# Patient Record
Sex: FEMALE | Race: BLACK OR AFRICAN AMERICAN | Employment: OTHER | URBAN - METROPOLITAN AREA
[De-identification: names, ages, dates, MRNs, and addresses within clinical notes are randomized per-mention and may not be internally consistent; named-entity substitution may affect disease eponyms.]

---

## 2017-01-03 ENCOUNTER — OFFICE VISIT (OUTPATIENT)
Dept: BARIATRICS/WEIGHT MGMT | Age: 52
End: 2017-01-03

## 2017-01-03 VITALS
SYSTOLIC BLOOD PRESSURE: 134 MMHG | BODY MASS INDEX: 36.8 KG/M2 | OXYGEN SATURATION: 97 % | HEART RATE: 90 BPM | HEIGHT: 66 IN | WEIGHT: 229 LBS | DIASTOLIC BLOOD PRESSURE: 83 MMHG

## 2017-01-03 DIAGNOSIS — F41.9 ANXIETY: ICD-10-CM

## 2017-01-03 DIAGNOSIS — K21.00 GASTROESOPHAGEAL REFLUX DISEASE WITH ESOPHAGITIS: ICD-10-CM

## 2017-01-03 DIAGNOSIS — E66.9 OBESITY (BMI 35.0-39.9 WITHOUT COMORBIDITY): Primary | ICD-10-CM

## 2017-01-03 DIAGNOSIS — Z01.818 PRE-OPERATIVE CLEARANCE: ICD-10-CM

## 2017-01-03 DIAGNOSIS — F17.219 CIGARETTE NICOTINE DEPENDENCE WITH NICOTINE-INDUCED DISORDER: ICD-10-CM

## 2017-01-03 PROBLEM — F17.200 NICOTINE ADDICTION: Status: ACTIVE | Noted: 2017-01-03

## 2017-01-03 PROCEDURE — 99214 OFFICE O/P EST MOD 30 MIN: CPT | Performed by: NURSE PRACTITIONER

## 2017-01-03 ASSESSMENT — ENCOUNTER SYMPTOMS
ABDOMINAL DISTENTION: 0
RHINORRHEA: 0
CHEST TIGHTNESS: 0
WHEEZING: 0
EYE PAIN: 0
SHORTNESS OF BREATH: 0
TROUBLE SWALLOWING: 0
DIARRHEA: 0
ABDOMINAL PAIN: 0
NAUSEA: 0

## 2017-01-27 ENCOUNTER — HOSPITAL ENCOUNTER (OUTPATIENT)
Dept: PHYSICAL THERAPY | Age: 52
Discharge: HOME | End: 2017-01-31
Attending: ANESTHESIOLOGY | Admitting: ANESTHESIOLOGY

## 2017-01-27 ASSESSMENT — PAIN DESCRIPTION - ORIENTATION: ORIENTATION: RIGHT;LEFT

## 2017-01-27 ASSESSMENT — PAIN DESCRIPTION - PAIN TYPE: TYPE: CHRONIC PAIN

## 2017-01-27 ASSESSMENT — PAIN DESCRIPTION - LOCATION: LOCATION: BACK

## 2017-01-27 ASSESSMENT — PAIN DESCRIPTION - DESCRIPTORS: DESCRIPTORS: TIGHTNESS;ACHING

## 2017-02-01 ENCOUNTER — HOSPITAL ENCOUNTER (OUTPATIENT)
Dept: OTHER | Age: 52
Discharge: HOME | End: 2017-02-28
Attending: ANESTHESIOLOGY | Admitting: INTERNAL MEDICINE

## 2017-02-02 ENCOUNTER — OFFICE VISIT (OUTPATIENT)
Dept: BARIATRICS/WEIGHT MGMT | Age: 52
End: 2017-02-02

## 2017-02-02 VITALS
DIASTOLIC BLOOD PRESSURE: 80 MMHG | WEIGHT: 225.5 LBS | BODY MASS INDEX: 36.24 KG/M2 | HEIGHT: 66 IN | HEART RATE: 69 BPM | SYSTOLIC BLOOD PRESSURE: 131 MMHG | RESPIRATION RATE: 12 BRPM

## 2017-02-02 DIAGNOSIS — E66.9 OBESITY (BMI 35.0-39.9 WITHOUT COMORBIDITY): ICD-10-CM

## 2017-02-02 DIAGNOSIS — F32.9 REACTIVE DEPRESSION: ICD-10-CM

## 2017-02-02 DIAGNOSIS — M70.61 TROCHANTERIC BURSITIS OF RIGHT HIP: ICD-10-CM

## 2017-02-02 DIAGNOSIS — N83.201 CYSTS OF BOTH OVARIES: ICD-10-CM

## 2017-02-02 DIAGNOSIS — M54.41 CHRONIC RIGHT-SIDED LOW BACK PAIN WITH RIGHT-SIDED SCIATICA: ICD-10-CM

## 2017-02-02 DIAGNOSIS — I10 ESSENTIAL HYPERTENSION: Primary | Chronic | ICD-10-CM

## 2017-02-02 DIAGNOSIS — J43.9 PULMONARY EMPHYSEMA, UNSPECIFIED EMPHYSEMA TYPE (HCC): Chronic | ICD-10-CM

## 2017-02-02 DIAGNOSIS — F17.219 CIGARETTE NICOTINE DEPENDENCE WITH NICOTINE-INDUCED DISORDER: ICD-10-CM

## 2017-02-02 DIAGNOSIS — E78.5 DYSLIPIDEMIA: ICD-10-CM

## 2017-02-02 DIAGNOSIS — E66.01 MORBID OBESITY DUE TO EXCESS CALORIES (HCC): Chronic | ICD-10-CM

## 2017-02-02 DIAGNOSIS — R06.83 SNORES: ICD-10-CM

## 2017-02-02 DIAGNOSIS — G89.29 CHRONIC RIGHT-SIDED LOW BACK PAIN WITH RIGHT-SIDED SCIATICA: ICD-10-CM

## 2017-02-02 DIAGNOSIS — J96.10 CHRONIC RESPIRATORY FAILURE, UNSPECIFIED WHETHER WITH HYPOXIA OR HYPERCAPNIA (HCC): ICD-10-CM

## 2017-02-02 DIAGNOSIS — N83.202 CYSTS OF BOTH OVARIES: ICD-10-CM

## 2017-02-02 DIAGNOSIS — K21.00 GASTROESOPHAGEAL REFLUX DISEASE WITH ESOPHAGITIS: ICD-10-CM

## 2017-02-02 DIAGNOSIS — F41.9 ANXIETY: ICD-10-CM

## 2017-02-02 DIAGNOSIS — J44.9 COPD, SEVERE (HCC): ICD-10-CM

## 2017-02-02 DIAGNOSIS — M25.551 ARTHRALGIA OF RIGHT HIP: ICD-10-CM

## 2017-02-02 DIAGNOSIS — G57.01 PIRIFORMIS SYNDROME, RIGHT: ICD-10-CM

## 2017-02-02 DIAGNOSIS — Z86.14 HISTORY OF MRSA INFECTION: ICD-10-CM

## 2017-02-02 DIAGNOSIS — N39.3 SUI (STRESS URINARY INCONTINENCE, FEMALE): ICD-10-CM

## 2017-02-02 PROCEDURE — 99214 OFFICE O/P EST MOD 30 MIN: CPT | Performed by: SURGERY

## 2017-02-03 ENCOUNTER — HOSPITAL ENCOUNTER (OUTPATIENT)
Dept: SLEEP CENTER | Age: 52
Discharge: HOME | End: 2017-02-03
Attending: INTERNAL MEDICINE | Admitting: INTERNAL MEDICINE

## 2017-02-14 ENCOUNTER — HOSPITAL ENCOUNTER (OUTPATIENT)
Dept: PHYSICAL THERAPY | Age: 52
Discharge: HOME | End: 2017-02-14

## 2017-02-20 ENCOUNTER — HOSPITAL ENCOUNTER (OUTPATIENT)
Dept: PHYSICAL THERAPY | Age: 52
Discharge: HOME | End: 2017-02-20

## 2017-03-01 ENCOUNTER — OFFICE VISIT (OUTPATIENT)
Dept: BARIATRICS/WEIGHT MGMT | Age: 52
End: 2017-03-01

## 2017-03-01 ENCOUNTER — HOSPITAL ENCOUNTER (OUTPATIENT)
Dept: OTHER | Age: 52
Discharge: HOME | End: 2017-03-31
Attending: ANESTHESIOLOGY | Admitting: INTERNAL MEDICINE

## 2017-03-01 VITALS
RESPIRATION RATE: 16 BRPM | BODY MASS INDEX: 35.81 KG/M2 | HEIGHT: 66 IN | DIASTOLIC BLOOD PRESSURE: 77 MMHG | SYSTOLIC BLOOD PRESSURE: 110 MMHG | WEIGHT: 222.8 LBS | HEART RATE: 89 BPM

## 2017-03-01 DIAGNOSIS — M54.41 CHRONIC RIGHT-SIDED LOW BACK PAIN WITH RIGHT-SIDED SCIATICA: ICD-10-CM

## 2017-03-01 DIAGNOSIS — G89.29 CHRONIC RIGHT-SIDED LOW BACK PAIN WITH RIGHT-SIDED SCIATICA: ICD-10-CM

## 2017-03-01 DIAGNOSIS — J96.10 CHRONIC RESPIRATORY FAILURE, UNSPECIFIED WHETHER WITH HYPOXIA OR HYPERCAPNIA (HCC): ICD-10-CM

## 2017-03-01 DIAGNOSIS — E66.9 OBESITY (BMI 35.0-39.9 WITHOUT COMORBIDITY): ICD-10-CM

## 2017-03-01 DIAGNOSIS — R06.83 SNORES: ICD-10-CM

## 2017-03-01 DIAGNOSIS — I10 ESSENTIAL HYPERTENSION: Chronic | ICD-10-CM

## 2017-03-01 DIAGNOSIS — N39.3 SUI (STRESS URINARY INCONTINENCE, FEMALE): ICD-10-CM

## 2017-03-01 DIAGNOSIS — K29.31 CHRONIC SUPERFICIAL GASTRITIS WITH BLEEDING: ICD-10-CM

## 2017-03-01 DIAGNOSIS — Z86.14 HISTORY OF MRSA INFECTION: ICD-10-CM

## 2017-03-01 DIAGNOSIS — J44.9 COPD, SEVERE (HCC): ICD-10-CM

## 2017-03-01 DIAGNOSIS — E66.01 MORBID OBESITY DUE TO EXCESS CALORIES (HCC): ICD-10-CM

## 2017-03-01 DIAGNOSIS — J43.9 PULMONARY EMPHYSEMA, UNSPECIFIED EMPHYSEMA TYPE (HCC): Chronic | ICD-10-CM

## 2017-03-01 DIAGNOSIS — E78.5 DYSLIPIDEMIA: ICD-10-CM

## 2017-03-01 DIAGNOSIS — F41.9 ANXIETY: ICD-10-CM

## 2017-03-01 DIAGNOSIS — K21.00 GASTROESOPHAGEAL REFLUX DISEASE WITH ESOPHAGITIS: ICD-10-CM

## 2017-03-01 DIAGNOSIS — N83.201 CYSTS OF BOTH OVARIES: ICD-10-CM

## 2017-03-01 DIAGNOSIS — R10.11 RUQ PAIN: Primary | ICD-10-CM

## 2017-03-01 DIAGNOSIS — F17.219 CIGARETTE NICOTINE DEPENDENCE WITH NICOTINE-INDUCED DISORDER: ICD-10-CM

## 2017-03-01 DIAGNOSIS — N83.202 CYSTS OF BOTH OVARIES: ICD-10-CM

## 2017-03-01 DIAGNOSIS — F32.9 REACTIVE DEPRESSION: ICD-10-CM

## 2017-03-01 DIAGNOSIS — G57.01 PIRIFORMIS SYNDROME, RIGHT: ICD-10-CM

## 2017-03-01 DIAGNOSIS — M70.61 TROCHANTERIC BURSITIS OF RIGHT HIP: ICD-10-CM

## 2017-03-01 DIAGNOSIS — M25.551 ARTHRALGIA OF RIGHT HIP: ICD-10-CM

## 2017-03-01 PROCEDURE — 99214 OFFICE O/P EST MOD 30 MIN: CPT | Performed by: SURGERY

## 2017-03-10 ENCOUNTER — HOSPITAL ENCOUNTER (OUTPATIENT)
Dept: MRI IMAGING | Age: 52
Discharge: HOME | End: 2017-03-10
Attending: INTERNAL MEDICINE | Admitting: INTERNAL MEDICINE

## 2017-03-10 DIAGNOSIS — M54.42 LOW BACK PAIN WITH LEFT-SIDED SCIATICA: ICD-10-CM

## 2017-03-10 DIAGNOSIS — G89.29 CHRONIC BILATERAL LOW BACK PAIN WITH LEFT-SIDED SCIATICA: ICD-10-CM

## 2017-03-10 DIAGNOSIS — M54.42 CHRONIC BILATERAL LOW BACK PAIN WITH LEFT-SIDED SCIATICA: ICD-10-CM

## 2017-03-22 ENCOUNTER — OFFICE VISIT (OUTPATIENT)
Dept: BARIATRICS/WEIGHT MGMT | Age: 52
End: 2017-03-22

## 2017-03-22 VITALS
WEIGHT: 228.2 LBS | DIASTOLIC BLOOD PRESSURE: 72 MMHG | BODY MASS INDEX: 36.67 KG/M2 | SYSTOLIC BLOOD PRESSURE: 109 MMHG | HEIGHT: 66 IN | RESPIRATION RATE: 16 BRPM | HEART RATE: 80 BPM

## 2017-03-22 DIAGNOSIS — R06.83 SNORES: ICD-10-CM

## 2017-03-22 DIAGNOSIS — E11.42 TYPE 2 DIABETES MELLITUS WITH DIABETIC POLYNEUROPATHY, WITHOUT LONG-TERM CURRENT USE OF INSULIN (HCC): Chronic | ICD-10-CM

## 2017-03-22 DIAGNOSIS — E66.9 OBESITY (BMI 30-39.9): Primary | ICD-10-CM

## 2017-03-22 PROCEDURE — 99214 OFFICE O/P EST MOD 30 MIN: CPT | Performed by: SURGERY

## 2017-03-22 RX ORDER — NAPROXEN 250 MG/1
250 TABLET ORAL 2 TIMES DAILY WITH MEALS
COMMUNITY
End: 2017-10-19

## 2017-03-23 ENCOUNTER — HOSPITAL ENCOUNTER (OUTPATIENT)
Dept: ULTRASOUND IMAGING | Age: 52
Discharge: HOME | End: 2017-03-23
Attending: SURGERY | Admitting: SURGERY

## 2017-03-23 DIAGNOSIS — R10.11 RUQ PAIN: ICD-10-CM

## 2017-04-01 ENCOUNTER — HOSPITAL ENCOUNTER (OUTPATIENT)
Dept: OTHER | Age: 52
Discharge: HOME | End: 2017-04-30
Attending: ANESTHESIOLOGY | Admitting: INTERNAL MEDICINE

## 2017-04-24 ENCOUNTER — OFFICE VISIT (OUTPATIENT)
Dept: BARIATRICS/WEIGHT MGMT | Age: 52
End: 2017-04-24

## 2017-04-24 VITALS
HEIGHT: 66 IN | DIASTOLIC BLOOD PRESSURE: 77 MMHG | WEIGHT: 228.2 LBS | HEART RATE: 91 BPM | SYSTOLIC BLOOD PRESSURE: 140 MMHG | BODY MASS INDEX: 36.67 KG/M2

## 2017-04-24 DIAGNOSIS — I10 ESSENTIAL HYPERTENSION: Chronic | ICD-10-CM

## 2017-04-24 DIAGNOSIS — K21.00 GASTROESOPHAGEAL REFLUX DISEASE WITH ESOPHAGITIS: ICD-10-CM

## 2017-04-24 DIAGNOSIS — F32.9 REACTIVE DEPRESSION: ICD-10-CM

## 2017-04-24 DIAGNOSIS — E66.9 OBESITY (BMI 35.0-39.9 WITHOUT COMORBIDITY): ICD-10-CM

## 2017-04-24 DIAGNOSIS — N39.3 SUI (STRESS URINARY INCONTINENCE, FEMALE): ICD-10-CM

## 2017-04-24 DIAGNOSIS — E11.42 TYPE 2 DIABETES MELLITUS WITH DIABETIC POLYNEUROPATHY, WITHOUT LONG-TERM CURRENT USE OF INSULIN (HCC): Primary | Chronic | ICD-10-CM

## 2017-04-24 DIAGNOSIS — E78.5 DYSLIPIDEMIA: ICD-10-CM

## 2017-04-24 PROCEDURE — 99214 OFFICE O/P EST MOD 30 MIN: CPT | Performed by: NURSE PRACTITIONER

## 2017-04-24 RX ORDER — MONTELUKAST SODIUM 10 MG/1
1 TABLET ORAL NIGHTLY
Refills: 6 | COMMUNITY
Start: 2017-02-01 | End: 2018-11-19

## 2017-04-24 RX ORDER — GABAPENTIN 600 MG/1
1 TABLET ORAL 4 TIMES DAILY
Refills: 0 | COMMUNITY
Start: 2017-04-17 | End: 2017-10-19

## 2017-04-24 RX ORDER — LORATADINE 10 MG/1
1 TABLET ORAL DAILY
Refills: 3 | COMMUNITY
Start: 2017-02-01 | End: 2017-10-19

## 2017-04-24 RX ORDER — HYDROXYZINE HYDROCHLORIDE 25 MG/1
1 TABLET, FILM COATED ORAL 3 TIMES DAILY
Refills: 0 | COMMUNITY
Start: 2017-02-01 | End: 2017-10-19

## 2017-04-24 RX ORDER — VENLAFAXINE HYDROCHLORIDE 150 MG/1
1 CAPSULE, EXTENDED RELEASE ORAL DAILY
Refills: 3 | COMMUNITY
Start: 2017-03-21 | End: 2017-10-19

## 2017-04-24 RX ORDER — METFORMIN HYDROCHLORIDE 500 MG/1
500 TABLET, EXTENDED RELEASE ORAL 2 TIMES DAILY
Refills: 0 | COMMUNITY
Start: 2017-03-10 | End: 2018-08-16 | Stop reason: DRUGHIGH

## 2017-04-24 RX ORDER — NYSTATIN 100000 [USP'U]/G
POWDER TOPICAL
Refills: 2 | COMMUNITY
Start: 2017-04-10 | End: 2017-10-19

## 2017-04-24 RX ORDER — NAPROXEN SODIUM 550 MG/1
1 TABLET ORAL 2 TIMES DAILY
Refills: 11 | COMMUNITY
Start: 2017-03-16 | End: 2017-10-19

## 2017-04-24 RX ORDER — TIZANIDINE 4 MG/1
1 TABLET ORAL 2 TIMES DAILY
Refills: 11 | COMMUNITY
Start: 2017-04-15 | End: 2019-07-26 | Stop reason: ALTCHOICE

## 2017-04-24 RX ORDER — LISINOPRIL AND HYDROCHLOROTHIAZIDE 25; 20 MG/1; MG/1
2 TABLET ORAL EVERY MORNING
Refills: 6 | COMMUNITY
Start: 2017-03-13 | End: 2018-12-12 | Stop reason: ALTCHOICE

## 2017-04-24 RX ORDER — GLIPIZIDE 5 MG/1
5 TABLET ORAL 2 TIMES DAILY
Refills: 4 | COMMUNITY
Start: 2017-03-09 | End: 2018-06-27 | Stop reason: ALTCHOICE

## 2017-04-24 RX ORDER — TRAZODONE HYDROCHLORIDE 50 MG/1
50 TABLET ORAL NIGHTLY
Refills: 3 | COMMUNITY
Start: 2017-03-21 | End: 2018-01-25 | Stop reason: SDUPTHER

## 2017-04-24 RX ORDER — KETOCONAZOLE 20 MG/ML
SHAMPOO TOPICAL
Refills: 10 | COMMUNITY
Start: 2017-04-10 | End: 2017-10-19

## 2017-04-24 RX ORDER — SITAGLIPTIN 100 MG/1
1 TABLET, FILM COATED ORAL EVERY MORNING
Refills: 3 | COMMUNITY
Start: 2017-03-08 | End: 2018-08-16 | Stop reason: SDUPTHER

## 2017-04-24 RX ORDER — ATORVASTATIN CALCIUM 40 MG/1
1 TABLET, FILM COATED ORAL DAILY
Refills: 5 | COMMUNITY
Start: 2017-02-01 | End: 2017-10-19

## 2017-04-24 ASSESSMENT — ENCOUNTER SYMPTOMS
ABDOMINAL DISTENTION: 0
ABDOMINAL PAIN: 0
NAUSEA: 0
DIARRHEA: 0
CHEST TIGHTNESS: 0
EYE PAIN: 0
COUGH: 1
SHORTNESS OF BREATH: 0
WHEEZING: 0
TROUBLE SWALLOWING: 0
RHINORRHEA: 0
BACK PAIN: 1

## 2017-04-25 ENCOUNTER — TELEPHONE (OUTPATIENT)
Dept: BARIATRICS/WEIGHT MGMT | Age: 52
End: 2017-04-25

## 2017-04-26 ENCOUNTER — TELEPHONE (OUTPATIENT)
Dept: PHYSICAL MEDICINE AND REHAB | Age: 52
End: 2017-04-26

## 2017-04-27 ENCOUNTER — OFFICE VISIT (OUTPATIENT)
Dept: PHYSICAL MEDICINE AND REHAB | Age: 52
End: 2017-04-27

## 2017-04-27 DIAGNOSIS — M79.602 PARESTHESIA AND PAIN OF BOTH UPPER EXTREMITIES: ICD-10-CM

## 2017-04-27 DIAGNOSIS — M79.601 PARESTHESIA AND PAIN OF BOTH UPPER EXTREMITIES: ICD-10-CM

## 2017-04-27 DIAGNOSIS — G56.03 BILATERAL CARPAL TUNNEL SYNDROME: Primary | ICD-10-CM

## 2017-04-27 DIAGNOSIS — R20.2 PARESTHESIA AND PAIN OF BOTH UPPER EXTREMITIES: ICD-10-CM

## 2017-04-27 PROCEDURE — 95910 NRV CNDJ TEST 7-8 STUDIES: CPT | Performed by: PHYSICAL MEDICINE & REHABILITATION

## 2017-04-27 PROCEDURE — 95886 MUSC TEST DONE W/N TEST COMP: CPT | Performed by: PHYSICAL MEDICINE & REHABILITATION

## 2017-05-01 ENCOUNTER — HOSPITAL ENCOUNTER (OUTPATIENT)
Dept: OTHER | Age: 52
Discharge: HOME | End: 2017-05-31
Attending: ANESTHESIOLOGY | Admitting: INTERNAL MEDICINE

## 2017-05-24 ENCOUNTER — OFFICE VISIT (OUTPATIENT)
Dept: BARIATRICS/WEIGHT MGMT | Age: 52
End: 2017-05-24

## 2017-05-24 VITALS
BODY MASS INDEX: 36.4 KG/M2 | DIASTOLIC BLOOD PRESSURE: 90 MMHG | SYSTOLIC BLOOD PRESSURE: 138 MMHG | HEIGHT: 66 IN | WEIGHT: 226.5 LBS | HEART RATE: 85 BPM

## 2017-05-24 DIAGNOSIS — E66.01 MORBID OBESITY DUE TO EXCESS CALORIES (HCC): Primary | ICD-10-CM

## 2017-05-24 PROCEDURE — 99214 OFFICE O/P EST MOD 30 MIN: CPT | Performed by: SURGERY

## 2017-06-29 ENCOUNTER — OFFICE VISIT (OUTPATIENT)
Dept: BARIATRICS/WEIGHT MGMT | Age: 52
End: 2017-06-29

## 2017-06-29 VITALS
WEIGHT: 230.8 LBS | BODY MASS INDEX: 37.09 KG/M2 | HEIGHT: 66 IN | HEART RATE: 85 BPM | SYSTOLIC BLOOD PRESSURE: 130 MMHG | DIASTOLIC BLOOD PRESSURE: 86 MMHG

## 2017-06-29 DIAGNOSIS — J96.10 CHRONIC RESPIRATORY FAILURE, UNSPECIFIED WHETHER WITH HYPOXIA OR HYPERCAPNIA (HCC): ICD-10-CM

## 2017-06-29 DIAGNOSIS — E11.42 TYPE 2 DIABETES MELLITUS WITH DIABETIC POLYNEUROPATHY, WITHOUT LONG-TERM CURRENT USE OF INSULIN (HCC): Chronic | ICD-10-CM

## 2017-06-29 DIAGNOSIS — G89.29 CHRONIC RIGHT-SIDED LOW BACK PAIN WITH RIGHT-SIDED SCIATICA: ICD-10-CM

## 2017-06-29 DIAGNOSIS — Z86.14 HISTORY OF MRSA INFECTION: ICD-10-CM

## 2017-06-29 DIAGNOSIS — J44.9 COPD, SEVERE (HCC): ICD-10-CM

## 2017-06-29 DIAGNOSIS — F41.9 ANXIETY: Primary | ICD-10-CM

## 2017-06-29 DIAGNOSIS — R06.83 SNORES: ICD-10-CM

## 2017-06-29 DIAGNOSIS — I10 ESSENTIAL HYPERTENSION: Chronic | ICD-10-CM

## 2017-06-29 DIAGNOSIS — M25.551 ARTHRALGIA OF RIGHT HIP: ICD-10-CM

## 2017-06-29 DIAGNOSIS — F32.9 REACTIVE DEPRESSION: ICD-10-CM

## 2017-06-29 DIAGNOSIS — M54.41 CHRONIC RIGHT-SIDED LOW BACK PAIN WITH RIGHT-SIDED SCIATICA: ICD-10-CM

## 2017-06-29 DIAGNOSIS — N39.3 SUI (STRESS URINARY INCONTINENCE, FEMALE): ICD-10-CM

## 2017-06-29 DIAGNOSIS — G89.4 CHRONIC PAIN SYNDROME: Chronic | ICD-10-CM

## 2017-06-29 DIAGNOSIS — K21.00 GASTROESOPHAGEAL REFLUX DISEASE WITH ESOPHAGITIS: ICD-10-CM

## 2017-06-29 DIAGNOSIS — E78.5 DYSLIPIDEMIA: ICD-10-CM

## 2017-06-29 DIAGNOSIS — J43.9 PULMONARY EMPHYSEMA, UNSPECIFIED EMPHYSEMA TYPE (HCC): Chronic | ICD-10-CM

## 2017-06-29 DIAGNOSIS — E66.01 MORBID OBESITY DUE TO EXCESS CALORIES (HCC): ICD-10-CM

## 2017-06-29 PROCEDURE — 99214 OFFICE O/P EST MOD 30 MIN: CPT | Performed by: SURGERY

## 2017-06-30 ASSESSMENT — ENCOUNTER SYMPTOMS
SHORTNESS OF BREATH: 1
COUGH: 0
ABDOMINAL DISTENTION: 1
DIARRHEA: 0
CONSTIPATION: 0
TROUBLE SWALLOWING: 0
ABDOMINAL PAIN: 1
VOMITING: 0
ANAL BLEEDING: 0
COLOR CHANGE: 0
BACK PAIN: 1
VOICE CHANGE: 0
SORE THROAT: 0
NAUSEA: 0
PHOTOPHOBIA: 0
BLOOD IN STOOL: 0
WHEEZING: 0

## 2017-07-20 ENCOUNTER — OFFICE VISIT (OUTPATIENT)
Dept: BARIATRICS/WEIGHT MGMT | Age: 52
End: 2017-07-20

## 2017-07-20 VITALS
WEIGHT: 230.1 LBS | HEIGHT: 66 IN | BODY MASS INDEX: 36.98 KG/M2 | HEART RATE: 102 BPM | SYSTOLIC BLOOD PRESSURE: 124 MMHG | DIASTOLIC BLOOD PRESSURE: 85 MMHG

## 2017-07-20 DIAGNOSIS — N39.3 SUI (STRESS URINARY INCONTINENCE, FEMALE): ICD-10-CM

## 2017-07-20 DIAGNOSIS — K21.00 GASTROESOPHAGEAL REFLUX DISEASE WITH ESOPHAGITIS: ICD-10-CM

## 2017-07-20 DIAGNOSIS — G89.29 CHRONIC RIGHT-SIDED LOW BACK PAIN WITH RIGHT-SIDED SCIATICA: ICD-10-CM

## 2017-07-20 DIAGNOSIS — M54.41 CHRONIC RIGHT-SIDED LOW BACK PAIN WITH RIGHT-SIDED SCIATICA: ICD-10-CM

## 2017-07-20 DIAGNOSIS — E78.5 DYSLIPIDEMIA: ICD-10-CM

## 2017-07-20 DIAGNOSIS — E66.9 OBESITY (BMI 35.0-39.9 WITHOUT COMORBIDITY): Primary | ICD-10-CM

## 2017-07-20 DIAGNOSIS — R06.83 SNORES: ICD-10-CM

## 2017-07-20 PROCEDURE — 99214 OFFICE O/P EST MOD 30 MIN: CPT | Performed by: SURGERY

## 2017-07-24 ASSESSMENT — ENCOUNTER SYMPTOMS
ANAL BLEEDING: 0
DIARRHEA: 0
SHORTNESS OF BREATH: 1
SORE THROAT: 0
COUGH: 0
ABDOMINAL DISTENTION: 1
CONSTIPATION: 0
NAUSEA: 0
BACK PAIN: 1
COLOR CHANGE: 0
ABDOMINAL PAIN: 1
TROUBLE SWALLOWING: 0
VOICE CHANGE: 0
WHEEZING: 0
BLOOD IN STOOL: 0
VOMITING: 0
PHOTOPHOBIA: 0

## 2017-08-25 ENCOUNTER — OFFICE VISIT (OUTPATIENT)
Dept: BARIATRICS/WEIGHT MGMT | Age: 52
End: 2017-08-25

## 2017-08-25 VITALS
HEIGHT: 66 IN | DIASTOLIC BLOOD PRESSURE: 79 MMHG | BODY MASS INDEX: 37.11 KG/M2 | SYSTOLIC BLOOD PRESSURE: 125 MMHG | HEART RATE: 98 BPM | WEIGHT: 230.9 LBS

## 2017-08-25 DIAGNOSIS — M25.551 ARTHRALGIA OF RIGHT HIP: ICD-10-CM

## 2017-08-25 DIAGNOSIS — F32.9 REACTIVE DEPRESSION: ICD-10-CM

## 2017-08-25 DIAGNOSIS — K21.00 GASTROESOPHAGEAL REFLUX DISEASE WITH ESOPHAGITIS: ICD-10-CM

## 2017-08-25 DIAGNOSIS — I10 ESSENTIAL HYPERTENSION: Chronic | ICD-10-CM

## 2017-08-25 DIAGNOSIS — E78.5 DYSLIPIDEMIA: ICD-10-CM

## 2017-08-25 DIAGNOSIS — E66.9 OBESITY (BMI 35.0-39.9 WITHOUT COMORBIDITY): Primary | ICD-10-CM

## 2017-08-25 DIAGNOSIS — E11.42 TYPE 2 DIABETES MELLITUS WITH DIABETIC POLYNEUROPATHY, WITHOUT LONG-TERM CURRENT USE OF INSULIN (HCC): Chronic | ICD-10-CM

## 2017-08-25 PROCEDURE — 99214 OFFICE O/P EST MOD 30 MIN: CPT | Performed by: NURSE PRACTITIONER

## 2017-08-25 ASSESSMENT — ENCOUNTER SYMPTOMS
NAUSEA: 0
SHORTNESS OF BREATH: 0
ABDOMINAL PAIN: 0
TROUBLE SWALLOWING: 0
DIARRHEA: 0
RHINORRHEA: 0
BACK PAIN: 1
CHEST TIGHTNESS: 0
EYE PAIN: 0
WHEEZING: 0
ABDOMINAL DISTENTION: 0

## 2017-09-12 ENCOUNTER — TELEPHONE (OUTPATIENT)
Dept: BARIATRICS/WEIGHT MGMT | Age: 52
End: 2017-09-12

## 2017-09-15 LAB
AVERAGE GLUCOSE: NORMAL
HBA1C MFR BLD: 8 %

## 2017-10-03 DIAGNOSIS — E66.9 OBESITY (BMI 35.0-39.9 WITHOUT COMORBIDITY): Primary | ICD-10-CM

## 2017-10-05 RX ORDER — FEEDER CONTAINER WITH PUMP SET
1 EACH MISCELLANEOUS 4 TIMES DAILY PRN
Qty: 124 CAN | Refills: 0 | Status: SHIPPED | OUTPATIENT
Start: 2017-10-05 | End: 2018-12-12

## 2017-10-11 ENCOUNTER — OFFICE VISIT (OUTPATIENT)
Dept: BARIATRICS/WEIGHT MGMT | Age: 52
End: 2017-10-11

## 2017-10-11 VITALS
BODY MASS INDEX: 37.27 KG/M2 | HEIGHT: 66 IN | DIASTOLIC BLOOD PRESSURE: 78 MMHG | HEART RATE: 80 BPM | WEIGHT: 231.9 LBS | SYSTOLIC BLOOD PRESSURE: 126 MMHG

## 2017-10-11 PROCEDURE — 99999 PR OFFICE/OUTPT VISIT,PROCEDURE ONLY: CPT

## 2017-10-11 NOTE — PROGRESS NOTES
Outpatient Nutrition Counseling    REASON FOR VISIT: Pre-Op Diet Education    Chief Complaint:    Chief Complaint   Patient presents with    Weight Management       SUBJECTIVE:  Pt here to start 2 week liver shrinking protein shake diet. Instructed on post-op diet progression, fluid and activity goals, and recipes. Pt ready to get started. The patient is a 50 y.o. female being seen for morbid obesity, considering weight loss surgery; Beverly's, Height: 5' 6\" (167.6 cm), Weight: 231 lb 14.4 oz (105.2 kg), Current Body mass index is 37.43 kg/m². The patient's PCP is Stephanie Triplett MD       Comorbid Conditions:  Significant diseases affecting this patient are   Past Medical History:   Diagnosis Date    [facility]     Depression     Diabetes mellitus [facility]     Emphysema     Hypertension     Obesity     Pap smear for cervical cancer screening 02/11/2008     Neg   Postive-Trichomonas    Pap smear for cervical cancer screening 03/25/2010    Neg    Pap smear for cervical cancer screening 07/18/2011    Neg   . Review of Systems - ROS  Otherwise per HPI. Allergies:  No Known Allergies    Past Surgical History:  Past Surgical History:   Procedure Laterality Date    APPENDECTOMY      CARPAL TUNNEL RELEASE Right     ELBOW SURGERY      ENDOSCOPY, COLON, DIAGNOSTIC  03/03/2017    with EGD    HYSTERECTOMY      UPPER GASTROINTESTINAL ENDOSCOPY  03/03/2017       Family History:  Family History   Problem Relation Age of Onset    Heart Failure Father     Heart Disease Father     High Blood Pressure Mother     High Blood Pressure Sister     Diabetes Brother     Heart Disease Paternal Grandmother     Diabetes Paternal Grandmother        Social History:  Social History     Social History    Marital status: Single     Spouse name: N/A    Number of children: N/A    Years of education: N/A     Occupational History    Not on file.      Social History Main Topics    Smoking status: Former Smoker     Packs/day: 0.25     Years: 25.00     Types: Cigarettes     Quit date: 9/30/2016    Smokeless tobacco: Never Used      Comment: Smokes when around her friends/Smoked again last Monday  Todays date 11/21/2016    Alcohol use 0.6 oz/week     1 Cans of beer per week      Comment: occasional    Drug use: No    Sexual activity: Not Currently     Partners: Male     Other Topics Concern    Not on file     Social History Narrative    No narrative on file         OBJECTIVE:  Physical Exam   /78 (Site: Right Arm, Position: Sitting, Cuff Size: Medium Adult)   Pulse 80   Ht 5' 6\" (1.676 m)   Wt 231 lb 14.4 oz (105.2 kg)   BMI 37.43 kg/m²        NUTRITION DIAGNOSIS: Overweight / Obesity   Problem: Increased adiposity compared to reference standard or established norms   Etiology: Excess intake compared to output over time   S/S: Ht: 66\" Wt: 231.9 lbs BMI: 37.43    NUTRITION INTERVENTIONS:    Individualized treatment goals to address nutrition diagnosis:   Instructed on Pre and Post Op Diet for Planned Sleeve Gastrectomy 10/26/17   Provided sample menus, food lists, recipes, and meal planning handouts   Encouraged Physical activity as approved by physician    MONITORING/ EVALUATION/ PLAN:   Pt verbalized understanding of all materials covered   Pt asked pertinent questions throughout the session - expect compliance with nutrition guidelines presented   Provided pt with contact information should questions arise prior to next visit   Will f/u with pt in 2 weeks for weigh-in  Evangelina Ayala MS, RDN, LD  10/11/2017

## 2017-10-17 ENCOUNTER — TELEPHONE (OUTPATIENT)
Dept: BARIATRICS/WEIGHT MGMT | Age: 52
End: 2017-10-17

## 2017-10-17 NOTE — ANESTHESIA PRE-OP
Results:    EKG:  Sinus rhythm with 1st degree AV block, HR 88   Otherwise normal ECG   10/19.2017    LABS:      CBC  Lab Results   Component Value Date/Time    WBC 10.9 (H) 10/19/2017 09:15 AM    HGB 15.2 10/19/2017 09:15 AM    HCT 43.1 10/19/2017 09:15 AM     10/19/2017 09:15 AM     RENAL  Lab Results   Component Value Date/Time     10/19/2017 09:15 AM    K 4.3 10/19/2017 09:15 AM    CL 99 10/19/2017 09:15 AM    CO2 21 10/19/2017 09:15 AM    BUN 8 10/19/2017 09:15 AM    CREATININE 0.9 10/19/2017 09:15 AM    GLUCOSE 198 (H) 10/19/2017 09:15 AM       Summary:  Chart reviewed, pt. Interviewed and examined. Planned surgical procedure:  Robotic Laparoscopic Gastrectomy Sleeve and Hiatal Hernia repair per Dr. Duy Rose.    1.  Cardiac evaluation per Dr. Glory Scruggs. Considered low-risk for becca-operative cardiac complications. Controlled HTN, HLD. NL stress and echo, EF 56%. 2.  Pulmonary evaluation per Dr. Froilan Curran. She is at higher risk of developing complications during and after surgery. Cleared to have all forms of anaesthesia including general anaesthesia. SOB with walking. Hx severe COPD, emphysema. Inhalers x 3, med neb treatments. No recent exacerbation. Followed by Dr. Froilan Curran. Former smoker, hx .5ppd x 30 years. Quit 2016. Able to lie flat. 4.  Controlled reflux, on PPI. Hiatal hernia. Colon polyps. Hx obesity with failed diets. On pre-op liquid protein diet. Started 4 shakes per day 10/12/2017. Water intake: 64-80 oz. Counseled to have minimum of 64 oz water per day. 5.  Psoriasis roshni elbows, stomach, and breasts. 6.  DM with neuropathy feet and hands. Last A1c was ~8.0 9/2017. Random BS today: 198.     7.  Osteoarthritis hand, back, and roshni shoulders. Roshni hip and shoulders with bursitis. S/p right rotator cuff repair 2013  Right CTR, 2017. S/p roshni elbow surgery, 2013. CBP. WBC: 10.9.     8. Hx migraine HA, last 8/2017. Fibromyalgia.         Anesthesia Evaluation will follow by a

## 2017-10-19 ENCOUNTER — HOSPITAL ENCOUNTER (OUTPATIENT)
Dept: PREADMISSION TESTING | Age: 52
Discharge: HOME | End: 2017-10-19
Attending: SURGERY | Admitting: SURGERY

## 2017-10-19 VITALS
WEIGHT: 227.38 LBS | OXYGEN SATURATION: 94 % | DIASTOLIC BLOOD PRESSURE: 88 MMHG | RESPIRATION RATE: 16 BRPM | HEART RATE: 88 BPM | TEMPERATURE: 97.2 F | SYSTOLIC BLOOD PRESSURE: 136 MMHG | HEIGHT: 66 IN | BODY MASS INDEX: 36.54 KG/M2

## 2017-10-19 LAB
ANION GAP SERPL CALCULATED.3IONS-SCNC: 16 MMOL/L (ref 4–16)
BUN BLDV-MCNC: 8 MG/DL (ref 6–23)
CALCIUM SERPL-MCNC: 9.1 MG/DL (ref 8.3–10.6)
CHLORIDE BLD-SCNC: 99 MMOL/L (ref 99–110)
CO2: 21 MMOL/L (ref 21–32)
CREAT SERPL-MCNC: 0.9 MG/DL (ref 0.6–1.1)
EKG ATRIAL RATE: 88 BPM
EKG DIAGNOSIS: NORMAL
EKG P AXIS: 75 DEGREES
EKG P-R INTERVAL: 218 MS
EKG Q-T INTERVAL: 380 MS
EKG QRS DURATION: 74 MS
EKG QTC CALCULATION (BAZETT): 459 MS
EKG R AXIS: 85 DEGREES
EKG T AXIS: 50 DEGREES
EKG VENTRICULAR RATE: 88 BPM
GFR AFRICAN AMERICAN: >60 ML/MIN/1.73M2
GFR NON-AFRICAN AMERICAN: >60 ML/MIN/1.73M2
GLUCOSE BLD-MCNC: 198 MG/DL (ref 70–140)
HCT VFR BLD CALC: 43.1 % (ref 37–47)
HEMOGLOBIN: 15.2 GM/DL (ref 12.5–16)
MCH RBC QN AUTO: 26.7 PG (ref 27–31)
MCHC RBC AUTO-ENTMCNC: 35.3 % (ref 32–36)
MCV RBC AUTO: 75.7 FL (ref 78–100)
PDW BLD-RTO: 13.2 % (ref 11.7–14.9)
PLATELET # BLD: 307 K/CU MM (ref 140–440)
PMV BLD AUTO: 11.3 FL (ref 7.5–11.1)
POTASSIUM SERPL-SCNC: 4.3 MMOL/L (ref 3.5–5.1)
RBC # BLD: 5.69 M/CU MM (ref 4.2–5.4)
SODIUM BLD-SCNC: 136 MMOL/L (ref 135–145)
WBC # BLD: 10.9 K/CU MM (ref 4–10.5)

## 2017-10-19 RX ORDER — LORATADINE 10 MG/1
10 TABLET ORAL PRN
COMMUNITY
End: 2018-11-19

## 2017-10-19 RX ORDER — LORAZEPAM 0.5 MG/1
0.5 TABLET ORAL 2 TIMES DAILY
COMMUNITY
End: 2018-08-20

## 2017-10-19 RX ORDER — NYSTATIN 100000 [USP'U]/G
POWDER TOPICAL PRN
COMMUNITY
End: 2018-06-27 | Stop reason: ALTCHOICE

## 2017-10-19 RX ORDER — NAPROXEN 500 MG/1
500 TABLET ORAL 2 TIMES DAILY
Status: ON HOLD | COMMUNITY
End: 2017-10-26

## 2017-10-19 ASSESSMENT — PAIN SCALES - GENERAL: PAINLEVEL_OUTOF10: 0

## 2017-10-25 ENCOUNTER — OFFICE VISIT (OUTPATIENT)
Dept: BARIATRICS/WEIGHT MGMT | Age: 52
End: 2017-10-25

## 2017-10-25 VITALS
HEIGHT: 66 IN | HEART RATE: 82 BPM | BODY MASS INDEX: 36.58 KG/M2 | DIASTOLIC BLOOD PRESSURE: 78 MMHG | WEIGHT: 227.6 LBS | SYSTOLIC BLOOD PRESSURE: 126 MMHG

## 2017-10-25 PROCEDURE — 99999 PR OFFICE/OUTPT VISIT,PROCEDURE ONLY: CPT

## 2017-11-02 ENCOUNTER — OFFICE VISIT (OUTPATIENT)
Dept: BARIATRICS/WEIGHT MGMT | Age: 52
End: 2017-11-02

## 2017-11-02 VITALS
SYSTOLIC BLOOD PRESSURE: 140 MMHG | BODY MASS INDEX: 36.59 KG/M2 | HEIGHT: 66 IN | DIASTOLIC BLOOD PRESSURE: 72 MMHG | HEART RATE: 85 BPM | RESPIRATION RATE: 16 BRPM | WEIGHT: 227.7 LBS

## 2017-11-02 DIAGNOSIS — Z98.84 STATUS POST LAPAROSCOPIC SLEEVE GASTRECTOMY: ICD-10-CM

## 2017-11-02 DIAGNOSIS — Z98.84 STATUS POST BARIATRIC SURGERY: Primary | ICD-10-CM

## 2017-11-02 PROCEDURE — 99024 POSTOP FOLLOW-UP VISIT: CPT | Performed by: SURGERY

## 2017-11-02 RX ORDER — POLYETHYLENE GLYCOL 3350 17 G/17G
17 POWDER, FOR SOLUTION ORAL DAILY
Qty: 510 G | Refills: 5 | Status: SHIPPED | OUTPATIENT
Start: 2017-11-02 | End: 2017-12-02

## 2017-11-02 NOTE — PROGRESS NOTES
BARIATRIC SURGERY POST OPERATIVE NOTE    SUBJECTIVE:    Patient presenting today referred from Alber Wilkerson MD, for   Chief Complaint   Patient presents with   [facility] Post Op Follow Up     PO#1 OR 10/26/17 Sleeve   . BP (!) 140/72 (Site: Right Arm, Position: Sitting, Cuff Size: Medium Adult)   Pulse 85   Resp 16   Ht 5' 6\" (1.676 m)   Wt 227 lb 11.2 oz (103.3 kg)   BMI 36.75 kg/m²      HPI: Martin Lou is a 50 y.o. female presenting in first     Date of Surgery: 10/26/17    Type of Surgery:   Laparoscopic robotic DaVinci-assisted sleeve gastrectomy around a  38-[place] bougie + Hiatal hernia primary repair.       BMI: Body mass index is 36.75 kg/m². Obesity Classification: Class II  Today's weight is 227.7 lbs, last visits weight was   Wt Readings from Last 3 Encounters:   11/02/17 227 lb 11.2 oz (103.3 kg)   10/28/17 250 lb (113.4 kg)   10/25/17 227 lb 9.6 oz (103.2 kg)   , total gain/loss is +0.1 lbs    Weight History: Wt Readings from Last 3 Encounters:   11/02/17 227 lb 11.2 oz (103.3 kg)   10/28/17 250 lb (113.4 kg)   10/25/17 227 lb 9.6 oz (103.2 kg)       Total weight loss/gain -2.4 Lbs over >12 month. Clear yellow urine  Will measure  Ensures one yesterday  No nausea  Constipation  NO GERD. How pleased are you with your current weight loss: not sure  If you are disappointed, what do you think is preventing you from increased weight loss? Is patient experiencing any physical problems post surgery: No:   Is patient experiencing any dietary problems post surgery: No:   Food Intolerances: Denies any food intolerances since last seen. How hungry are you? hungry  How full do you feel after eating? full  How fast do you eat? Very slow  Food log brought to appointment today: No    Breakfast: yes  Mid-AM Snack: yes  Lunch: yes  Mid-PM Snack: yes  Dinner: yes  Evening Snack: yes    Liquids Consumed: 32 oz per day.   Times of day liquids consumed: all day  Patient taking protein by mouth nightly , Disp: , Rfl: 6    tiZANidine (ZANAFLEX) 4 MG tablet, Take 1 tablet by mouth 2 times daily, Disp: , Rfl: 11    traZODone (DESYREL) 50 MG tablet, Take 50 mg by mouth nightly , Disp: , Rfl: 3    lisinopril-hydrochlorothiazide (PRINZIDE;ZESTORETIC) 20-25 MG per tablet, Take 2 tablets by mouth every morning \"I Take Two Every Morning\", Disp: , Rfl: 6    glipiZIDE (GLUCOTROL) 5 MG tablet, Take 5 mg by mouth 2 times daily \"I Take Two In The  Morning, Take One At Night\", Disp: , Rfl: 4    metFORMIN (GLUCOPHAGE-XR) 500 MG extended release tablet, Take 500 mg by mouth 2 times daily \"I Take 2 In The Morning, I Take One At Night\", Disp: , Rfl: 0    JANUVIA 100 MG tablet, Take 1 tablet by mouth every morning , Disp: , Rfl: 3    Nutritional Supplements (ENSURE ACTIVE LIGHT) LIQD, Take 4 Cans by mouth daily for 60 doses, Disp: 60 Can, Rfl: 2    tiotropium (SPIRIVA RESPIMAT) 1.25 MCG/ACT AERS inhaler, Inhale 2 puffs into the lungs daily, Disp: 1 Inhaler, Rfl: 5    budesonide-formoterol (SYMBICORT) 160-4.5 MCG/ACT AERO, Inhale 2 puffs into the lungs 2 times daily, Disp: 1 Inhaler, Rfl: 5    albuterol sulfate HFA (PROAIR HFA) 108 (90 BASE) MCG/ACT inhaler, Inhale 2 puffs into the lungs every 6 hours as needed for Wheezing, Disp: 1 Inhaler, Rfl: 5    fluticasone-salmeterol (ADVAIR DISKUS) 500-50 MCG/DOSE diskus inhaler, Inhale 1 puff into the lungs every 12 hours, Disp: 1 Inhaler, Rfl: 5    ranitidine (ZANTAC) 150 MG tablet, Take 150 mg by mouth as needed , Disp: , Rfl:     omeprazole (PRILOSEC) 40 MG capsule, Take 40 mg by mouth as needed , Disp: , Rfl:     hydrOXYzine (VISTARIL) 25 MG capsule, Take 1 capsule by mouth 3 times daily as needed for Itching., Disp: 30 capsule, Rfl: 0  Past Medical History:   Diagnosis Date    Anxiety     Arthritis     \"Back, Hands, Shoulders\"    Bronchitis Last Episode In Early 2000's    Chronic back pain     COPD (chronic obstructive pulmonary disease) ([facility])     Sees

## 2017-12-13 ENCOUNTER — OFFICE VISIT (OUTPATIENT)
Dept: BARIATRICS/WEIGHT MGMT | Age: 52
End: 2017-12-13

## 2017-12-13 VITALS
BODY MASS INDEX: 32.27 KG/M2 | WEIGHT: 200.8 LBS | HEIGHT: 66 IN | DIASTOLIC BLOOD PRESSURE: 85 MMHG | HEART RATE: 83 BPM | RESPIRATION RATE: 16 BRPM | SYSTOLIC BLOOD PRESSURE: 131 MMHG | TEMPERATURE: 96.1 F

## 2017-12-13 DIAGNOSIS — Z98.84 STATUS POST LAPAROSCOPIC SLEEVE GASTRECTOMY: Primary | ICD-10-CM

## 2017-12-13 PROCEDURE — 99024 POSTOP FOLLOW-UP VISIT: CPT | Performed by: SURGERY

## 2017-12-13 NOTE — PROGRESS NOTES
BARIATRIC SURGERY POST OPERATIVE NOTE    SUBJECTIVE:    Patient presenting today referred from Chris Tejeda MD, for   Chief Complaint   Patient presents with   [facility] Post Op Follow Up   . /85   Pulse 83   Temp 96.1 °F (35.6 °C)   Resp 16   Ht 5' 6\" (1.676 m)   Wt 200 lb 12.8 oz (91.1 kg)   BMI 32.41 kg/m²      HPI: Carlos Black is a 50 y.o. female presenting in second, follow up 2 weeks post op. Date of Surgery: 10/26/2017    Type of Surgery: Elective bariatric surgery    BMI:  Obesity Classification: Class II  Today's weight is 200.8lbs, last visits weight was   Wt Readings from Last 3 Encounters:   11/02/17 227 lb 11.2 oz (103.3 kg)   10/28/17 250 lb (113.4 kg)   10/25/17 227 lb 9.6 oz (103.2 kg)   , total gain/loss is -26.9lbs    Weight History: Wt Readings from Last 3 Encounters:   11/02/17 227 lb 11.2 oz (103.3 kg)   10/28/17 250 lb (113.4 kg)   10/25/17 227 lb 9.6 oz (103.2 kg)       Total weight loss/gain -26.9 Lbs over 2 week. How pleased are you with your current weight loss: happy  If you are disappointed, what do you think is preventing you from increased weight loss? Is patient experiencing any physical problems post surgery: No:   Is patient experiencing any dietary problems post surgery: No:   Food Intolerances: Denies any food intolerances since last seen. How hungry are you? little  How full do you feel after eating? full  How fast do you eat? slow  Food log brought to appointment today: No    Breakfast: yes   Mid-AM Snack: no  Lunch: no  Mid-PM Snack: no  Dinner: no  Evening Snack: no    Liquids Consumed: 16.9oz per day. Times of day liquids consumed: all day  Patient taking protein supplement: Yes. Brand of Supplement: ensure  Patient taking multivitamins: Yes  Does patient exercise: rarely  What prevents you from exercising: motivation    ~ 50 oz of water and diet green tea  30 + gm of prtn counseled.   Still on PPI for 2 more wks  Doing overall very well.      Current Outpatient Prescriptions:     Dextromethorphan-Benzocaine 5-7.5 MG LOZG, Take 1 tablet by mouth every 4 hours as needed (sore th), Disp: 40 lozenge, Rfl: 3    pantoprazole (PROTONIX) 40 MG tablet, Take 1 tablet by mouth 2 times daily, Disp: 60 tablet, Rfl: 3    ondansetron (ZOFRAN ODT) 4 MG disintegrating tablet, Take 1 tablet by mouth every 4 hours as needed for Nausea or Vomiting, Disp: 30 tablet, Rfl: 3    Venlafaxine HCl (EFFEXOR PO), Take by mouth 2 times daily \"I Take 150 Mg Every Morning, Take 37.5 Mg At Noon\", Disp: , Rfl:     nystatin (MYCOSTATIN) 921307 UNIT/GM powder, Apply topically as needed Apply topically 4 times daily. , Disp: , Rfl:     LORazepam (ATIVAN) 0.5 MG tablet, Take 0.5 mg by mouth 2 times daily, Disp: , Rfl:     loratadine (CLARITIN) 10 MG tablet, Take 10 mg by mouth as needed, Disp: , Rfl:     Nutritional Supplements (ENSURE HIGH PROTEIN) LIQD, Take 1 Can by mouth 4 times daily as needed (for pre-op diet meal replacement), Disp: 124 Can, Rfl: 0    ipratropium-albuterol (DUONEB) 0.5-2.5 (3) MG/3ML SOLN nebulizer solution, Inhale 3 mLs into the lungs every 4 hours, Disp: 360 mL, Rfl: 5    montelukast (SINGULAIR) 10 MG tablet, Take 1 tablet by mouth nightly , Disp: , Rfl: 6    tiZANidine (ZANAFLEX) 4 MG tablet, Take 1 tablet by mouth 2 times daily, Disp: , Rfl: 11    traZODone (DESYREL) 50 MG tablet, Take 50 mg by mouth nightly , Disp: , Rfl: 3    lisinopril-hydrochlorothiazide (PRINZIDE;ZESTORETIC) 20-25 MG per tablet, Take 2 tablets by mouth every morning \"I Take Two Every Morning\", Disp: , Rfl: 6    glipiZIDE (GLUCOTROL) 5 MG tablet, Take 5 mg by mouth 2 times daily \"I Take Two In The  Morning, Take One At Night\", Disp: , Rfl: 4    metFORMIN (GLUCOPHAGE-XR) 500 MG extended release tablet, Take 500 mg by mouth 2 times daily \"I Take 2 In The Morning, I Take One At Night\", Disp: , Rfl: 0    JANUVIA 100 MG tablet, Take 1 tablet by mouth every morning , Disp: , Rfl: 3    Nutritional Supplements (ENSURE ACTIVE LIGHT) LIQD, Take 4 Cans by mouth daily for 60 doses, Disp: 60 Can, Rfl: 2    tiotropium (SPIRIVA RESPIMAT) 1.25 MCG/ACT AERS inhaler, Inhale 2 puffs into the lungs daily, Disp: 1 Inhaler, Rfl: 5    budesonide-formoterol (SYMBICORT) 160-4.5 MCG/ACT AERO, Inhale 2 puffs into the lungs 2 times daily, Disp: 1 Inhaler, Rfl: 5    albuterol sulfate HFA (PROAIR HFA) 108 (90 BASE) MCG/ACT inhaler, Inhale 2 puffs into the lungs every 6 hours as needed for Wheezing, Disp: 1 Inhaler, Rfl: 5    fluticasone-salmeterol (ADVAIR DISKUS) 500-50 MCG/DOSE diskus inhaler, Inhale 1 puff into the lungs every 12 hours, Disp: 1 Inhaler, Rfl: 5    ranitidine (ZANTAC) 150 MG tablet, Take 150 mg by mouth as needed , Disp: , Rfl:     omeprazole (PRILOSEC) 40 MG capsule, Take 40 mg by mouth as needed , Disp: , Rfl:     hydrOXYzine (VISTARIL) 25 MG capsule, Take 1 capsule by mouth 3 times daily as needed for Itching., Disp: 30 capsule, Rfl: 0  Past Medical History:   Diagnosis Date    Anxiety     Arthritis     \"Back, Hands, Shoulders\"    Bronchitis Last Episode In Early 2000's    Chronic back pain     [facility])     Sees Dr. Eliot Arguello    Depression     [facility] Dx 2008    Emphysema     Fibromyalgia     Hyperlipidemia     Hypertension     Migraines Last Migraine 8-17    MRSA (methicillin resistant Staphylococcus aureus) Dx In 2012 Or 2013    Right Arm    Obesity     Pap smear for cervical cancer screening 02/11/2008     Neg   Postive-Trichomonas    Pap smear for cervical cancer screening 03/25/2010    Neg    Pap smear for cervical cancer screening 07/18/2011    Neg    Pneumonia Dx Early 2000's    Psoriasis     Shortness of breath on exertion     Teeth missing     Upper And Lower    Wears glasses       Past Surgical History:   Procedure Laterality Date    APPENDECTOMY  2000    CARPAL TUNNEL RELEASE Right 2017    [facility]     Tubal Ligation Also Done In     COLONOSCOPY  2017    Polpy Removed    DENTAL SURGERY      Teeth Extracted In Past    ELBOW SURGERY Bilateral Last Done In     Right Done Twice, Left Done Once    ENDOSCOPY, COLON, DIAGNOSTIC  2017    HERNIA REPAIR  10/26/2017    hiatal hernia with gastrectomy    HYSTERECTOMY VAGINAL      LAPAROSCOPY      SLEEVE GASTRECTOMY  10/26/2017    Robotic laparoscpoic    TUBAL LIGATION      Done With      Social History     Social History    Marital status: Single     Spouse name: N/A    Number of children: N/A    Years of education: N/A     Social History Main Topics    Smoking status: Former Smoker     Packs/day: 0.25     Years: 30.00     Types: Cigarettes, E-Cigarettes     Start date:      Quit date:     Smokeless tobacco: Never Used    Alcohol use Yes      Comment: \"Occ. Maybe Twice A Month\"    Drug use: No    Sexual activity: Yes     Partners: Male     Other Topics Concern    None     Social History Narrative    None     Review of Systems      OBJECTIVE:    Physical Exam    Assessment / Plan:    1. Status post laparoscopic sleeve gastrectomy      /85   Pulse 83   Temp 96.1 °F (35.6 °C)   Resp 16   Ht 5' 6\" (1.676 m)   Wt 200 lb 12.8 oz (91.1 kg)   BMI 32.41 kg/m²          ~ 50 oz of water and diet green tea  30 + gm of prtn counseled. Still on PPI for 2 more wks  Doing overall very well. Follow Up:  Return in about 2 months (around 2018) for Bariatric follow up: diet, exercise & weight loss, Post operative check.     Elizabeth Gandara MD, FACS, FICS  Member of the [employer] of [employer] and [employer] Sur[employer]    [phone]    17

## 2018-01-25 ENCOUNTER — OFFICE VISIT (OUTPATIENT)
Dept: FAMILY MEDICINE CLINIC | Age: 53
End: 2018-01-25

## 2018-01-25 VITALS
WEIGHT: 195.4 LBS | RESPIRATION RATE: 16 BRPM | SYSTOLIC BLOOD PRESSURE: 134 MMHG | OXYGEN SATURATION: 95 % | HEIGHT: 66 IN | DIASTOLIC BLOOD PRESSURE: 82 MMHG | BODY MASS INDEX: 31.4 KG/M2 | HEART RATE: 74 BPM

## 2018-01-25 DIAGNOSIS — L40.9 PSORIASIS OF SCALP: ICD-10-CM

## 2018-01-25 DIAGNOSIS — K21.9 GASTROESOPHAGEAL REFLUX DISEASE, ESOPHAGITIS PRESENCE NOT SPECIFIED: ICD-10-CM

## 2018-01-25 DIAGNOSIS — F41.9 ANXIETY: ICD-10-CM

## 2018-01-25 DIAGNOSIS — G47.00 INSOMNIA, UNSPECIFIED TYPE: ICD-10-CM

## 2018-01-25 DIAGNOSIS — J06.9 UPPER RESPIRATORY TRACT INFECTION, UNSPECIFIED TYPE: Primary | ICD-10-CM

## 2018-01-25 PROCEDURE — 99204 OFFICE O/P NEW MOD 45 MIN: CPT | Performed by: NURSE PRACTITIONER

## 2018-01-25 PROCEDURE — G8484 FLU IMMUNIZE NO ADMIN: HCPCS | Performed by: NURSE PRACTITIONER

## 2018-01-25 PROCEDURE — G8417 CALC BMI ABV UP PARAM F/U: HCPCS | Performed by: NURSE PRACTITIONER

## 2018-01-25 PROCEDURE — G8427 DOCREV CUR MEDS BY ELIG CLIN: HCPCS | Performed by: NURSE PRACTITIONER

## 2018-01-25 PROCEDURE — 1036F TOBACCO NON-USER: CPT | Performed by: NURSE PRACTITIONER

## 2018-01-25 PROCEDURE — 3017F COLORECTAL CA SCREEN DOC REV: CPT | Performed by: NURSE PRACTITIONER

## 2018-01-25 RX ORDER — PROMETHAZINE HYDROCHLORIDE AND CODEINE PHOSPHATE 6.25; 1 MG/5ML; MG/5ML
5 SYRUP ORAL NIGHTLY PRN
Qty: 120 ML | Refills: 0 | Status: SHIPPED | OUTPATIENT
Start: 2018-01-25 | End: 2018-02-24

## 2018-01-25 RX ORDER — GUAIFENESIN 600 MG/1
600 TABLET, EXTENDED RELEASE ORAL 2 TIMES DAILY
Qty: 60 TABLET | Refills: 2 | Status: SHIPPED | OUTPATIENT
Start: 2018-01-25 | End: 2019-04-12 | Stop reason: SDUPTHER

## 2018-01-25 RX ORDER — ALPRAZOLAM 0.5 MG/1
0.5 TABLET ORAL PRN
COMMUNITY
End: 2022-01-17

## 2018-01-25 RX ORDER — CLOBETASOL PROPIONATE 0.5 MG/G
OINTMENT TOPICAL
Qty: 45 G | Refills: 0 | Status: SHIPPED | OUTPATIENT
Start: 2018-01-25 | End: 2019-03-08 | Stop reason: ALTCHOICE

## 2018-01-25 RX ORDER — DIAPER,BRIEF,INFANT-TODD,DISP
EACH MISCELLANEOUS
Qty: 1 TUBE | Refills: 1 | Status: SHIPPED | OUTPATIENT
Start: 2018-01-25 | End: 2018-09-04 | Stop reason: SDUPTHER

## 2018-01-25 RX ORDER — AZITHROMYCIN 250 MG/1
TABLET, FILM COATED ORAL
Qty: 6 TABLET | Refills: 0 | Status: SHIPPED | OUTPATIENT
Start: 2018-01-25 | End: 2018-02-04

## 2018-01-25 RX ORDER — CICLOPIROX 1 G/100ML
1 SHAMPOO TOPICAL DAILY
Qty: 120 ML | Refills: 2 | Status: SHIPPED | OUTPATIENT
Start: 2018-01-25 | End: 2019-03-08 | Stop reason: ALTCHOICE

## 2018-01-25 RX ORDER — ALPHA LIPOIC ACID 300 MG
CAPSULE ORAL
COMMUNITY
End: 2018-08-20

## 2018-01-25 RX ORDER — BENZONATATE 200 MG/1
200 CAPSULE ORAL 3 TIMES DAILY PRN
Qty: 30 CAPSULE | Refills: 0 | Status: SHIPPED | OUTPATIENT
Start: 2018-01-25 | End: 2018-02-04

## 2018-01-25 ASSESSMENT — ENCOUNTER SYMPTOMS
NAUSEA: 0
ABDOMINAL DISTENTION: 0
SHORTNESS OF BREATH: 0
VOMITING: 0
BACK PAIN: 0

## 2018-01-25 ASSESSMENT — PATIENT HEALTH QUESTIONNAIRE - PHQ9
SUM OF ALL RESPONSES TO PHQ9 QUESTIONS 1 & 2: 0
1. LITTLE INTEREST OR PLEASURE IN DOING THINGS: 0
2. FEELING DOWN, DEPRESSED OR HOPELESS: 0
SUM OF ALL RESPONSES TO PHQ QUESTIONS 1-9: 0

## 2018-01-25 NOTE — PROGRESS NOTES
montelukast (SINGULAIR) 10 MG tablet Take 1 tablet by mouth nightly   6    glipiZIDE (GLUCOTROL) 5 MG tablet Take 5 mg by mouth 2 times daily \"I Take Two In The  Morning, Take One At Night\"  4    Nutritional Supplements (ENSURE ACTIVE LIGHT) LIQD Take 4 Cans by mouth daily for 60 doses 60 Can 2     No current facility-administered medications for this visit. Return in about 3 months (around 4/25/2018). Mark Mcpherson DNP, FNP-C    Return for new or worsening symptoms or any concerns as needed.

## 2018-01-25 NOTE — PATIENT INSTRUCTIONS
Your medication has been sent to the pharmacy    We will get your old records and review them    Return in 3 months or sooner if needed for any concerns    Follow up with your specialists as scheduled      Patient Education        Upper Respiratory Infection (Cold): Care Instructions  Your Care Instructions    An upper respiratory infection, or URI, is an infection of the nose, sinuses, or throat. URIs are spread by coughs, sneezes, and direct contact. The common cold is the most frequent kind of URI. The flu and sinus infections are other kinds of URIs. Almost all URIs are caused by viruses. Antibiotics won't cure them. But you can treat most infections with home care. This may include drinking lots of fluids and taking over-the-counter pain medicine. You will probably feel better in 4 to 10 days. The doctor has checked you carefully, but problems can develop later. If you notice any problems or new symptoms, get medical treatment right away. Follow-up care is a key part of your treatment and safety. Be sure to make and go to all appointments, and call your doctor if you are having problems. It's also a good idea to know your test results and keep a list of the medicines you take. How can you care for yourself at home? · To prevent dehydration, drink plenty of fluids, enough so that your urine is light yellow or clear like water. Choose water and other caffeine-free clear liquids until you feel better. If you have kidney, heart, or liver disease and have to limit fluids, talk with your doctor before you increase the amount of fluids you drink. · Take an over-the-counter pain medicine, such as acetaminophen (Tylenol), ibuprofen (Advil, Motrin), or naproxen (Aleve). Read and follow all instructions on the label. · Before you use cough and cold medicines, check the label. These medicines may not be safe for young children or for people with certain health problems.   · Be careful when taking over-the-counter cold or flu medicines and Tylenol at the same time. Many of these medicines have acetaminophen, which is Tylenol. Read the labels to make sure that you are not taking more than the recommended dose. Too much acetaminophen (Tylenol) can be harmful. · Get plenty of rest.  · Do not smoke or allow others to smoke around you. If you need help quitting, talk to your doctor about stop-smoking programs and medicines. These can increase your chances of quitting for good. When should you call for help? Call 911 anytime you think you may need emergency care. For example, call if:  ? · You have severe trouble breathing. ?Call your doctor now or seek immediate medical care if:  ? · You seem to be getting much sicker. ? · You have new or worse trouble breathing. ? · You have a new or higher fever. ? · You have a new rash. ? Watch closely for changes in your health, and be sure to contact your doctor if:  ? · You have a new symptom, such as a sore throat, an earache, or sinus pain. ? · You cough more deeply or more often, especially if you notice more mucus or a change in the color of your mucus. ? · You do not get better as expected. Where can you learn more? Go to [email or web address]. org and sign in to your [employer] account. Enter F137 in the fruux box to learn more about \"Upper Respiratory Infection (Cold): Care Instructions. \"     If you do not have an account, please click on the \"Sign Up Now\" link. Current as of: May 12, 2017  Content Version: 11.5  © 4912-3294 Healthwise, Incorporated. Care instructions adapted under license by [facility] ([place], [place]) [employer] [facility] ([facility]). If you have questions about a medical condition or this instruction, always ask your healthcare professional. [place] [address] any warranty or liability for your use of this information.

## 2018-01-26 RX ORDER — TRAZODONE HYDROCHLORIDE 50 MG/1
50 TABLET ORAL NIGHTLY
Qty: 30 TABLET | Refills: 3 | Status: SHIPPED | OUTPATIENT
Start: 2018-01-26 | End: 2018-04-25 | Stop reason: SDUPTHER

## 2018-01-26 RX ORDER — HYDROXYZINE PAMOATE 25 MG/1
25 CAPSULE ORAL 3 TIMES DAILY PRN
Qty: 30 CAPSULE | Refills: 5 | Status: SHIPPED | OUTPATIENT
Start: 2018-01-26 | End: 2018-04-25 | Stop reason: SDUPTHER

## 2018-01-26 RX ORDER — OMEPRAZOLE 40 MG/1
40 CAPSULE, DELAYED RELEASE ORAL DAILY
Qty: 30 CAPSULE | Refills: 5 | Status: SHIPPED | OUTPATIENT
Start: 2018-01-26 | End: 2018-04-25 | Stop reason: SDUPTHER

## 2018-02-01 ENCOUNTER — OFFICE VISIT (OUTPATIENT)
Dept: BARIATRICS/WEIGHT MGMT | Age: 53
End: 2018-02-01

## 2018-02-01 VITALS
DIASTOLIC BLOOD PRESSURE: 79 MMHG | HEIGHT: 66 IN | SYSTOLIC BLOOD PRESSURE: 134 MMHG | HEART RATE: 81 BPM | WEIGHT: 189 LBS | BODY MASS INDEX: 30.37 KG/M2

## 2018-02-01 DIAGNOSIS — E66.9 OBESITY (BMI 30.0-34.9): Primary | ICD-10-CM

## 2018-02-01 PROCEDURE — 99999 PR OFFICE/OUTPT VISIT,PROCEDURE ONLY: CPT

## 2018-02-01 NOTE — PROGRESS NOTES
History:   Procedure Laterality Date    APPENDECTOMY      CARPAL TUNNEL RELEASE Right 2017     SECTION   And     Tubal Ligation Also Done In     COLONOSCOPY  2017    Polpy Removed    DENTAL SURGERY      Teeth Extracted In Past    ELBOW SURGERY Bilateral Last Done In     Right Done Twice, Left Done Once    ENDOSCOPY, COLON, DIAGNOSTIC  2017    HERNIA REPAIR  10/26/2017    hiatal hernia with gastrectomy    HYSTERECTOMY VAGINAL      LAPAROSCOPY      SLEEVE GASTRECTOMY  10/26/2017    Robotic laparoscpoic    TUBAL LIGATION      Done With        Family History:  Family History   Problem Relation Age of Onset    Heart Attack Father     Heart Disease Father     Early Death Father 46     Heart Attack    Alcohol Abuse Father     Substance Abuse Father      Alcoholic    Arthritis Mother     Heart Disease Mother     Diabetes Mother     Cancer Mother      \"Kidney Cancer\"    High Blood Pressure Mother     High Blood Pressure Sister     Diabetes Brother     Early Death Daughter 21     \"Killed In A Car Accident\"       Social History:  Social History     Social History    Marital status: Single     Spouse name: N/A    Number of children: N/A    Years of education: N/A     Occupational History    Not on file. Social History Main Topics    Smoking status: Former Smoker     Packs/day: 0.25     Years: 30.00     Types: Cigarettes, E-Cigarettes     Start date:      Quit date:     Smokeless tobacco: Never Used    Alcohol use Yes      Comment: \"Occ.  Maybe Twice A Month\"    Drug use: No    Sexual activity: Yes     Partners: Male     Other Topics Concern    Not on file     Social History Narrative    No narrative on file         OBJECTIVE:  Physical Exam   /79 (Site: Right Arm, Position: Sitting, Cuff Size: Medium Adult)   Pulse 81   Ht 5' 6\" (1.676 m)   Wt 189 lb (85.7 kg)   BMI 30.51 kg/m²        NUTRITION DIAGNOSIS: Overweight /

## 2018-02-05 ENCOUNTER — TELEPHONE (OUTPATIENT)
Dept: FAMILY MEDICINE CLINIC | Age: 53
End: 2018-02-05

## 2018-02-06 DIAGNOSIS — M79.7 FIBROMYALGIA: Primary | ICD-10-CM

## 2018-02-08 ENCOUNTER — HOSPITAL ENCOUNTER (OUTPATIENT)
Dept: MRI IMAGING | Age: 53
Discharge: HOME | End: 2018-02-08
Attending: ORTHOPAEDIC SURGERY | Admitting: ORTHOPAEDIC SURGERY

## 2018-02-08 DIAGNOSIS — M75.41 CORACOID IMPINGEMENT OF RIGHT SHOULDER: ICD-10-CM

## 2018-02-08 DIAGNOSIS — M25.511 CHRONIC RIGHT SHOULDER PAIN: ICD-10-CM

## 2018-02-08 DIAGNOSIS — M75.41 IMPINGEMENT SYNDROME OF RIGHT SHOULDER: ICD-10-CM

## 2018-02-08 DIAGNOSIS — G89.29 CHRONIC RIGHT SHOULDER PAIN: ICD-10-CM

## 2018-02-08 NOTE — TELEPHONE ENCOUNTER
Received a fax from [facility] pain management stating that pt has been discharged from the practice.  Spoke to pt and she will [employer] insurance and call the office back to let us know who she wants to see that insurance will cover

## 2018-02-26 PROBLEM — M75.41 ROTATOR CUFF IMPINGEMENT SYNDROME, RIGHT: Status: ACTIVE | Noted: 2018-02-26

## 2018-02-27 ENCOUNTER — TELEPHONE (OUTPATIENT)
Dept: FAMILY MEDICINE CLINIC | Age: 53
End: 2018-02-27

## 2018-02-27 ENCOUNTER — OFFICE VISIT (OUTPATIENT)
Dept: BARIATRICS/WEIGHT MGMT | Age: 53
End: 2018-02-27

## 2018-02-27 VITALS
DIASTOLIC BLOOD PRESSURE: 92 MMHG | SYSTOLIC BLOOD PRESSURE: 169 MMHG | BODY MASS INDEX: 29.97 KG/M2 | RESPIRATION RATE: 20 BRPM | WEIGHT: 186.5 LBS | HEART RATE: 70 BPM | HEIGHT: 66 IN

## 2018-02-27 DIAGNOSIS — E11.42 TYPE 2 DIABETES MELLITUS WITH DIABETIC POLYNEUROPATHY, WITHOUT LONG-TERM CURRENT USE OF INSULIN (HCC): Primary | Chronic | ICD-10-CM

## 2018-02-27 DIAGNOSIS — E66.9 CLASS 1 OBESITY WITHOUT SERIOUS COMORBIDITY IN ADULT, UNSPECIFIED BMI, UNSPECIFIED OBESITY TYPE: Chronic | ICD-10-CM

## 2018-02-27 DIAGNOSIS — Z98.84 STATUS POST LAPAROSCOPIC SLEEVE GASTRECTOMY: ICD-10-CM

## 2018-02-27 DIAGNOSIS — E78.5 DYSLIPIDEMIA: ICD-10-CM

## 2018-02-27 DIAGNOSIS — I10 ESSENTIAL HYPERTENSION: Chronic | ICD-10-CM

## 2018-02-27 PROCEDURE — 3017F COLORECTAL CA SCREEN DOC REV: CPT | Performed by: NURSE PRACTITIONER

## 2018-02-27 PROCEDURE — G8417 CALC BMI ABV UP PARAM F/U: HCPCS | Performed by: NURSE PRACTITIONER

## 2018-02-27 PROCEDURE — 99214 OFFICE O/P EST MOD 30 MIN: CPT | Performed by: NURSE PRACTITIONER

## 2018-02-27 PROCEDURE — G8427 DOCREV CUR MEDS BY ELIG CLIN: HCPCS | Performed by: NURSE PRACTITIONER

## 2018-02-27 PROCEDURE — 3046F HEMOGLOBIN A1C LEVEL >9.0%: CPT | Performed by: NURSE PRACTITIONER

## 2018-02-27 PROCEDURE — 1036F TOBACCO NON-USER: CPT | Performed by: NURSE PRACTITIONER

## 2018-02-27 PROCEDURE — G8484 FLU IMMUNIZE NO ADMIN: HCPCS | Performed by: NURSE PRACTITIONER

## 2018-02-27 ASSESSMENT — ENCOUNTER SYMPTOMS
EYE PAIN: 0
CHEST TIGHTNESS: 0
NAUSEA: 0
WHEEZING: 0
ABDOMINAL PAIN: 0
SHORTNESS OF BREATH: 0
TROUBLE SWALLOWING: 0
DIARRHEA: 0
RHINORRHEA: 0
ABDOMINAL DISTENTION: 0

## 2018-02-27 NOTE — PROGRESS NOTES
comorbidity)    Chronic respiratory failure (HCC)    Nicotine addiction    Superficial gastritis with hemorrhage    Morbid obesity ([address]    Hiatal hernia    Status post bariatric surgery    Status post laparoscopic sleeve gastrectomy    Rotator cuff impingement syndrome, right     Past Surgical History:   Procedure Laterality Date    APPENDECTOMY  2000    CARPAL TUNNEL RELEASE Right 2017    Dr. Adali Amezcua (right)   [address]    Tubal Ligation Also Done In     COLONOSCOPY  2017    polyp removed    DENTAL SURGERY      Teeth Extracted In Past    ELBOW SURGERY Bilateral Last Done In 2013    Right Done Twice, Left Done Once left cubital tunnel release ; right 2014    ENDOSCOPY, COLON, DIAGNOSTIC  2017    HERNIA REPAIR  10/26/2017    hiatal hernia with gastrectomy    HYSTERECTOMY VAGINAL      LAPAROSCOPY  2000    ROTATOR CUFF REPAIR Right 2015    Dr. Chava Green  10/26/2017    Robotic laparoscpoic   [address] [place] [address]    Done With      Current Outpatient Prescriptions   Medication Sig Dispense Refill    Multiple Vitamin (MVI, CELEBRATE, CHEWABLE TABLET) Take 1 tablet by mouth daily 30 tablet 3    calcium carbonate-vitamin D (CALTRATE 600+D) 600-400 MG-UNIT TABS per tab Take 1 tablet by mouth 2 times daily 60 tablet 3    cephALEXin (KEFLEX) 500 MG capsule Take 1 capsule by mouth 4 times daily for 10 days 40 capsule 0    hydrOXYzine (VISTARIL) 25 MG capsule Take 1 capsule by mouth 3 times daily as needed for Itching 30 capsule 5    traZODone (DESYREL) 50 MG tablet Take 1 tablet by mouth nightly 30 tablet 3    omeprazole (PRILOSEC) 40 MG delayed release capsule Take 1 capsule by mouth daily 30 capsule 5    ALPRAZolam (XANAX) 0.5 MG tablet Take 0.5 mg by mouth nightly as needed for Sleep.  Alpha-Lipoic Acid 300 MG CAPS Take by mouth      clobetasol (TEMOVATE) 0.05 % ointment Apply topically 2 times daily.  45 g 0    Ciclopirox 1 puffs into the lungs 2 times daily 1 Inhaler 5    albuterol sulfate HFA (PROAIR HFA) 108 (90 BASE) MCG/ACT inhaler Inhale 2 puffs into the lungs every 6 hours as needed for Wheezing 1 Inhaler 5    fluticasone-salmeterol (ADVAIR DISKUS) 500-50 MCG/DOSE diskus inhaler Inhale 1 puff into the lungs every 12 hours 1 Inhaler 5    ranitidine (ZANTAC) 150 MG tablet Take 150 mg by mouth as needed       Nutritional Supplements (ENSURE ACTIVE LIGHT) LIQD Take 4 Cans by mouth daily for 60 doses 60 Can 2     No current facility-administered medications for this visit. No Known Allergies      Review of Systems   Constitutional: Negative. Negative for appetite change, fatigue and fever. HENT: Negative for congestion, dental problem, hearing loss, rhinorrhea and trouble swallowing. Eyes: Negative for pain. Respiratory: Negative for chest tightness, shortness of breath and wheezing. Cardiovascular: Negative for chest pain, palpitations and leg swelling. Gastrointestinal: Negative for abdominal distention, abdominal pain, diarrhea and nausea. Endocrine: Negative for cold intolerance and polydipsia. Genitourinary: Negative for difficulty urinating and frequency. Musculoskeletal: Negative for arthralgias and gait problem. Skin: Negative for rash. Allergic/Immunologic: Negative for environmental allergies. Neurological: Negative for dizziness, seizures and syncope. Hematological: Does not bruise/bleed easily. Psychiatric/Behavioral: Negative for behavioral problems and suicidal ideas. OBJECTIVE:    BP (!) 169/92 (Site: Left Arm, Position: Sitting, Cuff Size: Large Adult)   Pulse 70   Resp 20   Ht 5' 6\" (1.676 m)   Wt 186 lb 8 oz (84.6 kg)   BMI 30.10 kg/m²      Physical Exam   Constitutional: She is oriented to person, place, and time. She appears well-developed and well-nourished. Obese   HENT:   Head: Normocephalic and atraumatic.    Right Ear: Hearing and ear canal normal.   Left Ear: Hearing and ear canal normal.   Nose: Nose normal.   Mouth/Throat: Uvula is midline and oropharynx is clear and moist.   Eyes: Conjunctivae are normal. Pupils are equal, round, and reactive to light. Neck: Normal range of motion. Cardiovascular: Normal rate, regular rhythm and normal heart sounds. Pulmonary/Chest: Effort normal and breath sounds normal. She has no decreased breath sounds. She has no wheezes. She has no rhonchi. She has no rales. Abdominal: Soft. Bowel sounds are normal. There is no tenderness. Incisions well healed, bilateral strais noted with mild skin irritation. Musculoskeletal: Normal range of motion. She exhibits no tenderness. In all 4 extremities. Neurological: She is alert and oriented to person, place, and time. She has normal strength. She exhibits normal muscle tone. GCS eye subscore is 4. GCS verbal subscore is 5. GCS motor subscore is 6. Skin: Skin is warm and dry. No rash noted. Psychiatric: She has a normal mood and affect. Her behavior is normal. Judgment normal.   Nursing note and vitals reviewed. ASSESSMENT & PLAN:    1. Type 2 diabetes mellitus with diabetic polyneuropathy, without long-term current use of insulin ([facility])  - A1C, 8.0.  - Continue metformin and Insulin.    - Need to monitor skin issues. - Comprehensive Metabolic Panel; Future  - Hemoglobin A1C; Future  - Lipid Panel; Future  - PTH, Intact; Future  - TSH with Reflex; Future  - Vitamin B12 & Folate; Future  - Vitamin D 25 Hydroxy; Future  - Vitamin B1; Future  - Zinc; Future  - CBC WITH AUTO DIFFERENTIAL; Future  - Follow up at 9 month visit. 2. Essential hypertension  - HTN noted today. - Asymptomatic, needs to follow with PCP.   - Comprehensive Metabolic Panel; Future  - Hemoglobin A1C; Future  - Lipid Panel; Future  - PTH, Intact; Future  - TSH with Reflex; Future  - Vitamin B12 & Folate; Future  - Vitamin D 25 Hydroxy;  Future  - Vitamin B1; Future  - Zinc; Future  - CBC WITH AUTO DIFFERENTIAL; Future    3. Class 1 obesity without serious comorbidity in adult, unspecified BMI, unspecified obesity type  - Doing well with weight loss. Almost to recommended BMI. - Comprehensive Metabolic Panel; Future  - Hemoglobin A1C; Future  - Lipid Panel; Future  - PTH, Intact; Future  - TSH with Reflex; Future  - Vitamin B12 & Folate; Future  - Vitamin D 25 Hydroxy; Future  - Vitamin B1; Future  - Zinc; Future  - CBC WITH AUTO DIFFERENTIAL; Future    4. Dyslipidemia  - Last LDL elevated, uncontrolled. - Labs all repeated. - Comprehensive Metabolic Panel; Future  - Hemoglobin A1C; Future  - Lipid Panel; Future  - PTH, Intact; Future  - TSH with Reflex; Future  - Vitamin B12 & Folate; Future  - Vitamin D 25 Hydroxy; Future  - Vitamin B1; Future  - Zinc; Future  - CBC WITH AUTO DIFFERENTIAL; Future    5. Status post laparoscopic sleeve gastrectomy  - Doing well since surgery. - Comprehensive Metabolic Panel; Future  - Hemoglobin A1C; Future  - Lipid Panel; Future  - PTH, Intact; Future  - TSH with Reflex; Future  - Vitamin B12 & Folate; Future  - Vitamin D 25 Hydroxy; Future  - Vitamin B1; Future  - Zinc; Future  - CBC WITH AUTO DIFFERENTIAL;  Future       Orders Placed This Encounter   Medications    Multiple Vitamin (MVI, CELEBRATE, CHEWABLE TABLET)     Sig: Take 1 tablet by mouth daily     Dispense:  30 tablet     Refill:  3    calcium carbonate-vitamin D (CALTRATE 600+D) 600-400 MG-UNIT TABS per tab     Sig: Take 1 tablet by mouth 2 times daily     Dispense:  60 tablet     Refill:  3     Orders Placed This Encounter   Procedures    Comprehensive Metabolic Panel     Standing Status:   Future     Standing Expiration Date:   2/27/2019    Hemoglobin A1C     Standing Status:   Future     Standing Expiration Date:   2/27/2019    Lipid Panel     Standing Status:   Future     Standing Expiration Date:   2/27/2019     Order Specific Question:   Is Patient Fasting?/# of Hours     Answer:   12    PTH, Intact     Standing Status:   Future     Standing Expiration Date:   2/27/2019    TSH with Reflex     Standing Status:   Future     Standing Expiration Date:   2/27/2019    Vitamin B12 & Folate     Standing Status:   Future     Standing Expiration Date:   2/27/2019    Vitamin D 25 Hydroxy     Standing Status:   Future     Standing Expiration Date:   2/27/2019    Vitamin B1     Standing Status:   Future     Standing Expiration Date:   2/27/2019    Zinc     Standing Status:   Future     Standing Expiration Date:   2/27/2019    CBC WITH AUTO DIFFERENTIAL     Standing Status:   Future     Standing Expiration Date:   2/27/2019       Follow Up:  Return in about 1 month (around 3/27/2018).     Page Im, CNP

## 2018-02-27 NOTE — TELEPHONE ENCOUNTER
Patient came in states that per request she verified with insurance that [facility] in [place] will accept her insurance.  NE:[id number]

## 2018-03-02 ENCOUNTER — TELEPHONE (OUTPATIENT)
Dept: FAMILY MEDICINE CLINIC | Age: 53
End: 2018-03-02

## 2018-03-05 RX ORDER — LANCETS 28 GAUGE
1 EACH MISCELLANEOUS DAILY
Qty: 100 EACH | Refills: 3 | Status: SHIPPED | OUTPATIENT
Start: 2018-03-05 | End: 2018-10-23 | Stop reason: SDUPTHER

## 2018-03-13 ENCOUNTER — OFFICE VISIT (OUTPATIENT)
Dept: FAMILY MEDICINE CLINIC | Age: 53
End: 2018-03-13

## 2018-03-13 VITALS
HEIGHT: 68 IN | WEIGHT: 183.4 LBS | DIASTOLIC BLOOD PRESSURE: 80 MMHG | RESPIRATION RATE: 17 BRPM | HEART RATE: 89 BPM | SYSTOLIC BLOOD PRESSURE: 116 MMHG | OXYGEN SATURATION: 96 % | BODY MASS INDEX: 27.8 KG/M2

## 2018-03-13 DIAGNOSIS — J44.1 COPD EXACERBATION (HCC): ICD-10-CM

## 2018-03-13 DIAGNOSIS — Z87.01 HISTORY OF PNEUMONIA: ICD-10-CM

## 2018-03-13 DIAGNOSIS — E11.9 TYPE 2 DIABETES MELLITUS WITHOUT COMPLICATION, WITHOUT LONG-TERM CURRENT USE OF INSULIN (HCC): Primary | ICD-10-CM

## 2018-03-13 DIAGNOSIS — Z20.828 EXPOSURE TO INFLUENZA: ICD-10-CM

## 2018-03-13 PROCEDURE — 1036F TOBACCO NON-USER: CPT | Performed by: NURSE PRACTITIONER

## 2018-03-13 PROCEDURE — 3023F SPIROM DOC REV: CPT | Performed by: NURSE PRACTITIONER

## 2018-03-13 PROCEDURE — 99214 OFFICE O/P EST MOD 30 MIN: CPT | Performed by: NURSE PRACTITIONER

## 2018-03-13 PROCEDURE — G8417 CALC BMI ABV UP PARAM F/U: HCPCS | Performed by: NURSE PRACTITIONER

## 2018-03-13 PROCEDURE — 3017F COLORECTAL CA SCREEN DOC REV: CPT | Performed by: NURSE PRACTITIONER

## 2018-03-13 PROCEDURE — G8926 SPIRO NO PERF OR DOC: HCPCS | Performed by: NURSE PRACTITIONER

## 2018-03-13 PROCEDURE — G8427 DOCREV CUR MEDS BY ELIG CLIN: HCPCS | Performed by: NURSE PRACTITIONER

## 2018-03-13 PROCEDURE — 3046F HEMOGLOBIN A1C LEVEL >9.0%: CPT | Performed by: NURSE PRACTITIONER

## 2018-03-13 PROCEDURE — G8484 FLU IMMUNIZE NO ADMIN: HCPCS | Performed by: NURSE PRACTITIONER

## 2018-03-13 RX ORDER — METHYLPREDNISOLONE 4 MG/1
TABLET ORAL
Qty: 1 KIT | Refills: 0 | Status: SHIPPED | OUTPATIENT
Start: 2018-03-13 | End: 2018-06-27

## 2018-03-13 RX ORDER — OSELTAMIVIR PHOSPHATE 75 MG/1
75 CAPSULE ORAL 2 TIMES DAILY
Qty: 10 CAPSULE | Refills: 0 | Status: SHIPPED | OUTPATIENT
Start: 2018-03-13 | End: 2018-03-18

## 2018-03-13 RX ORDER — LEVOFLOXACIN 500 MG/1
500 TABLET, FILM COATED ORAL DAILY
Qty: 7 TABLET | Refills: 0 | Status: SHIPPED | OUTPATIENT
Start: 2018-03-13 | End: 2018-03-20

## 2018-03-13 ASSESSMENT — ENCOUNTER SYMPTOMS
NAUSEA: 0
SHORTNESS OF BREATH: 1
VOMITING: 0
ABDOMINAL PAIN: 0
WHEEZING: 1

## 2018-03-13 ASSESSMENT — PATIENT HEALTH QUESTIONNAIRE - PHQ9
SUM OF ALL RESPONSES TO PHQ9 QUESTIONS 1 & 2: 0
2. FEELING DOWN, DEPRESSED OR HOPELESS: 0
1. LITTLE INTEREST OR PLEASURE IN DOING THINGS: 0
SUM OF ALL RESPONSES TO PHQ QUESTIONS 1-9: 0

## 2018-03-13 NOTE — PROGRESS NOTES
Disease Mother     Diabetes Mother     Cancer Mother      \"Kidney Cancer\"    High Blood Pressure Mother     High Blood Pressure Sister     Diabetes Brother     Early Death Daughter 21     \"Killed In A Car Accident\"     Past Surgical History:   Procedure Laterality Date    APPENDECTOMY  2000    CARPAL TUNNEL RELEASE Right 2017    Dr. Jenna Eric (right)   [address]    Tubal Ligation Also Done In     COLONOSCOPY  2017    polyp removed    DENTAL SURGERY      Teeth Extracted In Past    ELBOW SURGERY Bilateral Last Done In 2013    Right Done Twice, Left Done Once left cubital tunnel release ; right 2014    ENDOSCOPY, COLON, DIAGNOSTIC  2017    HERNIA REPAIR  10/26/2017    hiatal hernia with gastrectomy    HYSTERECTOMY VAGINAL      LAPAROSCOPY  2000    ROTATOR CUFF REPAIR Right 2015    Dr. Arina Trent  10/26/2017    Robotic laparoscpoic    TUBAL LIGATION      Done With        Review of Systems   Constitutional: Positive for fatigue. HENT: Positive for congestion. Respiratory: Positive for shortness of breath and wheezing. Cardiovascular: Negative for chest pain and palpitations. Gastrointestinal: Negative for abdominal pain, nausea and vomiting. Musculoskeletal: Positive for myalgias. Neurological: Negative for dizziness, weakness and headaches. OBJECTIVE:  /80 (Site: Left Arm, Position: Sitting, Cuff Size: Medium Adult)   Pulse 89   Resp 17   Ht 5' 8\" (1.727 m)   Wt 183 lb 6.4 oz (83.2 kg)   SpO2 96%   BMI 27.89 kg/m²   BP Readings from Last 3 Encounters:   18 116/80   18 (!) 169/92   18 126/78     Wt Readings from Last 3 Encounters:   18 183 lb 6.4 oz (83.2 kg)   18 186 lb (84.4 kg)   18 186 lb 8 oz (84.6 kg)     Body mass index is 27.89 kg/m². Physical Exam   Constitutional: She appears well-developed and well-nourished. HENT:   Head: Normocephalic and atraumatic. 40 MG tablet Take 1 tablet by mouth 2 times daily 60 tablet 3    ondansetron (ZOFRAN ODT) 4 MG disintegrating tablet Take 1 tablet by mouth every 4 hours as needed for Nausea or Vomiting 30 tablet 3    Venlafaxine HCl (EFFEXOR PO) Take by mouth 2 times daily \"I Take 150 Mg Every Morning, Take 37.5 Mg At Noon\"      nystatin (MYCOSTATIN) 368159 UNIT/GM powder Apply topically as needed Apply topically 4 times daily.       LORazepam (ATIVAN) 0.5 MG tablet Take 0.5 mg by mouth 2 times daily      loratadine (CLARITIN) 10 MG tablet Take 10 mg by mouth as needed      Nutritional Supplements (ENSURE HIGH PROTEIN) LIQD Take 1 Can by mouth 4 times daily as needed (for pre-op diet meal replacement) 124 Can 0    ipratropium-albuterol (DUONEB) 0.5-2.5 (3) MG/3ML SOLN nebulizer solution Inhale 3 mLs into the lungs every 4 hours 360 mL 5    montelukast (SINGULAIR) 10 MG tablet Take 1 tablet by mouth nightly   6    tiZANidine (ZANAFLEX) 4 MG tablet Take 1 tablet by mouth 2 times daily  11    lisinopril-hydrochlorothiazide (PRINZIDE;ZESTORETIC) 20-25 MG per tablet Take 2 tablets by mouth every morning \"I Take Two Every Morning\"  6    glipiZIDE (GLUCOTROL) 5 MG tablet Take 5 mg by mouth 2 times daily \"I Take Two In The  Morning, Take One At Night\"  4    metFORMIN (GLUCOPHAGE-XR) 500 MG extended release tablet Take 500 mg by mouth 2 times daily \"I Take 2 In The Morning, I Take One At Night\"  0    JANUVIA 100 MG tablet Take 1 tablet by mouth every morning   3    tiotropium (SPIRIVA RESPIMAT) 1.25 MCG/ACT AERS inhaler Inhale 2 puffs into the lungs daily 1 Inhaler 5    budesonide-formoterol (SYMBICORT) 160-4.5 MCG/ACT AERO Inhale 2 puffs into the lungs 2 times daily 1 Inhaler 5    albuterol sulfate HFA (PROAIR HFA) 108 (90 BASE) MCG/ACT inhaler Inhale 2 puffs into the lungs every 6 hours as needed for Wheezing 1 Inhaler 5    ranitidine (ZANTAC) 150 MG tablet Take 150 mg by mouth as needed       Nutritional Supplements

## 2018-03-17 ENCOUNTER — HOSPITAL ENCOUNTER (OUTPATIENT)
Dept: LAB | Age: 53
Discharge: HOME | End: 2018-03-17
Attending: NURSE PRACTITIONER | Admitting: NURSE PRACTITIONER

## 2018-03-17 LAB
ALBUMIN SERPL-MCNC: 4.7 GM/DL (ref 3.4–5)
ALP BLD-CCNC: 83 IU/L (ref 40–129)
ALT SERPL-CCNC: 12 U/L (ref 10–40)
ANION GAP SERPL CALCULATED.3IONS-SCNC: 12 MMOL/L (ref 4–16)
ANISOCYTOSIS: ABNORMAL
AST SERPL-CCNC: 14 IU/L (ref 15–37)
BANDED NEUTROPHILS ABSOLUTE COUNT: 0.46 K/CU MM
BANDED NEUTROPHILS RELATIVE PERCENT: 3 % (ref 5–11)
BILIRUB SERPL-MCNC: 0.5 MG/DL (ref 0–1)
BUN BLDV-MCNC: 15 MG/DL (ref 6–23)
CALCIUM SERPL-MCNC: 10.2 MG/DL (ref 8.3–10.6)
CHLORIDE BLD-SCNC: 96 MMOL/L (ref 99–110)
CHOLESTEROL: 297 MG/DL
CO2: 29 MMOL/L (ref 21–32)
CREAT SERPL-MCNC: 1.1 MG/DL (ref 0.6–1.1)
CREATININE URINE: 150.6 MG/DL (ref 28–217)
DIFFERENTIAL TYPE: ABNORMAL
ESTIMATED AVERAGE GLUCOSE: 157 MG/DL
FOLATE: 15.8 NG/ML (ref 3.1–17.5)
GFR AFRICAN AMERICAN: >60 ML/MIN/1.73M2
GFR NON-AFRICAN AMERICAN: 52 ML/MIN/1.73M2
GLUCOSE FASTING: 136 MG/DL (ref 70–99)
HBA1C MFR BLD: 7.1 % (ref 4.2–6.3)
HCT VFR BLD CALC: 46.8 % (ref 37–47)
HDLC SERPL-MCNC: 51 MG/DL
HEMOGLOBIN: 15.7 GM/DL (ref 12.5–16)
LDL CHOLESTEROL DIRECT: 214 MG/DL
LYMPHOCYTES ABSOLUTE: 8.2 K/CU MM
LYMPHOCYTES RELATIVE PERCENT: 54 % (ref 24–44)
MCH RBC QN AUTO: 25.2 PG (ref 27–31)
MCHC RBC AUTO-ENTMCNC: 33.5 % (ref 32–36)
MCV RBC AUTO: 75.2 FL (ref 78–100)
MICROALBUMIN/CREAT 24H UR: <1.2 MG/DL
MICROALBUMIN/CREAT UR-RTO: NORMAL MG/G CREAT (ref 0–30)
MONOCYTES ABSOLUTE: 0.6 K/CU MM
MONOCYTES RELATIVE PERCENT: 4 % (ref 0–4)
PDW BLD-RTO: 14.3 % (ref 11.7–14.9)
PLATELET # BLD: 395 K/CU MM (ref 140–440)
PMV BLD AUTO: 11 FL (ref 7.5–11.1)
POTASSIUM SERPL-SCNC: 4.3 MMOL/L (ref 3.5–5.1)
RBC # BLD: 6.22 M/CU MM (ref 4.2–5.4)
RBC # BLD: ABNORMAL 10*6/UL
SEGMENTED NEUTROPHILS ABSOLUTE COUNT: 5.9 K/CU MM
SEGMENTED NEUTROPHILS RELATIVE PERCENT: 39 % (ref 36–66)
SODIUM BLD-SCNC: 137 MMOL/L (ref 135–145)
TOTAL PROTEIN: 8 GM/DL (ref 6.4–8.2)
TRIGL SERPL-MCNC: 200 MG/DL
TSH HIGH SENSITIVITY: 1.52 UIU/ML (ref 0.27–4.2)
VITAMIN B-12: 301.4 PG/ML (ref 211–911)
VITAMIN D 25-HYDROXY: 15.31 NG/ML
WBC # BLD: 15.2 K/CU MM (ref 4–10.5)
WBC # BLD: ABNORMAL 10*3/UL

## 2018-03-20 ENCOUNTER — TELEPHONE (OUTPATIENT)
Dept: FAMILY MEDICINE CLINIC | Age: 53
End: 2018-03-20

## 2018-03-20 LAB — PARATHYROID HORMONE INTACT: 48

## 2018-03-21 LAB — ZINC: 93

## 2018-03-22 LAB — VITAMIN B1, PLASMA: 8

## 2018-03-26 ENCOUNTER — TELEPHONE (OUTPATIENT)
Dept: FAMILY MEDICINE CLINIC | Age: 53
End: 2018-03-26

## 2018-03-26 ENCOUNTER — TELEPHONE (OUTPATIENT)
Dept: BARIATRICS/WEIGHT MGMT | Age: 53
End: 2018-03-26

## 2018-03-26 DIAGNOSIS — E55.9 VITAMIN D DEFICIENCY: Primary | ICD-10-CM

## 2018-03-26 NOTE — TELEPHONE ENCOUNTER
Called patient to discuss lab results. Aware of total cholesterol being elevated, states she is not taking her medication. Will be seeing PCP next week and instructed to discuss cholesterol and A1C; patient agreeable. Vitamin D low and medication sent to pharmacy and patient instructed on use.  Also informed of nutrition elements and foods higher in Calcium/Vitamin D.

## 2018-03-27 DIAGNOSIS — M54.9 CHRONIC BACK PAIN GREATER THAN 3 MONTHS DURATION: Primary | ICD-10-CM

## 2018-03-27 DIAGNOSIS — G89.29 CHRONIC BACK PAIN GREATER THAN 3 MONTHS DURATION: Primary | ICD-10-CM

## 2018-04-03 ENCOUNTER — OFFICE VISIT (OUTPATIENT)
Dept: FAMILY MEDICINE CLINIC | Age: 53
End: 2018-04-03

## 2018-04-03 VITALS
TEMPERATURE: 98 F | SYSTOLIC BLOOD PRESSURE: 114 MMHG | OXYGEN SATURATION: 95 % | HEART RATE: 74 BPM | DIASTOLIC BLOOD PRESSURE: 74 MMHG | BODY MASS INDEX: 28.34 KG/M2 | RESPIRATION RATE: 17 BRPM | WEIGHT: 187 LBS | HEIGHT: 68 IN

## 2018-04-03 DIAGNOSIS — J06.9 UPPER RESPIRATORY TRACT INFECTION, UNSPECIFIED TYPE: Primary | ICD-10-CM

## 2018-04-03 DIAGNOSIS — E11.9 TYPE 2 DIABETES MELLITUS WITHOUT COMPLICATION, WITHOUT LONG-TERM CURRENT USE OF INSULIN (HCC): ICD-10-CM

## 2018-04-03 DIAGNOSIS — R05.9 COUGH: ICD-10-CM

## 2018-04-03 DIAGNOSIS — Z12.39 SCREENING FOR BREAST CANCER: ICD-10-CM

## 2018-04-03 DIAGNOSIS — Z23 NEED FOR PROPHYLACTIC VACCINATION AND INOCULATION AGAINST VARICELLA: ICD-10-CM

## 2018-04-03 PROCEDURE — 1036F TOBACCO NON-USER: CPT | Performed by: NURSE PRACTITIONER

## 2018-04-03 PROCEDURE — G8417 CALC BMI ABV UP PARAM F/U: HCPCS | Performed by: NURSE PRACTITIONER

## 2018-04-03 PROCEDURE — 3014F SCREEN MAMMO DOC REV: CPT | Performed by: NURSE PRACTITIONER

## 2018-04-03 PROCEDURE — 3017F COLORECTAL CA SCREEN DOC REV: CPT | Performed by: NURSE PRACTITIONER

## 2018-04-03 PROCEDURE — 3045F PR MOST RECENT HEMOGLOBIN A1C LEVEL 7.0-9.0%: CPT | Performed by: NURSE PRACTITIONER

## 2018-04-03 PROCEDURE — G8427 DOCREV CUR MEDS BY ELIG CLIN: HCPCS | Performed by: NURSE PRACTITIONER

## 2018-04-03 PROCEDURE — 99214 OFFICE O/P EST MOD 30 MIN: CPT | Performed by: NURSE PRACTITIONER

## 2018-04-03 RX ORDER — BENZONATATE 200 MG/1
200 CAPSULE ORAL 3 TIMES DAILY PRN
Qty: 30 CAPSULE | Refills: 0 | Status: SHIPPED | OUTPATIENT
Start: 2018-04-03 | End: 2018-04-13

## 2018-04-03 RX ORDER — PROMETHAZINE HYDROCHLORIDE AND CODEINE PHOSPHATE 6.25; 1 MG/5ML; MG/5ML
5 SYRUP ORAL NIGHTLY PRN
Qty: 120 ML | Refills: 0 | Status: SHIPPED | OUTPATIENT
Start: 2018-04-03 | End: 2018-04-23

## 2018-04-03 RX ORDER — AZITHROMYCIN 250 MG/1
TABLET, FILM COATED ORAL
Qty: 1 PACKET | Refills: 0 | Status: SHIPPED | OUTPATIENT
Start: 2018-04-03 | End: 2018-04-13

## 2018-04-03 ASSESSMENT — ENCOUNTER SYMPTOMS
SINUS PRESSURE: 0
ABDOMINAL DISTENTION: 0
NAUSEA: 0
SINUS PAIN: 0
ALLERGIC/IMMUNOLOGIC NEGATIVE: 1
COUGH: 1
SHORTNESS OF BREATH: 0
VOMITING: 0
BACK PAIN: 0
SORE THROAT: 1
EYES NEGATIVE: 1

## 2018-04-17 ENCOUNTER — HOSPITAL ENCOUNTER (OUTPATIENT)
Dept: WOMENS IMAGING | Age: 53
Discharge: HOME | End: 2018-04-17
Attending: NURSE PRACTITIONER | Admitting: NURSE PRACTITIONER

## 2018-04-17 DIAGNOSIS — Z12.39 SCREENING FOR BREAST CANCER: ICD-10-CM

## 2018-04-17 DIAGNOSIS — R92.8 ABNORMAL MAMMOGRAM OF RIGHT BREAST: Primary | ICD-10-CM

## 2018-04-19 ENCOUNTER — HOSPITAL ENCOUNTER (OUTPATIENT)
Dept: WOMENS IMAGING | Age: 53
Discharge: HOME | End: 2018-04-19
Attending: NURSE PRACTITIONER | Admitting: NURSE PRACTITIONER

## 2018-04-19 DIAGNOSIS — R92.8 ABNORMAL MAMMOGRAM: ICD-10-CM

## 2018-04-19 DIAGNOSIS — R92.8 ABNORMAL MAMMOGRAM OF RIGHT BREAST: ICD-10-CM

## 2018-04-25 ENCOUNTER — OFFICE VISIT (OUTPATIENT)
Dept: FAMILY MEDICINE CLINIC | Age: 53
End: 2018-04-25

## 2018-04-25 VITALS
RESPIRATION RATE: 16 BRPM | HEART RATE: 82 BPM | HEIGHT: 68 IN | BODY MASS INDEX: 27.13 KG/M2 | WEIGHT: 179 LBS | DIASTOLIC BLOOD PRESSURE: 74 MMHG | OXYGEN SATURATION: 97 % | SYSTOLIC BLOOD PRESSURE: 116 MMHG

## 2018-04-25 DIAGNOSIS — Z23 NEED FOR PROPHYLACTIC VACCINATION AND INOCULATION AGAINST VARICELLA: ICD-10-CM

## 2018-04-25 DIAGNOSIS — K21.9 GASTROESOPHAGEAL REFLUX DISEASE, ESOPHAGITIS PRESENCE NOT SPECIFIED: ICD-10-CM

## 2018-04-25 DIAGNOSIS — G89.4 CHRONIC PAIN SYNDROME: Primary | ICD-10-CM

## 2018-04-25 DIAGNOSIS — R05.9 COUGH: ICD-10-CM

## 2018-04-25 DIAGNOSIS — G47.00 INSOMNIA, UNSPECIFIED TYPE: ICD-10-CM

## 2018-04-25 DIAGNOSIS — J06.9 UPPER RESPIRATORY TRACT INFECTION, UNSPECIFIED TYPE: ICD-10-CM

## 2018-04-25 PROCEDURE — 3014F SCREEN MAMMO DOC REV: CPT | Performed by: NURSE PRACTITIONER

## 2018-04-25 PROCEDURE — 99214 OFFICE O/P EST MOD 30 MIN: CPT | Performed by: NURSE PRACTITIONER

## 2018-04-25 PROCEDURE — G8427 DOCREV CUR MEDS BY ELIG CLIN: HCPCS | Performed by: NURSE PRACTITIONER

## 2018-04-25 PROCEDURE — 3017F COLORECTAL CA SCREEN DOC REV: CPT | Performed by: NURSE PRACTITIONER

## 2018-04-25 PROCEDURE — 1036F TOBACCO NON-USER: CPT | Performed by: NURSE PRACTITIONER

## 2018-04-25 PROCEDURE — G8417 CALC BMI ABV UP PARAM F/U: HCPCS | Performed by: NURSE PRACTITIONER

## 2018-04-25 RX ORDER — PANTOPRAZOLE SODIUM 40 MG/1
40 TABLET, DELAYED RELEASE ORAL 2 TIMES DAILY
Qty: 60 TABLET | Refills: 3 | Status: SHIPPED | OUTPATIENT
Start: 2018-04-25 | End: 2018-06-27 | Stop reason: ALTCHOICE

## 2018-04-25 RX ORDER — HYDROXYZINE PAMOATE 25 MG/1
25 CAPSULE ORAL 3 TIMES DAILY PRN
Qty: 30 CAPSULE | Refills: 5 | Status: SHIPPED | OUTPATIENT
Start: 2018-04-25 | End: 2018-08-16 | Stop reason: SDUPTHER

## 2018-04-25 RX ORDER — OMEPRAZOLE 40 MG/1
40 CAPSULE, DELAYED RELEASE ORAL DAILY
Qty: 30 CAPSULE | Refills: 5 | Status: SHIPPED | OUTPATIENT
Start: 2018-04-25 | End: 2018-08-16 | Stop reason: SDUPTHER

## 2018-04-25 RX ORDER — PROMETHAZINE HYDROCHLORIDE AND CODEINE PHOSPHATE 6.25; 1 MG/5ML; MG/5ML
5 SYRUP ORAL NIGHTLY PRN
Qty: 120 ML | Refills: 0 | Status: SHIPPED | OUTPATIENT
Start: 2018-04-25 | End: 2018-05-05

## 2018-04-25 RX ORDER — AZITHROMYCIN 250 MG/1
TABLET, FILM COATED ORAL
Qty: 6 TABLET | Refills: 0 | Status: SHIPPED | OUTPATIENT
Start: 2018-04-25 | End: 2018-05-05

## 2018-04-25 RX ORDER — TRAZODONE HYDROCHLORIDE 50 MG/1
50 TABLET ORAL NIGHTLY
Qty: 30 TABLET | Refills: 3 | Status: SHIPPED | OUTPATIENT
Start: 2018-04-25 | End: 2018-08-16 | Stop reason: SDUPTHER

## 2018-04-25 ASSESSMENT — PATIENT HEALTH QUESTIONNAIRE - PHQ9
1. LITTLE INTEREST OR PLEASURE IN DOING THINGS: 0
2. FEELING DOWN, DEPRESSED OR HOPELESS: 0
SUM OF ALL RESPONSES TO PHQ QUESTIONS 1-9: 0
SUM OF ALL RESPONSES TO PHQ9 QUESTIONS 1 & 2: 0

## 2018-04-25 ASSESSMENT — ENCOUNTER SYMPTOMS
BACK PAIN: 1
VOMITING: 0
COUGH: 1
ABDOMINAL PAIN: 0
NAUSEA: 0
SHORTNESS OF BREATH: 0

## 2018-05-29 ENCOUNTER — TELEPHONE (OUTPATIENT)
Dept: FAMILY MEDICINE CLINIC | Age: 53
End: 2018-05-29

## 2018-05-30 DIAGNOSIS — H92.09 CHRONIC EAR PAIN, UNSPECIFIED LATERALITY: Primary | ICD-10-CM

## 2018-05-30 DIAGNOSIS — G89.29 CHRONIC EAR PAIN, UNSPECIFIED LATERALITY: Primary | ICD-10-CM

## 2018-05-31 ENCOUNTER — TELEPHONE (OUTPATIENT)
Dept: FAMILY MEDICINE CLINIC | Age: 53
End: 2018-05-31

## 2018-06-01 DIAGNOSIS — G89.4 CHRONIC PAIN SYNDROME: Primary | ICD-10-CM

## 2018-06-27 ENCOUNTER — OFFICE VISIT (OUTPATIENT)
Dept: BARIATRICS/WEIGHT MGMT | Age: 53
End: 2018-06-27

## 2018-06-27 VITALS
DIASTOLIC BLOOD PRESSURE: 70 MMHG | SYSTOLIC BLOOD PRESSURE: 118 MMHG | HEIGHT: 66 IN | RESPIRATION RATE: 16 BRPM | HEART RATE: 64 BPM | WEIGHT: 173.7 LBS | BODY MASS INDEX: 27.92 KG/M2

## 2018-06-27 DIAGNOSIS — E11.42 TYPE 2 DIABETES MELLITUS WITH DIABETIC POLYNEUROPATHY, WITHOUT LONG-TERM CURRENT USE OF INSULIN (HCC): Primary | Chronic | ICD-10-CM

## 2018-06-27 DIAGNOSIS — Z98.84 STATUS POST LAPAROSCOPIC SLEEVE GASTRECTOMY: ICD-10-CM

## 2018-06-27 DIAGNOSIS — Z98.84 STATUS POST BARIATRIC SURGERY: ICD-10-CM

## 2018-06-27 DIAGNOSIS — I10 ESSENTIAL HYPERTENSION: Chronic | ICD-10-CM

## 2018-06-27 DIAGNOSIS — G89.4 CHRONIC PAIN SYNDROME: Chronic | ICD-10-CM

## 2018-06-27 PROCEDURE — G8417 CALC BMI ABV UP PARAM F/U: HCPCS | Performed by: SURGERY

## 2018-06-27 PROCEDURE — 1036F TOBACCO NON-USER: CPT | Performed by: SURGERY

## 2018-06-27 PROCEDURE — G8427 DOCREV CUR MEDS BY ELIG CLIN: HCPCS | Performed by: SURGERY

## 2018-06-27 PROCEDURE — 2022F DILAT RTA XM EVC RTNOPTHY: CPT | Performed by: SURGERY

## 2018-06-27 PROCEDURE — 3014F SCREEN MAMMO DOC REV: CPT | Performed by: SURGERY

## 2018-06-27 PROCEDURE — 3017F COLORECTAL CA SCREEN DOC REV: CPT | Performed by: SURGERY

## 2018-06-27 PROCEDURE — 99214 OFFICE O/P EST MOD 30 MIN: CPT | Performed by: SURGERY

## 2018-06-27 PROCEDURE — 3045F PR MOST RECENT HEMOGLOBIN A1C LEVEL 7.0-9.0%: CPT | Performed by: SURGERY

## 2018-06-27 RX ORDER — AMOXICILLIN 250 MG
2 CAPSULE ORAL DAILY
Qty: 60 TABLET | Refills: 3 | Status: SHIPPED | OUTPATIENT
Start: 2018-06-27 | End: 2018-07-07

## 2018-06-27 ASSESSMENT — ENCOUNTER SYMPTOMS
VOICE CHANGE: 0
NAUSEA: 0
SORE THROAT: 0
PHOTOPHOBIA: 0
ABDOMINAL PAIN: 0
COLOR CHANGE: 0
SHORTNESS OF BREATH: 0
VOMITING: 0
WHEEZING: 0
COUGH: 0
TROUBLE SWALLOWING: 0
CONSTIPATION: 0
DIARRHEA: 0
ANAL BLEEDING: 0
BLOOD IN STOOL: 0

## 2018-08-16 ENCOUNTER — OFFICE VISIT (OUTPATIENT)
Dept: FAMILY MEDICINE CLINIC | Age: 53
End: 2018-08-16

## 2018-08-16 VITALS
BODY MASS INDEX: 28.09 KG/M2 | WEIGHT: 174.8 LBS | HEART RATE: 62 BPM | SYSTOLIC BLOOD PRESSURE: 138 MMHG | HEIGHT: 66 IN | DIASTOLIC BLOOD PRESSURE: 84 MMHG | OXYGEN SATURATION: 97 %

## 2018-08-16 DIAGNOSIS — K21.9 GASTROESOPHAGEAL REFLUX DISEASE, ESOPHAGITIS PRESENCE NOT SPECIFIED: ICD-10-CM

## 2018-08-16 DIAGNOSIS — E11.42 TYPE 2 DIABETES MELLITUS WITH DIABETIC POLYNEUROPATHY, WITHOUT LONG-TERM CURRENT USE OF INSULIN (HCC): Primary | Chronic | ICD-10-CM

## 2018-08-16 DIAGNOSIS — G47.00 INSOMNIA, UNSPECIFIED TYPE: ICD-10-CM

## 2018-08-16 DIAGNOSIS — E55.9 VITAMIN D DEFICIENCY: ICD-10-CM

## 2018-08-16 DIAGNOSIS — B37.2 CANDIDIASIS OF SKIN: ICD-10-CM

## 2018-08-16 DIAGNOSIS — Z78.0 POSTMENOPAUSE: ICD-10-CM

## 2018-08-16 PROCEDURE — 1036F TOBACCO NON-USER: CPT | Performed by: NURSE PRACTITIONER

## 2018-08-16 PROCEDURE — 3017F COLORECTAL CA SCREEN DOC REV: CPT | Performed by: NURSE PRACTITIONER

## 2018-08-16 PROCEDURE — 3045F PR MOST RECENT HEMOGLOBIN A1C LEVEL 7.0-9.0%: CPT | Performed by: NURSE PRACTITIONER

## 2018-08-16 PROCEDURE — G8417 CALC BMI ABV UP PARAM F/U: HCPCS | Performed by: NURSE PRACTITIONER

## 2018-08-16 PROCEDURE — 3014F SCREEN MAMMO DOC REV: CPT | Performed by: NURSE PRACTITIONER

## 2018-08-16 PROCEDURE — 99214 OFFICE O/P EST MOD 30 MIN: CPT | Performed by: NURSE PRACTITIONER

## 2018-08-16 PROCEDURE — G8427 DOCREV CUR MEDS BY ELIG CLIN: HCPCS | Performed by: NURSE PRACTITIONER

## 2018-08-16 PROCEDURE — 2022F DILAT RTA XM EVC RTNOPTHY: CPT | Performed by: NURSE PRACTITIONER

## 2018-08-16 RX ORDER — HYDROXYZINE PAMOATE 50 MG/1
50 CAPSULE ORAL 3 TIMES DAILY PRN
Qty: 60 CAPSULE | Refills: 1 | Status: SHIPPED | OUTPATIENT
Start: 2018-08-16 | End: 2019-02-12 | Stop reason: SDUPTHER

## 2018-08-16 RX ORDER — HYDROXYZINE PAMOATE 25 MG/1
25 CAPSULE ORAL 3 TIMES DAILY PRN
Qty: 30 CAPSULE | Refills: 5 | Status: SHIPPED | OUTPATIENT
Start: 2018-08-16 | End: 2018-08-16 | Stop reason: DRUGHIGH

## 2018-08-16 RX ORDER — OMEPRAZOLE 40 MG/1
40 CAPSULE, DELAYED RELEASE ORAL DAILY
Qty: 30 CAPSULE | Refills: 5 | Status: SHIPPED | OUTPATIENT
Start: 2018-08-16 | End: 2019-04-03 | Stop reason: SDUPTHER

## 2018-08-16 RX ORDER — TRAZODONE HYDROCHLORIDE 50 MG/1
50 TABLET ORAL NIGHTLY
Qty: 30 TABLET | Refills: 3 | Status: SHIPPED | OUTPATIENT
Start: 2018-08-16 | End: 2019-04-03 | Stop reason: SDUPTHER

## 2018-08-16 RX ORDER — SITAGLIPTIN 100 MG/1
100 TABLET, FILM COATED ORAL EVERY MORNING
Qty: 30 TABLET | Refills: 3 | Status: SHIPPED | OUTPATIENT
Start: 2018-08-16 | End: 2018-12-12 | Stop reason: ALTCHOICE

## 2018-08-16 RX ORDER — METFORMIN HYDROCHLORIDE 500 MG/1
500 TABLET, EXTENDED RELEASE ORAL 3 TIMES DAILY
Qty: 90 TABLET | Refills: 1 | Status: SHIPPED | COMMUNITY
Start: 2018-08-16 | End: 2018-08-20 | Stop reason: SDUPTHER

## 2018-08-16 RX ORDER — NYSTATIN 100000 U/G
CREAM TOPICAL
Qty: 30 G | Refills: 0 | Status: SHIPPED | OUTPATIENT
Start: 2018-08-16 | End: 2019-01-23

## 2018-08-16 ASSESSMENT — ENCOUNTER SYMPTOMS
NAUSEA: 0
SHORTNESS OF BREATH: 0
ABDOMINAL DISTENTION: 0
VOMITING: 0

## 2018-08-16 NOTE — PROGRESS NOTES
SUBJECTIVE:  Eugena Minors   1965   female   No Known Allergies    Chief Complaint   Patient presents with    Diabetes      HPI   Increased personal stress and has been drinking more beer recently. Bilateral shoulder discomfort (chronic)  Going to [facility] for [facility] opinion on ears, continues with pain   Has follow up appt tomorrow with Dr Inessa Rizzo s/p bariatric surgery    Past Medical History:   Diagnosis Date    Anxiety     Arthritis     \"Back, Hands, Shoulders\"    Bronchitis Last Episode In Early 2000's    Chronic back pain     COPD (chronic obstructive pulmonary disease) [facility]     Sees Dr. Dilia De La Torre Depression     Diabetes mellitus ([facility] Dx 2008    Emphysema     Fibromyalgia     Hyperlipidemia     Hypertension     Migraines Last Migraine 8-17    MRSA (methicillin resistant Staphylococcus aureus) Dx In 2012 Or 2013    Right Arm    Obesity     Pap smear for cervical cancer screening 02/11/2008     Neg   Postive-Trichomonas    Pap smear for cervical cancer screening 03/25/2010    Neg    Pap smear for cervical cancer screening 07/18/2011    Neg    Pneumonia Dx Early 2000's    Psoriasis     Shortness of breath on exertion     Teeth missing     Upper And Lower    Wears glasses      Social History     Social History    Marital status: Single     Spouse name: N/A    Number of children: N/A    Years of education: N/A     Occupational History    unemployed      Social History Main Topics    Smoking status: Former Smoker     Packs/day: 0.25     Years: 30.00     Types: Cigarettes, E-Cigarettes     Start date: 1986     Quit date: 2016    Smokeless tobacco: Never Used    Alcohol use Yes      Comment: \"Occ.  Maybe Twice A Month\"    Drug use: No    Sexual activity: Yes     Partners: Male     Other Topics Concern    Not on file     Social History Narrative    No narrative on file     Family History   Problem Relation Age of Onset    Heart Attack Father     Heart Disease Father    [facility] Early Death Father 46        Heart Attack    Alcohol Abuse Father     Substance Abuse Father         Alcoholic    Arthritis Mother     Heart Disease Mother     Diabetes Mother     Cancer Mother         \"Kidney Cancer\"    High Blood Pressure Mother     High Blood Pressure Sister     Diabetes Brother     Early Death Daughter 21        \"Killed In A Car Accident\"     Past Surgical History:   Procedure Laterality Date    APPENDECTOMY  2000    CARPAL TUNNEL RELEASE Right 2017    Dr. Prudencio Leiva (right)   [address]    Tubal Ligation Also Done In     COLONOSCOPY  2017    polyp removed    DENTAL SURGERY      Teeth Extracted In Past    ELBOW SURGERY Bilateral Last Done In     Right Done Twice, Left Done Once left cubital tunnel release ; right 2014    ENDOSCOPY, COLON, DIAGNOSTIC  2017    HERNIA REPAIR  10/26/2017    hiatal hernia with gastrectomy    HYSTERECTOMY VAGINAL      LAPAROSCOPY  2000    ROTATOR CUFF REPAIR Right 2015    Dr. Lawrence Puls Right 2017    Dr Anali Snowden  10/26/2017    Robotic [place]    Done With        Review of Systems   Constitutional: Negative for fatigue and unexpected weight change. HENT: Negative for congestion. Respiratory: Negative for shortness of breath. Cardiovascular: Negative for chest pain, palpitations and leg swelling. Gastrointestinal: Negative for abdominal distention, nausea and vomiting. Musculoskeletal: Positive for arthralgias. Shoulder pain, chronic   Neurological: Negative for dizziness, weakness and headaches. Psychiatric/Behavioral: Negative for agitation, sleep disturbance and suicidal ideas. The patient is not nervous/anxious.          Recent stressors       OBJECTIVE:  /84   Pulse 62   Ht 5' 6\" (1.676 m)   Wt 174 lb 12.8 oz (79.3 kg)   SpO2 97%   BMI 28.21 kg/m²   BP Readings from Last 3 Encounters: topically 2 times daily. Dispense: 30 g; Refill: 0    5. Postmenopause  Refill:  - calcium carbonate-vitamin D (CALTRATE 600+D) 600-400 MG-UNIT TABS per tab; Take 1 tablet by mouth 2 times daily  Dispense: 60 tablet; Refill: 3  - Multiple Vitamin (MVI, CELEBRATE, CHEWABLE TABLET); Take 1 tablet by mouth daily  Dispense: 30 tablet; Refill: 3    6. Type 2 diabetes mellitus with diabetic polyneuropathy, without long-term current use of insulin ([facility])  Refill:  - metFORMIN (GLUCOPHAGE-XR) 500 MG extended release tablet; Take 1 tablet by mouth 3 times daily \"I Take 2 In The Morning, I Take One At Night\"  Dispense: 90 tablet; Refill: 1  - JANUVIA 100 MG tablet; Take 1 tablet by mouth every morning  Dispense: 30 tablet; Refill: 3      No orders of the defined types were placed in this encounter. Current Outpatient Prescriptions   Medication Sig Dispense Refill    omeprazole (PRILOSEC) 40 MG delayed release capsule Take 1 capsule by mouth daily 30 capsule 5    traZODone (DESYREL) 50 MG tablet Take 1 tablet by mouth nightly 30 tablet 3    calcium carbonate-vitamin D (CALTRATE 600+D) 600-400 MG-UNIT TABS per tab Take 1 tablet by mouth 2 times daily 60 tablet 3    Multiple Vitamin (MVI, CELEBRATE, CHEWABLE TABLET) Take 1 tablet by mouth daily 30 tablet 3    Calcium-Vitamin D 600-200 MG-UNIT TABS Take 2 tablets by mouth daily 60 tablet 5    tiotropium (SPIRIVA RESPIMAT) 2.5 MCG/ACT AERS inhaler Inhale 2 puffs into the lungs daily 1 Inhaler 5    hydrOXYzine (VISTARIL) 50 MG capsule Take 1 capsule by mouth 3 times daily as needed for Itching 60 capsule 1    nystatin (MYCOSTATIN) 576299 UNIT/GM cream Apply topically 2 times daily.  30 g 0    metFORMIN (GLUCOPHAGE-XR) 500 MG extended release tablet Take 1 tablet by mouth 3 times daily \"I Take 2 In The Morning, I Take One At Night\" 90 tablet 1    JANUVIA 100 MG tablet Take 1 tablet by mouth every morning 30 tablet 3    albuterol sulfate HFA (PROAIR HFA) 108 (90 Base) MCG/ACT inhaler Inhale 2 puffs into the lungs every 6 hours as needed for Wheezing 1 Inhaler 5    fluticasone-vilanterol (BREO ELLIPTA) 100-25 MCG/INH AEPB inhaler Inhale 1 puff into the lungs daily 1 each 5    FREESTYLE LANCETS MISC 1 each by Does not apply route daily 100 each 3    glucose blood VI test strips (FREESTYLE LITE) strip 1 each by In Vitro route daily As needed. 100 each 3    ALPRAZolam (XANAX) 0.5 MG tablet Take 0.5 mg by mouth nightly as needed for Sleep.  Alpha-Lipoic Acid 300 MG CAPS Take by mouth      clobetasol (TEMOVATE) 0.05 % ointment Apply topically 2 times daily.  45 g 0    Ciclopirox 1 % SHAM Apply 1 Units topically daily 120 mL 2    guaiFENesin (MUCINEX) 600 MG extended release tablet Take 1 tablet by mouth 2 times daily 60 tablet 2    Dextromethorphan-Benzocaine 5-7.5 MG LOZG Take 1 tablet by mouth every 4 hours as needed (sore th) 40 lozenge 3    ondansetron (ZOFRAN ODT) 4 MG disintegrating tablet Take 1 tablet by mouth every 4 hours as needed for Nausea or Vomiting 30 tablet 3    Venlafaxine HCl (EFFEXOR PO) Take by mouth 2 times daily \"I Take 150 Mg Every Morning, Take 37.5 Mg At Noon\"      LORazepam (ATIVAN) 0.5 MG tablet Take 0.5 mg by mouth 2 times daily      loratadine (CLARITIN) 10 MG tablet Take 10 mg by mouth as needed      Nutritional Supplements (ENSURE HIGH PROTEIN) LIQD Take 1 Can by mouth 4 times daily as needed (for pre-op diet meal replacement) 124 Can 0    ipratropium-albuterol (DUONEB) 0.5-2.5 (3) MG/3ML SOLN nebulizer solution Inhale 3 mLs into the lungs every 4 hours 360 mL 5    montelukast (SINGULAIR) 10 MG tablet Take 1 tablet by mouth nightly   6    tiZANidine (ZANAFLEX) 4 MG tablet Take 1 tablet by mouth 2 times daily  11    lisinopril-hydrochlorothiazide (PRINZIDE;ZESTORETIC) 20-25 MG per tablet Take 2 tablets by mouth every morning \"I Take Two Every Morning\"  6    budesonide-formoterol (SYMBICORT) 160-4.5 MCG/ACT AERO Inhale 2 puffs into the lungs 2 times daily 1 Inhaler 5    ranitidine (ZANTAC) 150 MG tablet Take 150 mg by mouth as needed        No current facility-administered medications for this visit. Return in about 3 months (around 11/16/2018) for diabetes, A1c. Ramesh Brewer DNP, FNP-C    Return for new or worsening symptoms or any concerns as needed.

## 2018-08-20 ENCOUNTER — OFFICE VISIT (OUTPATIENT)
Dept: FAMILY MEDICINE CLINIC | Age: 53
End: 2018-08-20

## 2018-08-20 ENCOUNTER — TELEPHONE (OUTPATIENT)
Dept: FAMILY MEDICINE CLINIC | Age: 53
End: 2018-08-20

## 2018-08-20 VITALS
SYSTOLIC BLOOD PRESSURE: 122 MMHG | HEART RATE: 75 BPM | DIASTOLIC BLOOD PRESSURE: 74 MMHG | OXYGEN SATURATION: 97 % | WEIGHT: 176.6 LBS | TEMPERATURE: 98.2 F | BODY MASS INDEX: 28.38 KG/M2 | HEIGHT: 66 IN

## 2018-08-20 DIAGNOSIS — E11.42 TYPE 2 DIABETES MELLITUS WITH DIABETIC POLYNEUROPATHY, WITHOUT LONG-TERM CURRENT USE OF INSULIN (HCC): Chronic | ICD-10-CM

## 2018-08-20 DIAGNOSIS — J44.1 COPD EXACERBATION (HCC): Primary | ICD-10-CM

## 2018-08-20 PROCEDURE — 99213 OFFICE O/P EST LOW 20 MIN: CPT | Performed by: NURSE PRACTITIONER

## 2018-08-20 RX ORDER — DOXYCYCLINE HYCLATE 100 MG
100 TABLET ORAL 2 TIMES DAILY
Qty: 20 TABLET | Refills: 0 | Status: SHIPPED | OUTPATIENT
Start: 2018-08-20 | End: 2018-08-30

## 2018-08-20 RX ORDER — PREDNISONE 20 MG/1
40 TABLET ORAL DAILY
Qty: 10 TABLET | Refills: 0 | Status: SHIPPED | OUTPATIENT
Start: 2018-08-20 | End: 2018-08-25

## 2018-08-20 RX ORDER — BENZONATATE 100 MG/1
100 CAPSULE ORAL 3 TIMES DAILY PRN
Qty: 21 CAPSULE | Refills: 0 | Status: SHIPPED | OUTPATIENT
Start: 2018-08-20 | End: 2018-08-27

## 2018-08-20 ASSESSMENT — ENCOUNTER SYMPTOMS
RHINORRHEA: 0
SINUS PAIN: 0
SPUTUM PRODUCTION: 1
DIARRHEA: 0
ABDOMINAL PAIN: 0
SWOLLEN GLANDS: 1
COUGH: 1
CHEST TIGHTNESS: 1
FREQUENT THROAT CLEARING: 1
SORE THROAT: 1
WHEEZING: 1

## 2018-08-20 ASSESSMENT — COPD QUESTIONNAIRES: COPD: 1

## 2018-08-20 NOTE — TELEPHONE ENCOUNTER
Patient called and stated that she has head congestion sore throat with cough no appts available call was transferred to [facility]

## 2018-08-20 NOTE — PATIENT INSTRUCTIONS
We are committed to providing you the best care possible. If you receive a survey after visiting one of our offices, please take time to share your experience concerning your physician office visit. These surveys are confidential and no health information about you is shared. We are eager to improve for you and we are counting on your feedback to help make that happen. Patient Education        COPD Exacerbation Plan: Care Instructions  Your Care Instructions    If you have chronic obstructive pulmonary disease (COPD), your usual shortness of breath could suddenly get worse. You may start coughing more and have more mucus. This flare-up is called a COPD exacerbation (say \"bv-[id number]-paulino\"). A lung infection or air pollution could set off an exacerbation. Sometimes it can happen after a quick change in temperature or being around chemicals. Work with your doctor to make a plan for dealing with an exacerbation. You can better manage it if you plan ahead. Follow-up care is a key part of your treatment and safety. Be sure to make and go to all appointments, and call your doctor if you are having problems. It's also a good idea to know your test results and keep a list of the medicines you take. How can you care for yourself at home? During an exacerbation  · Do not panic if you start to have one. Quick treatment at home may help you prevent serious breathing problems. If you have a COPD exacerbation plan that you developed with your doctor, follow it. · Take your medicines exactly as your doctor tells you. ¨ Use your inhaler as directed by your doctor. If your symptoms do not get better after you use your medicine, have someone take you to the emergency room. Call an ambulance if necessary. ¨ With inhaled medicines, a spacer or a nebulizer may help you get more medicine to your lungs. Ask your doctor or pharmacist how to use them properly.  Practice using the spacer in front of a mirror before you have an exacerbation. This may help you get the medicine into your lungs quickly. ¨ If your doctor has given you steroid pills, take them as directed. ¨ Your doctor may have given you a prescription for antibiotics, which you can fill if you need it. ¨ Talk to your doctor if you have any problems with your medicine. And call your doctor if you have to use your antibiotic or steroid pills. Preventing an exacerbation  · Do not smoke. This is the most important step you can take to prevent more damage to your lungs and prevent problems. If you already smoke, it is never too late to stop. If you need help quitting, talk to your doctor about stop-smoking programs and medicines. These can increase your chances of quitting for good. · Take your daily medicines as prescribed. · Avoid colds and flu. ¨ Get a pneumococcal vaccine. ¨ Get a flu vaccine each year, as soon as it is available. Ask those you live or work with to do the same, so they will not get the flu and infect you. ¨ Try to stay away from people with colds or the flu. ¨ Wash your hands often. · Avoid secondhand smoke; air pollution; cold, dry air; hot, humid air; and high altitudes. Stay at home with your windows closed when air pollution is bad. · Learn breathing techniques for COPD, such as breathing through pursed lips. These techniques can help you breathe easier during an exacerbation. When should you call for help? Call 911 anytime you think you may need emergency care. For example, call if:    · You have severe trouble breathing.     · You have severe chest pain.    Call your doctor now or seek immediate medical care if:    · You have new or worse shortness of breath.     · You develop new chest pain.     · You are coughing more deeply or more often, especially if you notice more mucus or a change in the color of your mucus.     · You cough up blood.     · You have new or increased swelling in your legs or belly.     · You have a fever.

## 2018-08-21 RX ORDER — METFORMIN HYDROCHLORIDE 500 MG/1
500 TABLET, EXTENDED RELEASE ORAL 3 TIMES DAILY
Qty: 90 TABLET | Refills: 1 | Status: SHIPPED | OUTPATIENT
Start: 2018-08-21 | End: 2019-01-02 | Stop reason: DRUGHIGH

## 2018-08-31 ENCOUNTER — OFFICE VISIT (OUTPATIENT)
Dept: BARIATRICS/WEIGHT MGMT | Age: 53
End: 2018-08-31

## 2018-08-31 VITALS
BODY MASS INDEX: 27.19 KG/M2 | SYSTOLIC BLOOD PRESSURE: 162 MMHG | DIASTOLIC BLOOD PRESSURE: 98 MMHG | WEIGHT: 169.2 LBS | RESPIRATION RATE: 14 BRPM | HEART RATE: 71 BPM | HEIGHT: 66 IN

## 2018-08-31 DIAGNOSIS — Z98.84 STATUS POST LAPAROSCOPIC SLEEVE GASTRECTOMY: Primary | ICD-10-CM

## 2018-08-31 DIAGNOSIS — L98.492 GROIN INCISION ULCER, WITH FAT LAYER EXPOSED (HCC): ICD-10-CM

## 2018-08-31 DIAGNOSIS — R10.31 INGUINAL PAIN OF BOTH SIDES: ICD-10-CM

## 2018-08-31 DIAGNOSIS — R10.32 INGUINAL PAIN OF BOTH SIDES: ICD-10-CM

## 2018-08-31 DIAGNOSIS — Z98.84 STATUS POST BARIATRIC SURGERY: ICD-10-CM

## 2018-08-31 PROCEDURE — G8427 DOCREV CUR MEDS BY ELIG CLIN: HCPCS | Performed by: SURGERY

## 2018-08-31 PROCEDURE — G8417 CALC BMI ABV UP PARAM F/U: HCPCS | Performed by: SURGERY

## 2018-08-31 PROCEDURE — 3014F SCREEN MAMMO DOC REV: CPT | Performed by: SURGERY

## 2018-08-31 PROCEDURE — 3017F COLORECTAL CA SCREEN DOC REV: CPT | Performed by: SURGERY

## 2018-08-31 PROCEDURE — 99214 OFFICE O/P EST MOD 30 MIN: CPT | Performed by: SURGERY

## 2018-08-31 PROCEDURE — 1036F TOBACCO NON-USER: CPT | Performed by: SURGERY

## 2018-08-31 ASSESSMENT — ENCOUNTER SYMPTOMS
WHEEZING: 0
COLOR CHANGE: 0
ANAL BLEEDING: 0
PHOTOPHOBIA: 0
COUGH: 0
BLOOD IN STOOL: 0
VOICE CHANGE: 0
SHORTNESS OF BREATH: 0
CONSTIPATION: 0
TROUBLE SWALLOWING: 0
NAUSEA: 0
SORE THROAT: 0
ABDOMINAL PAIN: 0
VOMITING: 0
DIARRHEA: 0

## 2018-08-31 NOTE — PROGRESS NOTES
BARIATRIC SURGERY POST OPERATIVE NOTE    SUBJECTIVE:    Patient presenting today referred from YESSICA Tao CNP, for   Chief Complaint   Patient presents with   [facility] Weight Management     PO#5 OR10/26/17 Laparoscopic robotic DaVinci-assisted sleeve gastrectomy around a     BP (!) 162/98 (Site: Right Arm, Position: Sitting, Cuff Size: Medium Adult)   Pulse 71   Resp 14   Ht 5' 6\" (1.676 m)   Wt 169 lb 3.2 oz (76.7 kg)   BMI 27.31 kg/m²      HPI: Maximilian Em is a 48 y.o. female Patient is here for their fifth, follow up 9 months post op 10/26/17. Date of Surgery: 10/26/17    Type of Surgery:   Laparoscopic robotic DaVinci-assisted sleeve gastrectomy around a  38-[place] bougie + Hiatal hernia primary repair    BMI:  Obesity Classification: Overweight  Today's weight is 169.2 lbs, last visits weight was 173.7  Wt Readings from Last 3 Encounters:   08/31/18 169 lb 3.2 oz (76.7 kg)   08/20/18 176 lb 9.6 oz (80.1 kg)   08/16/18 174 lb 12.8 oz (79.3 kg)     Total gain/loss is - 4.5 lbs    Weight History: Wt Readings from Last 3 Encounters:   08/31/18 169 lb 3.2 oz (76.7 kg)   08/20/18 176 lb 9.6 oz (80.1 kg)   08/16/18 174 lb 12.8 oz (79.3 kg)       Total weight loss/gain - 61.6 Lbs over 9 months. How pleased are you with your current weight loss: \"I'm happy I just don't like the sagging skin on my stomach\"   If you are disappointed, what do you think is preventing you from increased weight loss? Is patient experiencing any physical problems post surgery:\" Yes, a recurring rash on lower abdomen and groin due to sweat from stomach hanging over. \"  Is patient experiencing any dietary problems post surgery: No:   Food Intolerances: Denies any food intolerances since last seen. How hungry are you? \" I have days that I eat more than others it depends on what I eat\"  How full do you feel after eating? full  How fast do you eat? 15 min  Food log brought to appointment today: No    Breakfast: yes  Mid-AM Snack: yes  Lunch: yes  Mid-PM Snack: yes  Dinner: yes  Evening Snack: yes    Liquids Consumed: 60 + oz per day. Times of day liquids consumed: throughout  Patient taking protein supplement: Yes sometimes. Brand of Supplement: not sure  Patient taking multivitamins: Yes  Does patient exercise: never  What prevents you from exercising: nothing    Addressed the status of the following co-morbidities:   1. Bilateral groin ulcers  worsening. 2. Lower abdominal pain  worsening. 3. Diabetes  worsening. Due to alcohol, counseled in length. And with mental health. .      Current Outpatient Prescriptions:     metFORMIN (GLUCOPHAGE-XR) 500 MG extended release tablet, Take 1 tablet by mouth 3 times daily \"I Take 2 In The Morning, I Take One At Night\", Disp: 90 tablet, Rfl: 1    omeprazole (PRILOSEC) 40 MG delayed release capsule, Take 1 capsule by mouth daily, Disp: 30 capsule, Rfl: 5    traZODone (DESYREL) 50 MG tablet, Take 1 tablet by mouth nightly, Disp: 30 tablet, Rfl: 3    Multiple Vitamin (MVI, CELEBRATE, CHEWABLE TABLET), Take 1 tablet by mouth daily, Disp: 30 tablet, Rfl: 3    Calcium-Vitamin D 600-200 MG-UNIT TABS, Take 2 tablets by mouth daily, Disp: 60 tablet, Rfl: 5    tiotropium (SPIRIVA RESPIMAT) 2.5 MCG/ACT AERS inhaler, Inhale 2 puffs into the lungs daily, Disp: 1 Inhaler, Rfl: 5    hydrOXYzine (VISTARIL) 50 MG capsule, Take 1 capsule by mouth 3 times daily as needed for Itching, Disp: 60 capsule, Rfl: 1    nystatin (MYCOSTATIN) 700775 UNIT/GM cream, Apply topically 2 times daily. , Disp: 30 g, Rfl: 0    JANUVIA 100 MG tablet, Take 1 tablet by mouth every morning, Disp: 30 tablet, Rfl: 3    albuterol sulfate HFA (PROAIR HFA) 108 (90 Base) MCG/ACT inhaler, Inhale 2 puffs into the lungs every 6 hours as needed for Wheezing, Disp: 1 Inhaler, Rfl: 5    fluticasone-vilanterol (BREO ELLIPTA) 100-25 MCG/INH AEPB inhaler, Inhale 1 puff into the lungs daily, Disp: 1 each, Rfl: 5    FREESTYLE A Month\"    Drug use: No    Sexual activity: Yes     Partners: Male     Other Topics Concern    None     Social History Narrative    None     Review of Systems   Constitutional: Negative for activity change, chills, diaphoresis and fever. HENT: Negative for sore throat, trouble swallowing and voice change. Eyes: Negative for photophobia and visual disturbance. Respiratory: Negative for cough, shortness of breath and wheezing. Cardiovascular: Negative for chest pain, palpitations and leg swelling. Gastrointestinal: Negative for abdominal pain, anal bleeding, blood in stool, constipation, diarrhea, nausea and vomiting. Endocrine: Negative for cold intolerance, heat intolerance, polydipsia and polyuria. Genitourinary: Negative for dysuria, frequency and hematuria. Musculoskeletal: Negative for joint swelling, myalgias and neck stiffness. Skin: Negative for color change and rash. Neurological: Negative for seizures, speech difficulty, light-headedness and numbness. Hematological: Negative for adenopathy. Does not bruise/bleed easily. OBJECTIVE:    Physical Exam   Constitutional: She is oriented to person, place, and time. She appears well-developed and well-nourished. No distress. HENT:   Head: Normocephalic and atraumatic. Eyes: Pupils are equal, round, and reactive to light. Conjunctivae and EOM are normal. No scleral icterus. Neck: Normal range of motion. No JVD present. No tracheal deviation present. No thyromegaly present. Cardiovascular: Normal rate, regular rhythm, normal heart sounds and intact distal pulses. Exam reveals no gallop and no friction rub. No murmur heard. Pulmonary/Chest: Effort normal. No stridor. No respiratory distress. She has no wheezes. She has no rales. She exhibits no tenderness. Abdominal: Soft. Bowel sounds are normal. She exhibits no distension and no mass. There is tenderness (Bilateral groin ulcers).  There is no rebound and no

## 2018-09-04 RX ORDER — DIAPER,BRIEF,INFANT-TODD,DISP
EACH MISCELLANEOUS
Qty: 28.35 G | Refills: 1 | Status: SHIPPED | OUTPATIENT
Start: 2018-09-04 | End: 2018-12-24 | Stop reason: SDUPTHER

## 2018-10-02 ENCOUNTER — HOSPITAL ENCOUNTER (OUTPATIENT)
Age: 53
Discharge: HOME | End: 2018-10-02
Payer: COMMERCIAL

## 2018-10-02 LAB
ALBUMIN SERPL-MCNC: 4.1 GM/DL (ref 3.4–5)
ALP BLD-CCNC: 75 IU/L (ref 40–128)
ALT SERPL-CCNC: 10 U/L (ref 10–40)
ANION GAP SERPL CALCULATED.3IONS-SCNC: 16 MMOL/L (ref 4–16)
AST SERPL-CCNC: 14 IU/L (ref 15–37)
BASOPHILS ABSOLUTE: 0.1 K/CU MM
BASOPHILS RELATIVE PERCENT: 0.8 % (ref 0–1)
BILIRUB SERPL-MCNC: 0.6 MG/DL (ref 0–1)
BUN BLDV-MCNC: 17 MG/DL (ref 6–23)
CALCIUM SERPL-MCNC: 9.5 MG/DL (ref 8.3–10.6)
CHLORIDE BLD-SCNC: 97 MMOL/L (ref 99–110)
CO2: 23 MMOL/L (ref 21–32)
CREAT SERPL-MCNC: 1.1 MG/DL (ref 0.6–1.1)
DIFFERENTIAL TYPE: ABNORMAL
EOSINOPHILS ABSOLUTE: 0.1 K/CU MM
EOSINOPHILS RELATIVE PERCENT: 1 % (ref 0–3)
GFR AFRICAN AMERICAN: >60 ML/MIN/1.73M2
GFR NON-AFRICAN AMERICAN: 52 ML/MIN/1.73M2
GLUCOSE FASTING: 77 MG/DL (ref 70–99)
HCT VFR BLD CALC: 42.8 % (ref 37–47)
HEMOGLOBIN: 14.2 GM/DL (ref 12.5–16)
HEPATITIS B SURFACE ANTIGEN: NON REACTIVE
HEPATITIS C ANTIBODY: NON REACTIVE
IMMATURE NEUTROPHIL %: 0.3 % (ref 0–0.43)
LYMPHOCYTES ABSOLUTE: 4.8 K/CU MM
LYMPHOCYTES RELATIVE PERCENT: 40.1 % (ref 24–44)
MCH RBC QN AUTO: 26.3 PG (ref 27–31)
MCHC RBC AUTO-ENTMCNC: 33.2 % (ref 32–36)
MCV RBC AUTO: 79.3 FL (ref 78–100)
MONOCYTES ABSOLUTE: 0.6 K/CU MM
MONOCYTES RELATIVE PERCENT: 5 % (ref 0–4)
NUCLEATED RBC %: 0 %
PDW BLD-RTO: 13.5 % (ref 11.7–14.9)
PLATELET # BLD: 385 K/CU MM (ref 140–440)
PMV BLD AUTO: 11.1 FL (ref 7.5–11.1)
POTASSIUM SERPL-SCNC: 4.8 MMOL/L (ref 3.5–5.1)
RBC # BLD: 5.4 M/CU MM (ref 4.2–5.4)
SEGMENTED NEUTROPHILS ABSOLUTE COUNT: 6.3 K/CU MM
SEGMENTED NEUTROPHILS RELATIVE PERCENT: 52.8 % (ref 36–66)
SODIUM BLD-SCNC: 136 MMOL/L (ref 135–145)
TOTAL IMMATURE NEUTOROPHIL: 0.03 K/CU MM
TOTAL NUCLEATED RBC: 0 K/CU MM
TOTAL PROTEIN: 6.6 GM/DL (ref 6.4–8.2)
WBC # BLD: 11.9 K/CU MM (ref 4–10.5)

## 2018-10-02 PROCEDURE — 80053 COMPREHEN METABOLIC PANEL: CPT

## 2018-10-02 PROCEDURE — 36415 COLL VENOUS BLD VENIPUNCTURE: CPT

## 2018-10-02 PROCEDURE — 86803 HEPATITIS C AB TEST: CPT

## 2018-10-02 PROCEDURE — 85025 COMPLETE CBC W/AUTO DIFF WBC: CPT

## 2018-10-02 PROCEDURE — 87340 HEPATITIS B SURFACE AG IA: CPT

## 2018-10-23 RX ORDER — LANCETS 28 GAUGE
1 EACH MISCELLANEOUS DAILY
Qty: 100 EACH | Refills: 5 | Status: SHIPPED | OUTPATIENT
Start: 2018-10-23 | End: 2020-11-12 | Stop reason: SDUPTHER

## 2018-10-31 ENCOUNTER — OFFICE VISIT (OUTPATIENT)
Dept: FAMILY MEDICINE CLINIC | Age: 53
End: 2018-10-31
Payer: COMMERCIAL

## 2018-10-31 VITALS
RESPIRATION RATE: 16 BRPM | DIASTOLIC BLOOD PRESSURE: 70 MMHG | OXYGEN SATURATION: 95 % | HEART RATE: 86 BPM | WEIGHT: 170.4 LBS | HEIGHT: 68 IN | BODY MASS INDEX: 25.82 KG/M2 | SYSTOLIC BLOOD PRESSURE: 112 MMHG | TEMPERATURE: 98.1 F

## 2018-10-31 DIAGNOSIS — Z12.11 SCREENING FOR COLON CANCER: ICD-10-CM

## 2018-10-31 DIAGNOSIS — R05.9 COUGH: ICD-10-CM

## 2018-10-31 DIAGNOSIS — J01.90 ACUTE BACTERIAL SINUSITIS: Primary | ICD-10-CM

## 2018-10-31 DIAGNOSIS — B96.89 ACUTE BACTERIAL SINUSITIS: Primary | ICD-10-CM

## 2018-10-31 PROCEDURE — 1036F TOBACCO NON-USER: CPT | Performed by: NURSE PRACTITIONER

## 2018-10-31 PROCEDURE — G8417 CALC BMI ABV UP PARAM F/U: HCPCS | Performed by: NURSE PRACTITIONER

## 2018-10-31 PROCEDURE — G8427 DOCREV CUR MEDS BY ELIG CLIN: HCPCS | Performed by: NURSE PRACTITIONER

## 2018-10-31 PROCEDURE — G8484 FLU IMMUNIZE NO ADMIN: HCPCS | Performed by: NURSE PRACTITIONER

## 2018-10-31 PROCEDURE — 3017F COLORECTAL CA SCREEN DOC REV: CPT | Performed by: NURSE PRACTITIONER

## 2018-10-31 PROCEDURE — 99213 OFFICE O/P EST LOW 20 MIN: CPT | Performed by: NURSE PRACTITIONER

## 2018-10-31 PROCEDURE — 3014F SCREEN MAMMO DOC REV: CPT | Performed by: NURSE PRACTITIONER

## 2018-10-31 RX ORDER — PROMETHAZINE HYDROCHLORIDE AND CODEINE PHOSPHATE 6.25; 1 MG/5ML; MG/5ML
5 SYRUP ORAL NIGHTLY PRN
Qty: 120 ML | Refills: 0 | Status: SHIPPED | OUTPATIENT
Start: 2018-10-31 | End: 2018-11-10

## 2018-10-31 RX ORDER — METHYLPREDNISOLONE 4 MG/1
TABLET ORAL
Qty: 1 KIT | Refills: 0 | Status: SHIPPED | OUTPATIENT
Start: 2018-10-31 | End: 2018-11-19

## 2018-10-31 RX ORDER — BENZONATATE 100 MG/1
100 CAPSULE ORAL 3 TIMES DAILY PRN
Qty: 30 CAPSULE | Refills: 0 | Status: SHIPPED | OUTPATIENT
Start: 2018-10-31 | End: 2018-11-07

## 2018-10-31 RX ORDER — AZITHROMYCIN 250 MG/1
TABLET, FILM COATED ORAL
Qty: 6 TABLET | Refills: 0 | Status: SHIPPED | OUTPATIENT
Start: 2018-10-31 | End: 2018-11-10

## 2018-10-31 ASSESSMENT — ENCOUNTER SYMPTOMS
ABDOMINAL DISTENTION: 0
VOMITING: 0
COUGH: 1
SINUS PRESSURE: 1
NAUSEA: 0
SHORTNESS OF BREATH: 0

## 2018-10-31 NOTE — PROGRESS NOTES
BMI 25.91 kg/m²   BP Readings from Last 3 Encounters:   10/31/18 112/70   08/31/18 (!) 162/98   08/20/18 122/74     Wt Readings from Last 3 Encounters:   10/31/18 170 lb 6.4 oz (77.3 kg)   09/10/18 186 lb (84.4 kg)   08/31/18 169 lb 3.2 oz (76.7 kg)     Body mass index is 25.91 kg/m². Physical Exam   Constitutional: She is oriented to person, place, and time. She appears well-developed and well-nourished. HENT:   Head: Normocephalic and atraumatic. Nose: Nose normal.   Mouth/Throat: Oropharynx is clear and moist.   Mild erythema bilateral TM's   Eyes: Pupils are equal, round, and reactive to light. Conjunctivae are normal.   Neck: Normal range of motion. Neck supple. Cardiovascular: Normal rate, regular rhythm, normal heart sounds and intact distal pulses. Pulmonary/Chest: Effort normal and breath sounds normal.   Abdominal: Soft. Bowel sounds are normal.   Musculoskeletal: Normal range of motion. Lymphadenopathy:     She has cervical adenopathy. Neurological: She is alert and oriented to person, place, and time. She has normal reflexes. Skin: Skin is warm and dry. Psychiatric: She has a normal mood and affect. Her behavior is normal. Judgment and thought content normal.       ASSESSMENT/PLAN:    1. Acute bacterial sinusitis  Verbal and written instructions given for care  - azithromycin (ZITHROMAX) 250 MG tablet; Take 2 tablets on Day 1, then 1 tablet per day on Day 2 through Day 5  Dispense: 6 tablet; Refill: 0  - methylPREDNISolone (MEDROL, DEAN,) 4 MG tablet; Take as directed on the pack  Dispense: 1 kit; Refill: 0    2. Cough  Increase po fluids  - benzonatate (TESSALON PERLES) 100 MG capsule; Take 1 capsule by mouth 3 times daily as needed for Cough  Dispense: 30 capsule; Refill: 0  - promethazine-codeine (PHENERGAN WITH CODEINE) 6.25-10 MG/5ML syrup; Take 5 mLs by mouth nightly as needed for Cough for up to 10 days. .  Dispense: 120 mL; Refill: 0    3.  Screening for colon cancer  - POCT Fecal MCG/INH AEPB inhaler Inhale 1 puff into the lungs daily 1 each 5    glucose blood VI test strips (FREESTYLE LITE) strip 1 each by In Vitro route daily As needed. 100 each 3    ALPRAZolam (XANAX) 0.5 MG tablet Take 0.5 mg by mouth nightly as needed for Sleep.  clobetasol (TEMOVATE) 0.05 % ointment Apply topically 2 times daily. 45 g 0    Ciclopirox 1 % SHAM Apply 1 Units topically daily 120 mL 2    guaiFENesin (MUCINEX) 600 MG extended release tablet Take 1 tablet by mouth 2 times daily 60 tablet 2    ondansetron (ZOFRAN ODT) 4 MG disintegrating tablet Take 1 tablet by mouth every 4 hours as needed for Nausea or Vomiting 30 tablet 3    Venlafaxine HCl (EFFEXOR PO) Take by mouth 2 times daily \"I Take 150 Mg Every Morning, Take 37.5 Mg At Noon\"      loratadine (CLARITIN) 10 MG tablet Take 10 mg by mouth as needed      Nutritional Supplements (ENSURE HIGH PROTEIN) LIQD Take 1 Can by mouth 4 times daily as needed (for pre-op diet meal replacement) 124 Can 0    ipratropium-albuterol (DUONEB) 0.5-2.5 (3) MG/3ML SOLN nebulizer solution Inhale 3 mLs into the lungs every 4 hours 360 mL 5    montelukast (SINGULAIR) 10 MG tablet Take 1 tablet by mouth nightly   6    tiZANidine (ZANAFLEX) 4 MG tablet Take 1 tablet by mouth 2 times daily  11    lisinopril-hydrochlorothiazide (PRINZIDE;ZESTORETIC) 20-25 MG per tablet Take 2 tablets by mouth every morning \"I Take Two Every Morning\"  6    budesonide-formoterol (SYMBICORT) 160-4.5 MCG/ACT AERO Inhale 2 puffs into the lungs 2 times daily 1 Inhaler 5    ranitidine (ZANTAC) 150 MG tablet Take 150 mg by mouth as needed       Calcium-Vitamin D 600-200 MG-UNIT TABS Take 2 tablets by mouth daily 60 tablet 5     No current facility-administered medications for this visit. Return if symptoms worsen or fail to improve. Kathrin Garcia DNP, FNP-C    Return for new or worsening symptoms or any concerns as needed.

## 2018-11-02 ENCOUNTER — OFFICE VISIT (OUTPATIENT)
Dept: BARIATRICS/WEIGHT MGMT | Age: 53
End: 2018-11-02
Payer: COMMERCIAL

## 2018-11-02 VITALS
BODY MASS INDEX: 27.02 KG/M2 | HEIGHT: 66 IN | RESPIRATION RATE: 16 BRPM | HEART RATE: 68 BPM | SYSTOLIC BLOOD PRESSURE: 120 MMHG | DIASTOLIC BLOOD PRESSURE: 80 MMHG | WEIGHT: 168.1 LBS

## 2018-11-02 DIAGNOSIS — Z98.84 STATUS POST LAPAROSCOPIC SLEEVE GASTRECTOMY: Primary | ICD-10-CM

## 2018-11-02 PROCEDURE — 99214 OFFICE O/P EST MOD 30 MIN: CPT | Performed by: SURGERY

## 2018-11-02 PROCEDURE — G8484 FLU IMMUNIZE NO ADMIN: HCPCS | Performed by: SURGERY

## 2018-11-02 PROCEDURE — 1036F TOBACCO NON-USER: CPT | Performed by: SURGERY

## 2018-11-02 PROCEDURE — G8427 DOCREV CUR MEDS BY ELIG CLIN: HCPCS | Performed by: SURGERY

## 2018-11-02 PROCEDURE — G8417 CALC BMI ABV UP PARAM F/U: HCPCS | Performed by: SURGERY

## 2018-11-02 PROCEDURE — 3014F SCREEN MAMMO DOC REV: CPT | Performed by: SURGERY

## 2018-11-02 PROCEDURE — 3017F COLORECTAL CA SCREEN DOC REV: CPT | Performed by: SURGERY

## 2018-11-02 ASSESSMENT — ENCOUNTER SYMPTOMS
TROUBLE SWALLOWING: 0
COUGH: 0
VOICE CHANGE: 0
BLOOD IN STOOL: 0
CONSTIPATION: 0
ANAL BLEEDING: 0
SHORTNESS OF BREATH: 0
PHOTOPHOBIA: 0
ABDOMINAL PAIN: 0
WHEEZING: 0
DIARRHEA: 0
SORE THROAT: 0
COLOR CHANGE: 0
VOMITING: 0
NAUSEA: 0

## 2018-11-02 NOTE — PROGRESS NOTES
exercise: rarely  What prevents you from exercising: back pain    Addressed the status of the following co-morbidities:   1. DM  improving. 2. GERD  improving. 3. Depression improving. Current Outpatient Prescriptions:     azithromycin (ZITHROMAX) 250 MG tablet, Take 2 tablets on Day 1, then 1 tablet per day on Day 2 through Day 5, Disp: 6 tablet, Rfl: 0    methylPREDNISolone (MEDROL, DEAN,) 4 MG tablet, Take as directed on the pack, Disp: 1 kit, Rfl: 0    benzonatate (TESSALON PERLES) 100 MG capsule, Take 1 capsule by mouth 3 times daily as needed for Cough, Disp: 30 capsule, Rfl: 0    promethazine-codeine (PHENERGAN WITH CODEINE) 6.25-10 MG/5ML syrup, Take 5 mLs by mouth nightly as needed for Cough for up to 10 days. ., Disp: 120 mL, Rfl: 0    VALUE PLUS LANCETS THIN 26G MISC, 1 EACH BY DOES NOT APPLY ROUTE DAILY, Disp: 100 each, Rfl: 5    hydrocortisone 1 % cream, APPLY THIN LAYER TO AFFECTED AREA ON FACE TOPICALLY 2 TIMES DAILY. , Disp: 28.35 g, Rfl: 1    metFORMIN (GLUCOPHAGE-XR) 500 MG extended release tablet, Take 1 tablet by mouth 3 times daily \"I Take 2 In The Morning, I Take One At Night\", Disp: 90 tablet, Rfl: 1    omeprazole (PRILOSEC) 40 MG delayed release capsule, Take 1 capsule by mouth daily, Disp: 30 capsule, Rfl: 5    traZODone (DESYREL) 50 MG tablet, Take 1 tablet by mouth nightly, Disp: 30 tablet, Rfl: 3    Multiple Vitamin (MVI, CELEBRATE, CHEWABLE TABLET), Take 1 tablet by mouth daily, Disp: 30 tablet, Rfl: 3    tiotropium (SPIRIVA RESPIMAT) 2.5 MCG/ACT AERS inhaler, Inhale 2 puffs into the lungs daily, Disp: 1 Inhaler, Rfl: 5    hydrOXYzine (VISTARIL) 50 MG capsule, Take 1 capsule by mouth 3 times daily as needed for Itching, Disp: 60 capsule, Rfl: 1    nystatin (MYCOSTATIN) 812159 UNIT/GM cream, Apply topically 2 times daily. , Disp: 30 g, Rfl: 0    JANUVIA 100 MG tablet, Take 1 tablet by mouth every morning, Disp: 30 tablet, Rfl: 3    albuterol sulfate HFA (PROAIR HFA) 108 (90 Base) MCG/ACT inhaler, Inhale 2 puffs into the lungs every 6 hours as needed for Wheezing, Disp: 1 Inhaler, Rfl: 5    fluticasone-vilanterol (BREO ELLIPTA) 100-25 MCG/INH AEPB inhaler, Inhale 1 puff into the lungs daily, Disp: 1 each, Rfl: 5    glucose blood VI test strips (FREESTYLE LITE) strip, 1 each by In Vitro route daily As needed. , Disp: 100 each, Rfl: 3    ALPRAZolam (XANAX) 0.5 MG tablet, Take 0.5 mg by mouth nightly as needed for Sleep., Disp: , Rfl:     clobetasol (TEMOVATE) 0.05 % ointment, Apply topically 2 times daily. , Disp: 45 g, Rfl: 0    Ciclopirox 1 % SHAM, Apply 1 Units topically daily, Disp: 120 mL, Rfl: 2    guaiFENesin (MUCINEX) 600 MG extended release tablet, Take 1 tablet by mouth 2 times daily, Disp: 60 tablet, Rfl: 2    ondansetron (ZOFRAN ODT) 4 MG disintegrating tablet, Take 1 tablet by mouth every 4 hours as needed for Nausea or Vomiting, Disp: 30 tablet, Rfl: 3    Venlafaxine HCl (EFFEXOR PO), Take by mouth 2 times daily \"I Take 150 Mg Every Morning, Take 37.5 Mg At Noon\", Disp: , Rfl:     loratadine (CLARITIN) 10 MG tablet, Take 10 mg by mouth as needed, Disp: , Rfl:     Nutritional Supplements (ENSURE HIGH PROTEIN) LIQD, Take 1 Can by mouth 4 times daily as needed (for pre-op diet meal replacement), Disp: 124 Can, Rfl: 0    ipratropium-albuterol (DUONEB) 0.5-2.5 (3) MG/3ML SOLN nebulizer solution, Inhale 3 mLs into the lungs every 4 hours, Disp: 360 mL, Rfl: 5    montelukast (SINGULAIR) 10 MG tablet, Take 1 tablet by mouth nightly , Disp: , Rfl: 6    tiZANidine (ZANAFLEX) 4 MG tablet, Take 1 tablet by mouth 2 times daily, Disp: , Rfl: 11    lisinopril-hydrochlorothiazide (PRINZIDE;ZESTORETIC) 20-25 MG per tablet, Take 2 tablets by mouth every morning \"I Take Two Every Morning\", Disp: , Rfl: 6    budesonide-formoterol (SYMBICORT) 160-4.5 MCG/ACT AERO, Inhale 2 puffs into the lungs 2 times daily, Disp: 1 Inhaler, Rfl: 5    ranitidine (ZANTAC) 150 MG tablet, Take 150 normal. No stridor. No respiratory distress. She has no wheezes. She has no rales. She exhibits no tenderness. Abdominal: Soft. Bowel sounds are normal. She exhibits no distension and no mass. There is no tenderness. There is no rebound and no guarding. Musculoskeletal: Normal range of motion. She exhibits no edema or tenderness. Lymphadenopathy:     She has no cervical adenopathy. Neurological: She is alert and oriented to person, place, and time. No cranial nerve deficit. Coordination normal.   Skin: Skin is warm and dry. No rash noted. She is not diaphoretic. No erythema. No pallor. Psychiatric: Her behavior is normal. Judgment and thought content normal.   Vitals reviewed. Assessment / Plan:    1. Status post laparoscopic sleeve gastrectomy          DM improving, A1c Pending,   Prtn 50  800-900 Kcal/d    NEEDS an abdominoplasty, skin ulcers, in the crutch, will pre-approve, and proceed. Follow Up:  Return in about 6 weeks (around 12/14/2018) for Bariatric follow up: diet, exercise & weight loss, Follow up Symptoms.     Stephanie Stern MD, FACS, FICS  Member of the [id number] of [employer] and [employer]    [phone]    11/2/18

## 2018-11-14 ENCOUNTER — CLINICAL DOCUMENTATION (OUTPATIENT)
Dept: BARIATRICS/WEIGHT MGMT | Age: 53
End: 2018-11-14

## 2018-11-15 ENCOUNTER — TELEPHONE (OUTPATIENT)
Dept: SURGERY | Age: 53
End: 2018-11-15

## 2018-11-15 ENCOUNTER — TELEPHONE (OUTPATIENT)
Dept: BARIATRICS/WEIGHT MGMT | Age: 53
End: 2018-11-15

## 2018-11-15 NOTE — TELEPHONE ENCOUNTER
Scheduled Abdominoplasty at [facility]  Surgery: 11/29/18 at 1045. PAT: 1/19/18 at 1300. P/O appt: Call after discharge to schedule appointment  Prep: 1 week of Slim-Fast Diet starting on 11/22/18. NPO after midnight. Called and left message for Beverly to call back.  sent.

## 2018-11-19 ENCOUNTER — TELEPHONE (OUTPATIENT)
Dept: SURGERY | Age: 53
End: 2018-11-19

## 2018-11-19 ENCOUNTER — HOSPITAL ENCOUNTER (OUTPATIENT)
Dept: PREADMISSION TESTING | Age: 53
Discharge: HOME | End: 2018-11-23
Payer: COMMERCIAL

## 2018-11-19 VITALS
WEIGHT: 168 LBS | DIASTOLIC BLOOD PRESSURE: 81 MMHG | BODY MASS INDEX: 27.99 KG/M2 | HEART RATE: 76 BPM | TEMPERATURE: 96.4 F | OXYGEN SATURATION: 99 % | SYSTOLIC BLOOD PRESSURE: 153 MMHG | RESPIRATION RATE: 16 BRPM | HEIGHT: 65 IN

## 2018-11-19 DIAGNOSIS — E66.01 MORBID OBESITY DUE TO EXCESS CALORIES (HCC): Primary | ICD-10-CM

## 2018-11-19 LAB
ANION GAP SERPL CALCULATED.3IONS-SCNC: 14 MMOL/L (ref 4–16)
BUN BLDV-MCNC: 9 MG/DL (ref 6–23)
CALCIUM SERPL-MCNC: 9.5 MG/DL (ref 8.3–10.6)
CHLORIDE BLD-SCNC: 101 MMOL/L (ref 99–110)
CO2: 24 MMOL/L (ref 21–32)
CREAT SERPL-MCNC: 0.9 MG/DL (ref 0.6–1.1)
GFR AFRICAN AMERICAN: >60 ML/MIN/1.73M2
GFR NON-AFRICAN AMERICAN: >60 ML/MIN/1.73M2
GLUCOSE BLD-MCNC: 113 MG/DL (ref 70–99)
HCT VFR BLD CALC: 39.6 % (ref 37–47)
HEMOGLOBIN: 13.7 GM/DL (ref 12.5–16)
MCH RBC QN AUTO: 26.3 PG (ref 27–31)
MCHC RBC AUTO-ENTMCNC: 34.6 % (ref 32–36)
MCV RBC AUTO: 76.2 FL (ref 78–100)
PDW BLD-RTO: 13.8 % (ref 11.7–14.9)
PLATELET # BLD: 338 K/CU MM (ref 140–440)
PMV BLD AUTO: 10.5 FL (ref 7.5–11.1)
POTASSIUM SERPL-SCNC: 4.2 MMOL/L (ref 3.5–5.1)
RBC # BLD: 5.2 M/CU MM (ref 4.2–5.4)
SODIUM BLD-SCNC: 139 MMOL/L (ref 135–145)
WBC # BLD: 9 K/CU MM (ref 4–10.5)

## 2018-11-19 PROCEDURE — 85027 COMPLETE CBC AUTOMATED: CPT

## 2018-11-19 PROCEDURE — 36415 COLL VENOUS BLD VENIPUNCTURE: CPT

## 2018-11-19 PROCEDURE — 80048 BASIC METABOLIC PNL TOTAL CA: CPT

## 2018-11-19 RX ORDER — FEEDER CONTAINER WITH PUMP SET
4 EACH MISCELLANEOUS 4 TIMES DAILY
Qty: 120 CAN | Refills: 0 | Status: SHIPPED | OUTPATIENT
Start: 2018-11-19 | End: 2018-12-12 | Stop reason: ALTCHOICE

## 2018-11-19 RX ORDER — OXYCODONE AND ACETAMINOPHEN 10; 325 MG/1; MG/1
TABLET ORAL PRN
Status: ON HOLD | COMMUNITY
End: 2018-11-30 | Stop reason: HOSPADM

## 2018-11-19 NOTE — TELEPHONE ENCOUNTER
Patient phoned our office, wanted to confirm when she is to start her diet with Ensure prior to surgery. I conveyed the information to the patient, in which she understood. Patient also requested a prescription for more Ensure via [facility]. I told the patient that Vamsi Ken would assist her with this and also provided the hours of [facility] for this week due to the holiday.

## 2018-11-20 LAB
EKG ATRIAL RATE: 69 BPM
EKG DIAGNOSIS: NORMAL
EKG P AXIS: 53 DEGREES
EKG P-R INTERVAL: 242 MS
EKG Q-T INTERVAL: 412 MS
EKG QRS DURATION: 76 MS
EKG QTC CALCULATION (BAZETT): 441 MS
EKG R AXIS: 81 DEGREES
EKG T AXIS: 49 DEGREES
EKG VENTRICULAR RATE: 69 BPM

## 2018-11-27 ENCOUNTER — ANESTHESIA EVENT (OUTPATIENT)
Dept: OPERATING ROOM | Age: 53
DRG: 385 | End: 2018-11-27
Payer: COMMERCIAL

## 2018-11-27 ASSESSMENT — ENCOUNTER SYMPTOMS: SHORTNESS OF BREATH: 1

## 2018-11-27 NOTE — ANESTHESIA PRE PROCEDURE
Department of Anesthesiology  Preprocedure Note       Name:  Tyler Sommer   Age:  48 y.o.  :  1965                                          MRN:  [id number]         Date:  2018      Surgeon: Pattie Hwang):  Wilber Mejia MD    Procedure: ABDOMINOPLASTY (N/A )    Medications prior to admission:   Prior to Admission medications    Medication Sig Start Date End Date Taking? Authorizing Provider   oxyCODONE-acetaminophen (PERCOCET)  MG per tablet Take by mouth as needed for Pain. Bon Aguilar MD   Nutritional Supplements (ENSURE HIGH PROTEIN) LIQD Take 4 Cans by mouth 4 times daily Pre-op diet, meal replacement. 18   RonyYESSICA Sevilla CNP   VALUE PLUS LANCETS THIN 26G MISC 1 EACH BY DOES NOT APPLY ROUTE DAILY 10/23/18   YESSICA Connell CNP   hydrocortisone 1 % cream APPLY THIN LAYER TO AFFECTED AREA ON FACE TOPICALLY 2 TIMES DAILY.  18   YESSICA Connell CNP   metFORMIN (GLUCOPHAGE-XR) 500 MG extended release tablet Take 1 tablet by mouth 3 times daily \"I Take 2 In The Morning, I Take One At Night\" 18   YESSICA Connell CNP   omeprazole (PRILOSEC) 40 MG delayed release capsule Take 1 capsule by mouth daily  Patient taking differently: Take 40 mg by mouth as needed  18   YESSICA Connell CNP   traZODone (DESYREL) 50 MG tablet Take 1 tablet by mouth nightly 18   YESSICA Connell CNP   Multiple Vitamin (MVI, CELEBRATE, CHEWABLE TABLET) Take 1 tablet by mouth daily 18   YESSICA Connell CNP   Calcium-Vitamin D 600-200 MG-UNIT TABS Take 2 tablets by mouth daily 8/16/18 9/15/18  YESSICA Connell CNP   tiotropium (SPIRIVA RESPIMAT) 2.5 MCG/ACT AERS inhaler Inhale 2 puffs into the lungs daily 18   YESSICA Connell CNP   hydrOXYzine (VISTARIL) 50 MG capsule Take 1 capsule by mouth 3 times daily as needed for Itching 18   YESSICA Connell - CNP   nystatin (MYCOSTATIN) 612650 UNIT/GM cream Apply topically 2 times GASTRECTOMY  10/26/2017    Robotic Laparoscopic, Pre Op Weight 237  Or 238 lbs.  TUBAL LIGATION      Done With        Social History:    Social History   Substance Use Topics    Smoking status: Former Smoker     Packs/day: 0.25     Years: 30.00     Types: Cigarettes, E-Cigarettes     Start date:      Quit date:     Smokeless tobacco: Never Used    Alcohol use Yes      Comment: \"Occ. Maybe Twice A Month\"                                Counseling given: Not Answered      Vital Signs (Current): There were no vitals filed for this visit. BP Readings from Last 3 Encounters:   18 (!) 153/81   18 120/80   10/31/18 112/70       NPO Status:                                                                                 BMI:   Wt Readings from Last 3 Encounters:   18 168 lb (76.2 kg)   18 168 lb 1.6 oz (76.2 kg)   10/31/18 170 lb 6.4 oz (77.3 kg)     There is no height or weight on file to calculate BMI.    CBC:   Lab Results   Component Value Date    WBC 9.0 2018    RBC 5.20 2018    HGB 13.7 2018    HCT 39.6 2018    MCV 76.2 2018    RDW 13.8 2018     2018       CMP:   Lab Results   Component Value Date     2018    K 4.2 2018     2018    CO2 24 2018    BUN 9 2018    CREATININE 0.9 2018    GFRAA >60 2018    LABGLOM >60 2018    GLUCOSE 113 2018    PROT 6.6 10/02/2018    PROT 7.9 2013    CALCIUM 9.5 2018    BILITOT 0.6 10/02/2018    ALKPHOS 75 10/02/2018    AST 14 10/02/2018    ALT 10 10/02/2018       POC Tests: No results for input(s): POCGLU, POCNA, POCK, POCCL, POCBUN, POCHEMO, POCHCT in the last 72 hours.     Coags: No results found for: PROTIME, INR, APTT    HCG (If Applicable): No results found for: PREGTESTUR, PREGSERUM, HCG, HCGQUANT     ABGs: No results found for: PHART, PO2ART, LPE1SCH, RBK4QKK, BEART,

## 2018-11-29 ENCOUNTER — ANESTHESIA (OUTPATIENT)
Dept: OPERATING ROOM | Age: 53
DRG: 385 | End: 2018-11-29
Payer: COMMERCIAL

## 2018-11-29 ENCOUNTER — HOSPITAL ENCOUNTER (INPATIENT)
Age: 53
LOS: 1 days | Discharge: HOME | DRG: 385 | End: 2018-11-30
Attending: SURGERY | Admitting: SURGERY
Payer: COMMERCIAL

## 2018-11-29 VITALS
OXYGEN SATURATION: 99 % | DIASTOLIC BLOOD PRESSURE: 76 MMHG | TEMPERATURE: 96.8 F | SYSTOLIC BLOOD PRESSURE: 109 MMHG | RESPIRATION RATE: 7 BRPM

## 2018-11-29 DIAGNOSIS — E65 ABDOMINAL PANNUS: Primary | ICD-10-CM

## 2018-11-29 LAB
GLUCOSE BLD-MCNC: 69 MG/DL (ref 70–99)
GLUCOSE BLD-MCNC: 85 MG/DL (ref 70–99)

## 2018-11-29 PROCEDURE — 7100000001 HC PACU RECOVERY - ADDTL 15 MIN: Performed by: SURGERY

## 2018-11-29 PROCEDURE — 3700000000 HC ANESTHESIA ATTENDED CARE: Performed by: SURGERY

## 2018-11-29 PROCEDURE — 88304 TISSUE EXAM BY PATHOLOGIST: CPT

## 2018-11-29 PROCEDURE — 6360000002 HC RX W HCPCS: Performed by: NURSE ANESTHETIST, CERTIFIED REGISTERED

## 2018-11-29 PROCEDURE — 6370000000 HC RX 637 (ALT 250 FOR IP): Performed by: SURGERY

## 2018-11-29 PROCEDURE — 6360000002 HC RX W HCPCS: Performed by: SURGERY

## 2018-11-29 PROCEDURE — 0JB80ZZ EXCISION OF ABDOMEN SUBCUTANEOUS TISSUE AND FASCIA, OPEN APPROACH: ICD-10-PCS | Performed by: SURGERY

## 2018-11-29 PROCEDURE — 2500000003 HC RX 250 WO HCPCS: Performed by: NURSE ANESTHETIST, CERTIFIED REGISTERED

## 2018-11-29 PROCEDURE — 6370000000 HC RX 637 (ALT 250 FOR IP): Performed by: NURSE ANESTHETIST, CERTIFIED REGISTERED

## 2018-11-29 PROCEDURE — C1729 CATH, DRAINAGE: HCPCS | Performed by: SURGERY

## 2018-11-29 PROCEDURE — G0378 HOSPITAL OBSERVATION PER HR: HCPCS

## 2018-11-29 PROCEDURE — C9290 INJ, BUPIVACAINE LIPOSOME: HCPCS | Performed by: SURGERY

## 2018-11-29 PROCEDURE — 3600000003 HC SURGERY LEVEL 3 BASE: Performed by: SURGERY

## 2018-11-29 PROCEDURE — 82962 GLUCOSE BLOOD TEST: CPT

## 2018-11-29 PROCEDURE — 3700000001 HC ADD 15 MINUTES (ANESTHESIA): Performed by: SURGERY

## 2018-11-29 PROCEDURE — 1200000000 HC SEMI PRIVATE

## 2018-11-29 PROCEDURE — 7100000000 HC PACU RECOVERY - FIRST 15 MIN: Performed by: SURGERY

## 2018-11-29 PROCEDURE — 2709999900 HC NON-CHARGEABLE SUPPLY: Performed by: SURGERY

## 2018-11-29 PROCEDURE — 6360000002 HC RX W HCPCS: Performed by: ANESTHESIOLOGY

## 2018-11-29 PROCEDURE — 2580000003 HC RX 258: Performed by: SURGERY

## 2018-11-29 PROCEDURE — L0625 LO FLEX L1-BELOW L5 PRE OTS: HCPCS | Performed by: SURGERY

## 2018-11-29 PROCEDURE — 15847 EXC SKIN ABD ADD-ON: CPT | Performed by: SURGERY

## 2018-11-29 PROCEDURE — 2580000003 HC RX 258: Performed by: ANESTHESIOLOGY

## 2018-11-29 PROCEDURE — 94640 AIRWAY INHALATION TREATMENT: CPT

## 2018-11-29 PROCEDURE — 2500000003 HC RX 250 WO HCPCS: Performed by: SURGERY

## 2018-11-29 PROCEDURE — 3600000013 HC SURGERY LEVEL 3 ADDTL 15MIN: Performed by: SURGERY

## 2018-11-29 PROCEDURE — 15830 EXC EXCESSIVE SKIN ABDOMEN: CPT | Performed by: SURGERY

## 2018-11-29 RX ORDER — SENNA AND DOCUSATE SODIUM 50; 8.6 MG/1; MG/1
2 TABLET, FILM COATED ORAL 2 TIMES DAILY
Status: DISCONTINUED | OUTPATIENT
Start: 2018-11-29 | End: 2018-11-30 | Stop reason: HOSPADM

## 2018-11-29 RX ORDER — MEPERIDINE HYDROCHLORIDE 25 MG/ML
12.5 INJECTION INTRAMUSCULAR; INTRAVENOUS; SUBCUTANEOUS EVERY 5 MIN PRN
Status: DISCONTINUED | OUTPATIENT
Start: 2018-11-29 | End: 2018-11-29 | Stop reason: HOSPADM

## 2018-11-29 RX ORDER — LIDOCAINE HYDROCHLORIDE 20 MG/ML
INJECTION, SOLUTION INFILTRATION; PERINEURAL PRN
Status: DISCONTINUED | OUTPATIENT
Start: 2018-11-29 | End: 2018-11-29 | Stop reason: SDUPTHER

## 2018-11-29 RX ORDER — ONDANSETRON 2 MG/ML
4 INJECTION INTRAMUSCULAR; INTRAVENOUS EVERY 4 HOURS PRN
Status: DISCONTINUED | OUTPATIENT
Start: 2018-11-29 | End: 2018-11-30 | Stop reason: HOSPADM

## 2018-11-29 RX ORDER — TRAZODONE HYDROCHLORIDE 50 MG/1
50 TABLET ORAL NIGHTLY
Status: DISCONTINUED | OUTPATIENT
Start: 2018-11-29 | End: 2018-11-30 | Stop reason: HOSPADM

## 2018-11-29 RX ORDER — SODIUM CHLORIDE 0.9 % (FLUSH) 0.9 %
10 SYRINGE (ML) INJECTION EVERY 12 HOURS SCHEDULED
Status: DISCONTINUED | OUTPATIENT
Start: 2018-11-29 | End: 2018-11-30 | Stop reason: HOSPADM

## 2018-11-29 RX ORDER — FENTANYL CITRATE 50 UG/ML
INJECTION, SOLUTION INTRAMUSCULAR; INTRAVENOUS PRN
Status: DISCONTINUED | OUTPATIENT
Start: 2018-11-29 | End: 2018-11-29 | Stop reason: SDUPTHER

## 2018-11-29 RX ORDER — ALBUTEROL SULFATE 90 UG/1
AEROSOL, METERED RESPIRATORY (INHALATION) PRN
Status: DISCONTINUED | OUTPATIENT
Start: 2018-11-29 | End: 2018-11-29 | Stop reason: SDUPTHER

## 2018-11-29 RX ORDER — TIZANIDINE 4 MG/1
4 TABLET ORAL 2 TIMES DAILY
Status: DISCONTINUED | OUTPATIENT
Start: 2018-11-29 | End: 2018-11-30 | Stop reason: HOSPADM

## 2018-11-29 RX ORDER — ROCURONIUM BROMIDE 10 MG/ML
INJECTION, SOLUTION INTRAVENOUS PRN
Status: DISCONTINUED | OUTPATIENT
Start: 2018-11-29 | End: 2018-11-29 | Stop reason: SDUPTHER

## 2018-11-29 RX ORDER — CEFAZOLIN SODIUM 2 G/100ML
2 INJECTION, SOLUTION INTRAVENOUS EVERY 8 HOURS
Status: DISCONTINUED | OUTPATIENT
Start: 2018-11-29 | End: 2018-11-29 | Stop reason: SDUPTHER

## 2018-11-29 RX ORDER — POTASSIUM CHLORIDE 7.45 MG/ML
10 INJECTION INTRAVENOUS PRN
Status: DISCONTINUED | OUTPATIENT
Start: 2018-11-29 | End: 2018-11-30 | Stop reason: HOSPADM

## 2018-11-29 RX ORDER — OXYCODONE HYDROCHLORIDE AND ACETAMINOPHEN 5; 325 MG/1; MG/1
1 TABLET ORAL EVERY 4 HOURS PRN
Status: DISCONTINUED | OUTPATIENT
Start: 2018-11-29 | End: 2018-11-30 | Stop reason: HOSPADM

## 2018-11-29 RX ORDER — PROMETHAZINE HYDROCHLORIDE 25 MG/ML
12.5 INJECTION, SOLUTION INTRAMUSCULAR; INTRAVENOUS EVERY 6 HOURS PRN
Status: DISCONTINUED | OUTPATIENT
Start: 2018-11-29 | End: 2018-11-30 | Stop reason: HOSPADM

## 2018-11-29 RX ORDER — FEEDER CONTAINER WITH PUMP SET
1 EACH MISCELLANEOUS 4 TIMES DAILY PRN
Status: DISCONTINUED | OUTPATIENT
Start: 2018-11-29 | End: 2018-11-29 | Stop reason: CLARIF

## 2018-11-29 RX ORDER — FEEDER CONTAINER WITH PUMP SET
4 EACH MISCELLANEOUS 4 TIMES DAILY
Status: DISCONTINUED | OUTPATIENT
Start: 2018-11-29 | End: 2018-11-29 | Stop reason: CLARIF

## 2018-11-29 RX ORDER — IPRATROPIUM BROMIDE AND ALBUTEROL SULFATE 2.5; .5 MG/3ML; MG/3ML
1 SOLUTION RESPIRATORY (INHALATION) EVERY 4 HOURS
Status: DISCONTINUED | OUTPATIENT
Start: 2018-11-29 | End: 2018-11-30 | Stop reason: HOSPADM

## 2018-11-29 RX ORDER — FENTANYL CITRATE 50 UG/ML
50 INJECTION, SOLUTION INTRAMUSCULAR; INTRAVENOUS EVERY 5 MIN PRN
Status: DISCONTINUED | OUTPATIENT
Start: 2018-11-29 | End: 2018-11-29 | Stop reason: HOSPADM

## 2018-11-29 RX ORDER — LABETALOL HYDROCHLORIDE 5 MG/ML
5 INJECTION, SOLUTION INTRAVENOUS EVERY 10 MIN PRN
Status: DISCONTINUED | OUTPATIENT
Start: 2018-11-29 | End: 2018-11-29 | Stop reason: HOSPADM

## 2018-11-29 RX ORDER — HYDRALAZINE HYDROCHLORIDE 20 MG/ML
5 INJECTION INTRAMUSCULAR; INTRAVENOUS EVERY 10 MIN PRN
Status: DISCONTINUED | OUTPATIENT
Start: 2018-11-29 | End: 2018-11-29 | Stop reason: HOSPADM

## 2018-11-29 RX ORDER — CEFAZOLIN SODIUM 1 G/50ML
1 INJECTION, SOLUTION INTRAVENOUS ONCE
Status: COMPLETED | OUTPATIENT
Start: 2018-11-29 | End: 2018-11-29

## 2018-11-29 RX ORDER — ACETAMINOPHEN 325 MG/1
650 TABLET ORAL EVERY 4 HOURS PRN
Status: DISCONTINUED | OUTPATIENT
Start: 2018-11-29 | End: 2018-11-30 | Stop reason: HOSPADM

## 2018-11-29 RX ORDER — HEPARIN SODIUM 5000 [USP'U]/ML
5000 INJECTION, SOLUTION INTRAVENOUS; SUBCUTANEOUS ONCE
Status: COMPLETED | OUTPATIENT
Start: 2018-11-29 | End: 2018-11-29

## 2018-11-29 RX ORDER — DOCUSATE SODIUM 100 MG/1
100 CAPSULE, LIQUID FILLED ORAL 2 TIMES DAILY
Status: DISCONTINUED | OUTPATIENT
Start: 2018-11-29 | End: 2018-11-30 | Stop reason: HOSPADM

## 2018-11-29 RX ORDER — ALPRAZOLAM 0.5 MG/1
0.5 TABLET ORAL NIGHTLY PRN
Status: DISCONTINUED | OUTPATIENT
Start: 2018-11-30 | End: 2018-11-30 | Stop reason: HOSPADM

## 2018-11-29 RX ORDER — PROMETHAZINE HYDROCHLORIDE 25 MG/ML
6.25 INJECTION, SOLUTION INTRAMUSCULAR; INTRAVENOUS
Status: DISCONTINUED | OUTPATIENT
Start: 2018-11-29 | End: 2018-11-29 | Stop reason: HOSPADM

## 2018-11-29 RX ORDER — MAGNESIUM SULFATE 1 G/100ML
1 INJECTION INTRAVENOUS PRN
Status: DISCONTINUED | OUTPATIENT
Start: 2018-11-29 | End: 2018-11-30 | Stop reason: HOSPADM

## 2018-11-29 RX ORDER — HYDROMORPHONE HCL 110MG/55ML
0.5 PATIENT CONTROLLED ANALGESIA SYRINGE INTRAVENOUS EVERY 5 MIN PRN
Status: DISCONTINUED | OUTPATIENT
Start: 2018-11-29 | End: 2018-11-29 | Stop reason: HOSPADM

## 2018-11-29 RX ORDER — DIPHENHYDRAMINE HYDROCHLORIDE 50 MG/ML
12.5 INJECTION INTRAMUSCULAR; INTRAVENOUS
Status: COMPLETED | OUTPATIENT
Start: 2018-11-29 | End: 2018-11-29

## 2018-11-29 RX ORDER — BISACODYL 10 MG
10 SUPPOSITORY, RECTAL RECTAL DAILY PRN
Status: DISCONTINUED | OUTPATIENT
Start: 2018-11-29 | End: 2018-11-30 | Stop reason: HOSPADM

## 2018-11-29 RX ORDER — MORPHINE SULFATE 2 MG/ML
INJECTION, SOLUTION INTRAMUSCULAR; INTRAVENOUS PRN
Status: DISCONTINUED | OUTPATIENT
Start: 2018-11-29 | End: 2018-11-29 | Stop reason: SDUPTHER

## 2018-11-29 RX ORDER — CEFAZOLIN SODIUM 2 G/100ML
2 INJECTION, SOLUTION INTRAVENOUS EVERY 8 HOURS
Status: DISCONTINUED | OUTPATIENT
Start: 2018-11-29 | End: 2018-11-30 | Stop reason: HOSPADM

## 2018-11-29 RX ORDER — LISINOPRIL AND HYDROCHLOROTHIAZIDE 12.5; 1 MG/1; MG/1
2 TABLET ORAL EVERY MORNING
Status: DISCONTINUED | OUTPATIENT
Start: 2018-11-30 | End: 2018-11-30 | Stop reason: HOSPADM

## 2018-11-29 RX ORDER — OXYCODONE HYDROCHLORIDE AND ACETAMINOPHEN 5; 325 MG/1; MG/1
2 TABLET ORAL EVERY 4 HOURS PRN
Status: DISCONTINUED | OUTPATIENT
Start: 2018-11-29 | End: 2018-11-30 | Stop reason: HOSPADM

## 2018-11-29 RX ORDER — ONDANSETRON 2 MG/ML
INJECTION INTRAMUSCULAR; INTRAVENOUS PRN
Status: DISCONTINUED | OUTPATIENT
Start: 2018-11-29 | End: 2018-11-29 | Stop reason: SDUPTHER

## 2018-11-29 RX ORDER — METFORMIN HYDROCHLORIDE 500 MG/1
500 TABLET, EXTENDED RELEASE ORAL
Status: DISCONTINUED | OUTPATIENT
Start: 2018-11-30 | End: 2018-11-30

## 2018-11-29 RX ORDER — PROPOFOL 10 MG/ML
INJECTION, EMULSION INTRAVENOUS PRN
Status: DISCONTINUED | OUTPATIENT
Start: 2018-11-29 | End: 2018-11-29 | Stop reason: SDUPTHER

## 2018-11-29 RX ORDER — HYDROMORPHONE HCL 110MG/55ML
1 PATIENT CONTROLLED ANALGESIA SYRINGE INTRAVENOUS
Status: DISCONTINUED | OUTPATIENT
Start: 2018-11-29 | End: 2018-11-30 | Stop reason: HOSPADM

## 2018-11-29 RX ORDER — OYSTER SHELL CALCIUM WITH VITAMIN D 500; 200 MG/1; [IU]/1
2 TABLET, FILM COATED ORAL DAILY
Status: DISCONTINUED | OUTPATIENT
Start: 2018-11-30 | End: 2018-11-30 | Stop reason: HOSPADM

## 2018-11-29 RX ORDER — OXYCODONE AND ACETAMINOPHEN 10; 325 MG/1; MG/1
1 TABLET ORAL PRN
Status: DISCONTINUED | OUTPATIENT
Start: 2018-11-29 | End: 2018-11-29 | Stop reason: SDUPTHER

## 2018-11-29 RX ORDER — ONDANSETRON 2 MG/ML
4 INJECTION INTRAMUSCULAR; INTRAVENOUS EVERY 4 HOURS
Status: DISCONTINUED | OUTPATIENT
Start: 2018-11-29 | End: 2018-11-30 | Stop reason: HOSPADM

## 2018-11-29 RX ORDER — SODIUM CHLORIDE 9 MG/ML
INJECTION, SOLUTION INTRAVENOUS CONTINUOUS
Status: DISCONTINUED | OUTPATIENT
Start: 2018-11-29 | End: 2018-11-30

## 2018-11-29 RX ORDER — FAMOTIDINE 20 MG/1
20 TABLET, FILM COATED ORAL DAILY
Status: DISCONTINUED | OUTPATIENT
Start: 2018-11-30 | End: 2018-11-30 | Stop reason: HOSPADM

## 2018-11-29 RX ORDER — SODIUM CHLORIDE AND POTASSIUM CHLORIDE .9; .15 G/100ML; G/100ML
SOLUTION INTRAVENOUS CONTINUOUS
Status: DISCONTINUED | OUTPATIENT
Start: 2018-11-29 | End: 2018-11-30 | Stop reason: HOSPADM

## 2018-11-29 RX ORDER — PANTOPRAZOLE SODIUM 40 MG/1
40 TABLET, DELAYED RELEASE ORAL
Status: DISCONTINUED | OUTPATIENT
Start: 2018-11-30 | End: 2018-11-30 | Stop reason: HOSPADM

## 2018-11-29 RX ORDER — HYDROXYZINE PAMOATE 25 MG/1
50 CAPSULE ORAL 3 TIMES DAILY PRN
Status: DISCONTINUED | OUTPATIENT
Start: 2018-11-29 | End: 2018-11-30 | Stop reason: HOSPADM

## 2018-11-29 RX ORDER — LANCETS 28 GAUGE
1 EACH MISCELLANEOUS DAILY
Status: DISCONTINUED | OUTPATIENT
Start: 2018-11-30 | End: 2018-11-29

## 2018-11-29 RX ORDER — MULTIVITAMIN WITH FOLIC ACID 400 MCG
1 TABLET ORAL DAILY
Status: DISCONTINUED | OUTPATIENT
Start: 2018-11-30 | End: 2018-11-30 | Stop reason: HOSPADM

## 2018-11-29 RX ORDER — GUAIFENESIN 600 MG/1
600 TABLET, EXTENDED RELEASE ORAL 2 TIMES DAILY
Status: DISCONTINUED | OUTPATIENT
Start: 2018-11-29 | End: 2018-11-30 | Stop reason: HOSPADM

## 2018-11-29 RX ORDER — LABETALOL HYDROCHLORIDE 5 MG/ML
INJECTION, SOLUTION INTRAVENOUS PRN
Status: DISCONTINUED | OUTPATIENT
Start: 2018-11-29 | End: 2018-11-29 | Stop reason: SDUPTHER

## 2018-11-29 RX ORDER — ACETAMINOPHEN 650 MG/1
650 SUPPOSITORY RECTAL EVERY 4 HOURS PRN
Status: DISCONTINUED | OUTPATIENT
Start: 2018-11-29 | End: 2018-11-30 | Stop reason: HOSPADM

## 2018-11-29 RX ORDER — ALBUTEROL SULFATE 90 UG/1
2 AEROSOL, METERED RESPIRATORY (INHALATION) EVERY 6 HOURS PRN
Status: DISCONTINUED | OUTPATIENT
Start: 2018-11-29 | End: 2018-11-30 | Stop reason: HOSPADM

## 2018-11-29 RX ORDER — DIPHENHYDRAMINE HYDROCHLORIDE 50 MG/ML
INJECTION INTRAMUSCULAR; INTRAVENOUS PRN
Status: DISCONTINUED | OUTPATIENT
Start: 2018-11-29 | End: 2018-11-29 | Stop reason: SDUPTHER

## 2018-11-29 RX ORDER — SODIUM CHLORIDE 0.9 % (FLUSH) 0.9 %
10 SYRINGE (ML) INJECTION PRN
Status: DISCONTINUED | OUTPATIENT
Start: 2018-11-29 | End: 2018-11-30 | Stop reason: HOSPADM

## 2018-11-29 RX ADMIN — POTASSIUM CHLORIDE AND SODIUM CHLORIDE: 900; 150 INJECTION, SOLUTION INTRAVENOUS at 22:47

## 2018-11-29 RX ADMIN — SODIUM CHLORIDE: 9 INJECTION, SOLUTION INTRAVENOUS at 09:59

## 2018-11-29 RX ADMIN — ROCURONIUM BROMIDE 50 MG: 50 INJECTION, SOLUTION INTRAVENOUS at 13:37

## 2018-11-29 RX ADMIN — ROCURONIUM BROMIDE 10 MG: 50 INJECTION, SOLUTION INTRAVENOUS at 15:50

## 2018-11-29 RX ADMIN — SODIUM CHLORIDE: 9 INJECTION, SOLUTION INTRAVENOUS at 14:05

## 2018-11-29 RX ADMIN — Medication 2 PUFF: at 22:32

## 2018-11-29 RX ADMIN — ONDANSETRON HYDROCHLORIDE 4 MG: 2 SOLUTION INTRAMUSCULAR; INTRAVENOUS at 22:47

## 2018-11-29 RX ADMIN — HEPARIN SODIUM 5000 UNITS: 5000 INJECTION, SOLUTION INTRAVENOUS; SUBCUTANEOUS at 09:57

## 2018-11-29 RX ADMIN — HYDROMORPHONE HYDROCHLORIDE 1 MG: 1 INJECTION, SOLUTION INTRAMUSCULAR; INTRAVENOUS; SUBCUTANEOUS at 14:16

## 2018-11-29 RX ADMIN — DIPHENHYDRAMINE HYDROCHLORIDE 12.5 MG: 50 INJECTION, SOLUTION INTRAMUSCULAR; INTRAVENOUS at 14:04

## 2018-11-29 RX ADMIN — FENTANYL CITRATE 50 MCG: 50 INJECTION, SOLUTION INTRAMUSCULAR; INTRAVENOUS at 19:24

## 2018-11-29 RX ADMIN — FENTANYL CITRATE 100 MCG: 50 INJECTION INTRAMUSCULAR; INTRAVENOUS at 13:37

## 2018-11-29 RX ADMIN — GUAIFENESIN 600 MG: 600 TABLET, EXTENDED RELEASE ORAL at 22:47

## 2018-11-29 RX ADMIN — ROCURONIUM BROMIDE 30 MG: 50 INJECTION, SOLUTION INTRAVENOUS at 14:35

## 2018-11-29 RX ADMIN — DOCUSATE SODIUM,SENNOSIDES 2 TABLET: 50; 8.6 TABLET, FILM COATED ORAL at 22:47

## 2018-11-29 RX ADMIN — HYDROXYZINE PAMOATE 50 MG: 25 CAPSULE ORAL at 22:46

## 2018-11-29 RX ADMIN — PROPOFOL 150 MG: 10 INJECTION, EMULSION INTRAVENOUS at 13:37

## 2018-11-29 RX ADMIN — ONDANSETRON HYDROCHLORIDE 4 MG: 2 SOLUTION INTRAMUSCULAR; INTRAVENOUS at 13:37

## 2018-11-29 RX ADMIN — ROCURONIUM BROMIDE 10 MG: 50 INJECTION, SOLUTION INTRAVENOUS at 14:55

## 2018-11-29 RX ADMIN — LABETALOL HYDROCHLORIDE 5 MG: 5 INJECTION, SOLUTION INTRAVENOUS at 14:42

## 2018-11-29 RX ADMIN — POTASSIUM CHLORIDE AND SODIUM CHLORIDE: 900; 150 INJECTION, SOLUTION INTRAVENOUS at 17:32

## 2018-11-29 RX ADMIN — TRAZODONE HYDROCHLORIDE 50 MG: 50 TABLET ORAL at 22:47

## 2018-11-29 RX ADMIN — SUGAMMADEX 200 MG: 100 INJECTION, SOLUTION INTRAVENOUS at 16:25

## 2018-11-29 RX ADMIN — MORPHINE SULFATE 2 MG: 2 INJECTION, SOLUTION INTRAMUSCULAR; INTRAVENOUS at 16:46

## 2018-11-29 RX ADMIN — IPRATROPIUM BROMIDE AND ALBUTEROL SULFATE 3 ML: .5; 3 SOLUTION RESPIRATORY (INHALATION) at 22:32

## 2018-11-29 RX ADMIN — TIZANIDINE 4 MG: 4 TABLET ORAL at 22:46

## 2018-11-29 RX ADMIN — ALBUTEROL SULFATE 2 PUFF: 90 AEROSOL, METERED RESPIRATORY (INHALATION) at 16:42

## 2018-11-29 RX ADMIN — DIPHENHYDRAMINE HYDROCHLORIDE 12.5 MG: 50 INJECTION, SOLUTION INTRAMUSCULAR; INTRAVENOUS at 17:14

## 2018-11-29 RX ADMIN — METRONIDAZOLE 500 MG: 500 INJECTION, SOLUTION INTRAVENOUS at 22:47

## 2018-11-29 RX ADMIN — LIDOCAINE HYDROCHLORIDE 50 MG: 20 INJECTION, SOLUTION INFILTRATION; PERINEURAL at 13:37

## 2018-11-29 RX ADMIN — SODIUM CHLORIDE, PRESERVATIVE FREE 10 ML: 5 INJECTION INTRAVENOUS at 22:59

## 2018-11-29 RX ADMIN — CEFAZOLIN SODIUM 1 G: 1 INJECTION, SOLUTION INTRAVENOUS at 13:35

## 2018-11-29 RX ADMIN — ALBUTEROL SULFATE 2 PUFF: 90 AEROSOL, METERED RESPIRATORY (INHALATION) at 13:35

## 2018-11-29 RX ADMIN — OXYCODONE AND ACETAMINOPHEN 2 TABLET: 5; 325 TABLET ORAL at 22:46

## 2018-11-29 RX ADMIN — DOCUSATE SODIUM 100 MG: 100 CAPSULE, LIQUID FILLED ORAL at 22:46

## 2018-11-29 RX ADMIN — HYDROMORPHONE HYDROCHLORIDE 1 MG: 1 INJECTION, SOLUTION INTRAMUSCULAR; INTRAVENOUS; SUBCUTANEOUS at 14:25

## 2018-11-29 RX ADMIN — SODIUM CHLORIDE: 9 INJECTION, SOLUTION INTRAVENOUS at 13:35

## 2018-11-29 ASSESSMENT — PULMONARY FUNCTION TESTS
PIF_VALUE: 20
PIF_VALUE: 18
PIF_VALUE: 18
PIF_VALUE: 19
PIF_VALUE: 18
PIF_VALUE: 20
PIF_VALUE: 19
PIF_VALUE: 19
PIF_VALUE: 18
PIF_VALUE: 19
PIF_VALUE: 18
PIF_VALUE: 17
PIF_VALUE: 18
PIF_VALUE: 19
PIF_VALUE: 18
PIF_VALUE: 18
PIF_VALUE: 19
PIF_VALUE: 19
PIF_VALUE: 18
PIF_VALUE: 18
PIF_VALUE: 20
PIF_VALUE: 19
PIF_VALUE: 18
PIF_VALUE: 19
PIF_VALUE: 18
PIF_VALUE: 19
PIF_VALUE: 3
PIF_VALUE: 19
PIF_VALUE: 18
PIF_VALUE: 19
PIF_VALUE: 19
PIF_VALUE: 18
PIF_VALUE: 18
PIF_VALUE: 19
PIF_VALUE: 19
PIF_VALUE: 17
PIF_VALUE: 20
PIF_VALUE: 18
PIF_VALUE: 18
PIF_VALUE: 17
PIF_VALUE: 18
PIF_VALUE: 17
PIF_VALUE: 19
PIF_VALUE: 18
PIF_VALUE: 10
PIF_VALUE: 3
PIF_VALUE: 19
PIF_VALUE: 18
PIF_VALUE: 18
PIF_VALUE: 19
PIF_VALUE: 18
PIF_VALUE: 17
PIF_VALUE: 18
PIF_VALUE: 19
PIF_VALUE: 18
PIF_VALUE: 19
PIF_VALUE: 20
PIF_VALUE: 18
PIF_VALUE: 18
PIF_VALUE: 31
PIF_VALUE: 18
PIF_VALUE: 18
PIF_VALUE: 17
PIF_VALUE: 19
PIF_VALUE: 18
PIF_VALUE: 21
PIF_VALUE: 19
PIF_VALUE: 18
PIF_VALUE: 19
PIF_VALUE: 18
PIF_VALUE: 19
PIF_VALUE: 19
PIF_VALUE: 17
PIF_VALUE: 18
PIF_VALUE: 20
PIF_VALUE: 21
PIF_VALUE: 18
PIF_VALUE: 22
PIF_VALUE: 19
PIF_VALUE: 19
PIF_VALUE: 18
PIF_VALUE: 1
PIF_VALUE: 18
PIF_VALUE: 19
PIF_VALUE: 18
PIF_VALUE: 18
PIF_VALUE: 25
PIF_VALUE: 18
PIF_VALUE: 19
PIF_VALUE: 19
PIF_VALUE: 18
PIF_VALUE: 19
PIF_VALUE: 18
PIF_VALUE: 19
PIF_VALUE: 20
PIF_VALUE: 19
PIF_VALUE: 27
PIF_VALUE: 19
PIF_VALUE: 18
PIF_VALUE: 19
PIF_VALUE: 19
PIF_VALUE: 18
PIF_VALUE: 18
PIF_VALUE: 20
PIF_VALUE: 19
PIF_VALUE: 19
PIF_VALUE: 18
PIF_VALUE: 19
PIF_VALUE: 20
PIF_VALUE: 20
PIF_VALUE: 18
PIF_VALUE: 19
PIF_VALUE: 19
PIF_VALUE: 18
PIF_VALUE: 29
PIF_VALUE: 19
PIF_VALUE: 17
PIF_VALUE: 18
PIF_VALUE: 19
PIF_VALUE: 18
PIF_VALUE: 19

## 2018-11-29 ASSESSMENT — PAIN SCALES - GENERAL
PAINLEVEL_OUTOF10: 8
PAINLEVEL_OUTOF10: 0
PAINLEVEL_OUTOF10: 0
PAINLEVEL_OUTOF10: 5

## 2018-11-29 ASSESSMENT — PAIN - FUNCTIONAL ASSESSMENT: PAIN_FUNCTIONAL_ASSESSMENT: 0-10

## 2018-11-29 NOTE — ANESTHESIA PRE PROCEDURE
(36.3 °C)   TempSrc: Oral   SpO2: 96%   Weight: 162 lb (73.5 kg)   Height: 5' 5\" (1.651 m)                                              BP Readings from Last 3 Encounters:   11/29/18 112/89   11/19/18 (!) 153/81   11/02/18 120/80       NPO Status: Time of last liquid consumption: 2030                        Time of last solid consumption: 2030                        Date of last liquid consumption: 11/28/18                        Date of last solid food consumption: 11/28/18    BMI:   Wt Readings from Last 3 Encounters:   11/29/18 162 lb (73.5 kg)   11/19/18 168 lb (76.2 kg)   11/02/18 168 lb 1.6 oz (76.2 kg)     Body mass index is 26.96 kg/m².     CBC:   Lab Results   Component Value Date    WBC 9.0 11/19/2018    RBC 5.20 11/19/2018    HGB 13.7 11/19/2018    HCT 39.6 11/19/2018    MCV 76.2 11/19/2018    RDW 13.8 11/19/2018     11/19/2018       CMP:   Lab Results   Component Value Date     11/19/2018    K 4.2 11/19/2018     11/19/2018    CO2 24 11/19/2018    BUN 9 11/19/2018    CREATININE 0.9 11/19/2018    GFRAA >60 11/19/2018    LABGLOM >60 11/19/2018    GLUCOSE 113 11/19/2018    PROT 6.6 10/02/2018    PROT 7.9 03/02/2013    CALCIUM 9.5 11/19/2018    BILITOT 0.6 10/02/2018    ALKPHOS 75 10/02/2018    AST 14 10/02/2018    ALT 10 10/02/2018       POC Tests:   Recent Labs      11/29/18   0938   POCGLU  69*       Coags: No results found for: PROTIME, INR, APTT    HCG (If Applicable): No results found for: PREGTESTUR, PREGSERUM, HCG, HCGQUANT     ABGs: No results found for: PHART, PO2ART, MRW5YLP, ILB9HXM, BEART, L2WNSHYM     Type & Screen (If Applicable):  No results found for: LABABO, [address]    Anesthesia Evaluation  Patient summary reviewed and Nursing notes reviewed no history of anesthetic complications:   Airway: Mallampati: II  TM distance: >3 FB   Neck ROM: full  Mouth opening: > = 3 FB Dental: normal exam         Pulmonary:   (+) COPD:                             Cardiovascular:  Exercise tolerance: no interval change,   (+) hypertension:,       ECG reviewed               Beta Blocker:  Not on Beta Blocker      ROS comment: HR 69   Sinus rhythm with 1st degree AV block   Otherwise normal ECG   When compared with ECG of 19-OCT-2017 09:36,   No significant change was found   Confirmed by Rishi Serrano MD, Chris Rice ([id number]) on 11/20/2018 2:47:44      Neuro/Psych:   (+) neuromuscular disease (Fibromyalgia, rotator cuff injury , chronic back pain ):, headaches:, depression/anxiety             GI/Hepatic/Renal:   (+) hiatal hernia, GERD:,          ROS comment: S/p gastric sleeve  . Endo/Other:    (+) DiabetesType II DM, , : arthritis:., .                 Abdominal:           Vascular: negative vascular ROS. Anesthesia Plan      general     ASA 3       Induction: intravenous. MIPS: Postoperative opioids intended and Prophylactic antiemetics administered. Anesthetic plan and risks discussed with patient. Plan discussed with CRNA.                   Lee Ann Aden DO   11/29/2018

## 2018-11-30 VITALS
HEART RATE: 80 BPM | DIASTOLIC BLOOD PRESSURE: 43 MMHG | BODY MASS INDEX: 26.99 KG/M2 | RESPIRATION RATE: 16 BRPM | TEMPERATURE: 97.8 F | HEIGHT: 65 IN | WEIGHT: 162 LBS | SYSTOLIC BLOOD PRESSURE: 74 MMHG | OXYGEN SATURATION: 96 %

## 2018-11-30 LAB
ANION GAP SERPL CALCULATED.3IONS-SCNC: 13 MMOL/L (ref 4–16)
BASOPHILS ABSOLUTE: 0 K/CU MM
BASOPHILS RELATIVE PERCENT: 0.3 % (ref 0–1)
BUN BLDV-MCNC: 8 MG/DL (ref 6–23)
CALCIUM SERPL-MCNC: 8.2 MG/DL (ref 8.3–10.6)
CHLORIDE BLD-SCNC: 104 MMOL/L (ref 99–110)
CO2: 21 MMOL/L (ref 21–32)
CREAT SERPL-MCNC: 0.9 MG/DL (ref 0.6–1.1)
DIFFERENTIAL TYPE: ABNORMAL
EOSINOPHILS ABSOLUTE: 0.1 K/CU MM
EOSINOPHILS RELATIVE PERCENT: 0.5 % (ref 0–3)
ESTIMATED AVERAGE GLUCOSE: 120 MG/DL
GFR AFRICAN AMERICAN: >60 ML/MIN/1.73M2
GFR NON-AFRICAN AMERICAN: >60 ML/MIN/1.73M2
GLUCOSE BLD-MCNC: 116 MG/DL (ref 70–99)
GLUCOSE BLD-MCNC: 119 MG/DL (ref 70–99)
GLUCOSE BLD-MCNC: 303 MG/DL (ref 70–99)
HBA1C MFR BLD: 5.8 % (ref 4.2–6.3)
HCT VFR BLD CALC: 38.1 % (ref 37–47)
HEMOGLOBIN: 12.4 GM/DL (ref 12.5–16)
IMMATURE NEUTROPHIL %: 0.3 % (ref 0–0.43)
LYMPHOCYTES ABSOLUTE: 0.9 K/CU MM
LYMPHOCYTES RELATIVE PERCENT: 7.5 % (ref 24–44)
MCH RBC QN AUTO: 25.7 PG (ref 27–31)
MCHC RBC AUTO-ENTMCNC: 32.5 % (ref 32–36)
MCV RBC AUTO: 79 FL (ref 78–100)
MONOCYTES ABSOLUTE: 0.8 K/CU MM
MONOCYTES RELATIVE PERCENT: 6.4 % (ref 0–4)
NUCLEATED RBC %: 0 %
PDW BLD-RTO: 13.7 % (ref 11.7–14.9)
PLATELET # BLD: 291 K/CU MM (ref 140–440)
PMV BLD AUTO: 10.5 FL (ref 7.5–11.1)
POTASSIUM SERPL-SCNC: 4.8 MMOL/L (ref 3.5–5.1)
RBC # BLD: 4.82 M/CU MM (ref 4.2–5.4)
SEGMENTED NEUTROPHILS ABSOLUTE COUNT: 10.2 K/CU MM
SEGMENTED NEUTROPHILS RELATIVE PERCENT: 85 % (ref 36–66)
SODIUM BLD-SCNC: 138 MMOL/L (ref 135–145)
TOTAL IMMATURE NEUTOROPHIL: 0.03 K/CU MM
TOTAL NUCLEATED RBC: 0 K/CU MM
WBC # BLD: 11.9 K/CU MM (ref 4–10.5)

## 2018-11-30 PROCEDURE — 96375 TX/PRO/DX INJ NEW DRUG ADDON: CPT

## 2018-11-30 PROCEDURE — 94150 VITAL CAPACITY TEST: CPT

## 2018-11-30 PROCEDURE — 6370000000 HC RX 637 (ALT 250 FOR IP): Performed by: SURGERY

## 2018-11-30 PROCEDURE — 80048 BASIC METABOLIC PNL TOTAL CA: CPT

## 2018-11-30 PROCEDURE — 85025 COMPLETE CBC W/AUTO DIFF WBC: CPT

## 2018-11-30 PROCEDURE — 99024 POSTOP FOLLOW-UP VISIT: CPT | Performed by: SURGERY

## 2018-11-30 PROCEDURE — 6360000002 HC RX W HCPCS: Performed by: SURGERY

## 2018-11-30 PROCEDURE — 96374 THER/PROPH/DIAG INJ IV PUSH: CPT

## 2018-11-30 PROCEDURE — G0378 HOSPITAL OBSERVATION PER HR: HCPCS

## 2018-11-30 PROCEDURE — 36415 COLL VENOUS BLD VENIPUNCTURE: CPT

## 2018-11-30 PROCEDURE — 96372 THER/PROPH/DIAG INJ SC/IM: CPT

## 2018-11-30 PROCEDURE — 94640 AIRWAY INHALATION TREATMENT: CPT

## 2018-11-30 PROCEDURE — 2500000003 HC RX 250 WO HCPCS: Performed by: SURGERY

## 2018-11-30 PROCEDURE — 82962 GLUCOSE BLOOD TEST: CPT

## 2018-11-30 PROCEDURE — 96376 TX/PRO/DX INJ SAME DRUG ADON: CPT

## 2018-11-30 PROCEDURE — 94010 BREATHING CAPACITY TEST: CPT

## 2018-11-30 PROCEDURE — 83036 HEMOGLOBIN GLYCOSYLATED A1C: CPT

## 2018-11-30 PROCEDURE — 2580000003 HC RX 258: Performed by: SURGERY

## 2018-11-30 PROCEDURE — 6370000000 HC RX 637 (ALT 250 FOR IP): Performed by: INTERNAL MEDICINE

## 2018-11-30 PROCEDURE — 94761 N-INVAS EAR/PLS OXIMETRY MLT: CPT

## 2018-11-30 RX ORDER — METFORMIN HYDROCHLORIDE 500 MG/1
500 TABLET, EXTENDED RELEASE ORAL 2 TIMES DAILY WITH MEALS
Status: DISCONTINUED | OUTPATIENT
Start: 2018-11-30 | End: 2018-11-30 | Stop reason: HOSPADM

## 2018-11-30 RX ORDER — CEPHALEXIN 500 MG/1
500 CAPSULE ORAL 4 TIMES DAILY
Qty: 28 CAPSULE | Refills: 0 | Status: SHIPPED | OUTPATIENT
Start: 2018-11-30 | End: 2018-12-07

## 2018-11-30 RX ORDER — OXYCODONE HYDROCHLORIDE AND ACETAMINOPHEN 5; 325 MG/1; MG/1
1-2 TABLET ORAL EVERY 6 HOURS PRN
Qty: 35 TABLET | Refills: 0 | Status: SHIPPED | OUTPATIENT
Start: 2018-11-30 | End: 2018-12-07

## 2018-11-30 RX ORDER — AMOXICILLIN 250 MG
2 CAPSULE ORAL DAILY
Qty: 60 TABLET | Refills: 3 | Status: SHIPPED | OUTPATIENT
Start: 2018-11-30 | End: 2018-12-10

## 2018-11-30 RX ORDER — NICOTINE POLACRILEX 4 MG
15 LOZENGE BUCCAL PRN
Status: DISCONTINUED | OUTPATIENT
Start: 2018-11-30 | End: 2018-11-30 | Stop reason: HOSPADM

## 2018-11-30 RX ORDER — DEXTROSE MONOHYDRATE 25 G/50ML
12.5 INJECTION, SOLUTION INTRAVENOUS PRN
Status: DISCONTINUED | OUTPATIENT
Start: 2018-11-30 | End: 2018-11-30 | Stop reason: HOSPADM

## 2018-11-30 RX ORDER — DEXTROSE MONOHYDRATE 50 MG/ML
100 INJECTION, SOLUTION INTRAVENOUS PRN
Status: DISCONTINUED | OUTPATIENT
Start: 2018-11-30 | End: 2018-11-30 | Stop reason: HOSPADM

## 2018-11-30 RX ADMIN — CALCIUM CARBONATE-VITAMIN D TAB 500 MG-200 UNIT 2 TABLET: 500-200 TAB at 09:51

## 2018-11-30 RX ADMIN — HYDROXYZINE PAMOATE 50 MG: 25 CAPSULE ORAL at 07:10

## 2018-11-30 RX ADMIN — TIZANIDINE 4 MG: 4 TABLET ORAL at 09:55

## 2018-11-30 RX ADMIN — IPRATROPIUM BROMIDE AND ALBUTEROL SULFATE 3 ML: .5; 3 SOLUTION RESPIRATORY (INHALATION) at 11:24

## 2018-11-30 RX ADMIN — DOCUSATE SODIUM,SENNOSIDES 2 TABLET: 50; 8.6 TABLET, FILM COATED ORAL at 09:54

## 2018-11-30 RX ADMIN — MULTIVITAMIN TABLET 1 TABLET: TABLET at 09:54

## 2018-11-30 RX ADMIN — ENOXAPARIN SODIUM 40 MG: 40 INJECTION SUBCUTANEOUS at 09:52

## 2018-11-30 RX ADMIN — ONDANSETRON HYDROCHLORIDE 4 MG: 2 SOLUTION INTRAMUSCULAR; INTRAVENOUS at 09:54

## 2018-11-30 RX ADMIN — IPRATROPIUM BROMIDE AND ALBUTEROL SULFATE 3 ML: .5; 3 SOLUTION RESPIRATORY (INHALATION) at 07:24

## 2018-11-30 RX ADMIN — OXYCODONE AND ACETAMINOPHEN 2 TABLET: 5; 325 TABLET ORAL at 11:53

## 2018-11-30 RX ADMIN — OXYCODONE AND ACETAMINOPHEN 2 TABLET: 5; 325 TABLET ORAL at 03:08

## 2018-11-30 RX ADMIN — METRONIDAZOLE 500 MG: 500 INJECTION, SOLUTION INTRAVENOUS at 05:59

## 2018-11-30 RX ADMIN — INSULIN LISPRO 4 UNITS: 100 INJECTION, SOLUTION INTRAVENOUS; SUBCUTANEOUS at 12:08

## 2018-11-30 RX ADMIN — OXYCODONE AND ACETAMINOPHEN 2 TABLET: 5; 325 TABLET ORAL at 07:10

## 2018-11-30 RX ADMIN — POTASSIUM CHLORIDE AND SODIUM CHLORIDE: 900; 150 INJECTION, SOLUTION INTRAVENOUS at 10:33

## 2018-11-30 RX ADMIN — OXYCODONE AND ACETAMINOPHEN 2 TABLET: 5; 325 TABLET ORAL at 16:44

## 2018-11-30 RX ADMIN — GUAIFENESIN 600 MG: 600 TABLET, EXTENDED RELEASE ORAL at 09:53

## 2018-11-30 RX ADMIN — LISINOPRIL AND HYDROCHLOROTHIAZIDE 2 TABLET: 12.5; 1 TABLET ORAL at 09:53

## 2018-11-30 RX ADMIN — ONDANSETRON HYDROCHLORIDE 4 MG: 2 SOLUTION INTRAMUSCULAR; INTRAVENOUS at 01:14

## 2018-11-30 RX ADMIN — CEFAZOLIN SODIUM 2 G: 2 INJECTION, SOLUTION INTRAVENOUS at 09:51

## 2018-11-30 RX ADMIN — PANTOPRAZOLE SODIUM 40 MG: 40 TABLET, DELAYED RELEASE ORAL at 06:00

## 2018-11-30 RX ADMIN — ONDANSETRON HYDROCHLORIDE 4 MG: 2 SOLUTION INTRAMUSCULAR; INTRAVENOUS at 05:59

## 2018-11-30 RX ADMIN — SODIUM CHLORIDE, PRESERVATIVE FREE 10 ML: 5 INJECTION INTRAVENOUS at 09:54

## 2018-11-30 RX ADMIN — Medication 2 PUFF: at 07:25

## 2018-11-30 RX ADMIN — CEFAZOLIN SODIUM 2 G: 2 INJECTION, SOLUTION INTRAVENOUS at 00:21

## 2018-11-30 RX ADMIN — FAMOTIDINE 20 MG: 20 TABLET, FILM COATED ORAL at 09:52

## 2018-11-30 RX ADMIN — DOCUSATE SODIUM 100 MG: 100 CAPSULE, LIQUID FILLED ORAL at 09:52

## 2018-11-30 RX ADMIN — METRONIDAZOLE 500 MG: 500 INJECTION, SOLUTION INTRAVENOUS at 14:45

## 2018-11-30 RX ADMIN — HYDROMORPHONE HYDROCHLORIDE 1 MG: 2 INJECTION INTRAMUSCULAR; INTRAVENOUS; SUBCUTANEOUS at 01:51

## 2018-11-30 ASSESSMENT — PAIN SCALES - GENERAL
PAINLEVEL_OUTOF10: 5
PAINLEVEL_OUTOF10: 9
PAINLEVEL_OUTOF10: 9
PAINLEVEL_OUTOF10: 7
PAINLEVEL_OUTOF10: 0
PAINLEVEL_OUTOF10: 7
PAINLEVEL_OUTOF10: 8
PAINLEVEL_OUTOF10: 8

## 2018-11-30 ASSESSMENT — PAIN DESCRIPTION - PAIN TYPE: TYPE: SURGICAL PAIN

## 2018-11-30 ASSESSMENT — PAIN DESCRIPTION - LOCATION: LOCATION: ABDOMEN

## 2018-11-30 ASSESSMENT — PAIN DESCRIPTION - ORIENTATION: ORIENTATION: MID;RIGHT;LEFT

## 2018-11-30 NOTE — ANESTHESIA POSTPROCEDURE EVALUATION
Department of Anesthesiology  Postprocedure Note    Patient: Tom Mallory  MRN: [id number]  YOB: 1965  Date of evaluation: 11/29/2018  Time:  11:45 PM     Procedure Summary     Date:  11/29/18 Room / Location:  [place] [address] / [facility]    Anesthesia Start:  0418 Anesthesia Stop:  4910    Procedure:  ABDOMINOPLASTY (N/A ) Diagnosis:  (SKIN ULCER , POST GASTRIC SURGERY )    Surgeon:  Slim Miles MD Responsible Provider:  YESSICA Florence CRNA    Anesthesia Type:  general ASA Status:  3          Anesthesia Type: general    Morgan Phase I: Morgan Score: 9    Morgan Phase II:      Last vitals: Reviewed and per EMR flowsheets.        Anesthesia Post Evaluation    Patient location during evaluation: PACU  Patient participation: complete - patient participated  Level of consciousness: awake  Airway patency: patent  Nausea & Vomiting: no nausea  Complications: no  Cardiovascular status: blood pressure returned to baseline  Respiratory status: acceptable

## 2018-12-05 ENCOUNTER — OFFICE VISIT (OUTPATIENT)
Dept: BARIATRICS/WEIGHT MGMT | Age: 53
End: 2018-12-05

## 2018-12-05 VITALS
HEIGHT: 66 IN | HEART RATE: 67 BPM | DIASTOLIC BLOOD PRESSURE: 90 MMHG | WEIGHT: 170.2 LBS | BODY MASS INDEX: 27.35 KG/M2 | SYSTOLIC BLOOD PRESSURE: 138 MMHG | RESPIRATION RATE: 14 BRPM

## 2018-12-05 DIAGNOSIS — Z98.890 S/P ABDOMINOPLASTY: Primary | ICD-10-CM

## 2018-12-05 PROCEDURE — 99024 POSTOP FOLLOW-UP VISIT: CPT | Performed by: SURGERY

## 2018-12-05 RX ORDER — CEPHALEXIN 500 MG/1
500 CAPSULE ORAL 4 TIMES DAILY
Qty: 40 CAPSULE | Refills: 1 | Status: ON HOLD | OUTPATIENT
Start: 2018-12-05 | End: 2019-01-14

## 2018-12-05 RX ORDER — OXYCODONE HYDROCHLORIDE AND ACETAMINOPHEN 5; 325 MG/1; MG/1
1-2 TABLET ORAL EVERY 6 HOURS PRN
Qty: 35 TABLET | Refills: 0 | Status: SHIPPED | OUTPATIENT
Start: 2018-12-05 | End: 2018-12-12

## 2018-12-05 NOTE — PROGRESS NOTES
2010    Neg    Pap smear for cervical cancer screening 2011    Neg    Pneumonia Dx 2-18    Psoriasis     Shortness of breath on exertion     Teeth missing     Upper And Lower    Wears glasses       Past Surgical History:   Procedure Laterality Date    APPENDECTOMY  2000    CARPAL TUNNEL RELEASE Right 2017    Dr. Lio Moses (Right)   [address]    Tubal Ligation Also Done In     COLONOSCOPY  2017    polyp removed    DENTAL SURGERY      Teeth Extracted In Past    ELBOW SURGERY Bilateral Last Done In     Right Done Twice, Left Done Once left cubital tunnel release ; right 2014    ENDOSCOPY, COLON, DIAGNOSTIC  2017    HERNIA REPAIR  10/26/2017    hiatal hernia with gastrectomy    HYSTERECTOMY VAGINAL      LAPAROSCOPY      LA EXCISE EXCESS SKIN TISSUE,ABDOMEN N/A 2018    ABDOMINOPLASTY performed by Margot Capps MD at [address] Right 2015    Dr. Odette Stallworth Right 2017    Dr Abraham Garcia  10/26/2017    Robotic Laparoscopic, Pre Op Weight 237  Or 238 lbs.  TUBAL LIGATION      Done With      Current Outpatient Prescriptions   Medication Sig Dispense Refill    cephALEXin (KEFLEX) 500 MG capsule Take 1 capsule by mouth 4 times daily 40 capsule 1    oxyCODONE-acetaminophen (PERCOCET) 5-325 MG per tablet Take 1-2 tablets by mouth every 6 hours as needed for Pain for up to 7 days. . 35 tablet 0    oxyCODONE-acetaminophen (PERCOCET) 5-325 MG per tablet Take 1-2 tablets by mouth every 6 hours as needed for Pain for up to 7 days. . 35 tablet 0    cephALEXin (KEFLEX) 500 MG capsule Take 1 capsule by mouth 4 times daily for 7 days 28 capsule 0    senna-docusate (SENOKOT S) 8.6-50 MG per tablet Take 2 tablets by mouth daily for 10 days 60 tablet 3    Nutritional Supplements (ENSURE HIGH PROTEIN) LIQD Take 4 Cans by mouth 4 times daily Pre-op diet, meal replacement.  120 Can 0    VALUE PLUS LANCETS THIN 26G MISC 1 EACH BY DOES NOT APPLY ROUTE DAILY 100 each 5    hydrocortisone 1 % cream APPLY THIN LAYER TO AFFECTED AREA ON FACE TOPICALLY 2 TIMES DAILY. 28.35 g 1    metFORMIN (GLUCOPHAGE-XR) 500 MG extended release tablet Take 1 tablet by mouth 3 times daily \"I Take 2 In The Morning, I Take One At Night\" 90 tablet 1    omeprazole (PRILOSEC) 40 MG delayed release capsule Take 1 capsule by mouth daily (Patient taking differently: Take 40 mg by mouth as needed ) 30 capsule 5    traZODone (DESYREL) 50 MG tablet Take 1 tablet by mouth nightly 30 tablet 3    Multiple Vitamin (MVI, CELEBRATE, CHEWABLE TABLET) Take 1 tablet by mouth daily 30 tablet 3    tiotropium (SPIRIVA RESPIMAT) 2.5 MCG/ACT AERS inhaler Inhale 2 puffs into the lungs daily 1 Inhaler 5    hydrOXYzine (VISTARIL) 50 MG capsule Take 1 capsule by mouth 3 times daily as needed for Itching 60 capsule 1    nystatin (MYCOSTATIN) 944052 UNIT/GM cream Apply topically 2 times daily. 30 g 0    JANUVIA 100 MG tablet Take 1 tablet by mouth every morning 30 tablet 3    albuterol sulfate HFA (PROAIR HFA) 108 (90 Base) MCG/ACT inhaler Inhale 2 puffs into the lungs every 6 hours as needed for Wheezing 1 Inhaler 5    fluticasone-vilanterol (BREO ELLIPTA) 100-25 MCG/INH AEPB inhaler Inhale 1 puff into the lungs daily 1 each 5    glucose blood VI test strips (FREESTYLE LITE) strip 1 each by In Vitro route daily As needed. 100 each 3    ALPRAZolam (XANAX) 0.5 MG tablet Take 0.5 mg by mouth as needed for Sleep. Cheryle Mu-ism clobetasol (TEMOVATE) 0.05 % ointment Apply topically 2 times daily.  45 g 0    Ciclopirox 1 % SHAM Apply 1 Units topically daily 120 mL 2    guaiFENesin (MUCINEX) 600 MG extended release tablet Take 1 tablet by mouth 2 times daily 60 tablet 2    Venlafaxine HCl (EFFEXOR PO) Take by mouth 2 times daily \"I Take 150 Mg Every Morning, Take 37.5 Mg At Noon\"      Nutritional Supplements (ENSURE HIGH PROTEIN) LIQD Take 1

## 2018-12-12 ENCOUNTER — OFFICE VISIT (OUTPATIENT)
Dept: BARIATRICS/WEIGHT MGMT | Age: 53
End: 2018-12-12

## 2018-12-12 VITALS
RESPIRATION RATE: 18 BRPM | DIASTOLIC BLOOD PRESSURE: 68 MMHG | BODY MASS INDEX: 26.97 KG/M2 | HEART RATE: 76 BPM | WEIGHT: 167.8 LBS | HEIGHT: 66 IN | SYSTOLIC BLOOD PRESSURE: 118 MMHG

## 2018-12-12 DIAGNOSIS — Z98.890 S/P ABDOMINOPLASTY: Primary | ICD-10-CM

## 2018-12-12 PROCEDURE — 99024 POSTOP FOLLOW-UP VISIT: CPT | Performed by: SURGERY

## 2018-12-12 RX ORDER — GREEN TEA/HOODIA GORDONII 315-12.5MG
1 CAPSULE ORAL 2 TIMES DAILY
Qty: 60 TABLET | Refills: 0 | Status: SHIPPED | OUTPATIENT
Start: 2018-12-12 | End: 2019-01-11

## 2018-12-12 RX ORDER — OXYCODONE HYDROCHLORIDE AND ACETAMINOPHEN 5; 325 MG/1; MG/1
1-2 TABLET ORAL EVERY 6 HOURS PRN
Qty: 35 TABLET | Refills: 0 | Status: SHIPPED | OUTPATIENT
Start: 2018-12-12 | End: 2018-12-19

## 2018-12-12 NOTE — PROGRESS NOTES
Neg   Postive-Trichomonas    Pap smear for cervical cancer screening 2010    Neg    Pap smear for cervical cancer screening 2011    Neg    Pneumonia Dx 2-18    Psoriasis     Shortness of breath on exertion     Teeth missing     Upper And Lower    Wears glasses       Past Surgical History:   Procedure Laterality Date    APPENDECTOMY  2000    CARPAL TUNNEL RELEASE Right 2017    Dr. Yo Nunn (Right)   [address]     Tubal Ligation Also Done In     COLONOSCOPY  2017    polyp removed    DENTAL SURGERY      Teeth Extracted In Past    ELBOW SURGERY Bilateral Last Done In     Right Done Twice, Left Done Once left cubital tunnel release ; right 2014    ENDOSCOPY, COLON, DIAGNOSTIC  2017    HERNIA REPAIR  10/26/2017    hiatal hernia with gastrectomy    HYSTERECTOMY VAGINAL      LAPAROSCOPY      OR EXCISE EXCESS SKIN TISSUE,ABDOMEN N/A 2018    ABDOMINOPLASTY performed by Abdoul Rivero MD at [address] [place] [address] Right     Dr. Roland Doe Right 2017    Dr Sarahy Dyer  10/26/2017    Robotic Laparoscopic, Pre Op Weight 237  Or 238 lbs. [address] [facility]    Done With      Current Outpatient Prescriptions   Medication Sig Dispense Refill    Lactobacillus (PROBIOTIC ACIDOPHILUS) TABS Take 1 tablet by mouth 2 times daily 60 tablet 0    oxyCODONE-acetaminophen (PERCOCET) 5-325 MG per tablet Take 1-2 tablets by mouth every 6 hours as needed for Pain for up to 7 days. . 35 tablet 0    Elastic Bandages & Supports (ABDOMINAL BINDER/ELASTIC LARGE) MISC 2 Units by Does not apply route daily 2 each 2    cephALEXin (KEFLEX) 500 MG capsule Take 1 capsule by mouth 4 times daily 40 capsule 1    oxyCODONE-acetaminophen (PERCOCET) 5-325 MG per tablet Take 1-2 tablets by mouth every 6 hours as needed for Pain for up to 7 days. . 35 tablet 0    VALUE PLUS LANCETS THIN 26G MISC 1 EACH BY mouth 2 times daily  11    budesonide-formoterol (SYMBICORT) 160-4.5 MCG/ACT AERO Inhale 2 puffs into the lungs 2 times daily 1 Inhaler 5    ranitidine (ZANTAC) 150 MG tablet Take 150 mg by mouth as needed       Calcium-Vitamin D 600-200 MG-UNIT TABS Take 2 tablets by mouth daily 60 tablet 5     No current facility-administered medications for this visit. No Known Allergies        Review of Systems      OBJECTIVE:    /68 (Site: Right Upper Arm, Position: Sitting, Cuff Size: Medium Adult)   Pulse 76   Resp 18   Ht 5' 6\" (1.676 m)   Wt 167 lb 12.8 oz (76.1 kg)   BMI 27.08 kg/m²      Physical Exam      ASSESSMENT & PLAN:    1. S/P abdominoplasty           Still the JEREL output is higher than adequate, will remove the Drain in 10 days, continue Prophylactic Keflex. Patient counseled on the risks, benefits, and alternatives of treatment plan at length while in the office today. Patient states an understanding and willingness to proceed with the plan. Orders Placed This Encounter   Medications    Lactobacillus (PROBIOTIC ACIDOPHILUS) TABS     Sig: Take 1 tablet by mouth 2 times daily     Dispense:  60 tablet     Refill:  0    oxyCODONE-acetaminophen (PERCOCET) 5-325 MG per tablet     Sig: Take 1-2 tablets by mouth every 6 hours as needed for Pain for up to 7 days. .     Dispense:  35 tablet     Refill:  0    Elastic Bandages & Supports (ABDOMINAL BINDER/ELASTIC LARGE) MISC     Si Units by Does not apply route daily     Dispense:  2 each     Refill:  2         Follow Up:  Return in about 10 days (around 2018) for Follow up Symptoms. Robert Chand MD, FACS, FICS. 18       Patient was seen with total face to face time of 25 minutes. More than 50% of this visit was counseling and education as above in my assessment and plan.

## 2018-12-21 ENCOUNTER — OFFICE VISIT (OUTPATIENT)
Dept: BARIATRICS/WEIGHT MGMT | Age: 53
End: 2018-12-21

## 2018-12-21 VITALS
HEIGHT: 66 IN | WEIGHT: 166.6 LBS | BODY MASS INDEX: 26.78 KG/M2 | DIASTOLIC BLOOD PRESSURE: 76 MMHG | HEART RATE: 85 BPM | SYSTOLIC BLOOD PRESSURE: 124 MMHG

## 2018-12-21 DIAGNOSIS — Z98.890 H/O ABDOMINOPLASTY: Primary | ICD-10-CM

## 2018-12-21 PROCEDURE — 99024 POSTOP FOLLOW-UP VISIT: CPT | Performed by: SURGERY

## 2018-12-21 RX ORDER — OXYCODONE HYDROCHLORIDE AND ACETAMINOPHEN 5; 325 MG/1; MG/1
1-2 TABLET ORAL EVERY 6 HOURS PRN
Qty: 35 TABLET | Refills: 0 | Status: SHIPPED | OUTPATIENT
Start: 2018-12-21 | End: 2018-12-28

## 2018-12-24 RX ORDER — DIAPER,BRIEF,INFANT-TODD,DISP
EACH MISCELLANEOUS
Qty: 28.35 G | Refills: 1 | Status: SHIPPED | OUTPATIENT
Start: 2018-12-24 | End: 2019-03-08 | Stop reason: ALTCHOICE

## 2019-01-02 ENCOUNTER — OFFICE VISIT (OUTPATIENT)
Dept: BARIATRICS/WEIGHT MGMT | Age: 54
End: 2019-01-02

## 2019-01-02 ENCOUNTER — OFFICE VISIT (OUTPATIENT)
Dept: FAMILY MEDICINE CLINIC | Age: 54
End: 2019-01-02
Payer: COMMERCIAL

## 2019-01-02 VITALS
HEART RATE: 76 BPM | BODY MASS INDEX: 27.28 KG/M2 | RESPIRATION RATE: 16 BRPM | WEIGHT: 169 LBS | DIASTOLIC BLOOD PRESSURE: 80 MMHG | SYSTOLIC BLOOD PRESSURE: 142 MMHG

## 2019-01-02 VITALS
OXYGEN SATURATION: 97 % | SYSTOLIC BLOOD PRESSURE: 134 MMHG | DIASTOLIC BLOOD PRESSURE: 88 MMHG | BODY MASS INDEX: 27.23 KG/M2 | HEART RATE: 83 BPM | HEIGHT: 66 IN | WEIGHT: 169.4 LBS

## 2019-01-02 DIAGNOSIS — Z98.890 H/O ABDOMINOPLASTY: Primary | ICD-10-CM

## 2019-01-02 DIAGNOSIS — Z01.419 ENCOUNTER FOR GYNECOLOGICAL EXAMINATION WITHOUT ABNORMAL FINDING: Primary | ICD-10-CM

## 2019-01-02 DIAGNOSIS — E11.42 TYPE 2 DIABETES MELLITUS WITH DIABETIC POLYNEUROPATHY, WITHOUT LONG-TERM CURRENT USE OF INSULIN (HCC): Chronic | ICD-10-CM

## 2019-01-02 PROBLEM — Z98.84 STATUS POST BARIATRIC SURGERY: Status: RESOLVED | Noted: 2017-11-02 | Resolved: 2019-01-02

## 2019-01-02 PROCEDURE — 99396 PREV VISIT EST AGE 40-64: CPT | Performed by: NURSE PRACTITIONER

## 2019-01-02 PROCEDURE — 99024 POSTOP FOLLOW-UP VISIT: CPT | Performed by: SURGERY

## 2019-01-02 RX ORDER — AMOXICILLIN AND CLAVULANATE POTASSIUM 875; 125 MG/1; MG/1
1 TABLET, FILM COATED ORAL 2 TIMES DAILY
Qty: 20 TABLET | Refills: 0 | Status: SHIPPED | OUTPATIENT
Start: 2019-01-02 | End: 2019-01-12

## 2019-01-02 RX ORDER — METFORMIN HYDROCHLORIDE 500 MG/1
500 TABLET, EXTENDED RELEASE ORAL 2 TIMES DAILY
Qty: 60 TABLET | Refills: 2 | Status: SHIPPED | OUTPATIENT
Start: 2019-01-02 | End: 2019-01-10 | Stop reason: SDUPTHER

## 2019-01-02 RX ORDER — OXYCODONE HYDROCHLORIDE AND ACETAMINOPHEN 5; 325 MG/1; MG/1
1-2 TABLET ORAL EVERY 6 HOURS PRN
Qty: 35 TABLET | Refills: 0 | Status: SHIPPED | OUTPATIENT
Start: 2019-01-02 | End: 2019-01-09

## 2019-01-02 ASSESSMENT — ENCOUNTER SYMPTOMS
SHORTNESS OF BREATH: 0
BACK PAIN: 0
NAUSEA: 0
VOMITING: 0
ABDOMINAL PAIN: 0

## 2019-01-04 ENCOUNTER — TELEPHONE (OUTPATIENT)
Dept: BARIATRICS/WEIGHT MGMT | Age: 54
End: 2019-01-04

## 2019-01-07 ENCOUNTER — ANESTHESIA EVENT (OUTPATIENT)
Dept: OPERATING ROOM | Age: 54
End: 2019-01-07
Payer: COMMERCIAL

## 2019-01-07 ENCOUNTER — TELEPHONE (OUTPATIENT)
Dept: BARIATRICS/WEIGHT MGMT | Age: 54
End: 2019-01-07

## 2019-01-07 ENCOUNTER — OFFICE VISIT (OUTPATIENT)
Dept: BARIATRICS/WEIGHT MGMT | Age: 54
End: 2019-01-07

## 2019-01-07 VITALS
WEIGHT: 163.4 LBS | BODY MASS INDEX: 26.26 KG/M2 | DIASTOLIC BLOOD PRESSURE: 90 MMHG | SYSTOLIC BLOOD PRESSURE: 128 MMHG | HEART RATE: 85 BPM | HEIGHT: 66 IN

## 2019-01-07 DIAGNOSIS — L98.492 GROIN INCISION ULCER, WITH FAT LAYER EXPOSED (HCC): Primary | ICD-10-CM

## 2019-01-07 DIAGNOSIS — Z98.890 H/O ABDOMINOPLASTY: ICD-10-CM

## 2019-01-07 PROCEDURE — 99024 POSTOP FOLLOW-UP VISIT: CPT | Performed by: NURSE PRACTITIONER

## 2019-01-07 RX ORDER — FEEDER CONTAINER WITH PUMP SET
1 EACH MISCELLANEOUS DAILY
Qty: 32 CAN | Refills: 2 | Status: SHIPPED | OUTPATIENT
Start: 2019-01-07 | End: 2019-03-08 | Stop reason: ALTCHOICE

## 2019-01-07 RX ORDER — OXYCODONE HYDROCHLORIDE AND ACETAMINOPHEN 5; 325 MG/1; MG/1
1 TABLET ORAL EVERY 6 HOURS PRN
Qty: 8 TABLET | Refills: 0 | Status: SHIPPED | OUTPATIENT
Start: 2019-01-07 | End: 2019-01-09

## 2019-01-07 ASSESSMENT — ENCOUNTER SYMPTOMS
SHORTNESS OF BREATH: 0
ANAL BLEEDING: 0
WHEEZING: 0
CONSTIPATION: 1
CHEST TIGHTNESS: 0
ABDOMINAL DISTENTION: 0
EYE PAIN: 0
BLOOD IN STOOL: 0
RHINORRHEA: 0
NAUSEA: 0
BACK PAIN: 1
ABDOMINAL PAIN: 1
VOMITING: 0
TROUBLE SWALLOWING: 0
DIARRHEA: 0

## 2019-01-08 ENCOUNTER — HOSPITAL ENCOUNTER (OUTPATIENT)
Age: 54
Discharge: HOME | End: 2019-01-08
Payer: COMMERCIAL

## 2019-01-08 DIAGNOSIS — L98.492 GROIN INCISION ULCER, WITH FAT LAYER EXPOSED (HCC): ICD-10-CM

## 2019-01-08 DIAGNOSIS — L98.492 GROIN INCISION ULCER, WITH FAT LAYER EXPOSED (HCC): Primary | ICD-10-CM

## 2019-01-08 DIAGNOSIS — Z98.890 H/O ABDOMINOPLASTY: ICD-10-CM

## 2019-01-08 LAB
ALBUMIN SERPL-MCNC: 4.1 GM/DL (ref 3.4–5)
ALBUMIN SERPL-MCNC: 4.1 GM/DL (ref 3.4–5)
ALP BLD-CCNC: 112 IU/L (ref 40–129)
ALT SERPL-CCNC: 8 U/L (ref 10–40)
AST SERPL-CCNC: 14 IU/L (ref 15–37)
BILIRUB SERPL-MCNC: 0.3 MG/DL (ref 0–1)
BILIRUBIN DIRECT: 0.2 MG/DL (ref 0–0.3)
BILIRUBIN, INDIRECT: 0.1 MG/DL (ref 0–0.7)
PREALBUMIN: 17 MG/DL (ref 20–40)
TOTAL PROTEIN: 7.2 GM/DL (ref 6.4–8.2)

## 2019-01-08 PROCEDURE — 36415 COLL VENOUS BLD VENIPUNCTURE: CPT

## 2019-01-08 PROCEDURE — 82040 ASSAY OF SERUM ALBUMIN: CPT

## 2019-01-08 PROCEDURE — 84134 ASSAY OF PREALBUMIN: CPT

## 2019-01-08 PROCEDURE — 80076 HEPATIC FUNCTION PANEL: CPT

## 2019-01-08 RX ORDER — GAUZE BANDAGE 4" X 4"
BANDAGE TOPICAL
Qty: 1 EACH | Refills: 0 | Status: SHIPPED | OUTPATIENT
Start: 2019-01-08 | End: 2019-01-17 | Stop reason: SDUPTHER

## 2019-01-09 ENCOUNTER — OFFICE VISIT (OUTPATIENT)
Dept: BARIATRICS/WEIGHT MGMT | Age: 54
End: 2019-01-09

## 2019-01-09 ENCOUNTER — HOSPITAL ENCOUNTER (OUTPATIENT)
Dept: PREADMISSION TESTING | Age: 54
Discharge: HOME | End: 2019-01-13
Payer: COMMERCIAL

## 2019-01-09 VITALS
WEIGHT: 163.38 LBS | SYSTOLIC BLOOD PRESSURE: 137 MMHG | OXYGEN SATURATION: 96 % | BODY MASS INDEX: 27.22 KG/M2 | HEIGHT: 65 IN | TEMPERATURE: 96.4 F | RESPIRATION RATE: 16 BRPM | DIASTOLIC BLOOD PRESSURE: 88 MMHG | HEART RATE: 72 BPM

## 2019-01-09 VITALS
SYSTOLIC BLOOD PRESSURE: 118 MMHG | HEART RATE: 85 BPM | HEIGHT: 66 IN | DIASTOLIC BLOOD PRESSURE: 78 MMHG | BODY MASS INDEX: 26.28 KG/M2 | WEIGHT: 163.5 LBS

## 2019-01-09 DIAGNOSIS — T81.30XA WOUND DEHISCENCE: Primary | ICD-10-CM

## 2019-01-09 DIAGNOSIS — Z98.890 S/P ABDOMINOPLASTY: ICD-10-CM

## 2019-01-09 LAB
ANION GAP SERPL CALCULATED.3IONS-SCNC: 12 MMOL/L (ref 4–16)
BUN BLDV-MCNC: 7 MG/DL (ref 6–23)
CALCIUM SERPL-MCNC: 9.6 MG/DL (ref 8.3–10.6)
CHLORIDE BLD-SCNC: 104 MMOL/L (ref 99–110)
CO2: 24 MMOL/L (ref 21–32)
CREAT SERPL-MCNC: 0.8 MG/DL (ref 0.6–1.1)
GFR AFRICAN AMERICAN: >60 ML/MIN/1.73M2
GFR NON-AFRICAN AMERICAN: >60 ML/MIN/1.73M2
GLUCOSE BLD-MCNC: 105 MG/DL (ref 70–99)
HCT VFR BLD CALC: 41.3 % (ref 37–47)
HEMOGLOBIN: 13.5 GM/DL (ref 12.5–16)
MCH RBC QN AUTO: 25 PG (ref 27–31)
MCHC RBC AUTO-ENTMCNC: 32.7 % (ref 32–36)
MCV RBC AUTO: 76.3 FL (ref 78–100)
PDW BLD-RTO: 13.2 % (ref 11.7–14.9)
PLATELET # BLD: 539 K/CU MM (ref 140–440)
PMV BLD AUTO: 10.4 FL (ref 7.5–11.1)
POTASSIUM SERPL-SCNC: 5.2 MMOL/L (ref 3.5–5.1)
RBC # BLD: 5.41 M/CU MM (ref 4.2–5.4)
SODIUM BLD-SCNC: 140 MMOL/L (ref 135–145)
WBC # BLD: 8.7 K/CU MM (ref 4–10.5)

## 2019-01-09 PROCEDURE — 85027 COMPLETE CBC AUTOMATED: CPT

## 2019-01-09 PROCEDURE — 80048 BASIC METABOLIC PNL TOTAL CA: CPT

## 2019-01-09 PROCEDURE — 36415 COLL VENOUS BLD VENIPUNCTURE: CPT

## 2019-01-09 PROCEDURE — 99024 POSTOP FOLLOW-UP VISIT: CPT | Performed by: SURGERY

## 2019-01-09 RX ORDER — HEPARIN SODIUM 5000 [USP'U]/ML
5000 INJECTION, SOLUTION INTRAVENOUS; SUBCUTANEOUS ONCE
Status: CANCELLED | OUTPATIENT
Start: 2019-01-14

## 2019-01-09 RX ORDER — OXYCODONE HYDROCHLORIDE AND ACETAMINOPHEN 5; 325 MG/1; MG/1
1-2 TABLET ORAL EVERY 6 HOURS PRN
Qty: 35 TABLET | Refills: 0 | Status: ON HOLD | OUTPATIENT
Start: 2019-01-09 | End: 2019-01-14 | Stop reason: HOSPADM

## 2019-01-09 RX ORDER — PROPYLENE GLYCOL/PEG 400 0.3 %-0.4%
1 DROPS OPHTHALMIC (EYE) DAILY
Qty: 1 EACH | Refills: 3 | Status: SHIPPED | OUTPATIENT
Start: 2019-01-09 | End: 2019-03-08 | Stop reason: ALTCHOICE

## 2019-01-09 RX ORDER — CEFAZOLIN SODIUM 2 G/100ML
2 INJECTION, SOLUTION INTRAVENOUS ONCE
Status: CANCELLED | OUTPATIENT
Start: 2019-01-14

## 2019-01-09 ASSESSMENT — ENCOUNTER SYMPTOMS
SHORTNESS OF BREATH: 1
DYSPNEA ACTIVITY LEVEL: AFTER AMBULATING 1 FLIGHT OF STAIRS

## 2019-01-10 DIAGNOSIS — E11.42 TYPE 2 DIABETES MELLITUS WITH DIABETIC POLYNEUROPATHY, WITHOUT LONG-TERM CURRENT USE OF INSULIN (HCC): Chronic | ICD-10-CM

## 2019-01-10 RX ORDER — METFORMIN HYDROCHLORIDE 500 MG/1
500 TABLET, EXTENDED RELEASE ORAL 2 TIMES DAILY
Qty: 60 TABLET | Refills: 2 | Status: SHIPPED | OUTPATIENT
Start: 2019-01-10 | End: 2019-04-03 | Stop reason: SDUPTHER

## 2019-01-10 RX ORDER — GUAIFENESIN 600 MG/1
600 TABLET, EXTENDED RELEASE ORAL 2 TIMES DAILY
Qty: 60 TABLET | Refills: 0 | Status: ON HOLD | OUTPATIENT
Start: 2019-01-10 | End: 2019-01-14

## 2019-01-11 ENCOUNTER — OFFICE VISIT (OUTPATIENT)
Dept: FAMILY MEDICINE CLINIC | Age: 54
End: 2019-01-11
Payer: COMMERCIAL

## 2019-01-11 VITALS
WEIGHT: 167.2 LBS | OXYGEN SATURATION: 97 % | BODY MASS INDEX: 27.86 KG/M2 | RESPIRATION RATE: 16 BRPM | HEIGHT: 65 IN | DIASTOLIC BLOOD PRESSURE: 80 MMHG | SYSTOLIC BLOOD PRESSURE: 136 MMHG | HEART RATE: 70 BPM

## 2019-01-11 DIAGNOSIS — J30.9 ALLERGIC RHINITIS, UNSPECIFIED SEASONALITY, UNSPECIFIED TRIGGER: ICD-10-CM

## 2019-01-11 DIAGNOSIS — J06.9 UPPER RESPIRATORY TRACT INFECTION, UNSPECIFIED TYPE: Primary | ICD-10-CM

## 2019-01-11 DIAGNOSIS — R05.9 COUGH: ICD-10-CM

## 2019-01-11 PROCEDURE — G8417 CALC BMI ABV UP PARAM F/U: HCPCS | Performed by: NURSE PRACTITIONER

## 2019-01-11 PROCEDURE — 3017F COLORECTAL CA SCREEN DOC REV: CPT | Performed by: NURSE PRACTITIONER

## 2019-01-11 PROCEDURE — 3014F SCREEN MAMMO DOC REV: CPT | Performed by: NURSE PRACTITIONER

## 2019-01-11 PROCEDURE — G8484 FLU IMMUNIZE NO ADMIN: HCPCS | Performed by: NURSE PRACTITIONER

## 2019-01-11 PROCEDURE — G8427 DOCREV CUR MEDS BY ELIG CLIN: HCPCS | Performed by: NURSE PRACTITIONER

## 2019-01-11 PROCEDURE — 99213 OFFICE O/P EST LOW 20 MIN: CPT | Performed by: NURSE PRACTITIONER

## 2019-01-11 PROCEDURE — 1036F TOBACCO NON-USER: CPT | Performed by: NURSE PRACTITIONER

## 2019-01-11 RX ORDER — AZITHROMYCIN 250 MG/1
TABLET, FILM COATED ORAL
Qty: 6 TABLET | Refills: 0 | Status: SHIPPED | OUTPATIENT
Start: 2019-01-11 | End: 2019-01-21

## 2019-01-11 RX ORDER — PROMETHAZINE HYDROCHLORIDE AND CODEINE PHOSPHATE 6.25; 1 MG/5ML; MG/5ML
5 SYRUP ORAL NIGHTLY PRN
Qty: 100 ML | Refills: 0 | Status: SHIPPED | OUTPATIENT
Start: 2019-01-11 | End: 2019-01-16

## 2019-01-11 RX ORDER — METHYLPREDNISOLONE 4 MG/1
TABLET ORAL
Qty: 1 KIT | Refills: 0 | Status: SHIPPED | OUTPATIENT
Start: 2019-01-11 | End: 2019-01-23

## 2019-01-11 ASSESSMENT — ENCOUNTER SYMPTOMS
VOMITING: 0
NAUSEA: 0
COUGH: 1
RHINORRHEA: 1
ABDOMINAL PAIN: 0
SHORTNESS OF BREATH: 0

## 2019-01-14 ENCOUNTER — HOSPITAL ENCOUNTER (OUTPATIENT)
Age: 54
Setting detail: OUTPATIENT SURGERY
Discharge: HOME | End: 2019-01-14
Attending: SURGERY | Admitting: SURGERY
Payer: COMMERCIAL

## 2019-01-14 ENCOUNTER — ANESTHESIA (OUTPATIENT)
Dept: OPERATING ROOM | Age: 54
End: 2019-01-14
Payer: COMMERCIAL

## 2019-01-14 VITALS
OXYGEN SATURATION: 100 % | DIASTOLIC BLOOD PRESSURE: 87 MMHG | SYSTOLIC BLOOD PRESSURE: 119 MMHG | TEMPERATURE: 97.7 F | RESPIRATION RATE: 5 BRPM

## 2019-01-14 VITALS
TEMPERATURE: 96.7 F | DIASTOLIC BLOOD PRESSURE: 85 MMHG | RESPIRATION RATE: 16 BRPM | OXYGEN SATURATION: 95 % | HEART RATE: 80 BPM | SYSTOLIC BLOOD PRESSURE: 148 MMHG

## 2019-01-14 DIAGNOSIS — E65 ABDOMINAL PANNUS: Primary | ICD-10-CM

## 2019-01-14 DIAGNOSIS — T81.30XA WOUND DEHISCENCE: ICD-10-CM

## 2019-01-14 LAB
GLUCOSE BLD-MCNC: 95 MG/DL (ref 70–99)
GLUCOSE BLD-MCNC: 98 MG/DL (ref 70–99)
POTASSIUM SERPL-SCNC: 3.6 MMOL/L (ref 3.5–5.1)

## 2019-01-14 PROCEDURE — 7100000010 HC PHASE II RECOVERY - FIRST 15 MIN: Performed by: SURGERY

## 2019-01-14 PROCEDURE — 3600000003 HC SURGERY LEVEL 3 BASE: Performed by: SURGERY

## 2019-01-14 PROCEDURE — 2580000003 HC RX 258: Performed by: NURSE ANESTHETIST, CERTIFIED REGISTERED

## 2019-01-14 PROCEDURE — 82962 GLUCOSE BLOOD TEST: CPT

## 2019-01-14 PROCEDURE — 2709999900 HC NON-CHARGEABLE SUPPLY: Performed by: SURGERY

## 2019-01-14 PROCEDURE — 6360000002 HC RX W HCPCS: Performed by: NURSE ANESTHETIST, CERTIFIED REGISTERED

## 2019-01-14 PROCEDURE — 7100000001 HC PACU RECOVERY - ADDTL 15 MIN: Performed by: SURGERY

## 2019-01-14 PROCEDURE — 7100000000 HC PACU RECOVERY - FIRST 15 MIN: Performed by: SURGERY

## 2019-01-14 PROCEDURE — 87186 SC STD MICRODIL/AGAR DIL: CPT

## 2019-01-14 PROCEDURE — 87071 CULTURE AEROBIC QUANT OTHER: CPT

## 2019-01-14 PROCEDURE — 2580000003 HC RX 258: Performed by: SURGERY

## 2019-01-14 PROCEDURE — 3700000000 HC ANESTHESIA ATTENDED CARE: Performed by: SURGERY

## 2019-01-14 PROCEDURE — 3600000013 HC SURGERY LEVEL 3 ADDTL 15MIN: Performed by: SURGERY

## 2019-01-14 PROCEDURE — 7100000011 HC PHASE II RECOVERY - ADDTL 15 MIN: Performed by: SURGERY

## 2019-01-14 PROCEDURE — 87205 SMEAR GRAM STAIN: CPT

## 2019-01-14 PROCEDURE — 11042 DBRDMT SUBQ TIS 1ST 20SQCM/<: CPT | Performed by: SURGERY

## 2019-01-14 PROCEDURE — 2500000003 HC RX 250 WO HCPCS: Performed by: NURSE ANESTHETIST, CERTIFIED REGISTERED

## 2019-01-14 PROCEDURE — 6360000002 HC RX W HCPCS: Performed by: SURGERY

## 2019-01-14 PROCEDURE — 3700000001 HC ADD 15 MINUTES (ANESTHESIA): Performed by: SURGERY

## 2019-01-14 PROCEDURE — 84132 ASSAY OF SERUM POTASSIUM: CPT

## 2019-01-14 PROCEDURE — 6360000002 HC RX W HCPCS: Performed by: ANESTHESIOLOGY

## 2019-01-14 PROCEDURE — 2580000003 HC RX 258: Performed by: ANESTHESIOLOGY

## 2019-01-14 PROCEDURE — 87073 CULTURE BACTERIA ANAEROBIC: CPT

## 2019-01-14 PROCEDURE — 87077 CULTURE AEROBIC IDENTIFY: CPT

## 2019-01-14 PROCEDURE — 6360000002 HC RX W HCPCS

## 2019-01-14 RX ORDER — LABETALOL HYDROCHLORIDE 5 MG/ML
5 INJECTION, SOLUTION INTRAVENOUS EVERY 10 MIN PRN
Status: CANCELLED | OUTPATIENT
Start: 2019-01-14

## 2019-01-14 RX ORDER — HYDROMORPHONE HCL 110MG/55ML
0.5 PATIENT CONTROLLED ANALGESIA SYRINGE INTRAVENOUS EVERY 5 MIN PRN
Status: CANCELLED | OUTPATIENT
Start: 2019-01-14

## 2019-01-14 RX ORDER — ONDANSETRON 2 MG/ML
4 INJECTION INTRAMUSCULAR; INTRAVENOUS
Status: CANCELLED | OUTPATIENT
Start: 2019-01-14 | End: 2019-01-14

## 2019-01-14 RX ORDER — LIDOCAINE HYDROCHLORIDE 20 MG/ML
INJECTION, SOLUTION INFILTRATION; PERINEURAL PRN
Status: DISCONTINUED | OUTPATIENT
Start: 2019-01-14 | End: 2019-01-14 | Stop reason: SDUPTHER

## 2019-01-14 RX ORDER — AMOXICILLIN 250 MG
2 CAPSULE ORAL DAILY
Qty: 60 TABLET | Refills: 3 | Status: SHIPPED | OUTPATIENT
Start: 2019-01-14 | End: 2019-01-24

## 2019-01-14 RX ORDER — FENTANYL CITRATE 50 UG/ML
INJECTION, SOLUTION INTRAMUSCULAR; INTRAVENOUS
Status: COMPLETED
Start: 2019-01-14 | End: 2019-01-14

## 2019-01-14 RX ORDER — FENTANYL CITRATE 50 UG/ML
50 INJECTION, SOLUTION INTRAMUSCULAR; INTRAVENOUS EVERY 5 MIN PRN
Status: DISCONTINUED | OUTPATIENT
Start: 2019-01-14 | End: 2019-01-14 | Stop reason: HOSPADM

## 2019-01-14 RX ORDER — FENTANYL CITRATE 50 UG/ML
25 INJECTION, SOLUTION INTRAMUSCULAR; INTRAVENOUS EVERY 5 MIN PRN
Status: CANCELLED | OUTPATIENT
Start: 2019-01-14

## 2019-01-14 RX ORDER — ONDANSETRON 2 MG/ML
INJECTION INTRAMUSCULAR; INTRAVENOUS PRN
Status: DISCONTINUED | OUTPATIENT
Start: 2019-01-14 | End: 2019-01-14 | Stop reason: SDUPTHER

## 2019-01-14 RX ORDER — SODIUM CHLORIDE, SODIUM LACTATE, POTASSIUM CHLORIDE, CALCIUM CHLORIDE 600; 310; 30; 20 MG/100ML; MG/100ML; MG/100ML; MG/100ML
INJECTION, SOLUTION INTRAVENOUS CONTINUOUS PRN
Status: DISCONTINUED | OUTPATIENT
Start: 2019-01-14 | End: 2019-01-14 | Stop reason: SDUPTHER

## 2019-01-14 RX ORDER — KETOROLAC TROMETHAMINE 30 MG/ML
INJECTION, SOLUTION INTRAMUSCULAR; INTRAVENOUS PRN
Status: DISCONTINUED | OUTPATIENT
Start: 2019-01-14 | End: 2019-01-14 | Stop reason: SDUPTHER

## 2019-01-14 RX ORDER — HEPARIN SODIUM 5000 [USP'U]/ML
5000 INJECTION, SOLUTION INTRAVENOUS; SUBCUTANEOUS ONCE
Status: COMPLETED | OUTPATIENT
Start: 2019-01-14 | End: 2019-01-14

## 2019-01-14 RX ORDER — DEXAMETHASONE SODIUM PHOSPHATE 4 MG/ML
INJECTION, SOLUTION INTRA-ARTICULAR; INTRALESIONAL; INTRAMUSCULAR; INTRAVENOUS; SOFT TISSUE PRN
Status: DISCONTINUED | OUTPATIENT
Start: 2019-01-14 | End: 2019-01-14 | Stop reason: SDUPTHER

## 2019-01-14 RX ORDER — ACETAMINOPHEN 10 MG/ML
1000 INJECTION, SOLUTION INTRAVENOUS ONCE
Status: COMPLETED | OUTPATIENT
Start: 2019-01-14 | End: 2019-01-14

## 2019-01-14 RX ORDER — HYDRALAZINE HYDROCHLORIDE 20 MG/ML
5 INJECTION INTRAMUSCULAR; INTRAVENOUS EVERY 10 MIN PRN
Status: CANCELLED | OUTPATIENT
Start: 2019-01-14

## 2019-01-14 RX ORDER — SODIUM CHLORIDE, SODIUM LACTATE, POTASSIUM CHLORIDE, CALCIUM CHLORIDE 600; 310; 30; 20 MG/100ML; MG/100ML; MG/100ML; MG/100ML
INJECTION, SOLUTION INTRAVENOUS ONCE
Status: COMPLETED | OUTPATIENT
Start: 2019-01-14 | End: 2019-01-14

## 2019-01-14 RX ORDER — FENTANYL CITRATE 50 UG/ML
INJECTION, SOLUTION INTRAMUSCULAR; INTRAVENOUS PRN
Status: DISCONTINUED | OUTPATIENT
Start: 2019-01-14 | End: 2019-01-14 | Stop reason: SDUPTHER

## 2019-01-14 RX ORDER — PROPOFOL 10 MG/ML
INJECTION, EMULSION INTRAVENOUS PRN
Status: DISCONTINUED | OUTPATIENT
Start: 2019-01-14 | End: 2019-01-14 | Stop reason: SDUPTHER

## 2019-01-14 RX ORDER — CEFAZOLIN SODIUM 2 G/100ML
2 INJECTION, SOLUTION INTRAVENOUS ONCE
Status: COMPLETED | OUTPATIENT
Start: 2019-01-14 | End: 2019-01-14

## 2019-01-14 RX ORDER — OXYCODONE HYDROCHLORIDE AND ACETAMINOPHEN 5; 325 MG/1; MG/1
1-2 TABLET ORAL EVERY 6 HOURS PRN
Qty: 35 TABLET | Refills: 0 | Status: SHIPPED | OUTPATIENT
Start: 2019-01-14 | End: 2019-01-21

## 2019-01-14 RX ORDER — AMOXICILLIN AND CLAVULANATE POTASSIUM 875; 125 MG/1; MG/1
1 TABLET, FILM COATED ORAL 2 TIMES DAILY
Qty: 20 TABLET | Refills: 0 | Status: SHIPPED | OUTPATIENT
Start: 2019-01-14 | End: 2019-01-23 | Stop reason: SDUPTHER

## 2019-01-14 RX ADMIN — FENTANYL CITRATE 50 MCG: 50 INJECTION, SOLUTION INTRAMUSCULAR; INTRAVENOUS at 14:14

## 2019-01-14 RX ADMIN — FENTANYL CITRATE 100 MCG: 50 INJECTION INTRAMUSCULAR; INTRAVENOUS at 12:30

## 2019-01-14 RX ADMIN — KETOROLAC TROMETHAMINE 30 MG: 30 INJECTION, SOLUTION INTRAMUSCULAR; INTRAVENOUS at 13:13

## 2019-01-14 RX ADMIN — FENTANYL CITRATE 50 MCG: 50 INJECTION, SOLUTION INTRAMUSCULAR; INTRAVENOUS at 13:59

## 2019-01-14 RX ADMIN — SODIUM CHLORIDE, POTASSIUM CHLORIDE, SODIUM LACTATE AND CALCIUM CHLORIDE: 600; 310; 30; 20 INJECTION, SOLUTION INTRAVENOUS at 12:20

## 2019-01-14 RX ADMIN — SODIUM CHLORIDE, POTASSIUM CHLORIDE, SODIUM LACTATE AND CALCIUM CHLORIDE: 600; 310; 30; 20 INJECTION, SOLUTION INTRAVENOUS at 09:55

## 2019-01-14 RX ADMIN — ACETAMINOPHEN 1000 MG: 10 INJECTION, SOLUTION INTRAVENOUS at 14:12

## 2019-01-14 RX ADMIN — LIDOCAINE HYDROCHLORIDE 100 MG: 20 INJECTION, SOLUTION INFILTRATION; PERINEURAL at 12:32

## 2019-01-14 RX ADMIN — ONDANSETRON HYDROCHLORIDE 4 MG: 2 SOLUTION INTRAMUSCULAR; INTRAVENOUS at 12:41

## 2019-01-14 RX ADMIN — SODIUM CHLORIDE, POTASSIUM CHLORIDE, SODIUM LACTATE AND CALCIUM CHLORIDE: 600; 310; 30; 20 INJECTION, SOLUTION INTRAVENOUS at 13:18

## 2019-01-14 RX ADMIN — DEXAMETHASONE SODIUM PHOSPHATE 4 MG: 4 INJECTION, SOLUTION INTRAMUSCULAR; INTRAVENOUS at 12:41

## 2019-01-14 RX ADMIN — METRONIDAZOLE 500 MG: 500 INJECTION, SOLUTION INTRAVENOUS at 12:51

## 2019-01-14 RX ADMIN — PROPOFOL 200 MG: 10 INJECTION, EMULSION INTRAVENOUS at 12:32

## 2019-01-14 RX ADMIN — HEPARIN SODIUM 5000 UNITS: 5000 INJECTION, SOLUTION INTRAVENOUS; SUBCUTANEOUS at 09:40

## 2019-01-14 RX ADMIN — FENTANYL CITRATE 50 MCG: 50 INJECTION, SOLUTION INTRAMUSCULAR; INTRAVENOUS at 14:07

## 2019-01-14 RX ADMIN — CEFAZOLIN SODIUM 2 G: 2 INJECTION, SOLUTION INTRAVENOUS at 12:42

## 2019-01-14 ASSESSMENT — PULMONARY FUNCTION TESTS
PIF_VALUE: 3
PIF_VALUE: 2
PIF_VALUE: 5
PIF_VALUE: 4
PIF_VALUE: 1
PIF_VALUE: 0
PIF_VALUE: 6
PIF_VALUE: 6
PIF_VALUE: 19
PIF_VALUE: 11
PIF_VALUE: 0
PIF_VALUE: 5
PIF_VALUE: 4
PIF_VALUE: 0
PIF_VALUE: 5
PIF_VALUE: 4
PIF_VALUE: 5
PIF_VALUE: 2
PIF_VALUE: 9
PIF_VALUE: 4
PIF_VALUE: 6
PIF_VALUE: 4
PIF_VALUE: 20
PIF_VALUE: 4
PIF_VALUE: 4
PIF_VALUE: 7
PIF_VALUE: 11
PIF_VALUE: 0
PIF_VALUE: 5
PIF_VALUE: 4
PIF_VALUE: 21
PIF_VALUE: 7
PIF_VALUE: 4
PIF_VALUE: 6
PIF_VALUE: 7
PIF_VALUE: 7
PIF_VALUE: 5
PIF_VALUE: 4
PIF_VALUE: 0
PIF_VALUE: 4
PIF_VALUE: 5
PIF_VALUE: 5
PIF_VALUE: 8
PIF_VALUE: 4
PIF_VALUE: 4
PIF_VALUE: 5
PIF_VALUE: 5
PIF_VALUE: 27
PIF_VALUE: 3
PIF_VALUE: 4
PIF_VALUE: 4

## 2019-01-14 ASSESSMENT — PAIN DESCRIPTION - PAIN TYPE
TYPE: SURGICAL PAIN

## 2019-01-14 ASSESSMENT — PAIN SCALES - GENERAL
PAINLEVEL_OUTOF10: 6
PAINLEVEL_OUTOF10: 3
PAINLEVEL_OUTOF10: 6
PAINLEVEL_OUTOF10: 6

## 2019-01-14 ASSESSMENT — PAIN DESCRIPTION - DESCRIPTORS
DESCRIPTORS: DISCOMFORT
DESCRIPTORS: ACHING
DESCRIPTORS: DISCOMFORT
DESCRIPTORS: DISCOMFORT

## 2019-01-14 ASSESSMENT — PAIN DESCRIPTION - LOCATION
LOCATION: ABDOMEN

## 2019-01-14 ASSESSMENT — PAIN DESCRIPTION - FREQUENCY
FREQUENCY: CONTINUOUS

## 2019-01-14 ASSESSMENT — PAIN DESCRIPTION - ORIENTATION
ORIENTATION: LOWER

## 2019-01-17 DIAGNOSIS — L98.492 GROIN INCISION ULCER, WITH FAT LAYER EXPOSED (HCC): ICD-10-CM

## 2019-01-17 RX ORDER — GAUZE BANDAGE 4" X 4"
BANDAGE TOPICAL
Qty: 1 EACH | Refills: 0 | Status: SHIPPED | OUTPATIENT
Start: 2019-01-17 | End: 2019-03-08 | Stop reason: ALTCHOICE

## 2019-01-17 RX ORDER — ADHESIVE TAPE 1" X 5YARD
1 TAPE, NON-MEDICATED TOPICAL 2 TIMES DAILY
Qty: 5 EACH | Refills: 3 | Status: SHIPPED | OUTPATIENT
Start: 2019-01-17 | End: 2019-03-08 | Stop reason: ALTCHOICE

## 2019-01-18 LAB
CULTURE: NORMAL
GRAM SMEAR: NORMAL
Lab: NORMAL
ORGANISM: NORMAL
REPORT STATUS: NORMAL
SPECIMEN: NORMAL

## 2019-01-23 ENCOUNTER — ANESTHESIA EVENT (OUTPATIENT)
Dept: OPERATING ROOM | Age: 54
End: 2019-01-23
Payer: COMMERCIAL

## 2019-01-23 ENCOUNTER — OFFICE VISIT (OUTPATIENT)
Dept: BARIATRICS/WEIGHT MGMT | Age: 54
End: 2019-01-23

## 2019-01-23 ENCOUNTER — TELEPHONE (OUTPATIENT)
Dept: BARIATRICS/WEIGHT MGMT | Age: 54
End: 2019-01-23

## 2019-01-23 VITALS
HEIGHT: 66 IN | WEIGHT: 160.8 LBS | SYSTOLIC BLOOD PRESSURE: 114 MMHG | BODY MASS INDEX: 25.84 KG/M2 | HEART RATE: 80 BPM | DIASTOLIC BLOOD PRESSURE: 72 MMHG

## 2019-01-23 DIAGNOSIS — T81.30XA WOUND DEHISCENCE: Primary | ICD-10-CM

## 2019-01-23 DIAGNOSIS — E65 ABDOMINAL PANNUS: ICD-10-CM

## 2019-01-23 PROCEDURE — 99024 POSTOP FOLLOW-UP VISIT: CPT | Performed by: SURGERY

## 2019-01-23 RX ORDER — OXYCODONE HYDROCHLORIDE AND ACETAMINOPHEN 5; 325 MG/1; MG/1
1-2 TABLET ORAL EVERY 6 HOURS PRN
Qty: 35 TABLET | Refills: 0 | Status: SHIPPED | OUTPATIENT
Start: 2019-01-23 | End: 2019-01-30

## 2019-01-23 RX ORDER — AMOXICILLIN AND CLAVULANATE POTASSIUM 875; 125 MG/1; MG/1
1 TABLET, FILM COATED ORAL 2 TIMES DAILY
Qty: 20 TABLET | Refills: 0 | Status: SHIPPED | OUTPATIENT
Start: 2019-01-23 | End: 2019-01-24

## 2019-01-23 ASSESSMENT — ENCOUNTER SYMPTOMS
ANAL BLEEDING: 0
NAUSEA: 0
SHORTNESS OF BREATH: 0
WHEEZING: 0
VOICE CHANGE: 0
ABDOMINAL PAIN: 1
SORE THROAT: 0
COUGH: 0
VOMITING: 0
CONSTIPATION: 0
BLOOD IN STOOL: 0
DIARRHEA: 0
PHOTOPHOBIA: 0
TROUBLE SWALLOWING: 0
COLOR CHANGE: 0

## 2019-01-24 ENCOUNTER — HOSPITAL ENCOUNTER (OUTPATIENT)
Age: 54
Setting detail: OUTPATIENT SURGERY
Discharge: HOME | End: 2019-01-24
Attending: SURGERY | Admitting: SURGERY
Payer: COMMERCIAL

## 2019-01-24 ENCOUNTER — ANESTHESIA (OUTPATIENT)
Dept: OPERATING ROOM | Age: 54
End: 2019-01-24
Payer: COMMERCIAL

## 2019-01-24 VITALS
WEIGHT: 160 LBS | RESPIRATION RATE: 16 BRPM | SYSTOLIC BLOOD PRESSURE: 130 MMHG | HEART RATE: 73 BPM | DIASTOLIC BLOOD PRESSURE: 81 MMHG | HEIGHT: 66 IN | TEMPERATURE: 97.5 F | BODY MASS INDEX: 25.71 KG/M2 | OXYGEN SATURATION: 100 %

## 2019-01-24 VITALS
DIASTOLIC BLOOD PRESSURE: 73 MMHG | OXYGEN SATURATION: 100 % | SYSTOLIC BLOOD PRESSURE: 104 MMHG | RESPIRATION RATE: 1 BRPM

## 2019-01-24 LAB — GLUCOSE BLD-MCNC: 99 MG/DL (ref 70–99)

## 2019-01-24 PROCEDURE — 11042 DBRDMT SUBQ TIS 1ST 20SQCM/<: CPT | Performed by: SURGERY

## 2019-01-24 PROCEDURE — 2709999900 HC NON-CHARGEABLE SUPPLY: Performed by: SURGERY

## 2019-01-24 PROCEDURE — 2500000003 HC RX 250 WO HCPCS: Performed by: NURSE ANESTHETIST, CERTIFIED REGISTERED

## 2019-01-24 PROCEDURE — 6360000002 HC RX W HCPCS: Performed by: NURSE ANESTHETIST, CERTIFIED REGISTERED

## 2019-01-24 PROCEDURE — 3700000000 HC ANESTHESIA ATTENDED CARE: Performed by: SURGERY

## 2019-01-24 PROCEDURE — 87071 CULTURE AEROBIC QUANT OTHER: CPT

## 2019-01-24 PROCEDURE — 87205 SMEAR GRAM STAIN: CPT

## 2019-01-24 PROCEDURE — 2580000003 HC RX 258: Performed by: SURGERY

## 2019-01-24 PROCEDURE — 82962 GLUCOSE BLOOD TEST: CPT

## 2019-01-24 PROCEDURE — 2580000003 HC RX 258: Performed by: ANESTHESIOLOGY

## 2019-01-24 PROCEDURE — 3600000003 HC SURGERY LEVEL 3 BASE: Performed by: SURGERY

## 2019-01-24 PROCEDURE — 3600000013 HC SURGERY LEVEL 3 ADDTL 15MIN: Performed by: SURGERY

## 2019-01-24 PROCEDURE — 3700000001 HC ADD 15 MINUTES (ANESTHESIA): Performed by: SURGERY

## 2019-01-24 PROCEDURE — 6360000002 HC RX W HCPCS: Performed by: SURGERY

## 2019-01-24 PROCEDURE — 7100000011 HC PHASE II RECOVERY - ADDTL 15 MIN: Performed by: SURGERY

## 2019-01-24 PROCEDURE — 87073 CULTURE BACTERIA ANAEROBIC: CPT

## 2019-01-24 PROCEDURE — 94150 VITAL CAPACITY TEST: CPT

## 2019-01-24 PROCEDURE — 7100000010 HC PHASE II RECOVERY - FIRST 15 MIN: Performed by: SURGERY

## 2019-01-24 PROCEDURE — 94761 N-INVAS EAR/PLS OXIMETRY MLT: CPT

## 2019-01-24 RX ORDER — FENTANYL CITRATE 50 UG/ML
INJECTION, SOLUTION INTRAMUSCULAR; INTRAVENOUS PRN
Status: DISCONTINUED | OUTPATIENT
Start: 2019-01-24 | End: 2019-01-24 | Stop reason: SDUPTHER

## 2019-01-24 RX ORDER — MIDAZOLAM HYDROCHLORIDE 1 MG/ML
INJECTION INTRAMUSCULAR; INTRAVENOUS PRN
Status: DISCONTINUED | OUTPATIENT
Start: 2019-01-24 | End: 2019-01-24 | Stop reason: SDUPTHER

## 2019-01-24 RX ORDER — FENTANYL CITRATE 50 UG/ML
25 INJECTION, SOLUTION INTRAMUSCULAR; INTRAVENOUS EVERY 5 MIN PRN
Status: CANCELLED | OUTPATIENT
Start: 2019-01-24

## 2019-01-24 RX ORDER — SODIUM CHLORIDE 0.9 % (FLUSH) 0.9 %
10 SYRINGE (ML) INJECTION EVERY 12 HOURS SCHEDULED
Status: CANCELLED | OUTPATIENT
Start: 2019-01-24

## 2019-01-24 RX ORDER — SULFAMETHOXAZOLE AND TRIMETHOPRIM 800; 160 MG/1; MG/1
1 TABLET ORAL 2 TIMES DAILY
Qty: 20 TABLET | Refills: 0 | Status: SHIPPED | OUTPATIENT
Start: 2019-01-24 | End: 2019-02-03

## 2019-01-24 RX ORDER — IPRATROPIUM BROMIDE AND ALBUTEROL SULFATE 2.5; .5 MG/3ML; MG/3ML
1 SOLUTION RESPIRATORY (INHALATION)
Status: CANCELLED | OUTPATIENT
Start: 2019-01-24

## 2019-01-24 RX ORDER — SODIUM CHLORIDE 9 MG/ML
INJECTION, SOLUTION INTRAVENOUS CONTINUOUS
Status: CANCELLED | OUTPATIENT
Start: 2019-01-24

## 2019-01-24 RX ORDER — SODIUM CHLORIDE 9 MG/ML
INJECTION, SOLUTION INTRAVENOUS CONTINUOUS
Status: DISCONTINUED | OUTPATIENT
Start: 2019-01-24 | End: 2019-01-24 | Stop reason: HOSPADM

## 2019-01-24 RX ORDER — LIDOCAINE HYDROCHLORIDE 20 MG/ML
INJECTION, SOLUTION EPIDURAL; INFILTRATION; INTRACAUDAL; PERINEURAL PRN
Status: DISCONTINUED | OUTPATIENT
Start: 2019-01-24 | End: 2019-01-24 | Stop reason: SDUPTHER

## 2019-01-24 RX ORDER — PROPOFOL 10 MG/ML
INJECTION, EMULSION INTRAVENOUS PRN
Status: DISCONTINUED | OUTPATIENT
Start: 2019-01-24 | End: 2019-01-24 | Stop reason: SDUPTHER

## 2019-01-24 RX ORDER — SODIUM CHLORIDE 0.9 % (FLUSH) 0.9 %
10 SYRINGE (ML) INJECTION PRN
Status: CANCELLED | OUTPATIENT
Start: 2019-01-24

## 2019-01-24 RX ORDER — CEFAZOLIN SODIUM 2 G/100ML
2 INJECTION, SOLUTION INTRAVENOUS
Status: COMPLETED | OUTPATIENT
Start: 2019-01-24 | End: 2019-01-24

## 2019-01-24 RX ORDER — MAGNESIUM HYDROXIDE 1200 MG/15ML
LIQUID ORAL CONTINUOUS PRN
Status: DISCONTINUED | OUTPATIENT
Start: 2019-01-24 | End: 2019-01-24 | Stop reason: HOSPADM

## 2019-01-24 RX ADMIN — CEFAZOLIN SODIUM 2 G: 2 INJECTION, SOLUTION INTRAVENOUS at 15:05

## 2019-01-24 RX ADMIN — SODIUM CHLORIDE: 9 INJECTION, SOLUTION INTRAVENOUS at 13:23

## 2019-01-24 RX ADMIN — PROPOFOL 20 MG: 10 INJECTION, EMULSION INTRAVENOUS at 15:26

## 2019-01-24 RX ADMIN — PROPOFOL 40 MG: 10 INJECTION, EMULSION INTRAVENOUS at 15:22

## 2019-01-24 RX ADMIN — PROPOFOL 40 MG: 10 INJECTION, EMULSION INTRAVENOUS at 15:20

## 2019-01-24 RX ADMIN — FENTANYL CITRATE 50 MCG: 50 INJECTION INTRAMUSCULAR; INTRAVENOUS at 15:02

## 2019-01-24 RX ADMIN — PROPOFOL 40 MG: 10 INJECTION, EMULSION INTRAVENOUS at 15:24

## 2019-01-24 RX ADMIN — PROPOFOL 30 MG: 10 INJECTION, EMULSION INTRAVENOUS at 15:05

## 2019-01-24 RX ADMIN — PROPOFOL 50 MG: 10 INJECTION, EMULSION INTRAVENOUS at 15:10

## 2019-01-24 RX ADMIN — PROPOFOL 20 MG: 10 INJECTION, EMULSION INTRAVENOUS at 15:13

## 2019-01-24 RX ADMIN — PROPOFOL 60 MG: 10 INJECTION, EMULSION INTRAVENOUS at 15:29

## 2019-01-24 RX ADMIN — LIDOCAINE HYDROCHLORIDE 60 MG: 20 INJECTION, SOLUTION EPIDURAL; INFILTRATION; INTRACAUDAL; PERINEURAL at 15:02

## 2019-01-24 RX ADMIN — PROPOFOL 50 MG: 10 INJECTION, EMULSION INTRAVENOUS at 15:02

## 2019-01-24 RX ADMIN — PROPOFOL 50 MG: 10 INJECTION, EMULSION INTRAVENOUS at 15:18

## 2019-01-24 RX ADMIN — PROPOFOL 40 MG: 10 INJECTION, EMULSION INTRAVENOUS at 15:16

## 2019-01-24 RX ADMIN — FENTANYL CITRATE 50 MCG: 50 INJECTION INTRAMUSCULAR; INTRAVENOUS at 15:28

## 2019-01-24 RX ADMIN — MIDAZOLAM HYDROCHLORIDE 2 MG: 1 INJECTION, SOLUTION INTRAMUSCULAR; INTRAVENOUS at 14:58

## 2019-01-24 ASSESSMENT — PULMONARY FUNCTION TESTS
PIF_VALUE: 0

## 2019-01-24 ASSESSMENT — COPD QUESTIONNAIRES: CAT_SEVERITY: SEVERE

## 2019-01-24 ASSESSMENT — ENCOUNTER SYMPTOMS: SHORTNESS OF BREATH: 1

## 2019-01-24 ASSESSMENT — PAIN DESCRIPTION - DESCRIPTORS: DESCRIPTORS: THROBBING

## 2019-01-24 ASSESSMENT — PAIN - FUNCTIONAL ASSESSMENT: PAIN_FUNCTIONAL_ASSESSMENT: 0-10

## 2019-01-24 ASSESSMENT — LIFESTYLE VARIABLES: SMOKING_STATUS: 1

## 2019-01-28 LAB
CULTURE: NORMAL
GRAM SMEAR: NORMAL
Lab: NORMAL
REPORT STATUS: NORMAL
SPECIMEN: NORMAL

## 2019-02-01 ENCOUNTER — OFFICE VISIT (OUTPATIENT)
Dept: BARIATRICS/WEIGHT MGMT | Age: 54
End: 2019-02-01

## 2019-02-01 VITALS
DIASTOLIC BLOOD PRESSURE: 82 MMHG | HEIGHT: 66 IN | HEART RATE: 80 BPM | SYSTOLIC BLOOD PRESSURE: 136 MMHG | BODY MASS INDEX: 25.97 KG/M2 | WEIGHT: 161.6 LBS

## 2019-02-01 DIAGNOSIS — Z98.890 S/P ABDOMINOPLASTY: Primary | ICD-10-CM

## 2019-02-01 PROCEDURE — 99024 POSTOP FOLLOW-UP VISIT: CPT | Performed by: SURGERY

## 2019-02-01 RX ORDER — OXYCODONE HYDROCHLORIDE AND ACETAMINOPHEN 5; 325 MG/1; MG/1
1-2 TABLET ORAL EVERY 6 HOURS PRN
Qty: 35 TABLET | Refills: 0 | Status: SHIPPED | OUTPATIENT
Start: 2019-02-01 | End: 2019-02-08

## 2019-02-08 ENCOUNTER — TELEPHONE (OUTPATIENT)
Dept: BARIATRICS/WEIGHT MGMT | Age: 54
End: 2019-02-08

## 2019-02-12 DIAGNOSIS — B37.2 CANDIDIASIS OF SKIN: ICD-10-CM

## 2019-02-12 RX ORDER — HYDROXYZINE PAMOATE 50 MG/1
50 CAPSULE ORAL 3 TIMES DAILY PRN
Qty: 60 CAPSULE | Refills: 1 | Status: SHIPPED | OUTPATIENT
Start: 2019-02-12 | End: 2019-04-03 | Stop reason: SDUPTHER

## 2019-02-15 ENCOUNTER — OFFICE VISIT (OUTPATIENT)
Dept: BARIATRICS/WEIGHT MGMT | Age: 54
End: 2019-02-15

## 2019-02-15 VITALS
RESPIRATION RATE: 12 BRPM | WEIGHT: 159.8 LBS | SYSTOLIC BLOOD PRESSURE: 112 MMHG | DIASTOLIC BLOOD PRESSURE: 72 MMHG | HEART RATE: 68 BPM | HEIGHT: 66 IN | BODY MASS INDEX: 25.68 KG/M2

## 2019-02-15 DIAGNOSIS — Z98.890 S/P ABDOMINOPLASTY: Primary | ICD-10-CM

## 2019-02-15 PROCEDURE — 99024 POSTOP FOLLOW-UP VISIT: CPT | Performed by: SURGERY

## 2019-02-19 ENCOUNTER — TELEPHONE (OUTPATIENT)
Dept: FAMILY MEDICINE CLINIC | Age: 54
End: 2019-02-19

## 2019-02-20 DIAGNOSIS — M54.9 CHRONIC BACK PAIN, UNSPECIFIED BACK LOCATION, UNSPECIFIED BACK PAIN LATERALITY: Primary | ICD-10-CM

## 2019-02-20 DIAGNOSIS — G89.29 CHRONIC BACK PAIN, UNSPECIFIED BACK LOCATION, UNSPECIFIED BACK PAIN LATERALITY: Primary | ICD-10-CM

## 2019-02-20 RX ORDER — BLOOD-GLUCOSE METER
1 KIT MISCELLANEOUS DAILY
Qty: 100 STRIP | Refills: 3 | Status: SHIPPED | OUTPATIENT
Start: 2019-02-20 | End: 2019-04-03 | Stop reason: SDUPTHER

## 2019-02-20 RX ORDER — ATORVASTATIN CALCIUM 40 MG/1
40 TABLET, FILM COATED ORAL DAILY
Qty: 30 TABLET | Refills: 5 | Status: SHIPPED | OUTPATIENT
Start: 2019-02-20 | End: 2019-07-26

## 2019-03-01 ENCOUNTER — TELEPHONE (OUTPATIENT)
Dept: PAIN MANAGEMENT | Age: 54
End: 2019-03-01

## 2019-03-04 ENCOUNTER — APPOINTMENT (OUTPATIENT)
Dept: ULTRASOUND IMAGING | Age: 54
End: 2019-03-04
Payer: COMMERCIAL

## 2019-03-04 ENCOUNTER — APPOINTMENT (OUTPATIENT)
Dept: GENERAL RADIOLOGY | Age: 54
End: 2019-03-04
Payer: COMMERCIAL

## 2019-03-04 ENCOUNTER — APPOINTMENT (OUTPATIENT)
Dept: NUCLEAR MEDICINE | Age: 54
End: 2019-03-04
Payer: COMMERCIAL

## 2019-03-04 ENCOUNTER — HOSPITAL ENCOUNTER (OUTPATIENT)
Age: 54
Setting detail: OBSERVATION
Discharge: HOME | End: 2019-03-05
Attending: EMERGENCY MEDICINE | Admitting: HOSPITALIST
Payer: COMMERCIAL

## 2019-03-04 DIAGNOSIS — I95.9 HYPOTENSION, UNSPECIFIED HYPOTENSION TYPE: Primary | ICD-10-CM

## 2019-03-04 PROBLEM — R55 SYNCOPE AND COLLAPSE: Status: ACTIVE | Noted: 2019-03-04

## 2019-03-04 LAB
ALBUMIN SERPL-MCNC: 3.8 GM/DL (ref 3.4–5)
ALP BLD-CCNC: 91 IU/L (ref 40–129)
ALT SERPL-CCNC: 16 U/L (ref 10–40)
ANION GAP SERPL CALCULATED.3IONS-SCNC: 17 MMOL/L (ref 4–16)
AST SERPL-CCNC: 31 IU/L (ref 15–37)
ATYPICAL LYMPHOCYTE ABSOLUTE COUNT: ABNORMAL
BACTERIA: NEGATIVE /HPF
BILIRUB SERPL-MCNC: 0.5 MG/DL (ref 0–1)
BILIRUBIN URINE: NEGATIVE MG/DL
BLOOD, URINE: NEGATIVE
BUN BLDV-MCNC: 19 MG/DL (ref 6–23)
CALCIUM SERPL-MCNC: 8.1 MG/DL (ref 8.3–10.6)
CHLORIDE BLD-SCNC: 98 MMOL/L (ref 99–110)
CLARITY: ABNORMAL
CO2: 21 MMOL/L (ref 21–32)
COLOR: YELLOW
CREAT SERPL-MCNC: 1.1 MG/DL (ref 0.6–1.1)
DIFFERENTIAL TYPE: ABNORMAL
EOSINOPHILS ABSOLUTE: 0.1 K/CU MM
EOSINOPHILS RELATIVE PERCENT: 1 % (ref 0–3)
ESTIMATED AVERAGE GLUCOSE: 131 MG/DL
GFR AFRICAN AMERICAN: >60 ML/MIN/1.73M2
GFR NON-AFRICAN AMERICAN: 52 ML/MIN/1.73M2
GIANT PLATELETS: PRESENT
GLUCOSE BLD-MCNC: 216 MG/DL (ref 70–99)
GLUCOSE BLD-MCNC: 216 MG/DL (ref 70–99)
GLUCOSE BLD-MCNC: 243 MG/DL (ref 70–99)
GLUCOSE, URINE: NEGATIVE MG/DL
HBA1C MFR BLD: 6.2 % (ref 4.2–6.3)
HCT VFR BLD CALC: 37.6 % (ref 37–47)
HEMOGLOBIN: 12.6 GM/DL (ref 12.5–16)
HYALINE CASTS: 5 /LPF
KETONES, URINE: NEGATIVE MG/DL
LACTATE: 1.4 MMOL/L (ref 0.4–2)
LACTATE: 3 MMOL/L (ref 0.4–2)
LEUKOCYTE ESTERASE, URINE: NEGATIVE
LIPASE: 30 IU/L (ref 13–60)
LV EF: 58 %
LV EF: 79 %
LVEF MODALITY: NORMAL
LVEF MODALITY: NORMAL
LYMPHOCYTES ABSOLUTE: 7.2 K/CU MM
LYMPHOCYTES RELATIVE PERCENT: 65 % (ref 24–44)
MCH RBC QN AUTO: 25.1 PG (ref 27–31)
MCHC RBC AUTO-ENTMCNC: 33.5 % (ref 32–36)
MCV RBC AUTO: 75 FL (ref 78–100)
MONOCYTES ABSOLUTE: 1.2 K/CU MM
MONOCYTES RELATIVE PERCENT: 11 % (ref 0–4)
MUCUS: ABNORMAL HPF
NITRITE URINE, QUANTITATIVE: NEGATIVE
PDW BLD-RTO: 14.5 % (ref 11.7–14.9)
PH, URINE: 6 (ref 5–8)
PLATELET # BLD: 285 K/CU MM (ref 140–440)
PMV BLD AUTO: 10.8 FL (ref 7.5–11.1)
POTASSIUM SERPL-SCNC: 5 MMOL/L (ref 3.5–5.1)
PRO-BNP: 321.6 PG/ML
PROTEIN UA: NEGATIVE MG/DL
RBC # BLD: 5.01 M/CU MM (ref 4.2–5.4)
RBC URINE: <1 /HPF (ref 0–6)
REASON FOR REJECTION: NORMAL
REJECTED TEST: NORMAL
SEGMENTED NEUTROPHILS ABSOLUTE COUNT: 2.6 K/CU MM
SEGMENTED NEUTROPHILS RELATIVE PERCENT: 23 % (ref 36–66)
SMUDGE CELLS: PRESENT
SODIUM BLD-SCNC: 136 MMOL/L (ref 135–145)
SOURCE: NORMAL
SPECIFIC GRAVITY UA: 1.01 (ref 1–1.03)
SQUAMOUS EPITHELIAL: <1 /HPF
STOMATOCYTES: ABNORMAL
T4 FREE: 1.68 NG/DL (ref 0.9–1.8)
TARGET CELLS: ABNORMAL
TOTAL PROTEIN: 6.5 GM/DL (ref 6.4–8.2)
TROPONIN T: <0.01 NG/ML
TSH HIGH SENSITIVITY: 2.69 UIU/ML (ref 0.27–4.2)
UROBILINOGEN, URINE: NORMAL MG/DL (ref 0.2–1)
WBC # BLD: 11.1 K/CU MM (ref 4–10.5)
WBC # BLD: ABNORMAL 10*3/UL
WBC UA: <1 /HPF (ref 0–5)

## 2019-03-04 PROCEDURE — 2580000003 HC RX 258: Performed by: HOSPITALIST

## 2019-03-04 PROCEDURE — G0378 HOSPITAL OBSERVATION PER HR: HCPCS

## 2019-03-04 PROCEDURE — 6370000000 HC RX 637 (ALT 250 FOR IP): Performed by: FAMILY MEDICINE

## 2019-03-04 PROCEDURE — 82962 GLUCOSE BLOOD TEST: CPT

## 2019-03-04 PROCEDURE — 96365 THER/PROPH/DIAG IV INF INIT: CPT

## 2019-03-04 PROCEDURE — 6360000002 HC RX W HCPCS: Performed by: INTERNAL MEDICINE

## 2019-03-04 PROCEDURE — A9500 TC99M SESTAMIBI: HCPCS | Performed by: INTERNAL MEDICINE

## 2019-03-04 PROCEDURE — 96366 THER/PROPH/DIAG IV INF ADDON: CPT

## 2019-03-04 PROCEDURE — 6360000002 HC RX W HCPCS: Performed by: EMERGENCY MEDICINE

## 2019-03-04 PROCEDURE — 96372 THER/PROPH/DIAG INJ SC/IM: CPT

## 2019-03-04 PROCEDURE — 6370000000 HC RX 637 (ALT 250 FOR IP): Performed by: HOSPITALIST

## 2019-03-04 PROCEDURE — 94640 AIRWAY INHALATION TREATMENT: CPT

## 2019-03-04 PROCEDURE — 99219 PR INITIAL OBSERVATION CARE/DAY 50 MINUTES: CPT | Performed by: INTERNAL MEDICINE

## 2019-03-04 PROCEDURE — 93005 ELECTROCARDIOGRAM TRACING: CPT | Performed by: EMERGENCY MEDICINE

## 2019-03-04 PROCEDURE — 6360000002 HC RX W HCPCS: Performed by: HOSPITALIST

## 2019-03-04 PROCEDURE — 84436 ASSAY OF TOTAL THYROXINE: CPT

## 2019-03-04 PROCEDURE — 80053 COMPREHEN METABOLIC PANEL: CPT

## 2019-03-04 PROCEDURE — 85025 COMPLETE CBC W/AUTO DIFF WBC: CPT

## 2019-03-04 PROCEDURE — 83690 ASSAY OF LIPASE: CPT

## 2019-03-04 PROCEDURE — 83880 ASSAY OF NATRIURETIC PEPTIDE: CPT

## 2019-03-04 PROCEDURE — 83036 HEMOGLOBIN GLYCOSYLATED A1C: CPT

## 2019-03-04 PROCEDURE — 84484 ASSAY OF TROPONIN QUANT: CPT

## 2019-03-04 PROCEDURE — 78452 HT MUSCLE IMAGE SPECT MULT: CPT

## 2019-03-04 PROCEDURE — 84443 ASSAY THYROID STIM HORMONE: CPT

## 2019-03-04 PROCEDURE — 2140000000 HC CCU INTERMEDIATE R&B

## 2019-03-04 PROCEDURE — 84439 ASSAY OF FREE THYROXINE: CPT

## 2019-03-04 PROCEDURE — 83605 ASSAY OF LACTIC ACID: CPT

## 2019-03-04 PROCEDURE — 36415 COLL VENOUS BLD VENIPUNCTURE: CPT

## 2019-03-04 PROCEDURE — 96367 TX/PROPH/DG ADDL SEQ IV INF: CPT

## 2019-03-04 PROCEDURE — 2580000003 HC RX 258: Performed by: EMERGENCY MEDICINE

## 2019-03-04 PROCEDURE — 2580000003 HC RX 258

## 2019-03-04 PROCEDURE — 93225 XTRNL ECG REC<48 HRS REC: CPT

## 2019-03-04 PROCEDURE — 93306 TTE W/DOPPLER COMPLETE: CPT

## 2019-03-04 PROCEDURE — 71045 X-RAY EXAM CHEST 1 VIEW: CPT

## 2019-03-04 PROCEDURE — 3430000000 HC RX DIAGNOSTIC RADIOPHARMACEUTICAL: Performed by: INTERNAL MEDICINE

## 2019-03-04 PROCEDURE — 96375 TX/PRO/DX INJ NEW DRUG ADDON: CPT

## 2019-03-04 PROCEDURE — 81001 URINALYSIS AUTO W/SCOPE: CPT

## 2019-03-04 PROCEDURE — 93010 ELECTROCARDIOGRAM REPORT: CPT | Performed by: INTERNAL MEDICINE

## 2019-03-04 PROCEDURE — 93017 CV STRESS TEST TRACING ONLY: CPT

## 2019-03-04 PROCEDURE — 99285 EMERGENCY DEPT VISIT HI MDM: CPT

## 2019-03-04 PROCEDURE — 93880 EXTRACRANIAL BILAT STUDY: CPT

## 2019-03-04 RX ORDER — FLUTICASONE FUROATE AND VILANTEROL 100; 25 UG/1; UG/1
1 POWDER RESPIRATORY (INHALATION) DAILY
Status: DISCONTINUED | OUTPATIENT
Start: 2019-03-04 | End: 2019-03-04 | Stop reason: SDUPTHER

## 2019-03-04 RX ORDER — ALBUTEROL SULFATE 90 UG/1
2 AEROSOL, METERED RESPIRATORY (INHALATION) EVERY 6 HOURS PRN
Status: DISCONTINUED | OUTPATIENT
Start: 2019-03-04 | End: 2019-03-05 | Stop reason: HOSPADM

## 2019-03-04 RX ORDER — SODIUM CHLORIDE 9 MG/ML
INJECTION, SOLUTION INTRAVENOUS CONTINUOUS
Status: DISCONTINUED | OUTPATIENT
Start: 2019-03-04 | End: 2019-03-05 | Stop reason: HOSPADM

## 2019-03-04 RX ORDER — NICOTINE POLACRILEX 4 MG
15 LOZENGE BUCCAL PRN
Status: DISCONTINUED | OUTPATIENT
Start: 2019-03-04 | End: 2019-03-05 | Stop reason: HOSPADM

## 2019-03-04 RX ORDER — METFORMIN HYDROCHLORIDE 500 MG/1
500 TABLET, EXTENDED RELEASE ORAL 2 TIMES DAILY WITH MEALS
Status: DISCONTINUED | OUTPATIENT
Start: 2019-03-04 | End: 2019-03-05 | Stop reason: HOSPADM

## 2019-03-04 RX ORDER — SODIUM CHLORIDE, SODIUM LACTATE, POTASSIUM CHLORIDE, CALCIUM CHLORIDE 600; 310; 30; 20 MG/100ML; MG/100ML; MG/100ML; MG/100ML
INJECTION, SOLUTION INTRAVENOUS
Status: COMPLETED
Start: 2019-03-04 | End: 2019-03-04

## 2019-03-04 RX ORDER — PANTOPRAZOLE SODIUM 40 MG/1
40 TABLET, DELAYED RELEASE ORAL
Status: DISCONTINUED | OUTPATIENT
Start: 2019-03-04 | End: 2019-03-05 | Stop reason: HOSPADM

## 2019-03-04 RX ORDER — LANOLIN ALCOHOL/MO/W.PET/CERES
3 CREAM (GRAM) TOPICAL ONCE
Status: CANCELLED | OUTPATIENT
Start: 2019-03-05

## 2019-03-04 RX ORDER — SODIUM CHLORIDE, SODIUM LACTATE, POTASSIUM CHLORIDE, CALCIUM CHLORIDE 600; 310; 30; 20 MG/100ML; MG/100ML; MG/100ML; MG/100ML
INJECTION, SOLUTION INTRAVENOUS ONCE
Status: COMPLETED | OUTPATIENT
Start: 2019-03-04 | End: 2019-03-04

## 2019-03-04 RX ORDER — ATORVASTATIN CALCIUM 40 MG/1
40 TABLET, FILM COATED ORAL NIGHTLY
Status: DISCONTINUED | OUTPATIENT
Start: 2019-03-04 | End: 2019-03-05 | Stop reason: HOSPADM

## 2019-03-04 RX ORDER — DEXTROSE MONOHYDRATE 50 MG/ML
100 INJECTION, SOLUTION INTRAVENOUS PRN
Status: DISCONTINUED | OUTPATIENT
Start: 2019-03-04 | End: 2019-03-05 | Stop reason: HOSPADM

## 2019-03-04 RX ORDER — ALPRAZOLAM 0.5 MG/1
0.5 TABLET ORAL PRN
Status: DISCONTINUED | OUTPATIENT
Start: 2019-03-04 | End: 2019-03-05 | Stop reason: HOSPADM

## 2019-03-04 RX ORDER — SODIUM CHLORIDE 0.9 % (FLUSH) 0.9 %
10 SYRINGE (ML) INJECTION PRN
Status: DISCONTINUED | OUTPATIENT
Start: 2019-03-04 | End: 2019-03-05 | Stop reason: HOSPADM

## 2019-03-04 RX ORDER — TRAMADOL HYDROCHLORIDE 50 MG/1
50 TABLET ORAL EVERY 6 HOURS PRN
Status: DISCONTINUED | OUTPATIENT
Start: 2019-03-04 | End: 2019-03-05 | Stop reason: HOSPADM

## 2019-03-04 RX ORDER — SODIUM CHLORIDE 0.9 % (FLUSH) 0.9 %
10 SYRINGE (ML) INJECTION EVERY 12 HOURS SCHEDULED
Status: DISCONTINUED | OUTPATIENT
Start: 2019-03-04 | End: 2019-03-05 | Stop reason: HOSPADM

## 2019-03-04 RX ORDER — DEXTROSE MONOHYDRATE 25 G/50ML
12.5 INJECTION, SOLUTION INTRAVENOUS PRN
Status: DISCONTINUED | OUTPATIENT
Start: 2019-03-04 | End: 2019-03-05 | Stop reason: HOSPADM

## 2019-03-04 RX ORDER — M-VIT,TX,IRON,MINS/CALC/FOLIC 27MG-0.4MG
1 TABLET ORAL DAILY
Status: DISCONTINUED | OUTPATIENT
Start: 2019-03-04 | End: 2019-03-05 | Stop reason: HOSPADM

## 2019-03-04 RX ORDER — ONDANSETRON 2 MG/ML
4 INJECTION INTRAMUSCULAR; INTRAVENOUS EVERY 6 HOURS PRN
Status: DISCONTINUED | OUTPATIENT
Start: 2019-03-04 | End: 2019-03-05 | Stop reason: HOSPADM

## 2019-03-04 RX ORDER — SODIUM CHLORIDE, SODIUM LACTATE, POTASSIUM CHLORIDE, CALCIUM CHLORIDE 600; 310; 30; 20 MG/100ML; MG/100ML; MG/100ML; MG/100ML
1000 INJECTION, SOLUTION INTRAVENOUS ONCE
Status: COMPLETED | OUTPATIENT
Start: 2019-03-04 | End: 2019-03-04

## 2019-03-04 RX ADMIN — SODIUM CHLORIDE, POTASSIUM CHLORIDE, SODIUM LACTATE AND CALCIUM CHLORIDE 1000 ML: 600; 310; 30; 20 INJECTION, SOLUTION INTRAVENOUS at 04:36

## 2019-03-04 RX ADMIN — SODIUM CHLORIDE: 9 INJECTION, SOLUTION INTRAVENOUS at 17:30

## 2019-03-04 RX ADMIN — REGADENOSON 0.4 MG: 0.08 INJECTION, SOLUTION INTRAVENOUS at 13:11

## 2019-03-04 RX ADMIN — TRAMADOL HYDROCHLORIDE 50 MG: 50 TABLET, FILM COATED ORAL at 14:17

## 2019-03-04 RX ADMIN — Medication 10 MILLICURIE: at 11:00

## 2019-03-04 RX ADMIN — ATORVASTATIN CALCIUM 40 MG: 40 TABLET, FILM COATED ORAL at 21:52

## 2019-03-04 RX ADMIN — SODIUM CHLORIDE, SODIUM LACTATE, POTASSIUM CHLORIDE, CALCIUM CHLORIDE 1000 ML: 600; 310; 30; 20 INJECTION, SOLUTION INTRAVENOUS at 04:36

## 2019-03-04 RX ADMIN — Medication 30 MILLICURIE: at 13:05

## 2019-03-04 RX ADMIN — ALPRAZOLAM 0.5 MG: 0.5 TABLET ORAL at 21:52

## 2019-03-04 RX ADMIN — INSULIN LISPRO 2 UNITS: 100 INJECTION, SOLUTION INTRAVENOUS; SUBCUTANEOUS at 17:29

## 2019-03-04 RX ADMIN — INSULIN LISPRO 1 UNITS: 100 INJECTION, SOLUTION INTRAVENOUS; SUBCUTANEOUS at 21:53

## 2019-03-04 RX ADMIN — HYDROCORTISONE SODIUM SUCCINATE 100 MG: 100 INJECTION, POWDER, FOR SOLUTION INTRAMUSCULAR; INTRAVENOUS at 04:00

## 2019-03-04 RX ADMIN — SODIUM CHLORIDE: 9 INJECTION, SOLUTION INTRAVENOUS at 09:03

## 2019-03-04 RX ADMIN — CEFTRIAXONE SODIUM 1 G: 1 INJECTION, POWDER, FOR SOLUTION INTRAMUSCULAR; INTRAVENOUS at 02:31

## 2019-03-04 RX ADMIN — SODIUM CHLORIDE, POTASSIUM CHLORIDE, SODIUM LACTATE AND CALCIUM CHLORIDE 1000 ML: 600; 310; 30; 20 INJECTION, SOLUTION INTRAVENOUS at 02:32

## 2019-03-04 RX ADMIN — Medication 2 PUFF: at 19:52

## 2019-03-04 RX ADMIN — PANTOPRAZOLE SODIUM 40 MG: 40 TABLET, DELAYED RELEASE ORAL at 08:42

## 2019-03-04 RX ADMIN — SODIUM CHLORIDE, PRESERVATIVE FREE 10 ML: 5 INJECTION INTRAVENOUS at 21:55

## 2019-03-04 RX ADMIN — ENOXAPARIN SODIUM 40 MG: 40 INJECTION SUBCUTANEOUS at 08:42

## 2019-03-04 RX ADMIN — METFORMIN HYDROCHLORIDE 500 MG: 500 TABLET, EXTENDED RELEASE ORAL at 17:30

## 2019-03-04 RX ADMIN — SODIUM CHLORIDE, PRESERVATIVE FREE 10 ML: 5 INJECTION INTRAVENOUS at 08:30

## 2019-03-04 ASSESSMENT — PAIN DESCRIPTION - ONSET
ONSET: ON-GOING

## 2019-03-04 ASSESSMENT — PAIN DESCRIPTION - DESCRIPTORS
DESCRIPTORS: ACHING

## 2019-03-04 ASSESSMENT — PAIN DESCRIPTION - FREQUENCY
FREQUENCY: CONTINUOUS

## 2019-03-04 ASSESSMENT — PAIN SCALES - GENERAL
PAINLEVEL_OUTOF10: 8
PAINLEVEL_OUTOF10: 7

## 2019-03-04 ASSESSMENT — PAIN DESCRIPTION - PROGRESSION
CLINICAL_PROGRESSION: NOT CHANGED

## 2019-03-04 ASSESSMENT — PAIN DESCRIPTION - PAIN TYPE
TYPE: CHRONIC PAIN

## 2019-03-04 ASSESSMENT — PAIN DESCRIPTION - LOCATION
LOCATION: SHOULDER

## 2019-03-04 ASSESSMENT — PAIN DESCRIPTION - ORIENTATION
ORIENTATION: RIGHT
ORIENTATION: RIGHT;LEFT
ORIENTATION: RIGHT

## 2019-03-05 VITALS
SYSTOLIC BLOOD PRESSURE: 111 MMHG | DIASTOLIC BLOOD PRESSURE: 75 MMHG | WEIGHT: 164.9 LBS | HEIGHT: 66 IN | BODY MASS INDEX: 26.5 KG/M2 | TEMPERATURE: 97.9 F | RESPIRATION RATE: 14 BRPM | OXYGEN SATURATION: 97 % | HEART RATE: 82 BPM

## 2019-03-05 LAB
ANION GAP SERPL CALCULATED.3IONS-SCNC: 9 MMOL/L (ref 4–16)
BUN BLDV-MCNC: 6 MG/DL (ref 6–23)
CALCIUM SERPL-MCNC: 7.9 MG/DL (ref 8.3–10.6)
CHLORIDE BLD-SCNC: 108 MMOL/L (ref 99–110)
CO2: 23 MMOL/L (ref 21–32)
CREAT SERPL-MCNC: 0.7 MG/DL (ref 0.6–1.1)
GFR AFRICAN AMERICAN: >60 ML/MIN/1.73M2
GFR NON-AFRICAN AMERICAN: >60 ML/MIN/1.73M2
GLUCOSE BLD-MCNC: 106 MG/DL (ref 70–99)
GLUCOSE BLD-MCNC: 111 MG/DL (ref 70–99)
GLUCOSE BLD-MCNC: 118 MG/DL (ref 70–99)
GLUCOSE BLD-MCNC: 141 MG/DL (ref 70–99)
POTASSIUM SERPL-SCNC: 4 MMOL/L (ref 3.5–5.1)
SODIUM BLD-SCNC: 140 MMOL/L (ref 135–145)

## 2019-03-05 PROCEDURE — 36415 COLL VENOUS BLD VENIPUNCTURE: CPT

## 2019-03-05 PROCEDURE — 93226 XTRNL ECG REC<48 HR SCAN A/R: CPT

## 2019-03-05 PROCEDURE — 80048 BASIC METABOLIC PNL TOTAL CA: CPT

## 2019-03-05 PROCEDURE — APPSS30 APP SPLIT SHARED TIME 16-30 MINUTES: Performed by: NURSE PRACTITIONER

## 2019-03-05 PROCEDURE — 6370000000 HC RX 637 (ALT 250 FOR IP): Performed by: HOSPITALIST

## 2019-03-05 PROCEDURE — 96372 THER/PROPH/DIAG INJ SC/IM: CPT

## 2019-03-05 PROCEDURE — 82962 GLUCOSE BLOOD TEST: CPT

## 2019-03-05 PROCEDURE — 94761 N-INVAS EAR/PLS OXIMETRY MLT: CPT

## 2019-03-05 PROCEDURE — 6360000002 HC RX W HCPCS: Performed by: HOSPITALIST

## 2019-03-05 PROCEDURE — 99225 PR SBSQ OBSERVATION CARE/DAY 25 MINUTES: CPT | Performed by: INTERNAL MEDICINE

## 2019-03-05 PROCEDURE — G0378 HOSPITAL OBSERVATION PER HR: HCPCS

## 2019-03-05 PROCEDURE — 96366 THER/PROPH/DIAG IV INF ADDON: CPT

## 2019-03-05 PROCEDURE — 6370000000 HC RX 637 (ALT 250 FOR IP): Performed by: FAMILY MEDICINE

## 2019-03-05 PROCEDURE — 2580000003 HC RX 258: Performed by: HOSPITALIST

## 2019-03-05 RX ORDER — LANOLIN ALCOHOL/MO/W.PET/CERES
6 CREAM (GRAM) TOPICAL NIGHTLY PRN
Status: DISCONTINUED | OUTPATIENT
Start: 2019-03-05 | End: 2019-03-05 | Stop reason: HOSPADM

## 2019-03-05 RX ORDER — IBUPROFEN 800 MG/1
800 TABLET ORAL EVERY 8 HOURS PRN
Qty: 30 TABLET | Refills: 0 | Status: SHIPPED | OUTPATIENT
Start: 2019-03-05 | End: 2019-07-26

## 2019-03-05 RX ORDER — LANOLIN ALCOHOL/MO/W.PET/CERES
6 CREAM (GRAM) TOPICAL NIGHTLY PRN
Status: DISCONTINUED | OUTPATIENT
Start: 2019-03-05 | End: 2019-03-05

## 2019-03-05 RX ADMIN — MELATONIN TAB 3 MG 6 MG: 3 TAB at 00:40

## 2019-03-05 RX ADMIN — INSULIN LISPRO 1 UNITS: 100 INJECTION, SOLUTION INTRAVENOUS; SUBCUTANEOUS at 12:48

## 2019-03-05 RX ADMIN — MULTIPLE VITAMINS W/ MINERALS TAB 1 TABLET: TAB at 09:52

## 2019-03-05 RX ADMIN — PANTOPRAZOLE SODIUM 40 MG: 40 TABLET, DELAYED RELEASE ORAL at 06:25

## 2019-03-05 RX ADMIN — SODIUM CHLORIDE: 9 INJECTION, SOLUTION INTRAVENOUS at 09:51

## 2019-03-05 RX ADMIN — ENOXAPARIN SODIUM 40 MG: 40 INJECTION SUBCUTANEOUS at 09:52

## 2019-03-05 RX ADMIN — Medication 2 PUFF: at 09:11

## 2019-03-05 RX ADMIN — METFORMIN HYDROCHLORIDE 500 MG: 500 TABLET, EXTENDED RELEASE ORAL at 09:54

## 2019-03-05 RX ADMIN — TRAMADOL HYDROCHLORIDE 50 MG: 50 TABLET, FILM COATED ORAL at 09:52

## 2019-03-05 ASSESSMENT — PAIN DESCRIPTION - PAIN TYPE: TYPE: ACUTE PAIN

## 2019-03-05 ASSESSMENT — PAIN SCALES - GENERAL
PAINLEVEL_OUTOF10: 7
PAINLEVEL_OUTOF10: 0
PAINLEVEL_OUTOF10: 0
PAINLEVEL_OUTOF10: 8

## 2019-03-05 ASSESSMENT — PAIN - FUNCTIONAL ASSESSMENT: PAIN_FUNCTIONAL_ASSESSMENT: ACTIVITIES ARE NOT PREVENTED

## 2019-03-05 ASSESSMENT — PAIN DESCRIPTION - ORIENTATION: ORIENTATION: MID

## 2019-03-05 ASSESSMENT — PAIN DESCRIPTION - LOCATION: LOCATION: CHEST

## 2019-03-05 ASSESSMENT — PAIN DESCRIPTION - FREQUENCY: FREQUENCY: INTERMITTENT

## 2019-03-06 ENCOUNTER — TELEPHONE (OUTPATIENT)
Dept: FAMILY MEDICINE CLINIC | Age: 54
End: 2019-03-06

## 2019-03-06 LAB
ACQUISITION DURATION: NORMAL S
AVERAGE HEART RATE: 83 BPM
EKG DIAGNOSIS: NORMAL
HOOKUP DATE: NORMAL
HOOKUP TIME: NORMAL
MAX HEART RATE TIME/DATE: NORMAL
MAX HEART RATE: 109 BPM
MIN HEART RATE TIME/DATE: NORMAL
MIN HEART RATE: 55 BPM
NUMBER OF QRS COMPLEXES: NORMAL
NUMBER OF SUPRAVENTRICULAR BEATS IN RUNS: 0
NUMBER OF SUPRAVENTRICULAR COUPLETS: 0
NUMBER OF SUPRAVENTRICULAR ECTOPICS: 3
NUMBER OF SUPRAVENTRICULAR ISOLATED BEATS: 3
NUMBER OF SUPRAVENTRICULAR RUNS: 0
NUMBER OF VENTRICULAR BEATS IN RUNS: 0
NUMBER OF VENTRICULAR BIGEMINAL CYCLES: 0
NUMBER OF VENTRICULAR COUPLETS: 1
NUMBER OF VENTRICULAR ECTOPICS: 2
NUMBER OF VENTRICULAR ISOLATED BEATS: 0
NUMBER OF VENTRICULAR RUNS: 0

## 2019-03-08 ENCOUNTER — OFFICE VISIT (OUTPATIENT)
Dept: FAMILY MEDICINE CLINIC | Age: 54
End: 2019-03-08
Payer: COMMERCIAL

## 2019-03-08 VITALS
BODY MASS INDEX: 27.86 KG/M2 | SYSTOLIC BLOOD PRESSURE: 166 MMHG | WEIGHT: 167.2 LBS | OXYGEN SATURATION: 98 % | HEIGHT: 65 IN | HEART RATE: 71 BPM | DIASTOLIC BLOOD PRESSURE: 82 MMHG | TEMPERATURE: 98.3 F

## 2019-03-08 DIAGNOSIS — I95.89 OTHER SPECIFIED HYPOTENSION: ICD-10-CM

## 2019-03-08 DIAGNOSIS — I44.1 MOBITZ TYPE 1 SECOND DEGREE AV BLOCK: ICD-10-CM

## 2019-03-08 DIAGNOSIS — R94.31 ABNORMAL HOLTER MONITOR FINDING: ICD-10-CM

## 2019-03-08 DIAGNOSIS — R55 SYNCOPE, UNSPECIFIED SYNCOPE TYPE: Primary | ICD-10-CM

## 2019-03-08 PROCEDURE — 99214 OFFICE O/P EST MOD 30 MIN: CPT | Performed by: NURSE PRACTITIONER

## 2019-03-08 ASSESSMENT — ENCOUNTER SYMPTOMS
COUGH: 0
SORE THROAT: 0
TROUBLE SWALLOWING: 0
SINUS PAIN: 0
DIARRHEA: 0
CONSTIPATION: 0
SINUS PRESSURE: 0
NAUSEA: 0
SHORTNESS OF BREATH: 0

## 2019-03-09 ASSESSMENT — ENCOUNTER SYMPTOMS: ABDOMINAL PAIN: 0

## 2019-03-11 ENCOUNTER — OFFICE VISIT (OUTPATIENT)
Dept: CARDIOLOGY CLINIC | Age: 54
End: 2019-03-11
Payer: COMMERCIAL

## 2019-03-11 ENCOUNTER — NURSE ONLY (OUTPATIENT)
Dept: CARDIOLOGY CLINIC | Age: 54
End: 2019-03-11
Payer: COMMERCIAL

## 2019-03-11 ENCOUNTER — TELEPHONE (OUTPATIENT)
Dept: FAMILY MEDICINE CLINIC | Age: 54
End: 2019-03-11

## 2019-03-11 VITALS
HEIGHT: 67 IN | DIASTOLIC BLOOD PRESSURE: 80 MMHG | HEART RATE: 70 BPM | SYSTOLIC BLOOD PRESSURE: 118 MMHG | BODY MASS INDEX: 25.71 KG/M2 | WEIGHT: 163.8 LBS

## 2019-03-11 DIAGNOSIS — E11.42 TYPE 2 DIABETES MELLITUS WITH DIABETIC POLYNEUROPATHY, WITHOUT LONG-TERM CURRENT USE OF INSULIN (HCC): Chronic | ICD-10-CM

## 2019-03-11 DIAGNOSIS — I48.91 ATRIAL FIBRILLATION, UNSPECIFIED TYPE (HCC): Primary | ICD-10-CM

## 2019-03-11 DIAGNOSIS — E78.5 DYSLIPIDEMIA: ICD-10-CM

## 2019-03-11 DIAGNOSIS — J44.9 COPD, SEVERE (HCC): ICD-10-CM

## 2019-03-11 DIAGNOSIS — R55 SYNCOPE AND COLLAPSE: Primary | ICD-10-CM

## 2019-03-11 DIAGNOSIS — R55 SYNCOPE AND COLLAPSE: ICD-10-CM

## 2019-03-11 PROCEDURE — G8484 FLU IMMUNIZE NO ADMIN: HCPCS | Performed by: NURSE PRACTITIONER

## 2019-03-11 PROCEDURE — 3014F SCREEN MAMMO DOC REV: CPT | Performed by: NURSE PRACTITIONER

## 2019-03-11 PROCEDURE — G8926 SPIRO NO PERF OR DOC: HCPCS | Performed by: NURSE PRACTITIONER

## 2019-03-11 PROCEDURE — 3044F HG A1C LEVEL LT 7.0%: CPT | Performed by: NURSE PRACTITIONER

## 2019-03-11 PROCEDURE — 1036F TOBACCO NON-USER: CPT | Performed by: NURSE PRACTITIONER

## 2019-03-11 PROCEDURE — G8427 DOCREV CUR MEDS BY ELIG CLIN: HCPCS | Performed by: NURSE PRACTITIONER

## 2019-03-11 PROCEDURE — 0298T PR EXT ECG > 48HR TO 21 DAY REVIEW AND INTERPRETATN: CPT | Performed by: INTERNAL MEDICINE

## 2019-03-11 PROCEDURE — 2022F DILAT RTA XM EVC RTNOPTHY: CPT | Performed by: NURSE PRACTITIONER

## 2019-03-11 PROCEDURE — G8417 CALC BMI ABV UP PARAM F/U: HCPCS | Performed by: NURSE PRACTITIONER

## 2019-03-11 PROCEDURE — 99214 OFFICE O/P EST MOD 30 MIN: CPT | Performed by: NURSE PRACTITIONER

## 2019-03-11 PROCEDURE — 3023F SPIROM DOC REV: CPT | Performed by: NURSE PRACTITIONER

## 2019-03-11 PROCEDURE — 3017F COLORECTAL CA SCREEN DOC REV: CPT | Performed by: NURSE PRACTITIONER

## 2019-03-11 PROCEDURE — 0296T PR EXT ECG > 48HR TO 21 DAY RCRD W/CONECT INTL RCRD: CPT | Performed by: INTERNAL MEDICINE

## 2019-03-11 RX ORDER — ATORVASTATIN CALCIUM 40 MG/1
40 TABLET, FILM COATED ORAL DAILY
Qty: 90 TABLET | Refills: 1 | Status: SHIPPED | OUTPATIENT
Start: 2019-03-11 | End: 2019-05-10

## 2019-03-19 LAB
EKG ATRIAL RATE: 75 BPM
EKG DIAGNOSIS: NORMAL
EKG P AXIS: -5 DEGREES
EKG Q-T INTERVAL: 472 MS
EKG QRS DURATION: 68 MS
EKG QTC CALCULATION (BAZETT): 467 MS
EKG R AXIS: -27 DEGREES
EKG T AXIS: -36 DEGREES
EKG VENTRICULAR RATE: 59 BPM

## 2019-04-03 ENCOUNTER — OFFICE VISIT (OUTPATIENT)
Dept: FAMILY MEDICINE CLINIC | Age: 54
End: 2019-04-03
Payer: COMMERCIAL

## 2019-04-03 VITALS
BODY MASS INDEX: 25.58 KG/M2 | DIASTOLIC BLOOD PRESSURE: 84 MMHG | SYSTOLIC BLOOD PRESSURE: 138 MMHG | HEIGHT: 67 IN | OXYGEN SATURATION: 95 % | HEART RATE: 86 BPM | RESPIRATION RATE: 16 BRPM | WEIGHT: 163 LBS

## 2019-04-03 DIAGNOSIS — B37.2 CANDIDIASIS OF SKIN: ICD-10-CM

## 2019-04-03 DIAGNOSIS — E11.42 TYPE 2 DIABETES MELLITUS WITH DIABETIC POLYNEUROPATHY, WITHOUT LONG-TERM CURRENT USE OF INSULIN (HCC): Primary | Chronic | ICD-10-CM

## 2019-04-03 DIAGNOSIS — G47.00 INSOMNIA, UNSPECIFIED TYPE: ICD-10-CM

## 2019-04-03 DIAGNOSIS — K21.9 GASTROESOPHAGEAL REFLUX DISEASE, ESOPHAGITIS PRESENCE NOT SPECIFIED: ICD-10-CM

## 2019-04-03 PROCEDURE — 99214 OFFICE O/P EST MOD 30 MIN: CPT | Performed by: NURSE PRACTITIONER

## 2019-04-03 PROCEDURE — 3014F SCREEN MAMMO DOC REV: CPT | Performed by: NURSE PRACTITIONER

## 2019-04-03 PROCEDURE — 3044F HG A1C LEVEL LT 7.0%: CPT | Performed by: NURSE PRACTITIONER

## 2019-04-03 PROCEDURE — G8417 CALC BMI ABV UP PARAM F/U: HCPCS | Performed by: NURSE PRACTITIONER

## 2019-04-03 PROCEDURE — 3017F COLORECTAL CA SCREEN DOC REV: CPT | Performed by: NURSE PRACTITIONER

## 2019-04-03 PROCEDURE — G8427 DOCREV CUR MEDS BY ELIG CLIN: HCPCS | Performed by: NURSE PRACTITIONER

## 2019-04-03 PROCEDURE — 2022F DILAT RTA XM EVC RTNOPTHY: CPT | Performed by: NURSE PRACTITIONER

## 2019-04-03 PROCEDURE — 1036F TOBACCO NON-USER: CPT | Performed by: NURSE PRACTITIONER

## 2019-04-03 RX ORDER — TRAZODONE HYDROCHLORIDE 50 MG/1
50 TABLET ORAL NIGHTLY
Qty: 30 TABLET | Refills: 2 | Status: SHIPPED | OUTPATIENT
Start: 2019-04-03 | End: 2020-10-29

## 2019-04-03 RX ORDER — METFORMIN HYDROCHLORIDE 500 MG/1
500 TABLET, EXTENDED RELEASE ORAL 2 TIMES DAILY
Qty: 60 TABLET | Refills: 2 | Status: SHIPPED | OUTPATIENT
Start: 2019-04-03 | End: 2019-11-05 | Stop reason: SDUPTHER

## 2019-04-03 RX ORDER — HYDROXYZINE PAMOATE 50 MG/1
50 CAPSULE ORAL 3 TIMES DAILY PRN
Qty: 60 CAPSULE | Refills: 1 | Status: SHIPPED | OUTPATIENT
Start: 2019-04-03 | End: 2019-07-26

## 2019-04-03 RX ORDER — OMEPRAZOLE 40 MG/1
40 CAPSULE, DELAYED RELEASE ORAL DAILY
Qty: 30 CAPSULE | Refills: 2 | Status: SHIPPED | OUTPATIENT
Start: 2019-04-03 | End: 2020-02-18 | Stop reason: SDUPTHER

## 2019-04-03 ASSESSMENT — PATIENT HEALTH QUESTIONNAIRE - PHQ9
2. FEELING DOWN, DEPRESSED OR HOPELESS: 0
SUM OF ALL RESPONSES TO PHQ QUESTIONS 1-9: 2
SUM OF ALL RESPONSES TO PHQ9 QUESTIONS 1 & 2: 2
SUM OF ALL RESPONSES TO PHQ QUESTIONS 1-9: 2
1. LITTLE INTEREST OR PLEASURE IN DOING THINGS: 2

## 2019-04-03 ASSESSMENT — ENCOUNTER SYMPTOMS
NAUSEA: 0
BACK PAIN: 0
SHORTNESS OF BREATH: 0
VOMITING: 0
ABDOMINAL DISTENTION: 0

## 2019-04-03 NOTE — PROGRESS NOTES
SUBJECTIVE:  Dimas Flow   1965   female   No Known Allergies    Chief Complaint   Patient presents with    Diabetes    Insomnia    Hypertension      HPI   Akanksha Dallas is here today for a recheck of her chronic medical problems. She reports good appetite. She denies chest pain or shortness or breath. She has not had her eyes checked yet this year. She denies any current concerns.     Past Medical History:   Diagnosis Date    Anxiety     Arthritis     \"Back, Hands, Shoulders\"    Bronchitis Last Episode In Early 2000's    Chronic back pain     COPD (chronic obstructive pulmonary disease) ([facility])     Sees Dr. Chelo Hernandez Depression     Diabetes mellitus ([facility] [address] Dx 2008    Emphysema     Fibromyalgia     H/O total vaginal hysterectomy     Hyperlipidemia     Hypertension     Migraines Last Migraine 10-18    MRSA (methicillin resistant Staphylococcus aureus) Dx In 2012 Or 2013    Right Arm    Pap smear for cervical cancer screening 02/11/2008     Neg   Postive-Trichomonas    Pap smear for cervical cancer screening 03/25/2010    Neg    Pap smear for cervical cancer screening 07/18/2011    Neg    Pneumonia Dx 2-18    Psoriasis     Shortness of breath on exertion     Teeth missing     Upper And Lower    Wears glasses      Social History     Socioeconomic History    Marital status: Single     Spouse name: Not on file    Number of children: Not on file    Years of education: Not on file    Highest education level: Not on file   Occupational History    Occupation: unemployed   Social Needs    Financial resource strain: Not on file    Food insecurity:     Worry: Not on file     Inability: Not on file   Adrenaline Mobility needs:     Medical: Not on file     Non-medical: Not on file   Tobacco Use    Smoking status: Former Smoker     Packs/day: 0.25     Years: 30.00     Pack years: 7.50     Types: Cigarettes, E-Cigarettes     Start date: 1986     Last attempt to quit: 2016     Years since quitting: 3.2    Smokeless tobacco: Never Used   Substance and Sexual Activity    Alcohol use: Yes     Comment: \"Occ.  Maybe Twice A Month\"    Drug use: No    Sexual activity: Yes     Partners: Male   Lifestyle    Physical activity:     Days per week: Not on file     Minutes per session: Not on file    Stress: Not on file   Relationships    Social connections:     Talks on phone: Not on file     Gets together: Not on file     Attends Anabaptist service: Not on file     Active member of club or organization: Not on file     Attends meetings of clubs or organizations: Not on file     Relationship status: Not on file    Intimate partner violence:     Fear of current or ex partner: Not on file     Emotionally abused: Not on file     Physically abused: Not on file     Forced sexual activity: Not on file   Other Topics Concern    Not on file   Social History Narrative    Not on file     Family History   Problem Relation Age of Onset    Heart Attack Father     Heart Disease Father     Early Death Father 46        Heart Attack    Alcohol Abuse Father     Substance Abuse Father         Alcoholic    Arthritis Mother     Heart Disease Mother     Diabetes Mother     Cancer Mother         \"Kidney Cancer\"    High Blood Pressure Mother     High Blood Pressure Sister     Diabetes Brother     Early Death Daughter 21        \"Killed In A Car Accident\"     Past Surgical History:   Procedure Laterality Date    ABDOMEN SURGERY N/A 1/14/2019    SECONDARY CLOSURE OF DEHISCED ABDOMINAL WOUND performed by Danyell Esparza MD at [address] 1/24/2019    [address] performed by Danyell Esparza MD at [place] [address] ABDOMINOPLASTY  11/29/2018    APPENDECTOMY  2000    CARPAL TUNNEL RELEASE Right 2017    Dr. Carmela Puga (Right)   [address]    Tubal Ligation Also Done In 1997    COLONOSCOPY  03/2017    Polyp Removed    DENTAL SURGERY      Teeth Extracted In Past    ELBOW SURGERY Bilateral Last Done In     Right Done Twice, Left Done Once left cubital tunnel release ; right 2014    ENDOSCOPY, COLON, DIAGNOSTIC  2017    HERNIA REPAIR  10/26/2017    hiatal hernia with gastrectomy    HYSTERECTOMY VAGINAL      LAPAROSCOPY      ME EXCISE EXCESS SKIN TISSUE,ABDOMEN N/A 2018    ABDOMINOPLASTY performed by Uli Calixto MD at [address] [place] [address] Right     Dr. Chelsey Galdamez Right 2017    Dr Rosina Ross  10/26/2017    Robotic Laparoscopic, Pre Op Weight 237  Or 238 lbs.  TUBAL LIGATION      Done With         Review of Systems   Constitutional: Negative for fatigue and unexpected weight change. HENT: Negative for congestion. Respiratory: Negative for shortness of breath. Cardiovascular: Negative for chest pain, palpitations and leg swelling. Gastrointestinal: Negative for abdominal distention, nausea and vomiting. Musculoskeletal: Negative for back pain and gait problem. Neurological: Negative for dizziness, weakness and headaches. Psychiatric/Behavioral: Positive for sleep disturbance. Negative for agitation. The patient is not nervous/anxious. OBJECTIVE:  /84   Pulse 86   Resp 16   Ht 5' 7\" (1.702 m)   Wt 163 lb (73.9 kg)   SpO2 95%   BMI 25.53 kg/m²   BP Readings from Last 3 Encounters:   19 138/84   19 118/80   19 (!) 166/82     Wt Readings from Last 3 Encounters:   19 163 lb (73.9 kg)   19 163 lb 12.8 oz (74.3 kg)   19 164 lb (74.4 kg)     Body mass index is 25.53 kg/m². Physical Exam   Constitutional: She is oriented to person, place, and time. She appears well-developed and well-nourished. No distress. HENT:   Head: Normocephalic and atraumatic.    Right Ear: External ear normal.   Left Ear: External ear normal.   Nose: Nose normal.   Mouth/Throat: Oropharynx is clear and moist.   Eyes: Pupils are equal, round, and reactive to light. Conjunctivae and EOM are normal.   Neck: Normal range of motion. Neck supple. Cardiovascular: Normal rate, regular rhythm, normal heart sounds and intact distal pulses. Pulmonary/Chest: Effort normal and breath sounds normal.   Abdominal: Soft. Bowel sounds are normal.   Musculoskeletal: Normal range of motion. Neurological: She is alert and oriented to person, place, and time. She has normal reflexes. Skin: Skin is warm and dry. Psychiatric: She has a normal mood and affect. Her behavior is normal. Judgment and thought content normal.   Nursing note and vitals reviewed. ASSESSMENT/PLAN:    1. Candidiasis of skin  - hydrOXYzine (VISTARIL) 50 MG capsule; Take 1 capsule by mouth 3 times daily as needed for Itching  Dispense: 60 capsule; Refill: 1    2. Type 2 diabetes mellitus with diabetic polyneuropathy, without long-term current use of insulin (HCC)  - metFORMIN (GLUCOPHAGE-XR) 500 MG extended release tablet; Take 1 tablet by mouth 2 times daily \"I Take 2 In The Morning, I Take One At Night\" (Patient taking differently: Take 500 mg by mouth 2 times daily )  Dispense: 60 tablet; Refill: 2  -  DIABETES FOOT EXAM    3. Insomnia, unspecified type  Sleep hygiene  Refill:  - traZODone (DESYREL) 50 MG tablet; Take 1 tablet by mouth nightly  Dispense: 30 tablet; Refill: 2    4. Gastroesophageal reflux disease, esophagitis presence not specified  Diet and lifestyle modifications  Refill:  - omeprazole (PRILOSEC) 40 MG delayed release capsule; Take 1 capsule by mouth daily  Dispense: 30 capsule; Refill: 2    Patient is unable to void today. She will return for her lab work and JEREMY.     Orders Placed This Encounter   Procedures     DIABETES FOOT EXAM     Current Outpatient Medications   Medication Sig Dispense Refill    hydrOXYzine (VISTARIL) 50 MG capsule Take 1 capsule by mouth 3 times daily as needed for Itching 60 capsule 1    metFORMIN (GLUCOPHAGE-XR) 500 MG extended release tablet tablet Take 1 tablet by mouth 2 times daily  11    budesonide-formoterol (SYMBICORT) 160-4.5 MCG/ACT AERO Inhale 2 puffs into the lungs 2 times daily 1 Inhaler 5    ranitidine (ZANTAC) 150 MG tablet Take 150 mg by mouth as needed        No current facility-administered medications for this visit. Return in about 1 month (around 5/1/2019) for bring FIT test, check cholesterol, JEREMY. Qi Rowan DNP, FNP-C    Return for new or worsening symptoms or any concerns as needed.

## 2019-04-03 NOTE — PATIENT INSTRUCTIONS
We are committed to providing you the best care possible. If you receive a survey after visiting one of our offices, please take time to share your experience concerning your physician office visit. These surveys are confidential and no health information about you is shared. We are eager to improve for you and we are counting on your feedback to help make that happen. Please return your FIT test    Please schedule for your eye exam and ask them to send us a result      Patient Education        Diabetes Foot Health: Care Instructions  Your Care Instructions    When you have diabetes, your feet need extra care and attention. Diabetes can damage the nerve endings and blood vessels in your feet, making you less likely to notice when your feet are injured. Diabetes also limits your body's ability to fight infection and get blood to areas that need it. If you get a minor foot injury, it could become an ulcer or a serious infection. With good foot care, you can prevent most of these problems. Caring for your feet can be quick and easy. Most of the care can be done when you are bathing or getting ready for bed. Follow-up care is a key part of your treatment and safety. Be sure to make and go to all appointments, and call your doctor if you are having problems. It's also a good idea to know your test results and keep a list of the medicines you take. How can you care for yourself at home? · Keep your blood sugar close to normal by watching what and how much you eat, monitoring blood sugar, taking medicines if prescribed, and getting regular exercise. · Do not smoke. Smoking affects blood flow and can make foot problems worse. If you need help quitting, talk to your doctor about stop-smoking programs and medicines. These can increase your chances of quitting for good. · Eat a diet that is low in fats. High fat intake can cause fat to build up in your blood vessels and decrease blood flow.   · Inspect your feet daily for blisters, cuts, cracks, or sores. If you cannot see well, use a mirror or have someone help you. · Take care of your feet:  ? Wash your feet every day. Use warm (not hot) water. Check the water temperature with your wrists or other part of your body, not your feet. ? Dry your feet well. Pat them dry. Do not rub the skin on your feet too hard. Dry well between your toes. If the skin on your feet stays moist, bacteria or a fungus can grow, which can lead to infection. ? Keep your skin soft. Use moisturizing skin cream to keep the skin on your feet soft and prevent calluses and cracks. But do not put the cream between your toes, and stop using any cream that causes a rash. ? Clean underneath your toenails carefully. Do not use a sharp object to clean underneath your toenails. Use the blunt end of a nail file or other rounded tool. ? Trim and file your toenails straight across to prevent ingrown toenails. Use a nail clipper, not scissors. Use an emery board to smooth the edges. · Change socks daily. Socks without seams are best, because seams often rub the feet. You can find socks for people with diabetes from specialty catalogs. · Look inside your shoes every day for things like gravel or torn linings, which could cause blisters or sores. · Buy shoes that fit well:  ? Look for shoes that have plenty of space around the toes. This helps prevent bunions and blisters. ? Try on shoes while wearing the kind of socks you will usually wear with the shoes. ? Avoid plastic shoes. They may rub your feet and cause blisters. Good shoes should be made of materials that are flexible and breathable, such as leather or cloth. ? Break in new shoes slowly by wearing them for no more than an hour a day for several days. Take extra time to check your feet for red areas, blisters, or other problems after you wear new shoes. · Do not go barefoot. Do not wear sandals, and do not wear shoes with very thin soles. Thin soles are easy to puncture. They also do not protect your feet from hot pavement or cold weather. · Have your doctor check your feet during each visit. If you have a foot problem, see your doctor. Do not try to treat an early foot problem at home. Home remedies or treatments that you can buy without a prescription (such as corn removers) can be harmful. · Always get early treatment for foot problems. A minor irritation can lead to a major problem if not properly cared for early. When should you call for help? Call your doctor now or seek immediate medical care if:    · You have a foot sore, an ulcer or break in the skin that is not healing after 4 days, bleeding corns or calluses, or an ingrown toenail.     · You have blue or black areas, which can mean bruising or blood flow problems.     · You have peeling skin or tiny blisters between your toes or cracking or oozing of the skin.     · You have a fever for more than 24 hours and a foot sore.     · You have new numbness or tingling in your feet that does not go away after you move your feet or change positions.     · You have unexplained or unusual swelling of the foot or ankle.    Watch closely for changes in your health, and be sure to contact your doctor if:    · You cannot do proper foot care. Where can you learn more? Go to https://[employer]. org and sign in to your ROSTR account. Enter R939 in the F2G box to learn more about \"Diabetes Foot Health: Care Instructions. \"     If you do not have an account, please click on the \"Sign Up Now\" link. Current as of: July 25, 2018  Content Version: 11.9  © 9435-5780 [employer], [employer]. Care instructions adapted under license by [employer] [facility] ([facility]). If you have questions about a medical condition or this instruction, always ask your healthcare professional. [address] any warranty or liability for your use of this information.

## 2019-04-10 ENCOUNTER — TELEPHONE (OUTPATIENT)
Dept: CARDIOLOGY CLINIC | Age: 54
End: 2019-04-10

## 2019-04-10 NOTE — TELEPHONE ENCOUNTER
14 day zio monitor results:    abnormal 14 day monitor  Patient had a min HR of 29 bpm RR was 2.0 sec , max HR of 159 bpm,  and avg HR of 91bpm. Predominant underlying rhythm was Sinus  Rhythm. First Degree AV  Block was present. Second Degree AV Block-Mobitz I (Wenckebach) was  present on seversal occasions. Isolated SVEs were rare (<1.0%), and  no SVE Couplets or SVE  Triplets were present. Electronically signed by Dr. Deshawn Singh 04/04/19 05:11 PM (CT)    Advised pt of results and she verbalized understanding.

## 2019-04-12 DIAGNOSIS — J06.9 UPPER RESPIRATORY TRACT INFECTION, UNSPECIFIED TYPE: ICD-10-CM

## 2019-04-12 RX ORDER — GUAIFENESIN 600 MG/1
600 TABLET, EXTENDED RELEASE ORAL 2 TIMES DAILY
Qty: 60 TABLET | Refills: 2 | Status: SHIPPED | OUTPATIENT
Start: 2019-04-12 | End: 2019-09-13

## 2019-04-16 ENCOUNTER — TELEPHONE (OUTPATIENT)
Dept: FAMILY MEDICINE CLINIC | Age: 54
End: 2019-04-16

## 2019-04-16 NOTE — TELEPHONE ENCOUNTER
Pt called and is asking for antibiotic for sinus problems, pt stated she is coughing up green phlegm please advise

## 2019-05-03 ENCOUNTER — OFFICE VISIT (OUTPATIENT)
Dept: BARIATRICS/WEIGHT MGMT | Age: 54
End: 2019-05-03
Payer: COMMERCIAL

## 2019-05-03 ENCOUNTER — OFFICE VISIT (OUTPATIENT)
Dept: FAMILY MEDICINE CLINIC | Age: 54
End: 2019-05-03
Payer: COMMERCIAL

## 2019-05-03 VITALS
SYSTOLIC BLOOD PRESSURE: 120 MMHG | HEIGHT: 66 IN | OXYGEN SATURATION: 98 % | HEART RATE: 65 BPM | BODY MASS INDEX: 26.55 KG/M2 | RESPIRATION RATE: 16 BRPM | DIASTOLIC BLOOD PRESSURE: 78 MMHG | WEIGHT: 165.2 LBS

## 2019-05-03 VITALS
RESPIRATION RATE: 14 BRPM | WEIGHT: 166.4 LBS | SYSTOLIC BLOOD PRESSURE: 134 MMHG | HEIGHT: 66 IN | HEART RATE: 77 BPM | BODY MASS INDEX: 26.74 KG/M2 | TEMPERATURE: 97.8 F | OXYGEN SATURATION: 95 % | DIASTOLIC BLOOD PRESSURE: 80 MMHG

## 2019-05-03 DIAGNOSIS — Z98.890 S/P ABDOMINOPLASTY: Primary | ICD-10-CM

## 2019-05-03 DIAGNOSIS — M79.7 FIBROMYALGIA: ICD-10-CM

## 2019-05-03 DIAGNOSIS — E78.5 HYPERLIPIDEMIA, UNSPECIFIED HYPERLIPIDEMIA TYPE: ICD-10-CM

## 2019-05-03 DIAGNOSIS — E11.9 TYPE 2 DIABETES MELLITUS WITHOUT COMPLICATION, WITHOUT LONG-TERM CURRENT USE OF INSULIN (HCC): Primary | ICD-10-CM

## 2019-05-03 DIAGNOSIS — Z12.11 SCREENING FOR COLON CANCER: ICD-10-CM

## 2019-05-03 DIAGNOSIS — Z78.0 POSTMENOPAUSE: ICD-10-CM

## 2019-05-03 DIAGNOSIS — J06.9 UPPER RESPIRATORY TRACT INFECTION, UNSPECIFIED TYPE: ICD-10-CM

## 2019-05-03 LAB
CHOLESTEROL, TOTAL: 247 MG/DL (ref 0–199)
CONTROL: NORMAL
CREATININE URINE POCT: NORMAL
HDLC SERPL-MCNC: 73 MG/DL (ref 40–60)
HEMOCCULT STL QL: NORMAL
LDL CHOLESTEROL CALCULATED: 140 MG/DL
MICROALBUMIN/CREAT 24H UR: NORMAL MG/G{CREAT}
MICROALBUMIN/CREAT UR-RTO: NORMAL
TRIGL SERPL-MCNC: 170 MG/DL (ref 0–150)
VLDLC SERPL CALC-MCNC: 34 MG/DL

## 2019-05-03 PROCEDURE — 82274 ASSAY TEST FOR BLOOD FECAL: CPT | Performed by: NURSE PRACTITIONER

## 2019-05-03 PROCEDURE — 99214 OFFICE O/P EST MOD 30 MIN: CPT | Performed by: NURSE PRACTITIONER

## 2019-05-03 PROCEDURE — 3014F SCREEN MAMMO DOC REV: CPT | Performed by: NURSE PRACTITIONER

## 2019-05-03 PROCEDURE — 36415 COLL VENOUS BLD VENIPUNCTURE: CPT | Performed by: NURSE PRACTITIONER

## 2019-05-03 PROCEDURE — 3044F HG A1C LEVEL LT 7.0%: CPT | Performed by: NURSE PRACTITIONER

## 2019-05-03 PROCEDURE — G8417 CALC BMI ABV UP PARAM F/U: HCPCS | Performed by: NURSE PRACTITIONER

## 2019-05-03 PROCEDURE — G8417 CALC BMI ABV UP PARAM F/U: HCPCS | Performed by: SURGERY

## 2019-05-03 PROCEDURE — G8427 DOCREV CUR MEDS BY ELIG CLIN: HCPCS | Performed by: NURSE PRACTITIONER

## 2019-05-03 PROCEDURE — 3017F COLORECTAL CA SCREEN DOC REV: CPT | Performed by: NURSE PRACTITIONER

## 2019-05-03 PROCEDURE — 99213 OFFICE O/P EST LOW 20 MIN: CPT | Performed by: SURGERY

## 2019-05-03 PROCEDURE — 82044 UR ALBUMIN SEMIQUANTITATIVE: CPT | Performed by: NURSE PRACTITIONER

## 2019-05-03 PROCEDURE — 3014F SCREEN MAMMO DOC REV: CPT | Performed by: SURGERY

## 2019-05-03 PROCEDURE — 2022F DILAT RTA XM EVC RTNOPTHY: CPT | Performed by: NURSE PRACTITIONER

## 2019-05-03 PROCEDURE — 1036F TOBACCO NON-USER: CPT | Performed by: NURSE PRACTITIONER

## 2019-05-03 PROCEDURE — G8427 DOCREV CUR MEDS BY ELIG CLIN: HCPCS | Performed by: SURGERY

## 2019-05-03 PROCEDURE — 3017F COLORECTAL CA SCREEN DOC REV: CPT | Performed by: SURGERY

## 2019-05-03 PROCEDURE — 1036F TOBACCO NON-USER: CPT | Performed by: SURGERY

## 2019-05-03 RX ORDER — FEEDER CONTAINER WITH PUMP SET
1 EACH MISCELLANEOUS
Qty: 32 CAN | Refills: 3 | Status: SHIPPED | OUTPATIENT
Start: 2019-05-03 | End: 2019-07-26

## 2019-05-03 RX ORDER — PROMETHAZINE HYDROCHLORIDE AND CODEINE PHOSPHATE 6.25; 1 MG/5ML; MG/5ML
5 SYRUP ORAL NIGHTLY PRN
Qty: 100 ML | Refills: 0 | Status: SHIPPED | OUTPATIENT
Start: 2019-05-03 | End: 2019-05-13

## 2019-05-03 RX ORDER — AZITHROMYCIN 250 MG/1
250 TABLET, FILM COATED ORAL SEE ADMIN INSTRUCTIONS
Qty: 6 TABLET | Refills: 0 | Status: SHIPPED | OUTPATIENT
Start: 2019-05-03 | End: 2019-05-08

## 2019-05-03 ASSESSMENT — PATIENT HEALTH QUESTIONNAIRE - PHQ9
SUM OF ALL RESPONSES TO PHQ QUESTIONS 1-9: 0
2. FEELING DOWN, DEPRESSED OR HOPELESS: 0
SUM OF ALL RESPONSES TO PHQ QUESTIONS 1-9: 0
1. LITTLE INTEREST OR PLEASURE IN DOING THINGS: 0
SUM OF ALL RESPONSES TO PHQ9 QUESTIONS 1 & 2: 0

## 2019-05-03 ASSESSMENT — ENCOUNTER SYMPTOMS
DIARRHEA: 0
BACK PAIN: 0
SHORTNESS OF BREATH: 0
PHOTOPHOBIA: 0
COLOR CHANGE: 0
COUGH: 1
WHEEZING: 0
ABDOMINAL DISTENTION: 0
VOMITING: 0
COUGH: 0
BLOOD IN STOOL: 0
ABDOMINAL PAIN: 1
TROUBLE SWALLOWING: 0
ANAL BLEEDING: 0
VOMITING: 0
VOICE CHANGE: 0
NAUSEA: 0
NAUSEA: 0
SHORTNESS OF BREATH: 0
SORE THROAT: 0
CONSTIPATION: 0
DIARRHEA: 0
CONSTIPATION: 0

## 2019-05-03 NOTE — PATIENT INSTRUCTIONS
I am committed to providing you the best care possible. If you receive a survey after visiting our office, please take time to share your experience concerning your office visit. These surveys are confidential and no health information about you is shared. I am eager to improve for you and I am counting on your feedback to help make that happen. Patient Education        Upper Respiratory Infection (Cold): Care Instructions  Your Care Instructions    An upper respiratory infection, or URI, is an infection of the nose, sinuses, or throat. URIs are spread by coughs, sneezes, and direct contact. The common cold is the most frequent kind of URI. The flu and sinus infections are other kinds of URIs. Almost all URIs are caused by viruses. Antibiotics won't cure them. But you can treat most infections with home care. This may include drinking lots of fluids and taking over-the-counter pain medicine. You will probably feel better in 4 to 10 days. The doctor has checked you carefully, but problems can develop later. If you notice any problems or new symptoms, get medical treatment right away. Follow-up care is a key part of your treatment and safety. Be sure to make and go to all appointments, and call your doctor if you are having problems. It's also a good idea to know your test results and keep a list of the medicines you take. How can you care for yourself at home? · To prevent dehydration, drink plenty of fluids, enough so that your urine is light yellow or clear like water. Choose water and other caffeine-free clear liquids until you feel better. If you have kidney, heart, or liver disease and have to limit fluids, talk with your doctor before you increase the amount of fluids you drink. · Take an over-the-counter pain medicine, such as acetaminophen (Tylenol), ibuprofen (Advil, Motrin), or naproxen (Aleve). Read and follow all instructions on the label.   · Before you use cough and cold medicines, check the label. These medicines may not be safe for young children or for people with certain health problems. · Be careful when taking over-the-counter cold or flu medicines and Tylenol at the same time. Many of these medicines have acetaminophen, which is Tylenol. Read the labels to make sure that you are not taking more than the recommended dose. Too much acetaminophen (Tylenol) can be harmful. · Get plenty of rest.  · Do not smoke or allow others to smoke around you. If you need help quitting, talk to your doctor about stop-smoking programs and medicines. These can increase your chances of quitting for good. When should you call for help? Call 911 anytime you think you may need emergency care. For example, call if:    · You have severe trouble breathing.    Call your doctor now or seek immediate medical care if:    · You seem to be getting much sicker.     · You have new or worse trouble breathing.     · You have a new or higher fever.     · You have a new rash.    Watch closely for changes in your health, and be sure to contact your doctor if:    · You have a new symptom, such as a sore throat, an earache, or sinus pain.     · You cough more deeply or more often, especially if you notice more mucus or a change in the color of your mucus.     · You do not get better as expected. Where can you learn more? Go to https://Club Wp[employer]eb.Aivo. org and sign in to your FreshT account. Enter F473 in the [facility] box to learn more about \"Upper Respiratory Infection (Cold): Care Instructions. \"     If you do not have an account, please click on the \"Sign Up Now\" link. Current as of: September 5, 2018  Content Version: 11.9  © 8043-1691 [employer], [employer]. Care instructions adapted under license by [facility] ([facility]).  If you have questions about a medical condition or this instruction, always ask your healthcare professional. Darinel Mclean any warranty or liability for your use of this information.

## 2019-05-03 NOTE — PROGRESS NOTES
SUBJECTIVE:  Nakita Sandoval   1965   female   No Known Allergies    Chief Complaint   Patient presents with    Diabetes    Cough    Congestion        HPI   Productive cough with green and yellow sputum x 3 weeks. Afebrile  Taking mucinex without any relief  No known ill contacts  Continues to follow with Dr Genet Howard and was seen by him this morning for abdominal pannus s/p gastric sleeve and abdominoplasty. He is encouraging her to lose about 10 more pounds and then will consider surgical intervention. She denies abdominal pain or GI issues, but is unhappy with the appearance.      Past Medical History:   Diagnosis Date    Anxiety     Arthritis     \"Back, Hands, Shoulders\"    Bronchitis Last Episode In Early 2000's    Chronic back pain     COPD (chronic obstructive pulmonary disease) ([facility])     Sees Dr. Nieves Mariee Depression     [facility]    Emphysema     Fibromyalgia     H/O total vaginal hysterectomy     Hyperlipidemia     Hypertension     Migraines Last Migraine 10-18    MRSA (methicillin resistant Staphylococcus aureus) Dx In 2012 Or 2013    Right Arm    Pap smear for cervical cancer screening 02/11/2008     Neg   Postive-Trichomonas    Pap smear for cervical cancer screening 03/25/2010    Neg    Pap smear for cervical cancer screening 07/18/2011    Neg    Pneumonia Dx 2-18    Psoriasis     Shortness of breath on exertion     Teeth missing     Upper And Lower    Wears glasses      Social History     Socioeconomic History    Marital status: Single     Spouse name: Not on file    Number of children: Not on file    Years of education: Not on file    Highest education level: Not on file   Occupational History    Occupation: unemployed   Social Needs    Financial resource strain: Not on file    Food insecurity:     Worry: Not on file     Inability: Not on file    Transportation needs:     Medical: Not on file     Non-medical: Not on file   Tobacco Use    Smoking status: Former Smoker     Packs/day: 0.25     Years: 30.00     Pack years: 7.50     Types: Cigarettes, E-Cigarettes     Start date: 1986     Last attempt to quit: 2016     Years since quitting: 3.3    Smokeless tobacco: Never Used   Substance and Sexual Activity    Alcohol use: Yes     Comment: \"Occ.  Maybe Twice A Month\"    Drug use: No    Sexual activity: Yes     Partners: Male   Lifestyle    Physical activity:     Days per week: Not on file     Minutes per session: Not on file    Stress: Not on file   Relationships    Social connections:     Talks on phone: Not on file     Gets together: Not on file     Attends Hoahaoism service: Not on file     Active member of club or organization: Not on file     Attends meetings of clubs or organizations: Not on file     Relationship status: Not on file    Intimate partner violence:     Fear of current or ex partner: Not on file     Emotionally abused: Not on file     Physically abused: Not on file     Forced sexual activity: Not on file   Other Topics Concern    Not on file   Social History Narrative    Not on file     Family History   Problem Relation Age of Onset    Heart Attack Father     Heart Disease Father     Early Death Father 46        Heart Attack    Alcohol Abuse Father     Substance Abuse Father         Alcoholic    Arthritis Mother     Heart Disease Mother     Diabetes Mother     Cancer Mother         \"Kidney Cancer\"    High Blood Pressure Mother     High Blood Pressure Sister     Diabetes Brother     Early Death Daughter 21        \"Killed In A Car Accident\"     Past Surgical History:   Procedure Laterality Date    ABDOMEN SURGERY N/A 1/14/2019    SECONDARY CLOSURE OF DEHISCED ABDOMINAL WOUND performed by Torres Mcghee MD at [address] 1/24/2019    [address] performed by Torres Mcghee MD at [place] [address] ABDOMINOPLASTY  11/29/2018   [address] Right 2017    Dr. Maame Lux (Right)   [address]    Tubal Ligation Also Done In     COLONOSCOPY  2017    Polyp Removed    DENTAL SURGERY      Teeth Extracted In Past    ELBOW SURGERY Bilateral Last Done In     Right Done Twice, Left Done Once left cubital tunnel release ; right 2014    ENDOSCOPY, COLON, DIAGNOSTIC  2017    HERNIA REPAIR  10/26/2017    hiatal hernia with gastrectomy    HYSTERECTOMY VAGINAL      LAPAROSCOPY      IL EXCISE EXCESS SKIN TISSUE,ABDOMEN N/A 2018    ABDOMINOPLASTY performed by Clark Mcfarlane MD at [address] Right     Dr. Krissy Molina Right 2017    Dr Zepeda Aw  10/26/2017    Robotic Laparoscopic, Pre Op Weight 237  Or 238 lbs.  TUBAL LIGATION      Done With         Review of Systems   Constitutional: Negative for fatigue and unexpected weight change. HENT: Positive for congestion. Respiratory: Positive for cough. Negative for shortness of breath. Cardiovascular: Negative for chest pain, palpitations and leg swelling. Gastrointestinal: Negative for abdominal distention, constipation, diarrhea, nausea and vomiting. Musculoskeletal: Positive for myalgias. Negative for back pain and gait problem. Neurological: Negative for dizziness, weakness and headaches. Psychiatric/Behavioral: Negative for agitation and sleep disturbance. The patient is not nervous/anxious.         OBJECTIVE:  /80 (Site: Left Upper Arm, Position: Sitting, Cuff Size: Medium Adult)   Pulse 77   Temp 97.8 °F (36.6 °C) (Oral)   Resp 14   Ht 5' 6\" (1.676 m)   Wt 166 lb 6.4 oz (75.5 kg)   SpO2 95%   BMI 26.86 kg/m²   BP Readings from Last 3 Encounters:   19 134/80   19 120/78   19 138/84     Wt Readings from Last 3 Encounters:   19 166 lb 6.4 oz (75.5 kg)   19 165 lb 3.2 oz (74.9 kg)   19 163 lb (73.9 kg)     Body mass index is 26.86 kg/m². Physical Exam   Constitutional: She is oriented to person, place, and time. She appears well-developed and well-nourished. No distress. HENT:   Head: Normocephalic and atraumatic. Right Ear: External ear normal.   Left Ear: External ear normal.   Nose: Nose normal.   Mouth/Throat: Oropharynx is clear and moist.   Eyes: Pupils are equal, round, and reactive to light. Conjunctivae and EOM are normal.   Neck: Normal range of motion. Neck supple. Cardiovascular: Normal rate, regular rhythm, normal heart sounds and intact distal pulses. Pulmonary/Chest: Effort normal and breath sounds normal.   Abdominal: Soft. Bowel sounds are normal.   Pendulous, irregular abdomen   Musculoskeletal: Normal range of motion. Lymphadenopathy:     She has cervical adenopathy. Neurological: She is alert and oriented to person, place, and time. She has normal reflexes. Skin: Skin is warm and dry. Psychiatric: She has a normal mood and affect. Her behavior is normal. Judgment and thought content normal.   Nursing note and vitals reviewed. ASSESSMENT/PLAN:    1. Screening for colon cancer  - POCT Fecal Immunochemical Test (FIT)    2. Type 2 diabetes mellitus without complication, without long-term current use of insulin (HCC)  Continue current medication as directed  Healthy food choices, portion control  Routine exercise as tolerated  - POCT microalbumin    3. Upper respiratory tract infection, unspecified type  Verbal and written instructions given for URI  - promethazine-codeine (PHENERGAN WITH CODEINE) 6.25-10 MG/5ML syrup; Take 5 mLs by mouth nightly as needed for Cough for up to 10 days. Dispense: 100 mL; Refill: 0  - azithromycin (ZITHROMAX) 250 MG tablet; Take 1 tablet by mouth See Admin Instructions for 5 days 500mg on day 1 followed by 250mg on days 2 - 5  Dispense: 6 tablet; Refill: 0    4. Postmenopause  - Multiple Vitamin (MVI, CELEBRATE, CHEWABLE TABLET);  Take 1 tablet by mouth daily Dispense: 30 tablet; Refill: 5    5. Fibromyalgia  Keep active with activity as tolerated  Gentle stretches    6. Hyperlipidemia, unspecified hyperlipidemia type  Continue atorvastatin as directed\  Diet and lifestyle modifications  - LIPID PANEL      Orders Placed This Encounter   Procedures    LIPID PANEL     Order Specific Question:   Is Patient Fasting?/# of Hours     Answer:   12    POCT microalbumin     Current Outpatient Medications   Medication Sig Dispense Refill    Nutritional Supplements (ENSURE HIGH PROTEIN) LIQD Take 1 Can by mouth Daily with lunch 32 Can 3    promethazine-codeine (PHENERGAN WITH CODEINE) 6.25-10 MG/5ML syrup Take 5 mLs by mouth nightly as needed for Cough for up to 10 days. 100 mL 0    azithromycin (ZITHROMAX) 250 MG tablet Take 1 tablet by mouth See Admin Instructions for 5 days 500mg on day 1 followed by 250mg on days 2 - 5 6 tablet 0    Multiple Vitamin (MVI, CELEBRATE, CHEWABLE TABLET) Take 1 tablet by mouth daily 30 tablet 5    guaiFENesin (MUCINEX) 600 MG extended release tablet Take 1 tablet by mouth 2 times daily 60 tablet 2    hydrOXYzine (VISTARIL) 50 MG capsule Take 1 capsule by mouth 3 times daily as needed for Itching 60 capsule 1    metFORMIN (GLUCOPHAGE-XR) 500 MG extended release tablet Take 1 tablet by mouth 2 times daily \"I Take 2 In The Morning, I Take One At Night\" (Patient taking differently: Take 500 mg by mouth 2 times daily ) 60 tablet 2    traZODone (DESYREL) 50 MG tablet Take 1 tablet by mouth nightly 30 tablet 2    omeprazole (PRILOSEC) 40 MG delayed release capsule Take 1 capsule by mouth daily 30 capsule 2    blood glucose test strips (FREESTYLE LITE) strip 1 each by In Vitro route daily As needed.  100 strip 11    tiotropium (SPIRIVA RESPIMAT) 1.25 MCG/ACT AERS inhaler Inhale 2 puffs into the lungs daily 1 Inhaler 5    tiotropium (SPIRIVA HANDIHALER) 18 MCG inhalation capsule Inhale 1 capsule into the lungs daily 30 capsule 3    atorvastatin (LIPITOR) 40 MG tablet Take 1 tablet by mouth daily 90 tablet 1    ibuprofen (ADVIL;MOTRIN) 800 MG tablet Take 1 tablet by mouth every 8 hours as needed for Pain (pain) 30 tablet 0    atorvastatin (LIPITOR) 40 MG tablet Take 1 tablet by mouth daily 30 tablet 5    UNABLE TO FIND 1 Units by Does not apply route daily Cool mist humidifier 1 Units 0    VALUE PLUS LANCETS THIN 26G MISC 1 EACH BY DOES NOT APPLY ROUTE DAILY 100 each 5    albuterol sulfate HFA (PROAIR HFA) 108 (90 Base) MCG/ACT inhaler Inhale 2 puffs into the lungs every 6 hours as needed for Wheezing 1 Inhaler 5    fluticasone-vilanterol (BREO ELLIPTA) 100-25 MCG/INH AEPB inhaler Inhale 1 puff into the lungs daily 1 each 5    ALPRAZolam (XANAX) 0.5 MG tablet Take 0.5 mg by mouth as needed for Sleep. Hakeem Garcia Venlafaxine HCl (EFFEXOR PO) Take by mouth 2 times daily \"I Take 150 Mg Every Morning, Take 37.5 Mg At Noon\"      ipratropium-albuterol (DUONEB) 0.5-2.5 (3) MG/3ML SOLN nebulizer solution Inhale 3 mLs into the lungs every 4 hours 360 mL 5    tiZANidine (ZANAFLEX) 4 MG tablet Take 1 tablet by mouth 2 times daily  11    budesonide-formoterol (SYMBICORT) 160-4.5 MCG/ACT AERO Inhale 2 puffs into the lungs 2 times daily 1 Inhaler 5    ranitidine (ZANTAC) 150 MG tablet Take 150 mg by mouth as needed        No current facility-administered medications for this visit. Return in about 3 months (around 8/3/2019). Dotty Tanner DNP, FNP-C    Return for new or worsening symptoms or any concerns as needed.

## 2019-05-03 NOTE — PROGRESS NOTES
Patient is here for their 12, follow up 19 months post op     Date of Surgery  10[birth year]17    Type of Surgery: Gastric sleeve, Hiatal hernia repair    BMI:  Obesity Classification: Overweight  Today's weight is 165.2 lbs, last visits weight was 159.8 lb  Wt Readings from Last 3 Encounters:   05/03/19 165 lb 3.2 oz (74.9 kg)   04/03/19 163 lb (73.9 kg)   03/11/19 163 lb 12.8 oz (74.3 kg)     Total gain/loss is +5.4 lbs    Weight History: Wt Readings from Last 3 Encounters:   05/03/19 165 lb 3.2 oz (74.9 kg)   04/03/19 163 lb (73.9 kg)   03/11/19 163 lb 12.8 oz (74.3 kg)       Total weight loss/gain 65.7  Lbs over 16 month. How pleased are you with your current weight loss: yes  If you are disappointed, what do you think is preventing you from increased weight loss? Is patient experiencing any physical problems post surgery: Yes  Is patient experiencing any dietary problems post surgery: No:   Food Intolerances: Denies any food intolerances since last seen. How hungry are you? Sometimes, more lately with stress  How full do you feel after eating? yes  How fast do you eat? slow  Food log brought to appointment today: No    Breakfast: yes, cereal  Mid-AM Snack: no  Lunch: no  Mid-PM Snack: no  Dinner: yes, steak, boiled potatoes w/butter and cheese  Evening Snack: yes, leftovers    Liquids Consumed: 60 per day.   Times of day liquids consumed: all day  Patient taking protein supplement: No.  Brand of Supplement: n/a  Patient taking multivitamins: Yes  Does patient exercise: No  What prevents you from exercising: pain, shoulder

## 2019-05-03 NOTE — PROGRESS NOTES
GENERAL SURGERY OFFICE NOTE    SUBJECTIVE:    Patient presenting today referred from YESSICA Llamas CNP and No ref. provider found, for   Chief Complaint   Patient presents with    3 Month Follow-Up     Abdominoplasty 11/29/18        HPI: Nisreen Bojorquez is a 48 y.o. female presenting with pain in the right suprapubic and is acute, worsening and dull compared to patient's normal condition. It is moderate in intensity for a duration of 1 month and is intermittent with modifying factors increased by or worse with pressure. Wounds very nicely healing, no erythema, no induration, no purulent discharge. No constipation, BMs back to normal.    Thoroughly reviewed the patient's medical history, family history, social history and review of systems with the patient today in the office. Please see medical record for pertinent positives.       Past Medical History:   Diagnosis Date    Anxiety     Arthritis     \"Back, Hands, Shoulders\"    Bronchitis Last Episode In Early 2000's    Chronic back pain     COPD [facility]chronic obstructive pulmonary disease) ([facility])     Sees Dr. Taco Khan Depression     [facility] Dx 2008    Emphysema     Fibromyalgia     H/O total vaginal hysterectomy     Hyperlipidemia     Hypertension     Migraines Last Migraine 10-18    MRSA (methicillin resistant Staphylococcus aureus) Dx In 2012 Or 2013    Right Arm    Pap smear for cervical cancer screening 02/11/2008     Neg   Postive-Trichomonas    Pap smear for cervical cancer screening 03/25/2010    Neg    Pap smear for cervical cancer screening 07/18/2011    Neg    Pneumonia Dx 2-18    Psoriasis     Shortness of breath on exertion     Teeth missing     Upper And Lower    Wears glasses       Past Surgical History:   Procedure Laterality Date    ABDOMEN SURGERY N/A 1/14/2019    SECONDARY CLOSURE OF DEHISCED ABDOMINAL WOUND performed by Danylel Esparza MD at [place] [address] N/A 1/24/2019    IRRIGATION wheezing. Cardiovascular: Negative for chest pain, palpitations and leg swelling. Gastrointestinal: Positive for abdominal pain. Negative for anal bleeding, blood in stool, constipation, diarrhea, nausea and vomiting. Endocrine: Negative for cold intolerance, heat intolerance, polydipsia and polyuria. Genitourinary: Negative for dysuria, frequency and hematuria. Musculoskeletal: Negative for joint swelling, myalgias and neck stiffness. Skin: Negative for color change and rash. Neurological: Negative for seizures, speech difficulty, light-headedness and numbness. Hematological: Negative for adenopathy. Does not bruise/bleed easily. OBJECTIVE:    /78 (Site: Left Upper Arm, Position: Sitting, Cuff Size: Large Adult)   Pulse 65   Resp 16   Ht 5' 6\" (1.676 m)   Wt 165 lb 3.2 oz (74.9 kg)   SpO2 98%   BMI 26.66 kg/m²    Body mass index is 26.66 kg/m². Physical Exam   Constitutional: She is oriented to person, place, and time. She appears well-developed and well-nourished. No distress. HENT:   Head: Normocephalic and atraumatic. Eyes: Pupils are equal, round, and reactive to light. Conjunctivae and EOM are normal. No scleral icterus. Neck: Normal range of motion. No JVD present. No tracheal deviation present. No thyromegaly present. Cardiovascular: Normal rate, regular rhythm, normal heart sounds and intact distal pulses. Exam reveals no gallop and no friction rub. No murmur heard. Pulmonary/Chest: Effort normal. No stridor. No respiratory distress. She has no wheezes. She has no rales. She exhibits no tenderness. Abdominal: Soft. Bowel sounds are normal. She exhibits mass (Right supra pubic). She exhibits no distension. There is no tenderness. There is no rebound and no guarding. Musculoskeletal: Normal range of motion. She exhibits no edema or tenderness. Lymphadenopathy:     She has no cervical adenopathy.    Neurological: She is alert and oriented to person, place, and time. No cranial nerve deficit. Coordination normal.   Skin: Skin is warm and dry. No rash noted. She is not diaphoretic. No erythema. No pallor. Psychiatric: Her behavior is normal. Judgment and thought content normal.   Vitals reviewed. ASSESSMENT & PLAN:    1. S/P abdominoplasty         Needs to loose 10 more Lbs, and will check Prtn level, and then resect the lipoma IF looses adequate weight and is protein replenished. Patient counseled on the risks, benefits, and alternatives of treatment plan at length while in the office today. Patient states an understanding and willingness to proceed with the plan. Orders Placed This Encounter   Medications    Nutritional Supplements (ENSURE HIGH PROTEIN) LIQD     Sig: Take 1 Can by mouth Daily with lunch     Dispense:  32 Can     Refill:  3     Orders Placed This Encounter   Procedures    Hepatic Function Panel        Follow Up:  Return in about 6 weeks (around 6/14/2019) for For imaging and tests results review, Follow up Symptoms. Mina Rogers MD, FACS, FICS.    5/3/19       Patient was seen with total face to face time of 25 minutes. More than 50% of this visit was counseling and education as above in my assessment and plan.

## 2019-05-10 ENCOUNTER — TELEPHONE (OUTPATIENT)
Dept: FAMILY MEDICINE CLINIC | Age: 54
End: 2019-05-10

## 2019-05-10 DIAGNOSIS — E78.2 MIXED HYPERLIPIDEMIA: Primary | ICD-10-CM

## 2019-05-10 RX ORDER — ATORVASTATIN CALCIUM 80 MG/1
80 TABLET, FILM COATED ORAL DAILY
Qty: 90 TABLET | Refills: 1 | Status: SHIPPED | OUTPATIENT
Start: 2019-05-10 | End: 2020-02-19

## 2019-05-10 NOTE — TELEPHONE ENCOUNTER
It may be viral.  If she has a fever or is feeling short of breath, please advise her to go to [facility].   Thank you

## 2019-05-10 NOTE — TELEPHONE ENCOUNTER
Patient states antibiotic and cough syrup is not helping she still feels congested and would like to know if another antibiotic could be called in. Please advise.

## 2019-06-19 ENCOUNTER — HOSPITAL ENCOUNTER (OUTPATIENT)
Age: 54
Discharge: HOME | End: 2019-06-19
Payer: COMMERCIAL

## 2019-06-19 DIAGNOSIS — Z98.890 S/P ABDOMINOPLASTY: ICD-10-CM

## 2019-06-19 LAB
ALBUMIN SERPL-MCNC: 4.3 GM/DL (ref 3.4–5)
ALP BLD-CCNC: 96 IU/L (ref 40–129)
ALT SERPL-CCNC: 13 U/L (ref 10–40)
AST SERPL-CCNC: 17 IU/L (ref 15–37)
BILIRUB SERPL-MCNC: 0.9 MG/DL (ref 0–1)
BILIRUBIN DIRECT: 0.2 MG/DL (ref 0–0.3)
BILIRUBIN, INDIRECT: 0.7 MG/DL (ref 0–0.7)
TOTAL PROTEIN: 6.9 GM/DL (ref 6.4–8.2)

## 2019-06-19 PROCEDURE — 36415 COLL VENOUS BLD VENIPUNCTURE: CPT

## 2019-06-19 PROCEDURE — 80076 HEPATIC FUNCTION PANEL: CPT

## 2019-06-21 ENCOUNTER — OFFICE VISIT (OUTPATIENT)
Dept: BARIATRICS/WEIGHT MGMT | Age: 54
End: 2019-06-21
Payer: COMMERCIAL

## 2019-06-21 VITALS
DIASTOLIC BLOOD PRESSURE: 76 MMHG | HEIGHT: 66 IN | WEIGHT: 164.5 LBS | BODY MASS INDEX: 26.44 KG/M2 | HEART RATE: 76 BPM | SYSTOLIC BLOOD PRESSURE: 126 MMHG

## 2019-06-21 DIAGNOSIS — L98.492 SKIN ULCER WITH FAT LAYER EXPOSED (HCC): Primary | ICD-10-CM

## 2019-06-21 PROCEDURE — 99214 OFFICE O/P EST MOD 30 MIN: CPT | Performed by: SURGERY

## 2019-06-21 PROCEDURE — G8427 DOCREV CUR MEDS BY ELIG CLIN: HCPCS | Performed by: SURGERY

## 2019-06-21 PROCEDURE — 3017F COLORECTAL CA SCREEN DOC REV: CPT | Performed by: SURGERY

## 2019-06-21 PROCEDURE — G8417 CALC BMI ABV UP PARAM F/U: HCPCS | Performed by: SURGERY

## 2019-06-21 PROCEDURE — 3014F SCREEN MAMMO DOC REV: CPT | Performed by: SURGERY

## 2019-06-21 PROCEDURE — 1036F TOBACCO NON-USER: CPT | Performed by: SURGERY

## 2019-06-21 ASSESSMENT — ENCOUNTER SYMPTOMS
TROUBLE SWALLOWING: 0
PHOTOPHOBIA: 0
VOMITING: 0
SORE THROAT: 0
COUGH: 0
ABDOMINAL PAIN: 1
ANAL BLEEDING: 0
COLOR CHANGE: 0
DIARRHEA: 0
VOICE CHANGE: 0
BLOOD IN STOOL: 0
SHORTNESS OF BREATH: 0
WHEEZING: 0
NAUSEA: 0
CONSTIPATION: 0

## 2019-06-21 NOTE — PROGRESS NOTES
BARIATRIC SURGERY POST OPERATIVE NOTE    SUBJECTIVE:    Patient presenting today referred from YESSICA Hart CNP, for   Chief Complaint   Patient presents with    Follow-up     6 WK F/U Issues W/Stomach/LT     /76 (Site: Right Upper Arm, Position: Sitting, Cuff Size: Medium Adult)   Pulse 76   Ht 5' 6\" (1.676 m)   Wt 164 lb 8 oz (74.6 kg)   BMI 26.55 kg/m²      HPI: Nakita Sandoval is a 48 y.o. female presenting   pain in the right suprapubic and is acute, worsening and dull compared to patient's normal condition. It is moderate in intensity for a duration of 1 month and is intermittent with modifying factors increased by or worse with pressure. in follow up  S/P 11/26/18. Procedure:   1. Bilateral abdominoplasty. 2.  Rectus muscle approximation bilaterally. 3.  Complex closure of the abdominal wound and new umbilical implantation. 4.  Excision of 20 x 15 x 5 cm sheath bilaterally. 5.  Bilateral local anesthetic injection to the rectus muscle.           Pathology:   Final Pathologic Diagnosis:  Fragments of skin and subcutaneous tissue; abdominoplasty:  -     Fragments of skin and subcutaneous soft tissue showing  patchy mild chronic inflammation      with keratosis and reactive changes. .    Addressed the status of the following co-morbidities:   1. Abdominal pain  worsening. 2. Abdominal wounds worsening. 3. Skin crease ulcers  worsening.       Current Outpatient Medications:     atorvastatin (LIPITOR) 80 MG tablet, Take 1 tablet by mouth daily, Disp: 90 tablet, Rfl: 1    Nutritional Supplements (ENSURE HIGH PROTEIN) LIQD, Take 1 Can by mouth Daily with lunch, Disp: 32 Can, Rfl: 3    Multiple Vitamin (MVI, CELEBRATE, CHEWABLE TABLET), Take 1 tablet by mouth daily, Disp: 30 tablet, Rfl: 5    guaiFENesin (MUCINEX) 600 MG extended release tablet, Take 1 tablet by mouth 2 times daily, Disp: 60 tablet, Rfl: 2    hydrOXYzine (VISTARIL) 50 MG capsule, Take 1 capsule by mouth 3 360 mL, Rfl: 5    tiZANidine (ZANAFLEX) 4 MG tablet, Take 1 tablet by mouth 2 times daily, Disp: , Rfl: 11    budesonide-formoterol (SYMBICORT) 160-4.5 MCG/ACT AERO, Inhale 2 puffs into the lungs 2 times daily, Disp: 1 Inhaler, Rfl: 5    ranitidine (ZANTAC) 150 MG tablet, Take 150 mg by mouth as needed , Disp: , Rfl:   Past Medical History:   Diagnosis Date    Anxiety     Arthritis     \"Back, Hands, Shoulders\"    Bronchitis Last Episode In Early 2000's    Chronic back pain     COPD (chronic obstructive [facility] disease) ([facility]     Sees Dr. Tricia Minor Depression     Diabetes mellitus ([facility] Dx 2008    Emphysema     Fibromyalgia     H/O total vaginal hysterectomy     Hyperlipidemia     Hypertension     Migraines Last Migraine 10-18    MRSA (methicillin resistant Staphylococcus aureus) Dx In 2012 Or 2013    Right Arm    Pap smear for cervical cancer screening 02/11/2008     Neg   Postive-Trichomonas    Pap smear for cervical cancer screening 03/25/2010    Neg    Pap smear for cervical cancer screening 07/18/2011    Neg    Pneumonia Dx 2-18    Psoriasis     Shortness of breath on exertion     Teeth missing     Upper And Lower    Wears glasses       Past Surgical History:   Procedure Laterality Date    ABDOMEN SURGERY N/A 1/14/2019    SECONDARY CLOSURE OF DEHISCED ABDOMINAL WOUND performed by Tiffany Kidd MD at [facility] N/A 1/24/2019    IRRIGATION OF LOWER ABDOMINAL WOUND performed by T[facility] Kidd MD at [facility] ABDOMINOPLASTY  11/29/2018    APPENDECTOMY  2000    CARPAL TUNNEL RELEASE Right 2017    Dr. Patricio Garzon (Right)   [address]    Tubal Ligation Also Done In 1997    COLONOSCOPY  03/2017    Polyp Removed    DENTAL SURGERY      Teeth Extracted In Past    ELBOW SURGERY Bilateral Last Done In 2013    Right Done Twice, Left Done Once left cubital tunnel release 2009; right 2014    ENDOSCOPY, COLON, DIAGNOSTIC  03/2017    HERNIA REPAIR 10/26/2017    hiatal hernia with gastrectomy    HYSTERECTOMY VAGINAL  2000    LAPAROSCOPY  2000    GA EXCISE EXCESS SKIN TISSUE,ABDOMEN N/A 2018    ABDOMINOPLASTY performed by Carlita Lni MD at [address] Right     Dr. Mike Stovall Right 2017    Dr Daksha Chanel  10/26/2017    Robotic Laparoscopic, Pre Op Weight 237  Or 238 lbs.  TUBAL LIGATION      Done With      Social History     Socioeconomic History    Marital status: Single     Spouse name: None    Number of children: None    Years of education: None    Highest education level: None   Occupational History    Occupation: unemployed   Social Needs    Financial resource strain: None    Food insecurity:     Worry: None     Inability: None    Transportation needs:     Medical: None     Non-medical: None   Tobacco Use    Smoking status: Former Smoker     Packs/day: 0.25     Years: 30.00     Pack years: 7.50     Types: Cigarettes, E-Cigarettes     Start date:      Last attempt to quit: 2016     Years since quitting: 3.4    Smokeless tobacco: Never Used   Substance and Sexual Activity    Alcohol use: Yes     Comment: \"Occ.  Maybe Twice A Month\"    Drug use: No    Sexual activity: Yes     Partners: Male   Lifestyle    Physical activity:     Days per week: None     Minutes per session: None    Stress: None   Relationships    Social connections:     Talks on phone: None     Gets together: None     Attends Orthodox service: None     Active member of club or organization: None     Attends meetings of clubs or organizations: None     Relationship status: None    Intimate partner violence:     Fear of current or ex partner: None     Emotionally abused: None     Physically abused: None     Forced sexual activity: None   Other Topics Concern    None   Social History Narrative    None     Review of Systems   Constitutional: Negative for activity change, chills, diaphoresis and fever. HENT: Negative for sore throat, trouble swallowing and voice change. Eyes: Negative for photophobia and visual disturbance. Respiratory: Negative for cough, shortness of breath and wheezing. Cardiovascular: Negative for chest pain, palpitations and leg swelling. Gastrointestinal: Positive for abdominal pain. Negative for anal bleeding, blood in stool, constipation, diarrhea, nausea and vomiting. Endocrine: Negative for cold intolerance, heat intolerance, polydipsia and polyuria. Genitourinary: Negative for dysuria, frequency and hematuria. Musculoskeletal: Negative for joint swelling, myalgias and neck stiffness. Skin: Positive for wound (Abdominal wound at skin creases. . from the pannus). Negative for color change and rash. Neurological: Negative for seizures, speech difficulty, light-headedness and numbness. Hematological: Negative for adenopathy. Does not bruise/bleed easily. OBJECTIVE:    Physical Exam   Constitutional: She is oriented to person, place, and time. She appears well-developed and well-nourished. No distress. HENT:   Head: Normocephalic and atraumatic. Eyes: Pupils are equal, round, and reactive to light. Conjunctivae and EOM are normal. No scleral icterus. Neck: Normal range of motion. No JVD present. No tracheal deviation present. No thyromegaly present. Cardiovascular: Normal rate, regular rhythm, normal heart sounds and intact distal pulses. Exam reveals no gallop and no friction rub. No murmur heard. Pulmonary/Chest: Effort normal. No stridor. No respiratory distress. She has no wheezes. She has no rales. She exhibits no tenderness. Abdominal: Soft. Bowel sounds are normal. She exhibits no distension and no mass. There is tenderness. There is no rebound and no guarding. Musculoskeletal: Normal range of motion. She exhibits no edema or tenderness. Lymphadenopathy:     She has no cervical adenopathy.    Neurological: She is alert and oriented to person, place, and time. No cranial nerve deficit. Coordination normal.   Skin: Skin is warm and dry. No rash noted. She is not diaphoretic. There is erythema (Positive for wound (Abdominal wound at skin creases. . from the pannus). ). No pallor. Psychiatric: Her behavior is normal. Judgment and thought content normal.   Vitals reviewed. Assessment / Plan:    1. Skin ulcer with fat layer exposed ([address]          Abdominoplasty needs revision, still skin creases causing trouble with ulcers / pain / infections. Follow Up:  Return in about 1 month (around 7/19/2019) for Surgery, Bariatric follow up: diet, exercise & weight loss.     Yaima Kennedy MD, FACS, FICS  Member of the [employer]    [phone]    6/21/19

## 2019-07-09 ENCOUNTER — TELEPHONE (OUTPATIENT)
Dept: BARIATRICS/WEIGHT MGMT | Age: 54
End: 2019-07-09

## 2019-07-15 ENCOUNTER — TELEPHONE (OUTPATIENT)
Dept: BARIATRICS/WEIGHT MGMT | Age: 54
End: 2019-07-15

## 2019-07-17 ENCOUNTER — TELEPHONE (OUTPATIENT)
Dept: BARIATRICS/WEIGHT MGMT | Age: 54
End: 2019-07-17

## 2019-07-17 DIAGNOSIS — Z71.3 PRE-BARIATRIC SURGERY NUTRITION EVALUATION: Primary | ICD-10-CM

## 2019-07-17 RX ORDER — LACTOSE-REDUCED FOOD
4 LIQUID (ML) ORAL 4 TIMES DAILY PRN
Qty: 84 CAN | Refills: 0 | Status: SHIPPED | OUTPATIENT
Start: 2019-07-17 | End: 2020-06-02

## 2019-07-18 ENCOUNTER — TELEPHONE (OUTPATIENT)
Dept: FAMILY MEDICINE CLINIC | Age: 54
End: 2019-07-18

## 2019-07-22 ENCOUNTER — TELEPHONE (OUTPATIENT)
Dept: FAMILY MEDICINE CLINIC | Age: 54
End: 2019-07-22

## 2019-07-23 DIAGNOSIS — F41.9 ANXIETY: Primary | ICD-10-CM

## 2019-07-23 DIAGNOSIS — E11.9 TYPE 2 DIABETES MELLITUS WITHOUT COMPLICATION, WITHOUT LONG-TERM CURRENT USE OF INSULIN (HCC): ICD-10-CM

## 2019-07-23 DIAGNOSIS — E11.9 TYPE 2 DIABETES MELLITUS WITHOUT COMPLICATION, WITH LONG-TERM CURRENT USE OF INSULIN (HCC): ICD-10-CM

## 2019-07-23 DIAGNOSIS — Z79.4 TYPE 2 DIABETES MELLITUS WITHOUT COMPLICATION, WITH LONG-TERM CURRENT USE OF INSULIN (HCC): ICD-10-CM

## 2019-07-23 RX ORDER — BLOOD-GLUCOSE METER
1 KIT MISCELLANEOUS DAILY
Qty: 1 KIT | Refills: 0 | Status: SHIPPED | OUTPATIENT
Start: 2019-07-23 | End: 2021-02-01 | Stop reason: SDUPTHER

## 2019-07-23 RX ORDER — HYDROXYZINE HYDROCHLORIDE 25 MG/1
25 TABLET, FILM COATED ORAL EVERY 8 HOURS PRN
Qty: 60 TABLET | Refills: 1 | Status: SHIPPED | OUTPATIENT
Start: 2019-07-23 | End: 2020-08-10

## 2019-07-26 ENCOUNTER — ANESTHESIA EVENT (OUTPATIENT)
Dept: OPERATING ROOM | Age: 54
DRG: 385 | End: 2019-07-26
Payer: COMMERCIAL

## 2019-07-26 RX ORDER — VENLAFAXINE 37.5 MG/1
37.5 TABLET ORAL 2 TIMES DAILY
COMMUNITY
End: 2022-01-17

## 2019-07-27 ASSESSMENT — ENCOUNTER SYMPTOMS: SHORTNESS OF BREATH: 1

## 2019-07-27 ASSESSMENT — COPD QUESTIONNAIRES: CAT_SEVERITY: SEVERE

## 2019-07-27 ASSESSMENT — LIFESTYLE VARIABLES: SMOKING_STATUS: 1

## 2019-07-27 NOTE — ANESTHESIA PRE PROCEDURE
ROTATOR CUFF REPAIR Right     Dr. Treva Culp Right 2017    Dr Judd Rossi  10/26/2017    Robotic Laparoscopic, Pre Op Weight 237  Or 238 lbs.  TUBAL LIGATION      Done With        Social History:    Social History     Tobacco Use    Smoking status: Former Smoker     Packs/day: 0.25     Years: 30.00     Pack years: 7.50     Types: Cigarettes, E-Cigarettes     Start date:      Last attempt to quit: 2016     Years since quitting: 3.5    Smokeless tobacco: Never Used   Substance Use Topics    Alcohol use: Yes     Comment: \"Occ. Maybe Twice A Month\"                                Counseling given: Not Answered      Vital Signs (Current):   Vitals:    19 0954   Weight: 164 lb (74.4 kg)   Height: 5' 6\" (1.676 m)                                              BP Readings from Last 3 Encounters:   19 126/76   19 134/80   19 120/78       NPO Status:                                                                                 BMI:   Wt Readings from Last 3 Encounters:   19 164 lb 8 oz (74.6 kg)   19 166 lb 6.4 oz (75.5 kg)   19 165 lb 3.2 oz (74.9 kg)     Body mass index is 26.47 kg/m².     CBC:   Lab Results   Component Value Date    WBC 11.1 2019    RBC 5.01 2019    HGB 12.6 2019    HCT 37.6 2019    MCV 75.0 2019    RDW 14.5 2019     2019       CMP:   Lab Results   Component Value Date     2019    K 4.0 2019     2019    CO2 23 2019    BUN 6 2019    CREATININE 0.7 2019    GFRAA >60 2019    LABGLOM >60 2019    GLUCOSE 111 2019    PROT 6.9 2019    PROT 7.9 2013    CALCIUM 7.9 2019    BILITOT 0.9 2019    ALKPHOS 96 2019    AST 17 2019    ALT 13 2019       POC Tests: No results for input(s): POCGLU, POCNA, POCK, POCCL, POCBUN, POCHEMO, POCHCT in the last 72

## 2019-07-29 ENCOUNTER — HOSPITAL ENCOUNTER (INPATIENT)
Age: 54
LOS: 1 days | Discharge: HOME | DRG: 385 | End: 2019-07-30
Attending: SURGERY | Admitting: SURGERY
Payer: COMMERCIAL

## 2019-07-29 ENCOUNTER — ANESTHESIA (OUTPATIENT)
Dept: OPERATING ROOM | Age: 54
DRG: 385 | End: 2019-07-29
Payer: COMMERCIAL

## 2019-07-29 VITALS
RESPIRATION RATE: 7 BRPM | DIASTOLIC BLOOD PRESSURE: 86 MMHG | OXYGEN SATURATION: 97 % | TEMPERATURE: 97.7 F | SYSTOLIC BLOOD PRESSURE: 121 MMHG

## 2019-07-29 DIAGNOSIS — Z98.890 S/P ABDOMINOPLASTY: Primary | ICD-10-CM

## 2019-07-29 LAB
ANION GAP SERPL CALCULATED.3IONS-SCNC: 10 MMOL/L (ref 4–16)
BUN BLDV-MCNC: 11 MG/DL (ref 6–23)
CALCIUM SERPL-MCNC: 9.5 MG/DL (ref 8.3–10.6)
CHLORIDE BLD-SCNC: 101 MMOL/L (ref 99–110)
CO2: 25 MMOL/L (ref 21–32)
CREAT SERPL-MCNC: 1 MG/DL (ref 0.6–1.1)
GFR AFRICAN AMERICAN: >60 ML/MIN/1.73M2
GFR NON-AFRICAN AMERICAN: 58 ML/MIN/1.73M2
GLUCOSE BLD-MCNC: 111 MG/DL (ref 70–99)
GLUCOSE BLD-MCNC: 133 MG/DL (ref 70–99)
GLUCOSE BLD-MCNC: 322 MG/DL (ref 70–99)
HCT VFR BLD CALC: 42.3 % (ref 37–47)
HEMOGLOBIN: 14.1 GM/DL (ref 12.5–16)
MCH RBC QN AUTO: 25.6 PG (ref 27–31)
MCHC RBC AUTO-ENTMCNC: 33.3 % (ref 32–36)
MCV RBC AUTO: 76.8 FL (ref 78–100)
PDW BLD-RTO: 14.2 % (ref 11.7–14.9)
PLATELET # BLD: 320 K/CU MM (ref 140–440)
PMV BLD AUTO: 10.7 FL (ref 7.5–11.1)
POTASSIUM SERPL-SCNC: 4 MMOL/L (ref 3.5–5.1)
RBC # BLD: 5.51 M/CU MM (ref 4.2–5.4)
SODIUM BLD-SCNC: 136 MMOL/L (ref 135–145)
WBC # BLD: 7.6 K/CU MM (ref 4–10.5)

## 2019-07-29 PROCEDURE — 3700000001 HC ADD 15 MINUTES (ANESTHESIA): Performed by: SURGERY

## 2019-07-29 PROCEDURE — 2709999900 HC NON-CHARGEABLE SUPPLY: Performed by: SURGERY

## 2019-07-29 PROCEDURE — 2580000003 HC RX 258: Performed by: NURSE ANESTHETIST, CERTIFIED REGISTERED

## 2019-07-29 PROCEDURE — 6360000002 HC RX W HCPCS: Performed by: NURSE ANESTHETIST, CERTIFIED REGISTERED

## 2019-07-29 PROCEDURE — 80048 BASIC METABOLIC PNL TOTAL CA: CPT

## 2019-07-29 PROCEDURE — 3600000013 HC SURGERY LEVEL 3 ADDTL 15MIN: Performed by: SURGERY

## 2019-07-29 PROCEDURE — 88302 TISSUE EXAM BY PATHOLOGIST: CPT

## 2019-07-29 PROCEDURE — 36415 COLL VENOUS BLD VENIPUNCTURE: CPT

## 2019-07-29 PROCEDURE — 7100000000 HC PACU RECOVERY - FIRST 15 MIN: Performed by: SURGERY

## 2019-07-29 PROCEDURE — 1200000000 HC SEMI PRIVATE

## 2019-07-29 PROCEDURE — 6370000000 HC RX 637 (ALT 250 FOR IP): Performed by: PHYSICIAN ASSISTANT

## 2019-07-29 PROCEDURE — 6360000002 HC RX W HCPCS: Performed by: SURGERY

## 2019-07-29 PROCEDURE — C9290 INJ, BUPIVACAINE LIPOSOME: HCPCS | Performed by: SURGERY

## 2019-07-29 PROCEDURE — 82962 GLUCOSE BLOOD TEST: CPT

## 2019-07-29 PROCEDURE — 2580000003 HC RX 258: Performed by: SURGERY

## 2019-07-29 PROCEDURE — 0JB80ZZ EXCISION OF ABDOMEN SUBCUTANEOUS TISSUE AND FASCIA, OPEN APPROACH: ICD-10-PCS | Performed by: SURGERY

## 2019-07-29 PROCEDURE — 7100000001 HC PACU RECOVERY - ADDTL 15 MIN: Performed by: SURGERY

## 2019-07-29 PROCEDURE — 6370000000 HC RX 637 (ALT 250 FOR IP): Performed by: SURGERY

## 2019-07-29 PROCEDURE — 85027 COMPLETE CBC AUTOMATED: CPT

## 2019-07-29 PROCEDURE — 2500000003 HC RX 250 WO HCPCS: Performed by: SURGERY

## 2019-07-29 PROCEDURE — 3600000003 HC SURGERY LEVEL 3 BASE: Performed by: SURGERY

## 2019-07-29 PROCEDURE — 15830 EXC EXCESSIVE SKIN ABDOMEN: CPT | Performed by: SURGERY

## 2019-07-29 PROCEDURE — 15847 EXC SKIN ABD ADD-ON: CPT | Performed by: SURGERY

## 2019-07-29 PROCEDURE — 3700000000 HC ANESTHESIA ATTENDED CARE: Performed by: SURGERY

## 2019-07-29 RX ORDER — LIDOCAINE HYDROCHLORIDE 20 MG/ML
INJECTION, SOLUTION INTRAVENOUS PRN
Status: DISCONTINUED | OUTPATIENT
Start: 2019-07-29 | End: 2019-07-29 | Stop reason: SDUPTHER

## 2019-07-29 RX ORDER — FENTANYL CITRATE 50 UG/ML
INJECTION, SOLUTION INTRAMUSCULAR; INTRAVENOUS PRN
Status: DISCONTINUED | OUTPATIENT
Start: 2019-07-29 | End: 2019-07-29 | Stop reason: SDUPTHER

## 2019-07-29 RX ORDER — FENTANYL CITRATE 50 UG/ML
25 INJECTION, SOLUTION INTRAMUSCULAR; INTRAVENOUS EVERY 5 MIN PRN
Status: DISCONTINUED | OUTPATIENT
Start: 2019-07-29 | End: 2019-07-29 | Stop reason: HOSPADM

## 2019-07-29 RX ORDER — HYDRALAZINE HYDROCHLORIDE 20 MG/ML
5 INJECTION INTRAMUSCULAR; INTRAVENOUS EVERY 10 MIN PRN
Status: DISCONTINUED | OUTPATIENT
Start: 2019-07-29 | End: 2019-07-29 | Stop reason: HOSPADM

## 2019-07-29 RX ORDER — SODIUM CHLORIDE 0.9 % (FLUSH) 0.9 %
10 SYRINGE (ML) INJECTION EVERY 12 HOURS SCHEDULED
Status: DISCONTINUED | OUTPATIENT
Start: 2019-07-29 | End: 2019-07-30 | Stop reason: HOSPADM

## 2019-07-29 RX ORDER — VENLAFAXINE 37.5 MG/1
37.5 TABLET ORAL 2 TIMES DAILY
Status: DISCONTINUED | OUTPATIENT
Start: 2019-07-29 | End: 2019-07-30 | Stop reason: HOSPADM

## 2019-07-29 RX ORDER — DIPHENHYDRAMINE HCL 25 MG
25 TABLET ORAL
Status: COMPLETED | OUTPATIENT
Start: 2019-07-29 | End: 2019-07-29

## 2019-07-29 RX ORDER — ALBUTEROL SULFATE 90 UG/1
2 AEROSOL, METERED RESPIRATORY (INHALATION) EVERY 6 HOURS PRN
Status: DISCONTINUED | OUTPATIENT
Start: 2019-07-29 | End: 2019-07-30 | Stop reason: HOSPADM

## 2019-07-29 RX ORDER — SODIUM CHLORIDE, SODIUM LACTATE, POTASSIUM CHLORIDE, CALCIUM CHLORIDE 600; 310; 30; 20 MG/100ML; MG/100ML; MG/100ML; MG/100ML
INJECTION, SOLUTION INTRAVENOUS CONTINUOUS
Status: DISCONTINUED | OUTPATIENT
Start: 2019-07-29 | End: 2019-07-29

## 2019-07-29 RX ORDER — GUAIFENESIN 600 MG/1
600 TABLET, EXTENDED RELEASE ORAL 2 TIMES DAILY
Status: DISCONTINUED | OUTPATIENT
Start: 2019-07-29 | End: 2019-07-30 | Stop reason: HOSPADM

## 2019-07-29 RX ORDER — HYDROMORPHONE HCL 110MG/55ML
PATIENT CONTROLLED ANALGESIA SYRINGE INTRAVENOUS PRN
Status: DISCONTINUED | OUTPATIENT
Start: 2019-07-29 | End: 2019-07-29 | Stop reason: SDUPTHER

## 2019-07-29 RX ORDER — TRAZODONE HYDROCHLORIDE 50 MG/1
50 TABLET ORAL NIGHTLY
Status: DISCONTINUED | OUTPATIENT
Start: 2019-07-29 | End: 2019-07-30 | Stop reason: HOSPADM

## 2019-07-29 RX ORDER — SODIUM CHLORIDE 0.9 % (FLUSH) 0.9 %
10 SYRINGE (ML) INJECTION PRN
Status: DISCONTINUED | OUTPATIENT
Start: 2019-07-29 | End: 2019-07-30 | Stop reason: HOSPADM

## 2019-07-29 RX ORDER — HYDROMORPHONE HCL 110MG/55ML
1 PATIENT CONTROLLED ANALGESIA SYRINGE INTRAVENOUS
Status: DISCONTINUED | OUTPATIENT
Start: 2019-07-29 | End: 2019-07-30 | Stop reason: HOSPADM

## 2019-07-29 RX ORDER — SODIUM CHLORIDE 9 MG/ML
INJECTION, SOLUTION INTRAVENOUS CONTINUOUS
Status: DISCONTINUED | OUTPATIENT
Start: 2019-07-29 | End: 2019-07-30 | Stop reason: HOSPADM

## 2019-07-29 RX ORDER — ACETAMINOPHEN 10 MG/ML
1000 INJECTION, SOLUTION INTRAVENOUS ONCE
Status: COMPLETED | OUTPATIENT
Start: 2019-07-29 | End: 2019-07-29

## 2019-07-29 RX ORDER — ONDANSETRON 2 MG/ML
4 INJECTION INTRAMUSCULAR; INTRAVENOUS
Status: DISCONTINUED | OUTPATIENT
Start: 2019-07-29 | End: 2019-07-29 | Stop reason: HOSPADM

## 2019-07-29 RX ORDER — LACTOSE-REDUCED FOOD
4 LIQUID (ML) ORAL 4 TIMES DAILY PRN
Status: DISCONTINUED | OUTPATIENT
Start: 2019-07-29 | End: 2019-07-29

## 2019-07-29 RX ORDER — ONDANSETRON 2 MG/ML
INJECTION INTRAMUSCULAR; INTRAVENOUS PRN
Status: DISCONTINUED | OUTPATIENT
Start: 2019-07-29 | End: 2019-07-29 | Stop reason: SDUPTHER

## 2019-07-29 RX ORDER — ATORVASTATIN CALCIUM 40 MG/1
80 TABLET, FILM COATED ORAL NIGHTLY
Status: DISCONTINUED | OUTPATIENT
Start: 2019-07-29 | End: 2019-07-30 | Stop reason: HOSPADM

## 2019-07-29 RX ORDER — DEXTROSE MONOHYDRATE 25 G/50ML
12.5 INJECTION, SOLUTION INTRAVENOUS PRN
Status: DISCONTINUED | OUTPATIENT
Start: 2019-07-29 | End: 2019-07-30 | Stop reason: HOSPADM

## 2019-07-29 RX ORDER — KETOROLAC TROMETHAMINE 30 MG/ML
INJECTION, SOLUTION INTRAMUSCULAR; INTRAVENOUS PRN
Status: DISCONTINUED | OUTPATIENT
Start: 2019-07-29 | End: 2019-07-29 | Stop reason: SDUPTHER

## 2019-07-29 RX ORDER — DEXTROSE MONOHYDRATE 50 MG/ML
100 INJECTION, SOLUTION INTRAVENOUS PRN
Status: DISCONTINUED | OUTPATIENT
Start: 2019-07-29 | End: 2019-07-30 | Stop reason: HOSPADM

## 2019-07-29 RX ORDER — CEFAZOLIN SODIUM 2 G/100ML
INJECTION, SOLUTION INTRAVENOUS
Status: COMPLETED
Start: 2019-07-29 | End: 2019-07-29

## 2019-07-29 RX ORDER — M-VIT,TX,IRON,MINS/CALC/FOLIC 27MG-0.4MG
1 TABLET ORAL DAILY
Status: DISCONTINUED | OUTPATIENT
Start: 2019-07-29 | End: 2019-07-30 | Stop reason: HOSPADM

## 2019-07-29 RX ORDER — DIPHENHYDRAMINE HYDROCHLORIDE 50 MG/ML
INJECTION INTRAMUSCULAR; INTRAVENOUS
Status: DISCONTINUED
Start: 2019-07-29 | End: 2019-07-30

## 2019-07-29 RX ORDER — SODIUM CHLORIDE, SODIUM LACTATE, POTASSIUM CHLORIDE, CALCIUM CHLORIDE 600; 310; 30; 20 MG/100ML; MG/100ML; MG/100ML; MG/100ML
INJECTION, SOLUTION INTRAVENOUS CONTINUOUS PRN
Status: DISCONTINUED | OUTPATIENT
Start: 2019-07-29 | End: 2019-07-29 | Stop reason: SDUPTHER

## 2019-07-29 RX ORDER — SODIUM CHLORIDE, SODIUM LACTATE, POTASSIUM CHLORIDE, CALCIUM CHLORIDE 600; 310; 30; 20 MG/100ML; MG/100ML; MG/100ML; MG/100ML
INJECTION, SOLUTION INTRAVENOUS
Status: COMPLETED
Start: 2019-07-29 | End: 2019-07-29

## 2019-07-29 RX ORDER — PROPOFOL 10 MG/ML
INJECTION, EMULSION INTRAVENOUS PRN
Status: DISCONTINUED | OUTPATIENT
Start: 2019-07-29 | End: 2019-07-29 | Stop reason: SDUPTHER

## 2019-07-29 RX ORDER — METFORMIN HYDROCHLORIDE 500 MG/1
500 TABLET, EXTENDED RELEASE ORAL 2 TIMES DAILY
Status: DISCONTINUED | OUTPATIENT
Start: 2019-07-29 | End: 2019-07-29

## 2019-07-29 RX ORDER — MAGNESIUM SULFATE 1 G/100ML
1 INJECTION INTRAVENOUS PRN
Status: DISCONTINUED | OUTPATIENT
Start: 2019-07-29 | End: 2019-07-30 | Stop reason: HOSPADM

## 2019-07-29 RX ORDER — CEFAZOLIN SODIUM 2 G/100ML
2 INJECTION, SOLUTION INTRAVENOUS EVERY 8 HOURS
Status: COMPLETED | OUTPATIENT
Start: 2019-07-29 | End: 2019-07-30

## 2019-07-29 RX ORDER — NICOTINE POLACRILEX 4 MG
15 LOZENGE BUCCAL PRN
Status: DISCONTINUED | OUTPATIENT
Start: 2019-07-29 | End: 2019-07-30 | Stop reason: HOSPADM

## 2019-07-29 RX ORDER — DEXAMETHASONE SODIUM PHOSPHATE 4 MG/ML
INJECTION, SOLUTION INTRA-ARTICULAR; INTRALESIONAL; INTRAMUSCULAR; INTRAVENOUS; SOFT TISSUE PRN
Status: DISCONTINUED | OUTPATIENT
Start: 2019-07-29 | End: 2019-07-29 | Stop reason: SDUPTHER

## 2019-07-29 RX ORDER — ONDANSETRON 2 MG/ML
4 INJECTION INTRAMUSCULAR; INTRAVENOUS EVERY 4 HOURS PRN
Status: DISCONTINUED | OUTPATIENT
Start: 2019-07-29 | End: 2019-07-30 | Stop reason: HOSPADM

## 2019-07-29 RX ORDER — HYDROXYZINE HYDROCHLORIDE 25 MG/1
25 TABLET, FILM COATED ORAL EVERY 8 HOURS PRN
Status: DISCONTINUED | OUTPATIENT
Start: 2019-07-29 | End: 2019-07-30 | Stop reason: HOSPADM

## 2019-07-29 RX ORDER — CEFAZOLIN SODIUM 2 G/100ML
2 INJECTION, SOLUTION INTRAVENOUS ONCE
Status: COMPLETED | OUTPATIENT
Start: 2019-07-29 | End: 2019-07-29

## 2019-07-29 RX ORDER — ALPRAZOLAM 0.5 MG/1
0.5 TABLET ORAL NIGHTLY PRN
Status: DISCONTINUED | OUTPATIENT
Start: 2019-07-29 | End: 2019-07-30 | Stop reason: HOSPADM

## 2019-07-29 RX ORDER — POTASSIUM CHLORIDE 7.45 MG/ML
10 INJECTION INTRAVENOUS PRN
Status: DISCONTINUED | OUTPATIENT
Start: 2019-07-29 | End: 2019-07-30 | Stop reason: HOSPADM

## 2019-07-29 RX ORDER — PANTOPRAZOLE SODIUM 40 MG/1
40 TABLET, DELAYED RELEASE ORAL
Status: DISCONTINUED | OUTPATIENT
Start: 2019-07-30 | End: 2019-07-30 | Stop reason: HOSPADM

## 2019-07-29 RX ADMIN — LIDOCAINE HYDROCHLORIDE 100 MG: 20 INJECTION, SOLUTION INTRAVENOUS at 13:37

## 2019-07-29 RX ADMIN — ONDANSETRON 4 MG: 2 INJECTION INTRAMUSCULAR; INTRAVENOUS at 13:38

## 2019-07-29 RX ADMIN — MULTIPLE VITAMINS W/ MINERALS TAB 1 TABLET: TAB at 21:30

## 2019-07-29 RX ADMIN — SODIUM CHLORIDE, POTASSIUM CHLORIDE, SODIUM LACTATE AND CALCIUM CHLORIDE: 600; 310; 30; 20 INJECTION, SOLUTION INTRAVENOUS at 13:27

## 2019-07-29 RX ADMIN — DEXAMETHASONE SODIUM PHOSPHATE 8 MG: 4 INJECTION, SOLUTION INTRAMUSCULAR; INTRAVENOUS at 13:37

## 2019-07-29 RX ADMIN — SODIUM CHLORIDE: 9 INJECTION, SOLUTION INTRAVENOUS at 17:56

## 2019-07-29 RX ADMIN — GUAIFENESIN 600 MG: 600 TABLET, EXTENDED RELEASE ORAL at 21:30

## 2019-07-29 RX ADMIN — HYDROMORPHONE HYDROCHLORIDE 1 MG: 2 INJECTION, SOLUTION INTRAMUSCULAR; INTRAVENOUS; SUBCUTANEOUS at 17:56

## 2019-07-29 RX ADMIN — SODIUM CHLORIDE, POTASSIUM CHLORIDE, SODIUM LACTATE AND CALCIUM CHLORIDE: 600; 310; 30; 20 INJECTION, SOLUTION INTRAVENOUS at 14:16

## 2019-07-29 RX ADMIN — KETOROLAC TROMETHAMINE 30 MG: 30 INJECTION, SOLUTION INTRAMUSCULAR; INTRAVENOUS at 15:16

## 2019-07-29 RX ADMIN — HYDROMORPHONE HYDROCHLORIDE 1 MG: 2 INJECTION, SOLUTION INTRAMUSCULAR; INTRAVENOUS; SUBCUTANEOUS at 23:07

## 2019-07-29 RX ADMIN — ATORVASTATIN CALCIUM 80 MG: 40 TABLET, FILM COATED ORAL at 21:30

## 2019-07-29 RX ADMIN — HYDROMORPHONE HYDROCHLORIDE 1 MG: 2 INJECTION, SOLUTION INTRAMUSCULAR; INTRAVENOUS; SUBCUTANEOUS at 20:05

## 2019-07-29 RX ADMIN — CEFAZOLIN SODIUM 2 G: 2 INJECTION, SOLUTION INTRAVENOUS at 21:30

## 2019-07-29 RX ADMIN — VENLAFAXINE 37.5 MG: 37.5 TABLET ORAL at 21:30

## 2019-07-29 RX ADMIN — FENTANYL CITRATE 75 MCG: 50 INJECTION INTRAMUSCULAR; INTRAVENOUS at 13:37

## 2019-07-29 RX ADMIN — HYDROMORPHONE HYDROCHLORIDE 0.5 MG: 2 INJECTION INTRAMUSCULAR; INTRAVENOUS; SUBCUTANEOUS at 14:31

## 2019-07-29 RX ADMIN — ACETAMINOPHEN 1000 MG: 10 INJECTION, SOLUTION INTRAVENOUS at 15:30

## 2019-07-29 RX ADMIN — METFORMIN HYDROCHLORIDE 500 MG: 500 TABLET, EXTENDED RELEASE ORAL at 21:30

## 2019-07-29 RX ADMIN — TRAZODONE HYDROCHLORIDE 50 MG: 50 TABLET ORAL at 21:30

## 2019-07-29 RX ADMIN — DIPHENHYDRAMINE HCL 25 MG: 25 TABLET ORAL at 21:30

## 2019-07-29 RX ADMIN — PROPOFOL 200 MG: 10 INJECTION, EMULSION INTRAVENOUS at 13:37

## 2019-07-29 RX ADMIN — SODIUM CHLORIDE, POTASSIUM CHLORIDE, SODIUM LACTATE AND CALCIUM CHLORIDE: 600; 310; 30; 20 INJECTION, SOLUTION INTRAVENOUS at 14:30

## 2019-07-29 RX ADMIN — CEFAZOLIN SODIUM 2 G: 2 INJECTION, SOLUTION INTRAVENOUS at 13:44

## 2019-07-29 RX ADMIN — ONDANSETRON 4 MG: 2 INJECTION INTRAMUSCULAR; INTRAVENOUS at 13:37

## 2019-07-29 RX ADMIN — FENTANYL CITRATE 100 MCG: 50 INJECTION INTRAMUSCULAR; INTRAVENOUS at 15:33

## 2019-07-29 RX ADMIN — FENTANYL CITRATE 25 MCG: 50 INJECTION INTRAMUSCULAR; INTRAVENOUS at 13:47

## 2019-07-29 ASSESSMENT — PULMONARY FUNCTION TESTS
PIF_VALUE: 12
PIF_VALUE: 13
PIF_VALUE: 13
PIF_VALUE: 11
PIF_VALUE: 15
PIF_VALUE: 14
PIF_VALUE: 13
PIF_VALUE: 8
PIF_VALUE: 12
PIF_VALUE: 13
PIF_VALUE: 13
PIF_VALUE: 11
PIF_VALUE: 0
PIF_VALUE: 8
PIF_VALUE: 10
PIF_VALUE: 1
PIF_VALUE: 14
PIF_VALUE: 14
PIF_VALUE: 22
PIF_VALUE: 12
PIF_VALUE: 14
PIF_VALUE: 14
PIF_VALUE: 5
PIF_VALUE: 14
PIF_VALUE: 14
PIF_VALUE: 8
PIF_VALUE: 13
PIF_VALUE: 14
PIF_VALUE: 5
PIF_VALUE: 12
PIF_VALUE: 1
PIF_VALUE: 12
PIF_VALUE: 13
PIF_VALUE: 11
PIF_VALUE: 13
PIF_VALUE: 15
PIF_VALUE: 14
PIF_VALUE: 8
PIF_VALUE: 13
PIF_VALUE: 12
PIF_VALUE: 13
PIF_VALUE: 1
PIF_VALUE: 8
PIF_VALUE: 12
PIF_VALUE: 12
PIF_VALUE: 13
PIF_VALUE: 13
PIF_VALUE: 14
PIF_VALUE: 14
PIF_VALUE: 12
PIF_VALUE: 13
PIF_VALUE: 16
PIF_VALUE: 14
PIF_VALUE: 13
PIF_VALUE: 7
PIF_VALUE: 12
PIF_VALUE: 1
PIF_VALUE: 12
PIF_VALUE: 5
PIF_VALUE: 13
PIF_VALUE: 14
PIF_VALUE: 13
PIF_VALUE: 13
PIF_VALUE: 14
PIF_VALUE: 7
PIF_VALUE: 12
PIF_VALUE: 14
PIF_VALUE: 13
PIF_VALUE: 13
PIF_VALUE: 12
PIF_VALUE: 14
PIF_VALUE: 14
PIF_VALUE: 12
PIF_VALUE: 13
PIF_VALUE: 12
PIF_VALUE: 7
PIF_VALUE: 13
PIF_VALUE: 8
PIF_VALUE: 12
PIF_VALUE: 13
PIF_VALUE: 14
PIF_VALUE: 12
PIF_VALUE: 13
PIF_VALUE: 14
PIF_VALUE: 8
PIF_VALUE: 13
PIF_VALUE: 14
PIF_VALUE: 13
PIF_VALUE: 13
PIF_VALUE: 12
PIF_VALUE: 14
PIF_VALUE: 13
PIF_VALUE: 14
PIF_VALUE: 13
PIF_VALUE: 13
PIF_VALUE: 12
PIF_VALUE: 14
PIF_VALUE: 14
PIF_VALUE: 8
PIF_VALUE: 13
PIF_VALUE: 13
PIF_VALUE: 12
PIF_VALUE: 14
PIF_VALUE: 15
PIF_VALUE: 15
PIF_VALUE: 7
PIF_VALUE: 13

## 2019-07-29 ASSESSMENT — PAIN - FUNCTIONAL ASSESSMENT
PAIN_FUNCTIONAL_ASSESSMENT: ACTIVITIES ARE NOT PREVENTED
PAIN_FUNCTIONAL_ASSESSMENT: ACTIVITIES ARE NOT PREVENTED
PAIN_FUNCTIONAL_ASSESSMENT: 0-10
PAIN_FUNCTIONAL_ASSESSMENT: ACTIVITIES ARE NOT PREVENTED

## 2019-07-29 ASSESSMENT — PAIN DESCRIPTION - PROGRESSION
CLINICAL_PROGRESSION: NOT CHANGED

## 2019-07-29 ASSESSMENT — PAIN DESCRIPTION - DESCRIPTORS
DESCRIPTORS: ACHING;CONSTANT;DISCOMFORT

## 2019-07-29 ASSESSMENT — PAIN DESCRIPTION - ONSET
ONSET: ON-GOING

## 2019-07-29 ASSESSMENT — PAIN DESCRIPTION - LOCATION
LOCATION: ABDOMEN

## 2019-07-29 ASSESSMENT — PAIN DESCRIPTION - PAIN TYPE
TYPE: ACUTE PAIN;SURGICAL PAIN

## 2019-07-29 ASSESSMENT — PAIN SCALES - GENERAL
PAINLEVEL_OUTOF10: 6
PAINLEVEL_OUTOF10: 8
PAINLEVEL_OUTOF10: 0
PAINLEVEL_OUTOF10: 0
PAINLEVEL_OUTOF10: 7
PAINLEVEL_OUTOF10: 7

## 2019-07-29 ASSESSMENT — COPD QUESTIONNAIRES: CAT_SEVERITY: SEVERE

## 2019-07-29 ASSESSMENT — LIFESTYLE VARIABLES: SMOKING_STATUS: 1

## 2019-07-29 ASSESSMENT — PAIN DESCRIPTION - ORIENTATION
ORIENTATION: RIGHT

## 2019-07-29 ASSESSMENT — PAIN DESCRIPTION - FREQUENCY
FREQUENCY: CONTINUOUS

## 2019-07-29 ASSESSMENT — ENCOUNTER SYMPTOMS: SHORTNESS OF BREATH: 1

## 2019-07-29 NOTE — ANESTHESIA PRE PROCEDURE
Department of Anesthesiology  Preprocedure Note       Name:  Cathy Celaya   Age:  48 y.o.  :  1965                                          MRN:  [id number]         Date:  2019      Surgeon: Cari Dunlap):  Gillian Garcia MD    Procedure: ABDOMINOPLASTY REVISION (N/A )    Medications prior to admission:   Prior to Admission medications    Medication Sig Start Date End Date Taking? Authorizing Provider   venlafaxine (EFFEXOR) 37.5 MG tablet Take 37.5 mg by mouth 2 times daily   Yes Historical Provider, MD   hydrOXYzine (ATARAX) 25 MG tablet Take 1 tablet by mouth every 8 hours as needed for Anxiety 7/23/19 10/21/19 Yes YESSICA Darling CNP   glucose monitoring kit (FREESTYLE) monitoring kit 1 kit by Does not apply route daily 19  Yes YESSICA Darling CNP   Nutritional Supplements (ENSURE ACTIVE HIGH PROTEIN) LIQD Take 4 Cans by mouth 4 times daily as needed (Pre-op diet) 19 Yes Gillian Garcia MD   atorvastatin (LIPITOR) 80 MG tablet Take 1 tablet by mouth daily 5/10/19  Yes YESSICA Darling CNP   Multiple Vitamin (MVI, CELEBRATE, CHEWABLE TABLET) Take 1 tablet by mouth daily 5/3/19  Yes YESSICA Darling CNP   guaiFENesin (MUCINEX) 600 MG extended release tablet Take 1 tablet by mouth 2 times daily  Patient taking differently: Take 600 mg by mouth 2 times daily as needed  19  Yes YESSICA Darling CNP   traZODone (DESYREL) 50 MG tablet Take 1 tablet by mouth nightly  Patient taking differently: Take 100 mg by mouth nightly  4/3/19  Yes YESSICA Darling CNP   omeprazole (PRILOSEC) 40 MG delayed release capsule Take 1 capsule by mouth daily 4/3/19  Yes YESSICA Darling CNP   blood glucose test strips (FREESTYLE LITE) strip 1 each by In Vitro route daily As needed.  4/3/19  Yes YESSICA Darling CNP   tiotropium (SPIRIVA RESPIMAT) 1.25 MCG/ACT AERS inhaler Inhale 2 puffs into the lungs daily 3/12/19  Yes Rodríguez Galdamez MD   tiotropium [facility] HANDIHALER) 18 MCG inhalation capsule Inhale 1 capsule into the lungs daily 3/11/19  Yes Leidy Dickinson MD   VALUE PLUS LANCETS THIN 26G MISC 1 EACH BY DOES NOT APPLY ROUTE DAILY 10/23/18  Yes YESSICA Gonzalez CNP   albuterol sulfate HFA (PROAIR HFA) 108 (90 Base) MCG/ACT inhaler Inhale 2 puffs into the lungs every 6 hours as needed for Wheezing 3/5/18  Yes Leidy Dickinson MD   fluticasone-vilanterol (BREO ELLIPTA) 100-25 MCG/INH AEPB inhaler Inhale 1 puff into the lungs daily 3/5/18  Yes Leidy Dickinson MD   ALPRAZolam Suanne Meeter) 0.5 MG tablet Take 0.5 mg by mouth as needed for Sleep.  .   Yes Historical Provider, MD   metFORMIN (GLUCOPHAGE-XR) 500 MG extended release tablet Take 1 tablet by mouth 2 times daily \"I Take 2 In The Morning, I Take One At Night\"  Patient taking differently: Take 500 mg by mouth 2 times daily  4/3/19   YESSICA Gonzalez CNP       Current medications:    Current Facility-Administered Medications   Medication Dose Route Frequency Provider Last Rate Last Dose    ceFAZolin (ANCEF) 2 g in dextrose 4 % 100 mL IVPB (premix)  2 g Intravenous Once Sd Russo MD        chlorhexidine gluconate (ANTISEPTIC SKIN CLEANSER) 4 % solution   Topical Daily PRN Sd Russo MD        lactated ringers infusion   Intravenous Continuous Sd Russo MD        lactated ringers infusion             ceFAZolin (ANCEF) 2-4 GM/100ML-% IVPB (premix) SOLN                Allergies:  No Known Allergies    Problem List:    Patient Active Problem List   Diagnosis Code    DM (diabetes mellitus) ([facility] E11.9    Emphysema lung ([facility] J43.9    COPD, severe (HCC) J44.9    Dyslipidemia E78.5    Gastroesophageal reflux disease with esophagitis K21.0    History of MRSA infection Z86.14    Snores R06.83    Anxiety F41.9    Arthralgia of right hip M25.551    Chronic right-sided low back pain with sciatica M54.40, G89.29    Piriformis syndrome G57.00    Chronic respiratory failure ([facility] J96.10   

## 2019-07-30 VITALS
HEIGHT: 66 IN | RESPIRATION RATE: 16 BRPM | DIASTOLIC BLOOD PRESSURE: 72 MMHG | OXYGEN SATURATION: 90 % | WEIGHT: 164 LBS | BODY MASS INDEX: 26.36 KG/M2 | SYSTOLIC BLOOD PRESSURE: 115 MMHG | TEMPERATURE: 97.6 F | HEART RATE: 86 BPM

## 2019-07-30 LAB
ANION GAP SERPL CALCULATED.3IONS-SCNC: 10 MMOL/L (ref 4–16)
ANION GAP SERPL CALCULATED.3IONS-SCNC: 8 MMOL/L (ref 4–16)
BASOPHILS ABSOLUTE: 0 K/CU MM
BASOPHILS RELATIVE PERCENT: 0.2 % (ref 0–1)
BUN BLDV-MCNC: 10 MG/DL (ref 6–23)
BUN BLDV-MCNC: 12 MG/DL (ref 6–23)
CALCIUM SERPL-MCNC: 7.1 MG/DL (ref 8.3–10.6)
CALCIUM SERPL-MCNC: 8.6 MG/DL (ref 8.3–10.6)
CHLORIDE BLD-SCNC: 108 MMOL/L (ref 99–110)
CHLORIDE BLD-SCNC: 110 MMOL/L (ref 99–110)
CO2: 17 MMOL/L (ref 21–32)
CO2: 22 MMOL/L (ref 21–32)
CREAT SERPL-MCNC: 0.8 MG/DL (ref 0.6–1.1)
CREAT SERPL-MCNC: 0.9 MG/DL (ref 0.6–1.1)
DIFFERENTIAL TYPE: ABNORMAL
EOSINOPHILS ABSOLUTE: 0 K/CU MM
EOSINOPHILS RELATIVE PERCENT: 0 % (ref 0–3)
ESTIMATED AVERAGE GLUCOSE: 137 MG/DL
GFR AFRICAN AMERICAN: >60 ML/MIN/1.73M2
GFR AFRICAN AMERICAN: >60 ML/MIN/1.73M2
GFR NON-AFRICAN AMERICAN: >60 ML/MIN/1.73M2
GFR NON-AFRICAN AMERICAN: >60 ML/MIN/1.73M2
GLUCOSE BLD-MCNC: 120 MG/DL (ref 70–99)
GLUCOSE BLD-MCNC: 155 MG/DL (ref 70–99)
GLUCOSE BLD-MCNC: 174 MG/DL (ref 70–99)
GLUCOSE BLD-MCNC: 174 MG/DL (ref 70–99)
HBA1C MFR BLD: 6.4 % (ref 4.2–6.3)
HCT VFR BLD CALC: 35.8 % (ref 37–47)
HCT VFR BLD CALC: 37.7 % (ref 37–47)
HEMOGLOBIN: 11.9 GM/DL (ref 12.5–16)
HEMOGLOBIN: 12.7 GM/DL (ref 12.5–16)
IMMATURE NEUTROPHIL %: 0.5 % (ref 0–0.43)
LYMPHOCYTES ABSOLUTE: 1.1 K/CU MM
LYMPHOCYTES RELATIVE PERCENT: 6.7 % (ref 24–44)
MCH RBC QN AUTO: 25.9 PG (ref 27–31)
MCH RBC QN AUTO: 26 PG (ref 27–31)
MCHC RBC AUTO-ENTMCNC: 33.2 % (ref 32–36)
MCHC RBC AUTO-ENTMCNC: 33.7 % (ref 32–36)
MCV RBC AUTO: 77.1 FL (ref 78–100)
MCV RBC AUTO: 77.8 FL (ref 78–100)
MONOCYTES ABSOLUTE: 1 K/CU MM
MONOCYTES RELATIVE PERCENT: 5.6 % (ref 0–4)
NUCLEATED RBC %: 0 %
PDW BLD-RTO: 13.9 % (ref 11.7–14.9)
PDW BLD-RTO: 14.3 % (ref 11.7–14.9)
PLATELET # BLD: 265 K/CU MM (ref 140–440)
PLATELET # BLD: 297 K/CU MM (ref 140–440)
PMV BLD AUTO: 10.8 FL (ref 7.5–11.1)
PMV BLD AUTO: 10.8 FL (ref 7.5–11.1)
POTASSIUM SERPL-SCNC: 4.2 MMOL/L (ref 3.5–5.1)
POTASSIUM SERPL-SCNC: 5 MMOL/L (ref 3.5–5.1)
RBC # BLD: 4.6 M/CU MM (ref 4.2–5.4)
RBC # BLD: 4.89 M/CU MM (ref 4.2–5.4)
SEGMENTED NEUTROPHILS ABSOLUTE COUNT: 14.8 K/CU MM
SEGMENTED NEUTROPHILS RELATIVE PERCENT: 87 % (ref 36–66)
SODIUM BLD-SCNC: 135 MMOL/L (ref 135–145)
SODIUM BLD-SCNC: 140 MMOL/L (ref 135–145)
TOTAL IMMATURE NEUTOROPHIL: 0.09 K/CU MM
TOTAL NUCLEATED RBC: 0 K/CU MM
WBC # BLD: 11.4 K/CU MM (ref 4–10.5)
WBC # BLD: 17 K/CU MM (ref 4–10.5)

## 2019-07-30 PROCEDURE — 82962 GLUCOSE BLOOD TEST: CPT

## 2019-07-30 PROCEDURE — 83036 HEMOGLOBIN GLYCOSYLATED A1C: CPT

## 2019-07-30 PROCEDURE — 96376 TX/PRO/DX INJ SAME DRUG ADON: CPT

## 2019-07-30 PROCEDURE — 6360000002 HC RX W HCPCS: Performed by: SURGERY

## 2019-07-30 PROCEDURE — 36415 COLL VENOUS BLD VENIPUNCTURE: CPT

## 2019-07-30 PROCEDURE — 99024 POSTOP FOLLOW-UP VISIT: CPT | Performed by: SURGERY

## 2019-07-30 PROCEDURE — 96374 THER/PROPH/DIAG INJ IV PUSH: CPT

## 2019-07-30 PROCEDURE — 6370000000 HC RX 637 (ALT 250 FOR IP): Performed by: SURGERY

## 2019-07-30 PROCEDURE — 96372 THER/PROPH/DIAG INJ SC/IM: CPT

## 2019-07-30 PROCEDURE — 2580000003 HC RX 258: Performed by: SURGERY

## 2019-07-30 PROCEDURE — 96375 TX/PRO/DX INJ NEW DRUG ADDON: CPT

## 2019-07-30 PROCEDURE — 85025 COMPLETE CBC W/AUTO DIFF WBC: CPT

## 2019-07-30 PROCEDURE — 80048 BASIC METABOLIC PNL TOTAL CA: CPT

## 2019-07-30 RX ORDER — AMOXICILLIN 250 MG
2 CAPSULE ORAL DAILY
Qty: 60 TABLET | Refills: 3 | Status: SHIPPED | OUTPATIENT
Start: 2019-07-30 | End: 2019-08-09

## 2019-07-30 RX ORDER — DIPHENHYDRAMINE HYDROCHLORIDE 50 MG/ML
25 INJECTION INTRAMUSCULAR; INTRAVENOUS EVERY 6 HOURS PRN
Status: DISCONTINUED | OUTPATIENT
Start: 2019-07-30 | End: 2019-07-30 | Stop reason: HOSPADM

## 2019-07-30 RX ORDER — OXYCODONE HYDROCHLORIDE AND ACETAMINOPHEN 5; 325 MG/1; MG/1
1-2 TABLET ORAL EVERY 6 HOURS PRN
Qty: 35 TABLET | Refills: 0 | Status: SHIPPED | OUTPATIENT
Start: 2019-07-30 | End: 2019-08-06

## 2019-07-30 RX ADMIN — HYDROMORPHONE HYDROCHLORIDE 1 MG: 2 INJECTION, SOLUTION INTRAMUSCULAR; INTRAVENOUS; SUBCUTANEOUS at 05:39

## 2019-07-30 RX ADMIN — HYDROMORPHONE HYDROCHLORIDE 1 MG: 2 INJECTION, SOLUTION INTRAMUSCULAR; INTRAVENOUS; SUBCUTANEOUS at 02:11

## 2019-07-30 RX ADMIN — PANTOPRAZOLE SODIUM 40 MG: 40 TABLET, DELAYED RELEASE ORAL at 06:04

## 2019-07-30 RX ADMIN — SODIUM CHLORIDE: 9 INJECTION, SOLUTION INTRAVENOUS at 00:16

## 2019-07-30 RX ADMIN — HYDROMORPHONE HYDROCHLORIDE 1 MG: 2 INJECTION, SOLUTION INTRAMUSCULAR; INTRAVENOUS; SUBCUTANEOUS at 09:58

## 2019-07-30 RX ADMIN — GUAIFENESIN 600 MG: 600 TABLET, EXTENDED RELEASE ORAL at 08:35

## 2019-07-30 RX ADMIN — VENLAFAXINE 37.5 MG: 37.5 TABLET ORAL at 08:35

## 2019-07-30 RX ADMIN — ENOXAPARIN SODIUM 40 MG: 40 INJECTION SUBCUTANEOUS at 08:35

## 2019-07-30 RX ADMIN — CEFAZOLIN SODIUM 2 G: 2 INJECTION, SOLUTION INTRAVENOUS at 05:38

## 2019-07-30 RX ADMIN — DIPHENHYDRAMINE HYDROCHLORIDE 25 MG: 50 INJECTION, SOLUTION INTRAMUSCULAR; INTRAVENOUS at 07:48

## 2019-07-30 RX ADMIN — HYDROMORPHONE HYDROCHLORIDE 1 MG: 2 INJECTION, SOLUTION INTRAMUSCULAR; INTRAVENOUS; SUBCUTANEOUS at 07:48

## 2019-07-30 RX ADMIN — MULTIPLE VITAMINS W/ MINERALS TAB 1 TABLET: TAB at 08:35

## 2019-07-30 ASSESSMENT — PAIN SCALES - GENERAL
PAINLEVEL_OUTOF10: 8
PAINLEVEL_OUTOF10: 8
PAINLEVEL_OUTOF10: 7

## 2019-07-30 ASSESSMENT — PAIN DESCRIPTION - PAIN TYPE
TYPE: SURGICAL PAIN
TYPE: SURGICAL PAIN

## 2019-07-30 ASSESSMENT — PAIN DESCRIPTION - LOCATION
LOCATION: ABDOMEN
LOCATION: ABDOMEN

## 2019-07-30 ASSESSMENT — PAIN DESCRIPTION - FREQUENCY
FREQUENCY: CONTINUOUS
FREQUENCY: CONTINUOUS

## 2019-07-30 ASSESSMENT — PAIN DESCRIPTION - ONSET
ONSET: ON-GOING
ONSET: ON-GOING

## 2019-07-30 ASSESSMENT — PAIN DESCRIPTION - DESCRIPTORS
DESCRIPTORS: SHARP;STABBING
DESCRIPTORS: ACHING;STABBING

## 2019-07-30 ASSESSMENT — PAIN DESCRIPTION - PROGRESSION
CLINICAL_PROGRESSION: NOT CHANGED

## 2019-07-30 ASSESSMENT — PAIN DESCRIPTION - ORIENTATION
ORIENTATION: RIGHT
ORIENTATION: RIGHT

## 2019-08-01 ENCOUNTER — TELEPHONE (OUTPATIENT)
Dept: BARIATRICS/WEIGHT MGMT | Age: 54
End: 2019-08-01

## 2019-08-01 NOTE — TELEPHONE ENCOUNTER
Patient called stating both her thighs are swollen. She had surgery on the 29th and thinks it may be from IV fluid. Please give patient a call.

## 2019-08-01 NOTE — TELEPHONE ENCOUNTER
Spoke with patient, states her ankles and feet were swollen nilaterally a few days ago but improved, now both thighs feel swollen today but has been elevating her legs, she is voiding more often and denies pain. Advised pt to ambulate more frequently as tolerated and continue to push fluids. Patient will call us in the morning to let us know if there is no improvement or has worsened and we can have her come in tho see Dr Desi Bellamy. Patient agreed to plan.

## 2019-08-02 ENCOUNTER — OFFICE VISIT (OUTPATIENT)
Dept: BARIATRICS/WEIGHT MGMT | Age: 54
End: 2019-08-02

## 2019-08-02 ENCOUNTER — HOSPITAL ENCOUNTER (OUTPATIENT)
Dept: ULTRASOUND IMAGING | Age: 54
Discharge: HOME | End: 2019-08-02
Payer: COMMERCIAL

## 2019-08-02 VITALS — SYSTOLIC BLOOD PRESSURE: 138 MMHG | HEART RATE: 83 BPM | DIASTOLIC BLOOD PRESSURE: 80 MMHG

## 2019-08-02 DIAGNOSIS — Z98.890 S/P ABDOMINOPLASTY: ICD-10-CM

## 2019-08-02 DIAGNOSIS — Z98.890 S/P ABDOMINOPLASTY: Primary | ICD-10-CM

## 2019-08-02 PROCEDURE — 99024 POSTOP FOLLOW-UP VISIT: CPT | Performed by: SURGERY

## 2019-08-02 PROCEDURE — 93970 EXTREMITY STUDY: CPT

## 2019-08-02 RX ORDER — OXYCODONE HYDROCHLORIDE AND ACETAMINOPHEN 5; 325 MG/1; MG/1
1-2 TABLET ORAL EVERY 6 HOURS PRN
Qty: 35 TABLET | Refills: 0 | Status: SHIPPED | OUTPATIENT
Start: 2019-08-02 | End: 2019-08-09

## 2019-08-02 RX ORDER — FUROSEMIDE 20 MG/1
20 TABLET ORAL DAILY
Qty: 60 TABLET | Refills: 3 | Status: SHIPPED | OUTPATIENT
Start: 2019-08-02 | End: 2020-05-19 | Stop reason: SDUPTHER

## 2019-08-02 RX ORDER — POTASSIUM CHLORIDE 20 MEQ/1
20 TABLET, EXTENDED RELEASE ORAL 2 TIMES DAILY
Qty: 180 TABLET | Refills: 1 | Status: SHIPPED | OUTPATIENT
Start: 2019-08-02 | End: 2022-01-17

## 2019-08-02 NOTE — PROGRESS NOTES
BARIATRIC SURGERY POST OPERATIVE NOTE    SUBJECTIVE:    Patient presenting today referred from YESSICA Pereira CNP, for   Chief Complaint   Patient presents with    Post-Op Check     1st P/O Revision Abdominoplasty @ [facility] 7/29/19, Thighs are swollen and not improving, moved 8/15/19 appt to today     /80 (Site: Left Upper Arm, Position: Sitting, Cuff Size: Medium Adult)   Pulse 83      HPI: Baron Samaniego is a 48 y.o. female presenting in first, follow up 5 days post op 7/29/19. Procedure:   1. Bilateral abdominoplasty. 2.  Complex closure of the abdominal wound. 3.  Excision of 20 x 8 x 5 cm sheaths x2 (Bilaterally). 4.  Bilateral local anesthetic injection to the rectus muscle. Pathology:   Specimen #[id number]      Final Pathologic Diagnosis:  Fragment of skin and fibroadipose tissue, abdominoplasty: No  histopathological diagnosis. .        Current Outpatient Medications:     furosemide (LASIX) 20 MG tablet, Take 1 tablet by mouth daily, Disp: 60 tablet, Rfl: 3    potassium chloride (KLOR-CON M) 20 MEQ extended release tablet, Take 1 tablet by mouth 2 times daily, Disp: 180 tablet, Rfl: 1    oxyCODONE-acetaminophen (PERCOCET) 5-325 MG per tablet, Take 1-2 tablets by mouth every 6 hours as needed for Pain for up to 7 days. , Disp: 35 tablet, Rfl: 0    oxyCODONE-acetaminophen (PERCOCET) 5-325 MG per tablet, Take 1-2 tablets by mouth every 6 hours as needed for Pain for up to 7 days. , Disp: 35 tablet, Rfl: 0    senna-docusate (SENOKOT S) 8.6-50 MG per tablet, Take 2 tablets by mouth daily for 10 days, Disp: 60 tablet, Rfl: 3    venlafaxine (EFFEXOR) 37.5 MG tablet, Take 37.5 mg by mouth 2 times daily, Disp: , Rfl:     hydrOXYzine (ATARAX) 25 MG tablet, Take 1 tablet by mouth every 8 hours as needed for Anxiety, Disp: 60 tablet, Rfl: 1    glucose monitoring kit (FREESTYLE) monitoring kit, 1 kit by Does not apply route daily, Disp: 1 kit, Rfl: 0    Nutritional Supplements (ENSURE

## 2019-08-14 ENCOUNTER — OFFICE VISIT (OUTPATIENT)
Dept: BARIATRICS/WEIGHT MGMT | Age: 54
End: 2019-08-14

## 2019-08-14 VITALS
BODY MASS INDEX: 27.05 KG/M2 | HEIGHT: 66 IN | HEART RATE: 97 BPM | SYSTOLIC BLOOD PRESSURE: 110 MMHG | WEIGHT: 168.3 LBS | DIASTOLIC BLOOD PRESSURE: 74 MMHG

## 2019-08-14 DIAGNOSIS — E65 ABDOMINAL PANNUS: Primary | ICD-10-CM

## 2019-08-14 DIAGNOSIS — Z98.890 S/P ABDOMINOPLASTY: ICD-10-CM

## 2019-08-14 PROCEDURE — 99024 POSTOP FOLLOW-UP VISIT: CPT | Performed by: SURGERY

## 2019-08-14 RX ORDER — OXYCODONE HYDROCHLORIDE AND ACETAMINOPHEN 5; 325 MG/1; MG/1
1-2 TABLET ORAL EVERY 6 HOURS PRN
Qty: 18 TABLET | Refills: 0 | Status: SHIPPED | OUTPATIENT
Start: 2019-08-14 | End: 2019-08-21

## 2019-08-19 ENCOUNTER — HOSPITAL ENCOUNTER (OUTPATIENT)
Dept: GENERAL RADIOLOGY | Age: 54
Discharge: HOME | End: 2019-08-19
Payer: COMMERCIAL

## 2019-08-19 ENCOUNTER — HOSPITAL ENCOUNTER (OUTPATIENT)
Age: 54
Discharge: HOME | End: 2019-08-19
Payer: COMMERCIAL

## 2019-08-19 DIAGNOSIS — M25.511 RIGHT SHOULDER PAIN, UNSPECIFIED CHRONICITY: ICD-10-CM

## 2019-08-19 DIAGNOSIS — M25.512 LEFT SHOULDER PAIN, UNSPECIFIED CHRONICITY: ICD-10-CM

## 2019-08-19 DIAGNOSIS — M51.36 OTHER INTERVERTEBRAL DISC DEGENERATION, LUMBAR REGION: ICD-10-CM

## 2019-08-19 PROCEDURE — 73030 X-RAY EXAM OF SHOULDER: CPT

## 2019-08-19 PROCEDURE — 72110 X-RAY EXAM L-2 SPINE 4/>VWS: CPT

## 2019-09-13 ENCOUNTER — OFFICE VISIT (OUTPATIENT)
Dept: FAMILY MEDICINE CLINIC | Age: 54
End: 2019-09-13
Payer: COMMERCIAL

## 2019-09-13 VITALS
HEART RATE: 92 BPM | HEIGHT: 66 IN | SYSTOLIC BLOOD PRESSURE: 124 MMHG | RESPIRATION RATE: 18 BRPM | WEIGHT: 175.8 LBS | BODY MASS INDEX: 28.25 KG/M2 | TEMPERATURE: 97.5 F | OXYGEN SATURATION: 94 % | DIASTOLIC BLOOD PRESSURE: 70 MMHG

## 2019-09-13 DIAGNOSIS — J96.10 CHRONIC RESPIRATORY FAILURE, UNSPECIFIED WHETHER WITH HYPOXIA OR HYPERCAPNIA (HCC): ICD-10-CM

## 2019-09-13 DIAGNOSIS — J06.9 UPPER RESPIRATORY TRACT INFECTION, UNSPECIFIED TYPE: Primary | ICD-10-CM

## 2019-09-13 PROBLEM — Z98.890 H/O ABDOMINOPLASTY: Status: RESOLVED | Noted: 2018-12-21 | Resolved: 2019-09-13

## 2019-09-13 PROBLEM — R10.32 INGUINAL PAIN OF BOTH SIDES: Status: RESOLVED | Noted: 2018-08-31 | Resolved: 2019-09-13

## 2019-09-13 PROBLEM — R10.31 INGUINAL PAIN OF BOTH SIDES: Status: RESOLVED | Noted: 2018-08-31 | Resolved: 2019-09-13

## 2019-09-13 PROBLEM — R55 SYNCOPE AND COLLAPSE: Status: RESOLVED | Noted: 2019-03-04 | Resolved: 2019-09-13

## 2019-09-13 PROBLEM — M75.41 ROTATOR CUFF IMPINGEMENT SYNDROME, RIGHT: Status: RESOLVED | Noted: 2018-02-26 | Resolved: 2019-09-13

## 2019-09-13 PROBLEM — L98.492 GROIN INCISION ULCER, WITH FAT LAYER EXPOSED (HCC): Status: RESOLVED | Noted: 2018-08-31 | Resolved: 2019-09-13

## 2019-09-13 PROCEDURE — G8427 DOCREV CUR MEDS BY ELIG CLIN: HCPCS | Performed by: NURSE PRACTITIONER

## 2019-09-13 PROCEDURE — G8417 CALC BMI ABV UP PARAM F/U: HCPCS | Performed by: NURSE PRACTITIONER

## 2019-09-13 PROCEDURE — 3014F SCREEN MAMMO DOC REV: CPT | Performed by: NURSE PRACTITIONER

## 2019-09-13 PROCEDURE — 1036F TOBACCO NON-USER: CPT | Performed by: NURSE PRACTITIONER

## 2019-09-13 PROCEDURE — 96372 THER/PROPH/DIAG INJ SC/IM: CPT | Performed by: NURSE PRACTITIONER

## 2019-09-13 PROCEDURE — 99213 OFFICE O/P EST LOW 20 MIN: CPT | Performed by: NURSE PRACTITIONER

## 2019-09-13 PROCEDURE — 3017F COLORECTAL CA SCREEN DOC REV: CPT | Performed by: NURSE PRACTITIONER

## 2019-09-13 RX ORDER — PROMETHAZINE HYDROCHLORIDE AND CODEINE PHOSPHATE 6.25; 1 MG/5ML; MG/5ML
5 SYRUP ORAL NIGHTLY PRN
Qty: 50 ML | Refills: 0 | Status: SHIPPED | OUTPATIENT
Start: 2019-09-13 | End: 2019-09-23

## 2019-09-13 RX ORDER — METHYLPREDNISOLONE ACETATE 40 MG/ML
40 INJECTION, SUSPENSION INTRA-ARTICULAR; INTRALESIONAL; INTRAMUSCULAR; SOFT TISSUE ONCE
Status: COMPLETED | OUTPATIENT
Start: 2019-09-13 | End: 2019-09-13

## 2019-09-13 RX ADMIN — METHYLPREDNISOLONE ACETATE 40 MG: 40 INJECTION, SUSPENSION INTRA-ARTICULAR; INTRALESIONAL; INTRAMUSCULAR; SOFT TISSUE at 14:39

## 2019-09-13 ASSESSMENT — ENCOUNTER SYMPTOMS
BACK PAIN: 0
RHINORRHEA: 1
COUGH: 1
SHORTNESS OF BREATH: 0
ABDOMINAL DISTENTION: 0
NAUSEA: 0
VOMITING: 0

## 2019-09-13 NOTE — PROGRESS NOTES
DENTAL SURGERY      Teeth Extracted In Past    ELBOW SURGERY Bilateral Last Done In     Right Done Twice, Left Done Once left cubital tunnel release ; right 2014    ENDOSCOPY, COLON, DIAGNOSTIC  2017    HERNIA REPAIR  10/26/2017    hiatal hernia with gastrectomy    HYSTERECTOMY VAGINAL      LAPAROSCOPY      ID EXCISE EXCESS SKIN TISSUE,ABDOMEN N/A 2018    ABDOMINOPLASTY performed by Agnes Abreu MD at [address] Right     Dr. Veronika Knox Right 2017    Dr Charo Anne  10/26/2017    Robotic Laparoscopic, Pre Op Weight 237  Or 238 lbs.  TUBAL LIGATION      Done With         Review of Systems   Constitutional: Negative for fatigue and unexpected weight change. HENT: Positive for congestion, ear pain and rhinorrhea. Respiratory: Positive for cough. Negative for shortness of breath. Cardiovascular: Negative for chest pain, palpitations and leg swelling. Gastrointestinal: Negative for abdominal distention, nausea and vomiting. Musculoskeletal: Negative for back pain and gait problem. Neurological: Negative for dizziness, weakness and headaches. Psychiatric/Behavioral: Negative for agitation and sleep disturbance. The patient is not nervous/anxious. OBJECTIVE:  /70 (Site: Right Upper Arm, Position: Sitting, Cuff Size: Medium Adult)   Pulse 92   Temp 97.5 °F (36.4 °C) (Temporal)   Resp 18   Ht 5' 6\" (1.676 m)   Wt 175 lb 12.8 oz (79.7 kg)   SpO2 94%   BMI 28.37 kg/m²   BP Readings from Last 3 Encounters:   19 124/70   19 110/74   19 138/80     Wt Readings from Last 3 Encounters:   19 175 lb 12.8 oz (79.7 kg)   19 168 lb 4.8 oz (76.3 kg)   19 164 lb (74.4 kg)     Body mass index is 28.37 kg/m². Physical Exam   Constitutional: She is oriented to person, place, and time. She appears well-developed and well-nourished. No distress.    HENT:

## 2019-09-18 ENCOUNTER — OFFICE VISIT (OUTPATIENT)
Dept: FAMILY MEDICINE CLINIC | Age: 54
End: 2019-09-18
Payer: COMMERCIAL

## 2019-09-18 VITALS
HEART RATE: 84 BPM | BODY MASS INDEX: 28.05 KG/M2 | RESPIRATION RATE: 17 BRPM | OXYGEN SATURATION: 96 % | DIASTOLIC BLOOD PRESSURE: 85 MMHG | SYSTOLIC BLOOD PRESSURE: 119 MMHG | WEIGHT: 173.8 LBS

## 2019-09-18 DIAGNOSIS — E11.42 TYPE 2 DIABETES MELLITUS WITH DIABETIC POLYNEUROPATHY, WITHOUT LONG-TERM CURRENT USE OF INSULIN (HCC): Primary | ICD-10-CM

## 2019-09-18 DIAGNOSIS — M79.7 FIBROMYALGIA: ICD-10-CM

## 2019-09-18 DIAGNOSIS — G59 MONONEUROPATHY DUE TO UNDERLYING DISEASE: ICD-10-CM

## 2019-09-18 PROCEDURE — G8427 DOCREV CUR MEDS BY ELIG CLIN: HCPCS | Performed by: NURSE PRACTITIONER

## 2019-09-18 PROCEDURE — 2022F DILAT RTA XM EVC RTNOPTHY: CPT | Performed by: NURSE PRACTITIONER

## 2019-09-18 PROCEDURE — G8417 CALC BMI ABV UP PARAM F/U: HCPCS | Performed by: NURSE PRACTITIONER

## 2019-09-18 PROCEDURE — 3017F COLORECTAL CA SCREEN DOC REV: CPT | Performed by: NURSE PRACTITIONER

## 2019-09-18 PROCEDURE — 3014F SCREEN MAMMO DOC REV: CPT | Performed by: NURSE PRACTITIONER

## 2019-09-18 PROCEDURE — 3044F HG A1C LEVEL LT 7.0%: CPT | Performed by: NURSE PRACTITIONER

## 2019-09-18 PROCEDURE — 99214 OFFICE O/P EST MOD 30 MIN: CPT | Performed by: NURSE PRACTITIONER

## 2019-09-18 PROCEDURE — 1036F TOBACCO NON-USER: CPT | Performed by: NURSE PRACTITIONER

## 2019-09-18 ASSESSMENT — ENCOUNTER SYMPTOMS
SHORTNESS OF BREATH: 0
VOMITING: 0
NAUSEA: 0
ABDOMINAL DISTENTION: 0
BACK PAIN: 1

## 2019-09-18 NOTE — PROGRESS NOTES
 Transportation needs:     Medical: Not on file     Non-medical: Not on file   Tobacco Use    Smoking status: Former Smoker     Packs/day: 0.25     Years: 30.00     Pack years: 7.50     Types: Cigarettes, E-Cigarettes     Start date: 1986     Last attempt to quit: 2016     Years since quitting: 3.7    Smokeless tobacco: Never Used   Substance and Sexual Activity    Alcohol use: Yes     Comment: \"Occ.  Maybe Twice A Month\"    Drug use: No    Sexual activity: Yes     Partners: Male   Lifestyle    Physical activity:     Days per week: Not on file     Minutes per session: Not on file    Stress: Not on file   Relationships    Social connections:     Talks on phone: Not on file     Gets together: Not on file     Attends Alevism service: Not on file     Active member of club or organization: Not on file     Attends meetings of clubs or organizations: Not on file     Relationship status: Not on file    Intimate partner violence:     Fear of current or ex partner: Not on file     Emotionally abused: Not on file     Physically abused: Not on file     Forced sexual activity: Not on file   Other Topics Concern    Not on file   Social History Narrative    Not on file     Family History   Problem Relation Age of Onset    Heart Attack Father     Heart Disease Father     Early Death Father 46        Heart Attack    Alcohol Abuse Father     Substance Abuse Father         Alcoholic    Arthritis Mother     Heart Disease Mother     Diabetes Mother     Cancer Mother         \"Kidney Cancer\"    High Blood Pressure Mother     High Blood Pressure Sister     Diabetes Brother     Early Death Daughter 21        \"Killed In A Car Accident\"     Past Surgical History:   Procedure Laterality Date    ABDOMEN SURGERY N/A 1/14/2019    SECONDARY CLOSURE OF DEHISCED ABDOMINAL WOUND performed by Remington Gonzales MD at [address] 1/24/2019    [address] performed by Katarina Wright Tray Shafer MD at [facility] ABDOMINOPLASTY  2018    ABDOMINOPLASTY N/A 2019    ABDOMINOPLASTY REVISION performed by Mary Kate Weeks MD at [address] [facility] [address] Right 2017    Dr. Vanessa Dumont (Right)   [address]    Tubal Ligation Also Done In     COLONOSCOPY  2017    Polyp Removed    DENTAL SURGERY      Teeth Extracted In Past    ELBOW SURGERY Bilateral Last Done In     Right Done Twice, Left Done Once left cubital tunnel release ; right 2014    ENDOSCOPY, COLON, DIAGNOSTIC  2017    HERNIA REPAIR  10/26/2017    hiatal hernia with gastrectomy    HYSTERECTOMY VAGINAL      LAPAROSCOPY      [place] EXCISE EXCESS SKIN TISSUE,ABDOMEN N/A 2018    ABDOMINOPLASTY performed by Mary Kate Weeks MD at [address] Right 2015    Dr. Neema Aguila Right 2017    Dr Alex Biswas  10/26/2017    Robotic Laparoscopic, Pre Op Weight 237  Or 238 lbs.  TUBAL LIGATION      Done With         Review of Systems   Constitutional: Negative for fatigue and unexpected weight change. HENT: Negative for congestion. Respiratory: Negative for shortness of breath. Cardiovascular: Negative for chest pain, palpitations and leg swelling. Gastrointestinal: Negative for abdominal distention, nausea and vomiting. Musculoskeletal: Positive for arthralgias, back pain and myalgias. Negative for gait problem. Neurological: Positive for numbness. Negative for dizziness, weakness and headaches. Pain and numbness intermittent to hands and feet   Psychiatric/Behavioral: Negative for agitation, sleep disturbance and suicidal ideas. The patient is nervous/anxious.         OBJECTIVE:  /85 (Site: Left Upper Arm, Position: Sitting, Cuff Size: Medium Adult)   Pulse 84   Resp 17   Wt 173 lb 12.8 oz (78.8 kg)   SpO2 96%   BMI 28.05 kg/m²   BP Readings from Last 3

## 2019-10-16 ENCOUNTER — TELEPHONE (OUTPATIENT)
Dept: BARIATRICS/WEIGHT MGMT | Age: 54
End: 2019-10-16

## 2019-10-22 ENCOUNTER — TELEPHONE (OUTPATIENT)
Dept: FAMILY MEDICINE CLINIC | Age: 54
End: 2019-10-22

## 2019-10-30 ENCOUNTER — TELEPHONE (OUTPATIENT)
Dept: FAMILY MEDICINE CLINIC | Age: 54
End: 2019-10-30

## 2019-10-30 ENCOUNTER — OFFICE VISIT (OUTPATIENT)
Dept: BARIATRICS/WEIGHT MGMT | Age: 54
End: 2019-10-30
Payer: COMMERCIAL

## 2019-10-30 VITALS
HEIGHT: 66 IN | WEIGHT: 173.7 LBS | BODY MASS INDEX: 27.92 KG/M2 | SYSTOLIC BLOOD PRESSURE: 130 MMHG | DIASTOLIC BLOOD PRESSURE: 80 MMHG | HEART RATE: 85 BPM

## 2019-10-30 DIAGNOSIS — Z98.890 S/P ABDOMINOPLASTY: Primary | ICD-10-CM

## 2019-10-30 DIAGNOSIS — Z98.84 STATUS POST LAPAROSCOPIC SLEEVE GASTRECTOMY: ICD-10-CM

## 2019-10-30 PROCEDURE — 99213 OFFICE O/P EST LOW 20 MIN: CPT | Performed by: SURGERY

## 2019-10-30 PROCEDURE — G8427 DOCREV CUR MEDS BY ELIG CLIN: HCPCS | Performed by: SURGERY

## 2019-10-30 PROCEDURE — G8417 CALC BMI ABV UP PARAM F/U: HCPCS | Performed by: SURGERY

## 2019-10-30 PROCEDURE — 3017F COLORECTAL CA SCREEN DOC REV: CPT | Performed by: SURGERY

## 2019-10-30 PROCEDURE — G9899 SCRN MAM PERF RSLTS DOC: HCPCS | Performed by: SURGERY

## 2019-10-30 PROCEDURE — 1036F TOBACCO NON-USER: CPT | Performed by: SURGERY

## 2019-10-30 PROCEDURE — G8484 FLU IMMUNIZE NO ADMIN: HCPCS | Performed by: SURGERY

## 2019-10-30 ASSESSMENT — ENCOUNTER SYMPTOMS
NAUSEA: 0
ABDOMINAL PAIN: 0
VOICE CHANGE: 0
COLOR CHANGE: 0
DIARRHEA: 0
BLOOD IN STOOL: 0
CONSTIPATION: 0
SORE THROAT: 0
VOMITING: 0
SHORTNESS OF BREATH: 0
TROUBLE SWALLOWING: 0
WHEEZING: 0
ANAL BLEEDING: 0
COUGH: 0
PHOTOPHOBIA: 0

## 2019-11-05 DIAGNOSIS — E11.42 TYPE 2 DIABETES MELLITUS WITH DIABETIC POLYNEUROPATHY, WITHOUT LONG-TERM CURRENT USE OF INSULIN (HCC): Chronic | ICD-10-CM

## 2019-11-05 RX ORDER — METFORMIN HYDROCHLORIDE 500 MG/1
TABLET, EXTENDED RELEASE ORAL
Qty: 60 TABLET | Refills: 2 | Status: SHIPPED | OUTPATIENT
Start: 2019-11-05 | End: 2020-01-07

## 2019-12-13 ENCOUNTER — TELEPHONE (OUTPATIENT)
Dept: FAMILY MEDICINE CLINIC | Age: 54
End: 2019-12-13

## 2020-01-03 ENCOUNTER — HOSPITAL ENCOUNTER (EMERGENCY)
Age: 55
Discharge: HOME | End: 2020-01-03
Payer: COMMERCIAL

## 2020-01-03 VITALS
DIASTOLIC BLOOD PRESSURE: 77 MMHG | RESPIRATION RATE: 16 BRPM | OXYGEN SATURATION: 96 % | HEART RATE: 88 BPM | SYSTOLIC BLOOD PRESSURE: 113 MMHG | TEMPERATURE: 98 F

## 2020-01-03 PROCEDURE — 99282 EMERGENCY DEPT VISIT SF MDM: CPT

## 2020-01-03 RX ORDER — HYDROCODONE BITARTRATE AND ACETAMINOPHEN 5; 325 MG/1; MG/1
1 TABLET ORAL EVERY 6 HOURS PRN
Qty: 10 TABLET | Refills: 0 | Status: SHIPPED | OUTPATIENT
Start: 2020-01-03 | End: 2020-01-07 | Stop reason: ALTCHOICE

## 2020-01-03 RX ORDER — DOXYCYCLINE 100 MG/1
100 TABLET ORAL 2 TIMES DAILY
Qty: 14 TABLET | Refills: 0 | Status: SHIPPED | OUTPATIENT
Start: 2020-01-03 | End: 2020-01-10

## 2020-01-03 ASSESSMENT — PAIN SCALES - GENERAL: PAINLEVEL_OUTOF10: 8

## 2020-01-03 ASSESSMENT — PAIN DESCRIPTION - PAIN TYPE: TYPE: ACUTE PAIN

## 2020-01-03 ASSESSMENT — PAIN DESCRIPTION - LOCATION: LOCATION: BREAST

## 2020-01-03 NOTE — ED PROVIDER NOTES
the lungs daily 1 Inhaler 5    VALUE PLUS LANCETS THIN 26G MISC 1 EACH BY DOES NOT APPLY ROUTE DAILY 100 each 5    albuterol sulfate HFA (PROAIR HFA) 108 (90 Base) MCG/ACT inhaler Inhale 2 puffs into the lungs every 6 hours as needed for Wheezing 1 Inhaler 5    fluticasone-vilanterol (BREO ELLIPTA) 100-25 MCG/INH AEPB inhaler Inhale 1 puff into the lungs daily 1 each 5    ALPRAZolam (XANAX) 0.5 MG tablet Take 0.5 mg by mouth as needed for Sleep. .         ALLERGIES    No Known Allergies    FAMILY HISTORY/SOCIAL HISTORY    Family History   Problem Relation Age of Onset    Heart Attack Father     Heart Disease Father     Early Death Father 46        Heart Attack    Alcohol Abuse Father     Substance Abuse Father         Alcoholic    Arthritis Mother     Heart Disease Mother     Diabetes Mother     Cancer Mother         \"Kidney Cancer\"    High Blood Pressure Mother     High Blood Pressure Sister     Diabetes Brother     Early Death Daughter 21        \"Killed In A Car Accident\"     Social History     Socioeconomic History    Marital status: Single     Spouse name: None    Number of children: None    Years of education: None    Highest education level: None   Occupational History    Occupation: unemployed   Social Needs    Financial resource strain: None    Food insecurity:     Worry: None     Inability: None    Transportation needs:     Medical: None     Non-medical: None   Tobacco Use    Smoking status: Former Smoker     Packs/day: 0.25     Years: 30.00     Pack years: 7.50     Types: Cigarettes, E-Cigarettes     Start date:      Last attempt to quit: 2016     Years since quittin.0    Smokeless tobacco: Never Used   Substance and Sexual Activity    Alcohol use: Yes     Comment: \"Occ.  Maybe Twice A Month\"    Drug use: No    Sexual activity: Yes     Partners: Male   Lifestyle    Physical activity:     Days per week: None     Minutes per session: None    Stress: None   Relationships  Social connections:     Talks on phone: None     Gets together: None     Attends Sikh service: None     Active member of club or organization: None     Attends meetings of clubs or organizations: None     Relationship status: None    Intimate partner violence:     Fear of current or ex partner: None     Emotionally abused: None     Physically abused: None     Forced sexual activity: None   Other Topics Concern    None   Social History Narrative    None       PHYSICAL EXAM    VITAL SIGNS: /77   Pulse 88   Temp 98 °F (36.7 °C) (Oral)   Resp 16   SpO2 96%    Constitutional:  Well developed, Well nourished  HENT:  Atraumatic, Normocephalic, PERRL, no eye drainage noted, bilateral external ears normal, no sinus tenderness, nose normal, oropharynx moist, no pharyngeal exudates. Neck- supple. No adenopathy. Respiratory:  No retractions, lungs are clear   Cardiovascular:  Heart rate regular, no JVD  GI:  Soft, No tenderness   Musculoskeletal:  No acute bony deformity, no obvious joint effusion   Integument:  No cyanosis, Indurated macular with induration to the medial aspect of the right breast fold near the chest wall. Is tender to palpation. Does not appear cystic in nature, no obvious fluctuance, does have a small head at the maximal point of tenderness. No no drainage, ulcerations, maceration. Patient's right breast does have some chronic inflammatory changes. No obvious nipple inversion. No blood from the nipple no discharge. No obvious signs of inflammatory breast disease. Vascular: Dorsalis pedis pulses 2+ equal bilaterally  Neurologic:  Alert & oriented, no slurred speech. ED COURSE & MEDICAL DECISION MAKING    Patient presents as above. On evaluation there appears to be a clogged mammary duct with infection versus developing abscess. No obvious fluctuance on exam, more induration. Is tender. No sternal tenderness.   Does have chronic inflammatory change changes to the underside

## 2020-01-07 ENCOUNTER — TELEPHONE (OUTPATIENT)
Dept: SURGERY | Age: 55
End: 2020-01-07

## 2020-01-07 ENCOUNTER — OFFICE VISIT (OUTPATIENT)
Dept: FAMILY MEDICINE CLINIC | Age: 55
End: 2020-01-07
Payer: COMMERCIAL

## 2020-01-07 ENCOUNTER — HOSPITAL ENCOUNTER (OUTPATIENT)
Age: 55
Setting detail: SPECIMEN
Discharge: HOME | End: 2020-01-07
Payer: COMMERCIAL

## 2020-01-07 ENCOUNTER — OFFICE VISIT (OUTPATIENT)
Dept: SURGERY | Age: 55
End: 2020-01-07
Payer: COMMERCIAL

## 2020-01-07 VITALS
SYSTOLIC BLOOD PRESSURE: 120 MMHG | HEART RATE: 73 BPM | BODY MASS INDEX: 28.08 KG/M2 | RESPIRATION RATE: 18 BRPM | WEIGHT: 174 LBS | DIASTOLIC BLOOD PRESSURE: 84 MMHG

## 2020-01-07 VITALS
WEIGHT: 174.8 LBS | HEART RATE: 98 BPM | SYSTOLIC BLOOD PRESSURE: 116 MMHG | HEIGHT: 66 IN | BODY MASS INDEX: 28.09 KG/M2 | OXYGEN SATURATION: 95 % | DIASTOLIC BLOOD PRESSURE: 68 MMHG

## 2020-01-07 LAB — HBA1C MFR BLD: 7.2 %

## 2020-01-07 PROCEDURE — 1036F TOBACCO NON-USER: CPT | Performed by: SURGERY

## 2020-01-07 PROCEDURE — 99214 OFFICE O/P EST MOD 30 MIN: CPT | Performed by: NURSE PRACTITIONER

## 2020-01-07 PROCEDURE — 3017F COLORECTAL CA SCREEN DOC REV: CPT | Performed by: NURSE PRACTITIONER

## 2020-01-07 PROCEDURE — 87073 CULTURE BACTERIA ANAEROBIC: CPT

## 2020-01-07 PROCEDURE — 2022F DILAT RTA XM EVC RTNOPTHY: CPT | Performed by: NURSE PRACTITIONER

## 2020-01-07 PROCEDURE — G8427 DOCREV CUR MEDS BY ELIG CLIN: HCPCS | Performed by: NURSE PRACTITIONER

## 2020-01-07 PROCEDURE — G8427 DOCREV CUR MEDS BY ELIG CLIN: HCPCS | Performed by: SURGERY

## 2020-01-07 PROCEDURE — 87071 CULTURE AEROBIC QUANT OTHER: CPT

## 2020-01-07 PROCEDURE — G8417 CALC BMI ABV UP PARAM F/U: HCPCS | Performed by: NURSE PRACTITIONER

## 2020-01-07 PROCEDURE — G8484 FLU IMMUNIZE NO ADMIN: HCPCS | Performed by: NURSE PRACTITIONER

## 2020-01-07 PROCEDURE — G8417 CALC BMI ABV UP PARAM F/U: HCPCS | Performed by: SURGERY

## 2020-01-07 PROCEDURE — 3017F COLORECTAL CA SCREEN DOC REV: CPT | Performed by: SURGERY

## 2020-01-07 PROCEDURE — G8484 FLU IMMUNIZE NO ADMIN: HCPCS | Performed by: SURGERY

## 2020-01-07 PROCEDURE — 96372 THER/PROPH/DIAG INJ SC/IM: CPT | Performed by: NURSE PRACTITIONER

## 2020-01-07 PROCEDURE — 99202 OFFICE O/P NEW SF 15 MIN: CPT | Performed by: SURGERY

## 2020-01-07 PROCEDURE — 1036F TOBACCO NON-USER: CPT | Performed by: NURSE PRACTITIONER

## 2020-01-07 PROCEDURE — 83036 HEMOGLOBIN GLYCOSYLATED A1C: CPT | Performed by: NURSE PRACTITIONER

## 2020-01-07 RX ORDER — KETOROLAC TROMETHAMINE 30 MG/ML
60 INJECTION, SOLUTION INTRAMUSCULAR; INTRAVENOUS ONCE
Status: COMPLETED | OUTPATIENT
Start: 2020-01-07 | End: 2020-01-07

## 2020-01-07 RX ORDER — ALOGLIPTIN 6.25 MG/1
6.25 TABLET, FILM COATED ORAL DAILY
Qty: 30 TABLET | Refills: 2 | Status: SHIPPED | OUTPATIENT
Start: 2020-01-07 | End: 2020-04-22 | Stop reason: SDUPTHER

## 2020-01-07 RX ADMIN — KETOROLAC TROMETHAMINE 60 MG: 30 INJECTION, SOLUTION INTRAMUSCULAR; INTRAVENOUS at 09:53

## 2020-01-07 ASSESSMENT — ENCOUNTER SYMPTOMS
ABDOMINAL DISTENTION: 0
VOMITING: 0
NAUSEA: 0
SHORTNESS OF BREATH: 0
BACK PAIN: 0

## 2020-01-07 ASSESSMENT — PATIENT HEALTH QUESTIONNAIRE - PHQ9
7. TROUBLE CONCENTRATING ON THINGS, SUCH AS READING THE NEWSPAPER OR WATCHING TELEVISION: 1
8. MOVING OR SPEAKING SO SLOWLY THAT OTHER PEOPLE COULD HAVE NOTICED. OR THE OPPOSITE, BEING SO FIGETY OR RESTLESS THAT YOU HAVE BEEN MOVING AROUND A LOT MORE THAN USUAL: 0
SUM OF ALL RESPONSES TO PHQ QUESTIONS 1-9: 6
SUM OF ALL RESPONSES TO PHQ9 QUESTIONS 1 & 2: 1
2. FEELING DOWN, DEPRESSED OR HOPELESS: 0
4. FEELING TIRED OR HAVING LITTLE ENERGY: 2
9. THOUGHTS THAT YOU WOULD BE BETTER OFF DEAD, OR OF HURTING YOURSELF: 0
3. TROUBLE FALLING OR STAYING ASLEEP: 2
5. POOR APPETITE OR OVEREATING: 0
6. FEELING BAD ABOUT YOURSELF - OR THAT YOU ARE A FAILURE OR HAVE LET YOURSELF OR YOUR FAMILY DOWN: 0
1. LITTLE INTEREST OR PLEASURE IN DOING THINGS: 1
SUM OF ALL RESPONSES TO PHQ QUESTIONS 1-9: 6
10. IF YOU CHECKED OFF ANY PROBLEMS, HOW DIFFICULT HAVE THESE PROBLEMS MADE IT FOR YOU TO DO YOUR WORK, TAKE CARE OF THINGS AT HOME, OR GET ALONG WITH OTHER PEOPLE: 1

## 2020-01-07 NOTE — PATIENT INSTRUCTIONS
I am committed to providing you the best care possible. If you receive a survey after visiting our office, please take time to share your experience concerning your office visit. These surveys are confidential and no health information about you is shared. I am eager to improve for you and I am counting on your feedback to help make that happen. We are scheduling you to see the surgeon to have the breast abscess treated. We will tell you when it is.

## 2020-01-07 NOTE — PROGRESS NOTES
Subjective:       Lulu Castle is a 48 y.o. female who presents for evaluation of a subcutaneous nodule located over the right breast medially. This has been present for 4 days. There has been discharge. Patient does have a history of epidermal inclusion cysts. She is currently on doxycycline.     Past Medical History:   Diagnosis Date    Anxiety     Arthritis     \"Back, Hands, Shoulders\"    Bronchitis Last Episode In Early 2000's    Chronic back pain     COPD (chronic obstructive pulmonary disease) ([address]     Sees Dr. Alexander Ragsdale Depression     Diabetes mellitus ([address] Dx 2008    Emphysema     Fibromyalgia     H/O abdominoplasty 12/21/2018    Hyperlipidemia     Hypertension     Inguinal pain of both sides 8/31/2018    Migraines Last Migraine 10-18    MRSA (methicillin resistant Staphylococcus aureus) Dx In 2012 Or 2013    Right Arm    Pap smear for cervical cancer screening 02/11/2008     Neg   Postive-Trichomonas    Pap smear for cervical cancer screening 03/25/2010    Neg    Pap smear for cervical cancer screening 07/18/2011    Neg    Piriformis syndrome 6/24/2016    Pneumonia Dx 2-18    Psoriasis     Rotator cuff impingement syndrome, right 2/26/2018    RTC repair Dr Paez Pi 2015  Treated by Dr Margi Cassidy 2/18/17  Arthrocentesis 12/19/17    Shortness of breath on exertion     Syncope and collapse 3/4/2019    Teeth missing     Upper And Lower    Wears glasses      Patient Active Problem List    Diagnosis Date Noted    DM (diabetes mellitus) ([address] 05/28/2013     Priority: Medium    Emphysema lung ([address] 05/28/2013     Priority: Medium    Skin ulcer with fat layer exposed ([address] 06/21/2019    Fibromyalgia 05/03/2019    S/P abdominoplasty 02/01/2019    Wound dehiscence 01/09/2019    Abdominal pannus 11/29/2018    Status post laparoscopic sleeve gastrectomy 11/02/2017    Hiatal hernia     Nicotine addiction 01/03/2017    Chronic respiratory failure ([address] 10/31/2016    Dyslipidemia Take 1 tablet by mouth 2 times daily for 7 days 14 tablet 0    mupirocin (BACTROBAN) 2 % ointment Apply topically to affected area 2-3 times daily. 1 g 0    tiotropium (SPIRIVA RESPIMAT) 2.5 MCG/ACT AERS inhaler Inhale 2 puffs into the lungs daily 1 Inhaler 5    furosemide (LASIX) 20 MG tablet Take 1 tablet by mouth daily 60 tablet 3    potassium chloride (KLOR-CON M) 20 MEQ extended release tablet Take 1 tablet by mouth 2 times daily 180 tablet 1    venlafaxine (EFFEXOR) 37.5 MG tablet Take 37.5 mg by mouth 2 times daily      glucose monitoring kit (FREESTYLE) monitoring kit 1 kit by Does not apply route daily 1 kit 0    atorvastatin (LIPITOR) 80 MG tablet Take 1 tablet by mouth daily 90 tablet 1    Multiple Vitamin (MVI, CELEBRATE, CHEWABLE TABLET) Take 1 tablet by mouth daily 30 tablet 5    traZODone (DESYREL) 50 MG tablet Take 1 tablet by mouth nightly (Patient taking differently: Take 100 mg by mouth nightly ) 30 tablet 2    omeprazole (PRILOSEC) 40 MG delayed release capsule Take 1 capsule by mouth daily 30 capsule 2    blood glucose test strips (FREESTYLE LITE) strip 1 each by In Vitro route daily As needed. 100 strip 11    tiotropium (SPIRIVA RESPIMAT) 1.25 MCG/ACT AERS inhaler Inhale 2 puffs into the lungs daily 1 Inhaler 5    VALUE PLUS LANCETS THIN 26G MISC 1 EACH BY DOES NOT APPLY ROUTE DAILY 100 each 5    albuterol sulfate HFA (PROAIR HFA) 108 (90 Base) MCG/ACT inhaler Inhale 2 puffs into the lungs every 6 hours as needed for Wheezing 1 Inhaler 5    fluticasone-vilanterol (BREO ELLIPTA) 100-25 MCG/INH AEPB inhaler Inhale 1 puff into the lungs daily 1 each 5    ALPRAZolam (XANAX) 0.5 MG tablet Take 0.5 mg by mouth as needed for Sleep. [place] Nutritional Supplements (ENSURE ACTIVE HIGH PROTEIN) LIQD Take 4 Cans by mouth 4 times daily as needed (Pre-op diet) 84 Can 0     No current facility-administered medications on file prior to visit.       No Known Allergies    Review of Systems  Pertinent items are noted in HPI. Objective:      /84   Pulse 73   Resp 18   Wt 174 lb (78.9 kg)   BMI 28.08 kg/m²   Physical Exam    Skin: 3 centimeter subcutaneous nodule over the medial right breast. The lesion is fully mobile. There is moderate erythema. There is moderate tenderness. Assessment:      Epidermal inclusion cyst of the right breast.      Plan:      1. I have discussed treatment options consisting of observation or excision under local anesthesia. 2. Patient is inclined to proceed with drainage. 3. Verbal patient instruction given.   4. Follow up in 2 weeks

## 2020-01-07 NOTE — PROGRESS NOTES
SUBJECTIVE:  Itz Rodríguez   1965   female   No Known Allergies    Chief Complaint   Patient presents with    Diabetes    Breast Mass     right breast        HPI   Counseling 1-2 monthly and sees psych every 3 months  Did not bring bottles in today  Was seen in ED 4 days ago for abscess to breast first encountered on that day. She was placed on antibiotic (doxycycline), bactroban and using warm soaks with no benefit. She was given a prescription for vicodin but did not get filled because she goes to pain management. She stopped the metformin last month because she heard warnings that it causes cancer.     Past Medical History:   Diagnosis Date    Anxiety     Arthritis     \"Back, Hands, Shoulders\"    Bronchitis Last Episode In Early 2000's    Chronic back pain     COPD [facility]ch[facility] obstructive [facility] disease) [facility]     Sees Dr. Latosha Holman Depression     Diabetes mellitus ([facility] Dx 2008    Emphysema     Fibromyalgia     H/O abdominoplasty 12/21/2018    Hyperlipidemia     Hypertension     Inguinal pain of both sides 8/31/2018    Migraines Last Migraine 10-18    MRSA (methicillin resistant Staphylococcus aureus) Dx In 2012 Or 2013    Right Arm    Pap smear for cervical cancer screening 02/11/2008     Neg   Postive-Trichomonas    Pap smear for cervical cancer screening 03/25/2010    Neg    Pap smear for cervical cancer screening 07/18/2011    Neg    Piriformis syndrome 6/24/2016    Pneumonia Dx 2-18    Psoriasis     Rotator cuff impingement syndrome, right 2/26/2018    RTC repair Dr Cris Mccullough 2015  Treated by Dr Deandre Colbert 2/18/17  Arthrocentesis 12/19/17    Shortness of breath on exertion     Syncope and collapse 3/4/2019    Teeth missing     Upper And Lower    Wears glasses      Social History     Socioeconomic History    Marital status: Single     Spouse name: Not on file    Number of children: Not on file    Years of education: Not on file    Highest education level: Not on file Occupational History    Occupation: unemployed   Social Needs    Financial resource strain: Not on file    Food insecurity:     Worry: Not on file     Inability: Not on file   [employer] needs:     Medical: Not on file     Non-medical: Not on file   Tobacco Use    Smoking status: Former Smoker     Packs/day: 0.25     Years: 30.00     Pack years: 7.50     Types: Cigarettes, E-Cigarettes     Start date:      Last attempt to quit: 2016     Years since quittin.0    Smokeless tobacco: Never Used   Substance and Sexual Activity    Alcohol use: Yes     Comment: \"Occ.  Maybe Twice A Month\"    Drug use: No    Sexual activity: Yes     Partners: Male   Lifestyle    Physical activity:     Days per week: Not on file     Minutes per session: Not on file    Stress: Not on file   Relationships    Social connections:     Talks on phone: Not on file     Gets together: Not on file     Attends Hindu service: Not on file     Active member of club or organization: Not on file     Attends meetings of clubs or organizations: Not on file     Relationship status: Not on file    Intimate partner violence:     Fear of current or ex partner: Not on file     Emotionally abused: Not on file     Physically abused: Not on file     Forced sexual activity: Not on file   Other Topics Concern    Not on file   Social History Narrative    Not on file     Family History   Problem Relation Age of Onset    Heart Attack Father     Heart Disease Father     Early Death Father 46        Heart Attack    Alcohol Abuse Father     Substance Abuse Father         Alcoholic    Arthritis Mother     Heart Disease Mother     Diabetes Mother     Cancer Mother         \"Kidney Cancer\"    High Blood Pressure Mother     High Blood Pressure Sister     Diabetes Brother     Early Death Daughter 21        \"Killed In A Car Accident\"     Past Surgical History:   Procedure Laterality Date    ABDOMEN SURGERY N/A 2019    SECONDARY Cuff Size: Medium Adult)   Pulse 98   Ht 5' 6\" (1.676 m)   Wt 174 lb 12.8 oz (79.3 kg)   SpO2 95%   BMI 28.21 kg/m²   BP Readings from Last 3 Encounters:   01/07/20 116/68   01/03/20 113/77   10/30/19 130/80     Wt Readings from Last 3 Encounters:   01/07/20 174 lb 12.8 oz (79.3 kg)   10/30/19 173 lb 11.2 oz (78.8 kg)   10/16/19 165 lb (74.8 kg)     Body mass index is 28.21 kg/m². Physical Exam  Vitals signs reviewed. Constitutional:       General: She is not in acute distress. Appearance: Normal appearance. She is well-developed. She is obese. She is not ill-appearing or toxic-appearing. HENT:      Head: Normocephalic and atraumatic. Right Ear: External ear normal.      Left Ear: External ear normal.      Nose: Nose normal.   Eyes:      Conjunctiva/sclera: Conjunctivae normal.      Pupils: Pupils are equal, round, and reactive to light. Neck:      Musculoskeletal: Normal range of motion and neck supple. Cardiovascular:      Rate and Rhythm: Normal rate and regular rhythm. Heart sounds: Normal heart sounds. Pulmonary:      Effort: Pulmonary effort is normal.      Breath sounds: Normal breath sounds. Abdominal:      General: Bowel sounds are normal.      Palpations: Abdomen is soft. Musculoskeletal: Normal range of motion. Skin:     General: Skin is warm and dry. Capillary Refill: Capillary refill takes less than 2 seconds. Findings: Lesion present. Comments: 1.5 cm diameter abscess right breast, tender to palpation   Neurological:      Mental Status: She is alert and oriented to person, place, and time. Deep Tendon Reflexes: Reflexes are normal and symmetric. Psychiatric:         Behavior: Behavior normal.         Thought Content: Thought content normal.         Judgment: Judgment normal.         ASSESSMENT/PLAN:    1.  Type 2 diabetes mellitus without complication, without long-term current use of insulin (HCC)  Discontinue metformin due to patient preference  Will start nesina as directed  - POCT glycosylated hemoglobin (Hb A1C)    2. Breast abscess  - [address] Roxana Fay MD, [facility]    3. Breast pain  - ketorolac (TORADOL) injection 60 mg      Orders Placed This Encounter   Procedures   106 Roxana Fay MD, [facility]     Referral Priority:   Urgent     Referral Type:   Eval and Treat     Referral Reason:   Specialty Services Required     Referred to Provider:   Jes Tompkins MD     Requested Specialty:   General Surgery     Number of Visits Requested:   1    POCT glycosylated hemoglobin (Hb A1C)     Current Outpatient Medications   Medication Sig Dispense Refill    alogliptin (NESINA) 6.25 MG TABS tablet Take 1 tablet by mouth daily 30 tablet 2    doxycycline monohydrate (ADOXA) 100 MG tablet Take 1 tablet by mouth 2 times daily for 7 days 14 tablet 0    mupirocin (BACTROBAN) 2 % ointment Apply topically to affected area 2-3 times daily.  1 g 0    tiotropium (SPIRIVA RESPIMAT) 2.5 MCG/ACT AERS inhaler Inhale 2 puffs into the lungs daily 1 Inhaler 5    furosemide (LASIX) 20 MG tablet Take 1 tablet by mouth daily 60 tablet 3    potassium chloride (KLOR-CON M) 20 MEQ extended release tablet Take 1 tablet by mouth 2 times daily 180 tablet 1    venlafaxine (EFFEXOR) 37.5 MG tablet Take 37.5 mg by mouth 2 times daily      glucose monitoring kit (FREESTYLE) monitoring kit 1 kit by Does not apply route daily 1 kit 0    atorvastatin (LIPITOR) 80 MG tablet Take 1 tablet by mouth daily 90 tablet 1    Multiple Vitamin (MVI, CELEBRATE, CHEWABLE TABLET) Take 1 tablet by mouth daily 30 tablet 5    traZODone (DESYREL) 50 MG tablet Take 1 tablet by mouth nightly (Patient taking differently: Take 100 mg by mouth nightly ) 30 tablet 2    omeprazole (PRILOSEC) 40 MG delayed release capsule Take 1 capsule by mouth daily 30 capsule 2    blood glucose test strips (FREESTYLE LITE) strip

## 2020-01-09 ENCOUNTER — OFFICE VISIT (OUTPATIENT)
Dept: SURGERY | Age: 55
End: 2020-01-09

## 2020-01-09 VITALS — SYSTOLIC BLOOD PRESSURE: 132 MMHG | RESPIRATION RATE: 16 BRPM | HEART RATE: 77 BPM | DIASTOLIC BLOOD PRESSURE: 88 MMHG

## 2020-01-09 PROCEDURE — 99024 POSTOP FOLLOW-UP VISIT: CPT | Performed by: SURGERY

## 2020-01-09 RX ORDER — DOXYCYCLINE HYCLATE 100 MG
100 TABLET ORAL 2 TIMES DAILY
Qty: 14 TABLET | Refills: 0 | Status: SHIPPED | OUTPATIENT
Start: 2020-01-09 | End: 2020-01-16

## 2020-01-11 LAB
CULTURE: NORMAL
Lab: NORMAL
SPECIMEN: NORMAL

## 2020-01-23 ENCOUNTER — OFFICE VISIT (OUTPATIENT)
Dept: SURGERY | Age: 55
End: 2020-01-23

## 2020-01-23 VITALS
SYSTOLIC BLOOD PRESSURE: 122 MMHG | OXYGEN SATURATION: 97 % | HEART RATE: 77 BPM | RESPIRATION RATE: 18 BRPM | DIASTOLIC BLOOD PRESSURE: 80 MMHG

## 2020-01-23 PROCEDURE — 99024 POSTOP FOLLOW-UP VISIT: CPT | Performed by: SURGERY

## 2020-01-23 NOTE — PROGRESS NOTES
Subjective:       Wyatt Davenport is a 48 y.o. female who presents for evaluation of a subcutaneous nodule located over the right breast medially. This has been present for 10 days. There has been discharge. Patient does have a history of epidermal inclusion cysts. She is currently on doxycycline and the infection has resolved. The residual cyst needs to be removed. She does not want to be awake when we do this.     Past Medical History:   Diagnosis Date    Anxiety     Arthritis     \"Back, Hands, Shoulders\"    Bronchitis Last Episode In Early 2000's    Chronic back pain     COPD (chronic obstructive pulmonary disease) ([address]     Sees Dr. Lisa Arguello Depression     Diabetes mellitus ([address] Dx 2008    Emphysema     Fibromyalgia     H/O abdominoplasty 12/21/2018    Hyperlipidemia     Hypertension     Inguinal pain of both sides 8/31/2018    Migraines Last Migraine 10-18    MRSA (methicillin resistant Staphylococcus aureus) Dx In 2012 Or 2013    Right Arm    Pap smear for cervical cancer screening 02/11/2008     Neg   Postive-Trichomonas    Pap smear for cervical cancer screening 03/25/2010    Neg    Pap smear for cervical cancer screening 07/18/2011    Neg    Piriformis syndrome 6/24/2016    Pneumonia Dx 2-18    Psoriasis     Rotator cuff impingement syndrome, right 2/26/2018    RTC repair Dr Corinne Hernández 2015  Treated by Dr Ritesh Domingo 2/18/17  Arthrocentesis 12/19/17    Shortness of breath on exertion     Syncope and collapse 3/4/2019    Teeth missing     Upper And Lower    Wears glasses      Patient Active Problem List    Diagnosis Date Noted    DM (diabetes mellitus) ([address] 05/28/2013     Priority: Medium    Emphysema lung ([address] 05/28/2013     Priority: Medium    Skin ulcer with fat layer exposed ([address] 06/21/2019    Fibromyalgia 05/03/2019    S/P abdominoplasty 02/01/2019    Wound dehiscence 01/09/2019    Abdominal pannus 11/29/2018    Status post laparoscopic sleeve gastrectomy 11/02/2017    Hiatal hernia     Nicotine addiction 2017    Chronic respiratory failure ([facility] 10/31/2016    Dyslipidemia 2016    Gastroesophageal reflux disease with esophagitis 2016    Snores 2016    Anxiety 2016    Arthralgia of right hip 2016    Chronic right-sided low back pain with sciatica 2016    COPD, severe ([address] 2014    History of MRSA infection 2013     Past Surgical History:   Procedure Laterality Date    ABDOMEN SURGERY N/A 2019    SECONDARY CLOSURE OF DEHISCED ABDOMINAL WOUND performed by Debbie Haider MD at [facility] N/A 2019    IRRIGATION OF LOWER ABDOMINAL WOUND performed by Debbie Haider MD at [facility] ABDOMINOPLASTY  2018    ABDOMINOPLASTY N/A 2019    ABDOMINOPLASTY REVISION performed by Debbie Haider MD at [address] [place] [address] & [place] Right     Dr. Maradiaga (Right)   [address]     Tubal Ligation Also Done In     COLONOSCOPY  2017    Polyp Removed    DENTAL SURGERY      Teeth Extracted In Past    ELBOW SURGERY Bilateral Last Done In     Right Done Twice, Left Done Once left cubital tunnel release ; right     ENDOSCOPY, COLON, DIAGNOSTIC  2017    HERNIA REPAIR  10/26/2017    hiatal hernia with gastrectomy    HYSTERECTOMY VAGINAL      LAPAROSCOPY      AL EXCISE EXCESS SKIN TISSUE,ABDOMEN N/A 2018    ABDOMINOPLASTY performed by Debbie Haider MD at [address] [place] [address] Right     Dr. Dave Duval Right 2017    Dr Osorio [facility]  10/26/2017    Robotic Laparoscopic, Pre Op Weight 237  Or 238 lbs.     TUBAL LIGATION      Done With      Family History   Problem Relation Age of Onset    Heart Attack Father     Heart Disease Father     Early Death Father 46        Heart Attack    Alcohol Abuse Father     Substance Abuse Father Alcoholic    Arthritis Mother     Heart Disease Mother     Diabetes Mother     Cancer Mother         \"Kidney Cancer\"    High Blood Pressure Mother     High Blood Pressure Sister     Diabetes Brother     Early Death Daughter 21        \"Killed In A Car Accident\"     Social History     Socioeconomic History    Marital status: Single     Spouse name: None    Number of children: None    Years of education: None    Highest education level: None   Occupational History    Occupation: unemployed   Social Needs    Financial resource strain: None    Food insecurity:     Worry: None     Inability: None    Transportation needs:     Medical: None     Non-medical: None   Tobacco Use    Smoking status: Former Smoker     Packs/day: 0.25     Years: 30.00     Pack years: 7.50     Types: Cigarettes, E-Cigarettes     Start date:      Last attempt to quit: 2016     Years since quittin.0    Smokeless tobacco: Never Used   Substance and Sexual Activity    Alcohol use: Yes     Comment: \"Occ.  Maybe Twice A Month\"    Drug use: No    Sexual activity: Yes     Partners: Male   Lifestyle    Physical activity:     Days per week: None     Minutes per session: None    Stress: None   Relationships    Social connections:     Talks on phone: None     Gets together: None     Attends Jain service: None     Active member of club or organization: None     Attends meetings of clubs or organizations: None     Relationship status: None    Intimate partner violence:     Fear of current or ex partner: None     Emotionally abused: None     Physically abused: None     Forced sexual activity: None   Other Topics Concern    None   Social History Narrative    None     Current Outpatient Medications   Medication Sig Dispense Refill    alogliptin (NESINA) 6.25 MG TABS tablet Take 1 tablet by mouth daily 30 tablet 2    doxycycline monohydrate (ADOXA) 100 MG tablet Take 1 tablet by mouth 2 times daily for 7 days 14 tablet 0    mupirocin (BACTROBAN) 2 % ointment Apply topically to affected area 2-3 times daily. 1 g 0    tiotropium (SPIRIVA RESPIMAT) 2.5 MCG/ACT AERS inhaler Inhale 2 puffs into the lungs daily 1 Inhaler 5    furosemide (LASIX) 20 MG tablet Take 1 tablet by mouth daily 60 tablet 3    potassium chloride (KLOR-CON M) 20 MEQ extended release tablet Take 1 tablet by mouth 2 times daily 180 tablet 1    venlafaxine (EFFEXOR) 37.5 MG tablet Take 37.5 mg by mouth 2 times daily      glucose monitoring kit (FREESTYLE) monitoring kit 1 kit by Does not apply route daily 1 kit 0    atorvastatin (LIPITOR) 80 MG tablet Take 1 tablet by mouth daily 90 tablet 1    Multiple Vitamin (MVI, CELEBRATE, CHEWABLE TABLET) Take 1 tablet by mouth daily 30 tablet 5    traZODone (DESYREL) 50 MG tablet Take 1 tablet by mouth nightly (Patient taking differently: Take 100 mg by mouth nightly ) 30 tablet 2    omeprazole (PRILOSEC) 40 MG delayed release capsule Take 1 capsule by mouth daily 30 capsule 2    blood glucose test strips (FREESTYLE LITE) strip 1 each by In Vitro route daily As needed. 100 strip 11    tiotropium (SPIRIVA RESPIMAT) 1.25 MCG/ACT AERS inhaler Inhale 2 puffs into the lungs daily 1 Inhaler 5    VALUE PLUS LANCETS THIN 26G MISC 1 EACH BY DOES NOT APPLY ROUTE DAILY 100 each 5    albuterol sulfate HFA (PROAIR HFA) 108 (90 Base) MCG/ACT inhaler Inhale 2 puffs into the lungs every 6 hours as needed for Wheezing 1 Inhaler 5    fluticasone-vilanterol (BREO ELLIPTA) 100-25 MCG/INH AEPB inhaler Inhale 1 puff into the lungs daily 1 each 5    ALPRAZolam (XANAX) 0.5 MG tablet Take 0.5 mg by mouth as needed for Sleep. Jaqueline Blunt Nutritional Supplements (ENSURE ACTIVE HIGH PROTEIN) LIQD Take 4 Cans by mouth 4 times daily as needed (Pre-op diet) 84 Can 0     No current facility-administered medications for this visit.       Current Outpatient Medications on File Prior to Visit   Medication Sig Dispense Refill    alogliptin (NESINA) 6.25 MG TABS tablet Take 1 tablet by mouth daily 30 tablet 2    doxycycline monohydrate (ADOXA) 100 MG tablet Take 1 tablet by mouth 2 times daily for 7 days 14 tablet 0    mupirocin (BACTROBAN) 2 % ointment Apply topically to affected area 2-3 times daily. 1 g 0    tiotropium (SPIRIVA RESPIMAT) 2.5 MCG/ACT AERS inhaler Inhale 2 puffs into the lungs daily 1 Inhaler 5    furosemide (LASIX) 20 MG tablet Take 1 tablet by mouth daily 60 tablet 3    potassium chloride (KLOR-CON M) 20 MEQ extended release tablet Take 1 tablet by mouth 2 times daily 180 tablet 1    venlafaxine (EFFEXOR) 37.5 MG tablet Take 37.5 mg by mouth 2 times daily      glucose monitoring kit (FREESTYLE) monitoring kit 1 kit by Does not apply route daily 1 kit 0    atorvastatin (LIPITOR) 80 MG tablet Take 1 tablet by mouth daily 90 tablet 1    Multiple Vitamin (MVI, CELEBRATE, CHEWABLE TABLET) Take 1 tablet by mouth daily 30 tablet 5    traZODone (DESYREL) 50 MG tablet Take 1 tablet by mouth nightly (Patient taking differently: Take 100 mg by mouth nightly ) 30 tablet 2    omeprazole (PRILOSEC) 40 MG delayed release capsule Take 1 capsule by mouth daily 30 capsule 2    blood glucose test strips (FREESTYLE LITE) strip 1 each by In Vitro route daily As needed. 100 strip 11    tiotropium (SPIRIVA RESPIMAT) 1.25 MCG/ACT AERS inhaler Inhale 2 puffs into the lungs daily 1 Inhaler 5    VALUE PLUS LANCETS THIN 26G MISC 1 EACH BY DOES NOT APPLY ROUTE DAILY 100 each 5    albuterol sulfate HFA (PROAIR HFA) 108 (90 Base) MCG/ACT inhaler Inhale 2 puffs into the lungs every 6 hours as needed for Wheezing 1 Inhaler 5    fluticasone-vilanterol (BREO ELLIPTA) 100-25 MCG/INH AEPB inhaler Inhale 1 puff into the lungs daily 1 each 5    ALPRAZolam (XANAX) 0.5 MG tablet Take 0.5 mg by mouth as needed for Sleep.  Neema Friend Nutritional Supplements (ENSURE ACTIVE HIGH PROTEIN) LIQD Take 4 Cans by mouth 4 times daily as needed (Pre-op diet) 84 Can 0     No current facility-administered medications on file prior to visit. No Known Allergies    Review of Systems  Pertinent items are noted in HPI. Objective:      /84   Pulse 73   Resp 18   Wt 174 lb (78.9 kg)   BMI 28.08 kg/m²   Physical Exam    Skin: 3 centimeter subcutaneous nodule over the medial right breast. The lesion is fully mobile. There is no drainage and there is no fluctuance. Assessment:     Infected sebaceous cyst, now ready for excision    Plan: Will plan excision under MAC. The risks, benefits and alternatives to the planned procedure were discussed. Patient expressed an understanding and is willing to proceed.

## 2020-01-24 ENCOUNTER — OFFICE VISIT (OUTPATIENT)
Dept: BARIATRICS/WEIGHT MGMT | Age: 55
End: 2020-01-24
Payer: COMMERCIAL

## 2020-01-24 ENCOUNTER — ANESTHESIA EVENT (OUTPATIENT)
Dept: OPERATING ROOM | Age: 55
End: 2020-01-24
Payer: COMMERCIAL

## 2020-01-24 VITALS
RESPIRATION RATE: 18 BRPM | HEART RATE: 78 BPM | HEIGHT: 66 IN | SYSTOLIC BLOOD PRESSURE: 125 MMHG | WEIGHT: 169.4 LBS | DIASTOLIC BLOOD PRESSURE: 84 MMHG | BODY MASS INDEX: 27.23 KG/M2

## 2020-01-24 PROCEDURE — G8484 FLU IMMUNIZE NO ADMIN: HCPCS | Performed by: SURGERY

## 2020-01-24 PROCEDURE — G8417 CALC BMI ABV UP PARAM F/U: HCPCS | Performed by: SURGERY

## 2020-01-24 PROCEDURE — 3017F COLORECTAL CA SCREEN DOC REV: CPT | Performed by: SURGERY

## 2020-01-24 PROCEDURE — G8427 DOCREV CUR MEDS BY ELIG CLIN: HCPCS | Performed by: SURGERY

## 2020-01-24 PROCEDURE — 1036F TOBACCO NON-USER: CPT | Performed by: SURGERY

## 2020-01-24 PROCEDURE — 99214 OFFICE O/P EST MOD 30 MIN: CPT | Performed by: SURGERY

## 2020-01-24 RX ORDER — TIZANIDINE 4 MG/1
4 TABLET ORAL EVERY 8 HOURS PRN
COMMUNITY
Start: 2020-01-17

## 2020-01-24 RX ORDER — DOXYCYCLINE HYCLATE 100 MG/1
CAPSULE ORAL
COMMUNITY
Start: 2020-01-09 | End: 2020-01-27 | Stop reason: ALTCHOICE

## 2020-01-24 RX ORDER — OXYCODONE AND ACETAMINOPHEN 7.5; 325 MG/1; MG/1
1 TABLET ORAL EVERY 8 HOURS PRN
COMMUNITY
Start: 2020-01-17

## 2020-01-24 ASSESSMENT — ENCOUNTER SYMPTOMS
DIARRHEA: 0
ANAL BLEEDING: 0
CONSTIPATION: 1
VOMITING: 0
COUGH: 0
ABDOMINAL PAIN: 0
COLOR CHANGE: 0
NAUSEA: 0
SORE THROAT: 0
BLOOD IN STOOL: 0
VOICE CHANGE: 0
WHEEZING: 0
PHOTOPHOBIA: 0
TROUBLE SWALLOWING: 0
DYSPNEA ACTIVITY LEVEL: AFTER AMBULATING 1 FLIGHT OF STAIRS
SHORTNESS OF BREATH: 0
SHORTNESS OF BREATH: 1

## 2020-01-24 NOTE — PROGRESS NOTES
1/24/20- Reminded patient pre-testing 1/27/20 @ 1000, arrival 0930. Bring insurance card, picture ID and list of medications. Beverly verbalized understanding.

## 2020-01-24 NOTE — ANESTHESIA PRE PROCEDURE
Department of Anesthesiology  Preprocedure Note       Name:  Crystal Quiroz   Age:  48 y.o.  :  1965                                          MRN:  [id number]         Date:  2020      Surgeon: Edgard Dumont):  Viridiana Renteria MD    Procedure: BREAST LESION BIOPSY EXCISION MASS RIGHT (Right )    Medications prior to admission:   Prior to Admission medications    Medication Sig Start Date End Date Taking? Authorizing Provider   alogliptin (NESINA) 6.25 MG TABS tablet Take 1 tablet by mouth daily 20   YESSICA Reis CNP   tiotropium (SPIRIVA RESPIMAT) 2.5 MCG/ACT AERS inhaler Inhale 2 puffs into the lungs daily 10/16/19   Rik Clifford MD   furosemide (LASIX) 20 MG tablet Take 1 tablet by mouth daily 19   Aleida Pierce MD   potassium chloride (KLOR-CON M) 20 MEQ extended release tablet Take 1 tablet by mouth 2 times daily 19   Aelida Pierce MD   venlafaxine (EFFEXOR) 37.5 MG tablet Take 37.5 mg by mouth 2 times daily    Historical Provider, MD   glucose monitoring kit (FREESTYLE) monitoring kit 1 kit by Does not apply route daily 19   YESSICA Reis CNP   Nutritional Supplements (ENSURE ACTIVE HIGH PROTEIN) LIQD Take 4 Cans by mouth 4 times daily as needed (Pre-op diet) 19  Aleida Pierce MD   atorvastatin (LIPITOR) 80 MG tablet Take 1 tablet by mouth daily 5/10/19   YESSICA Reis CNP   Multiple Vitamin (MVI, CELEBRATE, CHEWABLE TABLET) Take 1 tablet by mouth daily 5/3/19   YESSICA Reis CNP   traZODone (DESYREL) 50 MG tablet Take 1 tablet by mouth nightly  Patient taking differently: Take 100 mg by mouth nightly  4/3/19   YESSICA Reis CNP   omeprazole (PRILOSEC) 40 MG delayed release capsule Take 1 capsule by mouth daily 4/3/19   YESSICA Reis CNP   blood glucose test strips (FREESTYLE LITE) strip 1 each by In Vitro route daily As needed.  4/3/19   Arleen Solomon APRN - CNP   tiotropium (SPIRIVA RESPIMAT) 1.25 MCG/ACT AERS inhaler Inhale 2 puffs into the lungs daily 3/12/19   Khushbu Treivno MD   VALUE PLUS LANCETS THIN 26G MISC 1 EACH BY DOES NOT APPLY ROUTE DAILY 10/23/18   YESSICA Valencia - CNP   albuterol sulfate HFA (PROAIR HFA) 108 (90 Base) MCG/ACT inhaler Inhale 2 puffs into the lungs every 6 hours as needed for Wheezing 3/5/18   Khushbu Trevino MD   fluticasone-vilanterol (BREO ELLIPTA) 100-25 MCG/INH AEPB inhaler Inhale 1 puff into the lungs daily 3/5/18   Khushbu Trevino MD   ALPRAZolam Coretta Balk) 0.5 MG tablet Take 0.5 mg by mouth as needed for Sleep. Kee Hernandes Historical Provider, MD       Current medications:    Current Outpatient Medications   Medication Sig Dispense Refill    alogliptin (NESINA) 6.25 MG TABS tablet Take 1 tablet by mouth daily 30 tablet 2    tiotropium (SPIRIVA RESPIMAT) 2.5 MCG/ACT AERS inhaler Inhale 2 puffs into the lungs daily 1 Inhaler 5    furosemide (LASIX) 20 MG tablet Take 1 tablet by mouth daily 60 tablet 3    potassium chloride (KLOR-CON M) 20 MEQ extended release tablet Take 1 tablet by mouth 2 times daily 180 tablet 1    venlafaxine (EFFEXOR) 37.5 MG tablet Take 37.5 mg by mouth 2 times daily      glucose monitoring kit (FREESTYLE) monitoring kit 1 kit by Does not apply route daily 1 kit 0    Nutritional Supplements (ENSURE ACTIVE HIGH PROTEIN) LIQD Take 4 Cans by mouth 4 times daily as needed (Pre-op diet) 84 Can 0    atorvastatin (LIPITOR) 80 MG tablet Take 1 tablet by mouth daily 90 tablet 1    Multiple Vitamin (MVI, CELEBRATE, CHEWABLE TABLET) Take 1 tablet by mouth daily 30 tablet 5    traZODone (DESYREL) 50 MG tablet Take 1 tablet by mouth nightly (Patient taking differently: Take 100 mg by mouth nightly ) 30 tablet 2    omeprazole (PRILOSEC) 40 MG delayed release capsule Take 1 capsule by mouth daily 30 capsule 2    blood glucose test strips (FREESTYLE LITE) strip 1 each by In Vitro route daily As needed.  100 strip 11    tiotropium (SPIRIVA RESPIMAT) 1.25 MCG/ACT AERS Pack years: 7.50     Types: Cigarettes, E-Cigarettes     Start date:      Last attempt to quit: 2016     Years since quittin.0    Smokeless tobacco: Never Used   Substance Use Topics    Alcohol use: Yes     Comment: \"Occ. Maybe Twice A Month\"                                Counseling given: Not Answered      Vital Signs (Current): There were no vitals filed for this visit. BP Readings from Last 3 Encounters:   20 (!) 150/95   20 125/84   20 122/80       NPO Status:                                                                                 BMI:   Wt Readings from Last 3 Encounters:   20 173 lb (78.5 kg)   20 169 lb 6.4 oz (76.8 kg)   20 174 lb (78.9 kg)     There is no height or weight on file to calculate BMI. CBC  Lab Results   Component Value Date/Time    WBC 7.0 2020 11:00 AM    HGB 14.6 2020 11:00 AM    HCT 43.9 2020 11:00 AM     2020 11:00 AM     RENAL  Lab Results   Component Value Date/Time     2020 11:00 AM    K 4.6 2020 11:00 AM     2020 11:00 AM    CO2 26 2020 11:00 AM    BUN 14 2020 11:00 AM    CREATININE 0.8 2020 11:00 AM    GLUCOSE 135 (H) 2020 11:00 AM       Anesthesia Evaluation  Patient summary reviewed and Nursing notes reviewed  Airway: Mallampati: III        Dental:          Pulmonary:   (+) pneumonia:  COPD ( COPD, emphysema. Inhalers x 3, 2019 treated with steroids for wheezing. Followed by Dr. Tiffanie Godinez.):  shortness of breath (walking hills or fast ):                            ROS comment: Smoker, Hx . 5ppd x 30 years. Quit 2016. Able to lie flat:  Yes    smoker  Counseled on smoking cessation.     PAT Airway Exam:  Head/Neck movement: limited   Thyromental Distance: >3 FB  History of difficult intubation:  None  Mallampati Classification:  Class II  Able to lie flat     LUNGS:  No increased work of breathing, good air exchange, clear to auscultation bilaterally, no crackles or wheezing   Cardiovascular:  Exercise tolerance: poor (<4 METS),   (+) hypertension:, dysrhythmias (hx 1st/2nd degree HB):, TOMAS: after ambulating 1 flight of stairs, hyperlipidemia      ECG reviewed      Echocardiogram reviewed  Stress test reviewed             ROS comment: Hx Syncope and collapse, 3/2019. Holter showed 1 degree and 2nd HB    Stress  3/2019  EF 79%   ECG portion of stress test is negative for ischemia by diagnostic criteria.  No infarct or ischemia noted. ormal ventricular contractility.   Normal stress myocardial perfusion.   This is a normal study.       [place] 3/2019  EF 55-60%  technically difficult study   Left ventricular function is normal.   No significant valvular abnormalities. CARDIOVASCULAR:  Normal apical impulse, regular rate and rhythm, normal S1 and S2, no S3 or S4, and no murmur noted    CP: NO  Exercise: NO       EK2020  Sinus rhythm with 2nd degree AV block (Mobitz I)   Possible Anterior infarct , age undetermined   Abnormal ECG   When compared with ECG of 04-MAR-2019 01:38,   QRS axis shifted right   ST no longer depressed in Inferior leads   T wave inversion no longer evident in Inferior leads          Neuro/Psych:   (+) neuromuscular disease ( Right CTR, 2017. S/p roshni elbow surgery, 2013. CBP. Chronic right-sided low back pain with sciatica. Cervical pain. Fibromyalgia. Pain mgt,. in [facility]. On percocet. ):, headaches (last 10/2018): migraine headaches, psychiatric history:depression/anxiety             GI/Hepatic/Renal:   (+) hiatal hernia (s/p repair, ), GERD: well controlled, renal disease (hx [place]):, morbid obesity (s/p Gastric sleeve, 2017 with 50 lbs wt loss. Residual skin ulcers. s/p abdominoplasty. 2018. Post-op wound dehiscence. Redo 10/2019)         ROS comment:  Colon polyps. Endo/Other:    (+) Diabetes (DM with neuropathy feet and hands.  Last A1c was 5.8,

## 2020-01-24 NOTE — PROGRESS NOTES
BARIATRIC SURGERY POST OPERATIVE NOTE    SUBJECTIVE:    Patient presenting today referred from Hannah Dong, APRN - CNP, for   Chief Complaint   Patient presents with    Follow-up     3 mothn f/u abdominoplasty 7/29/19     /84   Pulse 78   Resp 18   Ht 5' 6\" (1.676 m)   Wt 169 lb 6.4 oz (76.8 kg)   BMI 27.34 kg/m²      HPI: Ramy Richards is a 48 y.o. female presenting in fourth, follow up 6 months post op 7/29/19. Procedure:   1. Bilateral abdominoplasty. 2.  Complex closure of the abdominal wound. 3.  Excision of 20 x 8 x 5 cm sheaths x2 (Bilaterally). 4.  Bilateral local anesthetic injection to the rectus muscle.     Pathology:   Final Pathologic Diagnosis:  Fragment of skin and fibroadipose tissue, abdominoplasty: No  histopathological diagnosis. Addressed the status of the following co-morbidities:   1. Breast pain  worsening and Dr Alanis Hernandez will proceed. .  2. Constipation  worsening. 3. Carpal tunnel  worsening.       Current Outpatient Medications:     tiZANidine (ZANAFLEX) 4 MG tablet, , Disp: , Rfl:     doxycycline hyclate (VIBRAMYCIN) 100 MG capsule, , Disp: , Rfl:     oxyCODONE-acetaminophen (PERCOCET) 7.5-325 MG per tablet, , Disp: , Rfl:     Psyllium (METAMUCIL) 48.57 % POWD, Take 1 Units by mouth daily, Disp: 1 Bottle, Rfl: 5    alogliptin (NESINA) 6.25 MG TABS tablet, Take 1 tablet by mouth daily, Disp: 30 tablet, Rfl: 2    tiotropium (SPIRIVA RESPIMAT) 2.5 MCG/ACT AERS inhaler, Inhale 2 puffs into the lungs daily, Disp: 1 Inhaler, Rfl: 5    furosemide (LASIX) 20 MG tablet, Take 1 tablet by mouth daily, Disp: 60 tablet, Rfl: 3    potassium chloride (KLOR-CON M) 20 MEQ extended release tablet, Take 1 tablet by mouth 2 times daily, Disp: 180 tablet, Rfl: 1    venlafaxine (EFFEXOR) 37.5 MG tablet, Take 37.5 mg by mouth 2 times daily, Disp: , Rfl:     glucose monitoring kit (FREESTYLE) monitoring kit, 1 kit by Does not apply route daily, Disp: 1 kit, Rfl: 0   atorvastatin (LIPITOR) 80 MG tablet, Take 1 tablet by mouth daily, Disp: 90 tablet, Rfl: 1    Multiple Vitamin (MVI, CELEBRATE, CHEWABLE TABLET), Take 1 tablet by mouth daily, Disp: 30 tablet, Rfl: 5    traZODone (DESYREL) 50 MG tablet, Take 1 tablet by mouth nightly (Patient taking differently: Take 100 mg by mouth nightly ), Disp: 30 tablet, Rfl: 2    omeprazole (PRILOSEC) 40 MG delayed release capsule, Take 1 capsule by mouth daily, Disp: 30 capsule, Rfl: 2    blood glucose test strips (FREESTYLE LITE) strip, 1 each by In Vitro route daily As needed. , Disp: 100 strip, Rfl: 11    tiotropium (SPIRIVA RESPIMAT) 1.25 MCG/ACT AERS inhaler, Inhale 2 puffs into the lungs daily, Disp: 1 Inhaler, Rfl: 5    VALUE PLUS LANCETS THIN 26G MISC, 1 EACH BY DOES NOT APPLY ROUTE DAILY, Disp: 100 each, Rfl: 5    albuterol sulfate HFA (PROAIR HFA) 108 (90 Base) MCG/ACT inhaler, Inhale 2 puffs into the lungs every 6 hours as needed for Wheezing, Disp: 1 Inhaler, Rfl: 5    fluticasone-vilanterol (BREO ELLIPTA) 100-25 MCG/INH AEPB inhaler, Inhale 1 puff into the lungs daily, Disp: 1 each, Rfl: 5    ALPRAZolam (XANAX) 0.5 MG tablet, Take 0.5 mg by mouth as needed for Sleep.  ., Disp: , Rfl:     Nutritional Supplements (ENSURE ACTIVE HIGH PROTEIN) LIQD, Take 4 Cans by mouth 4 times daily as needed (Pre-op diet), Disp: 84 Can, Rfl: 0  Past Medical History:   Diagnosis Date    Anxiety     Arthritis     \"Back, Hands, Shoulders\"    Bronchitis Last Episode In Early 2000's    Chronic back pain     COPD (chronic obstructive [facility] disease) ([facility]     Sees Dr. Neymar Powers Depression     Diabetes mellitus ([facility] Dx 2008    Emphysema     Fibromyalgia     H/O abdominoplasty 12/21/2018    Hyperlipidemia     Hypertension     Inguinal pain of both sides 8/31/2018    Migraines Last Migraine 10-18    MRSA (methicillin resistant Staphylococcus aureus) Dx In 2012 Or 2013    Right Arm    Pap smear for cervical cancer screening 2008     Neg   Postive-Trichomonas    Pap smear for cervical cancer screening 2010    Neg    Pap smear for cervical cancer screening 2011    Neg    Piriformis syndrome 2016    Pneumonia Dx 2-18    Psoriasis     Rotator cuff impingement syndrome, right 2018    RTC repair Dr Angie Choudhary   Treated by Dr Carlo Muñoz 17  Arthrocentesis 17    Shortness of breath on exertion     Syncope and collapse 2019    Teeth missing     Upper And Lower    Wears glasses       Past Surgical History:   Procedure Laterality Date    ABDOMEN SURGERY N/A 2019    SECONDARY CLOSURE OF DEHISCED ABDOMINAL WOUND performed by Gerda Mejía MD at [facility] 71 N/A 2019    IRRIGATION OF LOWER ABDOMINAL WOUND performed by Gerda Mejía MD at [facility] 73 ABDOMINOPLASTY  2018    ABDOMINOPLASTY N/A 2019    ABDOMINOPLASTY REVISION performed by Gerda Mejía MD at [address] [facility] [address] Right 2017    Dr. Carlo Muñoz (Right)   [address]     Tubal Ligation Also Done In     COLONOSCOPY  2017    Polyp Removed    DENTAL SURGERY      Teeth Extracted In Past    ELBOW SURGERY Bilateral Last Done In     Right Done Twice, Left Done Once left cubital tunnel release ; right     ENDOSCOPY, COLON, DIAGNOSTIC  2017    HERNIA REPAIR  10/26/2017    hiatal hernia with gastrectomy    HYSTERECTOMY VAGINAL      LAPAROSCOPY      [place] EXCISE EXCESS SKIN TISSUE,ABDOMEN N/A 2018    ABDOMINOPLASTY performed by Gerda Mejía MD at [address] Right     Dr. Irene Cortez 2017    Dr Yanira Loomis  10/26/2017    Robotic Laparoscopic, Pre Op Weight 237  Or 238 lbs.     TUBAL LIGATION      Done With      Social History     Socioeconomic History    Marital status: Single     Spouse name: None    Number of children: hematuria. Musculoskeletal: Negative for joint swelling, myalgias and neck stiffness. Skin: Negative for color change and rash. Neurological: Negative for seizures, speech difficulty, light-headedness and numbness. Hematological: Negative for adenopathy. Does not bruise/bleed easily. OBJECTIVE:    Physical Exam  Vitals signs reviewed. Constitutional:       General: She is not in acute distress. Appearance: She is well-developed. She is not diaphoretic. HENT:      Head: Normocephalic and atraumatic. Eyes:      General: No scleral icterus. Conjunctiva/sclera: Conjunctivae normal.      Pupils: Pupils are equal, round, and reactive to light. Neck:      Musculoskeletal: Normal range of motion. Thyroid: No thyromegaly. Vascular: No JVD. Trachea: No tracheal deviation. Cardiovascular:      Rate and Rhythm: Normal rate and regular rhythm. Heart sounds: Normal heart sounds. No murmur. No friction rub. No gallop. Pulmonary:      Effort: Pulmonary effort is normal. No respiratory distress. Breath sounds: No stridor. No wheezing or rales. Chest:      Chest wall: No tenderness. Abdominal:      General: Bowel sounds are normal. There is no distension. Palpations: Abdomen is soft. There is no mass. Tenderness: There is no tenderness. There is no guarding or rebound. Musculoskeletal: Normal range of motion. General: No tenderness. Lymphadenopathy:      Cervical: No cervical adenopathy. Skin:     General: Skin is warm and dry. Coloration: Skin is not pale. Findings: No erythema or rash. Neurological:      Mental Status: She is alert and oriented to person, place, and time. Cranial Nerves: No cranial nerve deficit. Coordination: Coordination normal.   Psychiatric:         Behavior: Behavior normal.         Thought Content: Thought content normal.         Judgment: Judgment normal.         Assessment / Plan:    1.  S/P

## 2020-01-27 ENCOUNTER — HOSPITAL ENCOUNTER (OUTPATIENT)
Dept: PREADMISSION TESTING | Age: 55
Setting detail: OUTPATIENT SURGERY
Discharge: HOME | End: 2020-01-31
Payer: COMMERCIAL

## 2020-01-27 VITALS
WEIGHT: 173 LBS | BODY MASS INDEX: 27.8 KG/M2 | RESPIRATION RATE: 16 BRPM | TEMPERATURE: 97.8 F | HEIGHT: 66 IN | SYSTOLIC BLOOD PRESSURE: 150 MMHG | OXYGEN SATURATION: 98 % | HEART RATE: 82 BPM | DIASTOLIC BLOOD PRESSURE: 95 MMHG

## 2020-01-27 LAB
ANION GAP SERPL CALCULATED.3IONS-SCNC: 11 MMOL/L (ref 4–16)
BUN BLDV-MCNC: 14 MG/DL (ref 6–23)
CALCIUM SERPL-MCNC: 9.9 MG/DL (ref 8.3–10.6)
CHLORIDE BLD-SCNC: 101 MMOL/L (ref 99–110)
CO2: 26 MMOL/L (ref 21–32)
CREAT SERPL-MCNC: 0.8 MG/DL (ref 0.6–1.1)
EKG ATRIAL RATE: 73 BPM
EKG DIAGNOSIS: NORMAL
EKG P AXIS: 66 DEGREES
EKG Q-T INTERVAL: 406 MS
EKG QRS DURATION: 78 MS
EKG QTC CALCULATION (BAZETT): 429 MS
EKG R AXIS: 81 DEGREES
EKG T AXIS: 65 DEGREES
EKG VENTRICULAR RATE: 67 BPM
GFR AFRICAN AMERICAN: >60 ML/MIN/1.73M2
GFR NON-AFRICAN AMERICAN: >60 ML/MIN/1.73M2
GLUCOSE BLD-MCNC: 135 MG/DL (ref 70–99)
HCT VFR BLD CALC: 43.9 % (ref 37–47)
HEMOGLOBIN: 14.6 GM/DL (ref 12.5–16)
MCH RBC QN AUTO: 25.5 PG (ref 27–31)
MCHC RBC AUTO-ENTMCNC: 33.3 % (ref 32–36)
MCV RBC AUTO: 76.6 FL (ref 78–100)
PDW BLD-RTO: 13.2 % (ref 11.7–14.9)
PLATELET # BLD: 290 K/CU MM (ref 140–440)
PMV BLD AUTO: 11.2 FL (ref 7.5–11.1)
POTASSIUM SERPL-SCNC: 4.6 MMOL/L (ref 3.5–5.1)
RBC # BLD: 5.73 M/CU MM (ref 4.2–5.4)
SODIUM BLD-SCNC: 138 MMOL/L (ref 135–145)
WBC # BLD: 7 K/CU MM (ref 4–10.5)

## 2020-01-27 PROCEDURE — 85027 COMPLETE CBC AUTOMATED: CPT

## 2020-01-27 PROCEDURE — 93010 ELECTROCARDIOGRAM REPORT: CPT | Performed by: INTERNAL MEDICINE

## 2020-01-27 PROCEDURE — 80048 BASIC METABOLIC PNL TOTAL CA: CPT

## 2020-01-27 PROCEDURE — 93005 ELECTROCARDIOGRAM TRACING: CPT | Performed by: NURSE PRACTITIONER

## 2020-01-27 ASSESSMENT — PAIN DESCRIPTION - ONSET: ONSET: ON-GOING

## 2020-01-27 ASSESSMENT — PAIN DESCRIPTION - ORIENTATION: ORIENTATION: RIGHT;LEFT

## 2020-01-27 ASSESSMENT — PAIN DESCRIPTION - DESCRIPTORS: DESCRIPTORS: ACHING

## 2020-01-27 ASSESSMENT — PAIN SCALES - GENERAL: PAINLEVEL_OUTOF10: 8

## 2020-01-27 ASSESSMENT — PAIN DESCRIPTION - PROGRESSION: CLINICAL_PROGRESSION: NOT CHANGED

## 2020-01-27 ASSESSMENT — PAIN DESCRIPTION - PAIN TYPE: TYPE: CHRONIC PAIN

## 2020-01-27 ASSESSMENT — PAIN DESCRIPTION - FREQUENCY: FREQUENCY: CONTINUOUS

## 2020-01-27 ASSESSMENT — PAIN DESCRIPTION - LOCATION: LOCATION: NECK;HAND;SHOULDER

## 2020-01-29 ENCOUNTER — ANESTHESIA (OUTPATIENT)
Dept: OPERATING ROOM | Age: 55
End: 2020-01-29
Payer: COMMERCIAL

## 2020-01-29 ENCOUNTER — HOSPITAL ENCOUNTER (OUTPATIENT)
Age: 55
Setting detail: OUTPATIENT SURGERY
Discharge: HOME | End: 2020-01-29
Attending: SURGERY | Admitting: SURGERY
Payer: COMMERCIAL

## 2020-01-29 VITALS
DIASTOLIC BLOOD PRESSURE: 82 MMHG | SYSTOLIC BLOOD PRESSURE: 127 MMHG | RESPIRATION RATE: 1 BRPM | OXYGEN SATURATION: 100 %

## 2020-01-29 VITALS
OXYGEN SATURATION: 98 % | SYSTOLIC BLOOD PRESSURE: 146 MMHG | RESPIRATION RATE: 16 BRPM | HEART RATE: 73 BPM | DIASTOLIC BLOOD PRESSURE: 94 MMHG | TEMPERATURE: 97.4 F

## 2020-01-29 PROBLEM — N60.81 SEBACEOUS CYST OF BREAST, RIGHT: Status: ACTIVE | Noted: 2020-01-29

## 2020-01-29 LAB — GLUCOSE BLD-MCNC: 126 MG/DL (ref 70–99)

## 2020-01-29 PROCEDURE — 2500000003 HC RX 250 WO HCPCS: Performed by: NURSE ANESTHETIST, CERTIFIED REGISTERED

## 2020-01-29 PROCEDURE — 99222 1ST HOSP IP/OBS MODERATE 55: CPT | Performed by: SURGERY

## 2020-01-29 PROCEDURE — 6360000002 HC RX W HCPCS

## 2020-01-29 PROCEDURE — 93010 ELECTROCARDIOGRAM REPORT: CPT | Performed by: INTERNAL MEDICINE

## 2020-01-29 PROCEDURE — 11402 EXC TR-EXT B9+MARG 1.1-2 CM: CPT | Performed by: SURGERY

## 2020-01-29 PROCEDURE — 2500000003 HC RX 250 WO HCPCS: Performed by: SURGERY

## 2020-01-29 PROCEDURE — 88304 TISSUE EXAM BY PATHOLOGIST: CPT

## 2020-01-29 PROCEDURE — 2580000003 HC RX 258: Performed by: SURGERY

## 2020-01-29 PROCEDURE — 2709999900 HC NON-CHARGEABLE SUPPLY: Performed by: SURGERY

## 2020-01-29 PROCEDURE — 93005 ELECTROCARDIOGRAM TRACING: CPT | Performed by: SURGERY

## 2020-01-29 PROCEDURE — 3600000012 HC SURGERY LEVEL 2 ADDTL 15MIN: Performed by: SURGERY

## 2020-01-29 PROCEDURE — 3700000000 HC ANESTHESIA ATTENDED CARE: Performed by: SURGERY

## 2020-01-29 PROCEDURE — 7100000011 HC PHASE II RECOVERY - ADDTL 15 MIN: Performed by: SURGERY

## 2020-01-29 PROCEDURE — 82962 GLUCOSE BLOOD TEST: CPT

## 2020-01-29 PROCEDURE — 3600000002 HC SURGERY LEVEL 2 BASE: Performed by: SURGERY

## 2020-01-29 PROCEDURE — 6360000002 HC RX W HCPCS: Performed by: NURSE ANESTHETIST, CERTIFIED REGISTERED

## 2020-01-29 PROCEDURE — 7100000010 HC PHASE II RECOVERY - FIRST 15 MIN: Performed by: SURGERY

## 2020-01-29 PROCEDURE — 3700000001 HC ADD 15 MINUTES (ANESTHESIA): Performed by: SURGERY

## 2020-01-29 PROCEDURE — 2580000003 HC RX 258

## 2020-01-29 PROCEDURE — 7100000000 HC PACU RECOVERY - FIRST 15 MIN: Performed by: SURGERY

## 2020-01-29 PROCEDURE — 7100000001 HC PACU RECOVERY - ADDTL 15 MIN: Performed by: SURGERY

## 2020-01-29 PROCEDURE — 99243 OFF/OP CNSLTJ NEW/EST LOW 30: CPT | Performed by: INTERNAL MEDICINE

## 2020-01-29 RX ORDER — ONDANSETRON 2 MG/ML
INJECTION INTRAMUSCULAR; INTRAVENOUS PRN
Status: DISCONTINUED | OUTPATIENT
Start: 2020-01-29 | End: 2020-01-29 | Stop reason: SDUPTHER

## 2020-01-29 RX ORDER — DEXAMETHASONE SODIUM PHOSPHATE 4 MG/ML
INJECTION, SOLUTION INTRA-ARTICULAR; INTRALESIONAL; INTRAMUSCULAR; INTRAVENOUS; SOFT TISSUE PRN
Status: DISCONTINUED | OUTPATIENT
Start: 2020-01-29 | End: 2020-01-29 | Stop reason: SDUPTHER

## 2020-01-29 RX ORDER — SODIUM CHLORIDE, SODIUM LACTATE, POTASSIUM CHLORIDE, CALCIUM CHLORIDE 600; 310; 30; 20 MG/100ML; MG/100ML; MG/100ML; MG/100ML
INJECTION, SOLUTION INTRAVENOUS
Status: COMPLETED
Start: 2020-01-29 | End: 2020-01-29

## 2020-01-29 RX ORDER — ACETAMINOPHEN 10 MG/ML
1000 INJECTION, SOLUTION INTRAVENOUS ONCE
Status: COMPLETED | OUTPATIENT
Start: 2020-01-29 | End: 2020-01-29

## 2020-01-29 RX ORDER — SODIUM CHLORIDE, SODIUM LACTATE, POTASSIUM CHLORIDE, CALCIUM CHLORIDE 600; 310; 30; 20 MG/100ML; MG/100ML; MG/100ML; MG/100ML
INJECTION, SOLUTION INTRAVENOUS CONTINUOUS
Status: DISCONTINUED | OUTPATIENT
Start: 2020-01-29 | End: 2020-01-29 | Stop reason: HOSPADM

## 2020-01-29 RX ORDER — LIDOCAINE HYDROCHLORIDE 10 MG/ML
INJECTION, SOLUTION INFILTRATION; PERINEURAL
Status: COMPLETED | OUTPATIENT
Start: 2020-01-29 | End: 2020-01-29

## 2020-01-29 RX ORDER — KETAMINE HYDROCHLORIDE 10 MG/ML
INJECTION, SOLUTION INTRAMUSCULAR; INTRAVENOUS PRN
Status: DISCONTINUED | OUTPATIENT
Start: 2020-01-29 | End: 2020-01-29 | Stop reason: SDUPTHER

## 2020-01-29 RX ORDER — LIDOCAINE HYDROCHLORIDE 20 MG/ML
INJECTION, SOLUTION INTRAVENOUS PRN
Status: DISCONTINUED | OUTPATIENT
Start: 2020-01-29 | End: 2020-01-29 | Stop reason: SDUPTHER

## 2020-01-29 RX ORDER — MIDAZOLAM HYDROCHLORIDE 1 MG/ML
INJECTION INTRAMUSCULAR; INTRAVENOUS PRN
Status: DISCONTINUED | OUTPATIENT
Start: 2020-01-29 | End: 2020-01-29 | Stop reason: SDUPTHER

## 2020-01-29 RX ORDER — PROPOFOL 10 MG/ML
INJECTION, EMULSION INTRAVENOUS PRN
Status: DISCONTINUED | OUTPATIENT
Start: 2020-01-29 | End: 2020-01-29 | Stop reason: SDUPTHER

## 2020-01-29 RX ADMIN — DEXAMETHASONE SODIUM PHOSPHATE 4 MG: 4 INJECTION, SOLUTION INTRAMUSCULAR; INTRAVENOUS at 11:13

## 2020-01-29 RX ADMIN — DEXTROSE MONOHYDRATE 900 MG: 50 INJECTION, SOLUTION INTRAVENOUS at 11:09

## 2020-01-29 RX ADMIN — ONDANSETRON 4 MG: 2 INJECTION INTRAMUSCULAR; INTRAVENOUS at 11:12

## 2020-01-29 RX ADMIN — ACETAMINOPHEN 1000 MG: 10 INJECTION, SOLUTION INTRAVENOUS at 12:39

## 2020-01-29 RX ADMIN — LIDOCAINE HYDROCHLORIDE 100 MG: 20 INJECTION, SOLUTION INTRAVENOUS at 11:08

## 2020-01-29 RX ADMIN — MIDAZOLAM 2 MG: 1 INJECTION INTRAMUSCULAR; INTRAVENOUS at 11:04

## 2020-01-29 RX ADMIN — KETAMINE HYDROCHLORIDE 10 MG: 10 INJECTION INTRAMUSCULAR; INTRAVENOUS at 11:07

## 2020-01-29 RX ADMIN — KETAMINE HYDROCHLORIDE 10 MG: 10 INJECTION INTRAMUSCULAR; INTRAVENOUS at 11:08

## 2020-01-29 RX ADMIN — PROPOFOL 100 MG: 10 INJECTION, EMULSION INTRAVENOUS at 11:08

## 2020-01-29 RX ADMIN — SODIUM CHLORIDE, SODIUM LACTATE, POTASSIUM CHLORIDE, CALCIUM CHLORIDE 1000 ML: 600; 310; 30; 20 INJECTION, SOLUTION INTRAVENOUS at 07:26

## 2020-01-29 RX ADMIN — SODIUM CHLORIDE, POTASSIUM CHLORIDE, SODIUM LACTATE AND CALCIUM CHLORIDE 1000 ML: 600; 310; 30; 20 INJECTION, SOLUTION INTRAVENOUS at 07:26

## 2020-01-29 RX ADMIN — KETAMINE HYDROCHLORIDE 20 MG: 10 INJECTION INTRAMUSCULAR; INTRAVENOUS at 11:13

## 2020-01-29 RX ADMIN — KETAMINE HYDROCHLORIDE 20 MG: 10 INJECTION INTRAMUSCULAR; INTRAVENOUS at 11:11

## 2020-01-29 ASSESSMENT — PULMONARY FUNCTION TESTS
PIF_VALUE: 1
PIF_VALUE: 1
PIF_VALUE: 0
PIF_VALUE: 0
PIF_VALUE: 1
PIF_VALUE: 0
PIF_VALUE: 1
PIF_VALUE: 0
PIF_VALUE: 0
PIF_VALUE: 1
PIF_VALUE: 1
PIF_VALUE: 0
PIF_VALUE: 1
PIF_VALUE: 1
PIF_VALUE: 0
PIF_VALUE: 1
PIF_VALUE: 0
PIF_VALUE: 1
PIF_VALUE: 2
PIF_VALUE: 1
PIF_VALUE: 0
PIF_VALUE: 0
PIF_VALUE: 1
PIF_VALUE: 0
PIF_VALUE: 1
PIF_VALUE: 0
PIF_VALUE: 1
PIF_VALUE: 0
PIF_VALUE: 1
PIF_VALUE: 0

## 2020-01-29 ASSESSMENT — ENCOUNTER SYMPTOMS: SHORTNESS OF BREATH: 1

## 2020-01-29 ASSESSMENT — PAIN DESCRIPTION - DESCRIPTORS: DESCRIPTORS: ACHING

## 2020-01-29 ASSESSMENT — PAIN DESCRIPTION - LOCATION: LOCATION: BREAST

## 2020-01-29 ASSESSMENT — PAIN SCALES - GENERAL
PAINLEVEL_OUTOF10: 5
PAINLEVEL_OUTOF10: 0
PAINLEVEL_OUTOF10: 4
PAINLEVEL_OUTOF10: 5

## 2020-01-29 ASSESSMENT — PAIN DESCRIPTION - PAIN TYPE: TYPE: CHRONIC PAIN

## 2020-01-29 ASSESSMENT — PAIN DESCRIPTION - PROGRESSION: CLINICAL_PROGRESSION: GRADUALLY WORSENING

## 2020-01-29 ASSESSMENT — PAIN - FUNCTIONAL ASSESSMENT: PAIN_FUNCTIONAL_ASSESSMENT: 0-10

## 2020-01-29 ASSESSMENT — PAIN DESCRIPTION - ONSET: ONSET: ON-GOING

## 2020-01-29 ASSESSMENT — PAIN DESCRIPTION - FREQUENCY: FREQUENCY: CONTINUOUS

## 2020-01-29 ASSESSMENT — PAIN DESCRIPTION - ORIENTATION: ORIENTATION: RIGHT

## 2020-01-29 NOTE — ANESTHESIA POSTPROCEDURE EVALUATION
Department of Anesthesiology  Postprocedure Note    Patient: Sheng Osborn  MRN: [id number]  YOB: 1965  Date of evaluation: 1/29/2020  Time:  11:40 AM     Procedure Summary     Date:  01/29/20 Room / Location:  [address] [place] [address] Road, [address]  / [facility]    Anesthesia Start:  1100 Anesthesia Stop:  3272    Procedures:       BREAST LESION BIOPSY EXCISION MASS RIGHT (Right )      EXCISION OF RIGHT BREAST SABACEOUS CYST (Right ) Diagnosis:       (sebaceous cyst of breast right)      (breast)    Surgeon:  Brooke Santana MD Responsible Provider:  Rosalee Arteaga MD    Anesthesia Type:  general ASA Status:  3          Anesthesia Type: general    Morgan Phase I: Morgan Score: 10    Morgan Phase II:  10    Last vitals: Reviewed and per EMR flowsheets.        Anesthesia Post Evaluation    Patient location during evaluation: bedside  Patient participation: complete - patient participated  Level of consciousness: awake and alert  Pain score: 0  Airway patency: patent  Nausea & Vomiting: no nausea and no vomiting  Complications: no  Cardiovascular status: hemodynamically stable  Respiratory status: acceptable, room air, spontaneous ventilation and nonlabored ventilation  Hydration status: euvolemic

## 2020-01-29 NOTE — CONSULTS
smoking history. She has never used smokeless tobacco. She reports current alcohol use. She reports that she does not use drugs. Family history:  As Reviewed family history includes Alcohol Abuse in her father; Arthritis in her mother; Cancer in her mother; Diabetes in her brother and mother; Early Death (age of onset: 21) in her daughter; Early Death (age of onset: 46) in her father; Heart Attack in her father; Heart Disease in her father and mother; High Blood Pressure in her mother and sister; Substance Abuse in her father. No Known Allergies    clindamycin (CLEOCIN) 900 mg in dextrose 5 % 50 mL IVPB, Once  lactated ringers infusion, Continuous        Review of Systems:   · Constitutional: No Fever or Weight Loss  · Eyes: No Decreased Vision  · ENT: No Headaches, Hearing Loss or Vertigo  · Cardiovascular: No chest pain, dyspnea on exertion, palpitations or loss of consciousness  · Respiratory: No cough or wheezing    · Gastrointestinal: No abdominal pain, appetite loss, blood in stools, constipation, diarrhea or heartburn  · Genitourinary: No dysuria, trouble voiding, or hematuria  · Musculoskeletal:  No gait disturbance, weakness or joint complaints  · Integumentary: No rash or pruritis  · Neurological: No TIA or stroke symptoms  · Psychiatric: No anxiety or depression  · Endocrine: No malaise, fatigue or temperature intolerance  · Hematologic/Lymphatic: No bleeding problems, blood clots or swollen lymph nodes  · Allergic/Immunologic: No nasal congestion or hives    Physical Examination:    BP (!) 137/91   Pulse 70   Temp 97.4 °F (36.3 °C) (Temporal)   Resp 16   SpO2 100%      General Appearance:  Non-obese/Well Nourished  1. Skin: It is warm & dry. No rashes noted. 2. Eyes: No conjunctival Pallor seen. No jaundice noted. 3. HEENT: atraumatic / normocephalic. EOMI. Neck is supple there is no elevation of JVD. No thyromegaly  4.  Respiratory:  · Resp Assessment: Normal  · Resp Auscultation: Normal

## 2020-01-29 NOTE — PROGRESS NOTES
Pt placed on heart monitor in the OR and found to be in complete heart block. She denies chest pain, heaviness, shortness of breath. She has had a w/u in the past for syncope but nothing was found. Will have Dr. Clementine Spatz to see. Chrys Bernheim Bucklew-Wilder. Talked with Dr. [place] and she has Daniel KLEIN and is OK for her procedure. Chrys Bernheim Bucklew-Wilder.

## 2020-01-29 NOTE — ANESTHESIA PRE PROCEDURE
Department of Anesthesiology  Preprocedure Note       Name:  Lisandra Mcintosh   Age:  48 y.o.  :  1965                                          MRN:  [id number]         Date:  2020      Surgeon: Michelle Robb):  Maddy Robles MD    Procedure: BREAST LESION BIOPSY EXCISION MASS RIGHT (Right )    Medications prior to admission:   Prior to Admission medications    Medication Sig Start Date End Date Taking? Authorizing Provider   tiZANidine (ZANAFLEX) 4 MG tablet Take 4 mg by mouth every 8 hours as needed  20  Yes Historical Provider, MD   oxyCODONE-acetaminophen (PERCOCET) 7.5-325 MG per tablet Take 1 tablet by mouth every 8 hours as needed for Pain (Patient states takes every 6 hours). 20  Yes Historical Provider, MD   alogliptin (NESINA) 6.25 MG TABS tablet Take 1 tablet by mouth daily 20  Yes YESSICA Cohen CNP   furosemide (LASIX) 20 MG tablet Take 1 tablet by mouth daily 19  Yes Becky Carrion MD   potassium chloride (KLOR-CON M) 20 MEQ extended release tablet Take 1 tablet by mouth 2 times daily 19  Yes Becky Carrion MD   venlafaxine (EFFEXOR) 37.5 MG tablet Take 37.5 mg by mouth 2 times daily   Yes Historical Provider, MD   omeprazole (PRILOSEC) 40 MG delayed release capsule Take 1 capsule by mouth daily 4/3/19  Yes YESSICA Cohen - CNP   tiotropium (SPIRIVA RESPIMAT) 1.25 MCG/ACT AERS inhaler Inhale 2 puffs into the lungs daily 3/12/19  Yes Farrah Diaz MD   albuterol sulfate HFA (PROAIR HFA) 108 (90 Base) MCG/ACT inhaler Inhale 2 puffs into the lungs every 6 hours as needed for Wheezing 3/5/18  Yes Farrah Diaz MD   fluticasone-vilanterol (BREO ELLIPTA) 100-25 MCG/INH AEPB inhaler Inhale 1 puff into the lungs daily 3/5/18  Yes Farrah Diaz MD   ALPRAZolam Herriman Martini) 0.5 MG tablet Take 0.5 mg by mouth as needed for Sleep.  .   Yes Historical Provider, MD   Psyllium (METAMUCIL) 48.57 % POWD Take 1 Units by mouth daily 20   Becky Carrion MD left cubital tunnel release ; right 2014    ENDOSCOPY, COLON, DIAGNOSTIC  2017    HERNIA REPAIR  10/26/2017    hiatal hernia with gastrectomy    HYSTERECTOMY VAGINAL  2000    LAPAROSCOPY      [place] EXCISE EXCESS SKIN TISSUE,ABDOMEN N/A 2018    ABDOMINOPLASTY performed by Leobardo Cisneros MD [address] Right     Dr. Umair Chan Right 2017    Dr Qasim Evans  10/26/2017    Robotic Laparoscopic, Pre Op Weight 237  Or 238 lbs.  TUBAL LIGATION      Done With        Social History:    Social History     Tobacco Use    Smoking status: Former Smoker     Packs/day: 0.25     Years: 30.00     Pack years: 7.50     Types: Cigarettes, E-Cigarettes     Start date:      Last attempt to quit: 2016     Years since quittin.0    Smokeless tobacco: Never Used   Substance Use Topics    Alcohol use: Yes     Comment: \"Occ.  Maybe Twice A Month\"                                Counseling given: Not Answered      Vital Signs (Current):   Vitals:    20 0707   BP: 113/75   Pulse: 75   Resp: 16   Temp: 97.9 °F (36.6 °C)   TempSrc: Temporal   SpO2: 97%                                              BP Readings from Last 3 Encounters:   20 113/75   20 (!) 140/92   20 (!) 150/95       NPO Status: Time of last liquid consumption:                         Time of last solid consumption:                         Date of last liquid consumption: 20                        Date of last solid food consumption: 20    BMI:   Wt Readings from Last 3 Encounters:   20 173 lb (78.5 kg)   20 169 lb 6.4 oz (76.8 kg)   20 174 lb (78.9 kg)     There is no height or weight on file to calculate BMI.    CBC:   Lab Results   Component Value Date    WBC 7.0 2020    RBC 5.73 2020    HGB 14.6 2020    HCT 43.9 2020    MCV 76.6 2020    RDW 13.2 2020     2020 CMP:   Lab Results   Component Value Date     01/27/2020    K 4.6 01/27/2020     01/27/2020    CO2 26 01/27/2020    BUN 14 01/27/2020    CREATININE 0.8 01/27/2020    GFRAA >60 01/27/2020    LABGLOM >60 01/27/2020    GLUCOSE 135 01/27/2020    PROT 6.9 06/19/2019    PROT 7.9 03/02/2013    CALCIUM 9.9 01/27/2020    BILITOT 0.9 06/19/2019    ALKPHOS 96 06/19/2019    AST 17 06/19/2019    ALT 13 06/19/2019       POC Tests:   Recent Labs     01/29/20  0716   POCGLU 126*       Coags: No results found for: PROTIME, INR, APTT    HCG (If Applicable): No results found for: PREGTESTUR, PREGSERUM, HCG, HCGQUANT     ABGs: No results found for: PHART, PO2ART, PNV9JLU, QHT1JUQ, BEART, D3ACGOYQ     Type & Screen (If Applicable):  No results found for: Nataliya Harrison    Anesthesia Evaluation     Anesthesia Plan        Jas Mukherjee MD   1/29/2020

## 2020-01-29 NOTE — H&P
Emeli Austin MD H&P    PATIENT NAME: Cyrilla Goldberg   YOB: 1965    ADMISSION DATE: 1/29/2020. TODAY'S DATE: 1/29/2020    CHIEF COMPLAINT:  Infected sebaceous cyst right breast,s/p drainage      HISTORY OF PRESENT ILLNESS:  The patient is a 48 y.o. female  who presents with a residual sebaceous cyst of the medial right breast after I/D for an infected cyst. Culture grew gm positive anaerobe. This needs removed but she does not want to do it under straight local. She is admitted for removal of this cyst under MAC.     Past Medical History:        Diagnosis Date    Anxiety     Arthritis     \"Back, Hands, Shoulders\"    Chronic back pain     Arthritis - NKI    [facility]ch[facility]     Sees Dr. Pedro Alcazar with Mental Health    Diabetes mellitus ([facility] Dx 2008    Type II - follows with PCP    Emphysema     Fibromyalgia     H/O abdominoplasty 12/21/2018    Hyperlipidemia     Hypertension     Follows with PCP    Migraines Last Migraine 10-18    Neuropathy     From feet to knees    Piriformis syndrome 6/24/2016    Psoriasis     Rotator cuff impingement syndrome, right 2/26/2018    RTC repair Dr Sneha Diaz 2015  Treated by Dr Vania Pickett 2/18/17  Arthrocentesis 12/19/17    Shortness of breath on exertion     Syncope and collapse 03/04/2019    Teeth missing     Upper And Lower    Wears glasses        Past Surgical History:        Procedure Laterality Date    ABDOMEN SURGERY N/A 1/14/2019    SECONDARY CLOSURE OF DEHISCED ABDOMINAL WOUND performed by Wily Herrera MD at [facility] 71 N/A 1/24/2019    IRRIGATION OF LOWER ABDOMINAL WOUND performed by Wily Herrera MD at [facility] 73 ABDOMINOPLASTY N/A 7/29/2019    ABDOMINOPLASTY REVISION performed by Wily Herrera MD at [address] [place] [address] & [employer] Right 2017    Dr. Vania Pickett (Right)   [address]    Tubal Ligation Also Done

## 2020-01-29 NOTE — ANESTHESIA PRE PROCEDURE
Department of Anesthesiology  Preprocedure Note       Name:  Cyrilla Goldberg   Age:  48 y.o.  :  1965                                          MRN:  [id number]         Date:  2020      Surgeon: Isaac Torres):  Dorothea Montoya MD    Procedure: BREAST LESION BIOPSY EXCISION MASS RIGHT (Right )    Medications prior to admission:   Prior to Admission medications    Medication Sig Start Date End Date Taking? Authorizing Provider   tiZANidine (ZANAFLEX) 4 MG tablet Take 4 mg by mouth every 8 hours as needed  20  Yes Historical Provider, MD   oxyCODONE-acetaminophen (PERCOCET) 7.5-325 MG per tablet Take 1 tablet by mouth every 8 hours as needed for Pain (Patient states takes every 6 hours). 20  Yes Historical Provider, MD   alogliptin (NESINA) 6.25 MG TABS tablet Take 1 tablet by mouth daily 20  Yes YESSICA Giron CNP   furosemide (LASIX) 20 MG tablet Take 1 tablet by mouth daily 19  Yes Wily Herrera MD   potassium chloride (KLOR-CON M) 20 MEQ extended release tablet Take 1 tablet by mouth 2 times daily 19  Yes Wily Herrera MD   venlafaxine (EFFEXOR) 37.5 MG tablet Take 37.5 mg by mouth 2 times daily   Yes Historical Provider, MD   omeprazole (PRILOSEC) 40 MG delayed release capsule Take 1 capsule by mouth daily 4/3/19  Yes YESSICA Giron CNP   tiotropium (SPIRIVA RESPIMAT) 1.25 MCG/ACT AERS inhaler Inhale 2 puffs into the lungs daily 3/12/19  Yes Nena Bocaengra MD   albuterol sulfate HFA (PROAIR HFA) 108 (90 Base) MCG/ACT inhaler Inhale 2 puffs into the lungs every 6 hours as needed for Wheezing 3/5/18  Yes Nena Bocanegra MD   fluticasone-vilanterol (BREO ELLIPTA) 100-25 MCG/INH AEPB inhaler Inhale 1 puff into the lungs daily 3/5/18  Yes Nena Bocanegra MD   ALPRAZolam Gearldine Nordmann) 0.5 MG tablet Take 0.5 mg by mouth as needed for Sleep.  .   Yes Historical Provider, MD   Psyllium (METAMUCIL) 48.57 % POWD Take 1 Units by mouth daily 20   Wily Herrera MD respiratory failure ([facility]) J96.10    Nicotine addiction F17.200    Hiatal hernia K44.9    Status post laparoscopic sleeve gastrectomy Z98.84    Abdominal pannus E65    Wound dehiscence T81.30XA    S/P abdominoplasty Z98.890    Fibromyalgia M79.7    Skin ulcer with fat layer exposed ([facility] [address] L98.492    Sebaceous cyst of breast, right N60.81       Past Medical History:        Diagnosis Date    Anxiety     Arthritis     \"Back, Hands, Shoulders\"    Chronic back pain     Arthritis - NKI    COPD (chronic obstructive pulmonary disease) ([facility])     Sees Dr. Jn Mack with Mental Health    Diabetes mellitus ([facility] [address] Dx 2008    Type II - follows with PCP    Emphysema     Fibromyalgia     H/O abdominoplasty 12/21/2018    Hyperlipidemia     Hypertension     Follows with PCP    Migraines Last Migraine 10-18    Neuropathy     From feet to knees    Piriformis syndrome 6/24/2016    Psoriasis     Rotator cuff impingement syndrome, right 2/26/2018    RTC repair Dr Marcos Rosas 2015  Treated by Dr Sravani Owens 2/18/17  Arthrocentesis 12/19/17    Shortness of breath on exertion     Syncope and collapse 03/04/2019    Teeth missing     Upper And Lower    Wears glasses        Past Surgical History:        Procedure Laterality Date    ABDOMEN SURGERY N/A 1/14/2019    SECONDARY CLOSURE OF DEHISCED ABDOMINAL WOUND performed by Aleida Pierce MD at [place] [address] N/A 1/24/2019    IRRIGATION OF LOWER ABDOMINAL WOUND performed by Aleida Pierce MD at [facility] ABDOMINOPLASTY N/A 7/29/2019    ABDOMINOPLASTY REVISION performed by Aleida Pierce MD at [address] [place] [address] & [facility] Right 2017    Dr. Sravani Owens (Right)   [address]    Tubal Ligation Also Done In 1997    COLONOSCOPY  03/2017    Polyp Removed - Dr. Eduardo Hanley In Past    ELBOW SURGERY Bilateral Last Done In 2013    Right Done Twice, Left Done Once left cubital tunnel release ; right 2014    ENDOSCOPY, COLON, DIAGNOSTIC  2017    HERNIA REPAIR  10/26/2017    hiatal hernia with gastrectomy    HYSTERECTOMY VAGINAL  2000    LAPAROSCOPY      PA EXCISE EXCESS SKIN TISSUE,ABDOMEN N/A 2018    ABDOMINOPLASTY performed by Leobardo Cisneros MD at [address] Right     Dr. Umair Chan Right 2017    Dr Qasim Evans  10/26/2017    Robotic Laparoscopic, Pre Op Weight 237  Or 238 lbs.  TUBAL LIGATION      Done With        Social History:    Social History     Tobacco Use    Smoking status: Former Smoker     Packs/day: 0.25     Years: 30.00     Pack years: 7.50     Types: Cigarettes, E-Cigarettes     Start date:      Last attempt to quit: 2016     Years since quittin.0    Smokeless tobacco: Never Used   Substance Use Topics    Alcohol use: Yes     Comment: \"Occ.  Maybe Twice A Month\"                                Counseling given: Not Answered      Vital Signs (Current):   Vitals:    20 0707   BP: 113/75   Pulse: 75   Resp: 16   Temp: 97.9 °F (36.6 °C)   TempSrc: Temporal   SpO2: 97%                                              BP Readings from Last 3 Encounters:   20 113/75   20 (!) 140/92   20 (!) 150/95       NPO Status: Time of last liquid consumption:                         Time of last solid consumption:                         Date of last liquid consumption: 20                        Date of last solid food consumption: 20    BMI:   Wt Readings from Last 3 Encounters:   20 173 lb (78.5 kg)   20 169 lb 6.4 oz (76.8 kg)   20 174 lb (78.9 kg)     There is no height or weight on file to calculate BMI.    CBC:   Lab Results   Component Value Date    WBC 7.0 2020    RBC 5.73 2020    HGB 14.6 2020    HCT 43.9 2020    MCV 76.6 2020    RDW 13.2 2020     2020 CMP:   Lab Results   Component Value Date     01/27/2020    K 4.6 01/27/2020     01/27/2020    CO2 26 01/27/2020    BUN 14 01/27/2020    CREATININE 0.8 01/27/2020    GFRAA >60 01/27/2020    LABGLOM >60 01/27/2020    GLUCOSE 135 01/27/2020    PROT 6.9 06/19/2019    PROT 7.9 03/02/2013    CALCIUM 9.9 01/27/2020    BILITOT 0.9 06/19/2019    ALKPHOS 96 06/19/2019    AST 17 06/19/2019    ALT 13 06/19/2019       POC Tests:   Recent Labs     01/29/20  0716   POCGLU 126*       Coags: No results found for: PROTIME, INR, APTT    HCG (If Applicable): No results found for: PREGTESTUR, PREGSERUM, HCG, HCGQUANT     ABGs: No results found for: PHART, PO2ART, RII0BDM, BEA4QNE, BEART, E6EEAVWL     Type & Screen (If Applicable):  No results found for: LABABO, [address]    Anesthesia Evaluation  Patient summary reviewed and Nursing notes reviewed no history of anesthetic complications:   Airway:         Dental:          Pulmonary:   (+) COPD:  shortness of breath:                             Cardiovascular:  Exercise tolerance: poor (<4 METS),   (+) hypertension:, dysrhythmias:, TOMAS:,                ROS comment: Syncope  Left ventricular function is normal.   Ejection fraction is visually estimated at 55-60%. No significant valvular abnormalities. hx 1st/2nd degree HB):, TOMAS: after ambulating 1 flight of stairs, hyperlipidemia        Neuro/Psych:   (+) neuromuscular disease:, headaches:, psychiatric history:            GI/Hepatic/Renal:   (+) hiatal hernia,           Endo/Other:    (+) Diabetes, . Abdominal:           Vascular:                                        Anesthesia Plan      general     ASA 4             Anesthetic plan and risks discussed with patient. Plan discussed with CRNA.                   Washington Pham MD   1/29/2020

## 2020-01-29 NOTE — PROGRESS NOTES
3152- arrived to PACU in supine position, monitors applied alarms on. Surgery cancelled due to low heart rate/ Mobitz 1. Patient awake, tearful but denies any chest pain/SOB.  Handoff report received from [address] [place] [address] Rd- Dr. Dora Wiggins at bedside, EKG obtained  0920- Dr. Andre Macedo at bedside, [id number] Margaret Michelle to proceed with surgery   0930- awaiting for OR  0945- up to bathroom, steady   1058- returned to OR per cart

## 2020-01-31 LAB
EKG ATRIAL RATE: 64 BPM
EKG DIAGNOSIS: NORMAL
EKG P AXIS: 78 DEGREES
EKG Q-T INTERVAL: 428 MS
EKG QRS DURATION: 76 MS
EKG QTC CALCULATION (BAZETT): 441 MS
EKG R AXIS: 82 DEGREES
EKG T AXIS: 87 DEGREES
EKG VENTRICULAR RATE: 64 BPM

## 2020-02-03 NOTE — PROGRESS NOTES
Beverly is 1 week post-op: excision sebaceous cyst of right breast.  Presenting for routine follow-up. S:  Doing well. Patient has noted no excessive redness and swelling. O:  Wound healing well. A:  Satisfactory course. P: Sutures removed. Patient to return prn. We will get her an appointment with Dr. Norman Apple for cardiac follow up from the hospital.  Patient is to return as needed for redness, swelling, discomfort, or any concern about her  surgery.

## 2020-02-04 ENCOUNTER — OFFICE VISIT (OUTPATIENT)
Dept: FAMILY MEDICINE CLINIC | Age: 55
End: 2020-02-04
Payer: COMMERCIAL

## 2020-02-04 ENCOUNTER — OFFICE VISIT (OUTPATIENT)
Dept: SURGERY | Age: 55
End: 2020-02-04

## 2020-02-04 VITALS
HEIGHT: 64 IN | OXYGEN SATURATION: 94 % | DIASTOLIC BLOOD PRESSURE: 82 MMHG | HEART RATE: 81 BPM | WEIGHT: 175 LBS | RESPIRATION RATE: 14 BRPM | BODY MASS INDEX: 29.88 KG/M2 | SYSTOLIC BLOOD PRESSURE: 140 MMHG

## 2020-02-04 VITALS — SYSTOLIC BLOOD PRESSURE: 144 MMHG | HEART RATE: 77 BPM | DIASTOLIC BLOOD PRESSURE: 72 MMHG | OXYGEN SATURATION: 95 %

## 2020-02-04 PROCEDURE — 99213 OFFICE O/P EST LOW 20 MIN: CPT | Performed by: NURSE PRACTITIONER

## 2020-02-04 PROCEDURE — 99024 POSTOP FOLLOW-UP VISIT: CPT | Performed by: SURGERY

## 2020-02-04 RX ORDER — FLUCONAZOLE 100 MG/1
100 TABLET ORAL DAILY
Qty: 7 TABLET | Refills: 0 | Status: SHIPPED | OUTPATIENT
Start: 2020-02-04 | End: 2020-02-11

## 2020-02-04 ASSESSMENT — ENCOUNTER SYMPTOMS
ABDOMINAL PAIN: 0
DIARRHEA: 0
NAUSEA: 0
VOMITING: 0

## 2020-02-04 NOTE — PROGRESS NOTES
Subjective:      Patient ID: Jeannine Jordan is a 48 y.o. female. This 48year old female presents for \"Yellow cottage cheese vaginal discharge\"  present for the last month that is worsening. There is no blood, open wounds, ulcers or abnormal smell present. Denies burning, frequency, urgency with urination. She does report minor itching intermittently. Last sexual encounter a few weeks ago. Her Partner denies having any recent infectious diseases. Pt reports having Multiple antibiotics lately for URI's and she is a diabetic. Hx of yeast infection multiple years ago. Hx chlamydia for which she was treated for. Partner was infected and not treated. No pap smear since 2015. Hysterectomy 2000. GYN exam 01/2019                Review of Systems   Constitutional: Negative. Gastrointestinal: Negative for abdominal pain, diarrhea, nausea and vomiting. Genitourinary: Positive for vaginal discharge. Negative for decreased urine volume, difficulty urinating, dyspareunia, dysuria, enuresis, flank pain, frequency, genital sores, hematuria, menstrual problem, pelvic pain, urgency, vaginal bleeding and vaginal pain. All other systems reviewed and are negative. Objective:   Physical Exam  Vitals signs reviewed. Constitutional:       Appearance: Normal appearance. HENT:      Head: Normocephalic and atraumatic. Eyes:      Pupils: Pupils are equal, round, and reactive to light. Cardiovascular:      Rate and Rhythm: Normal rate and regular rhythm. Pulmonary:      Effort: Pulmonary effort is normal.      Breath sounds: Normal breath sounds. Abdominal:      General: Abdomen is flat. Palpations: Abdomen is soft. Genitourinary:     General: Normal vulva. Exam position: Lithotomy position. Pubic Area: No rash. Labia:         Right: No rash, tenderness, lesion or injury. Left: No rash, tenderness, lesion or injury. Urethra: No prolapse.       Vagina: No signs of injury and

## 2020-02-04 NOTE — PATIENT INSTRUCTIONS
medical care if:    · You have unexpected vaginal bleeding.     · You have new or increased pain in your vagina or pelvis.    Watch closely for changes in your health, and be sure to contact your doctor if:    · You have a fever.     · You are not getting better after 2 days.     · Your symptoms come back after you finish your medicines. Where can you learn more? Go to https://Germin8pepiceweb.healthInvuity. org and sign in to your [employer]One account. Enter W675 in the Hit the Mark box to learn more about \"Vaginal Yeast Infection: Care Instructions. \"     If you do not have an account, please click on the \"Sign Up Now\" link. Current as of: February 19, 2019  Content Version: 12.3  © 8314-2469 [employer]. Care instructions adapted under license by [facility] ([facility]). If you have questions about a medical condition or this instruction, always ask your healthcare professional. [place] [address] any warranty or liability for your use of this information. Patient Education        Learning About Cervical Cancer Screening  What is a cervical cancer screening test?    The cervix is the lower part of the uterus that opens into the vagina. Cervical cancer screening tests check the cells on the cervix for changes that could lead to cancer. Two tests can be used to screen for cervical cancer. They may be used alone or together. A Pap test.   This test looks for changes in the cells of the cervix. Some of these cell changes could lead to cancer. A human papillomavirus (HPV) test.   This test looks for the HPV virus. Some high-risk types of HPV can cause cell changes that could lead to cervical cancer. During either test, the doctor or nurse will insert a tool called a speculum into your vagina. The speculum gently opens the vaginal walls. It allows your doctor to see inside the vagina and the cervix. He or she uses a small swab or brush to collect cell samples from your cervix.   Try menopause that are causing the cells to change. ? You have cell changes that may be a sign of precancer or cancer. The results are ranked based on how serious the changes might be. HPV tests  · A negative result means that the test didn't find any high-risk HPV in the sample. · A positive HPV test means that at least one type of high-risk HPV was found. It doesn't mean that you have cervical cancer, but it increases your chance of having cell changes that could lead to cancer. Follow-up care is a key part of your treatment and safety. Be sure to make and go to all appointments, and call your doctor if you are having problems. It's also a good idea to know your test results and keep a list of the medicines you take. Where can you learn more? Go to https://MILIpe"[email or web address].VOIP [email or web address] org and sign in to your Postachio account. Enter P919 in the Tapingo box to learn more about \"Learning About Cervical Cancer Screening. \"     If you do not have an account, please click on the \"Sign Up Now\" link. Current as of: August 21, 2019  Content Version: 12.3  © 0175-2312 [employer]. Care instructions adapted under license by [facility] ([facility]). If you have questions about a medical condition or this instruction, always ask your healthcare professional. [address] any warranty or liability for your use of this information.

## 2020-02-05 ENCOUNTER — TELEPHONE (OUTPATIENT)
Dept: FAMILY MEDICINE CLINIC | Age: 55
End: 2020-02-05

## 2020-02-05 LAB
CANDIDA SPECIES, DNA PROBE: ABNORMAL
GARDNERELLA VAGINALIS, DNA PROBE: ABNORMAL
TRICHOMONAS VAGINALIS DNA: ABNORMAL

## 2020-02-05 RX ORDER — METRONIDAZOLE 500 MG/1
500 TABLET ORAL 2 TIMES DAILY
Qty: 14 TABLET | Refills: 0 | Status: SHIPPED | OUTPATIENT
Start: 2020-02-05 | End: 2020-02-12

## 2020-02-07 LAB
C. TRACHOMATIS DNA,THIN PREP: NEGATIVE
N. GONORRHOEAE DNA, THIN PREP: NEGATIVE

## 2020-02-07 NOTE — RESULT ENCOUNTER NOTE
Please notify patient that their lab results (pap smear and ghon/ chlamydia) are normal other than confirmation of yeast infection with inflammation. Continue current regimen of flagyl and diflucan as ordered before.

## 2020-02-12 ENCOUNTER — HOSPITAL ENCOUNTER (EMERGENCY)
Age: 55
Discharge: HOME | End: 2020-02-12
Payer: COMMERCIAL

## 2020-02-12 VITALS
TEMPERATURE: 97.9 F | HEIGHT: 67 IN | WEIGHT: 167 LBS | BODY MASS INDEX: 26.21 KG/M2 | SYSTOLIC BLOOD PRESSURE: 142 MMHG | HEART RATE: 81 BPM | RESPIRATION RATE: 18 BRPM | OXYGEN SATURATION: 98 % | DIASTOLIC BLOOD PRESSURE: 107 MMHG

## 2020-02-12 LAB
BACTERIA: ABNORMAL /HPF
BILIRUBIN URINE: NEGATIVE MG/DL
BLOOD, URINE: NEGATIVE
CLARITY: ABNORMAL
COLOR: YELLOW
GLUCOSE, URINE: NEGATIVE MG/DL
INTERPRETATION: NORMAL
KETONES, URINE: ABNORMAL MG/DL
LEUKOCYTE ESTERASE, URINE: ABNORMAL
MUCUS: ABNORMAL HPF
NITRITE URINE, QUANTITATIVE: NEGATIVE
PH, URINE: 5 (ref 5–8)
PREGNANCY, URINE: NEGATIVE
PROTEIN UA: NEGATIVE MG/DL
RBC URINE: 1 /HPF (ref 0–6)
SPECIFIC GRAVITY UA: 1.02 (ref 1–1.03)
SPECIFIC GRAVITY, URINE: 1.02 (ref 1–1.03)
SQUAMOUS EPITHELIAL: 8 /HPF
TRICHOMONAS: ABNORMAL /HPF
UROBILINOGEN, URINE: NORMAL MG/DL (ref 0.2–1)
WBC UA: <1 /HPF (ref 0–5)

## 2020-02-12 PROCEDURE — 6360000002 HC RX W HCPCS: Performed by: PHYSICIAN ASSISTANT

## 2020-02-12 PROCEDURE — 99283 EMERGENCY DEPT VISIT LOW MDM: CPT

## 2020-02-12 PROCEDURE — 81001 URINALYSIS AUTO W/SCOPE: CPT

## 2020-02-12 PROCEDURE — 6370000000 HC RX 637 (ALT 250 FOR IP): Performed by: PHYSICIAN ASSISTANT

## 2020-02-12 PROCEDURE — 96372 THER/PROPH/DIAG INJ SC/IM: CPT

## 2020-02-12 PROCEDURE — 81025 URINE PREGNANCY TEST: CPT

## 2020-02-12 RX ORDER — KETOROLAC TROMETHAMINE 30 MG/ML
30 INJECTION, SOLUTION INTRAMUSCULAR; INTRAVENOUS ONCE
Status: COMPLETED | OUTPATIENT
Start: 2020-02-12 | End: 2020-02-12

## 2020-02-12 RX ORDER — OXYCODONE HYDROCHLORIDE AND ACETAMINOPHEN 5; 325 MG/1; MG/1
2 TABLET ORAL ONCE
Status: COMPLETED | OUTPATIENT
Start: 2020-02-12 | End: 2020-02-12

## 2020-02-12 RX ADMIN — KETOROLAC TROMETHAMINE 30 MG: 30 INJECTION, SOLUTION INTRAMUSCULAR; INTRAVENOUS at 12:49

## 2020-02-12 RX ADMIN — OXYCODONE HYDROCHLORIDE AND ACETAMINOPHEN 2 TABLET: 5; 325 TABLET ORAL at 12:46

## 2020-02-12 ASSESSMENT — PAIN DESCRIPTION - ORIENTATION: ORIENTATION: LEFT;LOWER

## 2020-02-12 ASSESSMENT — PAIN DESCRIPTION - LOCATION: LOCATION: BACK

## 2020-02-12 ASSESSMENT — PAIN DESCRIPTION - PAIN TYPE: TYPE: ACUTE PAIN

## 2020-02-12 ASSESSMENT — PAIN SCALES - GENERAL
PAINLEVEL_OUTOF10: 10

## 2020-02-12 NOTE — ED PROVIDER NOTES
EMERGENCY DEPARTMENT ENCOUNTER      PCP: Chanelle Mendoza, [address] [place] [address]    Chief Complaint   Patient presents with    Back Pain     left lower, radiating down LLE. denies known injury         This patient was not evaluated by the attending physician. I have independently evaluated this patient . HPI    Zenobia Lockett is a 48 y.o. female who presents presents to the emergency department today with left-sided back pain that radiates into her buttocks and down the left lower extremity. She states that initiated last night and then worsened today while she was at a doctor's appointment. She describes a dull achy pain. States that she has some mild numbness into the left leg. No motor difficulty. Has had history of acute on chronic back issues. No new injury or trauma. No alarming symptoms, no saddle anesthesias, bowel or bladder incontinence. Does admit that she been recently treated with a yeast infection, has had some mild burning with urination. Also admits that she had intermittent upper back pain prior to to the low back. No history of blood in her urine. No history of kidney stones, fever. REVIEW OF SYSTEMS    General:   Denies fever, weight loss or weakness. Denies recent/current systemic infection, recent spinal fracture or procedure, or immunosuppression. Denies history of IVDA. ENT:  No upper respiratory symptoms  Neck:  See HPI  Cardiovascular:  Denies chest pain, palpitations or swelling  Respiratory:  Denies cough or shortness of breath    GI:  Denies abdominal pain, nausea, vomiting, or diarrhea  : See HPI. No recent or current indwelling urinary catheter  Musculoskeletal:  No upper or lower extremity pain or functional deficits. No numbness or tingling. Skin:  Denies rash  Neurologic:   No bowel incontinence or bladder retention, No saddle anesthesia. No radicular symptoms. No lower extremity weakness or altered sensation.   Endocrine:  Denies polyuria or polydypsia   Lymphatic:  Denies swollen glands     All other review of systems are negative  See HPI and nursing notes for additional information       PAST MEDICAL & SURGICAL HISTORY    Past Medical History:   Diagnosis Date    Anxiety     Arthritis     \"Back, Hands, Shoulders\"    Chronic back pain     Arthritis - NKI    COPD [facility]ch[facility] obstructive pulmonary disease) ([facility]     Sees Dr. Herb Manzano with Mental Health    Diabetes mellitus ([facility] Dx 2008    Type II - follows with PCP    Emphysema     Fibromyalgia     H/O abdominoplasty 12/21/2018    H/O cardiovascular stress test 03/04/2019    ECG portion of stress test is neg for ischemia by diagnostic criteria. No infarct or ischemia noted. Normal stress myocardial perfusion. This is a normal study    H/O echocardiogram 03/04/2019    Left ventricular function is normal. Ejection fraction is visually estimated at 55-60%. No significant valvular abnormalitites.      Hyperlipidemia     Hypertension     Follows with PCP    Migraines Last Migraine 10-18    Mobitz (type) I (Wenckebach's) atrioventricular block 01/29/2020    Neuropathy     From feet to knees    Piriformis syndrome 6/24/2016    Psoriasis     Rotator cuff impingement syndrome, right 2/26/2018    RTC repair Dr Janina Kim 2015  Treated by Dr Butch Phoenix 2/18/17  Arthrocentesis 12/19/17    Shortness of breath on exertion     Syncope and collapse 03/04/2019    Teeth missing     Upper And Lower    Wears glasses      Past Surgical History:   Procedure Laterality Date    ABDOMEN SURGERY N/A 1/14/2019    SECONDARY CLOSURE OF DEHISCED ABDOMINAL WOUND performed by Ericka Davis MD at [facility] 71 N/A 1/24/2019    IRRIGATION OF LOWER ABDOMINAL WOUND performed by Ericka Davis MD at [facility] 73 ABDOMINOPLASTY N/A 7/29/2019    ABDOMINOPLASTY REVISION performed by Ericka Davis MD at [facility] 75 Right 1/29/2020    EXCISION OF RIGHT BREAST SABACEOUS CYST performed by Sophie Pineda MD at [facility] 19 Right 2017    Dr. Eulalia Le (Right)   [address]    Tubal Ligation Also Done In     COLONOSCOPY  2017    Polyp Removed - Dr. Ruvalcaba Neisha      Teeth Extracted In Past    ELBOW SURGERY Bilateral Last Done In     Right Done Twice, Left Done Once left cubital tunnel release ; right 2014    ENDOSCOPY, COLON, DIAGNOSTIC  2017    HERNIA REPAIR  10/26/2017    hiatal hernia with gastrectomy    HYSTERECTOMY VAGINAL      LAPAROSCOPY      [place] EXCISE EXCESS SKIN TISSUE,ABDOMEN N/A 2018    ABDOMINOPLASTY performed by Carina Sofia MD at [address] Right 2015    Dr. Laureen Perez Right 2017    Dr Caitlin Campbell  10/26/2017    Robotic Laparoscopic, Pre Op Weight 237  Or 238 lbs.  TUBAL LIGATION      Done With        CURRENT MEDICATIONS    Current Outpatient Rx   Medication Sig Dispense Refill    metroNIDAZOLE (FLAGYL) 500 MG tablet Take 1 tablet by mouth 2 times daily for 7 days 14 tablet 0    tiZANidine (ZANAFLEX) 4 MG tablet Take 4 mg by mouth every 8 hours as needed       oxyCODONE-acetaminophen (PERCOCET) 7.5-325 MG per tablet Take 1 tablet by mouth every 8 hours as needed for Pain (Patient states takes every 6 hours).        Psyllium (METAMUCIL) 48.57 % POWD Take 1 Units by mouth daily 1 Bottle 5    alogliptin (NESINA) 6.25 MG TABS tablet Take 1 tablet by mouth daily 30 tablet 2    tiotropium (SPIRIVA RESPIMAT) 2.5 MCG/ACT AERS inhaler Inhale 2 puffs into the lungs daily 1 Inhaler 5    furosemide (LASIX) 20 MG tablet Take 1 tablet by mouth daily 60 tablet 3    potassium chloride (KLOR-CON M) 20 MEQ extended release tablet Take 1 tablet by mouth 2 times daily 180 tablet 1    venlafaxine (EFFEXOR) 37.5 MG tablet Take 37.5 mg by mouth 2 times daily      glucose monitoring kit (FREESTYLE) monitoring kit 1 kit by Does not apply route daily 1 kit 0    Nutritional Supplements (ENSURE ACTIVE HIGH PROTEIN) LIQD Take 4 Cans by mouth 4 times daily as needed (Pre-op diet) (Patient taking differently: Take by mouth daily as needed (Pre-op diet) ) 84 Can 0    atorvastatin (LIPITOR) 80 MG tablet Take 1 tablet by mouth daily 90 tablet 1    Multiple Vitamin (MVI, CELEBRATE, CHEWABLE TABLET) Take 1 tablet by mouth daily 30 tablet 5    traZODone (DESYREL) 50 MG tablet Take 1 tablet by mouth nightly (Patient taking differently: Take 100 mg by mouth nightly ) 30 tablet 2    omeprazole (PRILOSEC) 40 MG delayed release capsule Take 1 capsule by mouth daily 30 capsule 2    blood glucose test strips (FREESTYLE LITE) strip 1 each by In Vitro route daily As needed. 100 strip 11    tiotropium (SPIRIVA RESPIMAT) 1.25 MCG/ACT AERS inhaler Inhale 2 puffs into the lungs daily 1 Inhaler 5    VALUE PLUS LANCETS THIN 26G MISC 1 EACH BY DOES NOT APPLY ROUTE DAILY 100 each 5    albuterol sulfate HFA (PROAIR HFA) 108 (90 Base) MCG/ACT inhaler Inhale 2 puffs into the lungs every 6 hours as needed for Wheezing 1 Inhaler 5    fluticasone-vilanterol (BREO ELLIPTA) 100-25 MCG/INH AEPB inhaler Inhale 1 puff into the lungs daily 1 each 5    ALPRAZolam (XANAX) 0.5 MG tablet Take 0.5 mg by mouth as needed for Sleep.  .         ALLERGIES    No Known Allergies    SOCIAL HISTORY    Social History     Socioeconomic History    Marital status: Single     Spouse name: None    Number of children: None    Years of education: None    Highest education level: None   Occupational History    Occupation: unemployed   Social Needs    Financial resource strain: None    Food insecurity:     Worry: None     Inability: None    Transportation needs:     Medical: None     Non-medical: None   Tobacco Use    Smoking status: Former Smoker     Packs/day: 0.25     Years: 30.00     Pack years: 7.50     Types: Cigarettes, negative     No CVA tenderness to percussion  Neurologic:    No obvious neurological deficits. Patient moves without obvious difficulty or weakness. Vascular:    Femoral & Distal pulses, & capillary refill intact bilateral lower extremities        Labs  Results for orders placed or performed during the hospital encounter of 02/12/20   Urinalysis (Lab)   Result Value Ref Range    Color, UA YELLOW YELLOW    Clarity, UA HAZY (A) CLEAR    Glucose, Urine NEGATIVE NEGATIVE MG/DL    Bilirubin Urine NEGATIVE NEGATIVE MG/DL    Ketones, Urine MODERATE (A) NEGATIVE MG/DL    Specific Gravity, UA 1.020 1.001 - 1.035    Blood, Urine NEGATIVE NEGATIVE    pH, Urine 5.0 5.0 - 8.0    Protein, UA NEGATIVE NEGATIVE MG/DL    Urobilinogen, Urine NORMAL 0.2 - 1.0 MG/DL    Nitrite Urine, Quantitative NEGATIVE NEGATIVE    Leukocyte Esterase, Urine SMALL (A) NEGATIVE    RBC, UA 1 0 - 6 /HPF    WBC, UA <1 0 - 5 /HPF    Bacteria, UA RARE (A) NEGATIVE /HPF    Squam Epithel, UA 8 /HPF    Mucus, UA RARE (A) NEGATIVE HPF    Trichomonas, UA NONE SEEN NONE SEEN /HPF   Pregnancy, Urine   Result Value Ref Range    Pregnancy, Urine NEGATIVE NEGATIVE    Specific Gravity, Urine 1.020 1.001 - 1.035    Interpretation       HCG METHOD LIMITATIONS:  Very dilute specimens, as indicated  by low specific gravity, may have  insufficient concentration of HCG  to bring about a positive result. ED COURSE & MEDICAL DECISION MAKING  Patient's urine demonstrated no sign of significant infection. Based on Pt's history (including stratification of the above red flags) & physical exam findings today, I do not see evidence of spinal cord compression/focal neuro deficits, cauda equina syndrome, epidural abscess, or osteomyelitis on exam today. History and exam is consistent with musculoskeletal back pain - Symptomatic treatment provided today. No signs of significant infection of the urine. No signs of obstructive uropathy.   Patient not pregnant. I discussed stretching exercises and avoid vigorous activity today at bedside. I recommend supportive OTC back brace for the next several days. I recommend followup with primary care provider/pain management. Over the next several days for recheck. Patient agrees to return emergency department if symptoms worsen or any new symptoms develop - this was discussed in detail at beside with Pt. Clinical  IMPRESSION    1. Acute exacerbation of chronic low back pain    2. Left sided sciatica        Comment: Please note this report has been produced using speech recognition software and may contain errors related to that system including errors in grammar, punctuation, and spelling, as well as words and phrases that may be inappropriate. If there are any questions or concerns please feel free to contact the dictating provider for clarification.       Emil Anderson 411, PA  02/12/20 [id number]

## 2020-02-12 NOTE — ED TRIAGE NOTES
Pt to ED with c/o left lower back pain radiating down into LLE. Denies known injury. Pt ambulatory in triage. GCS 15.

## 2020-02-19 RX ORDER — ATORVASTATIN CALCIUM 80 MG/1
80 TABLET, FILM COATED ORAL DAILY
Qty: 90 TABLET | Refills: 1 | Status: SHIPPED | OUTPATIENT
Start: 2020-02-19 | End: 2020-07-07 | Stop reason: SDUPTHER

## 2020-02-19 RX ORDER — OMEPRAZOLE 40 MG/1
40 CAPSULE, DELAYED RELEASE ORAL DAILY
Qty: 90 CAPSULE | Refills: 2 | Status: SHIPPED | OUTPATIENT
Start: 2020-02-19 | End: 2021-03-03 | Stop reason: SDUPTHER

## 2020-02-25 ENCOUNTER — OFFICE VISIT (OUTPATIENT)
Dept: CARDIOLOGY CLINIC | Age: 55
End: 2020-02-25
Payer: COMMERCIAL

## 2020-02-25 VITALS
SYSTOLIC BLOOD PRESSURE: 124 MMHG | HEIGHT: 67 IN | WEIGHT: 170 LBS | HEART RATE: 100 BPM | DIASTOLIC BLOOD PRESSURE: 80 MMHG | BODY MASS INDEX: 26.68 KG/M2

## 2020-02-25 PROCEDURE — 1036F TOBACCO NON-USER: CPT | Performed by: NURSE PRACTITIONER

## 2020-02-25 PROCEDURE — 2022F DILAT RTA XM EVC RTNOPTHY: CPT | Performed by: NURSE PRACTITIONER

## 2020-02-25 PROCEDURE — 3017F COLORECTAL CA SCREEN DOC REV: CPT | Performed by: NURSE PRACTITIONER

## 2020-02-25 PROCEDURE — G8427 DOCREV CUR MEDS BY ELIG CLIN: HCPCS | Performed by: NURSE PRACTITIONER

## 2020-02-25 PROCEDURE — 3051F HG A1C>EQUAL 7.0%<8.0%: CPT | Performed by: NURSE PRACTITIONER

## 2020-02-25 PROCEDURE — 93000 ELECTROCARDIOGRAM COMPLETE: CPT | Performed by: INTERNAL MEDICINE

## 2020-02-25 PROCEDURE — 99213 OFFICE O/P EST LOW 20 MIN: CPT | Performed by: NURSE PRACTITIONER

## 2020-02-25 PROCEDURE — G8417 CALC BMI ABV UP PARAM F/U: HCPCS | Performed by: NURSE PRACTITIONER

## 2020-02-25 PROCEDURE — G8484 FLU IMMUNIZE NO ADMIN: HCPCS | Performed by: NURSE PRACTITIONER

## 2020-02-25 NOTE — PROGRESS NOTES
tiotropium (SPIRIVA RESPIMAT) 2.5 MCG/ACT AERS inhaler Inhale 2 puffs into the lungs daily 1 Inhaler 5    fluticasone-vilanterol (BREO ELLIPTA) 100-25 MCG/INH AEPB inhaler Inhale 1 puff into the lungs daily 1 each 5    tiZANidine (ZANAFLEX) 4 MG tablet Take 4 mg by mouth every 8 hours as needed       oxyCODONE-acetaminophen (PERCOCET) 7.5-325 MG per tablet Take 1 tablet by mouth every 8 hours as needed for Pain (Patient states takes every 6 hours).  Psyllium (METAMUCIL) 48.57 % POWD Take 1 Units by mouth daily 1 Bottle 5    alogliptin (NESINA) 6.25 MG TABS tablet Take 1 tablet by mouth daily 30 tablet 2    tiotropium (SPIRIVA RESPIMAT) 2.5 MCG/ACT AERS inhaler Inhale 2 puffs into the lungs daily 1 Inhaler 5    furosemide (LASIX) 20 MG tablet Take 1 tablet by mouth daily 60 tablet 3    potassium chloride (KLOR-CON M) 20 MEQ extended release tablet Take 1 tablet by mouth 2 times daily 180 tablet 1    venlafaxine (EFFEXOR) 37.5 MG tablet Take 37.5 mg by mouth 2 times daily      glucose monitoring kit (FREESTYLE) monitoring kit 1 kit by Does not apply route daily 1 kit 0    Multiple Vitamin (MVI, CELEBRATE, CHEWABLE TABLET) Take 1 tablet by mouth daily 30 tablet 5    traZODone (DESYREL) 50 MG tablet Take 1 tablet by mouth nightly (Patient taking differently: Take 100 mg by mouth nightly ) 30 tablet 2    blood glucose test strips (FREESTYLE LITE) strip 1 each by In Vitro route daily As needed.  100 strip 11    tiotropium (SPIRIVA RESPIMAT) 1.25 MCG/ACT AERS inhaler Inhale 2 puffs into the lungs daily 1 Inhaler 5    VALUE PLUS LANCETS THIN 26G MISC 1 EACH BY DOES NOT APPLY ROUTE DAILY 100 each 5    albuterol sulfate HFA (PROAIR HFA) 108 (90 Base) MCG/ACT inhaler Inhale 2 puffs into the lungs every 6 hours as needed for Wheezing 1 Inhaler 5    fluticasone-vilanterol (BREO ELLIPTA) 100-25 MCG/INH AEPB inhaler Inhale 1 puff into the lungs daily 1 each 5    ALPRAZolam (XANAX) 0.5 MG tablet Take 0.5 mg by mouth as needed for Sleep. Erica Bernheim Nutritional Supplements (ENSURE ACTIVE HIGH PROTEIN) LIQD Take 4 Cans by mouth 4 times daily as needed (Pre-op diet) (Patient taking differently: Take by mouth daily as needed (Pre-op diet) ) 84 Can 0     No current facility-administered medications for this visit. Allergies: Patient has no known allergies. Past Medical History:   Diagnosis Date    Anxiety     Arthritis     \"Back, Hands, Shoulders\"    Chronic back pain     Arthritis - NKI    COPD [facility]     Sees Dr. Prabha Nickerson with Mental Health    Diabetes mellitus ([facility] [address] Dx 2008    Type II - follows with PCP    Emphysema     Fibromyalgia     H/O abdominoplasty 12/21/2018    H/O cardiovascular stress test 03/04/2019    ECG portion of stress test is neg for ischemia by diagnostic criteria. No infarct or ischemia noted. Normal stress myocardial perfusion. This is a normal study    H/O echocardiogram 03/04/2019    Left ventricular function is normal. Ejection fraction is visually estimated at 55-60%. No significant valvular abnormalitites.      Hyperlipidemia     Hypertension     Follows with PCP    Migraines Last Migraine 10-18    Mobitz (type) I (Wenckebach's) atrioventricular block 01/29/2020    Neuropathy     From feet to knees    Piriformis syndrome 6/24/2016    Psoriasis     Rotator cuff impingement syndrome, right 2/26/2018    RTC repair Dr Mil Mariscal 2015  Treated by Dr Georgi George 2/18/17  Arthrocentesis 12/19/17    Shortness of breath on exertion     Syncope and collapse 03/04/2019    Teeth missing     Upper And Lower    Wears glasses      Past Surgical History:   Procedure Laterality Date    ABDOMEN SURGERY N/A 1/14/2019    SECONDARY CLOSURE OF DEHISCED ABDOMINAL WOUND performed by Carroll Gross MD at [facility] 71 N/A 1/24/2019    IRRIGATION OF LOWER ABDOMINAL WOUND performed by Carroll Gross MD at [facility] 73 ABDOMINOPLASTY N/A 2019    ABDOMINOPLASTY REVISION performed by Rae Patrick MD at [facility] Right 2020    EXCISION OF RIGHT BREAST SABACEOUS CYST performed by Brooke Santana MD at [address] Right     Dr. Jaqui Madrid (Right)   [address]    Tubal Ligation Also Done In     COLONOSCOPY  2017    Polyp Removed - Dr. Fer Marcos      Teeth Extracted In Past    ELBOW SURGERY Bilateral Last Done In     Right Done Twice, Left Done Once left cubital tunnel release ; right 2014    ENDOSCOPY, COLON, DIAGNOSTIC  2017    HERNIA REPAIR  10/26/2017    hiatal hernia with gastrectomy    HYSTERECTOMY VAGINAL      LAPAROSCOPY      [place] EXCISE EXCESS SKIN TISSUE,ABDOMEN N/A 2018    ABDOMINOPLASTY performed by Rae Patrick MD at [address] Right     Dr. Kaitlynn Harkins Right 2017    Dr Laila Blanton  10/26/2017    Robotic Laparoscopic, Pre Op Weight 237  Or 238 lbs.     TUBAL LIGATION      Done With      Family History   Problem Relation Age of Onset    Heart Attack Father     Heart Disease Father     Early Death Father 46        Heart Attack    Alcohol Abuse Father     Substance Abuse Father         Alcoholic    Arthritis Mother     Heart Disease Mother     Diabetes Mother     Cancer Mother         \"Kidney Cancer\"    High Blood Pressure Mother     High Blood Pressure Sister     Diabetes Brother     Early Death Daughter 21        \"Killed In A Car Accident\"     Social History     Tobacco Use    Smoking status: Former Smoker     Packs/day: 0.25     Years: 30.00     Pack years: 7.50     Types: Cigarettes, E-Cigarettes     Start date:      Last attempt to quit: 2016     Years since quittin.1    Smokeless tobacco: Never Used   Substance Use Topics    Alcohol use: Yes     Comment: \"Occ. Maybe Twice A Month\"        Review of Systems:   · Constitutional: No Fever,no unintentional weight Loss   · Eyes: No change in Vision:     · ENT: No Headaches, Hearing Loss or Vertigo. No tinnitus   · Cardiovascular: as per note above   · Respiratory: No cough or wheezing and as per note above. · Gastrointestinal: no abdominal pain, no appetite loss, no blood in stools, constipation, or diarrhea, controlled heartburn + GERD  · Genitourinary: No dysuria, trouble voiding, or hematuria  · Musculoskeletal: back pain- No: myalgia No,  Arthralgia- No  · Integumentary: No rash or pruritis  · Neurological: No TIA or stroke symptoms  · Psychiatric: Anxiety- No: depression-  yes  · Endocrine: No malaise, No fatigue - no temperature intolerance  · Hematologic/Lymphatic: No bleeding problems, blood clots or swollen lymph nodes  · Allergic/Immunologic: No nasal congestion or hives    Objective:      Physical Exam:  /80   Pulse 100   Ht 5' 7\" (1.702 m)   Wt 170 lb (77.1 kg)   BMI 26.63 kg/m²   Wt Readings from Last 3 Encounters:   02/25/20 170 lb (77.1 kg)   02/19/20 165 lb (74.8 kg)   02/12/20 167 lb (75.8 kg)     Body mass index is 26.63 kg/m². Physical Exam  Constitutional:       Appearance: Normal appearance. HENT:      Head: Normocephalic and atraumatic. Right Ear: External ear normal.      Left Ear: External ear normal.      Nose: Nose normal.      Mouth/Throat:      Mouth: Mucous membranes are moist.   Eyes:      Extraocular Movements: Extraocular movements intact. Conjunctiva/sclera: Conjunctivae normal.      Pupils: Pupils are equal, round, and reactive to light. Neck:      Musculoskeletal: Normal range of motion and neck supple. Cardiovascular:      Rate and Rhythm: Normal rate and regular rhythm. Pulses: Normal pulses. Heart sounds: Normal heart sounds. No murmur. Pulmonary:      Effort: Pulmonary effort is normal.      Breath sounds: Normal breath sounds.    Abdominal:

## 2020-02-25 NOTE — LETTER
CLINICAL STAFF DOCUMENTATION    Belinda Sickle  1965  [id number]    Have you had any Chest Pain - No    Have you had any Shortness of Breath - Yes  If Yes - When on exertion    Have you had any dizziness - No      Have you had any palpitations - No      Do you have a surgery or procedure scheduled in the near future - No      Ask patient if they want to sign up for MyChart if they are not already signed up    Check to see if we have an E-MAIL on file for the patient    Check medication list thoroughly!!!  BE SURE TO ASK PATIENT IF THEY NEED MEDICATION REFILLS

## 2020-03-06 ENCOUNTER — CARE COORDINATION (OUTPATIENT)
Dept: CARE COORDINATION | Age: 55
End: 2020-03-06

## 2020-03-06 NOTE — CARE COORDINATION
ACM Introduction letter sent today. Will continue to follow to engage patient with Care Coordination. Ael Brown RN  Ambulatory Care Manager  [phone] office/cell  [phone] fax  [email or web address]

## 2020-04-22 ENCOUNTER — TELEPHONE (OUTPATIENT)
Dept: FAMILY MEDICINE CLINIC | Age: 55
End: 2020-04-22

## 2020-04-22 ENCOUNTER — VIRTUAL VISIT (OUTPATIENT)
Dept: FAMILY MEDICINE CLINIC | Age: 55
End: 2020-04-22
Payer: COMMERCIAL

## 2020-04-22 PROCEDURE — 99213 OFFICE O/P EST LOW 20 MIN: CPT | Performed by: NURSE PRACTITIONER

## 2020-04-22 PROCEDURE — 2022F DILAT RTA XM EVC RTNOPTHY: CPT | Performed by: NURSE PRACTITIONER

## 2020-04-22 PROCEDURE — G8427 DOCREV CUR MEDS BY ELIG CLIN: HCPCS | Performed by: NURSE PRACTITIONER

## 2020-04-22 PROCEDURE — 3051F HG A1C>EQUAL 7.0%<8.0%: CPT | Performed by: NURSE PRACTITIONER

## 2020-04-22 PROCEDURE — 3017F COLORECTAL CA SCREEN DOC REV: CPT | Performed by: NURSE PRACTITIONER

## 2020-04-22 RX ORDER — ALOGLIPTIN 6.25 MG/1
6.25 TABLET, FILM COATED ORAL DAILY
Qty: 30 TABLET | Refills: 2 | Status: SHIPPED | OUTPATIENT
Start: 2020-04-22 | End: 2020-09-03 | Stop reason: SDUPTHER

## 2020-04-22 NOTE — TELEPHONE ENCOUNTER
----- Message from YESSICA Gallardo CNP sent at 4/22/2020 10:10 AM EDT -----  Boyd Single:)  Ellen Barber says she will do the FIT test and bring it with her to her June appointment if we send her one in the mail. Can you please do that? Thank you!

## 2020-04-22 NOTE — PROGRESS NOTES
2020    TELEHEALTH EVALUATION -- Audio/Visual (During [id number]-[id number] public health emergency)    HPI:    George Titus (:  1965) has requested an audio/video evaluation for the following concern(s):    Diabetes, edema, chronic pain  Beverly has not been checking her blood sugar. She denies any episodes of nausea, fatigue, shortness of breath, or chest pain. She continues to follow with pain management, Dr. Jayla Cody, and recently had her percocet dosing increased for chronic pain of her shoulders and back. She follows routinely with Dr. Jessenia Worley for her COPD, and has no recent changes in her treatment of respiratory symptoms. Beverly has chronic, recurrent edema to her lower extremities and her right hand. She is on long-term lasix daily. Review of Systems   See HPI    Prior to Visit Medications    Medication Sig Taking? Authorizing Provider   alogliptin (NESINA) 6.25 MG TABS tablet Take 1 tablet by mouth daily Yes Anisha Berry, APRN - CNP   Compression Stockings MISC by Does not apply route Yes Anisha Berry, APRN - CNP   atorvastatin (LIPITOR) 80 MG tablet TAKE 1 TABLET BY MOUTH DAILY  Anisha Berry, APRN - CNP   omeprazole (PRILOSEC) 40 MG delayed release capsule Take 1 capsule by mouth daily  Anisha Berry, APRN - CNP   albuterol sulfate HFA (PROAIR HFA) 108 (90 Base) MCG/ACT inhaler Inhale 2 puffs into the lungs every 6 hours as needed for Randy Akbar MD   tiotropium (SPIRIVA RESPIMAT) 2.5 MCG/ACT AERS inhaler Inhale 2 puffs into the lungs daily  Ирина Escobedo MD   fluticasone-vilanterol (BREO ELLIPTA) 100-25 MCG/INH AEPB inhaler Inhale 1 puff into the lungs daily  Ирина Escobedo MD   tiZANidine (ZANAFLEX) 4 MG tablet Take 4 mg by mouth every 8 hours as needed   Historical Provider, MD   oxyCODONE-acetaminophen (PERCOCET) 7.5-325 MG per tablet Take 1 tablet by mouth every 8 hours as needed for Pain (Patient states takes every 6 hours).    Historical Provider, MD   Psyllium (METAMUCIL) 48.57 % POWD 1986     Last attempt to quit: 2016     Years since quittin.3    Smokeless tobacco: Never Used   Substance Use Topics    Alcohol use: Yes     Comment: \"Occ. Maybe Twice A Month\"    Drug use: No        No Known Allergies,   Past Medical History:   Diagnosis Date    Anxiety     Arthritis     \"Back, Hands, Shoulders\"    Chronic back pain     Arthritis - NKI    COPD [facility]ch[facility]) ([facility])     Sees Dr. Neha Pelletier with Mental Health    Diabetes mellitus [facility] Dx 2008    Type II - follows with PCP    Emphysema     Fibromyalgia     H/O abdominoplasty 2018    H/O cardiovascular stress test 2019    ECG portion of stress test is neg for ischemia by diagnostic criteria. No infarct or ischemia noted. Normal stress myocardial perfusion. This is a normal study    H/O echocardiogram 2019    Left ventricular function is normal. Ejection fraction is visually estimated at 55-60%. No significant valvular abnormalitites.      Hyperlipidemia     Hypertension     Follows with PCP    Migraines Last Migraine 10-18    Mobitz (type) I (Wenckebach's) atrioventricular block 2020    Neuropathy     From feet to knees    Piriformis syndrome 2016    Psoriasis     Rotator cuff impingement syndrome, right 2018    RTC repair Dr Martin Cox   Treated by Dr Tomi Jaramillo 17  Arthrocentesis 17    Shortness of breath on exertion     Syncope and collapse 2019    Teeth missing     Upper And Lower    Wears glasses    ,   Past Surgical History:   Procedure Laterality Date    ABDOMEN SURGERY N/A 2019    SECONDARY CLOSURE OF DEHISCED ABDOMINAL WOUND performed by Luca Bowser MD at [facility] 71 N/A 2019    IRRIGATION OF LOWER ABDOMINAL WOUND performed by Luca Bowser MD at [facility] 73 ABDOMINOPLASTY N/A 2019    ABDOMINOPLASTY REVISION performed by Luca Bowser MD at [address] [place] [address] worsening conditions or problems, and seek emergency medical treatment and/or call 911 if deemed necessary. Services were provided through a video synchronous discussion virtually to substitute for in-person clinic visit. Patient was in her own home, and provider was in her office at [address] [place] [address]  Encounter initiated 9:20  Encounter completed 9:35    --YESSICA Gallego CNP on 4/22/2020 at 9:55 AM    An electronic signature was used to authenticate this note.

## 2020-05-19 RX ORDER — FUROSEMIDE 20 MG/1
20 TABLET ORAL DAILY
Qty: 60 TABLET | Refills: 0 | Status: SHIPPED | OUTPATIENT
Start: 2020-05-19 | End: 2020-08-20 | Stop reason: SDUPTHER

## 2020-06-01 ENCOUNTER — OFFICE VISIT (OUTPATIENT)
Dept: FAMILY MEDICINE CLINIC | Age: 55
End: 2020-06-01
Payer: COMMERCIAL

## 2020-06-01 VITALS
HEIGHT: 67 IN | TEMPERATURE: 97.3 F | HEART RATE: 72 BPM | DIASTOLIC BLOOD PRESSURE: 68 MMHG | RESPIRATION RATE: 16 BRPM | WEIGHT: 170 LBS | BODY MASS INDEX: 26.68 KG/M2 | OXYGEN SATURATION: 98 % | SYSTOLIC BLOOD PRESSURE: 122 MMHG

## 2020-06-01 PROBLEM — T81.30XA WOUND DEHISCENCE: Status: RESOLVED | Noted: 2019-01-09 | Resolved: 2020-06-01

## 2020-06-01 PROCEDURE — G8427 DOCREV CUR MEDS BY ELIG CLIN: HCPCS | Performed by: NURSE PRACTITIONER

## 2020-06-01 PROCEDURE — 1036F TOBACCO NON-USER: CPT | Performed by: NURSE PRACTITIONER

## 2020-06-01 PROCEDURE — 2022F DILAT RTA XM EVC RTNOPTHY: CPT | Performed by: NURSE PRACTITIONER

## 2020-06-01 PROCEDURE — G8417 CALC BMI ABV UP PARAM F/U: HCPCS | Performed by: NURSE PRACTITIONER

## 2020-06-01 PROCEDURE — 3017F COLORECTAL CA SCREEN DOC REV: CPT | Performed by: NURSE PRACTITIONER

## 2020-06-01 PROCEDURE — 99214 OFFICE O/P EST MOD 30 MIN: CPT | Performed by: NURSE PRACTITIONER

## 2020-06-01 RX ORDER — LORATADINE 10 MG/1
10 TABLET ORAL DAILY
Qty: 30 TABLET | Refills: 5 | Status: SHIPPED | OUTPATIENT
Start: 2020-06-01 | End: 2021-01-11

## 2020-06-01 ASSESSMENT — PATIENT HEALTH QUESTIONNAIRE - PHQ9
1. LITTLE INTEREST OR PLEASURE IN DOING THINGS: 0
2. FEELING DOWN, DEPRESSED OR HOPELESS: 0
SUM OF ALL RESPONSES TO PHQ QUESTIONS 1-9: 0
SUM OF ALL RESPONSES TO PHQ9 QUESTIONS 1 & 2: 0
SUM OF ALL RESPONSES TO PHQ QUESTIONS 1-9: 0

## 2020-06-01 ASSESSMENT — ENCOUNTER SYMPTOMS
SHORTNESS OF BREATH: 0
BACK PAIN: 1
VOMITING: 0
ABDOMINAL DISTENTION: 0
NAUSEA: 0
EYES NEGATIVE: 1
COUGH: 0

## 2020-06-01 NOTE — PROGRESS NOTES
Musculoskeletal: Positive for arthralgias, back pain and myalgias. Negative for gait problem. Follows with pain management   Allergic/Immunologic: Positive for environmental allergies. Neurological: Negative for dizziness, weakness and headaches. Psychiatric/Behavioral: Positive for dysphoric mood. Negative for agitation, sleep disturbance and suicidal ideas. The patient is not nervous/anxious. OBJECTIVE:  /68 (Site: Left Upper Arm, Position: Sitting, Cuff Size: Medium Adult)   Pulse 72   Temp 97.3 °F (36.3 °C)   Resp 16   Ht 5' 7\" (1.702 m)   Wt 170 lb (77.1 kg)   SpO2 98%   BMI 26.63 kg/m²   BP Readings from Last 3 Encounters:   06/01/20 122/68   02/25/20 124/80   02/12/20 (!) 142/107     Wt Readings from Last 3 Encounters:   06/01/20 170 lb (77.1 kg)   02/25/20 170 lb (77.1 kg)   02/19/20 165 lb (74.8 kg)     Body mass index is 26.63 kg/m². Physical Exam  Vitals signs and nursing note reviewed. Constitutional:       General: She is not in acute distress. Appearance: Normal appearance. She is well-developed and normal weight. She is not ill-appearing or toxic-appearing. HENT:      Head: Normocephalic and atraumatic. Right Ear: External ear normal.      Left Ear: External ear normal.      Nose: Nose normal.      Mouth/Throat:      Mouth: Mucous membranes are moist.   Eyes:      Conjunctiva/sclera: Conjunctivae normal.      Pupils: Pupils are equal, round, and reactive to light. Neck:      Musculoskeletal: Normal range of motion and neck supple. Cardiovascular:      Rate and Rhythm: Normal rate and regular rhythm. Pulses: Normal pulses. Heart sounds: Normal heart sounds. Pulmonary:      Effort: Pulmonary effort is normal.      Breath sounds: Normal breath sounds. Abdominal:      General: Bowel sounds are normal.      Palpations: Abdomen is soft. Musculoskeletal: Normal range of motion. Skin:     General: Skin is warm and dry.       Capillary Refill: MG tablet Take 0.5 mg by mouth as needed for Sleep. Lurlene Catarina fluticasone-vilanterol (BREO ELLIPTA) 100-25 MCG/INH AEPB inhaler Inhale 1 puff into the lungs daily 1 each 5    Nutritional Supplements (ENSURE ACTIVE HIGH PROTEIN) LIQD Take 4 Cans by mouth 4 times daily as needed (Pre-op diet) (Patient taking differently: Take by mouth daily as needed (Pre-op diet) ) 84 Can 0     No current facility-administered medications for this visit. Return in about 6 months (around 12/1/2020). Lissett Pérez DNP, FNP-C    Return for new or worsening symptoms or any concerns as needed.

## 2020-06-02 ENCOUNTER — OFFICE VISIT (OUTPATIENT)
Dept: PHYSICAL MEDICINE AND REHAB | Age: 55
End: 2020-06-02
Payer: COMMERCIAL

## 2020-06-02 ENCOUNTER — TELEMEDICINE (OUTPATIENT)
Dept: CARDIOLOGY CLINIC | Age: 55
End: 2020-06-02
Payer: COMMERCIAL

## 2020-06-02 VITALS
WEIGHT: 170 LBS | SYSTOLIC BLOOD PRESSURE: 131 MMHG | HEART RATE: 74 BPM | BODY MASS INDEX: 26.68 KG/M2 | DIASTOLIC BLOOD PRESSURE: 83 MMHG | HEIGHT: 67 IN

## 2020-06-02 VITALS — TEMPERATURE: 97.8 F

## 2020-06-02 PROBLEM — R06.02 SOB (SHORTNESS OF BREATH): Status: ACTIVE | Noted: 2020-06-02

## 2020-06-02 PROCEDURE — 95911 NRV CNDJ TEST 9-10 STUDIES: CPT | Performed by: PHYSICAL MEDICINE & REHABILITATION

## 2020-06-02 PROCEDURE — G8427 DOCREV CUR MEDS BY ELIG CLIN: HCPCS | Performed by: INTERNAL MEDICINE

## 2020-06-02 PROCEDURE — 95886 MUSC TEST DONE W/N TEST COMP: CPT | Performed by: PHYSICAL MEDICINE & REHABILITATION

## 2020-06-02 PROCEDURE — 3051F HG A1C>EQUAL 7.0%<8.0%: CPT | Performed by: INTERNAL MEDICINE

## 2020-06-02 PROCEDURE — 99213 OFFICE O/P EST LOW 20 MIN: CPT | Performed by: INTERNAL MEDICINE

## 2020-06-02 PROCEDURE — 3017F COLORECTAL CA SCREEN DOC REV: CPT | Performed by: INTERNAL MEDICINE

## 2020-06-02 PROCEDURE — 2022F DILAT RTA XM EVC RTNOPTHY: CPT | Performed by: INTERNAL MEDICINE

## 2020-06-02 NOTE — ASSESSMENT & PLAN NOTE
Continue to monitor she is not symptomatic no further intervention at this time.   Echo shows preserved EF normal stress test in 2019

## 2020-06-02 NOTE — PROGRESS NOTES
EMG REPORT     CHIEF COMPLAINT: Shoulder, elbow and wrist pains with numbness and tingling in the palms and most fingers. HISTORY OF PRESENT ILLNESS: 50 y.o. right hand dominant female with a long history of persistent numbness, tingling and pain in all 4 limbs, but particularly in her arms and hands. She has endured bilateral carpal tunnel release procedures more than once. She is also had ulnar nerve transposition surgeries with little symptom benefit. She reports her arm pains can be 8-9/10 in intensity. She gets cramping in the palms. She denied limb discoloration or localized swelling. Her sleep is often disturbed. She has been dropping things from her grasp. She has numbness and tingling in the legs as well. She has a history of diabetes. There is no thyroid disorder. PHYSICAL EXAMINATION: About 75 degrees of active cervical rotation bilaterally. Spurling's maneuver is negative. Upper limb reflexes were missing. Tinel sign was negative. Tone was low normal.  She had some giveaway with pain with MMT but no focal weakness to testing. There was no atrophy, tremor or clonus noted. Perception of touch was distorted over the palmar and dorsal aspects of both hands and digits. NERVE CONDUCTION STUDIES:     MOTOR         LATENCY NORMAL AMPLITUDE DISTANCE COND. GISELLA. RIGHT  MEDIAN 3.8 < 4.2 msec 6.2 8 cm 54   LEFT  MEDIAN 3.6 < 4.2 msec 4.5 8 cm 58   RIGHT  ULNAR 3.0 < 4.2 msec 7.9/10.0 8 cm 55/64   LEFT  ULNAR 3.3 < 4.2 msec 5.3/6.8 8 cm 66/48      SENSORY  ORTHODROMIC        LATENCY NORMAL AMPLITUDE DISTANCE   RIGHT MEDIAN 2.7 <2.3 msec 16 10 cm   LEFT  MEDIAN 2.8 < 2.3 msec 9 10 cm   RIGHT  ULNAR Absent < 2.3 msec  10 cm   LEFT  ULNAR Absent < 2.3 msec  10 cm       Right dorsal ulnar sensory: dL 3.4 msec, amp 3 microV.         NEEDLE EMG:      RIGHT   LEFT     Insertional Activity Spontaneous  Activity Volutional  MUAP's Insertional Activity Spontaneous  Activity Volutional  MUAP's

## 2020-06-26 ENCOUNTER — TELEPHONE (OUTPATIENT)
Dept: FAMILY MEDICINE CLINIC | Age: 55
End: 2020-06-26
Payer: COMMERCIAL

## 2020-06-26 LAB
CONTROL: ABNORMAL
HEMOCCULT STL QL: ABNORMAL

## 2020-06-26 PROCEDURE — 82274 ASSAY TEST FOR BLOOD FECAL: CPT | Performed by: NURSE PRACTITIONER

## 2020-07-01 ENCOUNTER — TELEMEDICINE (OUTPATIENT)
Dept: BARIATRICS/WEIGHT MGMT | Age: 55
End: 2020-07-01
Payer: COMMERCIAL

## 2020-07-01 PROBLEM — R14.1 ABDOMINAL GAS PAIN: Status: ACTIVE | Noted: 2020-07-01

## 2020-07-01 PROBLEM — K92.1 HEMATOCHEZIA: Status: ACTIVE | Noted: 2020-07-01

## 2020-07-01 PROBLEM — R10.31 RIGHT LOWER QUADRANT ABDOMINAL PAIN: Status: ACTIVE | Noted: 2020-07-01

## 2020-07-01 PROCEDURE — 99214 OFFICE O/P EST MOD 30 MIN: CPT | Performed by: SURGERY

## 2020-07-01 PROCEDURE — G8427 DOCREV CUR MEDS BY ELIG CLIN: HCPCS | Performed by: SURGERY

## 2020-07-01 PROCEDURE — 1036F TOBACCO NON-USER: CPT | Performed by: SURGERY

## 2020-07-01 PROCEDURE — G8417 CALC BMI ABV UP PARAM F/U: HCPCS | Performed by: SURGERY

## 2020-07-01 PROCEDURE — 3017F COLORECTAL CA SCREEN DOC REV: CPT | Performed by: SURGERY

## 2020-07-01 RX ORDER — BISACODYL 5 MG
20 TABLET, DELAYED RELEASE (ENTERIC COATED) ORAL 2 TIMES DAILY
Qty: 8 TABLET | Refills: 3 | Status: SHIPPED
Start: 2020-07-01 | End: 2020-07-17 | Stop reason: SDUPTHER

## 2020-07-01 RX ORDER — POLYETHYLENE GLYCOL 3350, SODIUM SULFATE ANHYDROUS, SODIUM BICARBONATE, SODIUM CHLORIDE, POTASSIUM CHLORIDE 236; 22.74; 6.74; 5.86; 2.97 G/4L; G/4L; G/4L; G/4L; G/4L
4 POWDER, FOR SOLUTION ORAL ONCE
Qty: 4000 ML | Refills: 0 | Status: SHIPPED | OUTPATIENT
Start: 2020-07-01 | End: 2020-07-01

## 2020-07-01 ASSESSMENT — ENCOUNTER SYMPTOMS
VOMITING: 0
PHOTOPHOBIA: 0
BLOOD IN STOOL: 1
TROUBLE SWALLOWING: 0
VOICE CHANGE: 0
SORE THROAT: 0
ABDOMINAL PAIN: 1
CONSTIPATION: 0
DIARRHEA: 0
COUGH: 0
NAUSEA: 0
WHEEZING: 0
SHORTNESS OF BREATH: 0
ANAL BLEEDING: 0
COLOR CHANGE: 0

## 2020-07-01 NOTE — PROGRESS NOTES
Base) MCG/ACT inhaler Inhale 2 puffs into the lungs every 6 hours as needed for Ed Favre, MD   fluticasone-vilanterol (BREO ELLIPTA) 100-25 MCG/INH AEPB inhaler Inhale 1 puff into the lungs daily  Jessika Cervantes MD   ALPRAZolam Wendelin Gilman) 0.5 MG tablet Take 0.5 mg by mouth as needed for Sleep. Yolanda Rae Historical Provider, MD       Social History     Tobacco Use    Smoking status: Former Smoker     Packs/day: 0.25     Years: 30.00     Pack years: 7.50     Types: Cigarettes, E-Cigarettes     Start date:      Last attempt to quit: 2016     Years since quittin.5    Smokeless tobacco: Never Used   Substance Use Topics    Alcohol use: Yes     Comment: \"Occ. Maybe Twice A Month\"    Drug use: No        No Known Allergies,   Past Medical History:   Diagnosis Date    Anxiety     Arthritis     \"Back, Hands, Shoulders\"    Chronic back pain     Arthritis - NKI    COPD (chronic obstructive pulmonary disease) ([facility])     Sees Dr. Benja Sykes with Mental Health    [facility] Dx     Type II - follows with PCP    Emphysema     Fibromyalgia     H/O abdominoplasty 2018    H/O cardiovascular stress test 2019    ECG portion of stress test is neg for ischemia by diagnostic criteria. No infarct or ischemia noted. Normal stress myocardial perfusion. This is a normal study    H/O echocardiogram 2019    Left ventricular function is normal. Ejection fraction is visually estimated at 55-60%. No significant valvular abnormalitites.      Hyperlipidemia     Hypertension     Follows with PCP    Migraines Last Migraine 10-18    Mobitz (type) I (Wenckebach's) atrioventricular block 2020    Neuropathy     From feet to knees    Piriformis syndrome 2016    Psoriasis     Rotator cuff impingement syndrome, right 2018    RTC repair Dr Horace Shaw   Treated by Dr Garrison Alvarez 17  Arthrocentesis 17    Shortness of breath on exertion     Syncope and collapse 2019    Teeth missing     Upper And Lower    Wears glasses    ,   Past Surgical History:   Procedure Laterality Date    ABDOMEN SURGERY N/A 2019    SECONDARY CLOSURE OF DEHISCED ABDOMINAL WOUND performed by Phyllis Fairbanks MD at [facility] N/A 2019    IRRIGATION OF LOWER ABDOMINAL WOUND performed by Phyllis Fairbanks MD at [facility] 73 ABDOMINOPLASTY N/A 2019    ABDOMINOPLASTY REVISION performed by Phyllis Fairbanks MD at [facility] 75 Right 2020    EXCISION OF RIGHT BREAST SABACEOUS CYST performed by Kadi Mckee MD at [facility] 19 Right 2017    Dr. Carol Hernandez (Right)   [address]    Tubal Ligation Also Done In     COLONOSCOPY  2017    Polyp Removed - Dr. Vanita Eugene      Teeth Extracted In Past    ELBOW SURGERY Bilateral Last Done In     Right Done Twice, Left Done Once left cubital tunnel release ; right 2014    ENDOSCOPY, COLON, DIAGNOSTIC  2017    HERNIA REPAIR  10/26/2017    hiatal hernia with gastrectomy    HYSTERECTOMY VAGINAL      LAPAROSCOPY      OH EXCISE EXCESS SKIN TISSUE,ABDOMEN N/A 2018    ABDOMINOPLASTY performed by Phyllis Fairbanks MD at [address] Right     Dr. Alea Winter Right 2017    Dr Venice Spears  10/26/2017    Robotic Laparoscopic, Pre Op Weight 237  Or 238 lbs.  TUBAL LIGATION      Done With    ,   Social History     Tobacco Use    Smoking status: Former Smoker     Packs/day: 0.25     Years: 30.00     Pack years: 7.50     Types: Cigarettes, E-Cigarettes     Start date:      Last attempt to quit: 2016     Years since quittin.5    Smokeless tobacco: Never Used   Substance Use Topics    Alcohol use: Yes     Comment: \"Occ.  Maybe Twice A Month\"    Drug use: No   ,   Family History   Problem Relation Age of Onset    Heart Attack Father     Heart Disease Father     Early Death Father 46        Heart Attack    Alcohol Abuse Father     Substance Abuse Father         Alcoholic    Arthritis Mother     Heart Disease Mother     Diabetes Mother     Cancer Mother         \"Kidney Cancer\"    High Blood Pressure Mother     High Blood Pressure Sister     Diabetes Brother     Early Death Daughter 21        \"Killed In A Car Accident\"   ,   Immunization History   Administered Date(s) Administered    Tdap (Boostrix, Adacel) 12/20/2011    Zoster Recombinant (Shingrix) 05/16/2018   ,   Health Maintenance   Topic Date Due    Hepatitis B vaccine (1 of 3 - Risk 3-dose series) 03/16/1986    Diabetic foot exam  04/03/2020    Breast cancer screen  04/19/2020    Diabetic microalbuminuria test  05/03/2020    Lipid screen  05/03/2020    Pneumococcal 0-64 years Vaccine (1 of 1 - PPSV23) 04/01/2023 (Originally 3/16/1971)    Flu vaccine (1) 09/01/2020    A1C test (Diabetic or Prediabetic)  01/07/2021    Potassium monitoring  01/27/2021    Creatinine monitoring  01/27/2021    Diabetic retinal exam  03/09/2021    Colon Cancer Screen FIT/FOBT  06/26/2021    DTaP/Tdap/Td vaccine (2 - Td) 12/20/2021    Shingles Vaccine  Completed    HIV screen  Completed    Hepatitis A vaccine  Aged Out    Hib vaccine  Aged Out    Meningococcal (ACWY) vaccine  Aged Out         ASSESSMENT/PLAN:    1. Hematochezia  Will proceed with colonoscopy    2. Right lower quadrant abdominal pain  Will examine her for a hernia. Return in about 2 weeks (around 7/15/2020), or colonoscopy and exam for her ? hernia. Félix Trevizo is a 50 y.o. female being evaluated by a Virtual Visit (video visit) encounter to address concerns as mentioned above. A caregiver was present when appropriate.  Due to this being a TeleHealth encounter (During [facility]- public health emergency), evaluation of the following organ systems was limited: Vitals/Constitutional/EENT/Resp/CV/GI//MS/Neuro/Skin/Heme-Lymph-Imm. Pursuant to the emergency declaration under the [address] [place] [address] and the [employer] Act, this Virtual Visit was conducted with patient's (and/or legal guardian's) consent, to reduce the patient's risk of exposure to COVID-19 and provide necessary medical care. The patient (and/or legal guardian) has also been advised to contact this office for worsening conditions or problems, and seek emergency medical treatment and/or call 911 if deemed necessary. Patient identification was verified at the start of the visit: yes    Total time spent on this encounter: 20 min     Colonoscopy soon, and check her ? Hernia RIGHT lower abdomen. Services were provided through a video synchronous discussion virtually to substitute for in-person clinic visit. Patient and provider were located at their individual homes. --Gloria Patel MD on 7/1/2020 at 11:15 AM    An electronic signature was used to authenticate this note.

## 2020-07-02 ENCOUNTER — OFFICE VISIT (OUTPATIENT)
Dept: FAMILY MEDICINE CLINIC | Age: 55
End: 2020-07-02
Payer: COMMERCIAL

## 2020-07-02 VITALS
DIASTOLIC BLOOD PRESSURE: 78 MMHG | OXYGEN SATURATION: 100 % | TEMPERATURE: 97.1 F | HEART RATE: 84 BPM | SYSTOLIC BLOOD PRESSURE: 130 MMHG | WEIGHT: 167 LBS | BODY MASS INDEX: 26.16 KG/M2

## 2020-07-02 PROCEDURE — 99213 OFFICE O/P EST LOW 20 MIN: CPT | Performed by: NURSE PRACTITIONER

## 2020-07-02 PROCEDURE — 1036F TOBACCO NON-USER: CPT | Performed by: NURSE PRACTITIONER

## 2020-07-02 PROCEDURE — 3017F COLORECTAL CA SCREEN DOC REV: CPT | Performed by: NURSE PRACTITIONER

## 2020-07-02 PROCEDURE — G8427 DOCREV CUR MEDS BY ELIG CLIN: HCPCS | Performed by: NURSE PRACTITIONER

## 2020-07-02 PROCEDURE — G8417 CALC BMI ABV UP PARAM F/U: HCPCS | Performed by: NURSE PRACTITIONER

## 2020-07-02 RX ORDER — FLUTICASONE PROPIONATE 50 MCG
1 SPRAY, SUSPENSION (ML) NASAL DAILY
Qty: 1 BOTTLE | Refills: 0 | Status: SHIPPED | OUTPATIENT
Start: 2020-07-02 | End: 2021-01-11

## 2020-07-02 RX ORDER — CALCIPOTRIENE 50 UG/G
OINTMENT TOPICAL
COMMUNITY
Start: 2020-05-04 | End: 2022-09-07

## 2020-07-02 RX ORDER — CLOBETASOL PROPIONATE 0.5 MG/G
OINTMENT TOPICAL
COMMUNITY
Start: 2020-05-04 | End: 2022-09-07

## 2020-07-02 RX ORDER — AMITRIPTYLINE HYDROCHLORIDE 10 MG/1
TABLET, FILM COATED ORAL
COMMUNITY
End: 2020-10-29

## 2020-07-02 RX ORDER — AMMONIUM LACTATE 12 G/100G
LOTION TOPICAL
Status: ON HOLD | COMMUNITY
Start: 2020-06-24 | End: 2021-09-23

## 2020-07-02 NOTE — PROGRESS NOTES
2020     Stacey Adames (:  1965) is a 50 y.o. female, here for evaluation of the following medical concerns: BILATERAL EAR PAIN, WORSE ON LEFT, SINCE YESTERDAY (2020). Associated symptoms; denies. Denies any h/o ear infections. Has h/o allergies, but not taking allergy medications. Has taken Ibuprofen a couple times. States pain is intermittent, no worsening or relieving factors and radiates down side of neck at times. Review of Systems   Constitutional: Negative. HENT: Positive for ear pain. Negative for congestion, ear discharge, postnasal drip, rhinorrhea, sinus pressure, sinus pain, sneezing and sore throat. Eyes: Negative. Respiratory: Negative. Cardiovascular: Negative. Neurological: Negative. Prior to Visit Medications    Medication Sig Taking? Authorizing Provider   fluticasone (FLONASE) 50 MCG/ACT nasal spray 1 spray by Each Nostril route daily Yes YESSICA Alonzo CNP   bisacodyl (BISACODYL) 5 MG EC tablet Take 4 tablets by mouth 2 times daily Yes Vito Genao MD   loratadine (CLARITIN) 10 MG tablet Take 1 tablet by mouth daily Yes YESSICA Torres CNP   furosemide (LASIX) 20 MG tablet Take 1 tablet by mouth daily Yes YESSICA Torres CNP   alogliptin (NESINA) 6.25 MG TABS tablet Take 1 tablet by mouth daily Yes YESSICA Torres CNP   omeprazole (PRILOSEC) 40 MG delayed release capsule Take 1 capsule by mouth daily Yes YESSICA Torres CNP   tiZANidine (ZANAFLEX) 4 MG tablet Take 4 mg by mouth every 8 hours as needed  Yes Historical Provider, MD   oxyCODONE-acetaminophen (PERCOCET) 7.5-325 MG per tablet Take 1 tablet by mouth every 8 hours as needed for Pain (Patient states takes every 6 hours).   Yes Historical Provider, MD   Psyllium (METAMUCIL) 48.57 % POWD Take 1 Units by mouth daily Yes Vito Genao MD   tiotropium (SPIRIVA RESPIMAT) 2.5 MCG/ACT AERS inhaler Inhale 2 puffs into the lungs daily Yes Juani Carrillo MD potassium chloride (KLOR-CON M) 20 MEQ extended release tablet Take 1 tablet by mouth 2 times daily Yes Gillian Momin MD   venlafaxine (EFFEXOR) 37.5 MG tablet Take 37.5 mg by mouth 2 times daily Yes Historical Provider, MD   glucose monitoring kit (FREESTYLE) monitoring kit 1 kit by Does not apply route daily Yes YESSICA Grissom CNP   traZODone (DESYREL) 50 MG tablet Take 1 tablet by mouth nightly  Patient taking differently: Take 100 mg by mouth nightly  Yes YESSICA Grissom CNP   blood glucose test strips (FREESTYLE LITE) strip 1 each by In Vitro route daily As needed. Yes YESSICA Grissom CNP   VALUE PLUS LANCETS THIN 26G MISC 1 EACH BY DOES NOT APPLY ROUTE DAILY Yes YESSICA Grissom CNP   albuterol sulfate HFA (PROAIR HFA) 108 (90 Base) MCG/ACT inhaler Inhale 2 puffs into the lungs every 6 hours as needed for Wheezing Yes Marta Colbert MD   fluticasone-vilanterol (BREO ELLIPTA) 100-25 MCG/INH AEPB inhaler Inhale 1 puff into the lungs daily Yes Marta Colbert MD   ALPRAZolam Viviana Blue) 0.5 MG tablet Take 0.5 mg by mouth as needed for Sleep. . Yes Historical Provider, MD   atorvastatin (LIPITOR) 80 MG tablet Take 1 tablet by mouth daily  YESSICA Grissom CNP   ammonium lactate (LAC-HYDRIN) 12 % lotion   Historical Provider, MD   amitriptyline (ELAVIL) 10 MG tablet amitriptyline 10 mg tablet  Historical Provider, MD   calcipotriene (DOVONEX) 0.005 % ointment   Historical Provider, MD   clobetasol (TEMOVATE) 0.05 % ointment   Historical Provider, MD        Social History     Tobacco Use    Smoking status: Former Smoker     Packs/day: 0.25     Years: 30.00     Pack years: 7.50     Types: Cigarettes, E-Cigarettes     Start date:      Last attempt to quit: 2016     Years since quittin.5    Smokeless tobacco: Never Used   Substance Use Topics    Alcohol use: Yes     Comment: \"Occ.  Maybe Twice A Month\"        Vitals:    20 1114   BP: 130/78   Pulse: 84   Temp: 97.1 °F (36.2 °C) TempSrc: Temporal   SpO2: 100%   Weight: 167 lb (75.8 kg)     Estimated body mass index is 26.16 kg/m² as calculated from the following:    Height as of 6/2/20: 5' 7\" (1.702 m). Weight as of this encounter: 167 lb (75.8 kg). Physical Exam  Vitals signs reviewed. Constitutional:       Appearance: Normal appearance. She is well-developed. HENT:      Head: Normocephalic and atraumatic. Right Ear: Hearing, ear canal and external ear normal. No middle ear effusion. Tympanic membrane is bulging. Tympanic membrane is not injected, erythematous or retracted. Left Ear: Hearing, ear canal and external ear normal.  No middle ear effusion. Tympanic membrane is bulging. Tympanic membrane is not injected, erythematous or retracted. Nose: Nose normal.      Mouth/Throat:      Mouth: Mucous membranes are moist.      Pharynx: Oropharynx is clear. Eyes:      Conjunctiva/sclera: Conjunctivae normal.   Neck:      Musculoskeletal: Normal range of motion and neck supple. Cardiovascular:      Rate and Rhythm: Normal rate and regular rhythm. Heart sounds: Normal heart sounds. Pulmonary:      Effort: Pulmonary effort is normal.      Breath sounds: Normal breath sounds. Skin:     General: Skin is warm and dry. Neurological:      General: No focal deficit present. Mental Status: She is alert and oriented to person, place, and time. Psychiatric:         Mood and Affect: Mood normal.         Behavior: Behavior normal.         ASSESSMENT/PLAN:  1. Acute dysfunction of both eustachian tubes  - fluticasone (FLONASE) 50 MCG/ACT nasal spray; 1 spray by Each Nostril route daily  Dispense: 1 Bottle; Refill: 0    *ALSO ENCOURAGED TO RESUME OTC ALLERGY REGIMEN; CLARITIN DAILY. Return if symptoms worsen or fail to improve. An electronic signature was used to authenticate this note.     --YESSICA Alonzo - CNP on 7/9/2020 at 8:45 AM

## 2020-07-02 NOTE — PATIENT INSTRUCTIONS
Patient Education        Eustachian Tube Problems: Care Instructions  Your Care Instructions     The eustachian (say \"you-STAY-shee-un\") tubes run between the inside of the ears and the throat. They keep air pressure stable in the ears. If your eustachian tubes become blocked, the air pressure in your ears changes. The fluids from a cold can clog eustachian tubes, causing pain in the ears. A quick change in air pressure can cause eustachian tubes to close up. This might happen when an airplane changes altitude or when a  goes up or down underwater. Eustachian tube problems often clear up on their own or after antibiotic treatment. If your tubes continue to be blocked, you may need surgery. Follow-up care is a key part of your treatment and safety. Be sure to make and go to all appointments, and call your doctor if you are having problems. It's also a good idea to know your test results and keep a list of the medicines you take. How can you care for yourself at home? · To ease ear pain, apply a warm washcloth or a heating pad set on low. There may be some drainage from the ear when the heat melts earwax. Put a cloth between the heat source and your skin. Do not use a heating pad with children. · If your doctor prescribed antibiotics, take them as directed. Do not stop taking them just because you feel better. You need to take the full course of antibiotics. · Your doctor may recommend over-the-counter medicine. Be safe with medicines. Oral or nasal decongestants may relieve ear pain. Avoid decongestants that are combined with antihistamines, which tend to cause more blockage. But if allergies seem to be the problem, your doctor may recommend a combination. Be careful with cough and cold medicines. Don't give them to children younger than 6, because they don't work for children that age and can even be harmful. For children 6 and older, always follow all the instructions carefully.  Make sure you

## 2020-07-07 RX ORDER — ATORVASTATIN CALCIUM 80 MG/1
80 TABLET, FILM COATED ORAL DAILY
Qty: 90 TABLET | Refills: 0 | Status: SHIPPED | OUTPATIENT
Start: 2020-07-07 | End: 2020-10-07

## 2020-07-09 ASSESSMENT — ENCOUNTER SYMPTOMS
RHINORRHEA: 0
SINUS PRESSURE: 0
SINUS PAIN: 0
SORE THROAT: 0
EYES NEGATIVE: 1
RESPIRATORY NEGATIVE: 1

## 2020-07-17 ENCOUNTER — TELEPHONE (OUTPATIENT)
Dept: BARIATRICS/WEIGHT MGMT | Age: 55
End: 2020-07-17

## 2020-07-17 RX ORDER — SODIUM, POTASSIUM,MAG SULFATES 17.5-3.13G
2 SOLUTION, RECONSTITUTED, ORAL ORAL ONCE
Qty: 2 BOTTLE | Refills: 0 | Status: SHIPPED | OUTPATIENT
Start: 2020-07-17 | End: 2020-07-17

## 2020-07-17 RX ORDER — BISACODYL 5 MG
TABLET, DELAYED RELEASE (ENTERIC COATED) ORAL
Qty: 8 TABLET | Refills: 0 | Status: SHIPPED | OUTPATIENT
Start: 2020-07-17 | End: 2020-10-23

## 2020-07-17 NOTE — TELEPHONE ENCOUNTER
SPOKE TO [place] REGARDING SURGERY (Colonoscopy) SCHEDULED @ [facility]. NOTIFIED OF DATES, TIMES AND LOCATION    PHONE ASSESSMENT  SURGERY - 7/27/20 @ 1215, 1015 arrival  P/O - 8/7/20 at 1600 phone call with Dr. Alejandra Berman MIDNIGHT  Covid-19 Culture on 7/23/20 @1310  Clear liquids whole day of 7/26/20.  4 Dulcolax at 1200 on 7/26/20  Suprep: 7/26/20 at 1600  4 Dulcolax at 1800 on 7/26/20. Suprep: 7/27/20 at 0400  HOLD BLOOD THINNERS - Not on thinners     Suprep and Dulcolax ordered. PA for Suprep sent. PA approved  Called and informed . Called and spoke to [facility], went over dates and times. She will call back next week to re verify dates, times, and prep.

## 2020-07-23 ENCOUNTER — HOSPITAL ENCOUNTER (OUTPATIENT)
Dept: LAB | Age: 55
Discharge: HOME | End: 2020-07-23
Payer: COMMERCIAL

## 2020-07-23 PROCEDURE — U0002 COVID-19 LAB TEST NON-CDC: HCPCS

## 2020-07-23 NOTE — PROGRESS NOTES
Surgery 7/27/20 @ 1300, arrival 1100                1. Follow Dr. Margaret Omer instructions for the prep. 2. Follow your directions as prescribed by the doctor for your procedure and medications. 3. Check with your Doctor regarding stopping Plavix, Coumadin, Lovenox,Effient,Pradaxa,Xarelto, Fragmin or other blood thinners and                   follow their instructions. 4. Do not smoke, and do not drink any alcoholic beverages 24 hours prior to surgery. This includes NA Beer. 5. You may brush your teeth and gargle the morning of surgery. DO NOT SWALLOW WATER   6. You MUST make arrangements for a responsible adult to take you home after your surgery and be able to check on you every couple                   hours for the day. You will not be allowed to leave alone or drive yourself home. It is strongly suggested someone stay with you the first 24                   hrs. Your surgery will be cancelled if you do not have a ride home. 7. Please wear simple, loose fitting clothing to the hospital.  Aleksandra Inches not bring valuables (money, credit cards, checkbooks, etc.) Do not wear any                   makeup (including no eye makeup) or nail polish on your fingers or toes. 8. DO NOT wear any jewelry or piercings on day of surgery. All body piercing jewelry must be removed. 9. If you have dentures, they will be removed before going to the OR; we will provide you a container. If you wear contact lenses or glasses,                  they will be removed; please bring a case for them. 10. If you  have a Living Will and Durable Power of  for Healthcare, please bring in a copy. 11. Please bring picture ID,  insurance card, paperwork from the doctors office    (H & P, Consent, & card for implantable devices). 12. Take a shower the night before or morning of your procedure, do not apply any lotion, oil or powder.            13. Wear a mask covering your nose & mouth when entering the hospital. Have your covid-19 test performed within 7 days of your                  surgery. Quarantine yourself after the test until after your surgery.

## 2020-07-24 ENCOUNTER — ANESTHESIA EVENT (OUTPATIENT)
Dept: ENDOSCOPY | Age: 55
End: 2020-07-24
Payer: COMMERCIAL

## 2020-07-24 ASSESSMENT — ENCOUNTER SYMPTOMS: SHORTNESS OF BREATH: 1

## 2020-07-24 NOTE — ANESTHESIA PRE PROCEDURE
MCG/ACT AERS inhaler Inhale 2 puffs into the lungs daily 10/16/19   Yves Nash MD   potassium chloride (KLOR-CON M) 20 MEQ extended release tablet Take 1 tablet by mouth 2 times daily 8/2/19   Vito Genao MD   venlafaxine (EFFEXOR) 37.5 MG tablet Take 37.5 mg by mouth 2 times daily    Historical Provider, MD   glucose monitoring kit (FREESTYLE) monitoring kit 1 kit by Does not apply route daily 7/23/19   YESSICA Torres CNP   traZODone (DESYREL) 50 MG tablet Take 1 tablet by mouth nightly  Patient taking differently: Take 100 mg by mouth nightly  4/3/19   YESSICA Torres CNP   blood glucose test strips (FREESTYLE LITE) strip 1 each by In Vitro route daily As needed. 4/3/19   YESSICA Torres CNP   VALUE PLUS LANCETS THIN 26G MISC 1 EACH BY DOES NOT APPLY ROUTE DAILY 10/23/18   YESSICA Torres CNP   albuterol sulfate HFA (PROAIR HFA) 108 (90 Base) MCG/ACT inhaler Inhale 2 puffs into the lungs every 6 hours as needed for Wheezing 3/5/18   Yves Nash MD   fluticasone-vilanterol (BREO ELLIPTA) 100-25 MCG/INH AEPB inhaler Inhale 1 puff into the lungs daily 3/5/18   Yves Nash MD   ALPRAZolam Daryel Ham) 0.5 MG tablet Take 0.5 mg by mouth as needed for Sleep. Jaclyn Fagan Historical Provider, MD       Current medications:    Current Outpatient Medications   Medication Sig Dispense Refill    bisacodyl (BISACODYL) 5 MG EC tablet Take 4 tablets 2 times a day for one day as directed.  8 tablet 0    atorvastatin (LIPITOR) 80 MG tablet Take 1 tablet by mouth daily 90 tablet 0    ammonium lactate (LAC-HYDRIN) 12 % lotion       amitriptyline (ELAVIL) 10 MG tablet amitriptyline 10 mg tablet      calcipotriene (DOVONEX) 0.005 % ointment       clobetasol (TEMOVATE) 0.05 % ointment       fluticasone (FLONASE) 50 MCG/ACT nasal spray 1 spray by Each Nostril route daily 1 Bottle 0    loratadine (CLARITIN) 10 MG tablet Take 1 tablet by mouth daily 30 tablet 5    furosemide (LASIX) 20 MG tablet Take 1 tablet by mouth daily 60 tablet 0    alogliptin (NESINA) 6.25 MG TABS tablet Take 1 tablet by mouth daily 30 tablet 2    omeprazole (PRILOSEC) 40 MG delayed release capsule Take 1 capsule by mouth daily 90 capsule 2    tiZANidine (ZANAFLEX) 4 MG tablet Take 4 mg by mouth every 8 hours as needed       oxyCODONE-acetaminophen (PERCOCET) 7.5-325 MG per tablet Take 1 tablet by mouth every 8 hours as needed for Pain (Patient states takes every 6 hours).  Psyllium (METAMUCIL) 48.57 % POWD Take 1 Units by mouth daily 1 Bottle 5    tiotropium (SPIRIVA RESPIMAT) 2.5 MCG/ACT AERS inhaler Inhale 2 puffs into the lungs daily 1 Inhaler 5    potassium chloride (KLOR-CON M) 20 MEQ extended release tablet Take 1 tablet by mouth 2 times daily 180 tablet 1    venlafaxine (EFFEXOR) 37.5 MG tablet Take 37.5 mg by mouth 2 times daily      glucose monitoring kit (FREESTYLE) monitoring kit 1 kit by Does not apply route daily 1 kit 0    traZODone (DESYREL) 50 MG tablet Take 1 tablet by mouth nightly (Patient taking differently: Take 100 mg by mouth nightly ) 30 tablet 2    blood glucose test strips (FREESTYLE LITE) strip 1 each by In Vitro route daily As needed. 100 strip 11    VALUE PLUS LANCETS THIN 26G MISC 1 EACH BY DOES NOT APPLY ROUTE DAILY 100 each 5    albuterol sulfate HFA (PROAIR HFA) 108 (90 Base) MCG/ACT inhaler Inhale 2 puffs into the lungs every 6 hours as needed for Wheezing 1 Inhaler 5    fluticasone-vilanterol (BREO ELLIPTA) 100-25 MCG/INH AEPB inhaler Inhale 1 puff into the lungs daily 1 each 5    ALPRAZolam (XANAX) 0.5 MG tablet Take 0.5 mg by mouth as needed for Sleep. .       No current facility-administered medications for this visit.         Allergies:  No Known Allergies    Problem List:    Patient Active Problem List   Diagnosis Code    DM (diabetes mellitus) ([address] E11.9    Emphysema lung ([address] J43.9    COPD, severe ([address] J44.9    Dyslipidemia E78.5    Gastroesophageal reflux disease with esophagitis K21.0    History of MRSA infection Z86.14    Snores R06.83    Anxiety F41.9    Arthralgia of right hip M25.551    Chronic right-sided low back pain with sciatica M54.40, G89.29    Nicotine addiction F17.200    Hiatal hernia K44.9    Status post laparoscopic sleeve gastrectomy Z98.84    Abdominal pannus E65    S/P abdominoplasty Z98.890    Fibromyalgia M79.7    Skin ulcer with fat layer exposed ([facility] L98.492    Sebaceous cyst of breast, right N60.81    Mobitz type 1 second degree AV block I44.1    SOB (shortness of breath) R06.02    Hematochezia K92.1    Abdominal gas pain R14.1    Right lower quadrant abdominal pain R10.31       Past Medical History:        Diagnosis Date    Anxiety     Arthritis     \"Back, Hands, Shoulders\"    Chronic back pain     Arthritis - NKI    COPD (chronic obstructive pulmonary disease) ([facility]     Sees Dr. Eligio Soler with Mental Health    Diabetes mellitus ([facility] Dx 2008    Type II - follows with PCP    Emphysema     Fibromyalgia     H/O abdominoplasty 12/21/2018    H/O cardiovascular stress test 03/04/2019    ECG portion of stress test is neg for ischemia by diagnostic criteria. No infarct or ischemia noted. Normal stress myocardial perfusion. This is a normal study    H/O echocardiogram 03/04/2019    Left ventricular function is normal. Ejection fraction is visually estimated at 55-60%. No significant valvular abnormalitites.      Hyperlipidemia     Hypertension     Follows with PCP    Migraines Last Migraine 10-18    Mobitz (type) I (Wenckebach's) atrioventricular block 01/29/2020    Neuropathy     From feet to knees    Piriformis syndrome 6/24/2016    Psoriasis     Rotator cuff impingement syndrome, right 2/26/2018    RTC repair Dr Lorena Valladares 2015  Treated by Dr Norah Reyes 2/18/17  Arthrocentesis 12/19/17    Shortness of breath on exertion     Syncope and collapse 03/04/2019    Teeth missing     Upper And Lower    Wears glasses        Past Surgical History:        Procedure Laterality Date    ABDOMEN SURGERY N/A 2019    SECONDARY CLOSURE OF DEHISCED ABDOMINAL WOUND performed by Kelly Jones MD at [facility] N/A 2019    IRRIGATION OF LOWER ABDOMINAL WOUND performed by Kelly Jones MD at [facility] 73 ABDOMINOPLASTY N/A 2019    ABDOMINOPLASTY REVISION performed by Kelly Jones MD at [facility] 75 Right 2020    EXCISION OF RIGHT BREAST SABACEOUS CYST performed by Mendel Posadas MD at [facility] 19 Right 2017    Dr. Pari Becerra (Right)   [address]    Tubal Ligation Also Done In     COLONOSCOPY  2017    Polyp Removed - Dr. Keny Masters      Teeth Extracted In Past    ELBOW SURGERY Bilateral Last Done In     Right Done Twice, Left Done Once left cubital tunnel release ; right 2014    ENDOSCOPY, COLON, DIAGNOSTIC  2017    HERNIA REPAIR  10/26/2017    hiatal hernia with gastrectomy    HYSTERECTOMY VAGINAL      LAPAROSCOPY      SD EXCISE EXCESS SKIN TISSUE,ABDOMEN N/A 2018    ABDOMINOPLASTY performed by Kelly Jones MD at [facility] Right     Dr. Jay Virk Right 2017    Dr Emigdio George  10/26/2017    Robotic Laparoscopic, Pre Op Weight 237  Or 238 lbs.  TUBAL LIGATION      Done With        Social History:    Social History     Tobacco Use    Smoking status: Former Smoker     Packs/day: 0.25     Years: 30.00     Pack years: 7.50     Types: Cigarettes, E-Cigarettes     Start date:      Last attempt to quit: 2016     Years since quittin.5    Smokeless tobacco: Never Used   Substance Use Topics    Alcohol use: Yes     Comment: \"Occ. Maybe Twice A Month\"                                Counseling given: Not Answered      Vital Signs (Current):    There were no vitals filed for this visit. BP Readings from Last 3 Encounters:   07/02/20 130/78   06/02/20 131/83   06/01/20 122/68       NPO Status:                                                                                 BMI:   Wt Readings from Last 3 Encounters:   07/02/20 167 lb (75.8 kg)   06/02/20 170 lb (77.1 kg)   06/01/20 170 lb (77.1 kg)     There is no height or weight on file to calculate BMI.    CBC:   Lab Results   Component Value Date    WBC 12.5 07/01/2020    RBC 5.97 07/01/2020    HGB 16.0 07/01/2020    HCT 48.0 07/01/2020    MCV 80.4 07/01/2020    RDW 14.5 07/01/2020     07/01/2020       CMP:   Lab Results   Component Value Date     07/01/2020    K 4.1 07/01/2020    CL 94 07/01/2020    CO2 26 07/01/2020    BUN 20 07/01/2020    CREATININE 1.0 07/01/2020    GFRAA >60 07/01/2020    AGRATIO 1.7 07/01/2020    LABGLOM 58 07/01/2020    GLUCOSE 144 07/01/2020    PROT 8.0 07/01/2020    PROT 7.9 03/02/2013    CALCIUM 10.2 07/01/2020    BILITOT 0.9 07/01/2020    ALKPHOS 100 07/01/2020    AST 16 07/01/2020    ALT 11 07/01/2020       POC Tests:   No results for input(s): POCGLU, POCNA, POCK, POCCL, POCBUN, POCHEMO, POCHCT in the last 72 hours.     Coags: No results found for: PROTIME, INR, APTT    HCG (If Applicable):   Lab Results   Component Value Date    PREGTESTUR NEGATIVE 02/12/2020        ABGs: No results found for: PHART, PO2ART, QIP6EPA, PTA3XCY, BEART, G9EZIWLZ     Type & Screen (If Applicable):  No results found for: LABABO, [address]    Anesthesia Evaluation  Patient summary reviewed and Nursing notes reviewed no history of anesthetic complications:   Airway: Mallampati: II        Dental: normal exam         Pulmonary:normal exam    (+) COPD:  shortness of breath:                             Cardiovascular:  Exercise tolerance: poor (<4 METS),   (+) hypertension:, dysrhythmias:, TOMAS:,             Echocardiogram reviewed         Beta Blocker:  Not on Beta Blocker      ROS comment: Syncope  Left ventricular function is normal.   Ejection fraction is visually estimated at 55-60%. No significant valvular abnormalities. hx 1st/2nd degree HB):, TOMAS: after ambulating 1 flight of stairs, hyperlipidemia        Neuro/Psych:   (+) neuromuscular disease:, headaches:, psychiatric history:            GI/Hepatic/Renal:   (+) hiatal hernia,           Endo/Other:    (+) Diabetes, . Abdominal:           Vascular:                                        Anesthesia Plan      MAC     ASA 3       Induction: intravenous. Anesthetic plan and risks discussed with patient. Pre Anesthesia Evaluation complete. Anesthesia plan, risks, benefits, alternatives, and personnel discussed with patient and/or legal guardian. Patient and/or legal guardian verbalized an understanding and agreed to proceed. Anesthesia plan discussed with care team members and agreed upon.   YESSICA Greenwood - CRNA  7/27/2020

## 2020-07-27 ENCOUNTER — HOSPITAL ENCOUNTER (OUTPATIENT)
Age: 55
Setting detail: OUTPATIENT SURGERY
Discharge: HOME | End: 2020-07-27
Attending: SURGERY | Admitting: SURGERY
Payer: COMMERCIAL

## 2020-07-27 ENCOUNTER — ANESTHESIA (OUTPATIENT)
Dept: ENDOSCOPY | Age: 55
End: 2020-07-27
Payer: COMMERCIAL

## 2020-07-27 VITALS
TEMPERATURE: 98.6 F | OXYGEN SATURATION: 100 % | DIASTOLIC BLOOD PRESSURE: 70 MMHG | SYSTOLIC BLOOD PRESSURE: 109 MMHG | RESPIRATION RATE: 12 BRPM

## 2020-07-27 VITALS
HEART RATE: 70 BPM | SYSTOLIC BLOOD PRESSURE: 137 MMHG | WEIGHT: 167 LBS | OXYGEN SATURATION: 98 % | DIASTOLIC BLOOD PRESSURE: 97 MMHG | BODY MASS INDEX: 25.31 KG/M2 | HEIGHT: 68 IN | RESPIRATION RATE: 17 BRPM | TEMPERATURE: 98.3 F

## 2020-07-27 LAB — GLUCOSE BLD-MCNC: 143 MG/DL (ref 70–99)

## 2020-07-27 PROCEDURE — 2500000003 HC RX 250 WO HCPCS: Performed by: NURSE ANESTHETIST, CERTIFIED REGISTERED

## 2020-07-27 PROCEDURE — 45385 COLONOSCOPY W/LESION REMOVAL: CPT | Performed by: SURGERY

## 2020-07-27 PROCEDURE — 2709999900 HC NON-CHARGEABLE SUPPLY: Performed by: SURGERY

## 2020-07-27 PROCEDURE — 6360000002 HC RX W HCPCS: Performed by: NURSE ANESTHETIST, CERTIFIED REGISTERED

## 2020-07-27 PROCEDURE — 3609010600 HC COLONOSCOPY POLYPECTOMY SNARE/COLD BIOPSY: Performed by: SURGERY

## 2020-07-27 PROCEDURE — 7100000011 HC PHASE II RECOVERY - ADDTL 15 MIN: Performed by: SURGERY

## 2020-07-27 PROCEDURE — 82962 GLUCOSE BLOOD TEST: CPT

## 2020-07-27 PROCEDURE — 2720000010 HC SURG SUPPLY STERILE: Performed by: SURGERY

## 2020-07-27 PROCEDURE — 3700000001 HC ADD 15 MINUTES (ANESTHESIA): Performed by: SURGERY

## 2020-07-27 PROCEDURE — 2580000003 HC RX 258: Performed by: SURGERY

## 2020-07-27 PROCEDURE — 88305 TISSUE EXAM BY PATHOLOGIST: CPT

## 2020-07-27 PROCEDURE — 7100000010 HC PHASE II RECOVERY - FIRST 15 MIN: Performed by: SURGERY

## 2020-07-27 PROCEDURE — 3700000000 HC ANESTHESIA ATTENDED CARE: Performed by: SURGERY

## 2020-07-27 RX ORDER — SODIUM CHLORIDE, SODIUM LACTATE, POTASSIUM CHLORIDE, CALCIUM CHLORIDE 600; 310; 30; 20 MG/100ML; MG/100ML; MG/100ML; MG/100ML
INJECTION, SOLUTION INTRAVENOUS CONTINUOUS
Status: DISCONTINUED | OUTPATIENT
Start: 2020-07-27 | End: 2020-07-27 | Stop reason: HOSPADM

## 2020-07-27 RX ORDER — LIDOCAINE HYDROCHLORIDE 20 MG/ML
INJECTION, SOLUTION EPIDURAL; INFILTRATION; INTRACAUDAL; PERINEURAL PRN
Status: DISCONTINUED | OUTPATIENT
Start: 2020-07-27 | End: 2020-07-27 | Stop reason: SDUPTHER

## 2020-07-27 RX ORDER — PROPOFOL 10 MG/ML
INJECTION, EMULSION INTRAVENOUS PRN
Status: DISCONTINUED | OUTPATIENT
Start: 2020-07-27 | End: 2020-07-27 | Stop reason: SDUPTHER

## 2020-07-27 RX ADMIN — SODIUM CHLORIDE, POTASSIUM CHLORIDE, SODIUM LACTATE AND CALCIUM CHLORIDE: 600; 310; 30; 20 INJECTION, SOLUTION INTRAVENOUS at 11:20

## 2020-07-27 RX ADMIN — PROPOFOL 80 MG: 10 INJECTION, EMULSION INTRAVENOUS at 13:12

## 2020-07-27 RX ADMIN — PROPOFOL 280 MG: 10 INJECTION, EMULSION INTRAVENOUS at 13:14

## 2020-07-27 RX ADMIN — LIDOCAINE HYDROCHLORIDE 100 MG: 20 INJECTION, SOLUTION EPIDURAL; INFILTRATION; INTRACAUDAL; PERINEURAL at 13:12

## 2020-07-27 NOTE — PROGRESS NOTES
Patient tolerating PO fluids, no needs expressed at this time, call light in reach. RN to continue to monitor.

## 2020-07-27 NOTE — ANESTHESIA POSTPROCEDURE EVALUATION
Department of Anesthesiology  Postprocedure Note    Patient: Vanessa Colbert  MRN: [id number]  YOB: 1965  Date of evaluation: 7/27/2020  Time:  1:40 PM     Procedure Summary     Date:  07/27/20 Room / Location:  [address]    Anesthesia Start:  1252 Anesthesia Stop:  2252    Procedure:  COLONOSCOPY WITH BIOPSY (N/A ) Diagnosis:  (HEMATOCHEZIA)    Surgeon:  Melida Barrett MD Responsible Provider:  YESSICA Elam CRNA    Anesthesia Type:  MAC ASA Status:  3          Anesthesia Type: MAC    Morgan Phase I:  9    Morgan Phase II:  9    Last vitals: Reviewed and per EMR flowsheets.        Anesthesia Post Evaluation    Patient location during evaluation: bedside  Patient participation: complete - patient participated  Level of consciousness: awake and alert  Pain score: 0  Airway patency: patent  Nausea & Vomiting: no nausea and no vomiting  Complications: no  Cardiovascular status: hemodynamically stable  Respiratory status: room air and spontaneous ventilation  Hydration status: stable

## 2020-07-27 NOTE — PROGRESS NOTES
Patient discharge instructions reviewed and verified. All questions answered. Discharge paperwork signed by RN and patient. Armband removed.

## 2020-07-27 NOTE — H&P
mouth nightly  4/3/19  Yes YESSICA Reyna CNP   albuterol sulfate HFA (PROAIR HFA) 108 (90 Base) MCG/ACT inhaler Inhale 2 puffs into the lungs every 6 hours as needed for Wheezing 3/5/18  Yes Dariela Frazier MD   fluticasone-vilanterol (BREO ELLIPTA) 100-25 MCG/INH AEPB inhaler Inhale 1 puff into the lungs daily 3/5/18  Yes Dariela Frazier MD   ALPRAZolam Albesa Duster) 0.5 MG tablet Take 0.5 mg by mouth as needed for Sleep. .   Yes Historical Provider, MD   ammonium lactate (LAC-HYDRIN) 12 % lotion  6/24/20   Historical Provider, MD   amitriptyline (ELAVIL) 10 MG tablet amitriptyline 10 mg tablet    Historical Provider, MD   calcipotriene (DOVONEX) 0.005 % ointment  5/4/20   Historical Provider, MD   clobetasol (TEMOVATE) 0.05 % ointment  5/4/20   Historical Provider, MD   fluticasone (FLONASE) 50 MCG/ACT nasal spray 1 spray by Each Nostril route daily 7/2/20 8/1/20  YESSICA Alonzo CNP   glucose monitoring kit (FREESTYLE) monitoring kit 1 kit by Does not apply route daily 7/23/19   YESSICA Reyna CNP   blood glucose test strips (FREESTYLE LITE) strip 1 each by In Vitro route daily As needed.  4/3/19   YESSICA Reyna CNP   VALUE PLUS LANCETS THIN 26G MISC 1 EACH BY DOES NOT APPLY ROUTE DAILY 10/23/18   YESSICA Reyna CNP        Hospital Meds:    Current Facility-Administered Medications   Medication Dose Route Frequency Provider Last Rate Last Dose    lactated ringers infusion   Intravenous Continuous Macy Pavon  mL/hr at 07/27/20 1120        lactated ringers 100 mL/hr at 07/27/20 1120       Social History / Family History:        Social History     Socioeconomic History    Marital status: Single     Spouse name: None    Number of children: None    Years of education: None    Highest education level: None   Occupational History    Occupation: unemployed   Social Needs    Financial resource strain: None    Food insecurity     Worry: None     Inability: None   Nini Ness Transportation needs     Medical: None     Non-medical: None   Tobacco Use    Smoking status: Former Smoker     Packs/day: 0.25     Years: 30.00     Pack years: 7.50     Types: Cigarettes, E-Cigarettes     Start date:      Last attempt to quit: 2016     Years since quittin.5    Smokeless tobacco: Never Used   Substance and Sexual Activity    Alcohol use: Yes     Comment: \"Occ. Maybe Twice A Month\"    Drug use: No    Sexual activity: Yes     Partners: Male   Lifestyle    Physical activity     Days per week: None     Minutes per session: None    Stress: None   Relationships    Social connections     Talks on phone: None     Gets together: None     Attends Yazidism service: None     Active member of club or organization: None     Attends meetings of clubs or organizations: None     Relationship status: None    Intimate partner violence     Fear of current or ex partner: None     Emotionally abused: None     Physically abused: None     Forced sexual activity: None   Other Topics Concern    None   Social History Narrative    None      Family History   Problem Relation Age of Onset    Heart Attack Father     Heart Disease Father     Early Death Father 46        Heart Attack    Alcohol Abuse Father     Substance Abuse Father         Alcoholic    Arthritis Mother     Heart Disease Mother     Diabetes Mother     Cancer Mother         \"Kidney Cancer\"    High Blood Pressure Mother     High Blood Pressure Sister     Diabetes Brother     Early Death Daughter 21        \"Killed In A Car Accident\"    otherwise irrelevant to this surgical issue. Review of Systems:  Constitutional: Negative for fever, chills, diaphoresis, appetite change and fatigue. HENT: Negative for sore throat, trouble swallowing and voice change. Respiratory: Negative for cough, positive for shortness of breath no wheezing.    Cardiovascular: Negative for chest pain positive for SOB with one flight of stairs exertion, no pitting LE edema. Gastrointestinal: Positive for RLQ abdominal pain, and bleeding with her stools. Negative for nausea, vomiting, diarrhea, constipation, abdominal distention, or rectal pain. Musculoskeletal: Negative for joint swelling and arthralgias. Skin: Warm and dry, well perfused. Neurological: Negative for seizures, syncope, speech difficulty and weakness. Hematological/Lymphatic: Negative for adenopathy. No history of DVT/PE. Does not bruise/bleed easily. Psychiatric/Behavioral: Negative for agitation. All others reviewed and negative. Physical Exam:  Vital Signs:   Vitals:    07/27/20 1112   BP: (!) 140/95   Pulse: 80   Resp: 17   Temp: 97.2 °F (36.2 °C)   SpO2: 100%     Body mass index is 25.39 kg/m². General appearance: Pt Alert and oriented, to persons place and time, in no apparent acute distress. HEENT:  CHRISTY, EOMI, Conjunctivae clear. No JVDs, Bruits, No thyroid-megaly. Lungs: No dyspnea. Clear to auscultation bilaterally. Heart: Regular rate and rhythm, S1, S2 normal, no murmur, rub or gallop. No legs edema. Abdomen: Non tender. Non distended. Positive bowel sounds. No hernias noted, no masses palpable. Extremities: Warm, well perfused, no cyanosis or edema. Skin: Skin color, texture, turgor normal.  Neurologic: Grossly normal, Cranial nerves from II to XII intact. Deep tendon reflexes normal.  Psychology: Mood stable. Lymph nodes: No palpable LN in the neck, abdomen, or groins. Radiologic / Imaging / TESTING  No results found.       Labs:    Recent Results (from the past 24 hour(s))   POCT Glucose    Collection Time: 07/27/20 11:23 AM   Result Value Ref Range    POC Glucose 143 (H) 70 - 99 MG/DL         Diagnosis:  Patient Active Problem List   Diagnosis    DM (diabetes mellitus) (HCC)    Emphysema lung (HCC)    COPD, severe (HCC)    Dyslipidemia    Gastroesophageal reflux disease with esophagitis    History of MRSA infection    Snores    Anxiety    Arthralgia of right hip    Chronic right-sided low back pain with sciatica    Nicotine addiction    Hiatal hernia    Status post laparoscopic sleeve gastrectomy    Abdominal pannus    S/P abdominoplasty    Fibromyalgia    Skin ulcer with fat layer exposed ([address]    Sebaceous cyst of breast, right    Mobitz type 1 second degree AV block    SOB (shortness of breath)    Hematochezia    Abdominal gas pain    Right lower quadrant abdominal pain           Assessment & Plan:    Bleeding with the stools, Final Pathologic Diagnosis:  A.  Gastric antrum, biopsy:  Mild chronic gastritis,  non-specific.      Giemsa stain:  Negative.      Control tissue stained adequately. Walter Dennis, polypectomy:  Tubular adenoma. 2017;     166.4 Lbs - BMI 26. .     Colonoscopy soon, and checked her ?  Hernia RIGHT lower abdomen,  hurts but I could not feel any hernias.    ____________________________________________    Satinder Biswas MD, FACS, FICS    7/27/2020  12:46 PM

## 2020-07-29 LAB
SARS-COV-2: NOT DETECTED
SOURCE: NORMAL

## 2020-08-07 ENCOUNTER — VIRTUAL VISIT (OUTPATIENT)
Dept: BARIATRICS/WEIGHT MGMT | Age: 55
End: 2020-08-07
Payer: COMMERCIAL

## 2020-08-07 ENCOUNTER — OFFICE VISIT (OUTPATIENT)
Dept: FAMILY MEDICINE CLINIC | Age: 55
End: 2020-08-07
Payer: COMMERCIAL

## 2020-08-07 VITALS
DIASTOLIC BLOOD PRESSURE: 88 MMHG | TEMPERATURE: 97.3 F | SYSTOLIC BLOOD PRESSURE: 134 MMHG | WEIGHT: 176.8 LBS | HEART RATE: 76 BPM | OXYGEN SATURATION: 97 % | BODY MASS INDEX: 26.8 KG/M2 | HEIGHT: 68 IN

## 2020-08-07 PROBLEM — D12.2 ADENOMATOUS POLYP OF ASCENDING COLON: Status: ACTIVE | Noted: 2020-08-07

## 2020-08-07 PROBLEM — L98.492 SKIN ULCER WITH FAT LAYER EXPOSED (HCC): Status: RESOLVED | Noted: 2019-06-21 | Resolved: 2020-08-07

## 2020-08-07 PROCEDURE — 1036F TOBACCO NON-USER: CPT | Performed by: NURSE PRACTITIONER

## 2020-08-07 PROCEDURE — 3017F COLORECTAL CA SCREEN DOC REV: CPT | Performed by: NURSE PRACTITIONER

## 2020-08-07 PROCEDURE — 99213 OFFICE O/P EST LOW 20 MIN: CPT | Performed by: SURGERY

## 2020-08-07 PROCEDURE — G8417 CALC BMI ABV UP PARAM F/U: HCPCS | Performed by: NURSE PRACTITIONER

## 2020-08-07 PROCEDURE — G8427 DOCREV CUR MEDS BY ELIG CLIN: HCPCS | Performed by: NURSE PRACTITIONER

## 2020-08-07 PROCEDURE — 99212 OFFICE O/P EST SF 10 MIN: CPT | Performed by: NURSE PRACTITIONER

## 2020-08-07 RX ORDER — CETIRIZINE HYDROCHLORIDE 10 MG/1
10 TABLET ORAL DAILY
Qty: 30 TABLET | Refills: 3 | Status: SHIPPED | OUTPATIENT
Start: 2020-08-07 | End: 2020-09-06

## 2020-08-07 RX ORDER — AMOXICILLIN AND CLAVULANATE POTASSIUM 875; 125 MG/1; MG/1
1 TABLET, FILM COATED ORAL 2 TIMES DAILY
Qty: 14 TABLET | Refills: 0 | Status: SHIPPED | OUTPATIENT
Start: 2020-08-07 | End: 2020-08-14

## 2020-08-07 ASSESSMENT — ENCOUNTER SYMPTOMS
COLOR CHANGE: 0
VOICE CHANGE: 0
SORE THROAT: 0
VOMITING: 0
WHEEZING: 0
PHOTOPHOBIA: 0
NAUSEA: 0
BLOOD IN STOOL: 0
SORE THROAT: 1
COUGH: 0
SHORTNESS OF BREATH: 0
ANAL BLEEDING: 0
TROUBLE SWALLOWING: 0
SHORTNESS OF BREATH: 0
ABDOMINAL PAIN: 0
SINUS PRESSURE: 0
COUGH: 0
CONSTIPATION: 0
RHINORRHEA: 1
DIARRHEA: 0
TROUBLE SWALLOWING: 0
SINUS PAIN: 0

## 2020-08-07 NOTE — PROGRESS NOTES
2020     Rose Farrell (:  1965) is a 50 y.o. female, here for evaluation of the following medical concerns:    Presents with complaint of bilateral ear pain, sore and scratchy throat, postnasal drip for 3 weeks. Is not getting any better or worse. Went to the walk-in clinic and was given nasal spray; no relief. Denies fever, chills, sweating, fatigue, body aches. States she has chronic seasonal allergies. Takes Claritin on and off, but never daily. Has frontal headaches almost daily chronically, worse over the last 3 weeks with URI symptoms. Review of Systems   Constitutional: Negative for activity change, appetite change, chills, diaphoresis, fatigue and fever. HENT: Positive for ear pain, postnasal drip, rhinorrhea and sore throat. Negative for congestion, hearing loss, sinus pressure, sinus pain and trouble swallowing. Eyes: Negative for visual disturbance. Respiratory: Negative for cough and shortness of breath. Cardiovascular: Negative for chest pain and palpitations. Genitourinary: Negative for difficulty urinating. Neurological: Positive for headaches. Negative for dizziness and light-headedness. Prior to Visit Medications    Medication Sig Taking? Authorizing Provider   amoxicillin-clavulanate (AUGMENTIN) 875-125 MG per tablet Take 1 tablet by mouth 2 times daily for 7 days Yes YESSICA Garcia NP   cetirizine (ZYRTEC) 10 MG tablet Take 1 tablet by mouth daily Yes YESSICA Garcia NP   bisacodyl (BISACODYL) 5 MG EC tablet Take 4 tablets 2 times a day for one day as directed.  Yes Adalberto Deng MD   atorvastatin (LIPITOR) 80 MG tablet Take 1 tablet by mouth daily Yes YESSICA Bryan CNP   ammonium lactate (LAC-HYDRIN) 12 % lotion  Yes Historical Provider, MD   amitriptyline (ELAVIL) 10 MG tablet amitriptyline 10 mg tablet Yes Historical Provider, MD   calcipotriene (DOVONEX) 0.005 % ointment  Yes Historical Provider, MD   clobetasol (TEMOVATE) 0.05 % ointment  Yes Historical Provider, MD   loratadine (CLARITIN) 10 MG tablet Take 1 tablet by mouth daily Yes YESSICA Villarreal CNP   furosemide (LASIX) 20 MG tablet Take 1 tablet by mouth daily Yes YESSICA Villarreal CNP   alogliptin (NESINA) 6.25 MG TABS tablet Take 1 tablet by mouth daily Yes YESSICA Villarreal CNP   omeprazole (PRILOSEC) 40 MG delayed release capsule Take 1 capsule by mouth daily Yes YESSICA Villarreal CNP   tiZANidine (ZANAFLEX) 4 MG tablet Take 4 mg by mouth every 8 hours as needed  Yes Historical Provider, MD   oxyCODONE-acetaminophen (PERCOCET) 7.5-325 MG per tablet Take 1 tablet by mouth every 8 hours as needed for Pain (Patient states takes every 6 hours). Yes Historical Provider, MD   Psyllium (METAMUCIL) 48.57 % POWD Take 1 Units by mouth daily Yes Neymar Van MD   tiotropium (SPIRIVA RESPIMAT) 2.5 MCG/ACT AERS inhaler Inhale 2 puffs into the lungs daily Yes Analilia Gregg MD   potassium chloride (KLOR-CON M) 20 MEQ extended release tablet Take 1 tablet by mouth 2 times daily Yes Neymar Van MD   venlafaxine (EFFEXOR) 37.5 MG tablet Take 37.5 mg by mouth 2 times daily Yes Historical Provider, MD   glucose monitoring kit (FREESTYLE) monitoring kit 1 kit by Does not apply route daily Yes YESSICA Villarreal CNP   traZODone (DESYREL) 50 MG tablet Take 1 tablet by mouth nightly  Patient taking differently: Take 100 mg by mouth nightly  Yes YESSICA Villarreal CNP   blood glucose test strips (FREESTYLE LITE) strip 1 each by In Vitro route daily As needed.  Yes YESSICA Villarreal CNP   VALUE PLUS LANCETS THIN 26G MISC 1 EACH BY DOES NOT APPLY ROUTE DAILY Yes YESSICA Villarreal CNP   albuterol sulfate HFA (PROAIR HFA) 108 (90 Base) MCG/ACT inhaler Inhale 2 puffs into the lungs every 6 hours as needed for Wheezing Yes Analilia Gregg MD   fluticasone-vilanterol (BREO ELLIPTA) 100-25 MCG/INH AEPB inhaler Inhale 1 puff into the lungs daily Yes Michelet Ann Salinas MD   ALPRAZolam Karen Melva) 0.5 MG tablet Take 0.5 mg by mouth as needed for Sleep. . Yes Historical Provider, MD   fluticasone (FLONASE) 50 MCG/ACT nasal spray 1 spray by Each Nostril route daily  Leandra Holli, APRN - CNP        Social History     Tobacco Use    Smoking status: Former Smoker     Packs/day: 0.25     Years: 30.00     Pack years: 7.50     Types: Cigarettes, E-Cigarettes     Start date:      Last attempt to quit: 2016     Years since quittin.6    Smokeless tobacco: Never Used   Substance Use Topics    Alcohol use: Yes     Comment: \"Occ. Maybe Twice A Month\"        Vitals:    20 1428   BP: 134/88   Site: Left Upper Arm   Position: Sitting   Cuff Size: Medium Adult   Pulse: 76   Temp: 97.3 °F (36.3 °C)   SpO2: 97%   Weight: 176 lb 12.8 oz (80.2 kg)   Height: 5' 8\" (1.727 m)     Estimated body mass index is 26.88 kg/m² as calculated from the following:    Height as of this encounter: 5' 8\" (1.727 m). Weight as of this encounter: 176 lb 12.8 oz (80.2 kg). Physical Exam  Vitals signs reviewed. Constitutional:       General: She is not in acute distress. Appearance: Normal appearance. She is normal weight. She is not ill-appearing, toxic-appearing or diaphoretic. HENT:      Head: Normocephalic and atraumatic. Right Ear: Hearing normal. Tenderness present. No drainage. There is impacted cerumen. Left Ear: Hearing normal. Tenderness present. No drainage. A middle ear effusion is present. Ears:      Comments: Bilateral ear canal erythema and tenderness. Impacted dry cerumen in right ear canal.  Attempted to remove, to painful per patient. Nose: Nose normal.   Eyes:      Extraocular Movements: Extraocular movements intact. Pupils: Pupils are equal, round, and reactive to light. Neck:      Musculoskeletal: Normal range of motion and neck supple. Cardiovascular:      Rate and Rhythm: Normal rate and regular rhythm.       Heart sounds: Normal heart sounds. Pulmonary:      Effort: Pulmonary effort is normal.      Breath sounds: Normal breath sounds. Musculoskeletal: Normal range of motion. Skin:     General: Skin is warm and dry. Neurological:      General: No focal deficit present. Mental Status: She is alert and oriented to person, place, and time. Mental status is at baseline. Psychiatric:         Mood and Affect: Mood normal.         Behavior: Behavior normal.         Thought Content: Thought content normal.         Judgment: Judgment normal.         ASSESSMENT/PLAN:  1. Seasonal allergies  Zyrtec daily. Advised to start taking in March through September yearly. Increase water intake as this can dehydrate  2. Ear pain, bilateral  3. Ear infection  Start Augmentin    Follow-up if not improved. Present to the ER for any emergent or acute symptoms not managed at home or in office. An electronic signature was used to authenticate this note.     --YESSICA Alcantara NP on 8/7/2020 at 3:10 PM

## 2020-08-07 NOTE — PROGRESS NOTES
2020    TELEHEALTH EVALUATION -- Audio/Visual (During YFSFD-89 public health emergency)    HPI:    Rose Farrell (:  1965) has requested an audio/video evaluation for the following concern(s):    Doing very well, colonoscopy due next year. . 800 kcal, does NOT exercise. .. Review of Systems   Constitutional: Negative for activity change, chills, diaphoresis and fever. HENT: Negative for sore throat, trouble swallowing and voice change. Eyes: Negative for photophobia and visual disturbance. Respiratory: Negative for cough, shortness of breath and wheezing. Cardiovascular: Negative for chest pain, palpitations and leg swelling. Gastrointestinal: Negative for abdominal pain, anal bleeding, blood in stool, constipation, diarrhea, nausea and vomiting. Endocrine: Negative for cold intolerance, heat intolerance, polydipsia and polyuria. Genitourinary: Negative for dysuria, frequency and hematuria. Musculoskeletal: Negative for joint swelling, myalgias and neck stiffness. Skin: Negative for color change and rash. Neurological: Negative for seizures, speech difficulty, light-headedness and numbness. Hematological: Negative for adenopathy. Does not bruise/bleed easily. Prior to Visit Medications    Medication Sig Taking? Authorizing Provider   bisacodyl (BISACODYL) 5 MG EC tablet Take 4 tablets 2 times a day for one day as directed.   Adalberto Deng MD   atorvastatin (LIPITOR) 80 MG tablet Take 1 tablet by mouth daily  YESSICA Bryan CNP   ammonium lactate (LAC-HYDRIN) 12 % lotion   Historical Provider, MD   amitriptyline (ELAVIL) 10 MG tablet amitriptyline 10 mg tablet  Historical Provider, MD   calcipotriene (DOVONEX) 0.005 % ointment   Historical Provider, MD   clobetasol (TEMOVATE) 0.05 % ointment   Historical Provider, MD   fluticasone (FLONASE) 50 MCG/ACT nasal spray 1 spray by Each Nostril route daily  YESSICA Alonzo CNP   loratadine (CLARITIN) 10 MG tablet Take 1 tablet by mouth daily  YESSICA Ruiz CNP   furosemide (LASIX) 20 MG tablet Take 1 tablet by mouth daily  YESSICA Ruiz CNP   alogliptin (NESINA) 6.25 MG TABS tablet Take 1 tablet by mouth daily  YESSICA Ruiz CNP   omeprazole (PRILOSEC) 40 MG delayed release capsule Take 1 capsule by mouth daily  YESSICA Ruiz CNP   tiZANidine (ZANAFLEX) 4 MG tablet Take 4 mg by mouth every 8 hours as needed   Historical Provider, MD   oxyCODONE-acetaminophen (PERCOCET) 7.5-325 MG per tablet Take 1 tablet by mouth every 8 hours as needed for Pain (Patient states takes every 6 hours). Historical Provider, MD   Psyllium (METAMUCIL) 48.57 % POWD Take 1 Units by mouth daily  Kyrie Dunaway MD   tiotropium (SPIRIVA RESPIMAT) 2.5 MCG/ACT AERS inhaler Inhale 2 puffs into the lungs daily  Danny Kahn MD   potassium chloride (KLOR-CON M) 20 MEQ extended release tablet Take 1 tablet by mouth 2 times daily  Kyrie Dunaway MD   venlafaxine (EFFEXOR) 37.5 MG tablet Take 37.5 mg by mouth 2 times daily  Historical Provider, MD   glucose monitoring kit (FREESTYLE) monitoring kit 1 kit by Does not apply route daily  YESSICA Ruiz CNP   traZODone (DESYREL) 50 MG tablet Take 1 tablet by mouth nightly  Patient taking differently: Take 100 mg by mouth nightly   YESSICA Ruiz CNP   blood glucose test strips (FREESTYLE LITE) strip 1 each by In Vitro route daily As needed. YESSICA Ruiz CNP   VALUE PLUS LANCETS THIN 26G MISC 1 EACH BY DOES NOT APPLY ROUTE DAILY  YESSICA Ruiz CNP   albuterol sulfate HFA (PROAIR HFA) 108 (90 Base) MCG/ACT inhaler Inhale 2 puffs into the lungs every 6 hours as needed for Shayla Giraldo MD   fluticasone-vilanterol (BREO ELLIPTA) 100-25 MCG/INH AEPB inhaler Inhale 1 puff into the lungs daily  Danny Kahn MD   ALPRAZolam Sweeny Deepthi) 0.5 MG tablet Take 0.5 mg by mouth as needed for Sleep. Benjamin Samson   Historical Provider, MD       Social History Tobacco Use    Smoking status: Former Smoker     Packs/day: 0.25     Years: 30.00     Pack years: 7.50     Types: Cigarettes, E-Cigarettes     Start date:      Last attempt to quit: 2016     Years since quittin.6    Smokeless tobacco: Never Used   Substance Use Topics    Alcohol use: Yes     Comment: \"Occ. Maybe Twice A Month\"    Drug use: No        No Known Allergies,   Past Medical History:   Diagnosis Date    Anxiety     Arthritis     \"Back, Hands, Shoulders\"    Chronic back pain     Arthritis - NKI    COPD (chronic obstructive pulmonary disease) ([facility])     Sees Dr. Maliha Baugh with Mental Health    [facility]     Type II - follows with PCP    Emphysema     Fibromyalgia     H/O abdominoplasty 2018    H/O cardiovascular stress test 2019    ECG portion of stress test is neg for ischemia by diagnostic criteria. No infarct or ischemia noted. Normal stress myocardial perfusion. This is a normal study    H/O echocardiogram 2019    Left ventricular function is normal. Ejection fraction is visually estimated at 55-60%. No significant valvular abnormalitites.      Hyperlipidemia     Hypertension     Follows with PCP    Migraines Last Migraine 10-18    Mobitz (type) I (Wenckebach's) atrioventricular block 2020    Neuropathy     From feet to knees    Piriformis syndrome 2016    Psoriasis     Rotator cuff impingement syndrome, right 2018    RTC repair Dr Chary Mosqueda   Treated by Dr Reese Richards 17  Arthrocentesis 17    Shortness of breath on exertion     Syncope and collapse 2019    Teeth missing     Upper And Lower    Wears glasses    ,   Past Surgical History:   Procedure Laterality Date    ABDOMEN SURGERY N/A 2019    SECONDARY CLOSURE OF DEHISCED ABDOMINAL WOUND performed by Fabio Gomez MD at [place] [address] N/A 2019    IRRIGATION OF LOWER ABDOMINAL WOUND performed by [facility] Manuel Koch MD at [facility] ABDOMINOPLASTY N/A 2019    ABDOMINOPLASTY REVISION performed by Macy Pavon MD at [facility] Right 2020    EXCISION OF RIGHT BREAST SABACEOUS CYST performed by Alex Bullock MD at [address] [facility], [address] Right     Dr. Mary Beth Bro (Right)   [address]    Tubal Ligation Also Done In     COLONOSCOPY  2017    Polyp Removed - Dr. Juana Tyler COLONOSCOPY N/A 2020    COLONOSCOPY POLYPECTOMY SNARE/COLD BIOPSY performed by Macy Pavon MD at [address] [place] [address]      Teeth Extracted In Past    ELBOW SURGERY Bilateral Last Done In     Right Done Twice, Left Done Once left cubital tunnel release ; right     ENDOSCOPY, COLON, DIAGNOSTIC  2017    HERNIA REPAIR  10/26/2017    hiatal hernia with gastrectomy    HYSTERECTOMY VAGINAL      LAPAROSCOPY      DE EXCISE EXCESS SKIN TISSUE,ABDOMEN N/A 2018    ABDOMINOPLASTY performed by Macy Pavon MD at [facility] Right     Dr. Gary Armijo Right 2017    Dr Dalila Ritchie  10/26/2017    Robotic Laparoscopic, Pre Op Weight 237  Or 238 lbs.  TUBAL LIGATION      Done With    ,   Social History     Tobacco Use    Smoking status: Former Smoker     Packs/day: 0.25     Years: 30.00     Pack years: 7.50     Types: Cigarettes, E-Cigarettes     Start date:      Last attempt to quit: 2016     Years since quittin.6    Smokeless tobacco: Never Used   Substance Use Topics    Alcohol use: Yes     Comment: \"Occ.  Maybe Twice A Month\"    Drug use: No   ,   Family History   Problem Relation Age of Onset    Heart Attack Father     Heart Disease Father     Early Death Father 46        Heart Attack    Alcohol Abuse Father     Substance Abuse Father         Alcoholic    Arthritis Mother     Heart Disease Mother    Nini Ness

## 2020-08-10 RX ORDER — HYDROXYZINE HYDROCHLORIDE 25 MG/1
TABLET, FILM COATED ORAL
Qty: 60 TABLET | Refills: 1 | Status: SHIPPED | OUTPATIENT
Start: 2020-08-10 | End: 2020-10-29 | Stop reason: SDUPTHER

## 2020-08-14 ENCOUNTER — TELEPHONE (OUTPATIENT)
Dept: FAMILY MEDICINE CLINIC | Age: 55
End: 2020-08-14

## 2020-08-14 NOTE — TELEPHONE ENCOUNTER
We Can add Singulair to her daily Zyrtec or refer her to ENT. This is likely a chronic allergy problem.

## 2020-08-20 RX ORDER — FUROSEMIDE 20 MG/1
20 TABLET ORAL DAILY PRN
Qty: 14 TABLET | Refills: 0 | Status: SHIPPED | OUTPATIENT
Start: 2020-08-20 | End: 2020-09-03 | Stop reason: SDUPTHER

## 2020-08-20 NOTE — TELEPHONE ENCOUNTER
Patient states she had the same swelling back in May for which her previous provider gave her Lasix. She took it daily for 6 days. Edema resolved. States she cannot get to this office tomorrow, because she has to schedule a ride 3 days in advance. We will order 1 week of Lasix, advised patient that she needs to have her labs done. Ordered as lab collect. Will see in office in 1 week. 04/2020:  Per previous provider, Oneyda Leon note: \"Beverly has chronic, recurrent edema to her lower extremities and her right hand. She is on long-term lasix daily\"    Last CMP July 1, 2020.   Creat 1.0  GFR greater than 60

## 2020-09-03 ENCOUNTER — OFFICE VISIT (OUTPATIENT)
Dept: FAMILY MEDICINE CLINIC | Age: 55
End: 2020-09-03
Payer: COMMERCIAL

## 2020-09-03 VITALS
HEART RATE: 71 BPM | SYSTOLIC BLOOD PRESSURE: 132 MMHG | BODY MASS INDEX: 26.04 KG/M2 | TEMPERATURE: 97.2 F | HEIGHT: 68 IN | OXYGEN SATURATION: 97 % | DIASTOLIC BLOOD PRESSURE: 84 MMHG | WEIGHT: 171.8 LBS

## 2020-09-03 DIAGNOSIS — R60.9 PERIPHERAL EDEMA: ICD-10-CM

## 2020-09-03 PROCEDURE — 3017F COLORECTAL CA SCREEN DOC REV: CPT | Performed by: NURSE PRACTITIONER

## 2020-09-03 PROCEDURE — 3044F HG A1C LEVEL LT 7.0%: CPT | Performed by: NURSE PRACTITIONER

## 2020-09-03 PROCEDURE — 1036F TOBACCO NON-USER: CPT | Performed by: NURSE PRACTITIONER

## 2020-09-03 PROCEDURE — 2022F DILAT RTA XM EVC RTNOPTHY: CPT | Performed by: NURSE PRACTITIONER

## 2020-09-03 PROCEDURE — G8417 CALC BMI ABV UP PARAM F/U: HCPCS | Performed by: NURSE PRACTITIONER

## 2020-09-03 PROCEDURE — 99213 OFFICE O/P EST LOW 20 MIN: CPT | Performed by: NURSE PRACTITIONER

## 2020-09-03 PROCEDURE — G8427 DOCREV CUR MEDS BY ELIG CLIN: HCPCS | Performed by: NURSE PRACTITIONER

## 2020-09-03 RX ORDER — ALOGLIPTIN 6.25 MG/1
6.25 TABLET, FILM COATED ORAL DAILY
Qty: 90 TABLET | Refills: 1 | Status: SHIPPED | OUTPATIENT
Start: 2020-09-03 | End: 2021-03-08 | Stop reason: SDUPTHER

## 2020-09-03 RX ORDER — FUROSEMIDE 20 MG/1
20 TABLET ORAL DAILY PRN
Qty: 30 TABLET | Refills: 1 | Status: SHIPPED | OUTPATIENT
Start: 2020-09-03 | End: 2020-11-17

## 2020-09-03 ASSESSMENT — ENCOUNTER SYMPTOMS
WHEEZING: 0
SHORTNESS OF BREATH: 1
BACK PAIN: 1
COUGH: 1
EYES NEGATIVE: 1

## 2020-09-03 NOTE — PROGRESS NOTES
9/3/2020     Yue Black (:  1965) is a 50 y.o. female, here for evaluation of the following medical concerns:    Presents with complaint of chronic bilateral lower extremity edema. It was noted in 2020 by her previous primary care that she was on 20 mg Lasix daily long-term. She was given Lasix for 6 days in  which resolved the edema. This provider saw her In 2020 and ordered labs to check renal function, ordered 1 week of Lasix. The patient was supposed to follow-up with labs, never did. Presents today to discuss chronic intermittent bilateral lower extremity edema. She does not have any edema right now. There is no shortness of breath associated with the edema and it is resolved usually with Lasix. 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 Is unable to state whether it is worse after standing all day or taking in a lot of salt. She does not wear compression socks or elevate her extremities. She does follow with cardiology for heart blocks, has diabetes. Unrelated:  Was seen by ENT- hearing is normal.   Refer back to neuro in [place] for headaches. Follows with pain management for back pain. - dr Aranza Garcia   Following with Dr Edi Barksdale for pain after a fall in a parking lot. Review of Systems   Constitutional: Negative for activity change, appetite change, chills, diaphoresis, fatigue, fever and unexpected weight change. Eyes: Negative. Negative for visual disturbance. Respiratory: Positive for cough and shortness of breath. Negative for wheezing. Unchanged baseline cough dyspnea with COPD   Cardiovascular: Positive for leg swelling (intermittent, none current ). Negative for chest pain and palpitations. Endocrine: Negative. Genitourinary: Negative for decreased urine volume and difficulty urinating. Musculoskeletal: Positive for arthralgias and back pain. Negative for gait problem.    Neurological: Positive for headaches. Negative for dizziness, weakness and numbness. Prior to Visit Medications    Medication Sig Taking? Authorizing Provider   alogliptin (NESINA) 6.25 MG TABS tablet Take 1 tablet by mouth daily Yes YESSICA Park NP   furosemide (LASIX) 20 MG tablet Take 1 tablet by mouth daily as needed (feet swellling) Yes YESSICA Park NP   albuterol sulfate HFA (PROAIR HFA) 108 (90 Base) MCG/ACT inhaler Inhale 2 puffs into the lungs every 6 hours as needed for Wheezing Yes Antonio Campos MD   fluticasone-vilanterol (BREO ELLIPTA) 100-25 MCG/INH AEPB inhaler Inhale 1 puff into the lungs daily Yes Antonio Campos MD   tiotropium (SPIRIVA RESPIMAT) 2.5 MCG/ACT AERS inhaler Inhale 2 puffs into the lungs daily Yes Antonio Campos MD   hydrOXYzine (ATARAX) 25 MG tablet TAKE 1 TABLET EVERY 8 HOURS AS NEEDED FOR ANXIETY AVOID ALCOHOL/CAUSES DROWSINESS Yes YESSICA Park NP   cetirizine (ZYRTEC) 10 MG tablet Take 1 tablet by mouth daily Yes YESSICA Park NP   bisacodyl (BISACODYL) 5 MG EC tablet Take 4 tablets 2 times a day for one day as directed.  Yes Kel Soliman MD   atorvastatin (LIPITOR) 80 MG tablet Take 1 tablet by mouth daily Yes YESSICA Vega CNP   ammonium lactate (LAC-HYDRIN) 12 % lotion  Yes Historical Provider, MD   amitriptyline (ELAVIL) 10 MG tablet amitriptyline 10 mg tablet Yes Historical Provider, MD   calcipotriene (DOVONEX) 0.005 % ointment  Yes Historical Provider, MD   clobetasol (TEMOVATE) 0.05 % ointment  Yes Historical Provider, MD   loratadine (CLARITIN) 10 MG tablet Take 1 tablet by mouth daily Yes YESSICA Vega CNP   omeprazole (PRILOSEC) 40 MG delayed release capsule Take 1 capsule by mouth daily Yes YESSICA Vega CNP   tiZANidine (ZANAFLEX) 4 MG tablet Take 4 mg by mouth every 8 hours as needed  Yes Historical Provider, MD   oxyCODONE-acetaminophen (PERCOCET) 7.5-325 MG per tablet Take 1 tablet by mouth every 8 hours as

## 2020-09-04 LAB
ANION GAP SERPL CALCULATED.3IONS-SCNC: 14 MMOL/L (ref 3–16)
BUN BLDV-MCNC: 24 MG/DL (ref 7–20)
CALCIUM SERPL-MCNC: 10.4 MG/DL (ref 8.3–10.6)
CHLORIDE BLD-SCNC: 93 MMOL/L (ref 99–110)
CO2: 30 MMOL/L (ref 21–32)
CREAT SERPL-MCNC: 1.1 MG/DL (ref 0.6–1.1)
GFR AFRICAN AMERICAN: >60
GFR NON-AFRICAN AMERICAN: 52
GLUCOSE BLD-MCNC: 119 MG/DL (ref 70–99)
POTASSIUM SERPL-SCNC: 4.4 MMOL/L (ref 3.5–5.1)
PRO-BNP: 52 PG/ML (ref 0–124)
SODIUM BLD-SCNC: 137 MMOL/L (ref 136–145)

## 2020-09-30 ENCOUNTER — TELEPHONE (OUTPATIENT)
Dept: CARDIOLOGY CLINIC | Age: 55
End: 2020-09-30

## 2020-09-30 NOTE — TELEPHONE ENCOUNTER
Patient called she has had some swelling in her   Hands and feet PCP placed her on a lasixs   Doesn't seem to be helping

## 2020-09-30 NOTE — TELEPHONE ENCOUNTER
I talk to the patient and she stated that it comes and going. Lasix 20mg daily. Been on it for about a year but off and on. Her weight lost doctor gave it to her after a procedure because she had some swelling. Now the PCP started to giving it to her and she states it is not helping.

## 2020-10-07 RX ORDER — ATORVASTATIN CALCIUM 80 MG/1
80 TABLET, FILM COATED ORAL DAILY
Qty: 90 TABLET | Refills: 0 | Status: SHIPPED | OUTPATIENT
Start: 2020-10-07 | End: 2021-05-06 | Stop reason: SDUPTHER

## 2020-10-09 ENCOUNTER — OFFICE VISIT (OUTPATIENT)
Dept: CARDIOLOGY CLINIC | Age: 55
End: 2020-10-09
Payer: COMMERCIAL

## 2020-10-09 VITALS
SYSTOLIC BLOOD PRESSURE: 118 MMHG | WEIGHT: 173 LBS | BODY MASS INDEX: 27.15 KG/M2 | DIASTOLIC BLOOD PRESSURE: 80 MMHG | TEMPERATURE: 96.6 F | HEART RATE: 80 BPM | HEIGHT: 67 IN

## 2020-10-09 PROCEDURE — G8417 CALC BMI ABV UP PARAM F/U: HCPCS | Performed by: NURSE PRACTITIONER

## 2020-10-09 PROCEDURE — 99213 OFFICE O/P EST LOW 20 MIN: CPT | Performed by: NURSE PRACTITIONER

## 2020-10-09 PROCEDURE — G8484 FLU IMMUNIZE NO ADMIN: HCPCS | Performed by: NURSE PRACTITIONER

## 2020-10-09 PROCEDURE — G8427 DOCREV CUR MEDS BY ELIG CLIN: HCPCS | Performed by: NURSE PRACTITIONER

## 2020-10-09 PROCEDURE — 1036F TOBACCO NON-USER: CPT | Performed by: NURSE PRACTITIONER

## 2020-10-09 PROCEDURE — 3017F COLORECTAL CA SCREEN DOC REV: CPT | Performed by: NURSE PRACTITIONER

## 2020-10-09 ASSESSMENT — ENCOUNTER SYMPTOMS
COLOR CHANGE: 0
CONSTIPATION: 0
VOMITING: 0
BLOOD IN STOOL: 0
ABDOMINAL PAIN: 0
DIARRHEA: 0
COUGH: 0
SHORTNESS OF BREATH: 0
PHOTOPHOBIA: 0
NAUSEA: 0
SINUS PAIN: 0

## 2020-10-09 NOTE — PROGRESS NOTES
[facility] ([facility] PHYSICAL [facility]  Harpal Lopez  Phone: [phone]    Fax [phone]                  Med Katz MD, Orlando Angel MD, [address] Shanda Martinez MD, MD Juli Santiago MD Paulett Kohler, MD Minerva Rudd, APRN      Kamarbaltazar Warren, APRN  Jaelyn Anaya, APRN     Mayito Chaves, APRN    CARDIOLOGY  NOTE      10/9/2020    RE: Nat Lamb  (1965)                               TO:  Dr. Delfin Adames, APRN - NP  The primary cardiologist is Dr. Cassie Caicedo    CC:   1. Dyslipidemia    2. Swelling of both hands    3. Chronic edema BLE      Patient denies all of the following:  Chest Pain  Palpitations  Shortness of Breath  Dizziness  Syncope      HPI: Thank you for involving me in taking care of your patient Nat Lamb, who is a  50y.o. year old female with a history as listed above. Patient is  active and does not exercise regularly. Patient is  compliant with her medications. Patient is having intermittent edema to hands and R leg. She states that her medications are helping keeping her swelling controlled.      Vitals:    10/09/20 0922   BP: 118/80   Pulse: 80       Current Outpatient Medications   Medication Sig Dispense Refill    atorvastatin (LIPITOR) 80 MG tablet TAKE 1 TABLET BY MOUTH DAILY 90 tablet 0    alogliptin (NESINA) 6.25 MG TABS tablet Take 1 tablet by mouth daily 90 tablet 1    furosemide (LASIX) 20 MG tablet Take 1 tablet by mouth daily as needed (feet swellling) 30 tablet 1    albuterol sulfate HFA (PROAIR HFA) 108 (90 Base) MCG/ACT inhaler Inhale 2 puffs into the lungs every 6 hours as needed for Wheezing 1 Inhaler 5    fluticasone-vilanterol (BREO ELLIPTA) 100-25 MCG/INH AEPB inhaler Inhale 1 puff into the lungs daily 1 each 5    tiotropium (SPIRIVA RESPIMAT) 2.5 MCG/ACT AERS inhaler Inhale 2 puffs into the lungs daily 1 Inhaler 5    hydrOXYzine (ATARAX) 25 MG tablet TAKE 1 TABLET EVERY 8 HOURS AS NEEDED FOR ANXIETY AVOID ALCOHOL/CAUSES DROWSINESS 60 tablet 1    bisacodyl (BISACODYL) 5 MG EC tablet Take 4 tablets 2 times a day for one day as directed. 8 tablet 0    ammonium lactate (LAC-HYDRIN) 12 % lotion       amitriptyline (ELAVIL) 10 MG tablet amitriptyline 10 mg tablet      calcipotriene (DOVONEX) 0.005 % ointment       clobetasol (TEMOVATE) 0.05 % ointment       fluticasone (FLONASE) 50 MCG/ACT nasal spray 1 spray by Each Nostril route daily 1 Bottle 0    loratadine (CLARITIN) 10 MG tablet Take 1 tablet by mouth daily 30 tablet 5    omeprazole (PRILOSEC) 40 MG delayed release capsule Take 1 capsule by mouth daily 90 capsule 2    tiZANidine (ZANAFLEX) 4 MG tablet Take 4 mg by mouth every 8 hours as needed       oxyCODONE-acetaminophen (PERCOCET) 7.5-325 MG per tablet Take 1 tablet by mouth every 8 hours as needed for Pain (Patient states takes every 6 hours).  Psyllium (METAMUCIL) 48.57 % POWD Take 1 Units by mouth daily 1 Bottle 5    tiotropium (SPIRIVA RESPIMAT) 2.5 MCG/ACT AERS inhaler Inhale 2 puffs into the lungs daily 1 Inhaler 5    potassium chloride (KLOR-CON M) 20 MEQ extended release tablet Take 1 tablet by mouth 2 times daily 180 tablet 1    venlafaxine (EFFEXOR) 37.5 MG tablet Take 37.5 mg by mouth 2 times daily      glucose monitoring kit (FREESTYLE) monitoring kit 1 kit by Does not apply route daily 1 kit 0    traZODone (DESYREL) 50 MG tablet Take 1 tablet by mouth nightly (Patient taking differently: Take 100 mg by mouth nightly ) 30 tablet 2    blood glucose test strips (FREESTYLE LITE) strip 1 each by In Vitro route daily As needed.  100 strip 11    VALUE PLUS LANCETS THIN 26G MISC 1 EACH BY DOES NOT APPLY ROUTE DAILY 100 each 5    albuterol sulfate HFA (PROAIR HFA) 108 (90 Base) MCG/ACT inhaler Inhale 2 puffs into the lungs every 6 hours as needed for Wheezing 1 Inhaler 5    fluticasone-vilanterol (BREO ELLIPTA) 100-25 MCG/INH AEPB inhaler Inhale 1 puff into the lungs daily 1 each 5    ALPRAZolam (XANAX) 0.5 MG tablet Take 0.5 mg by mouth as needed for Sleep. .       No current facility-administered medications for this visit. Allergies: Patient has no known allergies. Past Medical History:   Diagnosis Date    Anxiety     Arthritis     \"Back, Hands, Shoulders\"    Chronic back pain     Arthritis - NKI    COPD (chronic obstructive pulmonary disease) ([facility])     Sees Dr. Goldy Hartman with Mental Health    [facility] Dx 2008    Type II - follows with PCP    Emphysema     Fibromyalgia     H/O abdominoplasty 12/21/2018    H/O cardiovascular stress test 03/04/2019    ECG portion of stress test is neg for ischemia by diagnostic criteria. No infarct or ischemia noted. Normal stress myocardial perfusion. This is a normal study    H/O echocardiogram 03/04/2019    Left ventricular function is normal. Ejection fraction is visually estimated at 55-60%. No significant valvular abnormalitites.      Hyperlipidemia     Hypertension     Follows with PCP    Migraines Last Migraine 10-18    Mobitz (type) I (Wenckebach's) atrioventricular block 01/29/2020    Neuropathy     From feet to knees    Piriformis syndrome 6/24/2016    Psoriasis     Rotator cuff impingement syndrome, right 2/26/2018    RTC repair Dr Abhinav Church 2015  Treated by Dr Carmelita Sommer 2/18/17  Arthrocentesis 12/19/17    Shortness of breath on exertion     Syncope and collapse 03/04/2019    Teeth missing     Upper And Lower    Wears glasses      Past Surgical History:   Procedure Laterality Date    ABDOMEN SURGERY N/A 1/14/2019    SECONDARY CLOSURE OF DEHISCED ABDOMINAL WOUND performed by Miranda Suarez MD at [facility] N/A 1/24/2019    IRRIGATION OF LOWER ABDOMINAL WOUND performed by Miranda Suarez MD at [facility] ABDOMINOPLASTY N/A 7/29/2019    ABDOMINOPLASTY REVISION performed by Miranda Suarez MD at [address] [place] [address]. SURGERY Right 2020    EXCISION OF RIGHT BREAST SABACEOUS CYST performed by Mary Ayala MD at [address] Right 2017    Dr. Kathleen Robertson (Right)   [address]    Tubal Ligation Also Done In     COLONOSCOPY  2017    Polyp Removed - Dr. Chaitanya Franco COLONOSCOPY N/A 2020    COLONOSCOPY POLYPECTOMY SNARE/COLD BIOPSY performed by Behzad Pérez MD at [address]      Teeth Extracted In Past    ELBOW SURGERY Bilateral Last Done In     Right Done Twice, Left Done Once left cubital tunnel release ; right 2014    ENDOSCOPY, COLON, DIAGNOSTIC  2017    HERNIA REPAIR  10/26/2017    hiatal hernia with gastrectomy    HYSTERECTOMY VAGINAL  2000    LAPAROSCOPY      CA EXCISE EXCESS SKIN TISSUE,ABDOMEN N/A 2018    ABDOMINOPLASTY performed by Behzad Pérez MD at [address] Right     Dr. Kameron Carbajal Right 2017    Dr Mary [facility]  10/26/2017    Robotic Laparoscopic, Pre Op Weight 237  Or 238 lbs.  TUBAL LIGATION      Done With      Family History   Problem Relation Age of Onset    Heart Attack Father     Heart Disease Father     Early Death Father 46        Heart Attack    Alcohol Abuse Father     Substance Abuse Father         Alcoholic    Arthritis Mother     Heart Disease Mother     Diabetes Mother     Cancer Mother         \"Kidney Cancer\"    High Blood Pressure Mother     High Blood Pressure Sister     Diabetes Brother     Early Death Daughter 21        \"Killed In A Car Accident\"     Social History     Tobacco Use    Smoking status: Former Smoker     Packs/day: 0.25     Years: 30.00     Pack years: 7.50     Types: Cigarettes, E-Cigarettes     Start date:      Last attempt to quit: 2016     Years since quittin.7    Smokeless tobacco: Never Used   Substance Use Topics    Alcohol use: Yes     Comment: \"Occ. refill takes less than 2 seconds. Neurological:      Mental Status: She is alert and oriented to person, place, and time. Psychiatric:         Mood and Affect: Mood normal.         Behavior: Behavior normal.         Thought Content: Thought content normal.         Judgment: Judgment normal.         DATA:  Lab Results   Component Value Date    TROPONINI <0.006 12/16/2012     BNP:  No results found for: BNP  PT/INR:  No results found for: PTINR  Lab Results   Component Value Date    LABA1C 6.7 07/01/2020    LABA1C 7.2 01/07/2020     Lab Results   Component Value Date    CHOL 247 (H) 05/03/2019    TRIG 170 (H) 05/03/2019     (H) 07/01/2020    LDLCALC 189 (H) 07/01/2020    LDLDIRECT 214 (H) 03/17/2018     Lab Results   Component Value Date    ALT 11 07/01/2020    AST 16 07/01/2020     TSH:  No results found for: TSH      Assessment/ Plan:     1. Dyslipidemia   Lipid panel reviewed   Patient LDL is not at goal, HDL is  at goal   Patient is on a Statin   Her statin was increased after her labs in July after most recent labs.  Will recheck lipid panel. Reviewed with patient that is her LDL is > 130 she will need additional medication.  Discussed with the patient the need for exercise, low cholesterol diet, and compliance with medications.  10 year risk ASCVD is 11%. - Lipid Panel; Future    2. Swelling of both hands/ Chronic edema BLE  Patient states that the swelling in her hands and legs are resolved today. Encouraged patient to keep food journal and monitor edema and foods she eats to assist in determining if high sodium foods are the cause of edema. Encouraged patient to eat low sodium diet. Patient seen, interviewed and examined. Testing was reviewed. Patient is encouraged to exercise even a brisk walk for 30 minutes at least 3 to 4 times a week. Lifestyle and risk factor modificatons discussed. Various goals are discussed and questions answered. Continue current medications.

## 2020-10-09 NOTE — LETTER
Matheus Meija  1965  [id number]    Have you had any Chest Pain - No      Have you had any Shortness of Breath - Yes  If Yes - When copd pt has this all the time    Have you had any dizziness - No    Have you had any palpitations - No    Is the patient on any of the following medications -   If Yes DO EKG    Do you have any edema - swelling in Had swelling in hands and feet      Check Venous \"LEG PROBLEM Questionnaire\"    Do you have a surgery or procedure scheduled in the near future - No      Ask patient if they [facility] if [facility] are not already signed up    Check to see if we have an E-MAIL on file for the patient    Check medication list thoroughly!!!  BE SURE TO ASK PATIENT IF THEY NEED MEDICATION REFILLS

## 2020-10-21 ENCOUNTER — OFFICE VISIT (OUTPATIENT)
Dept: FAMILY MEDICINE CLINIC | Age: 55
End: 2020-10-21
Payer: COMMERCIAL

## 2020-10-21 VITALS
WEIGHT: 169.6 LBS | HEIGHT: 67 IN | TEMPERATURE: 96.9 F | BODY MASS INDEX: 26.62 KG/M2 | DIASTOLIC BLOOD PRESSURE: 80 MMHG | SYSTOLIC BLOOD PRESSURE: 124 MMHG

## 2020-10-21 DIAGNOSIS — E11.42 TYPE 2 DIABETES MELLITUS WITH DIABETIC POLYNEUROPATHY, WITHOUT LONG-TERM CURRENT USE OF INSULIN (HCC): Chronic | ICD-10-CM

## 2020-10-21 DIAGNOSIS — E78.5 DYSLIPIDEMIA: ICD-10-CM

## 2020-10-21 LAB
A/G RATIO: 1.3 (ref 1.1–2.2)
ALBUMIN SERPL-MCNC: 4.3 G/DL (ref 3.4–5)
ALP BLD-CCNC: 110 U/L (ref 40–129)
ALT SERPL-CCNC: 32 U/L (ref 10–40)
ANION GAP SERPL CALCULATED.3IONS-SCNC: 13 MMOL/L (ref 3–16)
AST SERPL-CCNC: 35 U/L (ref 15–37)
BILIRUB SERPL-MCNC: 0.7 MG/DL (ref 0–1)
BUN BLDV-MCNC: 15 MG/DL (ref 7–20)
CALCIUM SERPL-MCNC: 9.5 MG/DL (ref 8.3–10.6)
CHLORIDE BLD-SCNC: 99 MMOL/L (ref 99–110)
CHOLESTEROL, TOTAL: 216 MG/DL (ref 0–199)
CO2: 26 MMOL/L (ref 21–32)
CREAT SERPL-MCNC: 0.8 MG/DL (ref 0.6–1.1)
GFR AFRICAN AMERICAN: >60
GFR NON-AFRICAN AMERICAN: >60
GLOBULIN: 3.2 G/DL
GLUCOSE BLD-MCNC: 121 MG/DL (ref 70–99)
HDLC SERPL-MCNC: 77 MG/DL (ref 40–60)
LDL CHOLESTEROL CALCULATED: 119 MG/DL
POTASSIUM SERPL-SCNC: 4.6 MMOL/L (ref 3.5–5.1)
SODIUM BLD-SCNC: 138 MMOL/L (ref 136–145)
TOTAL PROTEIN: 7.5 G/DL (ref 6.4–8.2)
TRIGL SERPL-MCNC: 102 MG/DL (ref 0–150)
VLDLC SERPL CALC-MCNC: 20 MG/DL

## 2020-10-21 PROCEDURE — 2022F DILAT RTA XM EVC RTNOPTHY: CPT | Performed by: NURSE PRACTITIONER

## 2020-10-21 PROCEDURE — 1036F TOBACCO NON-USER: CPT | Performed by: NURSE PRACTITIONER

## 2020-10-21 PROCEDURE — G8427 DOCREV CUR MEDS BY ELIG CLIN: HCPCS | Performed by: NURSE PRACTITIONER

## 2020-10-21 PROCEDURE — 99214 OFFICE O/P EST MOD 30 MIN: CPT | Performed by: NURSE PRACTITIONER

## 2020-10-21 PROCEDURE — 3023F SPIROM DOC REV: CPT | Performed by: NURSE PRACTITIONER

## 2020-10-21 PROCEDURE — 3017F COLORECTAL CA SCREEN DOC REV: CPT | Performed by: NURSE PRACTITIONER

## 2020-10-21 PROCEDURE — G8484 FLU IMMUNIZE NO ADMIN: HCPCS | Performed by: NURSE PRACTITIONER

## 2020-10-21 PROCEDURE — 3051F HG A1C>EQUAL 7.0%<8.0%: CPT | Performed by: NURSE PRACTITIONER

## 2020-10-21 PROCEDURE — G8926 SPIRO NO PERF OR DOC: HCPCS | Performed by: NURSE PRACTITIONER

## 2020-10-21 PROCEDURE — G8417 CALC BMI ABV UP PARAM F/U: HCPCS | Performed by: NURSE PRACTITIONER

## 2020-10-21 ASSESSMENT — ENCOUNTER SYMPTOMS
ABDOMINAL PAIN: 0
BACK PAIN: 1
WHEEZING: 0
DIARRHEA: 0
SHORTNESS OF BREATH: 0
BLOOD IN STOOL: 0
CONSTIPATION: 0
COUGH: 0
NAUSEA: 0
VOMITING: 0

## 2020-10-21 NOTE — PROGRESS NOTES
10/21/2020     Ethan Ornelas (:  1965) is a 50 y.o. female, here for evaluation of the following medical concerns:    Presents requesting disability forms to be completed for fibromyalgia, diabetes, anxiety, depression, chronic pains. States she has been trying to get disability she she quit her job in . Has not worked since then, because she states she is physically and mentally unable. Reports she cannot stand for more than one hour. Has weakness, numbness, tingling in hands and feet. Cannot lift greater than 10lbs on any occasion. Unable to complete an 8 hour workday. Follows with pain management, ortho, mental health. Just finished physical therapy- states she could not continue due to pain and incapability. Review of Systems   Constitutional: Negative for activity change, appetite change, chills, diaphoresis, fatigue, fever and unexpected weight change. Eyes: Negative for visual disturbance. Respiratory: Negative for cough, shortness of breath and wheezing. Cardiovascular: Positive for leg swelling (intermittent, none current). Negative for chest pain and palpitations. Gastrointestinal: Negative for abdominal pain, blood in stool, constipation, diarrhea, nausea and vomiting. Genitourinary: Negative for decreased urine volume. Musculoskeletal: Positive for arthralgias, back pain, gait problem and myalgias. Skin: Negative. Neurological: Positive for weakness and numbness (hands and feet). Negative for dizziness, light-headedness and headaches. Psychiatric/Behavioral: Positive for dysphoric mood. Negative for sleep disturbance and suicidal ideas. Prior to Visit Medications    Medication Sig Taking?  Authorizing Provider   atorvastatin (LIPITOR) 80 MG tablet TAKE 1 TABLET BY MOUTH DAILY Yes YESSICA Cheatham NP   alogliptin (NESINA) 6.25 MG TABS tablet Take 1 tablet by mouth daily Yes YESSICA Cheatham NP   furosemide (LASIX) 20 MG tablet Take 1 tablet by mouth daily as needed (feet swellling) Yes YESSICA Perez NP   albuterol sulfate HFA (PROAIR HFA) 108 (90 Base) MCG/ACT inhaler Inhale 2 puffs into the lungs every 6 hours as needed for Wheezing Yes Apryl Cody MD   hydrOXYzine (ATARAX) 25 MG tablet TAKE 1 TABLET EVERY 8 HOURS AS NEEDED FOR ANXIETY AVOID ALCOHOL/CAUSES DROWSINESS Yes YESSICA Perez NP   ammonium lactate (LAC-HYDRIN) 12 % lotion  Yes Historical Provider, MD   amitriptyline (ELAVIL) 10 MG tablet amitriptyline 10 mg tablet Yes Historical Provider, MD   calcipotriene (DOVONEX) 0.005 % ointment  Yes Historical Provider, MD   clobetasol (TEMOVATE) 0.05 % ointment  Yes Historical Provider, MD   loratadine (CLARITIN) 10 MG tablet Take 1 tablet by mouth daily Yes YESSICA Whitaker CNP   omeprazole (PRILOSEC) 40 MG delayed release capsule Take 1 capsule by mouth daily Yes YESSICA Whitaker CNP   tiZANidine (ZANAFLEX) 4 MG tablet Take 4 mg by mouth every 8 hours as needed  Yes Historical Provider, MD   oxyCODONE-acetaminophen (PERCOCET) 7.5-325 MG per tablet Take 1 tablet by mouth every 8 hours as needed for Pain (Patient states takes every 6 hours).   Yes Historical Provider, MD   Psyllium (METAMUCIL) 48.57 % POWD Take 1 Units by mouth daily Yes Merlene Lacey MD   tiotropium (SPIRIVA RESPIMAT) 2.5 MCG/ACT AERS inhaler Inhale 2 puffs into the lungs daily Yes Apryl Cody MD   potassium chloride (KLOR-CON M) 20 MEQ extended release tablet Take 1 tablet by mouth 2 times daily Yes Merlene Lacey MD   venlafaxine (EFFEXOR) 37.5 MG tablet Take 37.5 mg by mouth 2 times daily Yes Historical Provider, MD   glucose monitoring kit (FREESTYLE) monitoring kit 1 kit by Does not apply route daily Yes YESSICA Whitaker CNP   traZODone (DESYREL) 50 MG tablet Take 1 tablet by mouth nightly  Patient taking differently: Take 100 mg by mouth nightly  Yes Kamala [place], APRN - CNP   blood glucose test strips (FREESTYLE LITE) strip 1 each by In Vitro route daily As needed. Yes YESSICA Rivera CNP   VALUE PLUS LANCETS THIN 26G MISC 1 EACH BY DOES NOT APPLY ROUTE DAILY Yes YESSICA Rivera CNP   fluticasone-vilanterol (BREO ELLIPTA) 100-25 MCG/INH AEPB inhaler Inhale 1 puff into the lungs daily Yes Kristin Rizzo MD   ALPRAZolam Belvin Blare) 0.5 MG tablet Take 0.5 mg by mouth as needed for Sleep. . Yes Historical Provider, MD   fluticasone-vilanterol (BREO ELLIPTA) 100-25 MCG/INH AEPB inhaler Inhale 1 puff into the lungs daily  Kristin Rizzo MD   tiotropium (SPIRIVA RESPIMAT) 2.5 MCG/ACT AERS inhaler Inhale 2 puffs into the lungs daily  Kristin Rizzo MD   bisacodyl (BISACODYL) 5 MG EC tablet Take 4 tablets 2 times a day for one day as directed. Imer Collins MD   fluticasone (FLONASE) 50 MCG/ACT nasal spray 1 spray by Each Nostril route daily  YESSICA Alonzo CNP   albuterol sulfate HFA (PROAIR HFA) 108 (90 Base) MCG/ACT inhaler Inhale 2 puffs into the lungs every 6 hours as needed for Lebron Goins MD        Social History     Tobacco Use    Smoking status: Former Smoker     Packs/day: 0.25     Years: 30.00     Pack years: 7.50     Types: Cigarettes, E-Cigarettes     Start date:      Last attempt to quit: 2016     Years since quittin.8    Smokeless tobacco: Never Used   Substance Use Topics    Alcohol use: Yes     Comment: \"Occ. Maybe Twice A Month\"        Vitals:    10/21/20 0806   BP: 124/80   Site: Right Upper Arm   Position: Sitting   Cuff Size: Medium Adult   Temp: 96.9 °F (36.1 °C)   Weight: 169 lb 9.6 oz (76.9 kg)   Height: 5' 7\" (1.702 m)     Estimated body mass index is 26.56 kg/m² as calculated from the following:    Height as of this encounter: 5' 7\" (1.702 m). Weight as of this encounter: 169 lb 9.6 oz (76.9 kg). Physical Exam  Vitals signs reviewed. Constitutional:       General: She is not in acute distress. Appearance: Normal appearance. She is normal weight.  She is not ill-appearing, toxic-appearing or diaphoretic. HENT:      Head: Normocephalic and atraumatic. Nose: Nose normal.   Eyes:      Extraocular Movements: Extraocular movements intact. Pupils: Pupils are equal, round, and reactive to light. Neck:      Musculoskeletal: Normal range of motion and neck supple. Cardiovascular:      Rate and Rhythm: Normal rate and regular rhythm. Heart sounds: Normal heart sounds. Pulmonary:      Effort: Pulmonary effort is normal. No respiratory distress. Breath sounds: Normal breath sounds. No wheezing, rhonchi or rales. Abdominal:      General: Bowel sounds are normal. There is no distension. Palpations: Abdomen is soft. There is no mass. Tenderness: There is no abdominal tenderness. Hernia: No hernia is present. Musculoskeletal: Normal range of motion. Comments: Patient able to ambulate in office without difficulty- no assistive devices. She is carrying a purse as well. No abnormal gait or mobility. Able to bend over fully and touch her toes on command. Able to balance on one foot without falling over. States she cannot squat, because she will not be able to get back up. Demonstrates full ROM of upper extremities. Bilateral hand  strong  Hand push/ pull moderate strength  Leg push/ pull moderate strength. Is able to step up onto exam table and get down without assistance. Skin:     General: Skin is warm and dry. Neurological:      General: No focal deficit present. Mental Status: She is alert and oriented to person, place, and time. Mental status is at baseline. Gait: Gait normal.   Psychiatric:         Mood and Affect: Mood normal.         Behavior: Behavior normal.         Thought Content: Thought content normal.         Judgment: Judgment normal.         ASSESSMENT/PLAN:  1.  Type 2 diabetes mellitus with diabetic polyneuropathy, without long-term current use of insulin ([facility])  - Comprehensive Metabolic Panel; Future  - Hemoglobin A1C; Future    2. Chronic right-sided low back pain with right-sided sciatica    3. COPD, severe ([address]    4. Chronic edema BLE    5. Fibromyalgia    6. Anxiety    7. Encounter for other screening for malignant neoplasm of breast  - [employer] SCREENING SELF REFERRAL BILATERAL; Future      Discussed with patient that I would be speaking with her other providers and physical therapy to decide how to complete her forms or who would be better to complete them. I can only go off what she tells me she can do. Present to the ER for any emergent or acute symptoms not managed at home or in office. Please note that this chart was generated using dragon dictation software. Although every effort was made to ensure the accuracy of this automated transcription, some errors in transcription may have occurred. An electronic signature was used to authenticate this note.     --YESSICA Perez NP on 10/23/2020 at 8:06 AM

## 2020-10-22 LAB
ESTIMATED AVERAGE GLUCOSE: 168.6 MG/DL
HBA1C MFR BLD: 7.5 %

## 2020-10-26 ENCOUNTER — TELEPHONE (OUTPATIENT)
Dept: FAMILY MEDICINE CLINIC | Age: 55
End: 2020-10-26

## 2020-10-28 ENCOUNTER — TELEPHONE (OUTPATIENT)
Dept: FAMILY MEDICINE CLINIC | Age: 55
End: 2020-10-28

## 2020-10-29 ENCOUNTER — VIRTUAL VISIT (OUTPATIENT)
Dept: FAMILY MEDICINE CLINIC | Age: 55
End: 2020-10-29
Payer: COMMERCIAL

## 2020-10-29 PROCEDURE — 3017F COLORECTAL CA SCREEN DOC REV: CPT | Performed by: NURSE PRACTITIONER

## 2020-10-29 PROCEDURE — 99214 OFFICE O/P EST MOD 30 MIN: CPT | Performed by: NURSE PRACTITIONER

## 2020-10-29 PROCEDURE — G8427 DOCREV CUR MEDS BY ELIG CLIN: HCPCS | Performed by: NURSE PRACTITIONER

## 2020-10-29 PROCEDURE — 2022F DILAT RTA XM EVC RTNOPTHY: CPT | Performed by: NURSE PRACTITIONER

## 2020-10-29 PROCEDURE — 3051F HG A1C>EQUAL 7.0%<8.0%: CPT | Performed by: NURSE PRACTITIONER

## 2020-10-29 RX ORDER — HYDROXYZINE HYDROCHLORIDE 25 MG/1
TABLET, FILM COATED ORAL
Qty: 60 TABLET | Refills: 1 | Status: SHIPPED | OUTPATIENT
Start: 2020-10-29

## 2020-10-29 ASSESSMENT — ENCOUNTER SYMPTOMS
ROS SKIN COMMENTS: PSORIASIS
ABDOMINAL PAIN: 0
WHEEZING: 0
COUGH: 0
EYES NEGATIVE: 1
DIARRHEA: 0
BACK PAIN: 1
BLOOD IN STOOL: 0
SHORTNESS OF BREATH: 0
NAUSEA: 0
CONSTIPATION: 0
VOMITING: 0

## 2020-10-29 NOTE — TELEPHONE ENCOUNTER
Patient notified to call Dr. Mare Rowland office and file for now for release of records. Do the same for her physical therapy office. Physical therapy office is above Dr. Rosalino Blevins office.   Follow-up next week to see if patient's release has been completed

## 2020-10-29 NOTE — PROGRESS NOTES
vomiting. Endocrine: Negative. Genitourinary: Negative for difficulty urinating. Musculoskeletal: Positive for arthralgias, back pain and myalgias. Negative for gait problem. Skin:        psoriasis   Neurological: Positive for numbness (hands and feet). Negative for dizziness, weakness, light-headedness and headaches. Psychiatric/Behavioral: Positive for dysphoric mood. Negative for sleep disturbance and suicidal ideas. The patient is nervous/anxious. Managed with medications         Prior to Visit Medications    Medication Sig Taking? Authorizing Provider   hydrOXYzine (ATARAX) 25 MG tablet TAKE 1 TABLET EVERY 8 HOURS AS NEEDED FOR ANXIETY AVOID ALCOHOL/CAUSES DROWSINESS.   Do not take with Xanax Yes YESSICA Syed NP   atorvastatin (LIPITOR) 80 MG tablet TAKE 1 TABLET BY MOUTH DAILY Yes YESSICA Syed NP   alogliptin (NESINA) 6.25 MG TABS tablet Take 1 tablet by mouth daily Yes YESSICA Syed NP   furosemide (LASIX) 20 MG tablet Take 1 tablet by mouth daily as needed (feet swellling) Yes EYSSICA Syed NP   albuterol sulfate HFA (PROAIR HFA) 108 (90 Base) MCG/ACT inhaler Inhale 2 puffs into the lungs every 6 hours as needed for Wheezing Yes Duncan Merritt MD   tiotropium (SPIRIVA RESPIMAT) 2.5 MCG/ACT AERS inhaler Inhale 2 puffs into the lungs daily Yes Duncan Merritt MD   ammonium lactate (LAC-HYDRIN) 12 % lotion  Yes Historical Provider, MD   calcipotriene (DOVONEX) 0.005 % ointment  Yes Historical Provider, MD   clobetasol (TEMOVATE) 0.05 % ointment  Yes Historical Provider, MD   loratadine (CLARITIN) 10 MG tablet Take 1 tablet by mouth daily Yes YESSICA Morgan CNP   omeprazole (PRILOSEC) 40 MG delayed release capsule Take 1 capsule by mouth daily Yes YESSICA Morgan CNP   tiZANidine (ZANAFLEX) 4 MG tablet Take 4 mg by mouth every 8 hours as needed  Yes Historical Provider, MD   oxyCODONE-acetaminophen (PERCOCET) 7.5-325 MG per tablet Take 1 tablet by mouth every 8 hours as needed for Pain (Patient states takes every 6 hours). Yes Historical Provider, MD   Psyllium (METAMUCIL) 48.57 % POWD Take 1 Units by mouth daily Yes Miranda Suarez MD   potassium chloride (KLOR-CON M) 20 MEQ extended release tablet Take 1 tablet by mouth 2 times daily Yes Miranda Suarez MD   venlafaxine (EFFEXOR) 37.5 MG tablet Take 37.5 mg by mouth 2 times daily Yes Historical Provider, MD   glucose monitoring kit (FREESTYLE) monitoring kit 1 kit by Does not apply route daily Yes YESSICA Worthy CNP   blood glucose test strips (FREESTYLE LITE) strip 1 each by In Vitro route daily As needed. Yes YESSICA Worthy CNP   VALUE PLUS LANCETS THIN 26G MISC 1 EACH BY DOES NOT APPLY ROUTE DAILY Yes YESSICA Worthy CNP   fluticasone-vilanterol (BREO ELLIPTA) 100-25 MCG/INH AEPB inhaler Inhale 1 puff into the lungs daily Yes Robin Gonzalez MD   ALPRAZolam [employer]) 0.5 MG tablet Take 0.5 mg by mouth as needed for Sleep. . Yes Historical Provider, MD   fluticasone (FLONASE) 50 MCG/ACT nasal spray 1 spray by Each Nostril route daily  YESSICA Alonzo CNP       Social History     Tobacco Use    Smoking status: Former Smoker     Packs/day: 0.25     Years: 30.00     Pack years: 7.50     Types: Cigarettes, E-Cigarettes     Start date:      Last attempt to quit: 2016     Years since quittin.8    Smokeless tobacco: Never Used   Substance Use Topics    Alcohol use: Yes     Comment: \"Occ. Maybe Twice A Month\"    Drug use: No        PHYSICAL EXAMINATION:  Vital Signs: (As obtained by patient/caregiver or practitioner observation)    Blood pressure-  Heart rate-    Respiratory rate-    Temperature-  Pulse oximetry-     Physical Exam  Vitals signs reviewed. Constitutional:       General: She is not in acute distress. Appearance: Normal appearance. She is not ill-appearing, toxic-appearing or diaphoretic. HENT:      Head: Normocephalic and atraumatic. Nose: Nose normal.   Eyes:      Extraocular Movements: Extraocular movements intact. Pupils: Pupils are equal, round, and reactive to light. Neck:      Musculoskeletal: Normal range of motion. Pulmonary:      Effort: Pulmonary effort is normal.   Musculoskeletal: Normal range of motion. Skin:     Coloration: Skin is not pale. Neurological:      General: No focal deficit present. Mental Status: She is alert and oriented to person, place, and time. Mental status is at baseline. Psychiatric:         Mood and Affect: Mood normal.         Behavior: Behavior normal.         Thought Content: Thought content normal.         Judgment: Judgment normal.         ASSESSMENT/PLAN:  1. Type 2 diabetes mellitus without complication, without long-term current use of insulin (HCC)  Continue current regimen. Will not change medication yet. Recheck hemoglobin A1c in 2 months. Advised to stop drinking alcoholic beverages with sugar. Will work on diet and recheck. 2. Chronic right-sided low back pain with right-sided sciatica  Follow with pain management  - DME Order for Brian Liu as OP  - DME Order for Bath/Shower Seat as OP    3. Fibromyalgia  Follow-up with pain management  - DME Order for Brian Liu as OP  - DME Order for Bath/Shower Seat as OP    4. Gastroesophageal reflux disease with esophagitis without hemorrhage  Continue current regimen, controlled    5. COPD, severe ([address]  Follow with pulmonology    6. Chronic edema BLE  Follow with cardiology. 20 mg Lasix as needed. No current edema. DASH diet. Elevate extremities. Compression socks    7. Anxiety  Continue current medication regimen. Mental health is prescribing medications. Needs refill on hydroxyzine, advised not to take with Xanax. Patient states understanding  - hydrOXYzine (ATARAX) 25 MG tablet; TAKE 1 TABLET EVERY 8 HOURS AS NEEDED FOR ANXIETY AVOID ALCOHOL/CAUSES DROWSINESS. Do not take with Xanax  Dispense: 60 tablet; Refill: 1    8. Dyslipidemia  Continue statin. Low-cholesterol diet      Patient is still wanting disability paperwork filled out. Advised that she needs to give consent to release paperwork from pain management, Ortho, physical therapy so I can attach it to her disability paperwork. I advised her that I am unable to fully complete paperwork and will attach physical therapist notes. Discussed that I am not able to assess for functionable capacity because I see her in the office for 10 minutes at a time and I do not know what she can and cannot do other than what she tells me. The paperwork is for her . Present to the ER for any emergent or acute symptoms not managed at home or in office. Please note that this chart was generated using dragon dictation software. Although every effort was made to ensure the accuracy of this automated transcription, some errors in transcription may have occurred. Return in about 3 months (around 1/29/2021) for HTN DM cholesterol check. Luther Kaye is a 50 y.o. female being evaluated by a Virtual Visit (video visit) encounter to address concerns as mentioned above. A caregiver was present when appropriate. Due to this being a TeleHealth encounter ([facility] health emergency), evaluation of the following organ systems was limited: Vitals/Constitutional/EENT/Resp/CV/GI//MS/Neuro/Skin/Heme-Lymph-Imm. Pursuant to the emergency declaration under the [facility], [address] [place] [address] authority and the [employer] General Act, this Virtual Visit was conducted with patient's (and/or legal guardian's) consent, to reduce the patient's risk of exposure to COVID-19 and provide necessary medical care. The patient (and/or legal guardian) has also been advised to contact this office for worsening conditions or problems, and seek emergency medical treatment and/or call 911 if deemed necessary. Patient identification was verified at the start of the visit: Yes    Total time spent on this encounter: 20 minutes    Services were provided through a video synchronous discussion virtually to substitute for in-person clinic visit. Patient and provider were located at their individual homes. --YESSICA Orozco NP on 10/29/2020 at 5:08 PM    An electronic signature was used to authenticate this note.

## 2020-11-11 ENCOUNTER — TELEPHONE (OUTPATIENT)
Dept: FAMILY MEDICINE CLINIC | Age: 55
End: 2020-11-11

## 2020-11-12 RX ORDER — BLOOD-GLUCOSE METER
1 KIT MISCELLANEOUS DAILY
Qty: 100 STRIP | Refills: 11 | Status: SHIPPED | OUTPATIENT
Start: 2020-11-12 | End: 2021-04-29 | Stop reason: ALTCHOICE

## 2020-11-12 RX ORDER — LANCETS 28 GAUGE
1 EACH MISCELLANEOUS DAILY
Qty: 100 EACH | Refills: 5 | Status: SHIPPED | OUTPATIENT
Start: 2020-11-12 | End: 2021-04-29 | Stop reason: ALTCHOICE

## 2020-11-17 RX ORDER — FUROSEMIDE 20 MG/1
TABLET ORAL
Qty: 30 TABLET | Refills: 1 | Status: SHIPPED | OUTPATIENT
Start: 2020-11-17 | End: 2021-01-11

## 2020-11-24 ENCOUNTER — TELEPHONE (OUTPATIENT)
Dept: FAMILY MEDICINE CLINIC | Age: 55
End: 2020-11-24

## 2020-11-24 NOTE — TELEPHONE ENCOUNTER
Patient called and stated that the orders that were to be mailed 2 weeks ago for a shower chair and the cane has never been received at her home address. Irequested patient to contact home medical equipment on [employer] to see if they have those items. Patient called back and requested the orders be faxed to [address] Fax [phone] Phone [phone]. Received confirmation.

## 2020-11-25 ENCOUNTER — TELEPHONE (OUTPATIENT)
Dept: FAMILY MEDICINE CLINIC | Age: 55
End: 2020-11-25

## 2020-11-25 ENCOUNTER — OFFICE VISIT (OUTPATIENT)
Dept: PHYSICAL MEDICINE AND REHAB | Age: 55
End: 2020-11-25
Payer: COMMERCIAL

## 2020-11-25 VITALS — TEMPERATURE: 96.7 F

## 2020-11-25 PROCEDURE — 95886 MUSC TEST DONE W/N TEST COMP: CPT | Performed by: PHYSICAL MEDICINE & REHABILITATION

## 2020-11-25 PROCEDURE — 95911 NRV CNDJ TEST 9-10 STUDIES: CPT | Performed by: PHYSICAL MEDICINE & REHABILITATION

## 2020-11-25 NOTE — PROGRESS NOTES
EMG REPORT     CHIEF COMPLAINT: Bilateral neck, arm and palm pain with hand and finger tingling. HISTORY OF PRESENT ILLNESS: 50 y.o. right hand dominant female with nearly 15-year history of diabetes. She is previously had bilateral ulnar nerve transposition surgeries performed. She is also had bilateral carpal tunnel release procedures in the past.  Now she presents with escalating pain in her neck, arms and hands with tingling and aching in the palms bilaterally, slightly worse on the left. She feels clumsy with holding things in her hands but does not routinely drop things. Her sleep is negatively impacted. She rated her pain severity as 10/10. She did not describe any rashes, limb discoloration or swelling. She has chronic numbness and tingling of both feet as well. PHYSICAL EXAMINATION: Alert. Guarded cervical spine rotation through about 40 degrees bilaterally. Spurling's maneuver was uncomfortable but did not reproduce limb symptoms. Upper limb reflexes were absent but tone was normal.  Tinel's sign was negative. There was no focal weakness with upper limb MMT. There was no atrophy, tremor or clonus noted. Perception of touch was distorted in the fingers of both hands. NERVE CONDUCTION STUDIES:     MOTOR         LATENCY NORMAL AMPLITUDE DISTANCE COND. GISELLA. RIGHT  MEDIAN 3.7 < 4.2 msec 7.1 8 cm >50   LEFT  MEDIAN 3.6 < 4.2 msec 5.6 8 cm 58   RIGHT  ULNAR 2.9 < 4.2 msec 8.1/8.6 8 cm 66/43   LEFT  ULNAR 3.4 < 4.2 msec 8.1/8.7 8 cm 62/57      SENSORY  ORTHODROMIC        LATENCY NORMAL AMPLITUDE DISTANCE   RIGHT MEDIAN 3.0 <2.3 msec 19 10 cm   LEFT  MEDIAN 2.7 < 2.3 msec 28 10 cm   RIGHT  ULNAR 2.3 < 2.3 msec 8 10 cm   LEFT  ULNAR Absent < 2.3 msec  10 cm       Left dorsal ulnar sensory: Absent.       NEEDLE EMG:      RIGHT   LEFT     Insertional Activity Spontaneous  Activity Volutional  MUAP's Insertional Activity Spontaneous  Activity Volutional  MUAP's   Cerv Parasp Normal None localized nerve injury at the elbows and wrist or part of an indolent generalized neuropathy. 4.  No evidence of an active cervical spinal nerve root injury (radiculopathy), plexopathy or primary muscle disease.      Thank you for this interesting referral.

## 2020-11-25 NOTE — TELEPHONE ENCOUNTER
Pt called and stated that she had her EMG done today and wanted to make sure pcp knew to take a look at the results

## 2020-11-30 ENCOUNTER — TELEPHONE (OUTPATIENT)
Dept: FAMILY MEDICINE CLINIC | Age: 55
End: 2020-11-30

## 2020-12-01 NOTE — LETTER
[address] [address] [address] [facility],[address] [place] [address]  Phone: [phone]  Fax: [phone]    YESSICA Melendez NP        December 1, 2020     Patient: Ruma Hassan   YOB: 1965   Date of Visit: 10/21/2020       To Whom It May Concern:    Patient reports capabilities and symptoms of her conditions that pertain to disability to provider. This provider is unable to assess disability in office. Patient does see multiple specialists that could be used for this evaluation. Follows with pain management, physical therapy. Had a recent EMG study done with Dr. Lorin Wei as well. Patient will need to request note from these providers. Notes from this office will be provided to patient. Consider seeing physical therapy for functional capacity evaluation. If you have any questions or concerns, please don't hesitate to call.     Sincerely,          YESSICA Melendez NP

## 2020-12-02 NOTE — TELEPHONE ENCOUNTER
Patient called office again to check on forms. Patient is upset because \"it has been almost a month since forms was dropped off and she has a hearing on Monday\" \"If the forms isnt returned soon they will not be able to review them before her disability hearing\" Advised patient forms would be faxed as soon as pcp finish's forms.

## 2020-12-03 NOTE — TELEPHONE ENCOUNTER
Mildly abnormal EMG. There are persistent median neuropathies at each wrist with very mild electrical irregularities noted. This could represent chronic carpal tunnel syndrome, but now could be a part of an underlying sensorimotor polyneuropathy as well.      Bilateral ulnar neuropathies       There is an underlying generalized neuropathy primarily affecting sensory function (sensory diabetic neuropathy). The motor unit changes described above could be a product of past localized nerve injury at the elbows and wrist or part of  generalized neuropathy.     No evidence of an active cervical spinal nerve root injury (radiculopathy), plexopathy or primary muscle disease.     Would she like a referral to a specialist to address the neuropathy in her upper extremities or is she already following with someone?

## 2020-12-03 NOTE — TELEPHONE ENCOUNTER
Spoke with patient who voiced understanding of PCP notes. Report faxed to dr. Irma Kelly.  Pt will call for an appt about the issue

## 2020-12-21 ENCOUNTER — TELEPHONE (OUTPATIENT)
Dept: FAMILY MEDICINE CLINIC | Age: 55
End: 2020-12-21

## 2020-12-21 NOTE — TELEPHONE ENCOUNTER
Patient called and stated that she discuss with pcp needing a referral to a Neurologist. She would like to be referred to [address] [place] [address].

## 2020-12-31 ENCOUNTER — TELEPHONE (OUTPATIENT)
Dept: CARDIOLOGY CLINIC | Age: 55
End: 2020-12-31

## 2020-12-31 NOTE — TELEPHONE ENCOUNTER
Patient called her blood pressure has been very  Abnormal ranging from 164/115 to as low as  81/50 for the week or so , She is in a lot of pain   As well

## 2020-12-31 NOTE — TELEPHONE ENCOUNTER
I talked to the patient, The patient stated that her BP is going up and down. She is in pain. But in the should and arm. She stated she does need her shoulder replace and found out her carpule tunnel is back. Her BP is :1)  150/86 P 70  2)126/83 P63.

## 2020-12-31 NOTE — TELEPHONE ENCOUNTER
I talked to Lizzeth Jacobs and she said that the pain may cause the BP to go up. To see PCP for the pain.  Also to bring a bp log to her appointment     Left message

## 2021-01-11 ENCOUNTER — OFFICE VISIT (OUTPATIENT)
Dept: CARDIOLOGY CLINIC | Age: 56
End: 2021-01-11
Payer: COMMERCIAL

## 2021-01-11 VITALS
HEIGHT: 67 IN | HEART RATE: 76 BPM | TEMPERATURE: 96.7 F | DIASTOLIC BLOOD PRESSURE: 104 MMHG | BODY MASS INDEX: 28.79 KG/M2 | WEIGHT: 183.4 LBS | SYSTOLIC BLOOD PRESSURE: 152 MMHG

## 2021-01-11 DIAGNOSIS — E78.5 DYSLIPIDEMIA: ICD-10-CM

## 2021-01-11 DIAGNOSIS — I10 ESSENTIAL HYPERTENSION: Primary | Chronic | ICD-10-CM

## 2021-01-11 PROCEDURE — G8484 FLU IMMUNIZE NO ADMIN: HCPCS | Performed by: NURSE PRACTITIONER

## 2021-01-11 PROCEDURE — 1036F TOBACCO NON-USER: CPT | Performed by: NURSE PRACTITIONER

## 2021-01-11 PROCEDURE — 99214 OFFICE O/P EST MOD 30 MIN: CPT | Performed by: NURSE PRACTITIONER

## 2021-01-11 PROCEDURE — G8417 CALC BMI ABV UP PARAM F/U: HCPCS | Performed by: NURSE PRACTITIONER

## 2021-01-11 PROCEDURE — 3017F COLORECTAL CA SCREEN DOC REV: CPT | Performed by: NURSE PRACTITIONER

## 2021-01-11 PROCEDURE — G8427 DOCREV CUR MEDS BY ELIG CLIN: HCPCS | Performed by: NURSE PRACTITIONER

## 2021-01-11 RX ORDER — LISINOPRIL AND HYDROCHLOROTHIAZIDE 12.5; 1 MG/1; MG/1
1 TABLET ORAL DAILY
Qty: 18 TABLET | Refills: 0 | Status: SHIPPED | OUTPATIENT
Start: 2021-01-11 | End: 2021-01-25 | Stop reason: ALTCHOICE

## 2021-01-11 NOTE — PROGRESS NOTES
Kena Alfonso  [facility] [address], [address] [facility],Third Floor  Phone: [phone]    Fax [phone]                  Mary Moreira MD, Dolly Khan MD, [address] [facility], MD, MD Tu Pagan MD Larwence Ramming, MD Lorne Dauphin, MD Roxanne Epperson, APRN      [facility], APRN  [facility], APRN     [facility], APRN    CARDIOLOGY  NOTE      1/11/2021    RE: Harriet Mejia  (1965)                               TO:  Dr. Mike Coffman, APRN - NP  The primary cardiologist is Dr. Jessika Avendaño    CC:   1. Dyslipidemia    2. Essential hypertension      Patient denies all of the following:  Chest Pain  Palpitations  Shortness of Breath  Edema  Dizziness  Syncope      HPI: Thank you for involving me in taking care of your patient Harriet Mejia, who is a  50y.o. year old female with a history as listed above. Patient is  active and does not exercise regularly. Patient is  compliant with her medications. Patient blood pressure has been running high     Patient has been evaluated by Dr. Jessika Avendaño in the past due to abnormal EKG. Event monitor did not show high grade AV block or arrhythmia. Stress test and echo were normal.    Vitals:    01/11/21 1009   BP: (!) 152/104   Pulse: 76   Temp: 96.7 °F (35.9 °C)       Current Outpatient Medications   Medication Sig Dispense Refill    lisinopril-hydroCHLOROthiazide (PRINZIDE;ZESTORETIC) 10-12.5 MG per tablet Take 1 tablet by mouth daily 18 tablet 0    Value Plus Lancets Thin 26G MISC 1 each by Does not apply route daily 100 each 5    blood glucose test strips (FREESTYLE LITE) strip 1 each by In Vitro route daily As needed. 100 strip 11    hydrOXYzine (ATARAX) 25 MG tablet TAKE 1 TABLET EVERY 8 HOURS AS NEEDED FOR ANXIETY AVOID ALCOHOL/CAUSES DROWSINESS.   Do not take with Xanax 60 tablet 1    atorvastatin (LIPITOR) 80 MG tablet TAKE 1 TABLET BY MOUTH DAILY 90 tablet 0  alogliptin (NESINA) 6.25 MG TABS tablet Take 1 tablet by mouth daily 90 tablet 1    albuterol sulfate HFA (PROAIR HFA) 108 (90 Base) MCG/ACT inhaler Inhale 2 puffs into the lungs every 6 hours as needed for Wheezing 1 Inhaler 5    tiotropium (SPIRIVA RESPIMAT) 2.5 MCG/ACT AERS inhaler Inhale 2 puffs into the lungs daily 1 Inhaler 5    ammonium lactate (LAC-HYDRIN) 12 % lotion       calcipotriene (DOVONEX) 0.005 % ointment       clobetasol (TEMOVATE) 0.05 % ointment       omeprazole (PRILOSEC) 40 MG delayed release capsule Take 1 capsule by mouth daily 90 capsule 2    tiZANidine (ZANAFLEX) 4 MG tablet Take 4 mg by mouth every 8 hours as needed       oxyCODONE-acetaminophen (PERCOCET) 7.5-325 MG per tablet Take 1 tablet by mouth every 8 hours as needed for Pain (Patient states takes every 6 hours).  Psyllium (METAMUCIL) 48.57 % POWD Take 1 Units by mouth daily 1 Bottle 5    potassium chloride (KLOR-CON M) 20 MEQ extended release tablet Take 1 tablet by mouth 2 times daily 180 tablet 1    venlafaxine (EFFEXOR) 37.5 MG tablet Take 37.5 mg by mouth 2 times daily      glucose monitoring kit (FREESTYLE) monitoring kit 1 kit by Does not apply route daily 1 kit 0    fluticasone-vilanterol (BREO ELLIPTA) 100-25 MCG/INH AEPB inhaler Inhale 1 puff into the lungs daily 1 each 5    ALPRAZolam (XANAX) 0.5 MG tablet Take 0.5 mg by mouth as needed for Sleep. .       No current facility-administered medications for this visit. Allergies: Patient has no known allergies.   Past Medical History:   Diagnosis Date    Anxiety     Arthritis     \"Back, Hands, Shoulders\"    Chronic back pain     Arthritis - NKI    COPD (chronic obstructive pulmonary disease) ([facility])     Sees Dr. Ernie Luz with Mental Health    [facility] 2008    Type II - follows with PCP    Emphysema     Fibromyalgia     H/O abdominoplasty 12/21/2018    H/O cardiovascular stress test 03/04/2019 ECG portion of stress test is neg for ischemia by diagnostic criteria. No infarct or ischemia noted. Normal stress myocardial perfusion. This is a normal study    H/O echocardiogram 03/04/2019    Left ventricular function is normal. Ejection fraction is visually estimated at 55-60%. No significant valvular abnormalitites.      Hyperlipidemia     Hypertension     Follows with PCP    Migraines Last Migraine 10-18    Mobitz (type) I (Wenckebach's) atrioventricular block 01/29/2020    Neuropathy     From feet to knees    Piriformis syndrome 6/24/2016    Psoriasis     Rotator cuff impingement syndrome, right 2/26/2018    RTC repair Dr Tania Jauregui 2015  Treated by Dr Randal Guerra 2/18/17  Arthrocentesis 12/19/17    Shortness of breath on exertion     Syncope and collapse 03/04/2019    Teeth missing     Upper And Lower    Wears glasses      Past Surgical History:   Procedure Laterality Date    ABDOMEN SURGERY N/A 1/14/2019    SECONDARY CLOSURE OF DEHISCED ABDOMINAL WOUND performed by Kemi Cho MD at [place] [address] N/A 1/24/2019    IRRIGATION OF LOWER ABDOMINAL WOUND performed by Kemi Cho MD at [facility] 73 ABDOMINOPLASTY N/A 7/29/2019    ABDOMINOPLASTY REVISION performed by Kemi Cho MD at [facility] 75 Right 1/29/2020    EXCISION OF RIGHT BREAST SABACEOUS CYST performed by Lenora Mueller MD at [facility] 2017    Dr. Randal Guerra (Right)   [address]    Tubal Ligation Also Done In 1997    COLONOSCOPY  03/2017    Polyp Removed - Dr. Joselyn Orellana COLONOSCOPY N/A 7/27/2020    COLONOSCOPY POLYPECTOMY SNARE/COLD BIOPSY performed by Kemi Cho MD at [address] [place] [address]      Teeth Extracted In Past    ELBOW SURGERY Bilateral Last Done In 2013    Right Done Twice, Left Done Once left cubital tunnel release 2009; right 2014    ENDOSCOPY, COLON, DIAGNOSTIC  03/2017  HERNIA REPAIR  10/26/2017    hiatal hernia with gastrectomy    HYSTERECTOMY VAGINAL  2000    LAPAROSCOPY  2000    RI EXCISE EXCESS SKIN TISSUE,ABDOMEN N/A 2018    ABDOMINOPLASTY performed by Nir Logan MD at [address] Right     Dr. Kenan Cortez 2017    Dr Coretta Taylor  10/26/2017    Robotic Laparoscopic, Pre Op Weight 237  Or 238 lbs.  TUBAL LIGATION      Done With      Family History   Problem Relation Age of Onset    Heart Attack Father     Heart Disease Father     Early Death Father 46        Heart Attack    Alcohol Abuse Father     Substance Abuse Father         Alcoholic    Arthritis Mother     Heart Disease Mother     Diabetes Mother     Cancer Mother         \"Kidney Cancer\"    High Blood Pressure Mother     High Blood Pressure Sister     Diabetes Brother     Early Death Daughter 21        \"Killed In A Car Accident\"     Social History     Tobacco Use    Smoking status: Former Smoker     Packs/day: 0.25     Years: 30.00     Pack years: 7.50     Types: Cigarettes, E-Cigarettes     Start date:      Quit date:      Years since quittin.0    Smokeless tobacco: Never Used   Substance Use Topics    Alcohol use: Yes     Comment: \"Occ. Maybe Twice A Month\"        Review of Systems   Constitutional: Negative for fatigue and fever. HENT: Negative for ear pain and sinus pain. Eyes: Negative for photophobia and visual disturbance. Respiratory: Negative for cough and shortness of breath. Cardiovascular: Negative for chest pain, palpitations and leg swelling. Gastrointestinal: Negative for abdominal pain, blood in stool, constipation, diarrhea, nausea and vomiting. Endocrine: Negative for polyphagia and polyuria. Genitourinary: Negative for decreased urine volume and difficulty urinating. Musculoskeletal: Positive for arthralgias and back pain. Negative for gait problem. Skin: Negative for color change and wound. Neurological: Negative for dizziness, syncope, weakness, light-headedness and headaches. Psychiatric/Behavioral: Negative for agitation. The patient is not hyperactive. Objective:      Physical Exam:  BP (!) 152/104   Pulse 76   Temp 96.7 °F (35.9 °C) (Temporal)   Ht 5' 7\" (1.702 m)   Wt 183 lb 6.4 oz (83.2 kg)   BMI 28.72 kg/m²   Wt Readings from Last 3 Encounters:   01/11/21 183 lb 6.4 oz (83.2 kg)   10/21/20 169 lb 9.6 oz (76.9 kg)   10/09/20 173 lb (78.5 kg)     Body mass index is 28.72 kg/m². Physical Exam  Vitals signs reviewed. Constitutional:       General: She is not in acute distress. Appearance: Normal appearance. She is not ill-appearing. HENT:      Head: Normocephalic. Eyes:      Conjunctiva/sclera: Conjunctivae normal.      Pupils: Pupils are equal, round, and reactive to light. Neck:      Musculoskeletal: Neck supple. No muscular tenderness. Vascular: No carotid bruit. Cardiovascular:      Rate and Rhythm: Normal rate and regular rhythm. Pulses: Normal pulses. Heart sounds: Normal heart sounds. No murmur. Pulmonary:      Effort: Pulmonary effort is normal. No respiratory distress. Breath sounds: Normal breath sounds. Musculoskeletal:         General: No swelling or deformity. Skin:     General: Skin is warm and dry. Capillary Refill: Capillary refill takes less than 2 seconds. Neurological:      Mental Status: She is alert and oriented to person, place, and time. Psychiatric:         Mood and Affect: Mood normal.         Behavior: Behavior normal.         Thought Content:  Thought content normal.         Judgment: Judgment normal.         DATA:  Lab Results   Component Value Date    TROPONINI <0.006 12/16/2012     BNP:  No results found for: BNP  PT/INR:  No results found for: sMedio  Lab Results   Component Value Date    LABA1C 7.5 10/21/2020    LABA1C 6.7 07/01/2020     Lab Results Component Value Date    CHOL 216 (H) 10/21/2020    TRIG 102 10/21/2020    HDL 77 (H) 10/21/2020    LDLCALC 119 (H) 10/21/2020    LDLDIRECT 214 (H) 03/17/2018     Lab Results   Component Value Date    ALT 32 10/21/2020    AST 35 10/21/2020     TSH:  No results found for: TSH      Assessment/ Plan:     Essential hypertension  Patient has had a higher blood pressure at home. She has been going to pain management and they do not believe the pain is causing any affect on her blood pressure. Will start lisinopril/HTCZ 10/12.5 mg 1/2 tab daily. Due to patient frequent use of muscle relaxors and narcotics she is at risk for low blood pressures. Patient advised to not take Lisinopril/ HTCZ if her SBP < 120. Encouraged patient to element sodium from her diet. Plan for BP check in 2 weeks. She sees PCP in 2 weeks and will have labs drawn at that time. Dyslipidemia  ? Lipid panel reviewed  ? Patient LDL is at goal, HDL is  at goal  ? Patient is on a Statin  ? Discussed with the patient the need for exercise, low cholesterol diet, and compliance with medications. Results for Popeye Craig (MRN [id number]) as of 1/12/2021 04:18   Ref. Range 10/21/2020 09:03   Cholesterol, Total Latest Ref Range: 0 - 199 mg/dL 216 (H)   HDL Cholesterol Latest Ref Range: 40 - 60 mg/dL 77 (H)   LDL Calculated Latest Ref Range: <100 mg/dL 119 (H)   Triglycerides Latest Ref Range: 0 - 150 mg/dL 102   VLDL Cholesterol Calculated Latest Ref Range: Not Established mg/dL 20        Patient seen, interviewed and examined. Testing was reviewed. Patient is encouraged to exercise even a brisk walk for 30 minutes at least 3 to 4 times a week. Lifestyle and risk factor modificatons discussed. Various goals are discussed and questions answered. Continue current medications. Appropriate prescriptions are addressed. Questions answered and patient verbalizes understanding. Call for any problems, questions, or concerns. Pt is to follow up in 2 weeks for Cardiac management       Electronically signed by YESSICA Alonso CNP on 1/11/2021 at 10:53 AM

## 2021-01-12 ASSESSMENT — ENCOUNTER SYMPTOMS
NAUSEA: 0
SINUS PAIN: 0
ABDOMINAL PAIN: 0
DIARRHEA: 0
COLOR CHANGE: 0
SHORTNESS OF BREATH: 0
PHOTOPHOBIA: 0
CONSTIPATION: 0
COUGH: 0
BACK PAIN: 1
BLOOD IN STOOL: 0
VOMITING: 0

## 2021-01-12 NOTE — ASSESSMENT & PLAN NOTE
Patient has had a higher blood pressure at home. She has been going to pain management and they do not believe the pain is causing any affect on her blood pressure. Will start lisinopril/HTCZ 10/12.5 mg 1/2 tab daily. Due to patient frequent use of muscle relaxors and narcotics she is at risk for low blood pressures. Patient advised to not take Lisinopril/ HTCZ if her SBP < 120. Encouraged patient to element sodium from her diet. Plan for BP check in 2 weeks. She sees PCP in 2 weeks and will have labs drawn at that time.

## 2021-01-12 NOTE — ASSESSMENT & PLAN NOTE
? Lipid panel reviewed  ? Patient LDL is at goal, HDL is  at goal  ? Patient is on a Statin  ? Discussed with the patient the need for exercise, low cholesterol diet, and compliance with medications. Results for Clarence Madrid (MRN [id number]) as of 1/12/2021 04:18   Ref.  Range 10/21/2020 09:03   Cholesterol, Total Latest Ref Range: 0 - 199 mg/dL 216 (H)   HDL Cholesterol Latest Ref Range: 40 - 60 mg/dL 77 (H)   LDL Calculated Latest Ref Range: <100 mg/dL 119 (H)   Triglycerides Latest Ref Range: 0 - 150 mg/dL 102   VLDL Cholesterol Calculated Latest Ref Range: Not Established mg/dL 20

## 2021-01-19 ENCOUNTER — TELEPHONE (OUTPATIENT)
Dept: FAMILY MEDICINE CLINIC | Age: 56
End: 2021-01-19

## 2021-01-19 NOTE — TELEPHONE ENCOUNTER
Accessed this chart while in referral workque to verify previous referral status.  OutCome:    Patient will need called to verify status

## 2021-01-25 ENCOUNTER — TELEMEDICINE (OUTPATIENT)
Dept: CARDIOLOGY CLINIC | Age: 56
End: 2021-01-25
Payer: COMMERCIAL

## 2021-01-25 ENCOUNTER — TELEPHONE (OUTPATIENT)
Dept: CARDIOLOGY CLINIC | Age: 56
End: 2021-01-25

## 2021-01-25 DIAGNOSIS — I10 ESSENTIAL HYPERTENSION: Primary | ICD-10-CM

## 2021-01-25 PROCEDURE — 99211 OFF/OP EST MAY X REQ PHY/QHP: CPT | Performed by: NURSE PRACTITIONER

## 2021-01-25 PROCEDURE — G8427 DOCREV CUR MEDS BY ELIG CLIN: HCPCS | Performed by: NURSE PRACTITIONER

## 2021-01-25 RX ORDER — LISINOPRIL AND HYDROCHLOROTHIAZIDE 12.5; 1 MG/1; MG/1
1 TABLET ORAL DAILY
Qty: 30 TABLET | Refills: 5 | Status: CANCELLED | OUTPATIENT
Start: 2021-01-25

## 2021-01-25 RX ORDER — CETIRIZINE HYDROCHLORIDE 10 MG/1
TABLET ORAL PRN
COMMUNITY
Start: 2020-11-16 | End: 2021-05-06 | Stop reason: SDUPTHER

## 2021-01-25 RX ORDER — LISINOPRIL AND HYDROCHLOROTHIAZIDE 25; 20 MG/1; MG/1
1 TABLET ORAL DAILY
Qty: 90 TABLET | Refills: 1 | Status: SHIPPED | OUTPATIENT
Start: 2021-01-25 | End: 2021-02-08 | Stop reason: ALTCHOICE

## 2021-01-25 NOTE — PATIENT INSTRUCTIONS
Please be informed that if you contact our office outside of normal business hours the physician on call cannot help with any scheduling or rescheduling issues, procedure instruction questions or any type of medication issue. We advise you for any urgent/emergency that you go to the nearest emergency room!     PLEASE CALL OUR OFFICE DURING NORMAL BUSINESS HOURS    Monday - Friday   8 am to 5 pm    [place]: [place]: [phone]    [place]:  [phone]

## 2021-01-25 NOTE — PROGRESS NOTES
Lavone [facility] [address] [address] [facility],Third Floor  Phone: [phone]    Fax [phone]                  Bess Escudero MD, Floyd Krishna MD, Piper Warren MD, MD Deena Duenas MD Maynard Clapper, MD Charon Every, YESSICA Stanley APRN Marjorie Dillon, APRN    BP Check Visit    Pt is here for a 2 week BP check. At the last office visit patient was found to have a blood pressure of 152/104. At that time the patient was instructed to decrease sodium intake and start lisinopril /HCTZ 10/12.5 1/2 tab daily. Patient reports taking 1 tab daily. Patient reports today /85  HR 77    Last evening /87     She states that she did have 1 blood pressure that was 78/47 at 1237 AM last week but denies any dizziness, lightheaded or feeling poorly. Plan for pt is to increase to lisinopril 20mg/ HCTZ 25mg daily. Get labs drawn to check renal function and cholesterol as she was not able to get labs drawn at PCP.      Follow up in 2 weeks for BP check     Pt is to report any dizziness or syncope to the office    Electronically signed by YESSICA Cartwright CNP on 1/25/2021 at 11:14 AM

## 2021-01-28 ENCOUNTER — OFFICE VISIT (OUTPATIENT)
Dept: SURGERY | Age: 56
End: 2021-01-28
Payer: COMMERCIAL

## 2021-01-28 VITALS
DIASTOLIC BLOOD PRESSURE: 98 MMHG | TEMPERATURE: 97 F | HEART RATE: 95 BPM | WEIGHT: 176.6 LBS | SYSTOLIC BLOOD PRESSURE: 149 MMHG | BODY MASS INDEX: 27.66 KG/M2

## 2021-01-28 DIAGNOSIS — E11.9 TYPE 2 DIABETES MELLITUS WITHOUT COMPLICATION, WITH LONG-TERM CURRENT USE OF INSULIN (HCC): ICD-10-CM

## 2021-01-28 DIAGNOSIS — L72.3 SEBACEOUS CYST: Primary | ICD-10-CM

## 2021-01-28 DIAGNOSIS — Z79.4 TYPE 2 DIABETES MELLITUS WITHOUT COMPLICATION, WITH LONG-TERM CURRENT USE OF INSULIN (HCC): ICD-10-CM

## 2021-01-28 PROCEDURE — G8427 DOCREV CUR MEDS BY ELIG CLIN: HCPCS | Performed by: SURGERY

## 2021-01-28 PROCEDURE — G8417 CALC BMI ABV UP PARAM F/U: HCPCS | Performed by: SURGERY

## 2021-01-28 PROCEDURE — 99212 OFFICE O/P EST SF 10 MIN: CPT | Performed by: SURGERY

## 2021-01-28 PROCEDURE — 3017F COLORECTAL CA SCREEN DOC REV: CPT | Performed by: SURGERY

## 2021-01-28 PROCEDURE — 1036F TOBACCO NON-USER: CPT | Performed by: SURGERY

## 2021-01-28 PROCEDURE — G8484 FLU IMMUNIZE NO ADMIN: HCPCS | Performed by: SURGERY

## 2021-01-28 NOTE — PROGRESS NOTES
Subjective:       Johanny Pelletier is a 50 y.o. female who presents for evaluation of a subcutaneous nodule located over the axilla on the right side. This has been present for 20  years. There has been discharge and redness. Patient does have a history of epidermal inclusion cysts. Past Medical History:   Diagnosis Date    Anxiety     Arthritis     \"Back, Hands, Shoulders\"    Chronic back pain     Arthritis - NKI    COPD (chronic obstructive pulmonary disease) ([facility])     Sees Dr. Ramón Pelaez with Mental Health    Diabetes mellitus ([facility] Dx 2008    Type II - follows with PCP    Emphysema     Fibromyalgia     H/O abdominoplasty 12/21/2018    H/O cardiovascular stress test 03/04/2019    ECG portion of stress test is neg for ischemia by diagnostic criteria. No infarct or ischemia noted. Normal stress myocardial perfusion. This is a normal study    H/O echocardiogram 03/04/2019    Left ventricular function is normal. Ejection fraction is visually estimated at 55-60%. No significant valvular abnormalitites.      Hyperlipidemia     Hypertension     Follows with PCP    Migraines Last Migraine 10-18    Mobitz (type) I (Wenckebach's) atrioventricular block 01/29/2020    Neuropathy     From feet to knees    Piriformis syndrome 6/24/2016    Psoriasis     Rotator cuff impingement syndrome, right 2/26/2018    RTC repair Dr Aime Collins 2015  Treated by Dr Julian Lopes 2/18/17  Arthrocentesis 12/19/17    Shortness of breath on exertion     Syncope and collapse 03/04/2019    Teeth missing     Upper And Lower    Wears glasses      Patient Active Problem List    Diagnosis Date Noted    DM (diabetes mellitus) ([facility] 05/28/2013     Priority: Medium    Essential hypertension 05/28/2013     Priority: Medium    Emphysema lung ([facility] 05/28/2013     Priority: Medium    Chronic edema BLE 09/03/2020    Adenomatous polyp of ascending colon 08/07/2020    Polyp of cecum     Hematochezia 07/01/2020  Abdominal gas pain 07/01/2020    Right lower quadrant abdominal pain 07/01/2020    SOB (shortness of breath) 06/02/2020    Sebaceous cyst of breast, right 01/29/2020    Mobitz type 1 second degree AV block     Fibromyalgia 05/03/2019    S/P abdominoplasty 02/01/2019    Abdominal pannus 11/29/2018    Status post laparoscopic sleeve gastrectomy 11/02/2017    Hiatal hernia     Nicotine addiction 01/03/2017    Dyslipidemia 06/23/2016    Gastroesophageal reflux disease with esophagitis 06/23/2016    Snores 06/23/2016    Anxiety 06/23/2016    Arthralgia of right hip 06/23/2016    Chronic right-sided low back pain with sciatica 06/23/2016    COPD, severe ([address] 02/12/2014    History of MRSA infection 06/23/2013     Past Surgical History:   Procedure Laterality Date    ABDOMEN SURGERY N/A 1/14/2019    SECONDARY CLOSURE OF DEHISCED ABDOMINAL WOUND performed by Jordyn Sifuentes MD at [facility] 71 N/A 1/24/2019    IRRIGATION OF LOWER ABDOMINAL WOUND performed by Jordyn Sifuentes MD at [facility] 73 ABDOMINOPLASTY N/A 7/29/2019    ABDOMINOPLASTY REVISION performed by Jordyn Sifuentes MD at [facility] 75 Right 1/29/2020    EXCISION OF RIGHT BREAST SABACEOUS CYST performed by Aura Lipscomb MD at [address] [place] [address] Right 2017    Dr. Clari English (Right)   [address]    Tubal Ligation Also Done In 1997    COLONOSCOPY  03/2017    Polyp Removed - Dr. Ginna Mendez COLONOSCOPY N/A 7/27/2020    COLONOSCOPY POLYPECTOMY SNARE/COLD BIOPSY performed by Jordyn Sifuentes MD at [address] [place] [address]      Teeth Extracted In Past    ELBOW SURGERY Bilateral Last Done In 2013    Right Done Twice, Left Done Once left cubital tunnel release 2009; right 2014    ENDOSCOPY, COLON, DIAGNOSTIC  03/2017    HERNIA REPAIR  10/26/2017    hiatal hernia with gastrectomy    HYSTERECTOMY VAGINAL  2000    LAPAROSCOPY  2000 Attends meetings of clubs or organizations: None     Relationship status: None    Intimate partner violence     Fear of current or ex partner: None     Emotionally abused: None     Physically abused: None     Forced sexual activity: None   Other Topics Concern    None   Social History Narrative    None     Current Outpatient Medications   Medication Sig Dispense Refill    cetirizine (ZYRTEC) 10 MG tablet as needed      lisinopril-hydroCHLOROthiazide (PRINZIDE;ZESTORETIC) 20-25 MG per tablet Take 1 tablet by mouth daily 90 tablet 1    Value Plus Lancets Thin 26G MISC 1 each by Does not apply route daily 100 each 5    blood glucose test strips (FREESTYLE LITE) strip 1 each by In Vitro route daily As needed. 100 strip 11    hydrOXYzine (ATARAX) 25 MG tablet TAKE 1 TABLET EVERY 8 HOURS AS NEEDED FOR ANXIETY AVOID ALCOHOL/CAUSES DROWSINESS. Do not take with Xanax 60 tablet 1    atorvastatin (LIPITOR) 80 MG tablet TAKE 1 TABLET BY MOUTH DAILY 90 tablet 0    albuterol sulfate HFA (PROAIR HFA) 108 (90 Base) MCG/ACT inhaler Inhale 2 puffs into the lungs every 6 hours as needed for Wheezing 1 Inhaler 5    tiotropium (SPIRIVA RESPIMAT) 2.5 MCG/ACT AERS inhaler Inhale 2 puffs into the lungs daily 1 Inhaler 5    ammonium lactate (LAC-HYDRIN) 12 % lotion       calcipotriene (DOVONEX) 0.005 % ointment       clobetasol (TEMOVATE) 0.05 % ointment       omeprazole (PRILOSEC) 40 MG delayed release capsule Take 1 capsule by mouth daily 90 capsule 2    tiZANidine (ZANAFLEX) 4 MG tablet Take 4 mg by mouth every 8 hours as needed       oxyCODONE-acetaminophen (PERCOCET) 7.5-325 MG per tablet Take 1 tablet by mouth every 8 hours as needed for Pain (Patient states takes every 6 hours).        Psyllium (METAMUCIL) 48.57 % POWD Take 1 Units by mouth daily 1 Bottle 5    potassium chloride (KLOR-CON M) 20 MEQ extended release tablet Take 1 tablet by mouth 2 times daily 180 tablet 1  venlafaxine (EFFEXOR) 37.5 MG tablet Take 37.5 mg by mouth 2 times daily      glucose monitoring kit (FREESTYLE) monitoring kit 1 kit by Does not apply route daily 1 kit 0    fluticasone-vilanterol (BREO ELLIPTA) 100-25 MCG/INH AEPB inhaler Inhale 1 puff into the lungs daily 1 each 5    ALPRAZolam (XANAX) 0.5 MG tablet Take 0.5 mg by mouth as needed for Sleep. Selene Alexander alogliptin (NESINA) 6.25 MG TABS tablet Take 1 tablet by mouth daily 90 tablet 1     No current facility-administered medications for this visit. Current Outpatient Medications on File Prior to Visit   Medication Sig Dispense Refill    cetirizine (ZYRTEC) 10 MG tablet as needed      lisinopril-hydroCHLOROthiazide (PRINZIDE;ZESTORETIC) 20-25 MG per tablet Take 1 tablet by mouth daily 90 tablet 1    Value Plus Lancets Thin 26G MISC 1 each by Does not apply route daily 100 each 5    blood glucose test strips (FREESTYLE LITE) strip 1 each by In Vitro route daily As needed. 100 strip 11    hydrOXYzine (ATARAX) 25 MG tablet TAKE 1 TABLET EVERY 8 HOURS AS NEEDED FOR ANXIETY AVOID ALCOHOL/CAUSES DROWSINESS.   Do not take with Xanax 60 tablet 1    atorvastatin (LIPITOR) 80 MG tablet TAKE 1 TABLET BY MOUTH DAILY 90 tablet 0    albuterol sulfate HFA (PROAIR HFA) 108 (90 Base) MCG/ACT inhaler Inhale 2 puffs into the lungs every 6 hours as needed for Wheezing 1 Inhaler 5    tiotropium (SPIRIVA RESPIMAT) 2.5 MCG/ACT AERS inhaler Inhale 2 puffs into the lungs daily 1 Inhaler 5    ammonium lactate (LAC-HYDRIN) 12 % lotion       calcipotriene (DOVONEX) 0.005 % ointment       clobetasol (TEMOVATE) 0.05 % ointment       omeprazole (PRILOSEC) 40 MG delayed release capsule Take 1 capsule by mouth daily 90 capsule 2    tiZANidine (ZANAFLEX) 4 MG tablet Take 4 mg by mouth every 8 hours as needed

## 2021-01-29 ENCOUNTER — TELEPHONE (OUTPATIENT)
Dept: SURGERY | Age: 56
End: 2021-01-29

## 2021-01-29 ENCOUNTER — HOSPITAL ENCOUNTER (OUTPATIENT)
Age: 56
Setting detail: SPECIMEN
Discharge: HOME | End: 2021-01-29
Payer: COMMERCIAL

## 2021-01-29 ENCOUNTER — TELEPHONE (OUTPATIENT)
Dept: FAMILY MEDICINE CLINIC | Age: 56
End: 2021-01-29

## 2021-01-29 ENCOUNTER — PROCEDURE VISIT (OUTPATIENT)
Dept: SURGERY | Age: 56
End: 2021-01-29
Payer: COMMERCIAL

## 2021-01-29 VITALS
TEMPERATURE: 96.7 F | RESPIRATION RATE: 16 BRPM | HEART RATE: 110 BPM | DIASTOLIC BLOOD PRESSURE: 82 MMHG | SYSTOLIC BLOOD PRESSURE: 133 MMHG

## 2021-01-29 DIAGNOSIS — L72.3 SEBACEOUS CYST: Primary | ICD-10-CM

## 2021-01-29 PROCEDURE — 11403 EXC TR-EXT B9+MARG 2.1-3CM: CPT | Performed by: SURGERY

## 2021-01-29 PROCEDURE — 88304 TISSUE EXAM BY PATHOLOGIST: CPT

## 2021-01-29 NOTE — PROGRESS NOTES
Minor Surgery Procedure Note    Dr. Flora Engle MD    Hannah Jones        [id number]       1/29/2021. Pre op DX:Enlarging sebaceous cyst right axilla    Post OP Dx:Same    Procedure:Excision 3 cm cyst right axilla    PROCEDURE DETAILS: Pt was brought to the room and the lesion was confirmed. It was prepped and anesthetized. It was excised in an ellipse into the subqu tissue. The defect was closed with running 4-0 prolene. Sterile dressing was applied. Anesthesia: 5 cc's of1% xylocaine plain    Postop the patient did well and can go home. They are to remove the dressings in 24 hours and shower. Bharath Steward.

## 2021-02-01 RX ORDER — BLOOD-GLUCOSE METER
1 KIT MISCELLANEOUS DAILY
Qty: 1 KIT | Refills: 0 | Status: SHIPPED | OUTPATIENT
Start: 2021-02-01 | End: 2021-04-29 | Stop reason: ALTCHOICE

## 2021-02-03 ENCOUNTER — TELEPHONE (OUTPATIENT)
Dept: CARDIOLOGY CLINIC | Age: 56
End: 2021-02-03

## 2021-02-03 NOTE — TELEPHONE ENCOUNTER
Patient ordered lisinopril- HCTZ 20/25. States she only take when B/P is high unless it's bedtime. Yesterday  /83-did not take   Mid day 130/70 - took lisinopril  Evening 116/77, 97/71, 98/67  This am 125/79 did not take  3pm today 125/85.  Please advise if dose change needed and perimeters for holding

## 2021-02-03 NOTE — TELEPHONE ENCOUNTER
Patient to take 0.5 tablet daily. Take second 0.5 mg tablet if BP > 140    Keep appointment as scheduled.

## 2021-02-03 NOTE — TELEPHONE ENCOUNTER
Pt was asked to call if she was having low bp.   Last night was 106/83   130/70  116/77   97/71  98/67  This /79

## 2021-02-04 ENCOUNTER — OFFICE VISIT (OUTPATIENT)
Dept: FAMILY MEDICINE CLINIC | Age: 56
End: 2021-02-04
Payer: COMMERCIAL

## 2021-02-04 VITALS
BODY MASS INDEX: 27.25 KG/M2 | HEIGHT: 67 IN | SYSTOLIC BLOOD PRESSURE: 132 MMHG | DIASTOLIC BLOOD PRESSURE: 84 MMHG | WEIGHT: 173.6 LBS | TEMPERATURE: 97.3 F

## 2021-02-04 DIAGNOSIS — M54.41 CHRONIC RIGHT-SIDED LOW BACK PAIN WITH RIGHT-SIDED SCIATICA: ICD-10-CM

## 2021-02-04 DIAGNOSIS — E78.2 MIXED HYPERLIPIDEMIA: ICD-10-CM

## 2021-02-04 DIAGNOSIS — Z79.4 TYPE 2 DIABETES MELLITUS WITHOUT COMPLICATION, WITH LONG-TERM CURRENT USE OF INSULIN (HCC): Primary | ICD-10-CM

## 2021-02-04 DIAGNOSIS — F41.9 ANXIETY: ICD-10-CM

## 2021-02-04 DIAGNOSIS — K21.00 GASTROESOPHAGEAL REFLUX DISEASE WITH ESOPHAGITIS WITHOUT HEMORRHAGE: ICD-10-CM

## 2021-02-04 DIAGNOSIS — J44.9 COPD, SEVERE (HCC): ICD-10-CM

## 2021-02-04 DIAGNOSIS — G89.29 CHRONIC RIGHT-SIDED LOW BACK PAIN WITH RIGHT-SIDED SCIATICA: ICD-10-CM

## 2021-02-04 DIAGNOSIS — G89.4 CHRONIC PAIN SYNDROME: ICD-10-CM

## 2021-02-04 DIAGNOSIS — E11.9 TYPE 2 DIABETES MELLITUS WITHOUT COMPLICATION, WITH LONG-TERM CURRENT USE OF INSULIN (HCC): Primary | ICD-10-CM

## 2021-02-04 DIAGNOSIS — R60.9 CHRONIC EDEMA: ICD-10-CM

## 2021-02-04 DIAGNOSIS — G62.9 NEUROPATHY: ICD-10-CM

## 2021-02-04 DIAGNOSIS — I10 ESSENTIAL HYPERTENSION: ICD-10-CM

## 2021-02-04 DIAGNOSIS — M79.7 FIBROMYALGIA: ICD-10-CM

## 2021-02-04 PROBLEM — R14.1 ABDOMINAL GAS PAIN: Status: RESOLVED | Noted: 2020-07-01 | Resolved: 2021-02-04

## 2021-02-04 PROBLEM — R10.31 RIGHT LOWER QUADRANT ABDOMINAL PAIN: Status: RESOLVED | Noted: 2020-07-01 | Resolved: 2021-02-04

## 2021-02-04 PROBLEM — R06.02 SOB (SHORTNESS OF BREATH): Status: RESOLVED | Noted: 2020-06-02 | Resolved: 2021-02-04

## 2021-02-04 PROBLEM — F17.200 NICOTINE ADDICTION: Status: RESOLVED | Noted: 2017-01-03 | Resolved: 2021-02-04

## 2021-02-04 PROBLEM — K92.1 HEMATOCHEZIA: Status: RESOLVED | Noted: 2020-07-01 | Resolved: 2021-02-04

## 2021-02-04 PROCEDURE — G8427 DOCREV CUR MEDS BY ELIG CLIN: HCPCS | Performed by: NURSE PRACTITIONER

## 2021-02-04 PROCEDURE — G8926 SPIRO NO PERF OR DOC: HCPCS | Performed by: NURSE PRACTITIONER

## 2021-02-04 PROCEDURE — G8484 FLU IMMUNIZE NO ADMIN: HCPCS | Performed by: NURSE PRACTITIONER

## 2021-02-04 PROCEDURE — 3023F SPIROM DOC REV: CPT | Performed by: NURSE PRACTITIONER

## 2021-02-04 PROCEDURE — 1036F TOBACCO NON-USER: CPT | Performed by: NURSE PRACTITIONER

## 2021-02-04 PROCEDURE — 2022F DILAT RTA XM EVC RTNOPTHY: CPT | Performed by: NURSE PRACTITIONER

## 2021-02-04 PROCEDURE — G8417 CALC BMI ABV UP PARAM F/U: HCPCS | Performed by: NURSE PRACTITIONER

## 2021-02-04 PROCEDURE — 99214 OFFICE O/P EST MOD 30 MIN: CPT | Performed by: NURSE PRACTITIONER

## 2021-02-04 PROCEDURE — 3046F HEMOGLOBIN A1C LEVEL >9.0%: CPT | Performed by: NURSE PRACTITIONER

## 2021-02-04 PROCEDURE — 3017F COLORECTAL CA SCREEN DOC REV: CPT | Performed by: NURSE PRACTITIONER

## 2021-02-04 ASSESSMENT — ENCOUNTER SYMPTOMS
VOMITING: 0
BACK PAIN: 1
DIARRHEA: 0
SHORTNESS OF BREATH: 0
NAUSEA: 0
CONSTIPATION: 0
ABDOMINAL PAIN: 0
COUGH: 0
BLOOD IN STOOL: 0

## 2021-02-04 ASSESSMENT — PATIENT HEALTH QUESTIONNAIRE - PHQ9: SUM OF ALL RESPONSES TO PHQ QUESTIONS 1-9: 0

## 2021-02-04 NOTE — PROGRESS NOTES
2021     Kwaku Gillette (:  1965) is a 50 y.o. female, here for evaluation of the following medical concerns:      Presents to follow-up on current diagnoses:     Diabetes type 2- taking alogliptin.  glucose at home average 100-150  hgba1c 7.6 this summer due to wine coolers     GERD- prilosec, no complaints     Hyperlipidemia- tolerating statin without side effects.      Chronic lower extremity edema  no longer taking lasix, but is on thiazide. No lower extremity edema right now. Following with cardiology who is managing.     Anxiety depression- follows with mental health. Taking xanax once per day and hydroxyzine PRN. Taking hydroxyzine in the AM and xanax at night. Uses xanax PRN. effexor. Denies suicidal thoughts.     COPD- taking proair, spiriva and breo. Follows with Dr. Dorita Barksdale. Denies shortness of breath or cough. no recent COPD exacerbations     Chronic painfollows with pain management. Has done physical therapy in the past.  Has been trying to get disability since . Was referred to specialists for disability workup and paperwork completion. Has not worked since . Taking zanaflex and percocet  Also has diagnosis of fibromyalgiaall managed by pain management  Following with Dr. Irasema Sam as well  Has had steroid injections.      Recent cyst removed from right arm last week by Dr. Ursula Bella     HTN- medications recently updated by cardio for high and low BP's. Is taking half of her lisinopril thiazide. (1012.5 dose now)            Review of Systems   Constitutional: Negative for activity change, appetite change, chills, diaphoresis, fatigue, fever and unexpected weight change. Eyes: Negative for visual disturbance. Respiratory: Negative for cough and shortness of breath. Cardiovascular: Negative for chest pain, palpitations and leg swelling. Gastrointestinal: Negative for abdominal pain, blood in stool, constipation, diarrhea, nausea and vomiting. Genitourinary: Negative for difficulty urinating. Musculoskeletal: Positive for arthralgias, back pain and myalgias. Negative for gait problem. Skin: Negative. Neurological: Positive for headaches. Negative for dizziness, weakness, light-headedness and numbness. Psychiatric/Behavioral: Positive for dysphoric mood and sleep disturbance. Negative for suicidal ideas. The patient is nervous/anxious. Controlled       Prior to Visit Medications    Medication Sig Taking? Authorizing Provider   glucose monitoring kit (FREESTYLE) monitoring kit 1 kit by Does not apply route daily Yes YESSICA Agarwal - NP   cetirizine (ZYRTEC) 10 MG tablet as needed Yes Historical Provider, MD   lisinopril-hydroCHLOROthiazide (PRINZIDE;ZESTORETIC) 20-25 MG per tablet Take 1 tablet by mouth daily Yes YESSICA Cartwright CNP   Value Plus Lancets Thin 26G MISC 1 each by Does not apply route daily Yes YESSICA Agarwal NP   blood glucose test strips (FREESTYLE LITE) strip 1 each by In Vitro route daily As needed. Yes YESSICA Agarwal NP   hydrOXYzine (ATARAX) 25 MG tablet TAKE 1 TABLET EVERY 8 HOURS AS NEEDED FOR ANXIETY AVOID ALCOHOL/CAUSES DROWSINESS.   Do not take with Xanax Yes YESSICA Agarwal NP   atorvastatin (LIPITOR) 80 MG tablet TAKE 1 TABLET BY MOUTH DAILY Yes YESSICA Agarwal - BARRIE   albuterol sulfate HFA (PROAIR HFA) 108 (90 Base) MCG/ACT inhaler Inhale 2 puffs into the lungs every 6 hours as needed for Wheezing Yes Khadra Anne MD   tiotropium (SPIRIVA RESPIMAT) 2.5 MCG/ACT AERS inhaler Inhale 2 puffs into the lungs daily Yes Khadra Anne MD   ammonium lactate (LAC-HYDRIN) 12 % lotion  Yes Historical Provider, MD   calcipotriene (DOVONEX) 0.005 % ointment  Yes Historical Provider, MD   clobetasol (TEMOVATE) 0.05 % ointment  Yes Historical Provider, MD   omeprazole (PRILOSEC) 40 MG delayed release capsule Take 1 capsule by mouth daily Yes YESSICA Rahman - EVANGELINA tiZANidine (ZANAFLEX) 4 MG tablet Take 4 mg by mouth every 8 hours as needed  Yes Historical Provider, MD   oxyCODONE-acetaminophen (PERCOCET) 7.5-325 MG per tablet Take 1 tablet by mouth every 8 hours as needed for Pain (Patient states takes every 6 hours). Yes Historical Provider, MD   Psyllium (METAMUCIL) 48.57 % POWD Take 1 Units by mouth daily Yes Odalis Garg MD   potassium chloride (KLOR-CON M) 20 MEQ extended release tablet Take 1 tablet by mouth 2 times daily Yes Odalis Garg MD   venlafaxine (EFFEXOR) 37.5 MG tablet Take 37.5 mg by mouth 2 times daily Yes Historical Provider, MD   fluticasone-vilanterol (BREO ELLIPTA) 100-25 MCG/INH AEPB inhaler Inhale 1 puff into the lungs daily Yes Cathy Lopez MD   ALPRAZolam Bill Dowell) 0.5 MG tablet Take 0.5 mg by mouth as needed for Sleep. . Yes Historical Provider, MD   alogliptin (NESINA) 6.25 MG TABS tablet Take 1 tablet by mouth daily  YESSICA Pineda - BARRIE        Social History     Tobacco Use    Smoking status: Former Smoker     Packs/day: 0.25     Years: 30.00     Pack years: 7.50     Types: Cigarettes, E-Cigarettes     Start date:      Quit date: 2016     Years since quittin.0    Smokeless tobacco: Never Used   Substance Use Topics    Alcohol use: Yes     Comment: \"Occ. Maybe Twice A Month\"        Vitals:    21 0912   BP: 132/84   Site: Left Upper Arm   Position: Sitting   Cuff Size: Medium Adult   Temp: 97.3 °F (36.3 °C)   Weight: 173 lb 9.6 oz (78.7 kg)   Height: 5' 7\" (1.702 m)     Estimated body mass index is 27.19 kg/m² as calculated from the following:    Height as of this encounter: 5' 7\" (1.702 m). Weight as of this encounter: 173 lb 9.6 oz (78.7 kg). Physical Exam  Vitals signs reviewed. Constitutional:       General: She is not in acute distress. Appearance: Normal appearance. She is not ill-appearing, toxic-appearing or diaphoretic. HENT:      Head: Normocephalic and atraumatic. Nose: Nose normal.   Eyes:      Extraocular Movements: Extraocular movements intact. Pupils: Pupils are equal, round, and reactive to light. Neck:      Musculoskeletal: Normal range of motion and neck supple. Cardiovascular:      Rate and Rhythm: Normal rate and regular rhythm. Heart sounds: Normal heart sounds. Pulmonary:      Effort: Pulmonary effort is normal.      Breath sounds: Normal breath sounds. Abdominal:      General: Bowel sounds are normal. There is no distension. Palpations: Abdomen is soft. There is no mass. Tenderness: There is no abdominal tenderness. Hernia: No hernia is present. Musculoskeletal: Normal range of motion. Skin:     General: Skin is warm and dry. Neurological:      General: No focal deficit present. Mental Status: She is alert and oriented to person, place, and time. Mental status is at baseline. Psychiatric:         Mood and Affect: Mood normal.         Behavior: Behavior normal.         Thought Content: Thought content normal.         Judgment: Judgment normal.         ASSESSMENT/PLAN:  1. Type 2 diabetes mellitus without complication, with long-term current use of insulin (HCC)  Continue glipizidecheck glucose once daily. Diabetic diet. Check A1c.  - Comprehensive Metabolic Panel  - Hemoglobin A1C    2. Essential hypertension  Following with cardio. Recent adjustment to lisinopril thiazide. BP goal greater than 110/70 and less than 140/90.    3. Mixed hyperlipidemia  Continue statin  - Lipid Panel    4. Neuropathy  Follow with pain management    5. Chronic right-sided low back pain with right-sided sciatica  Follow with pain management    6. Fibromyalgia  Follow with pain management    7. Chronic pain syndrome  Pain management    8. Gastroesophageal reflux disease with esophagitis without hemorrhage  Controlled with PPI    9. Anxiety  Follow with mental health    10.  COPD, severe ([address]

## 2021-02-04 NOTE — PROGRESS NOTES
Pt needed labs in office after her appt. Tried to stick her in the left arm where she wanted me to go. vein rolled. Tried left arm and got very slow flow.  Stopped blood draw and pt refused another stick

## 2021-02-05 ENCOUNTER — OFFICE VISIT (OUTPATIENT)
Dept: BARIATRICS/WEIGHT MGMT | Age: 56
End: 2021-02-05
Payer: COMMERCIAL

## 2021-02-05 VITALS
DIASTOLIC BLOOD PRESSURE: 80 MMHG | SYSTOLIC BLOOD PRESSURE: 130 MMHG | TEMPERATURE: 97.2 F | OXYGEN SATURATION: 98 % | HEART RATE: 70 BPM | BODY MASS INDEX: 27.61 KG/M2 | HEIGHT: 67 IN | WEIGHT: 175.9 LBS

## 2021-02-05 DIAGNOSIS — R10.33 UMBILICAL PAIN: Primary | ICD-10-CM

## 2021-02-05 DIAGNOSIS — Z98.84 STATUS POST LAPAROSCOPIC SLEEVE GASTRECTOMY: ICD-10-CM

## 2021-02-05 DIAGNOSIS — Z98.890 S/P ABDOMINOPLASTY: ICD-10-CM

## 2021-02-05 PROCEDURE — G8484 FLU IMMUNIZE NO ADMIN: HCPCS | Performed by: SURGERY

## 2021-02-05 PROCEDURE — 3017F COLORECTAL CA SCREEN DOC REV: CPT | Performed by: SURGERY

## 2021-02-05 PROCEDURE — G8427 DOCREV CUR MEDS BY ELIG CLIN: HCPCS | Performed by: SURGERY

## 2021-02-05 PROCEDURE — 1036F TOBACCO NON-USER: CPT | Performed by: SURGERY

## 2021-02-05 PROCEDURE — G8417 CALC BMI ABV UP PARAM F/U: HCPCS | Performed by: SURGERY

## 2021-02-05 PROCEDURE — 99213 OFFICE O/P EST LOW 20 MIN: CPT | Performed by: SURGERY

## 2021-02-05 ASSESSMENT — ENCOUNTER SYMPTOMS
WHEEZING: 0
BLOOD IN STOOL: 0
ANAL BLEEDING: 0
VOMITING: 0
TROUBLE SWALLOWING: 0
NAUSEA: 0
SORE THROAT: 0
ABDOMINAL PAIN: 1
CONSTIPATION: 0
COUGH: 0
DIARRHEA: 0
SHORTNESS OF BREATH: 0
PHOTOPHOBIA: 0
COLOR CHANGE: 0
VOICE CHANGE: 0

## 2021-02-05 NOTE — PROGRESS NOTES
GENERAL SURGERY OFFICE NOTE    SUBJECTIVE:    Patient presenting today referred from Tommy Manzanares, YESSICA - NP and No ref. provider found, for   Chief Complaint   Patient presents with    Follow-up     F/U S/P Bilat Abdominoplasty 06/41/91 c/o umbilical pain        HPI: Fanny Sunshine is a 50 y.o. female presenting with pain in the umbilical and is acute, worsening and dull compared to patient's normal condition. It is severe in intensity for a duration of 6 months and is continuous with modifying factors increased by or worse with straining. .. Will check A CT scan to r/o hernia, and inject with Marcaine/Steroid, IF Negative. Wounds very nicely healing, no erythema, no induration, no purulent discharge. No constipation, BMs back to normal.    Thoroughly reviewed the patient's medical history, family history, social history and review of systems with the patient today in the office. Please see medical record for pertinent positives. Past Medical History:   Diagnosis Date    Anxiety     Arthritis     \"Back, Hands, Shoulders\"    Chronic back pain     Arthritis - NKI    COPD [facility]ch[facility] obstructive pulmonary disease) ([facility]     Sees Dr. Marybel Batista with Mental Health    Diabetes mellitus ([facility] [address] Dx 2008    Type II - follows with PCP    Emphysema     Fibromyalgia     H/O abdominoplasty 12/21/2018    H/O cardiovascular stress test 03/04/2019    ECG portion of stress test is neg for ischemia by diagnostic criteria. No infarct or ischemia noted. Normal stress myocardial perfusion. This is a normal study    H/O echocardiogram 03/04/2019    Left ventricular function is normal. Ejection fraction is visually estimated at 55-60%. No significant valvular abnormalitites.      Hyperlipidemia     Hypertension     Follows with PCP    Migraines Last Migraine 10-18    Mobitz (type) I (Wenckebach's) atrioventricular block 01/29/2020    Neuropathy     From feet to knees  Piriformis syndrome 2016    Psoriasis     Rotator cuff impingement syndrome, right 2018    RTC repair Dr Kayode Hair   Treated by Dr Annia Roberts 17  Arthrocentesis 17    Shortness of breath on exertion     Syncope and collapse 2019    Teeth missing     Upper And Lower    Wears glasses         Past Surgical History:   Procedure Laterality Date    ABDOMEN SURGERY N/A 2019    SECONDARY CLOSURE OF DEHISCED ABDOMINAL WOUND performed by Ramakrishna Mayes MD at [facility] N/A 2019    IRRIGATION OF LOWER ABDOMINAL WOUND performed by Ramakrishna Mayes MD at [facility] ABDOMINOPLASTY N/A 2019    ABDOMINOPLASTY REVISION performed by Ramakrishna Mayes MD at [facility] Right 2020    EXCISION OF RIGHT BREAST SABACEOUS CYST performed by Moises Cortez MD at [address] Right 2017    Dr. Annia Roberts (Right)   [address]    Tubal Ligation Also Done In     COLONOSCOPY  2017    Polyp Removed - Dr. Desir [facility] COLONOSCOPY N/A 2020    COLONOSCOPY POLYPECTOMY SNARE/COLD BIOPSY performed by Ramakrishna Mayes MD at [address] [place] [address]      Teeth Extracted In Past    ELBOW SURGERY Bilateral Last Done In     Right Done Twice, Left Done Once left cubital tunnel release ; right 2014    ENDOSCOPY, COLON, DIAGNOSTIC  2017    HERNIA REPAIR  10/26/2017    hiatal hernia with gastrectomy    HYSTERECTOMY VAGINAL      LAPAROSCOPY      NH EXCISE EXCESS SKIN TISSUE,ABDOMEN N/A 2018    ABDOMINOPLASTY performed by Ramakrishna Mayes MD at [facility] Right     Dr. Taco Bryant Right 2017    Dr Mcmahon Case  10/26/2017    Robotic Laparoscopic, Pre Op Weight 237  Or 238 lbs.     TUBAL LIGATION      Done With         Social History     Socioeconomic History  Marital status: Single     Spouse name: Not on file    Number of children: Not on file    Years of education: Not on file    Highest education level: Not on file   Occupational History    Occupation: unemployed   Social Needs    Financial resource strain: Not on file    Food insecurity     Worry: Not on file     Inability: Not on file   [employer] needs     Medical: Not on file     Non-medical: Not on file   Tobacco Use    Smoking status: Former Smoker     Packs/day: 0.25     Years: 30.00     Pack years: 7.50     Types: Cigarettes, E-Cigarettes     Start date:      Quit date:      Years since quittin.1    Smokeless tobacco: Never Used   Substance and Sexual Activity    Alcohol use: Yes     Comment: \"Occ.  Maybe Twice A Month\"    Drug use: No    Sexual activity: Yes     Partners: Male   Lifestyle    Physical activity     Days per week: Not on file     Minutes per session: Not on file    Stress: Not on file   Relationships    Social connections     Talks on phone: Not on file     Gets together: Not on file     Attends [employer] service: Not on file     Active member of club or organization: Not on file     Attends meetings of clubs or organizations: Not on file     Relationship status: Not on file    Intimate partner violence     Fear of current or ex partner: Not on file     Emotionally abused: Not on file     Physically abused: Not on file     Forced sexual activity: Not on file   Other Topics Concern    Not on file   Social History Narrative    Not on file        No Known Allergies     Family History   Problem Relation Age of Onset    Heart Attack Father     Heart Disease Father     Early Death Father 46        Heart Attack    Alcohol Abuse Father     Substance Abuse Father         Alcoholic    Arthritis Mother     Heart Disease Mother     Diabetes Mother     Cancer Mother         \"Kidney Cancer\"    High Blood Pressure Mother     High Blood Pressure Sister  Diabetes Brother     Early Death Daughter 21        \"Killed In A Car Accident\"       Current Outpatient Medications   Medication Sig Dispense Refill    glucose monitoring kit (FREESTYLE) monitoring kit 1 kit by Does not apply route daily 1 kit 0    cetirizine (ZYRTEC) 10 MG tablet as needed      lisinopril-hydroCHLOROthiazide (PRINZIDE;ZESTORETIC) 20-25 MG per tablet Take 1 tablet by mouth daily 90 tablet 1    Value Plus Lancets Thin 26G MISC 1 each by Does not apply route daily 100 each 5    blood glucose test strips (FREESTYLE LITE) strip 1 each by In Vitro route daily As needed. 100 strip 11    hydrOXYzine (ATARAX) 25 MG tablet TAKE 1 TABLET EVERY 8 HOURS AS NEEDED FOR ANXIETY AVOID ALCOHOL/CAUSES DROWSINESS. Do not take with Xanax 60 tablet 1    atorvastatin (LIPITOR) 80 MG tablet TAKE 1 TABLET BY MOUTH DAILY 90 tablet 0    alogliptin (NESINA) 6.25 MG TABS tablet Take 1 tablet by mouth daily 90 tablet 1    albuterol sulfate HFA (PROAIR HFA) 108 (90 Base) MCG/ACT inhaler Inhale 2 puffs into the lungs every 6 hours as needed for Wheezing 1 Inhaler 5    tiotropium (SPIRIVA RESPIMAT) 2.5 MCG/ACT AERS inhaler Inhale 2 puffs into the lungs daily 1 Inhaler 5    ammonium lactate (LAC-HYDRIN) 12 % lotion       calcipotriene (DOVONEX) 0.005 % ointment       clobetasol (TEMOVATE) 0.05 % ointment       omeprazole (PRILOSEC) 40 MG delayed release capsule Take 1 capsule by mouth daily 90 capsule 2    tiZANidine (ZANAFLEX) 4 MG tablet Take 4 mg by mouth every 8 hours as needed       oxyCODONE-acetaminophen (PERCOCET) 7.5-325 MG per tablet Take 1 tablet by mouth every 8 hours as needed for Pain (Patient states takes every 6 hours).        Psyllium (METAMUCIL) 48.57 % POWD Take 1 Units by mouth daily 1 Bottle 5    potassium chloride (KLOR-CON M) 20 MEQ extended release tablet Take 1 tablet by mouth 2 times daily 180 tablet 1    venlafaxine (EFFEXOR) 37.5 MG tablet Take 37.5 mg by mouth 2 times daily Rate and Rhythm: Normal rate and regular rhythm. Heart sounds: Normal heart sounds. No murmur. No friction rub. No gallop. Pulmonary:      Effort: Pulmonary effort is normal. No respiratory distress. Breath sounds: No stridor. No wheezing or rales. Chest:      Chest wall: No tenderness. Abdominal:      General: Bowel sounds are normal. There is no distension. Palpations: Abdomen is soft. There is no mass. Tenderness: There is no abdominal tenderness. There is no guarding or rebound. Musculoskeletal: Normal range of motion. General: No tenderness. Lymphadenopathy:      Cervical: No cervical adenopathy. Skin:     General: Skin is warm and dry. Coloration: Skin is not pale. Findings: No erythema or rash. Neurological:      Mental Status: She is alert and oriented to person, place, and time. Cranial Nerves: No cranial nerve deficit. Coordination: Coordination normal.   Psychiatric:         Behavior: Behavior normal.         Thought Content: Thought content normal.         Judgment: Judgment normal.         Orders Placed This Encounter   Procedures    CT ABDOMEN PELVIS W IV CONTRAST Additional Contrast? Oral        ASSESSMENT & PLAN:    1. Umbilical pain    2. S/P abdominoplasty    3. Status post laparoscopic sleeve gastrectomy         Will check A CT scan to r/o hernia, and inject with Marcaine/Steroid, IF Negative. Patient counseled on the risks, benefits, and alternatives of treatment plan at length while in the office today. Patient states an understanding and willingness to proceed with the plan. Follow Up:  Return in about 2 weeks (around 2/19/2021) for Follow up Symptoms, For imaging and tests results review.       Steele Mcardle, MD, FACS, FICS.    2/5/21

## 2021-02-08 ENCOUNTER — VIRTUAL VISIT (OUTPATIENT)
Dept: CARDIOLOGY CLINIC | Age: 56
End: 2021-02-08
Payer: COMMERCIAL

## 2021-02-08 DIAGNOSIS — I10 ESSENTIAL HYPERTENSION: Primary | ICD-10-CM

## 2021-02-08 PROCEDURE — G8428 CUR MEDS NOT DOCUMENT: HCPCS | Performed by: NURSE PRACTITIONER

## 2021-02-08 PROCEDURE — 99211 OFF/OP EST MAY X REQ PHY/QHP: CPT | Performed by: NURSE PRACTITIONER

## 2021-02-08 RX ORDER — LISINOPRIL AND HYDROCHLOROTHIAZIDE 12.5; 1 MG/1; MG/1
TABLET ORAL
Qty: 60 TABLET | Refills: 1 | Status: SHIPPED | OUTPATIENT
Start: 2021-02-08 | End: 2021-04-06

## 2021-02-08 NOTE — PROGRESS NOTES
[facility]  [address], [address] [facility],[address]  Phone: [phone]    Fax [phone]                  Beth Jane MD, Elroy Marks MD, Maru Bui MD, MD Ezequiel Mahmood MD Creston Plum, MD  [facility], YESSICA Viera, YESSICA Brody, YESSICA Nunez    BP Check Visit    Pt is here for a 1 week BP check. At the last office visit patient was found to have a blood pressure of 132/85. At that time the patient was instructed to take half tab of lisinopril hydrochlorothiazide 20/25 mg if blood pressure was less than 140/80 and take 1 tab if blood pressure was greater than 140/80. Patient has brought blood pressure machine into the office within the last 6 months and confirmed that it is accurate. Patient states that she has been having erratic blood pressures and they are ranging from 88//90. Patient does not have a pill splitter at home and pills are not perforated. Therefore she has been taking 1 tab every day. Patient-Reported Vitals 2/8/2021   Patient-Reported Weight -   Patient-Reported Height -   Patient-Reported Systolic 734   Patient-Reported Diastolic 90   Patient-Reported Pulse 79        We will send lisinopril hydrochlorothiazide 10/12.5 mg tablets to pharmacy. Patient to take 1 tablet if systolic blood pressure less than 130/80 and to take 2 tablets if blood pressure is greater than 130/80. Patient is to not take lisinopril hydrochlorothiazide if systolic blood pressure less than 100. Follow up in 2 weeks for virtual blood pressure check.     Pt is to report any dizziness or syncope to the office    Electronically signed by YESSICA Carmen CNP on 2/8/2021 at 10:50 AM

## 2021-02-08 NOTE — PROGRESS NOTES
Beverly is 10 days post-op: excision sebaceous cyst of right axilla. Presenting for routine follow-up. S:  Doing well. Patient has noted no excessive redness and swelling. O:  Wound healing well. A:  Satisfactory course. P: Sutures removed. Patient to return prn. Patient is to return as needed for redness, swelling, discomfort, or any concern about her  surgery.

## 2021-02-08 NOTE — PATIENT INSTRUCTIONS
Please be informed that if you contact our office outside of normal business hours the physician on call cannot help with any scheduling or rescheduling issues, procedure instruction questions or any type of medication issue. We advise you for any urgent/emergency that you go to the nearest emergency room!     PLEASE CALL OUR OFFICE DURING NORMAL BUSINESS HOURS    Monday - Friday   8 am to 5 pm    [place]: [phone]    [place]:  [phone]

## 2021-02-09 ENCOUNTER — OFFICE VISIT (OUTPATIENT)
Dept: SURGERY | Age: 56
End: 2021-02-09

## 2021-02-09 VITALS
DIASTOLIC BLOOD PRESSURE: 68 MMHG | HEART RATE: 90 BPM | OXYGEN SATURATION: 99 % | TEMPERATURE: 97 F | SYSTOLIC BLOOD PRESSURE: 118 MMHG

## 2021-02-09 DIAGNOSIS — L72.3 SEBACEOUS CYST: Primary | ICD-10-CM

## 2021-02-09 DIAGNOSIS — Z09 POSTOP CHECK: ICD-10-CM

## 2021-02-09 PROCEDURE — 99024 POSTOP FOLLOW-UP VISIT: CPT | Performed by: SURGERY

## 2021-02-12 ENCOUNTER — HOSPITAL ENCOUNTER (OUTPATIENT)
Age: 56
Discharge: HOME | End: 2021-02-12
Payer: COMMERCIAL

## 2021-02-12 ENCOUNTER — HOSPITAL ENCOUNTER (OUTPATIENT)
Dept: CT IMAGING | Age: 56
Discharge: HOME | End: 2021-02-12
Payer: COMMERCIAL

## 2021-02-12 DIAGNOSIS — R10.33 UMBILICAL PAIN: ICD-10-CM

## 2021-02-12 DIAGNOSIS — Z98.84 STATUS POST LAPAROSCOPIC SLEEVE GASTRECTOMY: ICD-10-CM

## 2021-02-12 DIAGNOSIS — Z98.890 S/P ABDOMINOPLASTY: ICD-10-CM

## 2021-02-12 LAB
GFR AFRICAN AMERICAN: >60 ML/MIN/1.73M2
GFR NON-AFRICAN AMERICAN: >60 ML/MIN/1.73M2
POC CREATININE: 0.9 MG/DL (ref 0.6–1.1)

## 2021-02-12 PROCEDURE — 6360000004 HC RX CONTRAST MEDICATION: Performed by: SURGERY

## 2021-02-12 PROCEDURE — 74177 CT ABD & PELVIS W/CONTRAST: CPT

## 2021-02-12 RX ORDER — SODIUM CHLORIDE 0.9 % (FLUSH) 0.9 %
10 SYRINGE (ML) INJECTION PRN
Status: DISCONTINUED | OUTPATIENT
Start: 2021-02-12 | End: 2021-02-13 | Stop reason: HOSPADM

## 2021-02-12 RX ADMIN — IOHEXOL 50 ML: 240 INJECTION, SOLUTION INTRATHECAL; INTRAVASCULAR; INTRAVENOUS; ORAL at 08:45

## 2021-02-12 RX ADMIN — IOPAMIDOL 75 ML: 755 INJECTION, SOLUTION INTRAVENOUS at 09:50

## 2021-02-17 ENCOUNTER — VIRTUAL VISIT (OUTPATIENT)
Dept: BARIATRICS/WEIGHT MGMT | Age: 56
End: 2021-02-17
Payer: COMMERCIAL

## 2021-02-17 ENCOUNTER — TELEPHONE (OUTPATIENT)
Dept: FAMILY MEDICINE CLINIC | Age: 56
End: 2021-02-17

## 2021-02-17 DIAGNOSIS — R10.30 LOWER ABDOMINAL PAIN: Primary | ICD-10-CM

## 2021-02-17 PROCEDURE — 1036F TOBACCO NON-USER: CPT | Performed by: SURGERY

## 2021-02-17 PROCEDURE — G8484 FLU IMMUNIZE NO ADMIN: HCPCS | Performed by: SURGERY

## 2021-02-17 PROCEDURE — G8417 CALC BMI ABV UP PARAM F/U: HCPCS | Performed by: SURGERY

## 2021-02-17 PROCEDURE — 3017F COLORECTAL CA SCREEN DOC REV: CPT | Performed by: SURGERY

## 2021-02-17 PROCEDURE — G8428 CUR MEDS NOT DOCUMENT: HCPCS | Performed by: SURGERY

## 2021-02-17 PROCEDURE — 99212 OFFICE O/P EST SF 10 MIN: CPT | Performed by: SURGERY

## 2021-02-17 ASSESSMENT — ENCOUNTER SYMPTOMS
CONSTIPATION: 0
PHOTOPHOBIA: 0
BLOOD IN STOOL: 0
NAUSEA: 0
WHEEZING: 0
SORE THROAT: 0
COUGH: 0
VOMITING: 0
ABDOMINAL PAIN: 1
VOICE CHANGE: 0
ANAL BLEEDING: 0
DIARRHEA: 0
COLOR CHANGE: 0
SHORTNESS OF BREATH: 0
TROUBLE SWALLOWING: 0

## 2021-02-17 NOTE — PROGRESS NOTES
2021    TELEHEALTH EVALUATION -- Audio/Visual (During YVLWZ-50 public health emergency)    HPI:    Fanny Sunshine (:  1965) has requested an audio/video evaluation for the following concern(s):    Pain still hurts; diet. . needs to go down to 160 lbs. Review of Systems   Constitutional: Negative for activity change, chills, diaphoresis and fever. HENT: Negative for sore throat, trouble swallowing and voice change. Eyes: Negative for photophobia and visual disturbance. Respiratory: Negative for cough, shortness of breath and wheezing. Cardiovascular: Negative for chest pain, palpitations and leg swelling. Gastrointestinal: Positive for abdominal pain. Negative for anal bleeding, blood in stool, constipation, diarrhea, nausea and vomiting. Endocrine: Negative for cold intolerance, heat intolerance, polydipsia and polyuria. Genitourinary: Negative for dysuria, frequency and hematuria. Musculoskeletal: Negative for joint swelling, myalgias and neck stiffness. Skin: Negative for color change and rash. Neurological: Negative for seizures, speech difficulty, light-headedness and numbness. Hematological: Negative for adenopathy. Does not bruise/bleed easily. Prior to Visit Medications    Medication Sig Taking? Authorizing Provider   lisinopril-hydroCHLOROthiazide (PRINZIDE;ZESTORETIC) 10-12.5 MG per tablet Take 2 tablet if SBP > 130 or 1 tablet if SBP < 130, do not take if SBP < 100  YESSICA Vasques CNP   glucose monitoring kit (FREESTYLE) monitoring kit 1 kit by Does not apply route daily  YESSICA Herrera NP   cetirizine (ZYRTEC) 10 MG tablet as needed  Historical Provider, MD   Value Plus Lancets Thin 26G MISC 1 each by Does not apply route daily  YESSICA Herrera NP   blood glucose test strips (FREESTYLE LITE) strip 1 each by In Vitro route daily As needed.   YESSICA Herrera NP hydrOXYzine (ATARAX) 25 MG tablet TAKE 1 TABLET EVERY 8 HOURS AS NEEDED FOR ANXIETY AVOID ALCOHOL/CAUSES DROWSINESS. Do not take with Xanax  [facility], APRN - NP   atorvastatin (LIPITOR) 80 MG tablet TAKE 1 TABLET BY MOUTH DAILY  [facility], APRN - NP   alogliptin (NESINA) 6.25 MG TABS tablet Take 1 tablet by mouth daily  [facility], APRN - NP   albuterol sulfate HFA (PROAIR HFA) 108 (90 Base) MCG/ACT inhaler Inhale 2 puffs into the lungs every 6 hours as needed for Tad Standard, MD   tiotropium (SPIRIVA RESPIMAT) 2.5 MCG/ACT AERS inhaler Inhale 2 puffs into the lungs daily  Jodee Oliveira MD   ammonium lactate (LAC-HYDRIN) 12 % lotion   Historical Provider, MD   calcipotriene (DOVONEX) 0.005 % ointment   Historical Provider, MD   clobetasol (TEMOVATE) 0.05 % ointment   Historical Provider, MD   omeprazole (PRILOSEC) 40 MG delayed release capsule Take 1 capsule by mouth daily  YESSICA James - CNP   tiZANidine (ZANAFLEX) 4 MG tablet Take 4 mg by mouth every 8 hours as needed   Historical Provider, MD   oxyCODONE-acetaminophen (PERCOCET) 7.5-325 MG per tablet Take 1 tablet by mouth every 8 hours as needed for Pain (Patient states takes every 6 hours). Historical Provider, MD   Psyllium (METAMUCIL) 48.57 % POWD Take 1 Units by mouth daily  Florence Lake MD   potassium chloride (KLOR-CON M) 20 MEQ extended release tablet Take 1 tablet by mouth 2 times daily  Florence Lake MD   venlafaxine (EFFEXOR) 37.5 MG tablet Take 37.5 mg by mouth 2 times daily  Historical Provider, MD   fluticasone-vilanterol (BREO ELLIPTA) 100-25 MCG/INH AEPB inhaler Inhale 1 puff into the lungs daily  Jodee Oliveira MD   ALPRAZolam Rosalene Daykin) 0.5 MG tablet Take 0.5 mg by mouth as needed for Sleep. Eneida Wild   Historical Provider, MD       Social History     Tobacco Use    Smoking status: Former Smoker     Packs/day: 0.25     Years: 30.00     Pack years: 7.50     Types: Cigarettes, E-Cigarettes Start date: 14     Quit date: 2016     Years since quittin.1    Smokeless tobacco: Never Used   Substance Use Topics    Alcohol use: Yes     Comment: \"Occ. Maybe Twice A Month\"    Drug use: No        No Known Allergies,   Past Medical History:   Diagnosis Date    Anxiety     Arthritis     \"Back, Hands, Shoulders\"    Chronic back pain     Arthritis - NKI    COPD [facility]chronic obstructive pulmonary disease) ([facility])     Sees Dr. Jose Beal with Mental Health    Diabetes mellitus ([facility] Dx 2008    Type II - follows with PCP    Emphysema     Fibromyalgia     H/O abdominoplasty 2018    H/O cardiovascular stress test 2019    ECG portion of stress test is neg for ischemia by diagnostic criteria. No infarct or ischemia noted. Normal stress myocardial perfusion. This is a normal study    H/O echocardiogram 2019    Left ventricular function is normal. Ejection fraction is visually estimated at 55-60%. No significant valvular abnormalitites.      Hyperlipidemia     Hypertension     Follows with PCP    Migraines Last Migraine 10-18    Mobitz (type) I (Wenckebach's) atrioventricular block 2020    Neuropathy     From feet to knees    Piriformis syndrome 2016    Psoriasis     Rotator cuff impingement syndrome, right 2018    RTC repair Dr Сергей Fairbanks   Treated by Dr Chucky Siemens 17  Arthrocentesis 17    Shortness of breath on exertion     Syncope and collapse 2019    Teeth missing     Upper And Lower    Wears glasses    ,   Past Surgical History:   Procedure Laterality Date    ABDOMEN SURGERY N/A 2019    SECONDARY CLOSURE OF DEHISCED ABDOMINAL WOUND performed by Ho Pugh MD at [place] [address] N/A 2019    IRRIGATION OF LOWER ABDOMINAL WOUND performed by [facility]ugh MD at [facility] ABDOMINOPLASTY N/A 2019    [facility]  BREAST SURGERY Right 2020    EXCISION OF RIGHT BREAST SABACEOUS CYST performed by Karen To MD at [place] [address] Right 2017    Dr. Leidy Bianchi (Right)   [address]    Tubal Ligation Also Done In     COLONOSCOPY  2017    Polyp Removed - Dr. Richi Mack COLONOSCOPY N/A 2020    COLONOSCOPY POLYPECTOMY SNARE/COLD BIOPSY performed by Michele Amezcua MD at [address] [place] [address]      Teeth Extracted In Past    ELBOW SURGERY Bilateral Last Done In     Right Done Twice, Left Done Once left cubital tunnel release ; right 2014    ENDOSCOPY, COLON, DIAGNOSTIC  2017    HERNIA REPAIR  10/26/2017    hiatal hernia with gastrectomy    HYSTERECTOMY VAGINAL      LAPAROSCOPY      IL EXCISE EXCESS SKIN TISSUE,ABDOMEN N/A 2018    ABDOMINOPLASTY performed by Michele Amezcua MD at [address] Right     Dr. Cristopher Michelle Right 2017    Dr Carri Whipple  10/26/2017    Robotic Laparoscopic, Pre Op Weight 237  Or 238 lbs.  TUBAL LIGATION      Done With    ,   Social History     Tobacco Use    Smoking status: Former Smoker     Packs/day: 0.25     Years: 30.00     Pack years: 7.50     Types: Cigarettes, E-Cigarettes     Start date:      Quit date: 2016     Years since quittin.1    Smokeless tobacco: Never Used   Substance Use Topics    Alcohol use: Yes     Comment: \"Occ.  Maybe Twice A Month\"    Drug use: No   ,   Family History   Problem Relation Age of Onset    Heart Attack Father     Heart Disease Father     Early Death Father 46        Heart Attack    Alcohol Abuse Father     Substance Abuse Father         Alcoholic    Arthritis Mother     Heart Disease Mother     Diabetes Mother     Cancer Mother         \"Kidney Cancer\"    High Blood Pressure Mother     High Blood Pressure Sister     Diabetes Brother  Early Death Daughter 21        \"Killed In A Car Accident\"   ,   Immunization History   Administered Date(s) Administered    Tdap (Boostrix, Adacel) 12/20/2011    Zoster Recombinant (Shingrix) 05/16/2018   ,   Health Maintenance   Topic Date Due    Hepatitis B vaccine (1 of 3 - Risk 3-dose series) 03/16/1986    Diabetic foot exam  04/03/2020    Breast cancer screen  04/19/2020    Flu vaccine (1) 02/04/2022 (Originally 9/1/2020)    Pneumococcal 0-64 years Vaccine (1 of 1 - PPSV23) 04/01/2023 (Originally 3[birth year]16/1971)    Diabetic retinal exam  03/09/2021    Diabetic microalbuminuria test  07/01/2021    A1C test (Diabetic or Prediabetic)  10/21/2021    Lipid screen  10/21/2021    Potassium monitoring  10/21/2021    Creatinine monitoring  10/21/2021    DTaP/Tdap/Td vaccine (2 - Td) 12/20/2021    Colon cancer screen colonoscopy  07/27/2030    Shingles Vaccine  Completed    Hepatitis C screen  Completed    HIV screen  Completed    Hepatitis A vaccine  Aged Out    Hib vaccine  Aged Out    Meningococcal (ACWY) vaccine  Aged Out       ASSESSMENT/PLAN:  1. Lower abdominal pain  NO HERNIA  CT:  Impression   1. No acute process in the abdomen or pelvis. 2. A tiny fat containing periumbilical hernia is present.  Surrounding the   hernia sac, focal fat is present measuring 5.5 x 3.8 cm which is located   between the rectus musculature and superficial fascia, with extension to the   umbilicus.  This is favored to be postoperative change related to history of   abdominoplasty.  A lipoma is also in the differential.   3. Postoperative changes of gastric sleeve. Will inject her painful spot, with local anesthetic, and steroid. Return in about 2 weeks (around 3/3/2021) for Follow up Symptoms, and local anesthetic and steroid injection . Lor Greenwood is a 50 y.o. female being evaluated by a Virtual Visit (video visit) encounter to address concerns as mentioned above. A caregiver was present when appropriate. Due to this being a TeleHealth encounter (During WIDSK-99 public health emergency), evaluation of the following organ systems was limited: Vitals/Constitutional/EENT/Resp/CV/GI//MS/Neuro/Skin/Heme-Lymph-Imm. Pursuant to the emergency declaration under the [address] [place] [address] and the [employer] Act, this Virtual Visit was conducted with patient's (and/or legal guardian's) consent, to reduce the patient's risk of exposure to COVID-19 and provide necessary medical care. The patient (and/or legal guardian) has also been advised to contact this office for worsening conditions or problems, and seek emergency medical treatment and/or call 911 if deemed necessary. Patient identification was verified at the start of the visit: yes    Total time spent on this encounter: 10 min    Services were provided through a video synchronous discussion virtually to substitute for in-person clinic visit. Patient and provider were located at their individual homes. --Kemi Cho MD on 2/17/2021 at 1:53 PM    An electronic signature was used to authenticate this note.

## 2021-02-17 NOTE — TELEPHONE ENCOUNTER
Pt called and is asking for results from labs that she had done, I tried to call pt and had to leave a message for call back because I do not see any labs that has been done, wanted to verify where she had the labs done at.

## 2021-02-22 ENCOUNTER — TELEMEDICINE (OUTPATIENT)
Dept: CARDIOLOGY CLINIC | Age: 56
End: 2021-02-22
Payer: COMMERCIAL

## 2021-02-22 DIAGNOSIS — I10 ESSENTIAL HYPERTENSION: Primary | ICD-10-CM

## 2021-02-22 PROCEDURE — G8427 DOCREV CUR MEDS BY ELIG CLIN: HCPCS | Performed by: NURSE PRACTITIONER

## 2021-02-22 PROCEDURE — 99212 OFFICE O/P EST SF 10 MIN: CPT | Performed by: NURSE PRACTITIONER

## 2021-02-22 PROCEDURE — 3017F COLORECTAL CA SCREEN DOC REV: CPT | Performed by: NURSE PRACTITIONER

## 2021-02-22 NOTE — LETTER
Sari Arroyo Darrick Schirmer  1965  [id number]    Have you had any Chest Pain that is not new? - No          Have you had any Shortness of Breath - No      Have you had any dizziness - No  ?     Have you had any palpitations that are not new? - No      Is the patient on any of the following medications -   If Yes DO EKG - Needs done every 6 months    Do you have any edema - swelling in No        ? Ask patient if they want to [facility] if they are not already signed up    ? Check to see if we have an E-MAIL on file for the patient    ? Check medication list thoroughly!!! AND RECONCILE OUTSIDE MEDICATIONS  If dose has changed change the entire order not just the MG  BE SURE TO ASK PATIENT IF THEY NEED MEDICATION REFILLS    ?  At check out add to every patient's \"wrap up\" the following dot phrase AFTERHOURSEDUCATION and ensure we explain this to our patients

## 2021-02-22 NOTE — PROGRESS NOTES
[address] [facility],Third Floor  Phone: [phone]    Fax [phone]                  Lexi Mckee MD, Simeon Stockton MD, Dylan Honeycutt MD, MD Yung Obregon MD Barba Baker, MD Dietra Slimmer, YESSICA Salas, APRN Mordechai Duane, APRN Verner Lass, APRN    BP Check Visit    Pt is here for a 2 week BP check. At the last office visit patient was found to have a blood pressure of 131/90. But patient is having issues with her blood pressure being erratic. At that time the patient was instructed to stay taking lisinopril hydrochlorothiazide 10/12.5 mg tablet 1 tablet for systolic blood pressure less than 130/80 and 2 tablets for blood pressure greater than 130/80. Patient continues to have episodes where her blood pressure is low. Patient is unsure of which dose she is taking prior to the low blood pressure. Patient-Reported Vitals 2/22/2021   Patient-Reported Weight 176lb   Patient-Reported Height 5'7   Patient-Reported Systolic 435   Patient-Reported Diastolic 79   Patient-Reported Pulse 68      Plan for pt is to continue taking medications as prescribed. Patient to keep better track of which dose she is taking prior to low blood pressure.     Follow up in 1 month    Pt is to report any dizziness or syncope to the office    Electronically signed by YESSICA Henry CNP on 2/22/2021 at 10:43 AM

## 2021-02-22 NOTE — PATIENT INSTRUCTIONS
Please be informed that if you contact our office outside of normal business hours the physician on call cannot help with any scheduling or rescheduling issues, procedure instruction questions or any type of medication issue. We advise you for any urgent/emergency that you go to the nearest emergency room!     PLEASE CALL OUR OFFICE DURING NORMAL BUSINESS HOURS    Monday - Friday   8 am to 5 pm    [facility] [address]: [phone]    [place]:  [phone]

## 2021-02-22 NOTE — PROGRESS NOTES
Patient came in for 2 week bp check. Patient is on Lisinopril-hydroclorothiazide 10-12.5mg. Patient stated she been have times when it drop to low but nothing so far of it going up high. Yesterday was 78/58 Pulse of 81. Today it was 122/79 pulse of 68. I talk to [facility] NP. She said to have her keep tracking and write down what dosage and time she took the medication so we can see how long it was after the medication that it drop down too low. And have patient come in one month for a BP check. I told the patient and the patient was okay. Appointment is made and After visit been mail out.

## 2021-03-03 ENCOUNTER — TELEPHONE (OUTPATIENT)
Dept: FAMILY MEDICINE CLINIC | Age: 56
End: 2021-03-03

## 2021-03-03 DIAGNOSIS — K21.9 GASTROESOPHAGEAL REFLUX DISEASE, UNSPECIFIED WHETHER ESOPHAGITIS PRESENT: Primary | ICD-10-CM

## 2021-03-03 DIAGNOSIS — K21.9 GASTROESOPHAGEAL REFLUX DISEASE: ICD-10-CM

## 2021-03-03 RX ORDER — OMEPRAZOLE 40 MG/1
40 CAPSULE, DELAYED RELEASE ORAL DAILY
Qty: 90 CAPSULE | Refills: 2 | Status: SHIPPED | OUTPATIENT
Start: 2021-03-03 | End: 2021-05-06 | Stop reason: SDUPTHER

## 2021-03-04 ENCOUNTER — OFFICE VISIT (OUTPATIENT)
Dept: BARIATRICS/WEIGHT MGMT | Age: 56
End: 2021-03-04
Payer: COMMERCIAL

## 2021-03-04 VITALS
BODY MASS INDEX: 28.19 KG/M2 | WEIGHT: 175.4 LBS | HEIGHT: 66 IN | HEART RATE: 86 BPM | OXYGEN SATURATION: 97 % | SYSTOLIC BLOOD PRESSURE: 138 MMHG | TEMPERATURE: 97.4 F | DIASTOLIC BLOOD PRESSURE: 88 MMHG

## 2021-03-04 DIAGNOSIS — Z98.84 S/P LAPAROSCOPIC SLEEVE GASTRECTOMY: ICD-10-CM

## 2021-03-04 DIAGNOSIS — E66.9 DIABETES MELLITUS TYPE 2 IN OBESE (HCC): Primary | ICD-10-CM

## 2021-03-04 DIAGNOSIS — E11.69 DIABETES MELLITUS TYPE 2 IN OBESE (HCC): Primary | ICD-10-CM

## 2021-03-04 DIAGNOSIS — E66.3 OVERWEIGHT: ICD-10-CM

## 2021-03-04 PROCEDURE — G8417 CALC BMI ABV UP PARAM F/U: HCPCS | Performed by: NURSE PRACTITIONER

## 2021-03-04 PROCEDURE — 2022F DILAT RTA XM EVC RTNOPTHY: CPT | Performed by: NURSE PRACTITIONER

## 2021-03-04 PROCEDURE — 3046F HEMOGLOBIN A1C LEVEL >9.0%: CPT | Performed by: NURSE PRACTITIONER

## 2021-03-04 PROCEDURE — 3017F COLORECTAL CA SCREEN DOC REV: CPT | Performed by: NURSE PRACTITIONER

## 2021-03-04 PROCEDURE — G8484 FLU IMMUNIZE NO ADMIN: HCPCS | Performed by: NURSE PRACTITIONER

## 2021-03-04 PROCEDURE — 1036F TOBACCO NON-USER: CPT | Performed by: NURSE PRACTITIONER

## 2021-03-04 PROCEDURE — 99214 OFFICE O/P EST MOD 30 MIN: CPT | Performed by: NURSE PRACTITIONER

## 2021-03-04 PROCEDURE — G8427 DOCREV CUR MEDS BY ELIG CLIN: HCPCS | Performed by: NURSE PRACTITIONER

## 2021-03-04 RX ORDER — M-VIT,TX,IRON,MINS/CALC/FOLIC 27MG-0.4MG
1 TABLET ORAL DAILY
Qty: 90 TABLET | Refills: 3 | Status: SHIPPED | OUTPATIENT
Start: 2021-03-04 | End: 2024-07-07

## 2021-03-04 RX ORDER — CALCIUM CARBONATE/VITAMIN D3 600 MG-10
1 TABLET ORAL DAILY
Qty: 90 TABLET | Refills: 3 | Status: SHIPPED | OUTPATIENT
Start: 2021-03-04 | End: 2022-01-17

## 2021-03-04 NOTE — PROGRESS NOTES
Subjective     Chief Complaint   Patient presents with    Other     Office Visit extended discuss medications   (mercedez taylor)       Vitals:    03/04/21 1309   BP: 138/88   Pulse: 86   Temp: 97.4 °F (36.3 °C)   SpO2: 97%     Wt Readings from Last 3 Encounters:   03/04/21 175 lb 6.4 oz (79.6 kg)   03/03/21 165 lb (74.8 kg)   02/05/21 175 lb 14.4 oz (79.8 kg)     The patient first recognized that she had a weight problem about 5 years ago. The patient's lowest weight in the last five years was 160 lbs, and the highest weight in the last five years was 175.4 lbs. Weight Loss Program History:  The patient states she has tried pt had surgery  The most weight lost ever with diet and exercise was} Lbs, but unfortunately only kept them of for . These attempts have been temporarily successful with weight loss, but have failed to sustain adequate weight loss. Psychiatric history: anxiety and depression. Denies suicidal thoughts or ideations. Follows with mental health services  History of eating disorders  Cardiac history noted. Mobitz type 1 block. Sees Dr. Jessika Avendaño. Blood pressure has been elevated  Highest weight prior to sleeve 237. Had bilateral paniculectomies in 2019  No previous weight loss medications  Eats two meals per day does count calories  Uses protein shakes  Denies exercising  Drinks flavored water    Beverly's life is affected by weight related to below comorbid conditions. The patient has also tried self directed diet and exercise in attempts to sustain weight loss.      Comorbid Conditions:  Significant diseases effecting this patient are   Past Medical History:   Diagnosis Date    Anxiety     Arthritis     \"Back, Hands, Shoulders\"    Chronic back pain     Arthritis - NKI    COPD [facility]ch[facility] obstructive [facility] disease[facility] ([facility])     Sees Dr. Hassan Lefort with Mental Health    Diabetes mellitus ([facility] [address] Dx 2008    Type II - follows with PCP    Emphysema     Fibromyalgia  H/O abdominoplasty 12/21/2018    H/O cardiovascular stress test 03/04/2019    ECG portion of stress test is neg for ischemia by diagnostic criteria. No infarct or ischemia noted. Normal stress myocardial perfusion. This is a normal study    H/O echocardiogram 03/04/2019    Left ventricular function is normal. Ejection fraction is visually estimated at 55-60%. No significant valvular abnormalitites.  Hyperlipidemia     Hypertension     Follows with PCP    Migraines Last Migraine 10-18    Mobitz (type) I (Wenckebach's) atrioventricular block 01/29/2020    Neuropathy     From feet to knees    Piriformis syndrome 6/24/2016    Psoriasis     Rotator cuff impingement syndrome, right 2/26/2018    RTC repair Dr Lorenzo Templeton 2015  Treated by Dr Mya Pradhan 2/18/17  Arthrocentesis 12/19/17    Shortness of breath on exertion     Syncope and collapse 03/04/2019    Teeth missing     Upper And Lower    Wears glasses    . Thoroughly reviewed the patient's medical history, family history, social history and review of systems with the patient today in the office. Please see medical record for pertinent positives.       Allergies:  No Known Allergies    Past Surgical History:  Past Surgical History:   Procedure Laterality Date    ABDOMEN SURGERY N/A 1/14/2019    SECONDARY CLOSURE OF DEHISCED ABDOMINAL WOUND performed by Nir Logan MD at [facility] 71 N/A 1/24/2019    IRRIGATION OF LOWER ABDOMINAL WOUND performed by Nir Logan MD at [facility] 73 ABDOMINOPLASTY N/A 7/29/2019    ABDOMINOPLASTY REVISION performed by Nir Logan MD at [facility] 75 Right 1/29/2020    EXCISION OF RIGHT BREAST SABACEOUS CYST performed by Tyesha Suarez MD at [facility] 19 Right 2017    Dr. Mya Pradhan (Right)   [address]    Tubal Ligation Also Done In 1997    COLONOSCOPY  03/2017    Polyp Removed - Dr. Dutch Freedman  COLONOSCOPY N/A 2020    COLONOSCOPY POLYPECTOMY SNARE/COLD BIOPSY performed by Martha Alegria MD at [address] [facility]      Teeth Extracted In Past    ELBOW SURGERY Bilateral Last Done In     Right Done Twice, Left Done Once left cubital tunnel release ; right 2014    ENDOSCOPY, COLON, DIAGNOSTIC  2017    HERNIA REPAIR  10/26/2017    hiatal hernia with gastrectomy    HYSTERECTOMY VAGINAL  2000    LAPAROSCOPY      MI EXCISE EXCESS SKIN TISSUE,ABDOMEN N/A 2018    ABDOMINOPLASTY performed by Martha Alegria MD at [place] [address] Right     Dr. Carla Santoyo Right 2017    Dr Thu Baumann  10/26/2017    Robotic Laparoscopic, Pre Op Weight 237  Or 238 lbs.     TUBAL LIGATION      Done With        Family History:  Family History   Problem Relation Age of Onset    Heart Attack Father     Heart Disease Father     Early Death Father 46        Heart Attack    Alcohol Abuse Father     Substance Abuse Father         Alcoholic    Arthritis Mother     Heart Disease Mother     Diabetes Mother     Cancer Mother         \"Kidney Cancer\"    High Blood Pressure Mother     High Blood Pressure Sister     Diabetes Brother     Early Death Daughter 21        \"Killed In A Car Accident\"       Social History:  Social History     Socioeconomic History    Marital status: Single     Spouse name: Not on file    Number of children: Not on file    Years of education: Not on file    Highest education level: Not on file   Occupational History    Occupation: unemployed   Social Needs    Financial resource strain: Not on file    Food insecurity     Worry: Not on file     Inability: Not on file   [employer] needs     Medical: Not on file     Non-medical: Not on file   Tobacco Use    Smoking status: Former Smoker     Packs/day: 0.25     Years: 30.00     Pack years: 7.50     Types: Cigarettes, E-Cigarettes Start date:      Quit date:      Years since quittin.1    Smokeless tobacco: Never Used   Substance and Sexual Activity    Alcohol use: Yes     Comment: \"Occ. Maybe Twice A Month\"    Drug use: No    Sexual activity: Yes     Partners: Male   Lifestyle    Physical activity     Days per week: Not on file     Minutes per session: Not on file    Stress: Not on file   Relationships    Social connections     Talks on phone: Not on file     Gets together: Not on file     Attends Holiness service: Not on file     Active member of club or organization: Not on file     Attends meetings of clubs or organizations: Not on file     Relationship status: Not on file    Intimate partner violence     Fear of current or ex partner: Not on file     Emotionally abused: Not on file     Physically abused: Not on file     Forced sexual activity: Not on file   Other Topics Concern    Not on file   Social History Narrative    Not on file       Review of Systems - History obtained from the patient.     Review of Systems - General ROS: negative for - chills, fever, hot flashes or night sweats  ENT ROS: negative for - headaches, oral lesions, sore throat, vertigo or visual changes  Allergy and Immunology ROS: negative for - hives or nasal congestion  Endocrine ROS: negative for - hair pattern changes, mood swings or polydipsia/polyuria  Respiratory ROS: no cough, shortness of breath, or wheezing  Cardiovascular ROS: no chest pain or dyspnea on exertion  Gastrointestinal ROS: no abdominal pain, change in bowel habits, or black or bloody stools  Genito-Urinary ROS: no dysuria, incontinence, trouble voiding, or hematuria  Musculoskeletal ROS: Negative for joint pain or myalgia  Neurological ROS: negative for - behavioral changes, dizziness, headaches, impaired coordination/balance, numbness/tingling or seizures  Dermatological ROS: negative for - eczema or nail changes Psychological ROS: negative for - anxiety, depression or suicidal ideation     Objective     Physical Exam   Constitutional: Oriented to person, place, and time and well-developed, well-nourished, and in no distress. HENT:   Head: Normocephalic and atraumatic. Eyes: Conjunctivae are normal. Pupils are equal, round, and reactive to light. Neck: Normal range of motion. Neck supple. Cardiovascular: Normal rate, regular rhythm, normal heart sounds and intact distal pulses. No murmur heard. Pulmonary/Chest: Effort normal and breath sounds normal. No respiratory distress. Abdominal: Soft. Bowel sounds are normal. Exhibits no distension. There is tenderness. Musculoskeletal: Exhibits no edema or tenderness. Lymphadenopathy: Deferred. Neurological: She is alert and oriented to person, place, and time. No cranial nerve deficit. Skin: Skin is warm. She is not diaphoretic. Open wound noted below umbilicus. Minimal drainage. Open to air  Psychiatric: Mood, memory, affect and judgment normal.     Assessment  and Plan    Given medication handout today and discussed. Start medication BID and then increase to TID if tolerating well. Take daily with meals and avoid high fat foods. Will increase to orlistat if patient has coverage and tolerates well. Discussed with patient mechanism of inhibiting pancreatic lipases. Inhibiting the absorption of approximately 25-30 percent of calories ingested as far. Discussed side effects in detail cramps, flatulence, fecal incontinence, reduced absorption of fat-soluble vitamins. Contraindications: Hyperthyroidism, glaucoma, Kidney stones, ETOH, seizure hx, previous SI/Attempt, MAOI inhibiter therapy use within 14 days, pregnant. Pregnancy: teratogenic (increased risk of oral cleft defects, T1). Negative preg test prior and during tx. TWO forms of contraception necessary for women of child-bearing age. Adverse effects, hepatotoxicity and patients should be instructed to report any symptoms of hepatic impairment such as anorexia, pruritis, jaundice, and dark urine. Patient aware to d/c immediately. Fat soluble vitamins ADEK can be lowered. Need MVI at bedtime. Plan    1. S/P laparoscopic sleeve gastrectomy  - Multiple Vitamins-Minerals (THERAPEUTIC MULTIVITAMIN-MINERALS) tablet; Take 1 tablet by mouth daily  Dispense: 90 tablet; Refill: 3  - Calcium Carb-Cholecalciferol (CALCIUM-VITAMIN D) 600-400 MG-UNIT TABS; Take 1 tablet by mouth daily  Dispense: 90 tablet; Refill: 3  -Weight loss plan    2. Diabetes mellitus type 2 in obese [facility])  Well controlled on oral agent. PCP managing    3. Overweight  - Discussed weight loss program with patient and the metabolic components of obesity and insulin resistance over time. - Ultimately will need to change overall eating behaviors to have success in continued management with weight loss. - Will continue to journal food items and discussed the below recommendations to patient. - All questions answered and overall appears very receptive.   - Revisit with RD  -Reviewed labs. Plan to start luz. Instructed on above  -RTC with NP in one month    Patient was encouraged to journal all food intake using [employer] pal. Keep calorie level at approximately 1000. Protein intake is to be a minimum of 60 grams per day. Water drinking was encouraged with a goal of 64oz-128oz daily. Beverages to be calorie free except for milk. Every other beverage should be water. They are to avoid soda. Continue to increase level of physical activity. I spent 30 minutes and more than 50% of the office visit today was spent in face to face counseling regarding diet and exercise, counting calories, complying with the diet recommendations. Patient counseled on the benefits of treatment plan at length while in the office today. Patient states an understanding and willingness to proceed with the recommended weight loss plan. Orders Placed This Encounter   Medications    Multiple Vitamins-Minerals (THERAPEUTIC MULTIVITAMIN-MINERALS) tablet     Sig: Take 1 tablet by mouth daily     Dispense:  90 tablet     Refill:  3    Calcium Carb-Cholecalciferol (CALCIUM-VITAMIN D) 600-400 MG-UNIT TABS     Sig: Take 1 tablet by mouth daily     Dispense:  90 tablet     Refill:  3     No orders of the defined types were placed in this encounter. Follow Up:  Return in about 1 month (around 4/4/2021).     Dave Hyatt CNP

## 2021-03-05 ENCOUNTER — OFFICE VISIT (OUTPATIENT)
Dept: BARIATRICS/WEIGHT MGMT | Age: 56
End: 2021-03-05
Payer: COMMERCIAL

## 2021-03-05 VITALS
TEMPERATURE: 97.3 F | RESPIRATION RATE: 16 BRPM | DIASTOLIC BLOOD PRESSURE: 62 MMHG | HEIGHT: 66 IN | HEART RATE: 58 BPM | WEIGHT: 176.3 LBS | SYSTOLIC BLOOD PRESSURE: 102 MMHG | OXYGEN SATURATION: 93 % | BODY MASS INDEX: 28.33 KG/M2

## 2021-03-05 DIAGNOSIS — Z98.890 S/P PANNICULECTOMY: ICD-10-CM

## 2021-03-05 DIAGNOSIS — Z98.84 STATUS POST LAPAROSCOPIC SLEEVE GASTRECTOMY: Primary | ICD-10-CM

## 2021-03-05 PROCEDURE — 3017F COLORECTAL CA SCREEN DOC REV: CPT | Performed by: SURGERY

## 2021-03-05 PROCEDURE — G8427 DOCREV CUR MEDS BY ELIG CLIN: HCPCS | Performed by: SURGERY

## 2021-03-05 PROCEDURE — G8484 FLU IMMUNIZE NO ADMIN: HCPCS | Performed by: SURGERY

## 2021-03-05 PROCEDURE — 99213 OFFICE O/P EST LOW 20 MIN: CPT | Performed by: SURGERY

## 2021-03-05 PROCEDURE — G8417 CALC BMI ABV UP PARAM F/U: HCPCS | Performed by: SURGERY

## 2021-03-05 PROCEDURE — 1036F TOBACCO NON-USER: CPT | Performed by: SURGERY

## 2021-03-05 RX ORDER — ASCORBIC ACID 250 MG
1000 TABLET ORAL DAILY
Qty: 30 TABLET | Refills: 3 | Status: SHIPPED | OUTPATIENT
Start: 2021-03-05 | End: 2022-01-17

## 2021-03-05 RX ORDER — ZINC SULFATE 50(220)MG
220 CAPSULE ORAL DAILY
Qty: 30 CAPSULE | Refills: 3 | Status: SHIPPED | OUTPATIENT
Start: 2021-03-05 | End: 2022-01-17

## 2021-03-05 RX ORDER — FEEDER CONTAINER WITH PUMP SET
1 EACH MISCELLANEOUS
Qty: 32 CAN | Refills: 5 | Status: SHIPPED | OUTPATIENT
Start: 2021-03-05 | End: 2022-01-17

## 2021-03-05 ASSESSMENT — ENCOUNTER SYMPTOMS
BLOOD IN STOOL: 0
COUGH: 0
SHORTNESS OF BREATH: 0
ANAL BLEEDING: 0
SORE THROAT: 0
NAUSEA: 0
PHOTOPHOBIA: 0
TROUBLE SWALLOWING: 0
DIARRHEA: 0
VOICE CHANGE: 0
COLOR CHANGE: 0
ABDOMINAL PAIN: 0
CONSTIPATION: 0
VOMITING: 0
WHEEZING: 0

## 2021-03-05 NOTE — PROGRESS NOTES
  lisinopril-hydroCHLOROthiazide (PRINZIDE;ZESTORETIC) 10-12.5 MG per tablet, Take 2 tablet if SBP > 130 or 1 tablet if SBP < 130, do not take if SBP < 100, Disp: 60 tablet, Rfl: 1    glucose monitoring kit (FREESTYLE) monitoring kit, 1 kit by Does not apply route daily, Disp: 1 kit, Rfl: 0    cetirizine (ZYRTEC) 10 MG tablet, as needed, Disp: , Rfl:     Value Plus Lancets Thin 26G MISC, 1 each by Does not apply route daily, Disp: 100 each, Rfl: 5    blood glucose test strips (FREESTYLE LITE) strip, 1 each by In Vitro route daily As needed. , Disp: 100 strip, Rfl: 11    hydrOXYzine (ATARAX) 25 MG tablet, TAKE 1 TABLET EVERY 8 HOURS AS NEEDED FOR ANXIETY AVOID ALCOHOL/CAUSES DROWSINESS. Do not take with Xanax, Disp: 60 tablet, Rfl: 1    atorvastatin (LIPITOR) 80 MG tablet, TAKE 1 TABLET BY MOUTH DAILY, Disp: 90 tablet, Rfl: 0    alogliptin (NESINA) 6.25 MG TABS tablet, Take 1 tablet by mouth daily, Disp: 90 tablet, Rfl: 1    albuterol sulfate HFA (PROAIR HFA) 108 (90 Base) MCG/ACT inhaler, Inhale 2 puffs into the lungs every 6 hours as needed for Wheezing, Disp: 1 Inhaler, Rfl: 5    tiotropium (SPIRIVA RESPIMAT) 2.5 MCG/ACT AERS inhaler, Inhale 2 puffs into the lungs daily, Disp: 1 Inhaler, Rfl: 5    ammonium lactate (LAC-HYDRIN) 12 % lotion, , Disp: , Rfl:     calcipotriene (DOVONEX) 0.005 % ointment, , Disp: , Rfl:     clobetasol (TEMOVATE) 0.05 % ointment, , Disp: , Rfl:     tiZANidine (ZANAFLEX) 4 MG tablet, Take 4 mg by mouth every 8 hours as needed , Disp: , Rfl:     oxyCODONE-acetaminophen (PERCOCET) 7.5-325 MG per tablet, Take 1 tablet by mouth every 8 hours as needed for Pain (Patient states takes every 6 hours).  , Disp: , Rfl:     Psyllium (METAMUCIL) 48.57 % POWD, Take 1 Units by mouth daily, Disp: 1 Bottle, Rfl: 5    potassium chloride (KLOR-CON M) 20 MEQ extended release tablet, Take 1 tablet by mouth 2 times daily, Disp: 180 tablet, Rfl: 1   venlafaxine (EFFEXOR) 37.5 MG tablet, Take 37.5 mg by mouth 2 times daily, Disp: , Rfl:     fluticasone-vilanterol (BREO ELLIPTA) 100-25 MCG/INH AEPB inhaler, Inhale 1 puff into the lungs daily, Disp: 1 each, Rfl: 5    ALPRAZolam (XANAX) 0.5 MG tablet, Take 0.5 mg by mouth as needed for Sleep. ., Disp: , Rfl:   Past Medical History:   Diagnosis Date    Anxiety     Arthritis     \"Back, Hands, Shoulders\"    Chronic back pain     Arthritis - NKI    COPD [facility]ch[facility] disease) ([facility])     Sees Dr. Emilee Jacobs with Mental Health    Diabetes mellitus ([facility] [address] Dx 2008    Type II - follows with PCP    Emphysema     Fibromyalgia     H/O abdominoplasty 12/21/2018    H/O cardiovascular stress test 03/04/2019    ECG portion of stress test is neg for ischemia by diagnostic criteria. No infarct or ischemia noted. Normal stress myocardial perfusion. This is a normal study    H/O echocardiogram 03/04/2019    Left ventricular function is normal. Ejection fraction is visually estimated at 55-60%. No significant valvular abnormalitites.      Hyperlipidemia     Hypertension     Follows with PCP    Migraines Last Migraine 10-18    Mobitz (type) I (Wenckebach's) atrioventricular block 01/29/2020    Neuropathy     From feet to knees    Piriformis syndrome 6/24/2016    Psoriasis     Rotator cuff impingement syndrome, right 2/26/2018    RTC repair Dr Mercy Tate 2015  Treated by Dr Speedy Ferrari 2/18/17  Arthrocentesis 12/19/17    Shortness of breath on exertion     Syncope and collapse 03/04/2019    Teeth missing     Upper And Lower    Wears glasses       Past Surgical History:   Procedure Laterality Date    ABDOMEN SURGERY N/A 1/14/2019    SECONDARY CLOSURE OF DEHISCED ABDOMINAL WOUND performed by Angelina Oconnor MD at [address] N/A 1/24/2019    IRRIGATION OF LOWER ABDOMINAL WOUND [facility] ABDOMINOPLASTY N/A 7/29/2019 ABDOMINOPLASTY REVISION performed by Nash Mccartney [facility] Right 2020    EXCISION OF RIGHT BREAST SABACEOUS CYST performed by Tania Schulz MD at [facility] Right     Dr. Tanja Wen (Right)   [address]    Tubal Ligation Also Done In     COLONOSCOPY  2017    Polyp Removed - Dr. García Bruno COLONOSCOPY N/A 2020    COLONOSCOPY POLYPECTOMY SNARE/COLD BIOPSY performed by Nash Mccartney MD at [address] [facility]      Teeth Extracted In Past    ELBOW SURGERY Bilateral Last Done In     Right Done Twice, Left Done Once left cubital tunnel release ; right 2014    ENDOSCOPY, COLON, DIAGNOSTIC  2017    HERNIA REPAIR  10/26/2017    hiatal hernia with gastrectomy    HYSTERECTOMY VAGINAL      LAPAROSCOPY      VT EXCISE EXCESS SKIN TISSUE,ABDOMEN N/A 2018    ABDOMINOPLASTY performed by Nash Mccartney MD at [address] Right     Dr. Manuela Santana Right 2017    Dr Radha Wise  10/26/2017    Robotic Laparoscopic, Pre Op Weight 237  Or 238 lbs.  TUBAL LIGATION      Done With      Social History     Socioeconomic History    Marital status: Single     Spouse name: None    Number of children: None    Years of education: None    Highest education level: None   Occupational History    Occupation: unemployed   Social Needs    Financial resource strain: None    Food insecurity     Worry: None     Inability: None    Transportation needs     Medical: None     Non-medical: None   Tobacco Use    Smoking status: Former Smoker     Packs/day: 0.25     Years: 30.00     Pack years: 7.50     Types: Cigarettes, E-Cigarettes     Start date:      Quit date: 2016     Years since quittin.1    Smokeless tobacco: Never Used   Substance and Sexual Activity    Alcohol use:  Yes Comment: \"Occ. Maybe Twice A Month\"    Drug use: No    Sexual activity: Yes     Partners: Male   Lifestyle    Physical activity     Days per week: None     Minutes per session: None    Stress: None   Relationships    Social connections     Talks on phone: None     Gets together: None     Attends Faith service: None     Active member of club or organization: None     Attends meetings of clubs or organizations: None     Relationship status: None    Intimate partner violence     Fear of current or ex partner: None     Emotionally abused: None     Physically abused: None     Forced sexual activity: None   Other Topics Concern    None   Social History Narrative    None     Family History   Problem Relation Age of Onset    Heart Attack Father     Heart Disease Father     Early Death Father 46        Heart Attack    Alcohol Abuse Father     Substance Abuse Father         Alcoholic    Arthritis Mother     Heart Disease Mother     Diabetes Mother     Cancer Mother         \"Kidney Cancer\"    High Blood Pressure Mother     High Blood Pressure Sister     Diabetes Brother     Early Death Daughter 21        \"Killed In A Car Accident\"     Review of Systems   Constitutional: Negative for activity change, chills, diaphoresis and fever. HENT: Negative for sore throat, trouble swallowing and voice change. Eyes: Negative for photophobia and visual disturbance. Respiratory: Negative for cough, shortness of breath and wheezing. Cardiovascular: Negative for chest pain, palpitations and leg swelling. Gastrointestinal: Negative for abdominal pain, anal bleeding, blood in stool, constipation, diarrhea, nausea and vomiting. Endocrine: Negative for cold intolerance, heat intolerance, polydipsia and polyuria. Genitourinary: Negative for dysuria, frequency and hematuria. Musculoskeletal: Negative for joint swelling, myalgias and neck stiffness. Skin: Negative for color change and rash. Needs more prtn, and more LOW, counseled in length and will follow in 2 months. Follow Up:  Return in about 2 months (around 5/5/2021) for Bariatric follow up: diet, exercise & weight loss, Follow up Symptoms.     Alysa Lou MD, FACS, FICS  Member of the [id number] of [employer] and [employer]    [phone]    3/5/21

## 2021-03-08 DIAGNOSIS — E11.9 TYPE 2 DIABETES MELLITUS WITHOUT COMPLICATION, WITHOUT LONG-TERM CURRENT USE OF INSULIN (HCC): ICD-10-CM

## 2021-03-08 RX ORDER — ALOGLIPTIN 6.25 MG/1
6.25 TABLET, FILM COATED ORAL DAILY
Qty: 90 TABLET | Refills: 1 | Status: SHIPPED | OUTPATIENT
Start: 2021-03-08 | End: 2021-12-02

## 2021-03-09 ENCOUNTER — TELEPHONE (OUTPATIENT)
Dept: BARIATRICS/WEIGHT MGMT | Age: 56
End: 2021-03-09

## 2021-03-09 NOTE — TELEPHONE ENCOUNTER
Pt called stating she started all her medications / vitamins and has been throwing up today. Pt started Rikki with [facility] as well. Please advise.

## 2021-03-15 NOTE — TELEPHONE ENCOUNTER
Called patient to follow up. States only vomited once. Taking luz, TID. Tolerating well now. Aware to call with any issues or side effects.

## 2021-03-27 NOTE — PROGRESS NOTES
BRONCHOSPASM/BRONCHOCONSTRICTION    IMPROVE  AERATION/BREATHSOUNDS  ADMINISTER BRONCHODILATOR THERAPY AS APPROPRIATE  ASSESS BREATH SOUNDS  PATIENT EDUCATION AS NEEDED The patient first recognized that she had a weight problem about 5 years ago. The patient's lowest weight in the last five years was 160 lbs, and the highest weight in the last five years was 175.4 lbs. Weight Loss Program History:  The patient states she has tried pt had surgery  The most weight lost ever with diet and exercise was} Lbs, but unfortunately only kept them of for . These attempts have been temporarily successful with weight loss, but have failed to sustain adequate weight loss.

## 2021-04-05 ENCOUNTER — VIRTUAL VISIT (OUTPATIENT)
Dept: BARIATRICS/WEIGHT MGMT | Age: 56
End: 2021-04-05

## 2021-04-05 DIAGNOSIS — E66.3 OVERWEIGHT (BMI 25.0-29.9): Primary | ICD-10-CM

## 2021-04-05 PROCEDURE — 99999 PR OFFICE/OUTPT VISIT,PROCEDURE ONLY: CPT

## 2021-04-05 NOTE — PROGRESS NOTES
Outpatient Nutrition Counseling    REASON FOR VISIT: post-Op F/U    Chief Complaint:    Chief Complaint   Patient presents with    Weight Management       SUBJECTIVE:  Pt is s/p Sleeve Gastrectomy and 1.5 years s/p paniculectomy. Pt having pain with small hernia at umbilicus. Needs to lose 10 lbs before repair. Had been counting calories - 800/day and was losing but then stopped counting when weight loss stalled. Was taking Rikki but no benefit after 1 month. Pt now not counting. Not exercising. Discussed pt needs to resume calorie goal 700-800/day, min 60 gms protein/day. Needs to add in exercise to help with weight loss and maintaining weight. Instructed on expected rate of weight loss. Pt verbalized understanding. The patient is a 49 y.o. female being seen for morbid obesity, considering weight loss surgery; [facility]'s,  ,  , Current There is no height or weight on file to calculate BMI. The patient's PCP is YESSICA Camacho NP     Comorbid Conditions:  Significant diseases affecting this patient are   Past Medical History:   Diagnosis Date    Anxiety     Arthritis     \"Back, Hands, Shoulders\"    Chronic back pain     Arthritis - NKI    COPD (chronic obstructive pulmonary disease) ([facility]     Sees Dr. Marine Bowman with Mental Health    Diabetes mellitus ([facility] Dx 2008    Type II - follows with PCP    Emphysema     Fibromyalgia     H/O abdominoplasty 12/21/2018    H/O cardiovascular stress test 03/04/2019    ECG portion of stress test is neg for ischemia by diagnostic criteria. No infarct or ischemia noted. Normal stress myocardial perfusion. This is a normal study    H/O echocardiogram 03/04/2019    Left ventricular function is normal. Ejection fraction is visually estimated at 55-60%. No significant valvular abnormalitites.      Hyperlipidemia     Hypertension     Follows with PCP    Migraines Last Migraine 10-18    Mobitz (type) I (Wenckebach's) atrioventricular block 01/29/2020    Neuropathy     From feet to knees    Piriformis syndrome 6/24/2016    Psoriasis     Rotator cuff impingement syndrome, right 2/26/2018    RTC repair Dr Catherine Leija 2015  Treated by Dr Sonali Clements 2/18/17  Arthrocentesis 12/19/17    Shortness of breath on exertion     Syncope and collapse 03/04/2019    Teeth missing     Upper And Lower    Wears glasses    . Review of Systems - Review of Systems  Otherwise per HPI.     Allergies:  No Known Allergies    Past Surgical History:  Past Surgical History:   Procedure Laterality Date    ABDOMEN SURGERY N/A 1/14/2019    SECONDARY CLOSURE OF DEHISCED ABDOMINAL WOUND performed by Jeana Vazquez MD at [facility] 71 N/A 1/24/2019    IRRIGATION OF LOWER ABDOMINAL WOUND performed by Jeana Vazquez MD at [facility] 73 ABDOMINOPLASTY N/A 7/29/2019    ABDOMINOPLASTY REVISION performed by Jeana Vazquez MD at [facility] 75 Right 1/29/2020    EXCISION OF RIGHT BREAST SABACEOUS CYST performed by Cl Quick MD at [facility] 19 Right 2017    Dr. Sonali Clements (Right)   [address]    Tubal Ligation Also Done In 1997    COLONOSCOPY  03/2017    Polyp Removed - Dr. Houston Cummings COLONOSCOPY N/A 7/27/2020    COLONOSCOPY POLYPECTOMY SNARE/COLD BIOPSY performed by Jeana Vazquez MD at [address] [place] [address]      Teeth Extracted In Past    ELBOW SURGERY Bilateral Last Done In 2013    Right Done Twice, Left Done Once left cubital tunnel release 2009; right 2014    ENDOSCOPY, COLON, DIAGNOSTIC  03/2017    HERNIA REPAIR  10/26/2017    hiatal hernia with gastrectomy    HYSTERECTOMY VAGINAL  2000    LAPAROSCOPY  2000    NC EXCISE EXCESS SKIN TISSUE,ABDOMEN N/A 11/29/2018    ABDOMINOPLASTY performed by Jeana Vazquez MD at [address] Right 2015    Dr. Mary Choudhary Right 03/2017    Dr Karley Ball  10/26/2017 Fear of current or ex partner: Not on file     Emotionally abused: Not on file     Physically abused: Not on file     Forced sexual activity: Not on file   Other Topics Concern    Not on file   Social History Narrative    Not on file         OBJECTIVE:  Physical Exam   There were no vitals taken for this visit.        NUTRITION DIAGNOSIS: Overweight / Obesity   Problem: Increased adiposity compared to reference standard or established norms   Etiology: Excess intake compared to output over time   S/S: Ht: 66\" Wt: 176 lbs BMI: 28.46    NUTRITION INTERVENTIONS:    Individualized treatment goals to address nutritiondiagnosis:   Instructed on 700-800 kcal diet for weight loss   Provided expected weight loss and encouraged activity Encouraged Physical activityas approved by physician    MONITORING/ EVALUATION/ PLAN:   Pt verbalized understanding of allmaterials covered   Pt asked pertinent questions throughout the session - expect compliance with nutrition guidelines presented   Provided pt with contact information should questions arise prior to next visit   Will f/u with pt NYA Allen MS, RDN, LD  4/5/2021

## 2021-04-08 ENCOUNTER — HOSPITAL ENCOUNTER (OUTPATIENT)
Age: 56
Setting detail: SPECIMEN
Discharge: HOME | End: 2021-04-08
Payer: COMMERCIAL

## 2021-04-08 ENCOUNTER — TELEPHONE (OUTPATIENT)
Dept: CARDIOLOGY CLINIC | Age: 56
End: 2021-04-08

## 2021-04-08 ENCOUNTER — HOSPITAL ENCOUNTER (OUTPATIENT)
Age: 56
Discharge: HOME | End: 2021-04-08
Payer: COMMERCIAL

## 2021-04-08 DIAGNOSIS — E78.5 DYSLIPIDEMIA: Primary | ICD-10-CM

## 2021-04-08 LAB
ALBUMIN SERPL-MCNC: 4.4 GM/DL (ref 3.4–5)
ALP BLD-CCNC: 83 IU/L (ref 40–129)
ALT SERPL-CCNC: 22 U/L (ref 10–40)
ANION GAP SERPL CALCULATED.3IONS-SCNC: 11 MMOL/L (ref 4–16)
AST SERPL-CCNC: 25 IU/L (ref 15–37)
BASOPHILS ABSOLUTE: 0.1 K/CU MM
BASOPHILS RELATIVE PERCENT: 0.9 % (ref 0–1)
BILIRUB SERPL-MCNC: 0.7 MG/DL (ref 0–1)
BILIRUBIN DIRECT: 0.2 MG/DL (ref 0–0.3)
BILIRUBIN, INDIRECT: 0.5 MG/DL (ref 0–0.7)
BUN BLDV-MCNC: 23 MG/DL (ref 6–23)
CALCIUM SERPL-MCNC: 9.9 MG/DL (ref 8.3–10.6)
CHLORIDE BLD-SCNC: 98 MMOL/L (ref 99–110)
CHOLESTEROL: 250 MG/DL
CO2: 27 MMOL/L (ref 21–32)
CREAT SERPL-MCNC: 0.9 MG/DL (ref 0.6–1.1)
DIFFERENTIAL TYPE: ABNORMAL
EOSINOPHILS ABSOLUTE: 0.1 K/CU MM
EOSINOPHILS RELATIVE PERCENT: 1.7 % (ref 0–3)
GFR AFRICAN AMERICAN: >60 ML/MIN/1.73M2
GFR NON-AFRICAN AMERICAN: >60 ML/MIN/1.73M2
GLUCOSE BLD-MCNC: 109 MG/DL (ref 70–99)
HBV SURFACE AB TITR SER: <3.5 {TITER}
HCT VFR BLD CALC: 41.8 % (ref 37–47)
HDLC SERPL-MCNC: 65 MG/DL
HEMOGLOBIN: 14.1 GM/DL (ref 12.5–16)
HEPATITIS B CORE IGM ANTIBODY: NON REACTIVE
HEPATITIS B SURFACE ANTIGEN: NON REACTIVE
HEPATITIS C ANTIBODY: NON REACTIVE
IMMATURE NEUTROPHIL %: 0.4 % (ref 0–0.43)
LDL CHOLESTEROL DIRECT: 163 MG/DL
LYMPHOCYTES ABSOLUTE: 3.2 K/CU MM
LYMPHOCYTES RELATIVE PERCENT: 41.8 % (ref 24–44)
MCH RBC QN AUTO: 26.3 PG (ref 27–31)
MCHC RBC AUTO-ENTMCNC: 33.7 % (ref 32–36)
MCV RBC AUTO: 77.8 FL (ref 78–100)
MONOCYTES ABSOLUTE: 0.6 K/CU MM
MONOCYTES RELATIVE PERCENT: 7.9 % (ref 0–4)
NUCLEATED RBC %: 0 %
PDW BLD-RTO: 13.7 % (ref 11.7–14.9)
PLATELET # BLD: 348 K/CU MM (ref 140–440)
PMV BLD AUTO: 11.3 FL (ref 7.5–11.1)
POTASSIUM SERPL-SCNC: 5 MMOL/L (ref 3.5–5.1)
RBC # BLD: 5.37 M/CU MM (ref 4.2–5.4)
SEGMENTED NEUTROPHILS ABSOLUTE COUNT: 3.6 K/CU MM
SEGMENTED NEUTROPHILS RELATIVE PERCENT: 47.3 % (ref 36–66)
SODIUM BLD-SCNC: 136 MMOL/L (ref 135–145)
TOTAL IMMATURE NEUTOROPHIL: 0.03 K/CU MM
TOTAL NUCLEATED RBC: 0 K/CU MM
TOTAL PROTEIN: 7.3 GM/DL (ref 6.4–8.2)
TRIGL SERPL-MCNC: 125 MG/DL
VITAMIN D 25-HYDROXY: 18.6 NG/ML
WBC # BLD: 7.7 K/CU MM (ref 4–10.5)

## 2021-04-08 PROCEDURE — 87340 HEPATITIS B SURFACE AG IA: CPT

## 2021-04-08 PROCEDURE — 86803 HEPATITIS C AB TEST: CPT

## 2021-04-08 PROCEDURE — 85025 COMPLETE CBC W/AUTO DIFF WBC: CPT

## 2021-04-08 PROCEDURE — 86706 HEP B SURFACE ANTIBODY: CPT

## 2021-04-08 PROCEDURE — 86705 HEP B CORE ANTIBODY IGM: CPT

## 2021-04-08 PROCEDURE — 80061 LIPID PANEL: CPT

## 2021-04-08 PROCEDURE — 36415 COLL VENOUS BLD VENIPUNCTURE: CPT

## 2021-04-08 PROCEDURE — 83721 ASSAY OF BLOOD LIPOPROTEIN: CPT

## 2021-04-08 PROCEDURE — 80053 COMPREHEN METABOLIC PANEL: CPT

## 2021-04-08 PROCEDURE — 82306 VITAMIN D 25 HYDROXY: CPT

## 2021-04-08 PROCEDURE — 86480 TB TEST CELL IMMUN MEASURE: CPT

## 2021-04-08 PROCEDURE — 82248 BILIRUBIN DIRECT: CPT

## 2021-04-08 RX ORDER — LISINOPRIL AND HYDROCHLOROTHIAZIDE 12.5; 1 MG/1; MG/1
TABLET ORAL
Qty: 30 TABLET | Refills: 2 | Status: SHIPPED | OUTPATIENT
Start: 2021-04-08 | End: 2021-07-15 | Stop reason: ALTCHOICE

## 2021-04-08 RX ORDER — EZETIMIBE 10 MG/1
10 TABLET ORAL DAILY
Qty: 90 TABLET | Refills: 1 | Status: SHIPPED | OUTPATIENT
Start: 2021-04-08 | End: 2021-09-17

## 2021-04-08 NOTE — TELEPHONE ENCOUNTER
Patient cholesterol remains elevated. Recommend patient to start Zetia to assist with management of elevated LDL. Goal LDL for patient is less than 130. Encourage patient to continue to exercise and attempt to lose weight. Recheck cholesterol in 6 to 8 weeks for further management.

## 2021-04-13 DIAGNOSIS — R76.12 POSITIVE QUANTIFERON-TB GOLD TEST: Primary | ICD-10-CM

## 2021-04-13 LAB
QUANTI TB1 MINUS NIL: 0.41 IU/ML (ref 0–0.34)
QUANTI TB2 MINUS NIL: 0.25 IU/ML (ref 0–0.34)
QUANTIFERON (R) TB GOLD (INCUBATED): POSITIVE IU/ML
QUANTIFERON MITOGEN MINUS NIL: 7.95 IU/ML
QUANTIFERON NIL: 0.16 IU/ML

## 2021-04-13 NOTE — RESULT ENCOUNTER NOTE
Looks like her dermatologist ordered all of these labs. ..   Please contact his office to have labs sent to him. [address] [facility]   [address], [address]   [phone]     She is TB positive. . need to send referral to Dr. Mandy Engle. Please let pt know.

## 2021-04-19 ENCOUNTER — HOSPITAL ENCOUNTER (OUTPATIENT)
Age: 56
Discharge: HOME | End: 2021-04-19
Payer: COMMERCIAL

## 2021-04-19 ENCOUNTER — HOSPITAL ENCOUNTER (OUTPATIENT)
Dept: GENERAL RADIOLOGY | Age: 56
Discharge: HOME | End: 2021-04-19
Payer: COMMERCIAL

## 2021-04-19 DIAGNOSIS — Z11.1 SCREENING-PULMONARY TB: ICD-10-CM

## 2021-04-19 PROCEDURE — 71046 X-RAY EXAM CHEST 2 VIEWS: CPT

## 2021-04-20 ENCOUNTER — TELEPHONE (OUTPATIENT)
Dept: FAMILY MEDICINE CLINIC | Age: 56
End: 2021-04-20

## 2021-04-20 RX ORDER — GUAIFENESIN 600 MG/1
600 TABLET, EXTENDED RELEASE ORAL 2 TIMES DAILY
Qty: 28 TABLET | Refills: 0 | Status: SHIPPED | OUTPATIENT
Start: 2021-04-20 | End: 2021-05-04

## 2021-04-20 NOTE — TELEPHONE ENCOUNTER
Pt called and is asking if there is something that can be sent in for her phlegm and mucus?  please advise

## 2021-04-28 ENCOUNTER — TELEPHONE (OUTPATIENT)
Dept: FAMILY MEDICINE CLINIC | Age: 56
End: 2021-04-28

## 2021-04-29 RX ORDER — BLOOD SUGAR DIAGNOSTIC
1 STRIP MISCELLANEOUS DAILY
Qty: 100 EACH | Refills: 3 | Status: SHIPPED | OUTPATIENT
Start: 2021-04-29 | End: 2022-09-07

## 2021-04-29 RX ORDER — GLUCOSAM/CHON-MSM1/C/MANG/BOSW 500-416.6
1 TABLET ORAL DAILY
Qty: 100 EACH | Refills: 3 | Status: SHIPPED | OUTPATIENT
Start: 2021-04-29 | End: 2021-11-22

## 2021-04-29 RX ORDER — DIPHENHYDRAMINE HCL 25 MG
1 TABLET ORAL CONTINUOUS
Qty: 1 KIT | Refills: 0 | Status: SHIPPED | OUTPATIENT
Start: 2021-04-29 | End: 2022-09-07

## 2021-04-30 ENCOUNTER — TELEPHONE (OUTPATIENT)
Dept: FAMILY MEDICINE CLINIC | Age: 56
End: 2021-04-30

## 2021-04-30 DIAGNOSIS — G43.709 CHRONIC MIGRAINE WITHOUT AURA WITHOUT STATUS MIGRAINOSUS, NOT INTRACTABLE: Primary | ICD-10-CM

## 2021-04-30 NOTE — TELEPHONE ENCOUNTER
She stated that you sent her daughter to [facility]-Dr. Bryce Huffman at [facility] (fax # [phone]) she would like to go to him, please advise.   Thank you

## 2021-05-03 ENCOUNTER — TELEPHONE (OUTPATIENT)
Dept: FAMILY MEDICINE CLINIC | Age: 56
End: 2021-05-03

## 2021-05-06 ENCOUNTER — OFFICE VISIT (OUTPATIENT)
Dept: FAMILY MEDICINE CLINIC | Age: 56
End: 2021-05-06
Payer: COMMERCIAL

## 2021-05-06 VITALS
WEIGHT: 168 LBS | HEART RATE: 74 BPM | TEMPERATURE: 97.3 F | BODY MASS INDEX: 27 KG/M2 | SYSTOLIC BLOOD PRESSURE: 126 MMHG | OXYGEN SATURATION: 97 % | HEIGHT: 66 IN | DIASTOLIC BLOOD PRESSURE: 80 MMHG

## 2021-05-06 DIAGNOSIS — Z12.4 PAP SMEAR FOR CERVICAL CANCER SCREENING: ICD-10-CM

## 2021-05-06 DIAGNOSIS — R60.9 CHRONIC EDEMA: ICD-10-CM

## 2021-05-06 DIAGNOSIS — F41.9 ANXIETY: ICD-10-CM

## 2021-05-06 DIAGNOSIS — E55.9 VITAMIN D DEFICIENCY: ICD-10-CM

## 2021-05-06 DIAGNOSIS — Z11.3 SCREEN FOR SEXUALLY TRANSMITTED DISEASES: ICD-10-CM

## 2021-05-06 DIAGNOSIS — L40.9 PSORIASIS: ICD-10-CM

## 2021-05-06 DIAGNOSIS — K21.9 GASTROESOPHAGEAL REFLUX DISEASE, UNSPECIFIED WHETHER ESOPHAGITIS PRESENT: ICD-10-CM

## 2021-05-06 DIAGNOSIS — Z22.7 LATENT TUBERCULOSIS: ICD-10-CM

## 2021-05-06 DIAGNOSIS — E11.9 TYPE 2 DIABETES MELLITUS WITHOUT COMPLICATION, WITHOUT LONG-TERM CURRENT USE OF INSULIN (HCC): Primary | ICD-10-CM

## 2021-05-06 DIAGNOSIS — E11.9 TYPE 2 DIABETES MELLITUS WITHOUT COMPLICATION, WITHOUT LONG-TERM CURRENT USE OF INSULIN (HCC): ICD-10-CM

## 2021-05-06 DIAGNOSIS — E78.2 MIXED HYPERLIPIDEMIA: ICD-10-CM

## 2021-05-06 DIAGNOSIS — I10 ESSENTIAL HYPERTENSION: ICD-10-CM

## 2021-05-06 DIAGNOSIS — J44.9 COPD, SEVERE (HCC): ICD-10-CM

## 2021-05-06 DIAGNOSIS — G89.4 CHRONIC PAIN SYNDROME: ICD-10-CM

## 2021-05-06 PROCEDURE — 3046F HEMOGLOBIN A1C LEVEL >9.0%: CPT | Performed by: NURSE PRACTITIONER

## 2021-05-06 PROCEDURE — 1036F TOBACCO NON-USER: CPT | Performed by: NURSE PRACTITIONER

## 2021-05-06 PROCEDURE — G8926 SPIRO NO PERF OR DOC: HCPCS | Performed by: NURSE PRACTITIONER

## 2021-05-06 PROCEDURE — 3023F SPIROM DOC REV: CPT | Performed by: NURSE PRACTITIONER

## 2021-05-06 PROCEDURE — G8417 CALC BMI ABV UP PARAM F/U: HCPCS | Performed by: NURSE PRACTITIONER

## 2021-05-06 PROCEDURE — 99214 OFFICE O/P EST MOD 30 MIN: CPT | Performed by: NURSE PRACTITIONER

## 2021-05-06 PROCEDURE — 3017F COLORECTAL CA SCREEN DOC REV: CPT | Performed by: NURSE PRACTITIONER

## 2021-05-06 PROCEDURE — 2022F DILAT RTA XM EVC RTNOPTHY: CPT | Performed by: NURSE PRACTITIONER

## 2021-05-06 PROCEDURE — G8427 DOCREV CUR MEDS BY ELIG CLIN: HCPCS | Performed by: NURSE PRACTITIONER

## 2021-05-06 RX ORDER — ATORVASTATIN CALCIUM 80 MG/1
80 TABLET, FILM COATED ORAL DAILY
Qty: 90 TABLET | Refills: 2 | Status: SHIPPED | OUTPATIENT
Start: 2021-05-06 | End: 2021-12-14 | Stop reason: SDUPTHER

## 2021-05-06 RX ORDER — OMEPRAZOLE 40 MG/1
40 CAPSULE, DELAYED RELEASE ORAL DAILY
Qty: 90 CAPSULE | Refills: 2 | Status: SHIPPED | OUTPATIENT
Start: 2021-05-06 | End: 2021-12-20 | Stop reason: SDUPTHER

## 2021-05-06 RX ORDER — HYDROXYZINE HYDROCHLORIDE 25 MG/1
TABLET, FILM COATED ORAL
Qty: 60 TABLET | Refills: 1 | Status: CANCELLED | OUTPATIENT
Start: 2021-05-06

## 2021-05-06 RX ORDER — CETIRIZINE HYDROCHLORIDE 10 MG/1
10 TABLET ORAL DAILY
Qty: 90 TABLET | Refills: 3 | Status: SHIPPED | OUTPATIENT
Start: 2021-05-06 | End: 2021-08-04

## 2021-05-07 ENCOUNTER — OFFICE VISIT (OUTPATIENT)
Dept: BARIATRICS/WEIGHT MGMT | Age: 56
End: 2021-05-07
Payer: COMMERCIAL

## 2021-05-07 VITALS
DIASTOLIC BLOOD PRESSURE: 72 MMHG | SYSTOLIC BLOOD PRESSURE: 116 MMHG | TEMPERATURE: 98.8 F | BODY MASS INDEX: 26.84 KG/M2 | HEART RATE: 96 BPM | HEIGHT: 66 IN | OXYGEN SATURATION: 97 % | WEIGHT: 167 LBS

## 2021-05-07 DIAGNOSIS — E65 PANNUS, ABDOMINAL: Primary | ICD-10-CM

## 2021-05-07 LAB
C. TRACHOMATIS DNA,THIN PREP: NEGATIVE
CANDIDA SPECIES, DNA PROBE: NORMAL
ESTIMATED AVERAGE GLUCOSE: 139.9 MG/DL
GARDNERELLA VAGINALIS, DNA PROBE: NORMAL
HBA1C MFR BLD: 6.5 %
HERPES SIMPLEX VIRUS 1 IGG: NEGATIVE
HERPES SIMPLEX VIRUS 2 IGG: POSITIVE
HIV AG/AB: NORMAL
HIV ANTIGEN: NORMAL
HIV-1 ANTIBODY: NORMAL
HIV-2 AB: NORMAL
TOTAL SYPHILLIS IGG/IGM: NORMAL
TRICHOMONAS VAGINALIS DNA: NORMAL

## 2021-05-07 PROCEDURE — 99213 OFFICE O/P EST LOW 20 MIN: CPT | Performed by: SURGERY

## 2021-05-07 PROCEDURE — 3017F COLORECTAL CA SCREEN DOC REV: CPT | Performed by: SURGERY

## 2021-05-07 PROCEDURE — G8427 DOCREV CUR MEDS BY ELIG CLIN: HCPCS | Performed by: SURGERY

## 2021-05-07 PROCEDURE — 1036F TOBACCO NON-USER: CPT | Performed by: SURGERY

## 2021-05-07 PROCEDURE — G8417 CALC BMI ABV UP PARAM F/U: HCPCS | Performed by: SURGERY

## 2021-05-07 ASSESSMENT — ENCOUNTER SYMPTOMS
PHOTOPHOBIA: 0
BLOOD IN STOOL: 0
TROUBLE SWALLOWING: 0
VOICE CHANGE: 0
SORE THROAT: 0
COLOR CHANGE: 1
NAUSEA: 0
ABDOMINAL PAIN: 1
CONSTIPATION: 0
VOMITING: 0
SHORTNESS OF BREATH: 0
COUGH: 0
WHEEZING: 0
DIARRHEA: 0
ANAL BLEEDING: 0

## 2021-05-07 NOTE — PROGRESS NOTES
BARIATRIC SURGERY POST OPERATIVE NOTE    SUBJECTIVE:    Patient presenting today referred from YESSICA Narvaez NP, for   Chief Complaint   Patient presents with    Follow-up     2 mth f/u abd pain S/P abdominophasty ,7/29/19 S/P Gastric Clfnfl65/26/17     /72   Pulse 96   Temp 98.8 °F (37.1 °C)   Ht 5' 6\" (1.676 m)   Wt 167 lb (75.8 kg)   SpO2 97%   BMI 26.95 kg/m²      HPI: Amanda Edmondson is a 49 y.o. female s/p sleeve gastrectomy and abdominoplasty 7/29/2019, NOW pannus abdominus, 30 gm prtn and 160 kcal once daily, loosing good weight, and close to 160 goal, before her second abdominoplasy. Current Outpatient Medications:     omeprazole (PRILOSEC) 40 MG delayed release capsule, Take 1 capsule by mouth daily, Disp: 90 capsule, Rfl: 2    atorvastatin (LIPITOR) 80 MG tablet, Take 1 tablet by mouth daily, Disp: 90 tablet, Rfl: 2    cetirizine (ZYRTEC) 10 MG tablet, Take 1 tablet by mouth daily, Disp: 90 tablet, Rfl: 3    Blood Glucose Monitoring Suppl (TRUE METRIX AIR GLUCOSE METER) w/Device KIT, 1 Device by Does not apply route continuous, Disp: 1 kit, Rfl: 0    TRUEplus Lancets 28G MISC, 1 each by Does not apply route daily, Disp: 100 each, Rfl: 3    blood glucose test strips (TRUE METRIX PRO BLOOD GLUCOSE) strip, 1 each by In Vitro route daily As needed. , Disp: 100 each, Rfl: 3    lisinopril-hydroCHLOROthiazide (PRINZIDE;ZESTORETIC) 10-12.5 MG per tablet, 1 tablet if SBP > 130, do not take if SBP < 110, Disp: 30 tablet, Rfl: 2    ezetimibe (ZETIA) 10 MG tablet, Take 1 tablet by mouth daily, Disp: 90 tablet, Rfl: 1    traZODone (DESYREL) 100 MG tablet, , Disp: , Rfl:     alogliptin (NESINA) 6.25 MG TABS tablet, Take 1 tablet by mouth daily, Disp: 90 tablet, Rfl: 1    ascorbic acid (V-R VITAMIN C) 250 MG tablet, Take 4 tablets by mouth daily, Disp: 30 tablet, Rfl: 3    zinc sulfate (ORAZINC) 220 (50 Zn) MG capsule, Take 4 capsules by mouth daily, Disp: 30 capsule, Rfl: 3   Nutritional Supplements (ENSURE HIGH PROTEIN) LIQD, Take 1 Can by mouth Daily with lunch, Disp: 32 Can, Rfl: 5    Multiple Vitamins-Minerals (THERAPEUTIC MULTIVITAMIN-MINERALS) tablet, Take 1 tablet by mouth daily, Disp: 90 tablet, Rfl: 3    Calcium Carb-Cholecalciferol (CALCIUM-VITAMIN D) 600-400 MG-UNIT TABS, Take 1 tablet by mouth daily, Disp: 90 tablet, Rfl: 3    albuterol sulfate HFA (PROAIR HFA) 108 (90 Base) MCG/ACT inhaler, Inhale 2 puffs into the lungs every 6 hours as needed for Wheezing, Disp: 1 Inhaler, Rfl: 5    hydrOXYzine (ATARAX) 25 MG tablet, TAKE 1 TABLET EVERY 8 HOURS AS NEEDED FOR ANXIETY AVOID ALCOHOL/CAUSES DROWSINESS. Do not take with Xanax, Disp: 60 tablet, Rfl: 1    albuterol sulfate HFA (PROAIR HFA) 108 (90 Base) MCG/ACT inhaler, Inhale 2 puffs into the lungs every 6 hours as needed for Wheezing, Disp: 1 Inhaler, Rfl: 5    tiotropium (SPIRIVA RESPIMAT) 2.5 MCG/ACT AERS inhaler, Inhale 2 puffs into the lungs daily, Disp: 1 Inhaler, Rfl: 5    ammonium lactate (LAC-HYDRIN) 12 % lotion, , Disp: , Rfl:     calcipotriene (DOVONEX) 0.005 % ointment, , Disp: , Rfl:     clobetasol (TEMOVATE) 0.05 % ointment, , Disp: , Rfl:     tiZANidine (ZANAFLEX) 4 MG tablet, Take 4 mg by mouth every 8 hours as needed , Disp: , Rfl:     oxyCODONE-acetaminophen (PERCOCET) 7.5-325 MG per tablet, Take 1 tablet by mouth every 8 hours as needed for Pain (Patient states takes every 6 hours).  , Disp: , Rfl:     Psyllium (METAMUCIL) 48.57 % POWD, Take 1 Units by mouth daily, Disp: 1 Bottle, Rfl: 5    potassium chloride (KLOR-CON M) 20 MEQ extended release tablet, Take 1 tablet by mouth 2 times daily, Disp: 180 tablet, Rfl: 1    venlafaxine (EFFEXOR) 37.5 MG tablet, Take 37.5 mg by mouth 2 times daily, Disp: , Rfl:     fluticasone-vilanterol (BREO ELLIPTA) 100-25 MCG/INH AEPB inhaler, Inhale 1 puff into the lungs daily, Disp: 1 each, Rfl: 5    ALPRAZolam (XANAX) 0.5 MG tablet, Take 0.5 mg by mouth as needed for Sleep. ., Disp: , Rfl:   Past Medical History:   Diagnosis Date    Anxiety     Arthritis     \"Back, Hands, Shoulders\"    Chronic back pain     Arthritis - NKI    COPD [facility]chronic obstructive pulmonary disease) ([facility])     Sees Dr. Jesusita Khan with Mental Health    Diabetes mellitus ([facility] [address] Dx 2008    Type II - follows with PCP    Emphysema     Fibromyalgia     H/O abdominoplasty 12/21/2018    H/O cardiovascular stress test 03/04/2019    ECG portion of stress test is neg for ischemia by diagnostic criteria. No infarct or ischemia noted. Normal stress myocardial perfusion. This is a normal study    H/O echocardiogram 03/04/2019    Left ventricular function is normal. Ejection fraction is visually estimated at 55-60%. No significant valvular abnormalitites.      Hyperlipidemia     Hypertension     Follows with PCP    Migraines Last Migraine 10-18    Mobitz (type) I (Wenckebach's) atrioventricular block 01/29/2020    Neuropathy     From feet to knees    Piriformis syndrome 6/24/2016    Psoriasis     Rotator cuff impingement syndrome, right 2/26/2018    RTC repair Dr Israel Bella 2015  Treated by Dr Nakul Kelley 2/18/17  Arthrocentesis 12/19/17    Shortness of breath on exertion     Syncope and collapse 03/04/2019    Teeth missing     Upper And Lower    Wears glasses       Past Surgical History:   Procedure Laterality Date    ABDOMEN SURGERY N/A 1/14/2019    SECONDARY CLOSURE OF DEHISCED ABDOMINAL WOUND performed by Edil Bello MD at [facility] 71 N/A 1/24/2019    IRRIGATION OF LOWER ABDOMINAL WOUND performed by Edil Bello MD at [facility] 73 ABDOMINOPLASTY N/A 7/29/2019    ABDOMINOPLASTY REVISION performed by Edil Bello MD at [facility] 75 Right 1/29/2020    EXCISION OF RIGHT BREAST SABACEOUS CYST performed by Elo Christensen MD at [facility] Right 2017    Dr. Nakul Kelley (Right)    [address]    Tubal Ligation Also Done In     COLONOSCOPY  2017    Polyp Removed - Dr. Nita Barksdale COLONOSCOPY N/A 2020    COLONOSCOPY POLYPECTOMY SNARE/COLD BIOPSY performed by Xiomy Luong MD at [address] [facility]      Teeth Extracted In Past    ELBOW SURGERY Bilateral Last Done In     Right Done Twice, Left Done Once left cubital tunnel release ; right 2014    ENDOSCOPY, COLON, DIAGNOSTIC  2017    HERNIA REPAIR  10/26/2017    hiatal hernia with gastrectomy    HYSTERECTOMY VAGINAL      LAPAROSCOPY      DC EXCISE EXCESS SKIN TISSUE,ABDOMEN N/A 2018    ABDOMINOPLASTY performed by Xiomy Luong MD at [address] Right     Dr. Anita Armenta Right 2017    Dr Wyatt Aden  10/26/2017    Robotic Laparoscopic, Pre Op Weight 237  Or 238 lbs.  TUBAL LIGATION      Done With      Social History     Socioeconomic History    Marital status: Single     Spouse name: None    Number of children: None    Years of education: None    Highest education level: None   Occupational History    Occupation: unemployed   Social Needs    Financial resource strain: None    Food insecurity     Worry: None     Inability: None    Transportation needs     Medical: None     Non-medical: None   Tobacco Use    Smoking status: Former Smoker     Packs/day: 0.25     Years: 30.00     Pack years: 7.50     Types: Cigarettes, E-Cigarettes     Start date:      Quit date: 2016     Years since quittin.3    Smokeless tobacco: Never Used   Substance and Sexual Activity    Alcohol use: Yes     Comment: \"Occ.  Maybe Twice A Month\"    Drug use: No    Sexual activity: Yes     Partners: Male   Lifestyle    Physical activity     Days per week: None     Minutes per session: None    Stress: None   Relationships    Social connections     Talks on phone: None     Gets together: None

## 2021-05-10 LAB
HPV COMMENT: NORMAL
HPV TYPE 16: NOT DETECTED
HPV TYPE 18: NOT DETECTED
HPVOH (OTHER TYPES): NOT DETECTED

## 2021-05-11 ENCOUNTER — TELEPHONE (OUTPATIENT)
Dept: FAMILY MEDICINE CLINIC | Age: 56
End: 2021-05-11

## 2021-05-26 ENCOUNTER — OFFICE VISIT (OUTPATIENT)
Dept: INFECTIOUS DISEASES | Age: 56
End: 2021-05-26
Payer: COMMERCIAL

## 2021-05-26 VITALS
WEIGHT: 167.4 LBS | RESPIRATION RATE: 18 BRPM | DIASTOLIC BLOOD PRESSURE: 92 MMHG | TEMPERATURE: 97.2 F | SYSTOLIC BLOOD PRESSURE: 150 MMHG | BODY MASS INDEX: 27.02 KG/M2 | HEART RATE: 74 BPM

## 2021-05-26 DIAGNOSIS — A60.09 HERPES GENITALIS IN WOMEN: Primary | ICD-10-CM

## 2021-05-26 DIAGNOSIS — A60.09 HERPES GENITALIS IN WOMEN: ICD-10-CM

## 2021-05-26 LAB
ALBUMIN SERPL-MCNC: 4.6 G/DL (ref 3.4–5)
ALP BLD-CCNC: 87 U/L (ref 40–129)
ALT SERPL-CCNC: 15 U/L (ref 10–40)
ANION GAP SERPL CALCULATED.3IONS-SCNC: 13 MMOL/L (ref 3–16)
AST SERPL-CCNC: 20 U/L (ref 15–37)
BILIRUB SERPL-MCNC: 0.8 MG/DL (ref 0–1)
BILIRUBIN DIRECT: <0.2 MG/DL (ref 0–0.3)
BILIRUBIN, INDIRECT: NORMAL MG/DL (ref 0–1)
BUN BLDV-MCNC: 18 MG/DL (ref 7–20)
CALCIUM SERPL-MCNC: 10.2 MG/DL (ref 8.3–10.6)
CHLORIDE BLD-SCNC: 99 MMOL/L (ref 99–110)
CO2: 26 MMOL/L (ref 21–32)
CREAT SERPL-MCNC: 0.8 MG/DL (ref 0.6–1.1)
GFR AFRICAN AMERICAN: >60
GFR NON-AFRICAN AMERICAN: >60
GLUCOSE BLD-MCNC: 94 MG/DL (ref 70–99)
HCT VFR BLD CALC: 42.2 % (ref 36–48)
HEMOGLOBIN: 14.3 G/DL (ref 12–16)
MCH RBC QN AUTO: 27 PG (ref 26–34)
MCHC RBC AUTO-ENTMCNC: 33.9 G/DL (ref 31–36)
MCV RBC AUTO: 79.6 FL (ref 80–100)
PDW BLD-RTO: 13.9 % (ref 12.4–15.4)
PLATELET # BLD: 267 K/UL (ref 135–450)
PMV BLD AUTO: 10.1 FL (ref 5–10.5)
POTASSIUM SERPL-SCNC: 4.8 MMOL/L (ref 3.5–5.1)
RBC # BLD: 5.31 M/UL (ref 4–5.2)
SODIUM BLD-SCNC: 138 MMOL/L (ref 136–145)
TOTAL PROTEIN: 7.3 G/DL (ref 6.4–8.2)
WBC # BLD: 9.2 K/UL (ref 4–11)

## 2021-05-26 PROCEDURE — 1036F TOBACCO NON-USER: CPT | Performed by: INTERNAL MEDICINE

## 2021-05-26 PROCEDURE — G8427 DOCREV CUR MEDS BY ELIG CLIN: HCPCS | Performed by: INTERNAL MEDICINE

## 2021-05-26 PROCEDURE — 3017F COLORECTAL CA SCREEN DOC REV: CPT | Performed by: INTERNAL MEDICINE

## 2021-05-26 PROCEDURE — G8417 CALC BMI ABV UP PARAM F/U: HCPCS | Performed by: INTERNAL MEDICINE

## 2021-05-26 PROCEDURE — 99203 OFFICE O/P NEW LOW 30 MIN: CPT | Performed by: INTERNAL MEDICINE

## 2021-05-26 RX ORDER — VALACYCLOVIR HYDROCHLORIDE 500 MG/1
500 TABLET, FILM COATED ORAL DAILY
Qty: 30 TABLET | Refills: 5 | Status: SHIPPED | OUTPATIENT
Start: 2021-05-26 | End: 2021-12-16 | Stop reason: SDUPTHER

## 2021-05-26 NOTE — ASSESSMENT & PLAN NOTE
No active ongoing infection. Patient was counseled that she may not need chronic suppression as she does not have any flares. She puts a high value on navigating a flare I would like to be treated. Therefore Valtrex was prescribed and she will take it daily. We will see her in a month and evaluate her response to this treatment.

## 2021-05-26 NOTE — PROGRESS NOTES
5/26/2021         Referring Physician: Edelmira Diggs*  Primary Care Physician: YESSICA Scott NP    Impression/Plan:   Diagnosis Orders   1. Herpes genitalis in women  CBC    Basic Metabolic Panel    Hepatic Function Panel    valACYclovir (VALTREX) 500 MG tablet       Discussion:  Herpes genitalis in women  No active ongoing infection. Patient was counseled that she may not need chronic suppression as she does not have any flares. She puts a high value on navigating a flare I would like to be treated. Therefore Valtrex was prescribed and she will take it daily. We will see her in a month and evaluate her response to this treatment. Return in about 4 weeks (around 6/23/2021). History: Abbey Boyer is a 49 y.o.  female presenting today. She was originally referred to us for positive QuantiFERON test.  However, she states that she has been to the health department and they will be starting her regimen (rifapentine and isoniazid). She is here for HSV II IgG positive test.  She tested positive for HSV antibody as part of the screen for STDs. She does not recall having any painful genital lesions ever. She last had sex about a year ago. She would like to be treated for Herpes. She is asymptomatic and has not had a flare. She denies headache, fever, chills, abdominal pain. Review of Systems   All other systems reviewed and are negative.       No Known Allergies    Patient Active Problem List   Diagnosis    DM (diabetes mellitus) ([address]    Essential hypertension    Chronic pain syndrome    COPD, severe ([address]    Gastroesophageal reflux disease with esophagitis    History of MRSA infection    Anxiety    Chronic right-sided low back pain with sciatica    Hiatal hernia    Status post laparoscopic sleeve gastrectomy    Abdominal pannus    S/P abdominoplasty    Fibromyalgia    Sebaceous cyst of breast, right    Mobitz type 1 second degree AV block    sulfate HFA (PROAIR HFA) 108 (90 Base) MCG/ACT inhaler Inhale 2 puffs into the lungs every 6 hours as needed for Wheezing 1 Inhaler 5    hydrOXYzine (ATARAX) 25 MG tablet TAKE 1 TABLET EVERY 8 HOURS AS NEEDED FOR ANXIETY AVOID ALCOHOL/CAUSES DROWSINESS. Do not take with Xanax 60 tablet 1    albuterol sulfate HFA (PROAIR HFA) 108 (90 Base) MCG/ACT inhaler Inhale 2 puffs into the lungs every 6 hours as needed for Wheezing 1 Inhaler 5    tiotropium (SPIRIVA RESPIMAT) 2.5 MCG/ACT AERS inhaler Inhale 2 puffs into the lungs daily 1 Inhaler 5    ammonium lactate (LAC-HYDRIN) 12 % lotion       calcipotriene (DOVONEX) 0.005 % ointment       clobetasol (TEMOVATE) 0.05 % ointment       tiZANidine (ZANAFLEX) 4 MG tablet Take 4 mg by mouth every 8 hours as needed       oxyCODONE-acetaminophen (PERCOCET) 7.5-325 MG per tablet Take 1 tablet by mouth every 8 hours as needed for Pain (Patient states takes every 6 hours).  Psyllium (METAMUCIL) 48.57 % POWD Take 1 Units by mouth daily 1 Bottle 5    potassium chloride (KLOR-CON M) 20 MEQ extended release tablet Take 1 tablet by mouth 2 times daily 180 tablet 1    venlafaxine (EFFEXOR) 37.5 MG tablet Take 37.5 mg by mouth 2 times daily      fluticasone-vilanterol (BREO ELLIPTA) 100-25 MCG/INH AEPB inhaler Inhale 1 puff into the lungs daily 1 each 5    ALPRAZolam (XANAX) 0.5 MG tablet Take 0.5 mg by mouth as needed for Sleep. .       No current facility-administered medications for this visit.        Past Medical History:   Diagnosis Date    Anxiety     Arthritis     \"Back, Hands, Shoulders\"    Chronic back pain     Arthritis - NKI    COPD (chronic obstructive pulmonary disease) ([facility])     Sees Dr. Kavya Zuniga with Mental Health    Diabetes mellitus ([facility] Dx 2008    Type II - follows with PCP    Emphysema     Fibromyalgia     H/O abdominoplasty 12/21/2018    H/O cardiovascular stress test 03/04/2019    ECG portion of stress test is neg for ischemia by diagnostic criteria. No infarct or ischemia noted. Normal stress myocardial perfusion. This is a normal study    H/O echocardiogram 03/04/2019    Left ventricular function is normal. Ejection fraction is visually estimated at 55-60%. No significant valvular abnormalitites.      Hyperlipidemia     Hypertension     Follows with PCP    Migraines Last Migraine 10-18    Mobitz (type) I (Wenckebach's) atrioventricular block 01/29/2020    Neuropathy     From feet to knees    Piriformis syndrome 6/24/2016    Psoriasis     Rotator cuff impingement syndrome, right 2/26/2018    RTC repair Dr Marylee Savin 2015  Treated by Dr Erik Velasquez 2/18/17  Arthrocentesis 12/19/17    Shortness of breath on exertion     Syncope and collapse 03/04/2019    Teeth missing     Upper And Lower    Wears glasses        Past Surgical History:   Procedure Laterality Date    ABDOMEN SURGERY N/A 1/14/2019    SECONDARY CLOSURE OF DEHISCED ABDOMINAL WOUND performed by [facility]ong MD at [facility] 71 N/A 1/24/2019    IRRIGATION OF LOWER ABDOMINAL WOUND performed by Xiomy Luong MD at [facility] 73 ABDOMINOPLASTY N/A 7/29/2019    ABDOMINOPLASTY REVISION performed by Xiomy Luong MD at [facility] 75 Right 1/29/2020    EXCISION OF RIGHT BREAST SABACEOUS CYST performed by Iwona Reeves MD at [facility] 19 Right 2017    Dr. Erik Velasquez (Right)   [address]    Tubal Ligation Also Done In 1997    COLONOSCOPY  03/2017    Polyp Removed - Dr. Nita Barksdale COLONOSCOPY N/A 7/27/2020    COLONOSCOPY POLYPECTOMY SNARE/COLD BIOPSY performed by Xiomy Luong MD at [address] [place] [address]      Teeth Extracted In Past    ELBOW SURGERY Bilateral Last Done In 2013    Right Done Twice, Left Done Once left cubital tunnel release 2009; right 2014    ENDOSCOPY, COLON, DIAGNOSTIC  03/2017    HERNIA REPAIR  10/26/2017    hiatal hernia with gastrectomy    HYSTERECTOMY VAGINAL  2000    LAPAROSCOPY  2000    IN EXCISE EXCESS SKIN TISSUE,ABDOMEN N/A 2018    ABDOMINOPLASTY performed by Bruna Reeves MD at [address] Right     Dr. Ortiz Friday Right 2017    Dr Makenna Alan  10/26/2017    Robotic Laparoscopic, Pre Op Weight 237  Or 238 lbs.  TUBAL LIGATION      Done With        Social History     Socioeconomic History    Marital status: Single     Spouse name: Not on file    Number of children: Not on file    Years of education: Not on file    Highest education level: Not on file   Occupational History    Occupation: unemployed   Tobacco Use    Smoking status: Former Smoker     Packs/day: 0.25     Years: 30.00     Pack years: 7.50     Types: Cigarettes, E-Cigarettes     Start date:      Quit date:      Years since quittin.4    Smokeless tobacco: Never Used   Vaping Use    Vaping Use: Never used   Substance and Sexual Activity    Alcohol use: Yes     Comment: \"Occ. Maybe Twice A Month\"    Drug use: No    Sexual activity: Yes     Partners: Male   Other Topics Concern    Not on file   Social History Narrative    Not on file     Social Determinants of Health     Financial Resource Strain:     Difficulty of Paying Living Expenses:    Food Insecurity:     Worried About Running Out of Food in the Last Year:     [address] in the Last Year:    Transportation Needs:     Lack of Transportation (Medical):      Lack of Transportation (Non-Medical):    Physical Activity:     Days of Exercise per Week:     Minutes of Exercise per Session:    Stress:     Feeling of Stress :    Social Connections:     Frequency of Communication with Friends and Family:     Frequency of Social Gatherings with Friends and Family:     Attends [employer] Services:     Active Member of Clubs or Organizations:     Attends Club or Organization Meetings:     Marital Status:    Intimate Partner Violence:     Fear of Current or Ex-Partner:     Emotionally Abused:     Physically Abused:     Sexually Abused:        Family History   Problem Relation Age of Onset    Heart Attack Father     Heart Disease Father     Early Death Father 46        Heart Attack    Alcohol Abuse Father     Substance Abuse Father         Alcoholic    Arthritis Mother     Heart Disease Mother     Diabetes Mother     Cancer Mother         \"Kidney Cancer\"    High Blood Pressure Mother     High Blood Pressure Sister     Diabetes Brother     Early Death Daughter 21        \"Killed In A Car Accident\"       Vital Signs:  Vitals:    05/26/21 1010   BP: (!) 150/92   Site: Left Upper Arm   Position: Sitting   Cuff Size: Medium Adult   Pulse: 74   Resp: 18   Temp: 97.2 °F (36.2 °C)   TempSrc: Infrared   Weight: 167 lb 6.4 oz (75.9 kg)        Wt Readings from Last 3 Encounters:   05/26/21 167 lb 6.4 oz (75.9 kg)   05/07/21 167 lb (75.8 kg)   05/06/21 168 lb (76.2 kg)        Physical Exam:   Gen: alert and NAD  HEENT: sclera clear, pupils equal and reactive, extra ocular muscles intact, oropharynx clear, mucus membranes moist, tympanic membranes clear bilaterally, no cervical lymphadenopathy noted and neck supple  Neck: supple, no significant adenopathy  Chest: clear to auscultation, no wheezes, rales or rhonchi, symmetric air entry  Heart: regular rate and rhythm, no murmurs  ABD: abdomen is soft without significant tenderness, masses, organomegaly or guarding.   EXT:peripheral pulses normal, no pedal edema, no clubbing or cyanosis  NEURO: alert, oriented, normal speech, no focal findings or movement disorder noted  Skin: rash  Wounds:   Labs:   WBC   Date Value Ref Range Status   04/08/2021 7.7 4.0 - 10.5 K/CU MM Final   07/01/2020 12.5 (H) 4.0 - 11.0 K/uL Final   01/27/2020 7.0 4.0 - 10.5 K/CU MM Final     CREATININE   Date Value Ref Range Status   04/08/2021 0.9 0.6 - 1.1 MG/DL Final   10/21/2020 0.8

## 2021-05-28 ENCOUNTER — TELEPHONE (OUTPATIENT)
Dept: INFECTIOUS DISEASES | Age: 56
End: 2021-05-28

## 2021-05-28 NOTE — TELEPHONE ENCOUNTER
Pt is requesting a Herpes test to find out what her numbers are. Is it ok to put an order in for that test?  Please advise.

## 2021-06-14 ENCOUNTER — TELEPHONE (OUTPATIENT)
Dept: FAMILY MEDICINE CLINIC | Age: 56
End: 2021-06-14

## 2021-06-14 NOTE — TELEPHONE ENCOUNTER
Pt called and stated that recent neuro referral was sent to [facility] and pt stated that their providers do not do imaging or inj. and she does not want to go there. Pt asked about [facility] and I told her they are not taking caresource. Pt will contact insurance and find somewhere that she wants to go and then she will call the office back.

## 2021-06-18 ENCOUNTER — OFFICE VISIT (OUTPATIENT)
Dept: BARIATRICS/WEIGHT MGMT | Age: 56
End: 2021-06-18
Payer: COMMERCIAL

## 2021-06-18 VITALS
OXYGEN SATURATION: 90 % | TEMPERATURE: 97.5 F | BODY MASS INDEX: 25.96 KG/M2 | DIASTOLIC BLOOD PRESSURE: 88 MMHG | WEIGHT: 161.5 LBS | SYSTOLIC BLOOD PRESSURE: 130 MMHG | HEIGHT: 66 IN | HEART RATE: 97 BPM

## 2021-06-18 DIAGNOSIS — Z86.14 HISTORY OF MRSA INFECTION: ICD-10-CM

## 2021-06-18 DIAGNOSIS — E65 ABDOMINAL PANNUS: Primary | ICD-10-CM

## 2021-06-18 DIAGNOSIS — E65 PANNUS, ABDOMINAL: ICD-10-CM

## 2021-06-18 PROCEDURE — 3017F COLORECTAL CA SCREEN DOC REV: CPT | Performed by: SURGERY

## 2021-06-18 PROCEDURE — 1036F TOBACCO NON-USER: CPT | Performed by: SURGERY

## 2021-06-18 PROCEDURE — G8427 DOCREV CUR MEDS BY ELIG CLIN: HCPCS | Performed by: SURGERY

## 2021-06-18 PROCEDURE — 99213 OFFICE O/P EST LOW 20 MIN: CPT | Performed by: SURGERY

## 2021-06-18 PROCEDURE — G8417 CALC BMI ABV UP PARAM F/U: HCPCS | Performed by: SURGERY

## 2021-06-18 ASSESSMENT — ENCOUNTER SYMPTOMS
ANAL BLEEDING: 0
COLOR CHANGE: 0
COUGH: 0
WHEEZING: 0
SORE THROAT: 0
BLOOD IN STOOL: 0
TROUBLE SWALLOWING: 0
DIARRHEA: 0
PHOTOPHOBIA: 0
CONSTIPATION: 0
NAUSEA: 0
SHORTNESS OF BREATH: 0
ABDOMINAL PAIN: 1
VOMITING: 0
VOICE CHANGE: 0

## 2021-06-18 NOTE — PROGRESS NOTES
BARIATRIC SURGERY POST OPERATIVE NOTE    SUBJECTIVE:    Patient presenting today referred from YESSICA Howell NP, for   Chief Complaint   Patient presents with    Follow-up     3mth f/u abd pain S/P abdominoplasty, 7/29/19 S/P Gastric Gjiykv36/26/17     /88   Pulse 97   Temp 97.5 °F (36.4 °C)   Ht 5' 6\" (1.676 m)   Wt 161 lb 8 oz (73.3 kg)   SpO2 90%   BMI 26.07 kg/m²      HPI: Lexi John is a 49 y.o. female s/p sleeve gastrectomy, and abdominoplasty, 10-20-17, and 7-29-19,     Addressed the status of the following co-morbidities:   1. Abdominal pannus  worsening. 2. Abdominal pain  worsening. 3. Skin ulcers  worsening. Current Outpatient Medications:     valACYclovir (VALTREX) 500 MG tablet, Take 1 tablet by mouth daily, Disp: 30 tablet, Rfl: 5    omeprazole (PRILOSEC) 40 MG delayed release capsule, Take 1 capsule by mouth daily, Disp: 90 capsule, Rfl: 2    atorvastatin (LIPITOR) 80 MG tablet, Take 1 tablet by mouth daily, Disp: 90 tablet, Rfl: 2    cetirizine (ZYRTEC) 10 MG tablet, Take 1 tablet by mouth daily, Disp: 90 tablet, Rfl: 3    Blood Glucose Monitoring Suppl (TRUE METRIX AIR GLUCOSE METER) w/Device KIT, 1 Device by Does not apply route continuous, Disp: 1 kit, Rfl: 0    TRUEplus Lancets 28G MISC, 1 each by Does not apply route daily, Disp: 100 each, Rfl: 3    blood glucose test strips (TRUE METRIX PRO BLOOD GLUCOSE) strip, 1 each by In Vitro route daily As needed. , Disp: 100 each, Rfl: 3    lisinopril-hydroCHLOROthiazide (PRINZIDE;ZESTORETIC) 10-12.5 MG per tablet, 1 tablet if SBP > 130, do not take if SBP < 110, Disp: 30 tablet, Rfl: 2    ezetimibe (ZETIA) 10 MG tablet, Take 1 tablet by mouth daily, Disp: 90 tablet, Rfl: 1    traZODone (DESYREL) 100 MG tablet, , Disp: , Rfl:     alogliptin (NESINA) 6.25 MG TABS tablet, Take 1 tablet by mouth daily, Disp: 90 tablet, Rfl: 1    ascorbic acid (V-R VITAMIN C) 250 MG tablet, Take 4 tablets by mouth daily, Disp: 30 tablet, Rfl: 3    zinc sulfate (ORAZINC) 220 (50 Zn) MG capsule, Take 4 capsules by mouth daily, Disp: 30 capsule, Rfl: 3    Nutritional Supplements (ENSURE HIGH PROTEIN) LIQD, Take 1 Can by mouth Daily with lunch, Disp: 32 Can, Rfl: 5    Multiple Vitamins-Minerals (THERAPEUTIC MULTIVITAMIN-MINERALS) tablet, Take 1 tablet by mouth daily, Disp: 90 tablet, Rfl: 3    Calcium Carb-Cholecalciferol (CALCIUM-VITAMIN D) 600-400 MG-UNIT TABS, Take 1 tablet by mouth daily, Disp: 90 tablet, Rfl: 3    albuterol sulfate HFA (PROAIR HFA) 108 (90 Base) MCG/ACT inhaler, Inhale 2 puffs into the lungs every 6 hours as needed for Wheezing, Disp: 1 Inhaler, Rfl: 5    hydrOXYzine (ATARAX) 25 MG tablet, TAKE 1 TABLET EVERY 8 HOURS AS NEEDED FOR ANXIETY AVOID ALCOHOL/CAUSES DROWSINESS. Do not take with Xanax, Disp: 60 tablet, Rfl: 1    albuterol sulfate HFA (PROAIR HFA) 108 (90 Base) MCG/ACT inhaler, Inhale 2 puffs into the lungs every 6 hours as needed for Wheezing, Disp: 1 Inhaler, Rfl: 5    tiotropium (SPIRIVA RESPIMAT) 2.5 MCG/ACT AERS inhaler, Inhale 2 puffs into the lungs daily, Disp: 1 Inhaler, Rfl: 5    ammonium lactate (LAC-HYDRIN) 12 % lotion, , Disp: , Rfl:     calcipotriene (DOVONEX) 0.005 % ointment, , Disp: , Rfl:     clobetasol (TEMOVATE) 0.05 % ointment, , Disp: , Rfl:     tiZANidine (ZANAFLEX) 4 MG tablet, Take 4 mg by mouth every 8 hours as needed , Disp: , Rfl:     oxyCODONE-acetaminophen (PERCOCET) 7.5-325 MG per tablet, Take 1 tablet by mouth every 8 hours as needed for Pain (Patient states takes every 6 hours).  , Disp: , Rfl:     Psyllium (METAMUCIL) 48.57 % POWD, Take 1 Units by mouth daily, Disp: 1 Bottle, Rfl: 5    potassium chloride (KLOR-CON M) 20 MEQ extended release tablet, Take 1 tablet by mouth 2 times daily, Disp: 180 tablet, Rfl: 1    venlafaxine (EFFEXOR) 37.5 MG tablet, Take 37.5 mg by mouth 2 times daily, Disp: , Rfl:     fluticasone-vilanterol (BREO ELLIPTA) 100-25 MCG/INH SABACEOUS CYST performed by Nida Robles MD at [facility] 19 Right 2017    Dr. Daniel Rosenbaum (Right)   [address]    Tubal Ligation Also Done In     COLONOSCOPY  2017    Polyp Removed - Dr. Velia Phelps COLONOSCOPY N/A 2020    COLONOSCOPY POLYPECTOMY SNARE/COLD BIOPSY performed by Solo Nolasco MD at [address] [place] [address]      Teeth Extracted In Past    ELBOW SURGERY Bilateral Last Done In     Right Done Twice, Left Done Once left cubital tunnel release ; right 2014    ENDOSCOPY, COLON, DIAGNOSTIC  2017    HERNIA REPAIR  10/26/2017    hiatal hernia with gastrectomy    HYSTERECTOMY VAGINAL  2000    LAPAROSCOPY      [place] EXCISE EXCESS SKIN TISSUE,ABDOMEN N/A 2018    ABDOMINOPLASTY performed by Solo Nolasco MD at [facility] Right 2015    Dr. Avelino Herring Right 2017    Dr Dennis Carson  10/26/2017    Robotic Laparoscopic, Pre Op Weight 237  Or 238 lbs.  TUBAL LIGATION      Done With      Social History     Socioeconomic History    Marital status: Single     Spouse name: None    Number of children: None    Years of education: None    Highest education level: None   Occupational History    Occupation: unemployed   Tobacco Use    Smoking status: Former Smoker     Packs/day: 0.25     Years: 30.00     Pack years: 7.50     Types: Cigarettes, E-Cigarettes     Start date:      Quit date: 2016     Years since quittin.4    Smokeless tobacco: Never Used   Vaping Use    Vaping Use: Never used   Substance and Sexual Activity    Alcohol use: Yes     Comment: \"Occ.  Maybe Twice A Month\"    Drug use: No    Sexual activity: Yes     Partners: Male   Other Topics Concern    None   Social History Narrative    None     Social Determinants of Health     Financial Resource Strain:     Difficulty of Paying Living Expenses:    Food Insecurity:  Worried About [facility] in the Last Year:    [address] in the Last Year:    Transportation Needs:     Lack of Transportation (Medical):  Lack of Transportation (Non-Medical):    Physical Activity:     Days of Exercise per Week:     Minutes of Exercise per Session:    Stress:     Feeling of Stress :    Social Connections:     Frequency of Communication with Friends and Family:     Frequency of Social Gatherings with Friends and Family:     Attends Mu-ism Services:     Active Member of Clubs or Organizations:     Attends Club or Organization Meetings:     Marital Status:    Intimate Partner Violence:     Fear of Current or Ex-Partner:     Emotionally Abused:     Physically Abused:     Sexually Abused:      Family History   Problem Relation Age of Onset    Heart Attack Father     Heart Disease Father     Early Death Father 46        Heart Attack    Alcohol Abuse Father     Substance Abuse Father         Alcoholic    Arthritis Mother     Heart Disease Mother     Diabetes Mother     Cancer Mother         \"Kidney Cancer\"    High Blood Pressure Mother     High Blood Pressure Sister     Diabetes Brother     Early Death Daughter 21        \"Killed In A Car Accident\"     Review of Systems   Constitutional: Negative for activity change, chills, diaphoresis and fever. HENT: Negative for sore throat, trouble swallowing and voice change. Eyes: Negative for photophobia and visual disturbance. Respiratory: Negative for cough, shortness of breath and wheezing. Cardiovascular: Negative for chest pain, palpitations and leg swelling. Gastrointestinal: Positive for abdominal pain. Negative for anal bleeding, blood in stool, constipation, diarrhea, nausea and vomiting. Endocrine: Negative for cold intolerance, heat intolerance, polydipsia and polyuria. Genitourinary: Negative for dysuria, frequency and hematuria.    Musculoskeletal: Negative for joint swelling, myalgias and neck stiffness. Skin: Negative for color change and rash. Neurological: Negative for seizures, speech difficulty, light-headedness and numbness. Hematological: Negative for adenopathy. Does not bruise/bleed easily. OBJECTIVE:    Physical Exam  Vitals reviewed. Constitutional:       General: She is not in acute distress. Appearance: She is well-developed. She is not diaphoretic. HENT:      Head: Normocephalic and atraumatic. Eyes:      General: No scleral icterus. Conjunctiva/sclera: Conjunctivae normal.      Pupils: Pupils are equal, round, and reactive to light. Neck:      Thyroid: No thyromegaly. Vascular: No JVD. Trachea: No tracheal deviation. Cardiovascular:      Rate and Rhythm: Normal rate and regular rhythm. Heart sounds: Normal heart sounds. No murmur heard. No friction rub. No gallop. Pulmonary:      Effort: Pulmonary effort is normal. No respiratory distress. Breath sounds: No stridor. No wheezing or rales. Chest:      Chest wall: No tenderness. Abdominal:      General: Bowel sounds are normal. There is no distension. Palpations: Abdomen is soft. There is no mass. Tenderness: There is abdominal tenderness. There is no guarding or rebound. Musculoskeletal:         General: No tenderness. Normal range of motion. Cervical back: Normal range of motion. Lymphadenopathy:      Cervical: No cervical adenopathy. Skin:     General: Skin is warm and dry. Coloration: Skin is not pale. Findings: No erythema or rash. Neurological:      Mental Status: She is alert and oriented to person, place, and time. Cranial Nerves: No cranial nerve deficit. Coordination: Coordination normal.   Psychiatric:         Behavior: Behavior normal.         Thought Content: Thought content normal.         Judgment: Judgment normal.         Assessment / Plan:    1. Abdominal pannus    2. Pannus, abdominal    3.  History of MRSA infection          Took more pictures today, and will submit to the insurance. Follow Up:  Return in about 4 weeks (around 7/16/2021) for Bariatric follow up: diet, exercise & weight loss, Follow up Symptoms.     Aditya Fry MD, FACS, FICS  Member of the [id number] of [employer]    [phone]    6/18/21

## 2021-06-23 ENCOUNTER — OFFICE VISIT (OUTPATIENT)
Dept: INFECTIOUS DISEASES | Age: 56
End: 2021-06-23
Payer: COMMERCIAL

## 2021-06-23 VITALS
BODY MASS INDEX: 26.7 KG/M2 | DIASTOLIC BLOOD PRESSURE: 97 MMHG | TEMPERATURE: 97.3 F | WEIGHT: 165.4 LBS | SYSTOLIC BLOOD PRESSURE: 150 MMHG | HEART RATE: 83 BPM | RESPIRATION RATE: 18 BRPM

## 2021-06-23 DIAGNOSIS — Z51.81 ADMISSION FOR THERAPEUTIC DRUG MONITORING: ICD-10-CM

## 2021-06-23 DIAGNOSIS — A60.09 HERPES GENITALIS IN WOMEN: Primary | ICD-10-CM

## 2021-06-23 PROCEDURE — 3017F COLORECTAL CA SCREEN DOC REV: CPT | Performed by: INTERNAL MEDICINE

## 2021-06-23 PROCEDURE — G8417 CALC BMI ABV UP PARAM F/U: HCPCS | Performed by: INTERNAL MEDICINE

## 2021-06-23 PROCEDURE — 99213 OFFICE O/P EST LOW 20 MIN: CPT | Performed by: INTERNAL MEDICINE

## 2021-06-23 PROCEDURE — 1036F TOBACCO NON-USER: CPT | Performed by: INTERNAL MEDICINE

## 2021-06-23 PROCEDURE — G8427 DOCREV CUR MEDS BY ELIG CLIN: HCPCS | Performed by: INTERNAL MEDICINE

## 2021-06-23 NOTE — PROGRESS NOTES
6/23/2021         Referring Physician: Porfirio Moya*  Primary Care Physician: Lovette Dancer, APRN - NP    Impression/Plan:   Diagnosis Orders   1. Herpes genitalis in women     2. Admission for therapeutic drug monitoring  Basic Metabolic Panel    CBC Auto Differential    Hepatic Function Panel       Discussion:  Herpes genitalis in women   Well-controlled, continue current medications      Return in about 5 months (around 11/23/2021). History: Catia Evangelista is a 49 y.o.  female presenting today. She was originally referred to us for positive QuantiFERON test.  However, she states that she has been to the health department and they will be starting her regimen (rifapentine and isoniazid). She is here for HSV II IgG positive test.  She tested positive for HSV antibody as part of the screen for STDs. She does not recall having any painful genital lesions ever. She last had sex about a year ago. She would like to be treated for Herpes. She is asymptomatic and has not had a flare. She denies headache, fever, chills, abdominal pain. 6/23/2021: denies any genital lesions. Tolerating medication. Review of Systems   All other systems reviewed and are negative.       No Known Allergies    Patient Active Problem List   Diagnosis    DM (diabetes mellitus) ([address]    Essential hypertension    Chronic pain syndrome    COPD, severe ([address]    Gastroesophageal reflux disease with esophagitis    History of MRSA infection    Anxiety    Chronic right-sided low back pain with sciatica    Hiatal hernia    Status post laparoscopic sleeve gastrectomy    Abdominal pannus    S/P abdominoplasty    Fibromyalgia    Sebaceous cyst of breast, right    Mobitz type 1 second degree AV block    Polyp of cecum    Adenomatous polyp of ascending colon    Chronic edema BLE    Mixed hyperlipidemia    Neuropathy    Umbilical pain    Lower abdominal pain    S/P panniculectomy    Latent tuberculosis    Gastroesophageal reflux disease    Vitamin D deficiency    Psoriasis    Pannus, abdominal    Herpes genitalis in women    Admission for therapeutic drug monitoring       Current Outpatient Medications   Medication Sig Dispense Refill    valACYclovir (VALTREX) 500 MG tablet Take 1 tablet by mouth daily 30 tablet 5    omeprazole (PRILOSEC) 40 MG delayed release capsule Take 1 capsule by mouth daily 90 capsule 2    atorvastatin (LIPITOR) 80 MG tablet Take 1 tablet by mouth daily 90 tablet 2    cetirizine (ZYRTEC) 10 MG tablet Take 1 tablet by mouth daily 90 tablet 3    Blood Glucose Monitoring Suppl (TRUE METRIX AIR GLUCOSE METER) w/Device KIT 1 Device by Does not apply route continuous 1 kit 0    TRUEplus Lancets 28G MISC 1 each by Does not apply route daily 100 each 3    blood glucose test strips (TRUE METRIX PRO BLOOD GLUCOSE) strip 1 each by In Vitro route daily As needed.  100 each 3    lisinopril-hydroCHLOROthiazide (PRINZIDE;ZESTORETIC) 10-12.5 MG per tablet 1 tablet if SBP > 130, do not take if SBP < 110 30 tablet 2    ezetimibe (ZETIA) 10 MG tablet Take 1 tablet by mouth daily 90 tablet 1    traZODone (DESYREL) 100 MG tablet       ascorbic acid (V-R VITAMIN C) 250 MG tablet Take 4 tablets by mouth daily 30 tablet 3    zinc sulfate (ORAZINC) 220 (50 Zn) MG capsule Take 4 capsules by mouth daily 30 capsule 3    Nutritional Supplements (ENSURE HIGH PROTEIN) LIQD Take 1 Can by mouth Daily with lunch 32 Can 5    Multiple Vitamins-Minerals (THERAPEUTIC MULTIVITAMIN-MINERALS) tablet Take 1 tablet by mouth daily 90 tablet 3    Calcium Carb-Cholecalciferol (CALCIUM-VITAMIN D) 600-400 MG-UNIT TABS Take 1 tablet by mouth daily 90 tablet 3    albuterol sulfate HFA (PROAIR HFA) 108 (90 Base) MCG/ACT inhaler Inhale 2 puffs into the lungs every 6 hours as needed for Wheezing 1 Inhaler 5    hydrOXYzine (ATARAX) 25 MG tablet TAKE 1 TABLET EVERY 8 HOURS AS NEEDED FOR ANXIETY AVOID echocardiogram 03/04/2019    Left ventricular function is normal. Ejection fraction is visually estimated at 55-60%. No significant valvular abnormalitites.      Hyperlipidemia     Hypertension     Follows with PCP    Migraines Last Migraine 10-18    Mobitz (type) I (Wenckebach's) atrioventricular block 01/29/2020    Neuropathy     From feet to knees    Piriformis syndrome 6/24/2016    Psoriasis     Rotator cuff impingement syndrome, right 2/26/2018    RTC repair Dr Sam Sumner 2015  Treated by Dr Kuldip Beaver 2/18/17  Arthrocentesis 12/19/17    Shortness of breath on exertion     Syncope and collapse 03/04/2019    Teeth missing     Upper And Lower    Wears glasses        Past Surgical History:   Procedure Laterality Date    ABDOMEN SURGERY N/A 1/14/2019    SECONDARY CLOSURE OF DEHISCED ABDOMINAL WOUND performed by Varun Hardy MD at [facility] 71 N/A 1/24/2019    IRRIGATION OF LOWER ABDOMINAL WOUND performed by Varun Hardy MD at [facility] 73 ABDOMINOPLASTY N/A 7/29/2019    ABDOMINOPLASTY REVISION performed by Varun Hardy MD at [facility] 75 Right 1/29/2020    EXCISION OF RIGHT BREAST SABACEOUS CYST performed by Mickie Piedra MD at [facility] 19 Right 2017    Dr. Kuldip Beaver (Right)   [address]    Tubal Ligation Also Done In 1997    COLONOSCOPY  03/2017    Polyp Removed - Dr. Tahira Glass COLONOSCOPY N/A 7/27/2020    COLONOSCOPY POLYPECTOMY SNARE/COLD BIOPSY performed by Varun Hardy MD at [address] [place] [address]      Teeth Extracted In Past    ELBOW SURGERY Bilateral Last Done In 2013    Right Done Twice, Left Done Once left cubital tunnel release 2009; right 2014    ENDOSCOPY, COLON, DIAGNOSTIC  03/2017    HERNIA REPAIR  10/26/2017    hiatal hernia with gastrectomy    HYSTERECTOMY VAGINAL  2000    LAPAROSCOPY  2000    [place] EXCISE EXCESS SKIN TISSUE,ABDOMEN N/A 11/29/2018 ABDOMINOPLASTY performed by Chelsey Sanabria MD at [address] Right     Dr. Candis Darden Right 2017    Dr Olu Alvarado  10/26/2017    Robotic Laparoscopic, Pre Op Weight 237  Or 238 lbs.  TUBAL LIGATION      Done With        Social History     Socioeconomic History    Marital status: Single     Spouse name: Not on file    Number of children: Not on file    Years of education: Not on file    Highest education level: Not on file   Occupational History    Occupation: unemployed   Tobacco Use    Smoking status: Former Smoker     Packs/day: 0.25     Years: 30.00     Pack years: 7.50     Types: Cigarettes, E-Cigarettes     Start date:      Quit date:      Years since quittin.4    Smokeless tobacco: Never Used   Vaping Use    Vaping Use: Never used   Substance and Sexual Activity    Alcohol use: Yes     Comment: \"Occ. Maybe Twice A Month\"    Drug use: No    Sexual activity: Yes     Partners: Male   Other Topics Concern    Not on file   Social History Narrative    Not on file     Social Determinants of Health     Financial Resource Strain:     Difficulty of Paying Living Expenses:    Food Insecurity:     Worried About Running Out of Food in the Last Year:     [address] in the Last Year:    Transportation Needs:     Lack of Transportation (Medical):      Lack of Transportation (Non-Medical):    Physical Activity:     Days of Exercise per Week:     Minutes of Exercise per Session:    Stress:     Feeling of Stress :    Social Connections:     Frequency of Communication with Friends and Family:     Frequency of Social Gatherings with Friends and Family:     Attends Yazdanism Services:     Active Member of Clubs or Organizations:     Attends Club or Organization Meetings:     Marital Status:    Intimate Partner Violence:     Fear of Current or Ex-Partner:     Emotionally Abused:     Physically Abused:  Sexually Abused:        Family History   Problem Relation Age of Onset    Heart Attack Father     Heart Disease Father     Early Death Father 46        Heart Attack    Alcohol Abuse Father     Substance Abuse Father         Alcoholic    Arthritis Mother     Heart Disease Mother     Diabetes Mother     Cancer Mother         \"Kidney Cancer\"    High Blood Pressure Mother     High Blood Pressure Sister     Diabetes Brother     Early Death Daughter 21        \"Killed In A Car Accident\"       Vital Signs:  Vitals:    06/23/21 0945   BP: (!) 150/97   Site: Left Upper Arm   Position: Sitting   Cuff Size: Medium Adult   Pulse: 83   Resp: 18   Temp: 97.3 °F (36.3 °C)   TempSrc: Infrared   Weight: 165 lb 6.4 oz (75 kg)        Wt Readings from Last 3 Encounters:   06/23/21 165 lb 6.4 oz (75 kg)   06/18/21 161 lb 8 oz (73.3 kg)   05/26/21 167 lb 6.4 oz (75.9 kg)        Physical Exam:   Gen: alert and NAD  HEENT: sclera clear, pupils equal and reactive, extra ocular muscles intact, oropharynx clear, mucus membranes moist, tympanic membranes clear bilaterally, no cervical lymphadenopathy noted and neck supple  Neck: supple, no significant adenopathy  Chest: clear to auscultation, no wheezes, rales or rhonchi, symmetric air entry  Heart: regular rate and rhythm, no murmurs  ABD: abdomen is soft without significant tenderness, masses, organomegaly or guarding.   EXT:peripheral pulses normal, no pedal edema, no clubbing or cyanosis  NEURO: alert, oriented, normal speech, no focal findings or movement disorder noted  Skin: rash  Wounds:   Labs:   WBC   Date Value Ref Range Status   05/26/2021 9.2 4.0 - 11.0 K/uL Final   04/08/2021 7.7 4.0 - 10.5 K/CU MM Final   07/01/2020 12.5 (H) 4.0 - 11.0 K/uL Final     CREATININE   Date Value Ref Range Status   05/26/2021 0.8 0.6 - 1.1 mg/dL Final   04/08/2021 0.9 0.6 - 1.1 MG/DL Final   10/21/2020 0.8 0.6 - 1.1 mg/dL Final     POC Creatinine   Date Value Ref Range Status 02/12/2021 0.9 0.6 - 1.1 MG/DL Final       Cultures:  Culture   Date Value Ref Range Status   01/07/2020   Final    GRAM POSITIVE BACILLI SCANTY GROWTH ANAEROBE OPPORTUNIST PATHOGEN   01/07/2020   Final    GRAM POSITIVE COCCI SCANTY GROWTH ANAEROBE OPPORTUNIST PATHOGEN   01/07/2020 NO GROWTH AT 48 HOURS  Final       Imaging Studies:         Electronicallysigned by Altha Habermann, MD on 5/26/21 at 5:44 AM EDT

## 2021-07-01 ENCOUNTER — TELEPHONE (OUTPATIENT)
Dept: BARIATRICS/WEIGHT MGMT | Age: 56
End: 2021-07-01

## 2021-07-15 ENCOUNTER — NURSE ONLY (OUTPATIENT)
Dept: CARDIOLOGY CLINIC | Age: 56
End: 2021-07-15
Payer: COMMERCIAL

## 2021-07-15 ENCOUNTER — OFFICE VISIT (OUTPATIENT)
Dept: CARDIOLOGY CLINIC | Age: 56
End: 2021-07-15
Payer: COMMERCIAL

## 2021-07-15 VITALS
SYSTOLIC BLOOD PRESSURE: 116 MMHG | HEIGHT: 66 IN | HEART RATE: 63 BPM | WEIGHT: 168.8 LBS | DIASTOLIC BLOOD PRESSURE: 70 MMHG | BODY MASS INDEX: 27.13 KG/M2

## 2021-07-15 DIAGNOSIS — I44.1 MOBITZ TYPE 1 SECOND DEGREE AV BLOCK: ICD-10-CM

## 2021-07-15 DIAGNOSIS — R00.1 BRADYCARDIA: Primary | ICD-10-CM

## 2021-07-15 DIAGNOSIS — E78.2 MIXED HYPERLIPIDEMIA: ICD-10-CM

## 2021-07-15 DIAGNOSIS — I10 ESSENTIAL HYPERTENSION: Primary | ICD-10-CM

## 2021-07-15 PROCEDURE — 93000 ELECTROCARDIOGRAM COMPLETE: CPT | Performed by: NURSE PRACTITIONER

## 2021-07-15 PROCEDURE — 99214 OFFICE O/P EST MOD 30 MIN: CPT | Performed by: NURSE PRACTITIONER

## 2021-07-15 PROCEDURE — G8427 DOCREV CUR MEDS BY ELIG CLIN: HCPCS | Performed by: NURSE PRACTITIONER

## 2021-07-15 PROCEDURE — 3017F COLORECTAL CA SCREEN DOC REV: CPT | Performed by: NURSE PRACTITIONER

## 2021-07-15 PROCEDURE — 93225 XTRNL ECG REC<48 HRS REC: CPT | Performed by: INTERNAL MEDICINE

## 2021-07-15 PROCEDURE — 1036F TOBACCO NON-USER: CPT | Performed by: NURSE PRACTITIONER

## 2021-07-15 PROCEDURE — G8417 CALC BMI ABV UP PARAM F/U: HCPCS | Performed by: NURSE PRACTITIONER

## 2021-07-15 RX ORDER — NORTRIPTYLINE HYDROCHLORIDE 10 MG/1
10 CAPSULE ORAL NIGHTLY
COMMUNITY
End: 2021-09-17

## 2021-07-15 RX ORDER — PROPRANOLOL HCL 60 MG
60 CAPSULE, EXTENDED RELEASE 24HR ORAL DAILY
COMMUNITY
End: 2021-09-17

## 2021-07-15 RX ORDER — LISINOPRIL AND HYDROCHLOROTHIAZIDE 12.5; 1 MG/1; MG/1
TABLET ORAL
Qty: 30 TABLET | Refills: 2 | OUTPATIENT
Start: 2021-07-15

## 2021-07-15 RX ORDER — HYDROCHLOROTHIAZIDE 25 MG/1
25 TABLET ORAL EVERY MORNING
Qty: 30 TABLET | Refills: 5 | Status: SHIPPED | OUTPATIENT
Start: 2021-07-15 | End: 2021-12-14

## 2021-07-15 RX ORDER — LISINOPRIL 2.5 MG/1
2.5 TABLET ORAL DAILY
Qty: 30 TABLET | Refills: 1 | Status: SHIPPED | OUTPATIENT
Start: 2021-07-15 | End: 2021-09-17

## 2021-07-15 RX ORDER — RIZATRIPTAN BENZOATE 10 MG/1
10 TABLET ORAL
COMMUNITY
End: 2022-09-07

## 2021-07-15 ASSESSMENT — ENCOUNTER SYMPTOMS
SHORTNESS OF BREATH: 0
COUGH: 0

## 2021-07-15 NOTE — PROGRESS NOTES
Geetha Fuller [address]  Phone: [phone]    Fax [phone]    Jackeline Espino MD, Kevin Ratliff MD, Salvador Hughes MD, Dionicio Side, MD Baruch Barthel, MD Zenovia Bump, MD Sharla Fridge, MD Dolores Hoyos, APRN      Russ Candelario, YESSICA Hilliard, APRKEN Cornell, APRN    CARDIOLOGY  NOTE    7/15/2021    Ton Lopez (:  1965) is a 49 y.o. female,Established patient with Dr. Flynn Key, here for evaluation of the following chief complaint(s):  3 Month Follow-Up        SUBJECTIVE/OBJECTIVE:    Harish Morton is a 50y.o. year old who has Past medical history as noted below. She is doing very well she has no new complaints denies any chest pain or shortness of breath. She was actually evaluated in February when she was undergoing surgery was noted to have second-degree heart block type I Wenckebach. She is not symptomatic. During her event monitor which she wore for 14 days in 2019 she was noted to have Wenckebach type I. She was not symptomatic  Currently she is feeling well blood pressure is well controlled. Neurologist started her on propanolol for migraine prevention. Her blood pressure dropped significantly with first dose and she has not taken again. She has been having more swelling in her hands nd feet even with HTCZ daily. Review of Systems   Constitutional: Negative for fatigue and fever. Respiratory: Negative for cough and shortness of breath. Cardiovascular: Positive for leg swelling. Negative for chest pain and palpitations. Musculoskeletal: Negative for arthralgias and gait problem. Neurological: Positive for dizziness. Negative for syncope, weakness, light-headedness and headaches.        Vitals:    07/15/21 0820   BP: 116/70   Pulse: 63   Weight: 168 lb 12.8 oz (76.6 kg)   Height: 5' 6\" (1.676 m)       Wt Readings from Last 3 Encounters:   07/15/21 168 lb 12.8 oz (76.6 kg) 06/23/21 165 lb 6.4 oz (75 kg)   06/18/21 161 lb 8 oz (73.3 kg)       BP Readings from Last 3 Encounters:   07/15/21 116/70   06/23/21 (!) 150/97   06/18/21 130/88       Prior to Admission medications    Medication Sig Start Date End Date Taking? Authorizing Provider   nortriptyline (PAMELOR) 10 MG capsule Take 10 mg by mouth nightly   Yes Historical Provider, MD   propranolol (INDERAL LA) 60 MG extended release capsule Take 60 mg by mouth daily   Yes Historical Provider, MD   rizatriptan (MAXALT) 10 MG tablet Take 10 mg by mouth once as needed for Migraine May repeat in 2 hours if needed   Yes Historical Provider, MD   lisinopril (PRINIVIL;ZESTRIL) 2.5 MG tablet Take 1 tablet by mouth daily 7/15/21  Yes YESSICA Mccarthy CNP   hydroCHLOROthiazide (HYDRODIURIL) 25 MG tablet Take 1 tablet by mouth every morning 7/15/21  Yes YESSICA Mccarthy CNP   valACYclovir (VALTREX) 500 MG tablet Take 1 tablet by mouth daily 5/26/21 11/22/21 Yes Chaitanya Mukherjee MD   omeprazole (PRILOSEC) 40 MG delayed release capsule Take 1 capsule by mouth daily 5/6/21  Yes YESSICA Moe NP   atorvastatin (LIPITOR) 80 MG tablet Take 1 tablet by mouth daily 5/6/21  Yes YESSICA Moe NP   cetirizine (ZYRTEC) 10 MG tablet Take 1 tablet by mouth daily 5/6/21 8/4/21 Yes YESSICA Moe NP   Blood Glucose Monitoring Suppl (TRUE METRIX AIR GLUCOSE METER) w/Device KIT 1 Device by Does not apply route continuous 4/29/21  Yes YESSICA Moe NP   TRUEplus Lancets 28G [address] 1 each by Does not apply route daily 4/29/21  Yes YESSICA Moe NP   blood glucose test strips (TRUE METRIX PRO BLOOD GLUCOSE) strip 1 each by In Vitro route daily As needed.  4/29/21  Yes YESSICA Moe NP   ezetimibe (ZETIA) 10 MG tablet Take 1 tablet by mouth daily 4/8/21  Yes Sarahy Roper APRN - CNP   traZODone (DESYREL) 100 MG tablet  3/1/21  Yes Historical Provider, MD   alogliptin (NESINA) 6.25 MG TABS tablet Take 1 tablet by mouth daily 3/8/21 7/15/21 Yes YESSICA Avendaño NP   ascorbic acid (V-R VITAMIN C) 250 MG tablet Take 4 tablets by mouth daily 3/5/21  Yes Stan Farrell MD   zinc sulfate (ORAZINC) 220 (50 Zn) MG capsule Take 4 capsules by mouth daily 3/5/21 3/5/22 Yes Stan Farrell MD   Nutritional Supplements (ENSURE HIGH PROTEIN) LIQD Take 1 Can by mouth Daily with lunch 3/5/21  Yes Stan Farrell MD   Multiple Vitamins-Minerals (THERAPEUTIC MULTIVITAMIN-MINERALS) tablet Take 1 tablet by mouth daily 3/4/21 3/4/22 Yes YESSICA Hoffmann CNP   Calcium Carb-Cholecalciferol (CALCIUM-VITAMIN D) 600-400 MG-UNIT TABS Take 1 tablet by mouth daily 3/4/21  Yes YESSICA Hoffmann CNP   albuterol sulfate HFA (PROAIR HFA) 108 (90 Base) MCG/ACT inhaler Inhale 2 puffs into the lungs every 6 hours as needed for Wheezing 9/2/20  Yes Malik Lowery MD   tiotropium (SPIRIVA RESPIMAT) 2.5 MCG/ACT AERS inhaler Inhale 2 puffs into the lungs daily 9/2/20  Yes Malik Lowery MD   ammonium lactate (LAC-HYDRIN) 12 % lotion  6/24/20  Yes Historical Provider, MD   clobetasol (TEMOVATE) 0.05 % ointment  5/4/20  Yes Historical Provider, MD   tiZANidine (ZANAFLEX) 4 MG tablet Take 4 mg by mouth every 8 hours as needed  1/17/20  Yes Historical Provider, MD   oxyCODONE-acetaminophen (PERCOCET) 7.5-325 MG per tablet Take 1 tablet by mouth every 8 hours as needed for Pain (Patient states takes every 6 hours).   1/17/20  Yes Historical Provider, MD   Psyllium (METAMUCIL) 48.57 % POWD Take 1 Units by mouth daily 1/24/20  Yes Stan Farrell MD   potassium chloride (KLOR-CON M) 20 MEQ extended release tablet Take 1 tablet by mouth 2 times daily 8/2/19  Yes Stan Farrell MD   venlafaxine (EFFEXOR) 37.5 MG tablet Take 37.5 mg by mouth 2 times daily   Yes Historical Provider, MD   fluticasone-vilanterol (BREO ELLIPTA) 100-25 MCG/INH AEPB inhaler Inhale 1 puff into the lungs daily 3/5/18  Yes Malik Lowery MD ALPRAZolam (XANAX) 0.5 MG tablet Take 0.5 mg by mouth as needed for Sleep. .   Yes Historical Provider, MD   hydrOXYzine (ATARAX) 25 MG tablet TAKE 1 TABLET EVERY 8 HOURS AS NEEDED FOR ANXIETY AVOID ALCOHOL/CAUSES DROWSINESS. Do not take with Xanax  Patient not taking: Reported on 7/15/2021 10/29/20   Agusto All, APRN - NP   calcipotriene (DOVONEX) 0.005 % ointment  5/4/20   Historical Provider, MD       Physical Exam  Vitals reviewed. Constitutional:       General: She is not in acute distress. Appearance: Normal appearance. She is obese. She is not ill-appearing. HENT:      Head: Atraumatic. Neck:      Vascular: No carotid bruit. Cardiovascular:      Rate and Rhythm: Normal rate and regular rhythm. Pulses: Normal pulses. Heart sounds: Normal heart sounds. No murmur heard. Pulmonary:      Effort: Pulmonary effort is normal. No respiratory distress. Breath sounds: Normal breath sounds. Musculoskeletal:         General: No swelling or deformity. Cervical back: Neck supple. No muscular tenderness. Neurological:      Mental Status: She is alert.          Health Maintenance   Topic Date Due    Hepatitis B vaccine (1 of 3 - Risk 3-dose series) Never done    Diabetic foot exam  04/03/2020    Breast cancer screen  04/19/2020    Diabetic retinal exam  03/09/2021    Diabetic microalbuminuria test  07/01/2021    Pneumococcal 0-64 years Vaccine (1 of 2 - PPSV23) 04/01/2023 (Originally 3/16/1971)    Flu vaccine (1) 09/01/2021    DTaP/Tdap/Td vaccine (2 - Td or Tdap) 12/20/2021    Lipid screen  04/08/2022    A1C test (Diabetic or Prediabetic)  05/06/2022    Potassium monitoring  05/26/2022    Creatinine monitoring  05/26/2022    Colon cancer screen colonoscopy  07/27/2030    Shingles Vaccine  Completed    COVID-19 Vaccine  Completed    Hepatitis C screen  Completed    HIV screen  Completed    Hepatitis A vaccine  Aged Out    Hib vaccine  Aged C/ Tho Nai 19 Meningococcal (ACWY) vaccine  Aged Out       Stress tests 3/4/2019       Summary    ECG portion of stress test is negative for ischemia by diagnostic criteria.    No infarct or ischemia noted.    Normal EF 79 % with normal ventricular contractility.    Normal stress myocardial perfusion.    This is a normal study.         Echo 3/4/2019  Summary   technically difficult study   Left ventricular function is normal.   Ejection fraction is visually estimated at 55-60%.   No significant valvular abnormalities.         ASSESSMENT/PLAN:    1. Essential hypertension  Assessment & Plan:   Well-controlled, changes made today: reduce lisinopril to allow for migraine medication  2. Mixed hyperlipidemia  Assessment & Plan:   Uncontrolled, lifestyle modifications recommended and recommend patient to take zetia for cholesterol. Patient is leery of starting medication for her cholesterol as she has had issues in the past with medications causing myalgias. Patient will think about it  3. Mobitz type 1 second degree AV block  Assessment & Plan:   Asymptomatic, continue current medications and will place on holter monitor to see if propanolol causes more bradycardia or rhythm changes  EKG today shows bradycardia mobitz type 1 second degree AV block  Orders:  -     Holter monitor 48 hour; Future      No follow-ups on file. An electronic signature was used to authenticate this note.     Electronically signed by YESSICA Harrison CNP on 7/15/2021 at 9:03 AM

## 2021-07-15 NOTE — PROGRESS NOTES
48 hour holter monitor [email or web address] for Mobitz type 1 av block Serial # [id number] . Instructed patient on monitor and proper use. Instructed on diary. When to remove and bring it back. Must leave the holter monitor on  without removing for the duration of time ordered. Answered all questions the patient had. Instructed patient to call [facility] at [phone] with any questions or concerns with the monitor.

## 2021-07-15 NOTE — LETTER
Laurie Rand María [facility]  1965  [id number]    Have you had any Chest Pain that is not new? - No  If Yes DO EKG - How does it feel -    How long does the pain last -      How long have you been having the pain -    Did you take a    And did it relieve the pain -      DO EKG IF: Patient has a Heart Rate above 100 or below 40     CAD (Coronary Artery Disease) patient should have one on file every 6 months        Have you had any Shortness of Breath - No  If Yes - When     Have you had any dizziness - No  If Yes DO ORTHOSTATIC BP - when do you feel dizzy    How long does it last .        Sitting wait 5 minutes do supine (laying down) wait 5 minutes then do standing - log each in \"vitals\" area in Epic   Be sure to ask what symptoms they are having if they get dizzy while completing ortho stats such as: room spinning, nausea, etc.    Have you had any palpitations that are not new? - No  If Yes DO EKG - Do you feel your heart   How long does it last - . Is the patient on any of the following medications -   If Yes DO EKG - Needs done every 6 months    Do you have any edema - swelling in feet  And hands   If Yes - CHECK TO SEE IF THE EDEMA IS PITTING  How long have they been having edema - Months  If Yes - Have they worn compression stockings No  If they have worn compression stockings      Vein \"LEG PROBLEM Questionnaire\"  1. Do you have prominent leg veins? No   2. Do you have any skin discoloration? No  3. Do you have any healed or active sores? No  4. Do you have swelling of the legs? No  5. Do you have a family history of varicose veins? No  6. Does your profession involve pro-longed        standing or heavy lifting? Yes  7. Have you been fighting overweight problems? No  8. Do you have restless legs? No  9. Do you have any night time cramps? Yes once in awhile   10.  Do you have any of the following in your legs:        Aching and Tiredness

## 2021-07-15 NOTE — ASSESSMENT & PLAN NOTE
Uncontrolled, lifestyle modifications recommended and recommend patient to take zetia for cholesterol. Patient is leery of starting medication for her cholesterol as she has had issues in the past with medications causing myalgias.   Patient will think about it

## 2021-07-15 NOTE — PATIENT INSTRUCTIONS
Please be informed that if you contact our office outside of normal business hours the physician on call cannot help with any scheduling or rescheduling issues, procedure instruction questions or any type of medication issue. We advise you for any urgent/emergency that you go to the nearest emergency room! PLEASE CALL OUR OFFICE DURING NORMAL BUSINESS HOURS    Monday - Friday   8 am to 5 pm    [place]: [address]: [phone]    [place]:  [phone]    **It is YOUR responsibilty to bring medication bottles and/or updated medication list to [address] [place] [address].  This will allow us to better serve you and all your healthcare needs**

## 2021-07-16 ENCOUNTER — OFFICE VISIT (OUTPATIENT)
Dept: BARIATRICS/WEIGHT MGMT | Age: 56
End: 2021-07-16
Payer: COMMERCIAL

## 2021-07-16 VITALS
HEART RATE: 76 BPM | RESPIRATION RATE: 18 BRPM | OXYGEN SATURATION: 97 % | HEIGHT: 67 IN | WEIGHT: 160.3 LBS | DIASTOLIC BLOOD PRESSURE: 100 MMHG | SYSTOLIC BLOOD PRESSURE: 155 MMHG | BODY MASS INDEX: 25.16 KG/M2

## 2021-07-16 DIAGNOSIS — E65 ABDOMINAL PANNUS: Primary | ICD-10-CM

## 2021-07-16 PROCEDURE — G8417 CALC BMI ABV UP PARAM F/U: HCPCS | Performed by: SURGERY

## 2021-07-16 PROCEDURE — 3017F COLORECTAL CA SCREEN DOC REV: CPT | Performed by: SURGERY

## 2021-07-16 PROCEDURE — G8427 DOCREV CUR MEDS BY ELIG CLIN: HCPCS | Performed by: SURGERY

## 2021-07-16 PROCEDURE — 99213 OFFICE O/P EST LOW 20 MIN: CPT | Performed by: SURGERY

## 2021-07-16 PROCEDURE — 1036F TOBACCO NON-USER: CPT | Performed by: SURGERY

## 2021-07-16 ASSESSMENT — ENCOUNTER SYMPTOMS
NAUSEA: 0
VOICE CHANGE: 0
CONSTIPATION: 0
PHOTOPHOBIA: 0
VOMITING: 0
COUGH: 0
SHORTNESS OF BREATH: 0
COLOR CHANGE: 0
ABDOMINAL PAIN: 1
WHEEZING: 0
TROUBLE SWALLOWING: 0
ANAL BLEEDING: 0
DIARRHEA: 0
SORE THROAT: 0
BLOOD IN STOOL: 0

## 2021-07-16 NOTE — PROGRESS NOTES
BARIATRIC SURGERY POST OPERATIVE NOTE    SUBJECTIVE:    Patient presenting today referred from YESSICA Burkett NP, for   Chief Complaint   Patient presents with    Follow-up     Consent Appointment- Abdominal pannus      BP (!) 155/100   Pulse 76   Resp 18   Ht 5' 7\" (1.702 m)   Wt 160 lb 4.8 oz (72.7 kg)   SpO2 97%   BMI 25.11 kg/m²      HPI: Melida Mcleod is a 49 y.o. female S/P SLEEVE GASTRECTOMY AND REACHED HER IBW!!!    Consented today for her panniculectomy. Will do ASP. Current Outpatient Medications:     nortriptyline (PAMELOR) 10 MG capsule, Take 10 mg by mouth nightly, Disp: , Rfl:     propranolol (INDERAL LA) 60 MG extended release capsule, Take 60 mg by mouth daily, Disp: , Rfl:     rizatriptan (MAXALT) 10 MG tablet, Take 10 mg by mouth once as needed for Migraine May repeat in 2 hours if needed, Disp: , Rfl:     lisinopril (PRINIVIL;ZESTRIL) 2.5 MG tablet, Take 1 tablet by mouth daily, Disp: 30 tablet, Rfl: 1    hydroCHLOROthiazide (HYDRODIURIL) 25 MG tablet, Take 1 tablet by mouth every morning, Disp: 30 tablet, Rfl: 5    valACYclovir (VALTREX) 500 MG tablet, Take 1 tablet by mouth daily, Disp: 30 tablet, Rfl: 5    omeprazole (PRILOSEC) 40 MG delayed release capsule, Take 1 capsule by mouth daily, Disp: 90 capsule, Rfl: 2    atorvastatin (LIPITOR) 80 MG tablet, Take 1 tablet by mouth daily, Disp: 90 tablet, Rfl: 2    cetirizine (ZYRTEC) 10 MG tablet, Take 1 tablet by mouth daily, Disp: 90 tablet, Rfl: 3    Blood Glucose Monitoring Suppl (TRUE METRIX AIR GLUCOSE METER) w/Device KIT, 1 Device by Does not apply route continuous, Disp: 1 kit, Rfl: 0    TRUEplus Lancets 28G MISC, 1 each by Does not apply route daily, Disp: 100 each, Rfl: 3    blood glucose test strips (TRUE METRIX PRO BLOOD GLUCOSE) strip, 1 each by In Vitro route daily As needed. , Disp: 100 each, Rfl: 3    ezetimibe (ZETIA) 10 MG tablet, Take 1 tablet by mouth daily, Disp: 90 tablet, Rfl: 1   the lungs daily, Disp: 1 each, Rfl: 5    ALPRAZolam (XANAX) 0.5 MG tablet, Take 0.5 mg by mouth as needed for Sleep. ., Disp: , Rfl:     alogliptin (NESINA) 6.25 MG TABS tablet, Take 1 tablet by mouth daily, Disp: 90 tablet, Rfl: 1  Past Medical History:   Diagnosis Date    Anxiety     Arthritis     \"Back, Hands, Shoulders\"    Chronic back pain     Arthritis - NKI    COPD [facility]chronic obstructive pulmonary disease) [facility]     Sees Dr. Camille Murray with Mental Health    Diabetes mellitus ([facility] Dx 2008    Type II - follows with PCP    Emphysema     Fibromyalgia     H/O abdominoplasty 12/21/2018    H/O cardiovascular stress test 03/04/2019    ECG portion of stress test is neg for ischemia by diagnostic criteria. No infarct or ischemia noted. Normal stress myocardial perfusion. This is a normal study    H/O echocardiogram 03/04/2019    Left ventricular function is normal. Ejection fraction is visually estimated at 55-60%. No significant valvular abnormalitites.      Hyperlipidemia     Hypertension     Follows with PCP    Migraines Last Migraine 10-18    Mobitz (type) I (Wenckebach's) atrioventricular block 01/29/2020    Neuropathy     From feet to knees    Piriformis syndrome 6/24/2016    Psoriasis     Rotator cuff impingement syndrome, right 2/26/2018    RTC repair Dr Sarah Carpenter 2015  Treated by Dr Jayla Slaughter 2/18/17  Arthrocentesis 12/19/17    Shortness of breath on exertion     Syncope and collapse 03/04/2019    Teeth missing     Upper And Lower    Wears glasses       Past Surgical History:   Procedure Laterality Date    ABDOMEN SURGERY N/A 1/14/2019    SECONDARY CLOSURE OF DEHISCED ABDOMINAL WOUND performed by Cristel Narvaez MD at [facility] 71 N/A 1/24/2019    IRRIGATION OF LOWER ABDOMINAL WOUND performed by Cristel Narvaez MD at [facility] 73 ABDOMINOPLASTY N/A 7/29/2019    ABDOMINOPLASTY REVISION performed by Cristel Narvaez MD at [address] [place] [address] Not on file     Social Determinants of Health     Financial Resource Strain:     Difficulty of Paying Living Expenses:    Food Insecurity:     Worried About Running Out of Food in the Last Year:     [address] in the Last Year:    Transportation Needs:     Lack of Transportation (Medical):  Lack of Transportation (Non-Medical):    Physical Activity:     Days of Exercise per Week:     Minutes of Exercise per Session:    Stress:     Feeling of Stress :    Social Connections:     Frequency of Communication with Friends and Family:     Frequency of Social Gatherings with Friends and Family:     Attends [employer] Services:     Active Member of Clubs or Organizations:     Attends Club or Organization Meetings:     Marital Status:    Intimate Partner Violence:     Fear of Current or Ex-Partner:     Emotionally Abused:     Physically Abused:     Sexually Abused:      Family History   Problem Relation Age of Onset    Heart Attack Father     Heart Disease Father     Early Death Father 46        Heart Attack    Alcohol Abuse Father     Substance Abuse Father         Alcoholic    Arthritis Mother     Heart Disease Mother     Diabetes Mother     Cancer Mother         \"Kidney Cancer\"    High Blood Pressure Mother     High Blood Pressure Sister     Diabetes Brother     Early Death Daughter 21        \"Killed In A Car Accident\"     Review of Systems   Constitutional: Negative for activity change, chills, diaphoresis and fever. HENT: Negative for sore throat, trouble swallowing and voice change. Eyes: Negative for photophobia and visual disturbance. Respiratory: Negative for cough, shortness of breath and wheezing. Cardiovascular: Negative for chest pain, palpitations and leg swelling. Gastrointestinal: Positive for abdominal pain. Negative for anal bleeding, blood in stool, constipation, diarrhea, nausea and vomiting.    Endocrine: Negative for cold intolerance, heat intolerance, polydipsia and polyuria. Genitourinary: Negative for dysuria, frequency and hematuria. Musculoskeletal: Negative for joint swelling, myalgias and neck stiffness. Skin: Negative for color change and rash. Neurological: Negative for seizures, speech difficulty, light-headedness and numbness. Hematological: Negative for adenopathy. Does not bruise/bleed easily. OBJECTIVE:    Physical Exam  Vitals reviewed. Constitutional:       General: She is not in acute distress. Appearance: She is well-developed. She is not diaphoretic. HENT:      Head: Normocephalic and atraumatic. Eyes:      General: No scleral icterus. Conjunctiva/sclera: Conjunctivae normal.      Pupils: Pupils are equal, round, and reactive to light. Neck:      Thyroid: No thyromegaly. Vascular: No JVD. Trachea: No tracheal deviation. Cardiovascular:      Rate and Rhythm: Normal rate and regular rhythm. Heart sounds: Normal heart sounds. No murmur heard. No friction rub. No gallop. Pulmonary:      Effort: Pulmonary effort is normal. No respiratory distress. Breath sounds: No stridor. No wheezing or rales. Chest:      Chest wall: No tenderness. Abdominal:      General: Bowel sounds are normal. There is no distension. Palpations: Abdomen is soft. There is no mass. Tenderness: There is abdominal tenderness (On and off ulcers, in her infrapubic pannus. Pamalee Bucker ). There is no guarding or rebound. Musculoskeletal:         General: No tenderness. Normal range of motion. Cervical back: Normal range of motion. Lymphadenopathy:      Cervical: No cervical adenopathy. Skin:     General: Skin is warm and dry. Coloration: Skin is not pale. Findings: No erythema or rash. Neurological:      Mental Status: She is alert and oriented to person, place, and time. Cranial Nerves: No cranial nerve deficit.       Coordination: Coordination normal.   Psychiatric:         Behavior: Behavior normal.         Thought Content: Thought content normal.         Judgment: Judgment normal.       Assessment / Plan:    1. Abdominal pannus        Consented today for her panniculectomy. Will do ASP. July 29th. Follow Up:  Return in about 4 weeks (around 8/13/2021) for Surgery.     Alyse Alberto MD, FACS, FICS  Member of the [employer] of Met[employer] and Bariatric Sur[employer]    [phone]    7/16/21

## 2021-07-21 NOTE — TELEPHONE ENCOUNTER
Member Info   Member ID:  [id number]  Member Name:  Raymond Ayala  Member :  1965  Reference #: [id number]  Reference #: [id number]  Description: Outpatient Elective  Place Of Service: [address] [place] [address]  Submitting Provider: David Traylor Physician/Medical Doctor (MD) P  Requesting/Ordering Provider: Melissa Lundy Physician/Medical Doctor (MD) P  Servicing/Rendering Provider: [address] [place] [address] Street:   Member Information  Member Name: Yoana Cousin Id: [id number]  YOB: 1965  Gender: Female  Service Event  Diagnosis Code: E65 Localized adiposity  Procedure: 87114 Excision, excessive skin and subcutaneous tissue (includes lipectomy); abdomen, infraumbilical panniculectomy  Line #1  Requested Received Date: 2021 3:29:00 PM Requested Units: 2  Start Date of Service: 2021 Authorized Units: 2  End Date of Service: 2021 Status: Approved  Service Event  Diagnosis Code: E65 Localized adiposity  Procedure: 22812 Excision, excessive skin and subcutaneous tissue (includes lipectomy), abdomen (eg, abdominoplasty) (includes umbilical transposition and fascial plication) (List separately i  Line #1  Requested Received Date: 2021 3:29:00 PM Requested Units: 2  Start Date of Service: 2021 Authorized Units: 2  End Date of Service: 2021 Status: Approved no

## 2021-07-22 ENCOUNTER — HOSPITAL ENCOUNTER (OUTPATIENT)
Age: 56
Discharge: HOME | End: 2021-07-22
Payer: COMMERCIAL

## 2021-07-22 ENCOUNTER — HOSPITAL ENCOUNTER (OUTPATIENT)
Dept: PREADMISSION TESTING | Age: 56
Discharge: HOME | End: 2021-07-26
Payer: COMMERCIAL

## 2021-07-22 VITALS
OXYGEN SATURATION: 95 % | SYSTOLIC BLOOD PRESSURE: 135 MMHG | BODY MASS INDEX: 26.36 KG/M2 | HEIGHT: 66 IN | TEMPERATURE: 97.5 F | HEART RATE: 70 BPM | DIASTOLIC BLOOD PRESSURE: 92 MMHG | RESPIRATION RATE: 16 BRPM | WEIGHT: 164 LBS

## 2021-07-22 DIAGNOSIS — I45.9 HEART BLOCK: Primary | ICD-10-CM

## 2021-07-22 LAB
ANION GAP SERPL CALCULATED.3IONS-SCNC: 10 MMOL/L (ref 4–16)
BASOPHILS ABSOLUTE: 0.1 K/CU MM
BASOPHILS RELATIVE PERCENT: 0.9 % (ref 0–1)
BUN BLDV-MCNC: 19 MG/DL (ref 6–23)
CALCIUM SERPL-MCNC: 9.9 MG/DL (ref 8.3–10.6)
CHLORIDE BLD-SCNC: 97 MMOL/L (ref 99–110)
CO2: 30 MMOL/L (ref 21–32)
CREAT SERPL-MCNC: 0.8 MG/DL (ref 0.6–1.1)
DIFFERENTIAL TYPE: ABNORMAL
EOSINOPHILS ABSOLUTE: 0.1 K/CU MM
EOSINOPHILS RELATIVE PERCENT: 1.3 % (ref 0–3)
GFR AFRICAN AMERICAN: >60 ML/MIN/1.73M2
GFR NON-AFRICAN AMERICAN: >60 ML/MIN/1.73M2
GLUCOSE BLD-MCNC: 123 MG/DL (ref 70–99)
HCT VFR BLD CALC: 46.1 % (ref 37–47)
HEMOGLOBIN: 15.2 GM/DL (ref 12.5–16)
IMMATURE NEUTROPHIL %: 0.3 % (ref 0–0.43)
LYMPHOCYTES ABSOLUTE: 3.2 K/CU MM
LYMPHOCYTES RELATIVE PERCENT: 41.3 % (ref 24–44)
MCH RBC QN AUTO: 24.8 PG (ref 27–31)
MCHC RBC AUTO-ENTMCNC: 33 % (ref 32–36)
MCV RBC AUTO: 75.2 FL (ref 78–100)
MONOCYTES ABSOLUTE: 0.5 K/CU MM
MONOCYTES RELATIVE PERCENT: 6.7 % (ref 0–4)
NUCLEATED RBC %: 0 %
PDW BLD-RTO: 14 % (ref 11.7–14.9)
PLATELET # BLD: 331 K/CU MM (ref 140–440)
PMV BLD AUTO: 10.6 FL (ref 7.5–11.1)
POTASSIUM SERPL-SCNC: 5 MMOL/L (ref 3.5–5.1)
RBC # BLD: 6.13 M/CU MM (ref 4.2–5.4)
SEGMENTED NEUTROPHILS ABSOLUTE COUNT: 3.8 K/CU MM
SEGMENTED NEUTROPHILS RELATIVE PERCENT: 49.5 % (ref 36–66)
SODIUM BLD-SCNC: 137 MMOL/L (ref 135–145)
TOTAL IMMATURE NEUTOROPHIL: 0.02 K/CU MM
TOTAL NUCLEATED RBC: 0 K/CU MM
WBC # BLD: 7.7 K/CU MM (ref 4–10.5)

## 2021-07-22 PROCEDURE — 93227 XTRNL ECG REC<48 HR R&I: CPT | Performed by: INTERNAL MEDICINE

## 2021-07-22 PROCEDURE — 36415 COLL VENOUS BLD VENIPUNCTURE: CPT

## 2021-07-22 PROCEDURE — 80048 BASIC METABOLIC PNL TOTAL CA: CPT

## 2021-07-22 PROCEDURE — 85025 COMPLETE CBC W/AUTO DIFF WBC: CPT

## 2021-07-22 RX ORDER — HEPARIN SODIUM 5000 [USP'U]/ML
5000 INJECTION, SOLUTION INTRAVENOUS; SUBCUTANEOUS ONCE
Status: CANCELLED | OUTPATIENT
Start: 2021-07-29

## 2021-07-22 RX ORDER — ONDANSETRON 2 MG/ML
4 INJECTION INTRAMUSCULAR; INTRAVENOUS ONCE
Status: CANCELLED | OUTPATIENT
Start: 2021-07-29

## 2021-07-22 RX ORDER — SODIUM CHLORIDE, SODIUM LACTATE, POTASSIUM CHLORIDE, CALCIUM CHLORIDE 600; 310; 30; 20 MG/100ML; MG/100ML; MG/100ML; MG/100ML
INJECTION, SOLUTION INTRAVENOUS CONTINUOUS
Status: CANCELLED | OUTPATIENT
Start: 2021-07-29

## 2021-07-22 ASSESSMENT — PAIN SCALES - GENERAL: PAINLEVEL_OUTOF10: 9

## 2021-07-22 ASSESSMENT — PAIN DESCRIPTION - DESCRIPTORS: DESCRIPTORS: ACHING;SHARP

## 2021-07-22 ASSESSMENT — PAIN DESCRIPTION - LOCATION: LOCATION: NECK;SHOULDER

## 2021-07-22 ASSESSMENT — PAIN DESCRIPTION - FREQUENCY: FREQUENCY: CONTINUOUS

## 2021-07-22 ASSESSMENT — PAIN DESCRIPTION - ORIENTATION: ORIENTATION: LEFT;RIGHT

## 2021-07-22 ASSESSMENT — PAIN DESCRIPTION - PROGRESSION: CLINICAL_PROGRESSION: GRADUALLY WORSENING

## 2021-07-22 ASSESSMENT — PAIN DESCRIPTION - PAIN TYPE: TYPE: CHRONIC PAIN

## 2021-07-22 ASSESSMENT — PAIN DESCRIPTION - ONSET: ONSET: ON-GOING

## 2021-07-22 NOTE — PROGRESS NOTES
7/22/21 - Dr. Kemal Gates notified EKG results: A-fib, possible pulmonary disease. Holter Monitor report in chart from Dr. Canelo Rose stating: \"97 Hr holter showing  several episodes of type 1  AV 2nd degree heart block at 2.03 pm at Hr of 45 , also noted at 4:49 AM and 5:19AM, Lowest HR was 33, max was 128 and average was 67, Clinical correlation needed , sleep apnea can cause similar heart blocks.  Evaluate for sleep apnea \"  No orders received

## 2021-07-23 ENCOUNTER — TELEPHONE (OUTPATIENT)
Dept: FAMILY MEDICINE CLINIC | Age: 56
End: 2021-07-23

## 2021-07-23 DIAGNOSIS — I45.9 HEART BLOCK: ICD-10-CM

## 2021-07-23 DIAGNOSIS — I10 ESSENTIAL HYPERTENSION: ICD-10-CM

## 2021-07-23 DIAGNOSIS — E11.9 TYPE 2 DIABETES MELLITUS WITHOUT COMPLICATION, WITHOUT LONG-TERM CURRENT USE OF INSULIN (HCC): Primary | ICD-10-CM

## 2021-07-23 NOTE — TELEPHONE ENCOUNTER
Sleep Center called stating Dx for sleep center referral needs changed. Insurance will cover HTN or DM.
independent

## 2021-07-27 ENCOUNTER — TELEPHONE (OUTPATIENT)
Dept: CARDIOLOGY CLINIC | Age: 56
End: 2021-07-27

## 2021-07-27 NOTE — TELEPHONE ENCOUNTER
Patient returned call and I gave her the results of the holter monitor. She said that PCP's office called and said that they were referring her for a sleep study based on holter monitor results. Patient stated that she had not even gotten the results. I advised patient that we do not all if patient is scheduled for follow up to discuss test results. She voiced understanding. Patient did say that she is going to keep her follow up appt. She said that she feels that the migraine medication that she was taking caused the low heart rates. She said that one medication she was taking caused her to eat and the other caused her blood pressure to drop. Patient did say that she stopped taking both of the medications.

## 2021-07-27 NOTE — TELEPHONE ENCOUNTER
Returned call and left message for patient to call me back to discuss the results of holter monitor.     50 Hr holter showing  several episodes of type 1  AV 2nd degree heart block at 2.03 pm at Hr of 45 , also noted at 4:49 AM and 5:19AM   Lowest HR was 33, max was 128 and average was 67   Clinical correlation needed , sleep apnea can cause similar heart blocks  Evaluate for sleep apnea

## 2021-07-28 ENCOUNTER — TELEPHONE (OUTPATIENT)
Dept: BARIATRICS/WEIGHT MGMT | Age: 56
End: 2021-07-28

## 2021-07-28 ENCOUNTER — ANESTHESIA EVENT (OUTPATIENT)
Dept: OPERATING ROOM | Age: 56
End: 2021-07-28
Payer: COMMERCIAL

## 2021-07-28 ASSESSMENT — COPD QUESTIONNAIRES: CAT_SEVERITY: NO INTERVAL CHANGE

## 2021-07-28 ASSESSMENT — ENCOUNTER SYMPTOMS: SHORTNESS OF BREATH: 1

## 2021-07-28 NOTE — ANESTHESIA PRE PROCEDURE
Department of Anesthesiology  Preprocedure Note       Name:  Paola Liu   Age:  49 y.o.  :  1965                                          MRN:  [id number]         Date:  2021      Surgeon: Melita Young):  Rosalio Aguirre MD    Procedure: Procedure(s):  REDO PANNICULECTOMY    Medications prior to admission:   Prior to Admission medications    Medication Sig Start Date End Date Taking? Authorizing Provider   nortriptyline (PAMELOR) 10 MG capsule Take 10 mg by mouth nightly    Historical Provider, MD   propranolol (INDERAL LA) 60 MG extended release capsule Take 60 mg by mouth daily    Historical Provider, MD   rizatriptan (MAXALT) 10 MG tablet Take 10 mg by mouth once as needed for Migraine May repeat in 2 hours if needed    Historical Provider, MD   lisinopril (PRINIVIL;ZESTRIL) 2.5 MG tablet Take 1 tablet by mouth daily 7/15/21   YESSICA Guevara CNP   hydroCHLOROthiazide (HYDRODIURIL) 25 MG tablet Take 1 tablet by mouth every morning 7/15/21   YESSICA Guevara CNP   valACYclovir (VALTREX) 500 MG tablet Take 1 tablet by mouth daily 21  Sukh Fam MD   omeprazole (PRILOSEC) 40 MG delayed release capsule Take 1 capsule by mouth daily 21   Agusto Danielson, YESSICA - NP   atorvastatin (LIPITOR) 80 MG tablet Take 1 tablet by mouth daily 21   Agusto Danielson, APRN - NP   cetirizine (ZYRTEC) 10 MG tablet Take 1 tablet by mouth daily 21  YESSICA Orozco NP   Blood Glucose Monitoring Suppl (TRUE METRIX AIR GLUCOSE METER) w/Device KIT 1 Device by Does not apply route continuous 21   YESSICA Orozco NP   TRUEplus Lancets 28G MISC 1 each by Does not apply route daily 21   YESSICA Orozco NP   blood glucose test strips (TRUE METRIX PRO BLOOD GLUCOSE) strip 1 each by In Vitro route daily As needed.  21   Agusto Danielson APRN - NP   ezetimibe (ZETIA) 10 MG tablet Take 1 tablet by mouth daily 21   Gildardo Becker APRN - CNP   traZODone (DESYREL) 100 MG tablet  3/1/21   Historical Provider, MD   alogliptin (NESINA) 6.25 MG TABS tablet Take 1 tablet by mouth daily 3/8/21 7/22/21  YESSICA Cheatham NP   ascorbic acid (V-R VITAMIN C) 250 MG tablet Take 4 tablets by mouth daily 3/5/21   Dorita Stacy MD   zinc sulfate (ORAZINC) 220 (50 Zn) MG capsule Take 4 capsules by mouth daily 3/5/21 3/5/22  Dorita Stacy MD   Nutritional Supplements (ENSURE HIGH PROTEIN) LIQD Take 1 Can by mouth Daily with lunch 3/5/21   Dorita Stacy MD   Multiple Vitamins-Minerals (THERAPEUTIC MULTIVITAMIN-MINERALS) tablet Take 1 tablet by mouth daily 3/4/21 3/4/22  YESSICA Abebe CNP   Calcium Carb-Cholecalciferol (CALCIUM-VITAMIN D) 600-400 MG-UNIT TABS Take 1 tablet by mouth daily 3/4/21   YESSICA Abebe CNP   hydrOXYzine (ATARAX) 25 MG tablet TAKE 1 TABLET EVERY 8 HOURS AS NEEDED FOR ANXIETY AVOID ALCOHOL/CAUSES DROWSINESS. Do not take with Xanax 10/29/20   YESSICA Cheatham NP   albuterol sulfate HFA (PROAIR HFA) 108 (90 Base) MCG/ACT inhaler Inhale 2 puffs into the lungs every 6 hours as needed for Wheezing 9/2/20   Massiel Olivo MD   tiotropium (SPIRIVA RESPIMAT) 2.5 MCG/ACT AERS inhaler Inhale 2 puffs into the lungs daily 9/2/20   Massiel Olivo MD   ammonium lactate (LAC-HYDRIN) 12 % lotion  6/24/20   Historical Provider, MD   calcipotriene (DOVONEX) 0.005 % ointment  5/4/20   Historical Provider, MD   clobetasol (TEMOVATE) 0.05 % ointment  5/4/20   Historical Provider, MD   tiZANidine (ZANAFLEX) 4 MG tablet Take 4 mg by mouth every 8 hours as needed  1/17/20   Historical Provider, MD   oxyCODONE-acetaminophen (PERCOCET) 7.5-325 MG per tablet Take 1 tablet by mouth every 8 hours as needed for Pain (Patient states takes every 6 hours).   1/17/20   Historical Provider, MD   Psyllium (METAMUCIL) 48.57 % POWD Take 1 Units by mouth daily 1/24/20   Dorita Stacy MD   potassium chloride (KLOR-CON M) 20 MEQ extended release tablet Take 1 tablet by mouth 2 times daily 8/2/19   Stan Farrell MD   venlafaxine (EFFEXOR) 37.5 MG tablet Take 37.5 mg by mouth 2 times daily    Historical Provider, MD   fluticasone-vilanterol (BREO ELLIPTA) 100-25 MCG/INH AEPB inhaler Inhale 1 puff into the lungs daily 3/5/18   Alma Mehta MD   ALPRAZolam Danney Vinay) 0.5 MG tablet Take 0.5 mg by mouth as needed for Sleep. Lina Escalona Historical Provider, MD       Current medications:    No current facility-administered medications for this encounter. Current Outpatient Medications   Medication Sig Dispense Refill    nortriptyline (PAMELOR) 10 MG capsule Take 10 mg by mouth nightly      propranolol (INDERAL LA) 60 MG extended release capsule Take 60 mg by mouth daily      rizatriptan (MAXALT) 10 MG tablet Take 10 mg by mouth once as needed for Migraine May repeat in 2 hours if needed      lisinopril (PRINIVIL;ZESTRIL) 2.5 MG tablet Take 1 tablet by mouth daily 30 tablet 1    hydroCHLOROthiazide (HYDRODIURIL) 25 MG tablet Take 1 tablet by mouth every morning 30 tablet 5    valACYclovir (VALTREX) 500 MG tablet Take 1 tablet by mouth daily 30 tablet 5    omeprazole (PRILOSEC) 40 MG delayed release capsule Take 1 capsule by mouth daily 90 capsule 2    atorvastatin (LIPITOR) 80 MG tablet Take 1 tablet by mouth daily 90 tablet 2    cetirizine (ZYRTEC) 10 MG tablet Take 1 tablet by mouth daily 90 tablet 3    Blood Glucose Monitoring Suppl (TRUE METRIX AIR GLUCOSE METER) w/Device KIT 1 Device by Does not apply route continuous 1 kit 0    TRUEplus Lancets 28G MISC 1 each by Does not apply route daily 100 each 3    blood glucose test strips (TRUE METRIX PRO BLOOD GLUCOSE) strip 1 each by In Vitro route daily As needed.  100 each 3    ezetimibe (ZETIA) 10 MG tablet Take 1 tablet by mouth daily 90 tablet 1    traZODone (DESYREL) 100 MG tablet       alogliptin (NESINA) 6.25 MG TABS tablet Take 1 tablet by mouth daily 90 tablet 1    ascorbic acid (V-R VITAMIN C) 250 MG tablet Take 4 tablets by mouth daily 30 tablet 3    zinc sulfate (ORAZINC) 220 (50 Zn) MG capsule Take 4 capsules by mouth daily 30 capsule 3    Nutritional Supplements (ENSURE HIGH PROTEIN) LIQD Take 1 Can by mouth Daily with lunch 32 Can 5    Multiple Vitamins-Minerals (THERAPEUTIC MULTIVITAMIN-MINERALS) tablet Take 1 tablet by mouth daily 90 tablet 3    Calcium Carb-Cholecalciferol (CALCIUM-VITAMIN D) 600-400 MG-UNIT TABS Take 1 tablet by mouth daily 90 tablet 3    hydrOXYzine (ATARAX) 25 MG tablet TAKE 1 TABLET EVERY 8 HOURS AS NEEDED FOR ANXIETY AVOID ALCOHOL/CAUSES DROWSINESS. Do not take with Xanax 60 tablet 1    albuterol sulfate HFA (PROAIR HFA) 108 (90 Base) MCG/ACT inhaler Inhale 2 puffs into the lungs every 6 hours as needed for Wheezing 1 Inhaler 5    tiotropium (SPIRIVA RESPIMAT) 2.5 MCG/ACT AERS inhaler Inhale 2 puffs into the lungs daily 1 Inhaler 5    ammonium lactate (LAC-HYDRIN) 12 % lotion       calcipotriene (DOVONEX) 0.005 % ointment       clobetasol (TEMOVATE) 0.05 % ointment       tiZANidine (ZANAFLEX) 4 MG tablet Take 4 mg by mouth every 8 hours as needed       oxyCODONE-acetaminophen (PERCOCET) 7.5-325 MG per tablet Take 1 tablet by mouth every 8 hours as needed for Pain (Patient states takes every 6 hours).  Psyllium (METAMUCIL) 48.57 % POWD Take 1 Units by mouth daily 1 Bottle 5    potassium chloride (KLOR-CON M) 20 MEQ extended release tablet Take 1 tablet by mouth 2 times daily 180 tablet 1    venlafaxine (EFFEXOR) 37.5 MG tablet Take 37.5 mg by mouth 2 times daily      fluticasone-vilanterol (BREO ELLIPTA) 100-25 MCG/INH AEPB inhaler Inhale 1 puff into the lungs daily 1 each 5    ALPRAZolam (XANAX) 0.5 MG tablet Take 0.5 mg by mouth as needed for Sleep.  .         Allergies:  No Known Allergies    Problem List:    Patient Active Problem List   Diagnosis Code    DM (diabetes mellitus) ([facility] [address] E11.9    Essential hypertension I10    Chronic pain syndrome G89.4    COPD, severe (HCC) J44.9    Gastroesophageal reflux disease with esophagitis K21.00    History of MRSA infection Z86.14    Anxiety F41.9    Chronic right-sided low back pain with sciatica M54.40, G89.29    Hiatal hernia K44.9    Status post laparoscopic sleeve gastrectomy Z98.84    Abdominal pannus E65    S/P abdominoplasty Z98.890    Fibromyalgia M79.7    Sebaceous cyst of breast, right N60.81    Mobitz type 1 second degree AV block I44.1    Polyp of cecum K63.5    Adenomatous polyp of ascending colon D12.2    Chronic edema BLE R60.9    Mixed hyperlipidemia E78.2    Neuropathy Q73.7    Umbilical pain R71.35    Lower abdominal pain R10.30    S/P panniculectomy Z98.890    Latent tuberculosis Z22.7    Gastroesophageal reflux disease K21.9    Vitamin D deficiency E55.9    Psoriasis L40.9    Pannus, abdominal E65    Herpes genitalis in women A60.09    Admission for therapeutic drug monitoring Z51.81       Past Medical History:        Diagnosis Date    Anxiety     Arthritis     \"Back, Hands, Shoulders\"    Chronic back pain     Arthritis - NKI    COPD (chronic obstructive pulmonary disease) ([facility] [address]     Sees Dr. Clayton Root with Mental Health    Diabetes mellitus ([facility] Dx 2008    Type II - follows with PCP    Emphysema     Fibromyalgia     H/O abdominoplasty 12/21/2018    H/O cardiovascular stress test 03/04/2019    ECG portion of stress test is neg for ischemia by diagnostic criteria. No infarct or ischemia noted. Normal stress myocardial perfusion. This is a normal study    H/O echocardiogram 03/04/2019    Left ventricular function is normal. Ejection fraction is visually estimated at 55-60%. No significant valvular abnormalitites.      Hyperlipidemia     Hypertension     Follows with PCP    Migraines Last Migraine 7/20/21    Mobitz (type) I (Wenckebach's) atrioventricular block 01/29/2020    Neuropathy     From feet to knees    Smoking status: Former Smoker     Packs/day: 0.25     Years: 30.00     Pack years: 7.50     Types: Cigarettes, E-Cigarettes     Start date:      Quit date: 2016     Years since quittin.5    Smokeless tobacco: Never Used   Substance Use Topics    Alcohol use: Yes     Comment: \"Occ. Maybe Twice A Month\"                                Counseling given: Not Answered      Vital Signs (Current): There were no vitals filed for this visit. BP Readings from Last 3 Encounters:   21 (!) 135/92   21 (!) 155/100   07/15/21 116/70       NPO Status:                                                                                 BMI:   Wt Readings from Last 3 Encounters:   21 164 lb (74.4 kg)   21 160 lb 4.8 oz (72.7 kg)   07/15/21 168 lb 12.8 oz (76.6 kg)     There is no height or weight on file to calculate BMI.    CBC:   Lab Results   Component Value Date    WBC 7.7 2021    RBC 6.13 2021    HGB 15.2 2021    HCT 46.1 2021    MCV 75.2 2021    RDW 14.0 2021     2021       CMP:   Lab Results   Component Value Date     2021    K 5.0 2021    CL 97 2021    CO2 30 2021    BUN 19 2021    CREATININE 0.8 2021    GFRAA >60 2021    AGRATIO 1.3 10/21/2020    LABGLOM >60 2021    GLUCOSE 123 2021    PROT 7.3 2021    PROT 7.9 2013    CALCIUM 9.9 2021    BILITOT 0.8 2021    ALKPHOS 87 2021    AST 20 2021    ALT 15 2021       POC Tests: No results for input(s): POCGLU, POCNA, POCK, POCCL, POCBUN, POCHEMO, POCHCT in the last 72 hours.     Coags: No results found for: PROTIME, INR, APTT    HCG (If Applicable):   Lab Results   Component Value Date    PREGTESTUR NEGATIVE 2020        ABGs: No results found for: PHART, PO2ART, LWV1LJY, CFL7HVN, BEART, K9DLHKQQ     Type & Screen (If Applicable):  No results found for: LABABO, [address]    Drug/Infectious Status (If Applicable):  Lab Results   Component Value Date    HEPCAB NON REACTIVE 04/08/2021       COVID-19 Screening (If Applicable):   Lab Results   Component Value Date    COVID19 NOT DETECTED 07/23/2020           Anesthesia Evaluation    Airway: Mallampati: II  TM distance: >3 FB   Neck ROM: full  Mouth opening: > = 3 FB Dental:          Pulmonary:normal exam    (+) COPD: no interval change and severe,  shortness of breath:                             Cardiovascular:  Exercise tolerance: poor (<4 METS),   (+) hypertension:, dysrhythmias:, TOMAS:, hyperlipidemia         Beta Blocker:  Dose within 24 Hrs      ROS comment: stress test ECG portion of stress test is neg for ischemia by diagnostic criteria. No infarct or ischemia noted. Normal stress myocardial perfusion. This is a normal study     echocardiogram Left ventricular function is normal. Ejection fraction is visually estimated at 55-60%. No significant valvular abnormalitites      Neuro/Psych:   (+) headaches: migraine headaches, depression/anxiety             GI/Hepatic/Renal:   (+) hiatal hernia, GERD:,           Endo/Other:    (+) DiabetesType II DM, , : arthritis: OA., .                 Abdominal:             Vascular: Other Findings:           Anesthesia Plan      general     ASA 3       Induction: intravenous. MIPS: Postoperative opioids intended. Anesthetic plan and risks discussed with patient. Plan discussed with CRNA. Attending anesthesiologist reviewed and agrees with Pre Eval content     Pre Anesthesia Assessment complete.  Chart reviewed on 7/28/2021        YESSICA Thomson - CRNA   7/28/2021

## 2021-07-28 NOTE — TELEPHONE ENCOUNTER
Called patient advised procedure has been moved to 8/12/21 at 10am arrival of [facility] spoke w/ Rhett Whitfieldswick moved authorization date to new surgery date of 8/12/21

## 2021-07-29 ENCOUNTER — ANESTHESIA (OUTPATIENT)
Dept: OPERATING ROOM | Age: 56
End: 2021-07-29
Payer: COMMERCIAL

## 2021-08-05 ENCOUNTER — HOSPITAL ENCOUNTER (OUTPATIENT)
Dept: SLEEP CENTER | Age: 56
Discharge: HOME | End: 2021-08-05
Payer: COMMERCIAL

## 2021-08-05 VITALS — HEIGHT: 67 IN | WEIGHT: 162 LBS | BODY MASS INDEX: 25.43 KG/M2

## 2021-08-05 DIAGNOSIS — G47.33 OSA (OBSTRUCTIVE SLEEP APNEA): ICD-10-CM

## 2021-08-05 DIAGNOSIS — J44.9 COPD, SEVERE (HCC): ICD-10-CM

## 2021-08-05 DIAGNOSIS — Z87.891 EX-SMOKER: ICD-10-CM

## 2021-08-05 DIAGNOSIS — G47.10 HYPERSOMNIA: ICD-10-CM

## 2021-08-05 PROCEDURE — 99423 OL DIG E/M SVC 21+ MIN: CPT | Performed by: INTERNAL MEDICINE

## 2021-08-05 PROCEDURE — 99211 OFF/OP EST MAY X REQ PHY/QHP: CPT

## 2021-08-05 ASSESSMENT — SLEEP AND FATIGUE QUESTIONNAIRES
HOW LIKELY ARE YOU TO NOD OFF OR FALL ASLEEP WHILE WATCHING TV: 2
HOW LIKELY ARE YOU TO NOD OFF OR FALL ASLEEP WHILE LYING DOWN TO REST IN THE AFTERNOON WHEN CIRCUMSTANCES PERMIT: 2
HOW LIKELY ARE YOU TO NOD OFF OR FALL ASLEEP WHEN YOU ARE A PASSENGER IN A CAR FOR AN HOUR WITHOUT A BREAK: 0
HOW LIKELY ARE YOU TO NOD OFF OR FALL ASLEEP WHILE SITTING AND TALKING TO SOMEONE: 0
HOW LIKELY ARE YOU TO NOD OFF OR FALL ASLEEP WHILE SITTING QUIETLY AFTER LUNCH WITHOUT ALCOHOL: 0
HOW LIKELY ARE YOU TO NOD OFF OR FALL ASLEEP WHILE SITTING INACTIVE IN A PUBLIC PLACE: 0
HOW LIKELY ARE YOU TO NOD OFF OR FALL ASLEEP WHILE SITTING AND READING: 0
HOW LIKELY ARE YOU TO NOD OFF OR FALL ASLEEP IN A CAR, WHILE STOPPED FOR A FEW MINUTES IN TRAFFIC: 0
ESS TOTAL SCORE: 4

## 2021-08-05 NOTE — CONSULTS
Nilton Carrillo MD, Michael Watkins MD, Giselle Colbert MD, [facility]      [address], [address] [facility]   PH: [phone]  F: [phone]     Subjective:     Patient ID: Teodora Juan is a 49 y.o. female, referred to the sleep center for   Chief Complaint   Patient presents with    Hypertension    Diabetes   . Referring physician:  Dr. Albina Romo    History: has been referred for the URIEL. She had a weight loss surgery in 2017. She lost about 60 lbs.       Symptoms:   []  Snoring                                                                    [x]  Dry Mouth  []  Choking                                                                   [x]  Morning Headaches  []  Gasping for Air                                                        []  Trouble Falling asleep  [x]  Tired during the daytime                                         []  Trouble Staying Asleep  [x]  Tired when you wake up                                         [x]  Weight Gain in Last 5 Years  [x]  Wake up frequently at night                                    []  Weight Loss in Last 5 Years  [x]  Shortness Of Breath                                               []  Shift Worker  []  Coughing                                                                [x]  Smoker (Previous or Current) 1pk /day x 32 yrs quit 3 yrs ago  []  Chest Pain                                                              [x]  Anxiety  []  Trouble keeping your legs still at night                   [x]  Depression  []  Kicking your legs in your sleep                               []  Insomnia       []  Stop breathing  [x]  Palpitations       []  Other:     Significant Co-morbidities:  []  Congestive Heart Failure     [x]  COPD         []  Stroke (Past 30 Days)      []  Supplemental Oxygen Usage       []  Cognitive Impairment      []  Neuromuscular Problems  []  Epilepsy/Neurological Disorders Social History     Socioeconomic History    Marital status: Single     Spouse name: Not on file    Number of children: Not on file    Years of education: Not on file    Highest education level: Not on file   Occupational History    Occupation: unemployed   Tobacco Use    Smoking status: Former Smoker     Packs/day: 0.25     Years: 30.00     Pack years: 7.50     Types: Cigarettes, E-Cigarettes     Start date:      Quit date: 2016     Years since quittin.5    Smokeless tobacco: Never Used   Vaping Use    Vaping Use: Never used   Substance and Sexual Activity    Alcohol use: Yes     Comment: \"Occ. Maybe Twice A Month\"    Drug use: Not on file     Comment: Marijuana Card  - last used: 21    Sexual activity: Yes     Partners: Male   Other Topics Concern    Not on file   Social History Narrative    Not on file     Social Determinants of Health     Financial Resource Strain:     Difficulty of Paying Living Expenses:    Food Insecurity:     Worried About Running Out of Food in the Last Year:     [address] in the Last Year:    Transportation Needs:     Lack of Transportation (Medical):  Lack of Transportation (Non-Medical):    Physical Activity:     Days of Exercise per Week:     Minutes of Exercise per Session:    Stress:     Feeling of Stress :    Social Connections:     Frequency of Communication with Friends and Family:     Frequency of Social Gatherings with Friends and Family:     Attends Sikh Services:     Active Member of Clubs or Organizations:     Attends Club or Organization Meetings:     Marital Status:    Intimate Partner Violence:     Fear of Current or Ex-Partner:     Emotionally Abused:     Physically Abused:     Sexually Abused:        Prior to Admission medications    Medication Sig Start Date End Date Taking?  Authorizing Provider   nortriptyline (PAMELOR) 10 MG capsule Take 10 mg by mouth nightly   Yes Historical Provider, MD propranolol (INDERAL LA) 60 MG extended release capsule Take 60 mg by mouth daily   Yes Historical Provider, MD   lisinopril (PRINIVIL;ZESTRIL) 2.5 MG tablet Take 1 tablet by mouth daily 7/15/21  Yes YESSICA Lazaro CNP   hydroCHLOROthiazide (HYDRODIURIL) 25 MG tablet Take 1 tablet by mouth every morning 7/15/21  Yes YESSICA Lazaro CNP   valACYclovir (VALTREX) 500 MG tablet Take 1 tablet by mouth daily 5/26/21 11/22/21 Yes Hollie Villa MD   omeprazole (PRILOSEC) 40 MG delayed release capsule Take 1 capsule by mouth daily 5/6/21  Yes YESSICA Williamson NP   atorvastatin (LIPITOR) 80 MG tablet Take 1 tablet by mouth daily 5/6/21  Yes YESSICA Williamson NP   Blood Glucose Monitoring Suppl (TRUE METRIX AIR GLUCOSE METER) w/Device KIT 1 Device by Does not apply route continuous 4/29/21  Yes YESSICA Williamson NP   TRUEplus Lancets 28G [address] [facility] 1 each by Does not apply route daily 4/29/21  Yes YESSICA Williamson NP   blood glucose test strips (TRUE METRIX PRO BLOOD GLUCOSE) strip 1 each by In Vitro route daily As needed.  4/29/21  Yes YESSICA Williamson NP   ezetimibe (ZETIA) 10 MG tablet Take 1 tablet by mouth daily 4/8/21  Yes YESSICA Lazaro CNP   traZODone (DESYREL) 100 MG tablet  3/1/21  Yes Historical Provider, MD   alogliptin (NESINA) 6.25 MG TABS tablet Take 1 tablet by mouth daily 3/8/21 8/5/21 Yes YESSICA Williamson NP   ascorbic acid (V-R VITAMIN C) 250 MG tablet Take 4 tablets by mouth daily 3/5/21  Yes Dayana Morton MD   zinc sulfate (ORAZINC) 220 (50 Zn) MG capsule Take 4 capsules by mouth daily 3/5/21 3/5/22 Yes Dayana Morton MD   Nutritional Supplements (ENSURE HIGH PROTEIN) LIQD Take 1 Can by mouth Daily with lunch 3/5/21  Yes Dayana Morton MD   Multiple Vitamins-Minerals (THERAPEUTIC MULTIVITAMIN-MINERALS) tablet Take 1 tablet by mouth daily 3/4/21 3/4/22 Yes YESSICA Banuelos - CNP   Calcium Carb-Cholecalciferol (CALCIUM-VITAMIN D) 600-400 MG-UNIT TABS Take 1 tablet by mouth daily 3/4/21  Yes YESSICA Drake - CNP   hydrOXYzine (ATARAX) 25 MG tablet TAKE 1 TABLET EVERY 8 HOURS AS NEEDED FOR ANXIETY AVOID ALCOHOL/CAUSES DROWSINESS. Do not take with Xanax 10/29/20  Yes YESSICA Norris NP   albuterol sulfate HFA (PROAIR HFA) 108 (90 Base) MCG/ACT inhaler Inhale 2 puffs into the lungs every 6 hours as needed for Wheezing 9/2/20  Yes Jaylen Rabago MD   tiotropium (SPIRIVA RESPIMAT) 2.5 MCG/ACT AERS inhaler Inhale 2 puffs into the lungs daily 9/2/20  Yes Jaylen Rabago MD   ammonium lactate (LAC-HYDRIN) 12 % lotion  6/24/20  Yes Historical Provider, MD   calcipotriene (DOVONEX) 0.005 % ointment  5/4/20  Yes Historical Provider, MD   tiZANidine (ZANAFLEX) 4 MG tablet Take 4 mg by mouth every 8 hours as needed  1/17/20  Yes Historical Provider, MD   oxyCODONE-acetaminophen (PERCOCET) 7.5-325 MG per tablet Take 1 tablet by mouth every 8 hours as needed for Pain (Patient states takes every 6 hours). 1/17/20  Yes Historical Provider, MD   potassium chloride (KLOR-CON M) 20 MEQ extended release tablet Take 1 tablet by mouth 2 times daily 8/2/19  Yes Rossana French MD   venlafaxine (EFFEXOR) 37.5 MG tablet Take 37.5 mg by mouth 2 times daily   Yes Historical Provider, MD   fluticasone-vilanterol (BREO ELLIPTA) 100-25 MCG/INH AEPB inhaler Inhale 1 puff into the lungs daily 3/5/18  Yes Jaylen Rabago MD   ALPRAZolam Alvia Margarita) 0.5 MG tablet Take 0.5 mg by mouth as needed for Sleep.  Cordell Marie Historical Provider, MD   rizatriptan (MAXALT) 10 MG tablet Take 10 mg by mouth once as needed for Migraine May repeat in 2 hours if needed  Patient not taking: Reported on 8/5/2021    Historical Provider, MD   clobetasol (TEMOVATE) 0.05 % ointment  5/4/20   Historical Provider, MD   Psyllium (METAMUCIL) 48.57 % POWD Take 1 Units by mouth daily  Patient not taking: Reported on 8/5/2021 1/24/20   Rossana French MD       Allergies as of 08/05/2021    (No Known Allergies)       Patient Active Problem List   Diagnosis    DM [facility]diabetes mellitus) ([facility]    Essential hypertension    Chronic pain syndrome    COPD, severe ([facility]    Gastroesophageal reflux disease with esophagitis    History of MRSA infection    Anxiety    Chronic right-sided low back pain with sciatica    Hiatal hernia    Status post laparoscopic sleeve gastrectomy    Abdominal pannus    S/P abdominoplasty    Fibromyalgia    Sebaceous cyst of breast, right    Mobitz type 1 second degree AV block    Polyp of cecum    Adenomatous polyp of ascending colon    Chronic edema BLE    Mixed hyperlipidemia    Neuropathy    Umbilical pain    Lower abdominal pain    S/P panniculectomy    Latent tuberculosis    Gastroesophageal reflux disease    Vitamin D deficiency    Psoriasis    Pannus, abdominal    Herpes genitalis in women    Admission for therapeutic drug monitoring    URIEL (obstructive sleep apnea)    Hypersomnia    Ex-smoker       Past Medical History:   Diagnosis Date    Anxiety     Arthritis     \"Back, Hands, Shoulders\"    Chronic back pain     Arthritis - NKI    COPD (chronic obstructive pulmonary disease) ([facility]     Sees Dr. Hayes Abreu with Mental Health    Diabetes mellitus ([facility] Dx 2008    Type II - follows with PCP    Emphysema     Fibromyalgia     H/O abdominoplasty 12/21/2018    H/O cardiovascular stress test 03/04/2019    ECG portion of stress test is neg for ischemia by diagnostic criteria. No infarct or ischemia noted. Normal stress myocardial perfusion. This is a normal study    H/O echocardiogram 03/04/2019    Left ventricular function is normal. Ejection fraction is visually estimated at 55-60%. No significant valvular abnormalitites.      Hyperlipidemia     Hypertension     Follows with PCP    Migraines Last Migraine 7/20/21    Mobitz (type) [facility]) atrioventricular block 01/29/2020    Neuropathy From feet to knees    Piriformis syndrome 2016    Psoriasis     Rotator cuff impingement syndrome, right 2018    RTC repair Dr Maribeth Brown   Treated by Dr Kevin Hamilton 17  Arthrocentesis 17    Shortness of breath on exertion     Syncope and collapse 2019    Teeth missing     Upper And Lower    Wears glasses        Past Surgical History:   Procedure Laterality Date    ABDOMEN SURGERY N/A 2019    SECONDARY CLOSURE OF DEHISCED ABDOMINAL WOUND performed by Dola Galeazzi, MD at [facility] N/A 2019    IRRIGATION OF LOWER ABDOMINAL WOUND performed by Dola Galeazzi, MD at [facility] ABDOMINOPLASTY N/A 2019    ABDOMINOPLASTY REVISION performed by Dola Galeazzi, MD at [facility] 75 Right 2020    EXCISION OF RIGHT BREAST SABACEOUS CYST performed by Juancho Vaughn MD at [place] [address] Right     Dr. Kevin Hamilton (Right)   [address]     Tubal Ligation Also Done In     COLONOSCOPY  2017    Polyp Removed - Dr. Mirna Kelley COLONOSCOPY N/A 2020    COLONOSCOPY POLYPECTOMY SNARE/COLD BIOPSY performed by Dola Galeazzi, MD at [address] [facility]      Teeth Extracted In Past    ELBOW SURGERY Bilateral Last Done In     Right Done Twice, Left Done Once left cubital tunnel release ; right 2014    ENDOSCOPY, COLON, DIAGNOSTIC  2017    HERNIA REPAIR  10/26/2017    hiatal hernia with gastrectomy    HYSTERECTOMY VAGINAL      LAPAROSCOPY      MD EXCISE EXCESS SKIN TISSUE,ABDOMEN N/A 2018    ABDOMINOPLASTY performed by Dola Galeazzi, MD at [address] [facility] Right     Dr. Jose Armando Tello Right 2017    Dr Mcneil January  10/26/2017    Robotic Laparoscopic, Pre Op Weight 237  Or 238 lbs.     TUBAL LIGATION      Done With        Family History   Problem Relation Age of quitting smoking  I need to redo the PFT to assess  C/w Inhalers  No flu vaccine per patient  She had the COVID vaccine though         Relevant Medications    fluticasone-vilanterol (BREO ELLIPTA) 100-25 MCG/INH AEPB inhaler    albuterol sulfate HFA (PROAIR HFA) 108 (90 Base) MCG/ACT inhaler    tiotropium (SPIRIVA RESPIMAT) 2.5 MCG/ACT AERS inhaler    Other Relevant Orders    Full PFT Study With Bronchodilator    URIEL (obstructive sleep apnea)      She has all the symptoms of URIEL  Advised to go for the sleep study  Loose weight         Relevant Orders    Baseline Diagnostic Sleep Study       Other    Hypersomnia      Advised to go for the sleep study  Loose weight         Ex-smoker      Advised to c/w quitting smoking         Relevant Orders    Full PFT Study With Bronchodilator                Additional Plan:     [x]  Sleep hygiene/ relaxation methods & CBTi principles review with patient   [x]  Avoid supine/back sleep until sleep study   [x]  Driving precautions   [x]  Medical consequences of untreated URIEL   [x]  Weight loss recommendations   [x]  Diet recommendations   [x]  Exercise   []  Advised to quit smoking       []  PFT referral   []  Bariatric Program referral      Follow-Up:    No follow-ups on file. Melida Mcleod is a 49 y.o. female being evaluated by a Virtual Visit (video visit) encounter to address concerns as mentioned above. A caregiver was present when appropriate. Due to this being a TeleHealth encounter (During VYCQR-53 public health emergency), evaluation of the following organ systems was limited: Vitals/Constitutional/EENT/Resp/CV/GI//MS/Neuro/Skin/Heme-Lymph-Imm.   Pursuant to the emergency declaration under the [address] [place] [address], [address] [place] [address] authority and the [employer] Act, this Virtual Visit was conducted with patient's (and/or legal guardian's) consent, to reduce the patient's risk of exposure to COVID-19 and provide necessary medical care. The patient (and/or legal guardian) has also been advised to contact this office for worsening conditions or problems, and seek emergency medical treatment and/or call 911 if deemed necessary. Patient identification was verified at the start of the visit: Yes    Total time spent for this encounter: 21 minutes    Services were provided through a video synchronous discussion virtually to substitute for in-person clinic visit. Patient and provider were located at their individual homes. --Mike Acevedo MD on 8/5/2021 at 9:43 AM    An electronic signature was used to authenticate this note.      Electronically signed by Mike Acevedo MD on 8/5/2021 at 9:43 AM

## 2021-08-05 NOTE — ASSESSMENT & PLAN NOTE
Advised to c/w quitting smoking  I need to redo the PFT to assess  C/w Inhalers  No flu vaccine per patient  She had the COVID vaccine though

## 2021-08-09 DIAGNOSIS — Z01.818 PRE-OP TESTING: Primary | ICD-10-CM

## 2021-08-10 ENCOUNTER — NURSE ONLY (OUTPATIENT)
Dept: SURGERY | Age: 56
End: 2021-08-10
Payer: COMMERCIAL

## 2021-08-10 ENCOUNTER — HOSPITAL ENCOUNTER (OUTPATIENT)
Age: 56
Setting detail: SPECIMEN
Discharge: HOME | End: 2021-08-10
Payer: COMMERCIAL

## 2021-08-10 VITALS
HEART RATE: 92 BPM | TEMPERATURE: 97.3 F | DIASTOLIC BLOOD PRESSURE: 78 MMHG | OXYGEN SATURATION: 96 % | SYSTOLIC BLOOD PRESSURE: 112 MMHG

## 2021-08-10 DIAGNOSIS — Z01.818 PRE-OP TESTING: Primary | ICD-10-CM

## 2021-08-10 LAB
SARS-COV-2: NOT DETECTED
SOURCE: NORMAL

## 2021-08-10 PROCEDURE — U0003 INFECTIOUS AGENT DETECTION BY NUCLEIC ACID (DNA OR RNA); SEVERE ACUTE RESPIRATORY SYNDROME CORONAVIRUS 2 (SARS-COV-2) (CORONAVIRUS DISEASE [COVID-19]), AMPLIFIED PROBE TECHNIQUE, MAKING USE OF HIGH THROUGHPUT TECHNOLOGIES AS DESCRIBED BY CMS-2020-01-R: HCPCS

## 2021-08-10 PROCEDURE — 99211 OFF/OP EST MAY X REQ PHY/QHP: CPT | Performed by: SURGERY

## 2021-08-10 PROCEDURE — U0005 INFEC AGEN DETEC AMPLI PROBE: HCPCS

## 2021-08-10 NOTE — PROGRESS NOTES
Patient collected pre-op COVID-19 screening instruction and collection supplies given to patient accordingly. Patient denies fever/cough/sob or recent travels. Patient voiced understanding of collection/quarantine instructions. COVID screening lab ordered, collection completed without difficulty, identifiers placed on specimen. AVS given at discharge. Results will be given via mychart or telephone call [facility] Dept will manage any positive test results, the procedure will be rescheduled at a later date).

## 2021-08-10 NOTE — PATIENT INSTRUCTIONS
Pre-Procedure COVID-19 Self Testing  Quarantine Instructions  Day of Surgery Instructions         What to do before my surgery:    All patients scheduled for elective surgery must test for COVID19 72-96 hours prior to the surgery date.  Pre-Procedure COVID-19 Self-Test will be scheduled for you by your provider.  You can receive your Pre-Procedure COVID-19 Self-Test at:  [facility]. [address] [facility], [facility], [address] [facility],Third Floor   If you do not have the COVID-19 test we will cancel or reschedule your procedure   Once you test you must quarantine at home until after your procedure with only your immediate family members or whoever lives with you.  If you must work during your quarantine period, we ask that you continue to practice social distancing, wear a mask that covers your mouth and nose and perform all hand hygiene as recommended by the CDC.  If you must go to the grocery, etc. and cannot get someone to do this for you please wear a mask that covers your mouth and nose and perform all hand hygiene as recommended by the CDC.  Your surgeon's office will notify you with any concerns about your test result. What can I expect on the day of surgery?  Arrive at the time the office or hospital staff tell you on the day of your procedure.  Wear a mask when entering the hospital.     A member of the hospital staff will take your temperature and ask you a few questions as you enter the building.  In abundance of caution for the safety of all our patients and staff, please follow all hospital visitor guidelines in place at the time of your procedure. The staff caring for you will stay in close communication with your loved one and keep them updated on progress.  Please provide a phone number for us to use when communicating with your family or ride home.    When you are ready to discharge, we will notify your family/person with you to bring the car to the front entrance. We will take you to them after you receive all of your discharge instructions.

## 2021-08-11 PROCEDURE — 99024 POSTOP FOLLOW-UP VISIT: CPT | Performed by: SURGERY

## 2021-08-11 NOTE — H&P
HISTORY AND PHYSICAL EXAM    Date of Admission:  (Not on file)    PCP:  YESSICA Rosario NP    Chief Complaint / History of Present Illness:  Jules Hamilton is a very pleasant 49 y.o. female who presents for re-do panniculectomy, she has on and off skin ulcers from her pannus abdominus. [place] BUT she did not do slimfastm and did NOT loose the last 15-20 Lbs I strongly recommended to her to comply to get the best result, because now with her abdominal fat it will NOT look nice. PMH:   has a past medical history of Anxiety, Arthritis, Chronic back pain, COPD (chronic obstructive pulmonary disease) ([facility] [address] Depression, Diabetes mellitus ([facility] [address] (Dx ), Emphysema, Fibromyalgia, H/O abdominoplasty (2018), H/O cardiovascular stress test (2019), H/O echocardiogram (2019), Hyperlipidemia, Hypertension, Migraines (Last Migraine 21), Mobitz (type) I (Wenckebach's) atrioventricular block (2020), Neuropathy, Piriformis syndrome (2016), Psoriasis, Rotator cuff impingement syndrome, right (2018), Shortness of breath on exertion, Syncope and collapse (2019), Teeth missing, and Wears glasses. PSH:   has a past surgical history that includes Elbow surgery (Bilateral, Last Done In ); Dental surgery; laparoscopy ();  section ( And ); Tubal ligation (); Carpal tunnel release (Right, ); Sleeve Gastrectomy (10/26/2017); hernia repair (10/26/2017); Rotator cuff repair (Right, ); Rotator cuff repair (Right, 2017); pr excise excess skin tissue,abdomen (N/A, 2018); HYSTERECTOMY VAGINAL (); Endoscopy, colon, diagnostic (2017); Appendectomy (); Abdomen surgery (N/A, 2019); Abdomen surgery (N/A, 2019); Abdominoplasty (N/A, 2019); Colonoscopy (2017); Breast surgery (Right, 2020); and Colonoscopy (N/A, 2020).     Allergies:  No Known Allergies     Home Meds:    Prior to Admission medications Medication Sig Start Date End Date Taking? Authorizing Provider   nortriptyline (PAMELOR) 10 MG capsule Take 10 mg by mouth nightly    Historical Provider, MD   propranolol (INDERAL LA) 60 MG extended release capsule Take 60 mg by mouth daily    Historical Provider, MD   rizatriptan (MAXALT) 10 MG tablet Take 10 mg by mouth once as needed for Migraine May repeat in 2 hours if needed  Patient not taking: Reported on 8/5/2021    Historical Provider, MD   lisinopril (PRINIVIL;ZESTRIL) 2.5 MG tablet Take 1 tablet by mouth daily 7/15/21   YESSICA Cr CNP   hydroCHLOROthiazide (HYDRODIURIL) 25 MG tablet Take 1 tablet by mouth every morning 7/15/21   YESSICA Cr CNP   valACYclovir (VALTREX) 500 MG tablet Take 1 tablet by mouth daily 5/26/21 11/22/21  Aster Josue MD   omeprazole (PRILOSEC) 40 MG delayed release capsule Take 1 capsule by mouth daily 5/6/21   Maxineh YESSICA Aguirre NP   atorvastatin (LIPITOR) 80 MG tablet Take 1 tablet by mouth daily 5/6/21   Adah LouderYESSICA NP   Blood Glucose Monitoring Suppl (TRUE METRIX AIR GLUCOSE METER) w/Device KIT 1 Device by Does not apply route continuous 4/29/21   Adah LoudYESSICA faye NP   TRUEplus Lancets 28G MISC 1 each by Does not apply route daily 4/29/21   Adah LouderYESSICA NP   blood glucose test strips (TRUE METRIX PRO BLOOD GLUCOSE) strip 1 each by In Vitro route daily As needed.  4/29/21   Adah LoudYESSICA faye NP   ezetimibe (ZETIA) 10 MG tablet Take 1 tablet by mouth daily 4/8/21   YESSICA Cr CNP   traZODone (DESYREL) 100 MG tablet  3/1/21   Historical Provider, MD   alogliptin (NESINA) 6.25 MG TABS tablet Take 1 tablet by mouth daily 3/8/21 8/5/21  Adah Loudneyda APRKEN Garrett NP   ascorbic acid (V-R VITAMIN C) 250 MG tablet Take 4 tablets by mouth daily 3/5/21   Rosa Maria Wright MD   zinc sulfate (ORAZINC) 220 (50 Zn) MG capsule Take 4 capsules by mouth daily 3/5/21 3/5/22  Rosa Maria Wright MD Nutritional Supplements (ENSURE HIGH PROTEIN) LIQD Take 1 Can by mouth Daily with lunch 3/5/21   Dorita Stacy MD   Multiple Vitamins-Minerals (THERAPEUTIC MULTIVITAMIN-MINERALS) tablet Take 1 tablet by mouth daily 3/4/21 3/4/22  YESSICA Abebe CNP   Calcium Carb-Cholecalciferol (CALCIUM-VITAMIN D) 600-400 MG-UNIT TABS Take 1 tablet by mouth daily 3/4/21   YESSICA Abebe CNP   hydrOXYzine (ATARAX) 25 MG tablet TAKE 1 TABLET EVERY 8 HOURS AS NEEDED FOR ANXIETY AVOID ALCOHOL/CAUSES DROWSINESS. Do not take with Xanax 10/29/20   YESSICA Cheatham NP   albuterol sulfate HFA (PROAIR HFA) 108 (90 Base) MCG/ACT inhaler Inhale 2 puffs into the lungs every 6 hours as needed for Wheezing 9/2/20   Meka Marrero MD   tiotropium (SPIRIVA RESPIMAT) 2.5 MCG/ACT AERS inhaler Inhale 2 puffs into the lungs daily 9/2/20   Meka Marrero MD   ammonium lactate (LAC-HYDRIN) 12 % lotion  6/24/20   Historical Provider, MD   calcipotriene (DOVONEX) 0.005 % ointment  5/4/20   Historical Provider, MD   clobetasol (TEMOVATE) 0.05 % ointment  5/4/20   Historical Provider, MD   tiZANidine (ZANAFLEX) 4 MG tablet Take 4 mg by mouth every 8 hours as needed  1/17/20   Historical Provider, MD   oxyCODONE-acetaminophen (PERCOCET) 7.5-325 MG per tablet Take 1 tablet by mouth every 8 hours as needed for Pain (Patient states takes every 6 hours).   1/17/20   Historical Provider, MD   Psyllium (METAMUCIL) 48.57 % POWD Take 1 Units by mouth daily  Patient not taking: Reported on 8/5/2021 1/24/20   Dorita Stacy MD   potassium chloride (KLOR-CON M) 20 MEQ extended release tablet Take 1 tablet by mouth 2 times daily 8/2/19   Dorita Stacy MD   venlafaxine (EFFEXOR) 37.5 MG tablet Take 37.5 mg by mouth 2 times daily    Historical Provider, MD   fluticasone-vilanterol (BREO ELLIPTA) 100-25 MCG/INH AEPB inhaler Inhale 1 puff into the lungs daily 3/5/18   Meka Marrero MD   [facility]) 0.5 MG tablet Take 0.5 mg by mouth as needed for Sleep. Lina Escalona Historical Provider, MD        [facility] Meds:    No current facility-administered medications for this encounter. Current Outpatient Medications   Medication Sig Dispense Refill    nortriptyline (PAMELOR) 10 MG capsule Take 10 mg by mouth nightly      propranolol (INDERAL LA) 60 MG extended release capsule Take 60 mg by mouth daily      rizatriptan (MAXALT) 10 MG tablet Take 10 mg by mouth once as needed for Migraine May repeat in 2 hours if needed (Patient not taking: Reported on 8/5/2021)      lisinopril (PRINIVIL;ZESTRIL) 2.5 MG tablet Take 1 tablet by mouth daily 30 tablet 1    hydroCHLOROthiazide (HYDRODIURIL) 25 MG tablet Take 1 tablet by mouth every morning 30 tablet 5    valACYclovir (VALTREX) 500 MG tablet Take 1 tablet by mouth daily 30 tablet 5    omeprazole (PRILOSEC) 40 MG delayed release capsule Take 1 capsule by mouth daily 90 capsule 2    atorvastatin (LIPITOR) 80 MG tablet Take 1 tablet by mouth daily 90 tablet 2    Blood Glucose Monitoring Suppl (TRUE METRIX AIR GLUCOSE METER) w/Device KIT 1 Device by Does not apply route continuous 1 kit 0    TRUEplus Lancets 28G MISC 1 each by Does not apply route daily 100 each 3    blood glucose test strips (TRUE METRIX PRO BLOOD GLUCOSE) strip 1 each by In Vitro route daily As needed.  100 each 3    ezetimibe (ZETIA) 10 MG tablet Take 1 tablet by mouth daily 90 tablet 1    traZODone (DESYREL) 100 MG tablet       alogliptin (NESINA) 6.25 MG TABS tablet Take 1 tablet by mouth daily 90 tablet 1    ascorbic acid (V-R VITAMIN C) 250 MG tablet Take 4 tablets by mouth daily 30 tablet 3    zinc sulfate (ORAZINC) 220 (50 Zn) MG capsule Take 4 capsules by mouth daily 30 capsule 3    Nutritional Supplements (ENSURE HIGH PROTEIN) LIQD Take 1 Can by mouth Daily with lunch 32 Can 5    Multiple Vitamins-Minerals (THERAPEUTIC MULTIVITAMIN-MINERALS) tablet Take 1 tablet by mouth daily 90 tablet 3    Calcium Carb-Cholecalciferol (CALCIUM-VITAMIN D) 600-400 MG-UNIT TABS Take 1 tablet by mouth daily 90 tablet 3    hydrOXYzine (ATARAX) 25 MG tablet TAKE 1 TABLET EVERY 8 HOURS AS NEEDED FOR ANXIETY AVOID ALCOHOL/CAUSES DROWSINESS. Do not take with Xanax 60 tablet 1    albuterol sulfate HFA (PROAIR HFA) 108 (90 Base) MCG/ACT inhaler Inhale 2 puffs into the lungs every 6 hours as needed for Wheezing 1 Inhaler 5    tiotropium (SPIRIVA RESPIMAT) 2.5 MCG/ACT AERS inhaler Inhale 2 puffs into the lungs daily 1 Inhaler 5    ammonium lactate (LAC-HYDRIN) 12 % lotion       calcipotriene (DOVONEX) 0.005 % ointment       clobetasol (TEMOVATE) 0.05 % ointment       tiZANidine (ZANAFLEX) 4 MG tablet Take 4 mg by mouth every 8 hours as needed       oxyCODONE-acetaminophen (PERCOCET) 7.5-325 MG per tablet Take 1 tablet by mouth every 8 hours as needed for Pain (Patient states takes every 6 hours).  Psyllium (METAMUCIL) 48.57 % POWD Take 1 Units by mouth daily (Patient not taking: Reported on 2021) 1 Bottle 5    potassium chloride (KLOR-CON M) 20 MEQ extended release tablet Take 1 tablet by mouth 2 times daily 180 tablet 1    venlafaxine (EFFEXOR) 37.5 MG tablet Take 37.5 mg by mouth 2 times daily      fluticasone-vilanterol (BREO ELLIPTA) 100-25 MCG/INH AEPB inhaler Inhale 1 puff into the lungs daily 1 each 5    ALPRAZolam (XANAX) 0.5 MG tablet Take 0.5 mg by mouth as needed for Sleep.  .         Social History / Family History:        Social History     Socioeconomic History    Marital status: Single     Spouse name: Not on file    Number of children: Not on file    Years of education: Not on file    Highest education level: Not on file   Occupational History    Occupation: unemployed   Tobacco Use    Smoking status: Former Smoker     Packs/day: 0.25     Years: 30.00     Pack years: 7.50     Types: Cigarettes, E-Cigarettes     Start date:      Quit date: 2016     Years since quittin.6    Smokeless tobacco: Never Used   Vaping Use    Vaping Use: Never used   Substance and Sexual Activity    Alcohol use: Yes     Comment: \"Occ. Maybe Twice A Month\"    Drug use: Not on file     Comment: Marijuana Card  - last used: 7/21/21    Sexual activity: Yes     Partners: Male   Other Topics Concern    Not on file   Social History Narrative    Not on file     Social Determinants of Health     Financial Resource Strain:     Difficulty of Paying Living Expenses:    Food Insecurity:     Worried About Running Out of Food in the Last Year:     [address] in the Last Year:    Transportation Needs:     Lack of Transportation (Medical):  Lack of Transportation (Non-Medical):    Physical Activity:     Days of Exercise per Week:     Minutes of Exercise per Session:    Stress:     Feeling of Stress :    Social Connections:     Frequency of Communication with Friends and Family:     Frequency of Social Gatherings with Friends and Family:     Attends Rastafari Services:     Active Member of Clubs or Organizations:     Attends Club or Organization Meetings:     Marital Status:    Intimate Partner Violence:     Fear of Current or Ex-Partner:     Emotionally Abused:     Physically Abused:     Sexually Abused:       Family History   Problem Relation Age of Onset    Heart Attack Father     Heart Disease Father     Early Death Father 46        Heart Attack    Alcohol Abuse Father     Substance Abuse Father         Alcoholic    Arthritis Mother     Heart Disease Mother     Diabetes Mother     Cancer Mother         \"Kidney Cancer\"    High Blood Pressure Mother     High Blood Pressure Sister     Diabetes Brother     Early Death Daughter 21        \"Killed In A Car Accident\"    otherwise irrelevant to this surgical issue. Review of Systems:  Constitutional: Negative for fever, chills, diaphoresis, appetite change and fatigue. HENT: Negative for sore throat, trouble swallowing and voice change. Respiratory: Negative for cough, positive for shortness of breath no wheezing. Cardiovascular: Negative for chest pain positive for SOB with one flight of stairs exertion, no pitting LE edema. Gastrointestinal: Negative for nausea, vomiting, diarrhea, constipation, blood in stool, abdominal distention, anal bleeding or rectal pain. Musculoskeletal: Negative for joint swelling and arthralgias. Skin: Warm and dry, well perfused. Neurological: Negative for seizures, syncope, speech difficulty and weakness. Hematological/Lymphatic: Negative for adenopathy. No history of DVT/PE. Does not bruise/bleed easily. Psychiatric/Behavioral: Negative for agitation. All others reviewed and negative. Physical Exam:  Vital Signs: There were no vitals filed for this visit. There is no height or weight on file to calculate BMI. General appearance: Pt Alert and oriented, to persons place and time, in no apparent acute distress. HEENT:  CHRISTY, EOMI, Conjunctivae clear. No JVDs, Bruits, No thyroid-megaly. Lungs: No dyspnea. Clear to auscultation bilaterally. Heart: Regular rate and rhythm, S1, S2 normal, no murmur, rub or gallop. No legs edema. Abdomen: Non tender. Non distended. Positive bowel sounds. No hernias noted, no masses palpable. Extremities: Warm, well perfused, no cyanosis or edema. Skin: Skin color, texture, turgor normal.  Neurologic: Grossly normal, Cranial nerves from II to XII intact. Deep tendon reflexes normal.  Psychology: Mood stable. Lymph nodes: No palpable LN in the neck, abdomen, or groins. Radiologic / Imaging / TESTING  No results found. Labs:    No results found for this or any previous visit (from the past 24 hour(s)).       Diagnosis:  Patient Active Problem List   Diagnosis    DM (diabetes mellitus) ([facility] [address]    Essential hypertension    Chronic pain syndrome    COPD, severe ([address]    Gastroesophageal reflux disease with esophagitis    History of MRSA infection  Anxiety    Chronic right-sided low back pain with sciatica    Hiatal hernia    Status post laparoscopic sleeve gastrectomy    Abdominal pannus    S/P abdominoplasty    Fibromyalgia    Sebaceous cyst of breast, right    Mobitz type 1 second degree AV block    Polyp of cecum    Adenomatous polyp of ascending colon    Chronic edema BLE    Mixed hyperlipidemia    Neuropathy    Umbilical pain    Lower abdominal pain    S/P panniculectomy    Latent tuberculosis    Gastroesophageal reflux disease    Vitamin D deficiency    Psoriasis    Pannus, abdominal    Herpes genitalis in women    Admission for therapeutic drug monitoring    URIEL (obstructive sleep apnea)    Hypersomnia    Ex-smoker           Assessment & Plan:    Abdi Romero is a very pleasant 49 y.o. female who presents for re-do panniculectomy, she has on and off skin ulcers from her pannus abdominus. [place] BUT she did not do slimfastm and did NOT loose the last 15-20 Lbs I strongly recommended to her to comply to get the best result, because now with her abdominal fat it will NOT look nice. She agreed to delay and will do the slimfast diet x 2 wks.     ____________________________________________    Cindy Frederick MD, FACS, FICS    8/12/2021  6:19 AM

## 2021-08-12 ENCOUNTER — HOSPITAL ENCOUNTER (OUTPATIENT)
Age: 56
Setting detail: OUTPATIENT SURGERY
Discharge: HOME | End: 2021-08-12
Attending: SURGERY
Payer: COMMERCIAL

## 2021-08-12 VITALS
HEART RATE: 80 BPM | RESPIRATION RATE: 20 BRPM | DIASTOLIC BLOOD PRESSURE: 89 MMHG | HEIGHT: 66 IN | WEIGHT: 160 LBS | BODY MASS INDEX: 25.71 KG/M2 | TEMPERATURE: 96.8 F | SYSTOLIC BLOOD PRESSURE: 136 MMHG | OXYGEN SATURATION: 99 %

## 2021-08-12 LAB — GLUCOSE BLD-MCNC: 148 MG/DL (ref 70–99)

## 2021-08-12 PROCEDURE — 2580000003 HC RX 258: Performed by: SURGERY

## 2021-08-12 PROCEDURE — 82962 GLUCOSE BLOOD TEST: CPT

## 2021-08-12 PROCEDURE — 6360000002 HC RX W HCPCS: Performed by: SURGERY

## 2021-08-12 PROCEDURE — 6370000000 HC RX 637 (ALT 250 FOR IP): Performed by: SURGERY

## 2021-08-12 RX ORDER — HYDRALAZINE HYDROCHLORIDE 20 MG/ML
5 INJECTION INTRAMUSCULAR; INTRAVENOUS EVERY 10 MIN PRN
Status: DISCONTINUED | OUTPATIENT
Start: 2021-08-12 | End: 2021-09-02 | Stop reason: HOSPADM

## 2021-08-12 RX ORDER — HYDROCODONE BITARTRATE AND ACETAMINOPHEN 5; 325 MG/1; MG/1
2 TABLET ORAL EVERY 6 HOURS PRN
Status: DISCONTINUED | OUTPATIENT
Start: 2021-08-12 | End: 2021-09-02 | Stop reason: HOSPADM

## 2021-08-12 RX ORDER — MEPERIDINE HYDROCHLORIDE 25 MG/ML
12.5 INJECTION INTRAMUSCULAR; INTRAVENOUS; SUBCUTANEOUS EVERY 5 MIN PRN
Status: DISCONTINUED | OUTPATIENT
Start: 2021-08-12 | End: 2021-09-02 | Stop reason: HOSPADM

## 2021-08-12 RX ORDER — LABETALOL HYDROCHLORIDE 5 MG/ML
5 INJECTION, SOLUTION INTRAVENOUS EVERY 10 MIN PRN
Status: DISCONTINUED | OUTPATIENT
Start: 2021-08-12 | End: 2021-09-02 | Stop reason: HOSPADM

## 2021-08-12 RX ORDER — ONDANSETRON 2 MG/ML
4 INJECTION INTRAMUSCULAR; INTRAVENOUS
Status: DISCONTINUED | OUTPATIENT
Start: 2021-08-12 | End: 2021-08-12 | Stop reason: HOSPADM

## 2021-08-12 RX ORDER — HYDROCODONE BITARTRATE AND ACETAMINOPHEN 5; 325 MG/1; MG/1
1 TABLET ORAL PRN
Status: DISCONTINUED | OUTPATIENT
Start: 2021-08-12 | End: 2021-09-02 | Stop reason: HOSPADM

## 2021-08-12 RX ORDER — DIPHENHYDRAMINE HYDROCHLORIDE 50 MG/ML
12.5 INJECTION INTRAMUSCULAR; INTRAVENOUS
Status: DISCONTINUED | OUTPATIENT
Start: 2021-08-12 | End: 2021-08-12 | Stop reason: HOSPADM

## 2021-08-12 RX ORDER — HEPARIN SODIUM 5000 [USP'U]/ML
5000 INJECTION, SOLUTION INTRAVENOUS; SUBCUTANEOUS ONCE
Status: COMPLETED | OUTPATIENT
Start: 2021-08-12 | End: 2021-08-12

## 2021-08-12 RX ORDER — SODIUM CHLORIDE, SODIUM LACTATE, POTASSIUM CHLORIDE, CALCIUM CHLORIDE 600; 310; 30; 20 MG/100ML; MG/100ML; MG/100ML; MG/100ML
INJECTION, SOLUTION INTRAVENOUS CONTINUOUS
Status: DISCONTINUED | OUTPATIENT
Start: 2021-08-12 | End: 2021-09-02 | Stop reason: HOSPADM

## 2021-08-12 RX ORDER — PROMETHAZINE HYDROCHLORIDE 25 MG/ML
6.25 INJECTION, SOLUTION INTRAMUSCULAR; INTRAVENOUS
Status: DISCONTINUED | OUTPATIENT
Start: 2021-08-12 | End: 2021-08-12 | Stop reason: HOSPADM

## 2021-08-12 RX ORDER — FENTANYL CITRATE 50 UG/ML
50 INJECTION, SOLUTION INTRAMUSCULAR; INTRAVENOUS EVERY 5 MIN PRN
Status: DISCONTINUED | OUTPATIENT
Start: 2021-08-12 | End: 2021-09-02 | Stop reason: HOSPADM

## 2021-08-12 RX ORDER — HYDROMORPHONE HCL 110MG/55ML
0.5 PATIENT CONTROLLED ANALGESIA SYRINGE INTRAVENOUS EVERY 5 MIN PRN
Status: DISCONTINUED | OUTPATIENT
Start: 2021-08-12 | End: 2021-09-02 | Stop reason: HOSPADM

## 2021-08-12 RX ORDER — 0.9 % SODIUM CHLORIDE 0.9 %
500 INTRAVENOUS SOLUTION INTRAVENOUS
Status: DISCONTINUED | OUTPATIENT
Start: 2021-08-12 | End: 2021-08-12 | Stop reason: HOSPADM

## 2021-08-12 RX ORDER — ONDANSETRON 2 MG/ML
4 INJECTION INTRAMUSCULAR; INTRAVENOUS ONCE
Status: COMPLETED | OUTPATIENT
Start: 2021-08-12 | End: 2021-08-12

## 2021-08-12 RX ADMIN — HEPARIN SODIUM 5000 UNITS: 5000 INJECTION INTRAVENOUS; SUBCUTANEOUS at 08:58

## 2021-08-12 RX ADMIN — CHLORHEXIDINE GLUCONATE: 4 LIQUID TOPICAL at 09:02

## 2021-08-12 RX ADMIN — ONDANSETRON 4 MG: 2 INJECTION INTRAMUSCULAR; INTRAVENOUS at 08:58

## 2021-08-12 RX ADMIN — SODIUM CHLORIDE, POTASSIUM CHLORIDE, SODIUM LACTATE AND CALCIUM CHLORIDE: 600; 310; 30; 20 INJECTION, SOLUTION INTRAVENOUS at 08:41

## 2021-08-12 ASSESSMENT — PAIN DESCRIPTION - DESCRIPTORS: DESCRIPTORS: ACHING;SHARP

## 2021-08-12 ASSESSMENT — PAIN - FUNCTIONAL ASSESSMENT: PAIN_FUNCTIONAL_ASSESSMENT: 0-10

## 2021-08-19 ENCOUNTER — OFFICE VISIT (OUTPATIENT)
Dept: BARIATRICS/WEIGHT MGMT | Age: 56
End: 2021-08-19

## 2021-08-19 VITALS — WEIGHT: 163.9 LBS | HEIGHT: 66 IN | BODY MASS INDEX: 26.34 KG/M2

## 2021-08-19 DIAGNOSIS — E66.3 OVERWEIGHT (BMI 25.0-29.9): Primary | ICD-10-CM

## 2021-08-19 PROCEDURE — 99999 PR OFFICE/OUTPT VISIT,PROCEDURE ONLY: CPT

## 2021-08-19 NOTE — PROGRESS NOTES
Outpatient Nutrition Counseling    REASON FOR VISIT: Pre-Op Diet    Chief Complaint:    Chief Complaint   Patient presents with    Weight Management       SUBJECTIVE:  Pt here to start pre-op diet for planned re-do of panniculectomy. Pt to start diet this Sunday for 2 1/2 weeks total on liquid liver shrinking diet. Provided handout and instruction. Pt voiced understanding. The patient is a 49 y.o. female being seen for morbid obesity, s/p weight loss surgery; [place]'s, Height: 5' 6\" (167.6 cm), Weight: 163 lb 14.4 oz (74.3 kg), Current Body mass index is 26.45 kg/m². The patient's PCP is YESSICA Avendaño NP     Comorbid Conditions:  Significant diseases affecting this patient are   Past Medical History:   Diagnosis Date    Anxiety     Arthritis     \"Back, Hands, Shoulders\"    Chronic back pain     Arthritis - NKI    COPD (chronic obstructive pulmonary disease) ([facility] [address]     Sees Dr. Kaylene Mallory with Mental Health    Diabetes mellitus ([facility] [address] Dx 2008    Type II - follows with PCP    Emphysema     Fibromyalgia     H/O abdominoplasty 12/21/2018    H/O cardiovascular stress test 03/04/2019    ECG portion of stress test is neg for ischemia by diagnostic criteria. No infarct or ischemia noted. Normal stress myocardial perfusion. This is a normal study    H/O echocardiogram 03/04/2019    Left ventricular function is normal. Ejection fraction is visually estimated at 55-60%. No significant valvular abnormalitites.      Hyperlipidemia     Hypertension     Follows with PCP    Migraines Last Migraine 7/20/21    Mobitz (type) I (Wenckebach's) atrioventricular block 01/29/2020    Neuropathy     From feet to knees    Piriformis syndrome 6/24/2016    Psoriasis     Rotator cuff impingement syndrome, right 2/26/2018    RTC repair Dr Charles Lozoya 2015  Treated by Dr Laly Denny 2/18/17  Arthrocentesis 12/19/17    Shortness of breath on exertion     Syncope and collapse 03/04/2019    Teeth missing Upper And Lower    Wears glasses    . Review of Systems - Review of Systems  Otherwise per HPI. Allergies:  No Known Allergies    Past Surgical History:  Past Surgical History:   Procedure Laterality Date    ABDOMEN SURGERY N/A 2019    SECONDARY CLOSURE OF DEHISCED ABDOMINAL WOUND performed by Imer Collins MD at [facility] 71 N/A 2019    IRRIGATION OF LOWER ABDOMINAL WOUND performed by Imer Collins MD at [facility] 73 ABDOMINOPLASTY N/A 2019    ABDOMINOPLASTY REVISION performed by Imer Collins MD at [facility] 75 Right 2020    EXC[facility] OF [facility] SABACEOUS CYST [facility]     Dr. Huey Cooper (Right)   [address]    Tubal Ligation Also Done In     COLONOSCOPY  2017    Polyp Removed - Dr. Taz Zhu COLONOSCOPY N/A 2020    COLONOSCOPY POLYPECTOMY SNARE/COLD BIOPSY performed by Imer Collins MD at [address]      Teeth Extracted In Past    ELBOW SURGERY Bilateral Last Done In     Right Done Twice, Left Done Once left cubital tunnel release ; right 2014    ENDOSCOPY, COLON, DIAGNOSTIC  2017    HERNIA REPAIR  10/26/2017    hiatal hernia with gastrectomy    HYSTERECTOMY VAGINAL      LAPAROSCOPY      IN EXCISE EXCESS SKIN TISSUE,ABDOMEN N/A 2018    ABDOMINOPLASTY performed by Imer Collins MD at [facility] Right     Dr. Kelsi Escobar Right 2017    Dr Mariama Javier  10/26/2017    Robotic Laparoscopic, Pre Op Weight 237  Or 238 lbs.     TUBAL LIGATION      Done With        Family History:  Family History   Problem Relation Age of Onset    Heart Attack Father     Heart Disease Father     Early Death Father 46        Heart Attack    Alcohol Abuse Father     Substance Abuse Father  Emotionally Abused:     Physically Abused:     Sexually Abused:          OBJECTIVE:  Physical Exam   Ht 5' 6\" (1.676 m)   Wt 163 lb 14.4 oz (74.3 kg)   BMI 26.45 kg/m²        NUTRITION DIAGNOSIS: Overweight / Obesity   Problem: Increased adiposity compared to reference standard or established norms   Etiology: Excess intake compared to output over time   S/S: Ht: 66\" Wt: 163.9 lbs BMI: 26.45    NUTRITION INTERVENTIONS:    Individualized treatment goals to address nutritiondiagnosis:   Instructed on pre-op diet for panniculectomy   Provided handouts   Encouraged Physical activity as approved by physician    MONITORING/ EVALUATION/ PLAN:   Pt verbalized understanding of allmaterials covered   Pt asked pertinent questions throughout the session - expect compliance with nutrition guidelines presented   Provided pt with contact information should questions arise prior to next visit   Will f/u with pt NYA Colbert MS, RDN, LD  8/19/2021

## 2021-08-20 ENCOUNTER — HOSPITAL ENCOUNTER (OUTPATIENT)
Age: 56
Setting detail: SPECIMEN
Discharge: HOME | End: 2021-08-20
Payer: COMMERCIAL

## 2021-08-20 ENCOUNTER — OFFICE VISIT (OUTPATIENT)
Dept: CARDIOLOGY CLINIC | Age: 56
End: 2021-08-20
Payer: COMMERCIAL

## 2021-08-20 VITALS
DIASTOLIC BLOOD PRESSURE: 62 MMHG | BODY MASS INDEX: 26.21 KG/M2 | HEIGHT: 67 IN | WEIGHT: 167 LBS | SYSTOLIC BLOOD PRESSURE: 104 MMHG | HEART RATE: 50 BPM

## 2021-08-20 DIAGNOSIS — Z01.810 PRE-OPERATIVE CARDIOVASCULAR EXAMINATION: Primary | ICD-10-CM

## 2021-08-20 DIAGNOSIS — I95.9 HYPOTENSION, UNSPECIFIED HYPOTENSION TYPE: ICD-10-CM

## 2021-08-20 DIAGNOSIS — R55 SYNCOPE, UNSPECIFIED SYNCOPE TYPE: ICD-10-CM

## 2021-08-20 DIAGNOSIS — I44.1 MOBITZ TYPE 1 SECOND DEGREE AV BLOCK: ICD-10-CM

## 2021-08-20 LAB
SARS-COV-2: NOT DETECTED
SOURCE: NORMAL

## 2021-08-20 PROCEDURE — 3017F COLORECTAL CA SCREEN DOC REV: CPT | Performed by: NURSE PRACTITIONER

## 2021-08-20 PROCEDURE — 1036F TOBACCO NON-USER: CPT | Performed by: NURSE PRACTITIONER

## 2021-08-20 PROCEDURE — G8427 DOCREV CUR MEDS BY ELIG CLIN: HCPCS | Performed by: NURSE PRACTITIONER

## 2021-08-20 PROCEDURE — 99214 OFFICE O/P EST MOD 30 MIN: CPT | Performed by: NURSE PRACTITIONER

## 2021-08-20 PROCEDURE — U0005 INFEC AGEN DETEC AMPLI PROBE: HCPCS

## 2021-08-20 PROCEDURE — G8417 CALC BMI ABV UP PARAM F/U: HCPCS | Performed by: NURSE PRACTITIONER

## 2021-08-20 PROCEDURE — U0003 INFECTIOUS AGENT DETECTION BY NUCLEIC ACID (DNA OR RNA); SEVERE ACUTE RESPIRATORY SYNDROME CORONAVIRUS 2 (SARS-COV-2) (CORONAVIRUS DISEASE [COVID-19]), AMPLIFIED PROBE TECHNIQUE, MAKING USE OF HIGH THROUGHPUT TECHNOLOGIES AS DESCRIBED BY CMS-2020-01-R: HCPCS

## 2021-08-20 RX ORDER — MIDODRINE HYDROCHLORIDE 2.5 MG/1
2.5 TABLET ORAL 3 TIMES DAILY PRN
Qty: 90 TABLET | Refills: 3 | Status: SHIPPED | OUTPATIENT
Start: 2021-08-20 | End: 2021-12-14

## 2021-08-20 ASSESSMENT — ENCOUNTER SYMPTOMS
COUGH: 0
SHORTNESS OF BREATH: 0

## 2021-08-20 NOTE — PROGRESS NOTES
alogliptin (NESINA) 6.25 MG TABS tablet Take 1 tablet by mouth daily 3/8/21 8/20/21 Yes YESSICA Bravo NP   ascorbic acid (V-R VITAMIN C) 250 MG tablet Take 4 tablets by mouth daily 3/5/21  Yes Rosa Maria Wright MD   zinc sulfate (ORAZINC) 220 (50 Zn) MG capsule Take 4 capsules by mouth daily 3/5/21 3/5/22 Yes Rosa Maria Wright MD   Nutritional Supplements (ENSURE HIGH PROTEIN) LIQD Take 1 Can by mouth Daily with lunch 3/5/21  Yes Rosa Maria Wright MD   Multiple Vitamins-Minerals (THERAPEUTIC MULTIVITAMIN-MINERALS) tablet Take 1 tablet by mouth daily 3/4/21 3/4/22 Yes YESSICA Lamas CNP   Calcium Carb-Cholecalciferol (CALCIUM-VITAMIN D) 600-400 MG-UNIT TABS Take 1 tablet by mouth daily 3/4/21  Yes YESSICA Lamas CNP   hydrOXYzine (ATARAX) 25 MG tablet TAKE 1 TABLET EVERY 8 HOURS AS NEEDED FOR ANXIETY AVOID ALCOHOL/CAUSES DROWSINESS. Do not take with Xanax 10/29/20  Yes YESSICA Bravo NP   albuterol sulfate HFA (PROAIR HFA) 108 (90 Base) MCG/ACT inhaler Inhale 2 puffs into the lungs every 6 hours as needed for Wheezing 9/2/20  Yes Ismael Hernandez MD   tiotropium (SPIRIVA RESPIMAT) 2.5 MCG/ACT AERS inhaler Inhale 2 puffs into the lungs daily 9/2/20  Yes Ismael Hernandez MD   ammonium lactate (LAC-HYDRIN) 12 % lotion  6/24/20  Yes Historical Provider, MD   calcipotriene (DOVONEX) 0.005 % ointment  5/4/20  Yes Historical Provider, MD   clobetasol (TEMOVATE) 0.05 % ointment  5/4/20  Yes Historical Provider, MD   tiZANidine (ZANAFLEX) 4 MG tablet Take 4 mg by mouth every 8 hours as needed  1/17/20  Yes Historical Provider, MD   oxyCODONE-acetaminophen (PERCOCET) 7.5-325 MG per tablet Take 1 tablet by mouth every 8 hours as needed for Pain (Patient states takes every 6 hours).   1/17/20  Yes Historical Provider, MD   Psyllium (METAMUCIL) 48.57 % POWD Take 1 Units by mouth daily 1/24/20  Yes Rosa Maria Wright MD   potassium chloride (KLOR-CON M) 20 MEQ extended release tablet Take 1 tablet by mouth 2 times daily 8/2/19  Yes Rosa Maria Wright MD   venlafaxine (EFFEXOR) 37.5 MG tablet Take 37.5 mg by mouth 2 times daily   Yes Historical Provider, MD   fluticasone-vilanterol (BREO ELLIPTA) 100-25 MCG/INH AEPB inhaler Inhale 1 puff into the lungs daily 3/5/18  Yes Ismael Hernandez MD   ALPRAZolam [place]) 0.5 MG tablet Take 0.5 mg by mouth as needed for Sleep. Tomy Amin Historical Provider, MD       Physical Exam  Vitals reviewed. Constitutional:       General: She is not in acute distress. Appearance: Normal appearance. She is obese. She is not ill-appearing. HENT:      Head: Atraumatic. Neck:      Vascular: No carotid bruit. Cardiovascular:      Rate and Rhythm: Normal rate and regular rhythm. Pulses: Normal pulses. Heart sounds: Normal heart sounds. No murmur heard. Pulmonary:      Effort: Pulmonary effort is normal. No respiratory distress. Breath sounds: Normal breath sounds. Musculoskeletal:         General: No swelling or deformity. Cervical back: Neck supple. No muscular tenderness. Neurological:      Mental Status: She is alert.          Health Maintenance   Topic Date Due    Hepatitis B vaccine (1 of 3 - Risk 3-dose series) Never done    Diabetic foot exam  04/03/2020    Breast cancer screen  04/19/2020    Diabetic retinal exam  03/09/2021    Diabetic microalbuminuria test  07/01/2021    Pneumococcal 0-64 years Vaccine (1 of 2 - PPSV23) 04/01/2023 (Originally 3/16/1971)    Flu vaccine (1) 09/01/2021    DTaP/Tdap/Td vaccine (2 - Td or Tdap) 12/20/2021    Lipid screen  04/08/2022    A1C test (Diabetic or Prediabetic)  05/06/2022    Potassium monitoring  07/22/2022    Creatinine monitoring  07/22/2022    Colon cancer screen colonoscopy  07/27/2030    Shingles Vaccine  Completed    COVID-19 Vaccine  Completed    Hepatitis C screen  Completed    HIV screen  Completed    Hepatitis A vaccine  Aged Out    Hib vaccine  Aged Out    Meningococcal (ACWY) vaccine  Aged Out       Stress tests 3/4/2019       Summary    ECG portion of stress test is negative for ischemia by diagnostic criteria.    No infarct or ischemia noted.    Normal EF 79 % with normal ventricular contractility.    Normal stress myocardial perfusion.    This is a normal study.         Echo 3/4/2019  Summary   technically difficult study   Left ventricular function is normal.   Ejection fraction is visually estimated at 55-60%.   No significant valvular abnormalities.         ASSESSMENT/PLAN:    1. Essential hypertension  Assessment & Plan:   Well-controlled, changes made today: reduce lisinopril to allow for migraine medication  2. Mixed hyperlipidemia  Assessment & Plan:   Uncontrolled, lifestyle modifications recommended and recommend patient to take zetia for cholesterol. Patient is leery of starting medication for her cholesterol as she has had issues in the past with medications causing myalgias. Patient will think about it  3. Mobitz type 1 second degree AV block  Assessment & Plan:  She took propanolol while on event monitor she had low HR with mobitz type 1 2nd degree AV block noted. No change in rhythm while on holter monitor. 4. Syncope  Passed out on 8/12/2021. BP low the next day. Did not check it the day of passing out. No changes in medications or activity the day she passed out prior to passing out. Will give midodrine to use when SBP < 100. Encouraged patient to stay hydrated and wear compression stockings. Sleep study scheduled. Will check tilt table test to determine cause of syncope. Return in about 1 month (around 9/20/2021). An electronic signature was used to authenticate this note.     Electronically signed by YESSICA Puente CNP on 8/20/2021 at 8:21 AM

## 2021-08-20 NOTE — PROGRESS NOTES
Patient here in office & educated on LIZ for Dx: Syncope, scheduled for 8/26/21 @ 9:00, w/arrival @ 8:00, @ [facility]. Risks explained; & consents signed. Pre-admission orders given to pt for labs & CXR, which are due 8/20/21 @ [facility]. Instructions given to pt to: kassidy NOBLE after midnight the night before procedure. Patient to call hospital @ [phone] to pre-register. May take morning meds the morning of procedure. Patient was notified that procedure could be delayed due to an emergency. Patient voiced understanding. Copies of consent, pre-testing orders, & info. sheet scanned into media. Patient informed of instructions for performing test.  Throat swab performed  Swab placed into labeled collection tube. Collection tube and lab order placed in plastic bag. Bag placed in biohazard bag and placed in fridge.  called for .

## 2021-08-20 NOTE — LETTER
[facility]     Dr. Yunior Fonseca     Tilt Table Procedure     Patient Name: Vlad Smith   : 1965   MRN# [id number]     Date of Procedure: 21 Time:  9:00 Arrival Time: 8:00     Hospital: [address] [place] [address])     Call to [facility] at [phone] 2 days before your procedure    X Please have blood work and chest-x-ray done 1 to 2 days before procedure at [facility]. X Please have COVID test done     X Please do not have anything by mouth after midnight prior to or 8 hours before  the procedure. X You will need to arrive at the hospital 1 hour prior to the procedure. When you arrive at the hospital please go to the  registration desk. X You will need to arrange for someone to drive you home after the procedure. X Please wear comfortable clothing for the procedure. X  If you are taking HCTZ (Hydrochlorothiazide) please do not take it the morning before your procedure. Patient Signature:____________________________ Staff Signature: Jennifer Louie RN                                                 [facility]     Dr. Yunior Fonseca      Tilt Table Test     A tilt table test is used to check people who have fainted or who often feel lightheaded. The results help your doctor know the cause of your fainting or feeling lightheaded. The test uses a special table that slowly tilts you to an upright position. It checks how your body responds when you change positions. The test will take about an hour. It may take longer if you get medicine to speed up your heart during the test.  Why is this test done? This test checks what causes your symptoms by monitoring them while changing your position. Your doctor can see if you faint or feel lightheaded because of problems with your heart rate or blood pressure.   When people move from a lying position to an upright one, their blood pressure normally drops. But the body adjusts to this. Your nervous system senses changes in body position and controls your heart rate and blood pressure. If the nervous system doesn't work properly, you might feel lightheaded or faint. This can happen if your blood pressure stays too low. Your heart rate also may slow down or speed up. You feel lightheaded because your brain is not getting a normal amount of blood for a short time. This problem is called syncope. Syncope might happen during the test when you change to an upright position. During the Test: The test is usually done in a hospital. You will have small patches or pads attached to your skin. These are sensors that monitor your heart. You will also have a blood pressure cuff on your arm. And you may have an IV. You will lie flat on a table that can tilt you up to almost a standing position. You will be strapped securely to the table. Your heart rate and blood pressure are checked regularly as the table is tilted up. You will be asked if you feel any symptoms like nausea, sweating, dizziness, or an abnormal heartbeat. If you don't have any symptoms, you may be given medicine to speed up your heart rate. Then you will be checked for symptoms again. If you faint during the test, the table will be returned to a flat position. You will be checked closely and taken care of right away by your medical team. Most people wake up right away. Results of the test: The test result is normal if your blood pressure stays stable during the test and you do not feel lightheaded or faint. The test result is not normal if your blood pressure drops and you feel lightheaded or faint. These symptoms might happen because of a slow heart rate. After the Test: Your heart rate and blood pressure will be checked before you go home. You may need to have someone drive you home after the test. You can probably go back to your usual activities right away.  But some people feel a little tired or nauseated  Follow-up care is a key part of your treatment and safety. Be sure to make and go to all appointments, and call your doctor if you are having problems. It's also a good idea to keep a list of the medicines you take. Ask your doctor when you can expect to have your test results. [facility]     Dr. Zay Land              Patient Name: Teodora Juan   : 1965  MRN# [id number]    TODAYS DATE: 21    DATE OF PROCEDURE: 21      DX: Syncope             Tilt Table Procedure           X BMP        X MAGNESIUM       ? PLEASE CALL ABNORMAL RESULTS TO THE  PHYSICIAN? ATTENTION PATIENT: Pretesting is to be done before the cath. You do not have to fast for the lab work. You must go to the [facility]   behind the [facility] at [address] [place]. to have this lab work done. Phone: [phone] Hours: 7:00 am to 7:00 pm           PHYSICIANS SIGNATURE________________________DATE/TIME___________________                                                                 *This consent is applicable for 30 days following patient signature*    PROCEDURAL INFORMED [address] / PROCEDURE    I (We), Teodora  authorize, Dr. Zay Land    and such assistants as may be selected by him/her, to perform the following operation/procedures:    Tilt Table Procedure    Note: If unable to obtain consent prior to an emergent procedure, document the emergent reason in the medical record.    This procedure has been explained to my (our) satisfaction and included in the explanation was:   A) the intended benefit, nature, and extent of the procedure to be performed;   B) the significant risks involved and the probability of success;   C) alternative procedures and methods of treatment;   D) the dangers and probable consequences of such alternatives (including no procedure or treatment); E) the expected consequences of the procedure on my future health;   F) whether other qualified individuals would be performing important surgical tasks and / or whether  would be present to advise or support the procedure. I (we) understand that there are other risks of infection and other serious complications in the pre-operative/procedural and postoperative/procedural stages of my (our) care. I (we) have asked all of the questions which I (we) thought were important in deciding whether or not to undergo treatment or diagnosis. These questions have been answered to my (our) satisfaction. I (we) understand that no assurance can be given that the procedure will be a success, and no guarantee or warranty of success has been given to me (us). 2. It has been explained to me (us) that during the course of the operation/procedure, unforeseen conditions may be revealed that necessitate extension of the original procedure(s) or different procedure(s) than those set forth in Paragraph 1. I (we) authorize and request that the above-named physician, his/her assistants or his/her designees, perform procedures as necessary and desirable if deemed to be in my (our) best interest.     3. I acknowledge that other health care personnel may be observing this procedure for the purpose of medical education or other specified purposes as may be necessary as requested and/or approved by my (our) physician. 4. I (we) consent to the disposal by the hospital Pathologist of the removed tissue, parts or organs in accordance with hospital policy. 5. I do_____ do not______ consent to the use of a local infiltration pain blocking agent that will be used by my provider/surgical provider to help alleviate pain during my procedure. 6. I do_____ do not_____ consent to an emergent blood transfusion in the case of a life-threatening situation that requires blood components to be administered.  This consent is valid for 24 hours from the beginning of the procedure. 7. This patient does _____ or does not ______currently have a DNR status/order. If DNR order is in place, obtain \"Addendum to the Surgical Consent for ALL Patients with a DNR Order\" to address becca-operative status for limited intervention or DNR suspension. 8. I have read and fully understand the above Consent for Operation/Procedure and that all blanks were completed before I signed the consent.       ______________________________________   _____/____am/pm   Signature of Patient or legal representative Printed Name / Relationship Date / Time       _______________________________________  _____/____ am/pm   Witness to Signature Printed Name Date / Time     Informed consent:   I have provided the explanation described above in section 1 to the patient and/or legal representative.  I have provided the patient and/or legal representative with an opportunity to ask any questions about the proposed operation/procedure.       _____________________________________   ____/____ am/pm   Provider / Proceduralist Printed Name Date / Time       Revised 2021                                                                             [facility]     Dr. Mary Rand [address]:    Tilt Table Procedure    Patient Name: Abiola Walden   : 1965   MRN# [id number]  Home Phone Number: [phone]   Weight:    Wt Readings from Last 3 Encounters:   21 160 lb (72.6 kg)   21 167 lb (75.8 kg)   21 163 lb 14.4 oz (74.3 kg)        Insurance: Payor: Suzi Danger / Plan: Heraclio Luis Antonio / Product Type: *No Product type* /     Date of Procedure: 21  Time: 9:00 Arrival Time: 8:00    Diagnosis:  Syncope  Allergies: No Known Allergies       Monisha Ruvalcaba RN

## 2021-08-24 ENCOUNTER — HOSPITAL ENCOUNTER (OUTPATIENT)
Age: 56
Discharge: HOME | End: 2021-08-24
Payer: COMMERCIAL

## 2021-08-24 LAB
ANION GAP SERPL CALCULATED.3IONS-SCNC: 12 MMOL/L (ref 4–16)
BUN BLDV-MCNC: 15 MG/DL (ref 6–23)
CALCIUM SERPL-MCNC: 10.2 MG/DL (ref 8.3–10.6)
CHLORIDE BLD-SCNC: 102 MMOL/L (ref 99–110)
CO2: 28 MMOL/L (ref 21–32)
CREAT SERPL-MCNC: 0.7 MG/DL (ref 0.6–1.1)
GFR AFRICAN AMERICAN: >60 ML/MIN/1.73M2
GFR NON-AFRICAN AMERICAN: >60 ML/MIN/1.73M2
GLUCOSE BLD-MCNC: 117 MG/DL (ref 70–99)
MAGNESIUM: 1.9 MG/DL (ref 1.8–2.4)
POTASSIUM SERPL-SCNC: 4.3 MMOL/L (ref 3.5–5.1)
SODIUM BLD-SCNC: 142 MMOL/L (ref 135–145)

## 2021-08-24 PROCEDURE — 80048 BASIC METABOLIC PNL TOTAL CA: CPT

## 2021-08-24 PROCEDURE — 83735 ASSAY OF MAGNESIUM: CPT

## 2021-08-24 PROCEDURE — 36415 COLL VENOUS BLD VENIPUNCTURE: CPT

## 2021-08-25 ENCOUNTER — TELEPHONE (OUTPATIENT)
Dept: CARDIOLOGY CLINIC | Age: 56
End: 2021-08-25

## 2021-08-26 ENCOUNTER — HOSPITAL ENCOUNTER (OUTPATIENT)
Dept: NON INVASIVE DIAGNOSTICS | Age: 56
Discharge: HOME | End: 2021-08-26
Payer: COMMERCIAL

## 2021-08-26 VITALS
DIASTOLIC BLOOD PRESSURE: 78 MMHG | SYSTOLIC BLOOD PRESSURE: 140 MMHG | OXYGEN SATURATION: 99 % | RESPIRATION RATE: 18 BRPM | HEART RATE: 63 BPM

## 2021-08-26 DIAGNOSIS — I95.9 HYPOTENSION, UNSPECIFIED HYPOTENSION TYPE: ICD-10-CM

## 2021-08-26 PROCEDURE — 7100000001 HC PACU RECOVERY - ADDTL 15 MIN

## 2021-08-26 PROCEDURE — 93660 TILT TABLE EVALUATION: CPT

## 2021-08-26 PROCEDURE — 93660 TILT TABLE EVALUATION: CPT | Performed by: NURSE PRACTITIONER

## 2021-08-26 PROCEDURE — 7100000000 HC PACU RECOVERY - FIRST 15 MIN

## 2021-08-26 NOTE — PROGRESS NOTES
Discussed discharge instructions, copy signed and given. Denies any questions at this time. Escorted to ride.  Family waiting

## 2021-08-26 NOTE — PROCEDURES
long hours and need to stand for a long time. Specific techniques to decrease the pooling of blood in legs such as foot exercises, or tensing leg muscles when standing and increasing salt in diet were given. At this time, I don't recommend using medication (e.g. fluorinef) but if symptoms recurs despite these measurements, then medication can be used. Electronically signed by YESSICA Adame CNP on 8/26/21 at 9:36 AM EDT      CARDIOLOGY ATTENDING ADDENDUM    I have seen ,spoken to  and examined this patient personally, independently of the nurse practitioner. I have reviewed the hospital care given to date and reviewed all pertinent labs and imaging. The plan was developed mutually at the time of the visit with the patient,  NP   and myself. I have spoken with patient, nursing staff and provided written and verbal instructions . The above note has been reviewed and I agree with the assessment, diagnosis, and treatment plan with changes made by me as follows     HPI:  I have reviewed the above HPI  And agree with above   Mragot Hawk is a 49 y. o.year old who and presents with had no chief complaint listed for this encounter. MEDICAL DECISION MAKING;    I agree with the above plan, which was planned by myself and discussed with NP.         Shad Duong MD [facility] 08/26/21

## 2021-08-26 NOTE — PROGRESS NOTES
HEAD UP TILT TABLE TEST     Name: Velma Joseph  Date: 21  : 1965    MRN: [id number]  Physician: No att. providers found     Allergies: No Known Allergies     Medications:   Prior to Visit Medications    Medication Sig Taking? Authorizing Provider   rizatriptan (MAXALT) 10 MG tablet Take 10 mg by mouth once as needed for Migraine May repeat in 2 hours if needed  Yes Historical Provider, MD   omeprazole (PRILOSEC) 40 MG delayed release capsule Take 1 capsule by mouth daily Yes YESSICA Ferrera NP   atorvastatin (LIPITOR) 80 MG tablet Take 1 tablet by mouth daily Yes YESSICA Ferrera NP   ezetimibe (ZETIA) 10 MG tablet Take 1 tablet by mouth daily Yes Dennie Cox, APRN - CNP   traZODone (DESYREL) 100 MG tablet  Yes Historical Provider, MD   hydrOXYzine (ATARAX) 25 MG tablet TAKE 1 TABLET EVERY 8 HOURS AS NEEDED FOR ANXIETY AVOID ALCOHOL/CAUSES DROWSINESS. Do not take with Xanax Yes YESSICA Ferrera NP   albuterol sulfate HFA (PROAIR HFA) 108 (90 Base) MCG/ACT inhaler Inhale 2 puffs into the lungs every 6 hours as needed for Wheezing Yes Panfilo Cuba MD   tiotropium (SPIRIVA RESPIMAT) 2.5 MCG/ACT AERS inhaler Inhale 2 puffs into the lungs daily Yes Panfilo Cuba MD   tiZANidine (ZANAFLEX) 4 MG tablet Take 4 mg by mouth every 8 hours as needed  Yes Historical Provider, MD   oxyCODONE-acetaminophen (PERCOCET) 7.5-325 MG per tablet Take 1 tablet by mouth every 8 hours as needed for Pain (Patient states takes every 6 hours). Yes Historical Provider, MD   fluticasone-vilanterol (BREO ELLIPTA) 100-25 MCG/INH AEPB inhaler Inhale 1 puff into the lungs daily Yes Panfilo Cuba MD   ALPRAZolam Belvin Blare) 0.5 MG tablet Take 0.5 mg by mouth as needed for Sleep.  . Yes Historical Provider, MD   midodrine (PROAMATINE) 2.5 MG tablet Take 1 tablet by mouth 3 times daily as needed (SBP < 100)  YESSICA Vasques CNP   Compression Stockings MISC by Does not apply route 20 - 30 mmh wear daily and take off at night  Thigh High  YESSICA Amezcua CNP   nortriptyline (PAMELOR) 10 MG capsule Take 10 mg by mouth nightly  Historical Provider, MD   propranolol (INDERAL LA) 60 MG extended release capsule Take 60 mg by mouth daily  Historical Provider, MD   lisinopril (PRINIVIL;ZESTRIL) 2.5 MG tablet Take 1 tablet by mouth daily  YESSICA Vasques CNP   hydroCHLOROthiazide (HYDRODIURIL) 25 MG tablet Take 1 tablet by mouth every morning  YESSICA Vasques CNP   valACYclovir (VALTREX) 500 MG tablet Take 1 tablet by mouth daily  Lyssa Dowell MD   Blood Glucose Monitoring Suppl (TRUE METRIX AIR GLUCOSE METER) w/Device KIT 1 Device by Does not apply route continuous  Juliann Apley, APRN - NP   TRUEplus Lancets 28G MISC 1 each by Does not apply route daily  Juliann Apley, APRN - NP   blood glucose test strips (TRUE METRIX PRO BLOOD GLUCOSE) strip 1 each by In Vitro route daily As needed.   Juliann Apley, APRN - NP   alogliptin (NESINA) 6.25 MG TABS tablet Take 1 tablet by mouth daily  Juliann Apley, APRN - NP   ascorbic acid (V-R VITAMIN C) 250 MG tablet Take 4 tablets by mouth daily  Delvis Vann MD   zinc sulfate (ORAZINC) 220 (50 Zn) MG capsule Take 4 capsules by mouth daily  Delvis Vann MD   Nutritional Supplements (ENSURE HIGH PROTEIN) LIQD Take 1 Can by mouth Daily with lunch  Delvis Vann MD   Multiple Vitamins-Minerals (THERAPEUTIC MULTIVITAMIN-MINERALS) tablet Take 1 tablet by mouth daily  YESSICA Torres CNP   Calcium Carb-Cholecalciferol (CALCIUM-VITAMIN D) 600-400 MG-UNIT TABS Take 1 tablet by mouth daily  YESSICA Torres CNP   ammonium lactate (LAC-HYDRIN) 12 % lotion   Historical Provider, MD   calcipotriene (DOVONEX) 0.005 % ointment   Historical Provider, MD   clobetasol (TEMOVATE) 0.05 % ointment   Historical Provider, MD   Psyllium (METAMUCIL) 48.57 % POWD Take 1 Units by mouth daily  Delvis Vann MD   potassium chloride (KLOR-CON M) 20 MEQ extended release tablet Take 1 tablet by mouth 2 times daily  Nisha Weber MD   venlafaxine (EFFEXOR) 37.5 MG tablet Take 37.5 mg by mouth 2 times daily  Historical Provider, MD         Past Medical History:   Diagnosis Date    Anxiety     Arthritis     \"Back, Hands, Shoulders\"    Chronic back pain     Arthritis - NKI    COPD (chronic obstructive pulmonary disease) ([facility]     Sees Dr. Winnie Alcazar with Mental Health    Diabetes [facility] Dx 2008    Type II - follows with PCP    Emphysema     Fibromyalgia     H/O abdominoplasty 12/21/2018    H/O cardiovascular stress test 03/04/2019    ECG portion of stress test is neg for ischemia by diagnostic criteria. No infarct or ischemia noted. Normal stress myocardial perfusion. This is a normal study    H/O echocardiogram 03/04/2019    Left ventricular function is normal. Ejection fraction is visually estimated at 55-60%. No significant valvular abnormalitites.      Hyperlipidemia     Hypertension     Follows with PCP    Migraines Last Migraine 7/20/21    Mobitz (type) I (Wenckebach's) atrioventricular block 01/29/2020    Neuropathy     From feet to knees    Piriformis syndrome 6/24/2016    Psoriasis     Rotator cuff impingement syndrome, right 2/26/2018    RTC repair Dr Samuel Wooten 2015  Treated by Dr Steven Toro 2/18/17  Arthrocentesis 12/19/17    Shortness of breath on exertion     Syncope and collapse 03/04/2019    Teeth missing     Upper And Lower    Wears glasses        Past Surgical History:   Procedure Laterality Date    ABDOMEN SURGERY N/A 1/14/2019    SECONDARY CLOSURE OF DEHISCED ABDOMINAL WOUND performed by Nisha Weber MD at [facility] N/A 1/24/2019    IRRIGATION OF LOWER ABDOMINAL WOUND performed by Nisha Weber MD at [facility] ABDOMINOPLASTY N/A 7/29/2019    ABDOMINOPLASTY REVISION performed by Nisha Weber MD at [facility] Right 2020    EXCISION OF RIGHT BREAST SABACEOUS CYST performed by Tianna Morse MD at [place] [address] Right 2017    Dr. Melanie Jenkins (Right)   [address]    Tubal Ligation Also Done In     COLONOSCOPY  2017    Polyp Removed - Dr. Aric Pickett COLONOSCOPY N/A 2020    COLONOSCOPY POLYPECTOMY SNARE/COLD BIOPSY performed by Dayana Morton MD at [address] [place] [address]      Teeth Extracted In Past    ELBOW SURGERY Bilateral Last Done In     Right Done Twice, Left Done Once left cubital tunnel release ; right 2014    ENDOSCOPY, COLON, DIAGNOSTIC  2017    HERNIA REPAIR  10/26/2017    hiatal hernia with gastrectomy    HYSTERECTOMY VAGINAL      LAPAROSCOPY      [facility] EXCISE EXCESS SKIN TISSUE,ABDOMEN N/A 2018    ABDOMINOPLASTY performed by Dayana Morton MD at [address] [place] [address] Right     Dr. Claudia An Right 2017    Dr Shelby Butler  10/26/2017    Robotic Laparoscopic, Pre Op Weight 237  Or 238 lbs.  TUBAL LIGATION      Done With        [x] Patient verbalizes understanding of procedure    [x]  Consent obtained  [x]  NPO X13 hours  [x]  IV established with # 22 Site left AC  [x]  12 Lead EKG obtained   _  Electrolytes Na _  K _  CL _  CO2 _   Time:  7507        Date: 2021    Nursing Notes/ Vital Signs/ LOC/ Skin Color      TIME BP HR RR SPO2 COMMENTS   0905 140/86 60 20 100 Oriented to Tilt table procedure.  IV established   0935 124/81 64 18 99 Aurora CNP at bedside LIZ initiated table upright at 70 degrees   0937 127/89 76 22 99 Denies any issues at this time   0939 120/82 71 23 98 No complaints or dizziness   0941 119/87 103 22 98 Talking   0943 116/84 77 18 97 Denies any symptoms at this time   0945 102/83 76 22 98 No complaints at this time   0947 117/88 90 22 98 Denies dizziness   0949 108/82 78 21 96 Aurora auscultating bilat carotid

## 2021-08-31 DIAGNOSIS — Z01.818 PRE-OP TESTING: Primary | ICD-10-CM

## 2021-09-02 ENCOUNTER — HOSPITAL ENCOUNTER (OUTPATIENT)
Dept: PULMONOLOGY | Age: 56
Discharge: HOME | End: 2021-09-02
Payer: COMMERCIAL

## 2021-09-02 ENCOUNTER — HOSPITAL ENCOUNTER (OUTPATIENT)
Age: 56
Discharge: HOME | End: 2021-09-02
Payer: COMMERCIAL

## 2021-09-02 ENCOUNTER — TELEPHONE (OUTPATIENT)
Dept: BARIATRICS/WEIGHT MGMT | Age: 56
End: 2021-09-02

## 2021-09-02 DIAGNOSIS — Z87.891 EX-SMOKER: ICD-10-CM

## 2021-09-02 DIAGNOSIS — J44.9 COPD, SEVERE (HCC): ICD-10-CM

## 2021-09-02 LAB
DLCO %PRED: 57 %
DLCO PRED: NORMAL
DLCO/VA %PRED: NORMAL
DLCO/VA PRED: NORMAL
DLCO/VA: NORMAL
DLCO: NORMAL
EXPIRATORY TIME-POST: NORMAL
EXPIRATORY TIME: NORMAL
FEF 25-75% %CHNG: NORMAL
FEF 25-75% %PRED-POST: NORMAL
FEF 25-75% %PRED-PRE: NORMAL
FEF 25-75% PRED: NORMAL
FEF 25-75%-POST: NORMAL
FEF 25-75%-PRE: NORMAL
FEV1 %PRED-POST: 50 %
FEV1 %PRED-PRE: 45 %
FEV1 PRED: NORMAL
FEV1-POST: NORMAL
FEV1-PRE: NORMAL
FEV1/FVC %PRED-POST: NORMAL
FEV1/FVC %PRED-PRE: NORMAL
FEV1/FVC PRED: NORMAL
FEV1/FVC-POST: 77 %
FEV1/FVC-PRE: 74 %
FVC %PRED-POST: NORMAL
FVC %PRED-PRE: NORMAL
FVC PRED: NORMAL
FVC-POST: NORMAL
FVC-PRE: NORMAL
GAW %PRED: NORMAL
GAW PRED: NORMAL
GAW: NORMAL
IC %PRED: NORMAL
IC PRED: NORMAL
IC: NORMAL
MEP: NORMAL
MIP: NORMAL
MVV %PRED-PRE: NORMAL
MVV PRED: NORMAL
MVV-PRE: NORMAL
PEF %PRED-POST: NORMAL
PEF %PRED-PRE: NORMAL
PEF PRED: NORMAL
PEF%CHNG: NORMAL
PEF-POST: NORMAL
PEF-PRE: NORMAL
RAW %PRED: NORMAL
RAW PRED: NORMAL
RAW: NORMAL
RV %PRED: NORMAL
RV PRED: NORMAL
RV: NORMAL
SVC %PRED: NORMAL
SVC PRED: NORMAL
SVC: NORMAL
TLC %PRED: 97 %
TLC PRED: NORMAL
TLC: NORMAL
VA %PRED: NORMAL
VA PRED: NORMAL
VA: NORMAL
VTG %PRED: NORMAL
VTG PRED: NORMAL
VTG: NORMAL

## 2021-09-02 PROCEDURE — U0005 INFEC AGEN DETEC AMPLI PROBE: HCPCS

## 2021-09-02 PROCEDURE — 94726 PLETHYSMOGRAPHY LUNG VOLUMES: CPT

## 2021-09-02 PROCEDURE — 94729 DIFFUSING CAPACITY: CPT

## 2021-09-02 PROCEDURE — 94060 EVALUATION OF WHEEZING: CPT

## 2021-09-02 PROCEDURE — U0003 INFECTIOUS AGENT DETECTION BY NUCLEIC ACID (DNA OR RNA); SEVERE ACUTE RESPIRATORY SYNDROME CORONAVIRUS 2 (SARS-COV-2) (CORONAVIRUS DISEASE [COVID-19]), AMPLIFIED PROBE TECHNIQUE, MAKING USE OF HIGH THROUGHPUT TECHNOLOGIES AS DESCRIBED BY CMS-2020-01-R: HCPCS

## 2021-09-02 ASSESSMENT — PULMONARY FUNCTION TESTS
FEV1_PERCENT_PREDICTED_POST: 50
FEV1/FVC_PRE: 74
FEV1/FVC_POST: 77
FEV1_PERCENT_PREDICTED_PRE: 45

## 2021-09-02 NOTE — TELEPHONE ENCOUNTER
Pt. Called into the office stating she does not like the taste of the ensure, or slim fast or  Any protein drink. - she states she just cant do the diet before surgery please advise.

## 2021-09-03 ENCOUNTER — TELEPHONE (OUTPATIENT)
Dept: BARIATRICS/WEIGHT MGMT | Age: 56
End: 2021-09-03

## 2021-09-03 LAB — SARS-COV-2: NOT DETECTED

## 2021-09-03 NOTE — TELEPHONE ENCOUNTER
Pt called in having trouble tolerating protein shakes. Instructed on use of food based liver shrinking diet, to keep calories at 600/day, at least 60 gms protein. Pt verbalized understanding. Plan revise panniculectomy next week.  COVID completed at [facility], [address], RD, LD  9/3/2021

## 2021-09-08 ENCOUNTER — ANESTHESIA EVENT (OUTPATIENT)
Dept: OPERATING ROOM | Age: 56
End: 2021-09-08
Payer: COMMERCIAL

## 2021-09-08 ASSESSMENT — COPD QUESTIONNAIRES: CAT_SEVERITY: SEVERE

## 2021-09-08 ASSESSMENT — ENCOUNTER SYMPTOMS: SHORTNESS OF BREATH: 1

## 2021-09-08 NOTE — H&P
ELLIPTA) 100-25 MCG/INH AEPB inhaler Inhale 1 puff into the lungs daily 9/8/21 10/8/21  Racheal Rojas MD   Compression Stockings MISC by Does not apply route 20 - 30 mmh wear daily and take off at night  Thigh High 8/20/21   YESSICA Singer CNP   nortriptyline (PAMELOR) 10 MG capsule Take 10 mg by mouth nightly    Historical Provider, MD   propranolol (INDERAL LA) 60 MG extended release capsule Take 60 mg by mouth daily    Historical Provider, MD   rizatriptan (MAXALT) 10 MG tablet Take 10 mg by mouth once as needed for Migraine May repeat in 2 hours if needed     Historical Provider, MD   lisinopril (PRINIVIL;ZESTRIL) 2.5 MG tablet Take 1 tablet by mouth daily 7/15/21   YESSICA Singer CNP   atorvastatin (LIPITOR) 80 MG tablet Take 1 tablet by mouth daily 5/6/21   YESSICA Beach NP   Blood Glucose Monitoring Suppl (TRUE METRIX AIR GLUCOSE METER) w/Device KIT 1 Device by Does not apply route continuous 4/29/21   YESSICA Beach NP   TRUEplus Lancets 28G MISC 1 each by Does not apply route daily 4/29/21   YESSICA Beach NP   blood glucose test strips (TRUE METRIX PRO BLOOD GLUCOSE) strip 1 each by In Vitro route daily As needed.  4/29/21   YESSICA Beach NP   ezetimibe (ZETIA) 10 MG tablet Take 1 tablet by mouth daily 4/8/21   YESSICA Singer CNP   traZODone (DESYREL) 100 MG tablet  3/1/21   Historical Provider, MD   ascorbic acid (V-R VITAMIN C) 250 MG tablet Take 4 tablets by mouth daily 3/5/21   Milena Quick MD   zinc sulfate (ORAZINC) 220 (50 Zn) MG capsule Take 4 capsules by mouth daily 3/5/21 3/5/22  Milena Quick MD   Multiple Vitamins-Minerals (THERAPEUTIC MULTIVITAMIN-MINERALS) tablet Take 1 tablet by mouth daily 3/4/21 3/4/22  YESSICA Camara CNP   Calcium Carb-Cholecalciferol (CALCIUM-VITAMIN D) 600-400 MG-UNIT TABS Take 1 tablet by mouth daily 3/4/21   YESSICA Camara - CNP   albuterol sulfate HFA (PROAIR HFA) 108 (90 use: None     Comment: Marijuana Card  - last used: 7/21/21    Sexual activity: Yes     Partners: Male   Other Topics Concern    None   Social History Narrative    None     Social Determinants of Health     Financial Resource Strain:     Difficulty of Paying Living Expenses:    Food Insecurity:     Worried About Running Out of Food in the Last Year:     Ran Out of Food in the Last Year:    Transportation Needs:     Lack of Transportation (Medical):  Lack of Transportation (Non-Medical):    Physical Activity:     Days of Exercise per Week:     Minutes of Exercise per Session:    Stress:     Feeling of Stress :    Social Connections:     Frequency of Communication with Friends and Family:     Frequency of Social Gatherings with Friends and Family:     Attends Jehov[employer] witness Services:     Active Member of Clubs or Organizations:     Attends Club or Organization Meetings:     Marital Status:    Intimate Partner Violence:     Fear of Current or Ex-Partner:     Emotionally Abused:     Physically Abused:     Sexually Abused:       Family History   Problem Relation Age of Onset    Heart Attack Father     Heart Disease Father     Early Death Father 46        Heart Attack    Alcohol Abuse Father     Substance Abuse Father         Alcoholic    Arthritis Mother     Heart Disease Mother     Diabetes Mother     Cancer Mother         \"Kidney Cancer\"    High Blood Pressure Mother     High Blood Pressure Sister     Diabetes Brother     Early Death Daughter 21        \"Killed In A Car Accident\"    otherwise irrelevant to this surgical issue. Review of Systems:  Constitutional: Negative for fever, chills, diaphoresis, appetite change and fatigue. HENT: Negative for sore throat, trouble swallowing and voice change. Respiratory: Negative for cough, positive for shortness of breath no wheezing.    Cardiovascular: Negative for chest pain positive for SOB with one flight of stairs exertion, no pitting LE edema. Gastrointestinal: Negative for nausea, vomiting, diarrhea, constipation, blood in stool, abdominal distention, anal bleeding or rectal pain. Musculoskeletal: Negative for joint swelling and arthralgias. Skin: Warm and dry, well perfused. Neurological: Negative for seizures, syncope, speech difficulty and weakness. Hematological/Lymphatic: Negative for adenopathy. No history of DVT/PE. Does not bruise/bleed easily. Psychiatric/Behavioral: Negative for agitation. All others reviewed and negative. Physical Exam:  Vital Signs:   Vitals:    09/09/21 0746   BP: 122/68   Pulse: 53   Resp: 16   Temp: 96.4 °F (35.8 °C)   SpO2: 96%     Body mass index is 24.15 kg/m². General appearance: Pt Alert and oriented, to persons place and time, in no apparent acute distress. HEENT:  CHRISTY, EOMI, Conjunctivae clear. No JVDs, Bruits, No thyroid-megaly. Lungs: No dyspnea. Clear to auscultation bilaterally. Heart: Regular rate and rhythm, S1, S2 normal, no murmur, rub or gallop. No legs edema. Abdomen: Non tender. Non distended. Positive bowel sounds. No hernias noted, no masses palpable. Extremities: Warm, well perfused, no cyanosis or edema. Skin: Skin color, texture, turgor normal.  Neurologic: Grossly normal, Cranial nerves from II to XII intact. Deep tendon reflexes normal.  Psychology: Mood stable. Lymph nodes: No palpable LN in the neck, abdomen, or groins. Radiologic / Imaging / TESTING  No results found.       Labs:    Recent Results (from the past 24 hour(s))   POCT Glucose    Collection Time: 09/09/21  8:06 AM   Result Value Ref Range    POC Glucose 129 (H) 70 - 99 MG/DL         Diagnosis:  Patient Active Problem List   Diagnosis    DM [facility]di[facility] mellitus) [facility]    Essential hypertension    Chronic pain syndrome    COPD, severe (HCC)    Gastroesophageal reflux disease with esophagitis    History of MRSA infection    Anxiety    Chronic right-sided low back pain with sciatica  Hiatal hernia    Status post laparoscopic sleeve gastrectomy    Abdominal pannus    S/P abdominoplasty    Fibromyalgia    Sebaceous cyst of breast, right    Mobitz type 1 second degree AV block    Polyp of cecum    Adenomatous polyp of ascending colon    Chronic edema BLE    Mixed hyperlipidemia    Neuropathy    Umbilical pain    Lower abdominal pain    S/P panniculectomy    Latent tuberculosis    Gastroesophageal reflux disease    Vitamin D deficiency    Psoriasis    Pannus, abdominal    Herpes genitalis in women    Admission for therapeutic drug monitoring    URIEL (obstructive sleep apnea)    Hypersomnia    Ex-smoker           Assessment & Plan:    Luther Kaye is a very pleasant 49 y.o. female who presents for re-do panniculectomy, she has on and off skin ulcers from her pannus abdominus. .  AND she did do slimfastm and did loose the last 5 Lbs I strongly recommended to her to comply to get the best result, because now she IS ideal body weight      ADDENDUM:    EP Recommendations:        1. Second-degree Mobitz type I AV block  ( known hx )   2. Preop cardiac risk assessment  3. History of orthostasis, positive tilt table study  4. For panniculectomy     Variable heart rate 40-50  As such not a contraindication to proceed with surgery with dopamine.   Avoid AV fili blocking agents including propanol     Discussed with JAS Jo - Schedule outpt visit for stress test   Recommend outpatient ischemic work-up with stress test prior to surgy  Continue with increased fluid intake, consider compression stockings        Thank you for the consult     Dr. Norma Leary  9/9/2021 11:37 AM   ____________________________________________    Johanna Galaviz MD, FACS, FICS    9/9/2021  10:04 AM

## 2021-09-08 NOTE — ANESTHESIA PRE PROCEDURE
Department of Anesthesiology  Preprocedure Note       Name:  Paola Liu   Age:  49 y.o.  :  1965                                          MRN:  [id number]         Date:  2021      Surgeon: Melita Young):  Rosalio Aguirre MD    Procedure: Procedure(s):  PANNICULECTOMY    Medications prior to admission:   Prior to Admission medications    Medication Sig Start Date End Date Taking?  Authorizing Provider   albuterol sulfate HFA (PROAIR HFA) 108 (90 Base) MCG/ACT inhaler Inhale 2 puffs into the lungs every 6 hours as needed for Wheezing 21   Mahnaz Pang MD   tiotropium (SPIRIVA RESPIMAT) 2.5 MCG/ACT AERS inhaler Inhale 2 puffs into the lungs daily 21   Mahnaz Pang MD   fluticasone-vilanterol (BREO ELLIPTA) 100-25 MCG/INH AEPB inhaler Inhale 1 puff into the lungs daily 9/8/21 10/8/21  Mahnaz Pang MD   midodrine (PROAMATINE) 2.5 MG tablet Take 1 tablet by mouth 3 times daily as needed (SBP < 100) 21   YESSICA Guevara CNP   Compression Stockings MISC by Does not apply route 20 - 30 mmh wear daily and take off at night  Thigh High 21   YESSICA Guevara CNP   nortriptyline (PAMELOR) 10 MG capsule Take 10 mg by mouth nightly    Historical Provider, MD   propranolol (INDERAL LA) 60 MG extended release capsule Take 60 mg by mouth daily    Historical Provider, MD   rizatriptan (MAXALT) 10 MG tablet Take 10 mg by mouth once as needed for Migraine May repeat in 2 hours if needed     Historical Provider, MD   lisinopril (PRINIVIL;ZESTRIL) 2.5 MG tablet Take 1 tablet by mouth daily 7/15/21   YESSICA Guevara CNP   hydroCHLOROthiazide (HYDRODIURIL) 25 MG tablet Take 1 tablet by mouth every morning 7/15/21   YESSICA Guevara CNP   valACYclovir (VALTREX) 500 MG tablet Take 1 tablet by mouth daily 21  Sukh Fam MD   omeprazole (PRILOSEC) 40 MG delayed release capsule Take 1 capsule by mouth daily 21   Agusto All, APRN - NP   atorvastatin (LIPITOR) 80 MG tablet Take 1 tablet by mouth daily 5/6/21   YESSICA Avila NP   Blood Glucose Monitoring Suppl (TRUE METRIX AIR GLUCOSE METER) w/Device KIT 1 Device by Does not apply route continuous 4/29/21   YESSICA Avila NP   TRUEplus Lancets 28G MISC 1 each by Does not apply route daily 4/29/21   YESSICA Avila NP   blood glucose test strips (TRUE METRIX PRO BLOOD GLUCOSE) strip 1 each by In Vitro route daily As needed. 4/29/21   YESSICA Avila NP   ezetimibe (ZETIA) 10 MG tablet Take 1 tablet by mouth daily 4/8/21   YESSICA Barger CNP   traZODone (DESYREL) 100 MG tablet  3/1/21   Historical Provider, MD   alogliptin (NESINA) 6.25 MG TABS tablet Take 1 tablet by mouth daily 3/8/21 8/20/21  YESSICA Avila NP   ascorbic acid (V-R VITAMIN C) 250 MG tablet Take 4 tablets by mouth daily 3/5/21   Montse Nielson MD   zinc sulfate (ORAZINC) 220 (50 Zn) MG capsule Take 4 capsules by mouth daily 3/5/21 3/5/22  Montse Nielson MD   Nutritional Supplements (ENSURE HIGH PROTEIN) LIQD Take 1 Can by mouth Daily with lunch 3/5/21   Montse Nielson MD   Multiple Vitamins-Minerals (THERAPEUTIC MULTIVITAMIN-MINERALS) tablet Take 1 tablet by mouth daily 3/4/21 3/4/22  YESSICA Recinos CNP   Calcium Carb-Cholecalciferol (CALCIUM-VITAMIN D) 600-400 MG-UNIT TABS Take 1 tablet by mouth daily 3/4/21   YESSICA Recinos CNP   hydrOXYzine (ATARAX) 25 MG tablet TAKE 1 TABLET EVERY 8 HOURS AS NEEDED FOR ANXIETY AVOID ALCOHOL/CAUSES DROWSINESS.   Do not take with Xanax 10/29/20   YESSICA Avila NP   albuterol sulfate HFA (PROAIR HFA) 108 (90 Base) MCG/ACT inhaler Inhale 2 puffs into the lungs every 6 hours as needed for Wheezing 9/2/20   Raul Stovall MD   tiotropium [facility] RESPIMAT) 2.5 MCG/ACT AERS inhaler Inhale 2 puffs into the lungs daily 9/2/20   Raul Stovall MD   ammonium lactate (LAC-HYDRIN) 12 % lotion  6/24/20   Historical Provider, MD calcipotriene (DOVONEX) 0.005 % ointment  5/4/20   Historical Provider, MD   clobetasol (TEMOVATE) 0.05 % ointment  5/4/20   Historical Provider, MD   tiZANidine (ZANAFLEX) 4 MG tablet Take 4 mg by mouth every 8 hours as needed  1/17/20   Historical Provider, MD   oxyCODONE-acetaminophen (PERCOCET) 7.5-325 MG per tablet Take 1 tablet by mouth every 8 hours as needed for Pain (Patient states takes every 6 hours). 1/17/20   Historical Provider, MD   Psyllium (METAMUCIL) 48.57 % POWD Take 1 Units by mouth daily 1/24/20   Lubna Farmer MD   potassium chloride (KLOR-CON M) 20 MEQ extended release tablet Take 1 tablet by mouth 2 times daily 8/2/19   Lubna Farmer MD   venlafaxine (EFFEXOR) 37.5 MG tablet Take 37.5 mg by mouth 2 times daily    Historical Provider, MD   fluticasone-vilanterol (BREO ELLIPTA) 100-25 MCG/INH AEPB inhaler Inhale 1 puff into the lungs daily 3/5/18   Jenniffer Perkins MD   ALPRAZolam [place]) 0.5 MG tablet Take 0.5 mg by mouth as needed for Sleep. Linda Henson Historical Provider, MD       Current medications:    No current facility-administered medications for this encounter.      Current Outpatient Medications   Medication Sig Dispense Refill    albuterol sulfate HFA (PROAIR HFA) 108 (90 Base) MCG/ACT inhaler Inhale 2 puffs into the lungs every 6 hours as needed for Wheezing 18 g 5    tiotropium (SPIRIVA RESPIMAT) 2.5 MCG/ACT AERS inhaler Inhale 2 puffs into the lungs daily 1 each 5    fluticasone-vilanterol (BREO ELLIPTA) 100-25 MCG/INH AEPB inhaler Inhale 1 puff into the lungs daily 1 each 5    midodrine (PROAMATINE) 2.5 MG tablet Take 1 tablet by mouth 3 times daily as needed (SBP < 100) 90 tablet 3    Compression Stockings MISC by Does not apply route 20 - 30 mmh wear daily and take off at night  Thigh High 2 each 1    nortriptyline (PAMELOR) 10 MG capsule Take 10 mg by mouth nightly      propranolol (INDERAL LA) 60 MG extended release capsule Take 60 mg by mouth daily      rizatriptan (MAXALT) 10 MG tablet Take 10 mg by mouth once as needed for Migraine May repeat in 2 hours if needed       lisinopril (PRINIVIL;ZESTRIL) 2.5 MG tablet Take 1 tablet by mouth daily 30 tablet 1    hydroCHLOROthiazide (HYDRODIURIL) 25 MG tablet Take 1 tablet by mouth every morning 30 tablet 5    valACYclovir (VALTREX) 500 MG tablet Take 1 tablet by mouth daily 30 tablet 5    omeprazole (PRILOSEC) 40 MG delayed release capsule Take 1 capsule by mouth daily 90 capsule 2    atorvastatin (LIPITOR) 80 MG tablet Take 1 tablet by mouth daily 90 tablet 2    Blood Glucose Monitoring Suppl (TRUE METRIX AIR GLUCOSE METER) w/Device KIT 1 Device by Does not apply route continuous 1 kit 0    TRUEplus Lancets 28G MISC 1 each by Does not apply route daily 100 each 3    blood glucose test strips (TRUE METRIX PRO BLOOD GLUCOSE) strip 1 each by In Vitro route daily As needed. 100 each 3    ezetimibe (ZETIA) 10 MG tablet Take 1 tablet by mouth daily 90 tablet 1    traZODone (DESYREL) 100 MG tablet       alogliptin (NESINA) 6.25 MG TABS tablet Take 1 tablet by mouth daily 90 tablet 1    ascorbic acid (V-R VITAMIN C) 250 MG tablet Take 4 tablets by mouth daily 30 tablet 3    zinc sulfate (ORAZINC) 220 (50 Zn) MG capsule Take 4 capsules by mouth daily 30 capsule 3    Nutritional Supplements (ENSURE HIGH PROTEIN) LIQD Take 1 Can by mouth Daily with lunch 32 Can 5    Multiple Vitamins-Minerals (THERAPEUTIC MULTIVITAMIN-MINERALS) tablet Take 1 tablet by mouth daily 90 tablet 3    Calcium Carb-Cholecalciferol (CALCIUM-VITAMIN D) 600-400 MG-UNIT TABS Take 1 tablet by mouth daily 90 tablet 3    hydrOXYzine (ATARAX) 25 MG tablet TAKE 1 TABLET EVERY 8 HOURS AS NEEDED FOR ANXIETY AVOID ALCOHOL/CAUSES DROWSINESS.   Do not take with Xanax 60 tablet 1    albuterol sulfate HFA (PROAIR HFA) 108 (90 Base) MCG/ACT inhaler Inhale 2 puffs into the lungs every 6 hours as needed for Wheezing 1 Inhaler 5    tiotropium (SPIRIVA RESPIMAT) 2.5 MCG/ACT AERS inhaler Inhale 2 puffs into the lungs daily 1 Inhaler 5    ammonium lactate (LAC-HYDRIN) 12 % lotion       calcipotriene (DOVONEX) 0.005 % ointment       clobetasol (TEMOVATE) 0.05 % ointment       tiZANidine (ZANAFLEX) 4 MG tablet Take 4 mg by mouth every 8 hours as needed       oxyCODONE-acetaminophen (PERCOCET) 7.5-325 MG per tablet Take 1 tablet by mouth every 8 hours as needed for Pain (Patient states takes every 6 hours).  Psyllium (METAMUCIL) 48.57 % POWD Take 1 Units by mouth daily 1 Bottle 5    potassium chloride (KLOR-CON M) 20 MEQ extended release tablet Take 1 tablet by mouth 2 times daily 180 tablet 1    venlafaxine (EFFEXOR) 37.5 MG tablet Take 37.5 mg by mouth 2 times daily      fluticasone-vilanterol (BREO ELLIPTA) 100-25 MCG/INH AEPB inhaler Inhale 1 puff into the lungs daily 1 each 5    ALPRAZolam (XANAX) 0.5 MG tablet Take 0.5 mg by mouth as needed for Sleep. .         Allergies: Allergies   Allergen Reactions    Other      Patient states the electrodes irritated her skin.        Problem List:    Patient Active Problem List   Diagnosis Code    DM (diabetes mellitus) ([facility] [address] E11.9    Essential hypertension I10    Chronic pain syndrome G89.4    COPD, severe ([facility] J44.9    Gastroesophageal reflux disease with esophagitis K21.00    History of MRSA infection Z86.14    Anxiety F41.9    Chronic right-sided low back pain with sciatica M54.40, G89.29    Hiatal hernia K44.9    Status post laparoscopic sleeve gastrectomy Z98.84    Abdominal pannus E65    S/P abdominoplasty Z98.890    Fibromyalgia M79.7    Sebaceous cyst of breast, right N60.81    Mobitz type 1 second degree AV block I44.1    Polyp of cecum K63.5    Adenomatous polyp of ascending colon D12.2    Chronic edema BLE R60.9    Mixed hyperlipidemia E78.2    Neuropathy R30.2    Umbilical pain G16.56    Lower abdominal pain R10.30    S/P panniculectomy Z98.890    Latent tuberculosis Cielo Mcdaniel MD at [facility] ABDOMINOPLASTY N/A 2019    ABDOMINOPLASTY REVISION performed by Daisha Mcdaniel MD at [facility] Right 2020    EXCISION OF RIGHT BREAST SABACEOUS CYST performed by Jes Luna MD at [facility]     Dr. Antwan Britton (Right)   [address]    Tubal Ligation Also Done In     COLONOSCOPY  2017    Polyp Removed - Dr. Trinidad Davila COLONOSCOPY N/A 2020    COLONOSCOPY POLYPECTOMY SNARE/COLD BIOPSY performed by Daisha Mcdaniel MD at [address] [place] [address]      Teeth Extracted In Past    ELBOW SURGERY Bilateral Last Done In     Right Done Twice, Left Done Once left cubital tunnel release ; right 2014    ENDOSCOPY, COLON, DIAGNOSTIC  2017    HERNIA REPAIR  10/26/2017    hiatal hernia with gastrectomy    HYSTERECTOMY VAGINAL      LAPAROSCOPY      WA EXCISE EXCESS SKIN TISSUE,ABDOMEN N/A 2018    ABDOMINOPLASTY performed by Daisha Mcdaniel MD at [address] [place] [address] Right     Dr. Khoa Rust Right 2017    Dr Rhoda Pemberton  10/26/2017    Robotic Laparoscopic, Pre Op Weight 237  Or 238 lbs.  TUBAL LIGATION      Done With        Social History:    Social History     Tobacco Use    Smoking status: Former Smoker     Packs/day: 0.25     Years: 30.00     Pack years: 7.50     Types: Cigarettes, E-Cigarettes     Start date:      Quit date: 2016     Years since quittin.6    Smokeless tobacco: Never Used   Substance Use Topics    Alcohol use: Yes     Comment: \"Occ.  Maybe Twice A Month\"                                Counseling given: Not Answered      Vital Signs (Current):   Vitals:    21 1227   Weight: 157 lb (71.2 kg)   Height: 5' 7\" (1.702 m)                                              BP Readings from Last 3 Encounters:   21 (!) 140/78   21 104/62   08/19/21 102/68       NPO Status:                                                                                 BMI:   Wt Readings from Last 3 Encounters:   09/08/21 165 lb (74.8 kg)   08/20/21 167 lb (75.8 kg)   08/19/21 163 lb 14.4 oz (74.3 kg)     Body mass index is 24.59 kg/m². CBC:   Lab Results   Component Value Date    WBC 7.7 07/22/2021    RBC 6.13 07/22/2021    HGB 15.2 07/22/2021    HCT 46.1 07/22/2021    MCV 75.2 07/22/2021    RDW 14.0 07/22/2021     07/22/2021       CMP:   Lab Results   Component Value Date     08/24/2021    K 4.3 08/24/2021     08/24/2021    CO2 28 08/24/2021    BUN 15 08/24/2021    CREATININE 0.7 08/24/2021    GFRAA >60 08/24/2021    AGRATIO 1.3 10/21/2020    LABGLOM >60 08/24/2021    GLUCOSE 117 08/24/2021    PROT 7.3 05/26/2021    PROT 7.9 03/02/2013    CALCIUM 10.2 08/24/2021    BILITOT 0.8 05/26/2021    ALKPHOS 87 05/26/2021    AST 20 05/26/2021    ALT 15 05/26/2021       POC Tests: No results for input(s): POCGLU, POCNA, POCK, POCCL, POCBUN, POCHEMO, POCHCT in the last 72 hours.     Coags: No results found for: PROTIME, INR, APTT    HCG (If Applicable):   Lab Results   Component Value Date    PREGTESTUR NEGATIVE 02/12/2020        ABGs: No results found for: PHART, PO2ART, RBH2FUW, RJE2FAA, BEART, J4IANGEB     Type & Screen (If Applicable):  No results found for: LABABO, LABRH    Drug/Infectious Status (If Applicable):  Lab Results   Component Value Date    HEPCAB NON REACTIVE 04/08/2021       COVID-19 Screening (If Applicable):   Lab Results   Component Value Date    COVID19 NOT DETECTED 09/02/2021           Anesthesia Evaluation  Patient summary reviewed  Airway: Mallampati: II  TM distance: >3 FB   Neck ROM: full  Mouth opening: > = 3 FB Dental:          Pulmonary: breath sounds clear to auscultation  (+) COPD: severe,  shortness of breath:  sleep apnea:                             Cardiovascular:  Exercise tolerance: poor (<4 METS),   (+) hypertension:, dysrhythmias (Mobitz (type) I (Wenckebach's) atrioventricular block):, TOMAS:, hyperlipidemia        Rhythm: regular             Beta Blocker:  Not on Beta Blocker      ROS comment: echocardiogram Left ventricular function is normal. Ejection fraction is visually estimated at 55-60%. No significant valvular abnormalitites. Neuro/Psych:   (+) headaches: migraine headaches, depression/anxiety             GI/Hepatic/Renal:   (+) hiatal hernia, GERD:,           Endo/Other:    (+) Diabetes, : arthritis: OA., electrolyte abnormalities, . Abdominal:   (+) obese,           Vascular: Other Findings:           Anesthesia Plan      general     ASA 3       Induction: intravenous. MIPS: Postoperative opioids intended and Prophylactic antiemetics administered. Anesthetic plan and risks discussed with patient. Plan discussed with CRNA. Attending anesthesiologist reviewed and agrees with Preprocedure content   Pre Anesthesia Assessment complete. Chart reviewed on 9/8/2021        YESSICA Bowman - CRNA   9/8/2021      Pre Anesthesia Evaluation complete. Anesthesia plan, risks, benefits, alternatives, and personnel discussed with patient and/or legal guardian. Patient and/or legal guardian verbalized an understanding and agreed to proceed. Anesthesia plan discussed with care team members and agreed upon.   YESSICA Bowman - YAQUELIN  9/9/2021

## 2021-09-09 ENCOUNTER — ANESTHESIA (OUTPATIENT)
Dept: OPERATING ROOM | Age: 56
End: 2021-09-09
Payer: COMMERCIAL

## 2021-09-09 ENCOUNTER — HOSPITAL ENCOUNTER (OUTPATIENT)
Age: 56
Setting detail: OUTPATIENT SURGERY
Discharge: HOME | End: 2021-09-09
Attending: SURGERY | Admitting: SURGERY
Payer: COMMERCIAL

## 2021-09-09 ENCOUNTER — TELEPHONE (OUTPATIENT)
Dept: SURGERY | Age: 56
End: 2021-09-09

## 2021-09-09 ENCOUNTER — TELEPHONE (OUTPATIENT)
Dept: CARDIOLOGY CLINIC | Age: 56
End: 2021-09-09

## 2021-09-09 VITALS
WEIGHT: 154.2 LBS | HEART RATE: 47 BPM | SYSTOLIC BLOOD PRESSURE: 147 MMHG | BODY MASS INDEX: 24.2 KG/M2 | DIASTOLIC BLOOD PRESSURE: 76 MMHG | TEMPERATURE: 97.2 F | RESPIRATION RATE: 16 BRPM | OXYGEN SATURATION: 96 % | HEIGHT: 67 IN

## 2021-09-09 LAB
GLUCOSE BLD-MCNC: 109 MG/DL (ref 70–99)
GLUCOSE BLD-MCNC: 129 MG/DL (ref 70–99)

## 2021-09-09 PROCEDURE — 7100000001 HC PACU RECOVERY - ADDTL 15 MIN: Performed by: SURGERY

## 2021-09-09 PROCEDURE — 99235 HOSP IP/OBS SAME DATE MOD 70: CPT | Performed by: SURGERY

## 2021-09-09 PROCEDURE — 6360000002 HC RX W HCPCS: Performed by: SURGERY

## 2021-09-09 PROCEDURE — 3600000013 HC SURGERY LEVEL 3 ADDTL 15MIN: Performed by: SURGERY

## 2021-09-09 PROCEDURE — 99254 IP/OBS CNSLTJ NEW/EST MOD 60: CPT | Performed by: INTERNAL MEDICINE

## 2021-09-09 PROCEDURE — 6370000000 HC RX 637 (ALT 250 FOR IP): Performed by: SURGERY

## 2021-09-09 PROCEDURE — 2709999900 HC NON-CHARGEABLE SUPPLY: Performed by: SURGERY

## 2021-09-09 PROCEDURE — 3600000003 HC SURGERY LEVEL 3 BASE: Performed by: SURGERY

## 2021-09-09 PROCEDURE — 7100000010 HC PHASE II RECOVERY - FIRST 15 MIN: Performed by: SURGERY

## 2021-09-09 PROCEDURE — 82962 GLUCOSE BLOOD TEST: CPT

## 2021-09-09 PROCEDURE — 7100000000 HC PACU RECOVERY - FIRST 15 MIN: Performed by: SURGERY

## 2021-09-09 PROCEDURE — 2580000003 HC RX 258: Performed by: SURGERY

## 2021-09-09 RX ORDER — ALOGLIPTIN 6.25 MG/1
6.25 TABLET, FILM COATED ORAL DAILY
Status: CANCELLED | OUTPATIENT
Start: 2021-09-09

## 2021-09-09 RX ORDER — MIDODRINE HYDROCHLORIDE 5 MG/1
2.5 TABLET ORAL 3 TIMES DAILY PRN
Status: CANCELLED | OUTPATIENT
Start: 2021-09-09

## 2021-09-09 RX ORDER — ASCORBIC ACID 500 MG
1000 TABLET ORAL DAILY
Status: CANCELLED | OUTPATIENT
Start: 2021-09-09

## 2021-09-09 RX ORDER — ALBUTEROL SULFATE 90 UG/1
2 AEROSOL, METERED RESPIRATORY (INHALATION) EVERY 6 HOURS PRN
Status: CANCELLED | OUTPATIENT
Start: 2021-09-09

## 2021-09-09 RX ORDER — PROMETHAZINE HYDROCHLORIDE 25 MG/ML
12.5 INJECTION, SOLUTION INTRAMUSCULAR; INTRAVENOUS EVERY 6 HOURS PRN
Status: CANCELLED | OUTPATIENT
Start: 2021-09-09

## 2021-09-09 RX ORDER — CEFAZOLIN SODIUM 2 G/100ML
2000 INJECTION, SOLUTION INTRAVENOUS EVERY 8 HOURS
Status: CANCELLED | OUTPATIENT
Start: 2021-09-09 | End: 2021-09-10

## 2021-09-09 RX ORDER — DEXTROSE, SODIUM CHLORIDE, AND POTASSIUM CHLORIDE 5; .45; .15 G/100ML; G/100ML; G/100ML
INJECTION INTRAVENOUS CONTINUOUS
Status: CANCELLED | OUTPATIENT
Start: 2021-09-09

## 2021-09-09 RX ORDER — ONDANSETRON 2 MG/ML
4 INJECTION INTRAMUSCULAR; INTRAVENOUS EVERY 4 HOURS
Status: CANCELLED | OUTPATIENT
Start: 2021-09-09

## 2021-09-09 RX ORDER — EZETIMIBE 10 MG/1
10 TABLET ORAL DAILY
Status: CANCELLED | OUTPATIENT
Start: 2021-09-09

## 2021-09-09 RX ORDER — ATORVASTATIN CALCIUM 80 MG/1
80 TABLET, FILM COATED ORAL DAILY
Status: CANCELLED | OUTPATIENT
Start: 2021-09-09

## 2021-09-09 RX ORDER — HEPARIN SODIUM 5000 [USP'U]/ML
5000 INJECTION, SOLUTION INTRAVENOUS; SUBCUTANEOUS ONCE
Status: COMPLETED | OUTPATIENT
Start: 2021-09-09 | End: 2021-09-09

## 2021-09-09 RX ORDER — AMOXICILLIN 250 MG
2 CAPSULE ORAL 2 TIMES DAILY
Status: CANCELLED | OUTPATIENT
Start: 2021-09-09

## 2021-09-09 RX ORDER — BUDESONIDE AND FORMOTEROL FUMARATE DIHYDRATE 80; 4.5 UG/1; UG/1
2 AEROSOL RESPIRATORY (INHALATION) 2 TIMES DAILY
Refills: 5 | Status: CANCELLED | OUTPATIENT
Start: 2021-09-09

## 2021-09-09 RX ORDER — ONDANSETRON 2 MG/ML
4 INJECTION INTRAMUSCULAR; INTRAVENOUS ONCE
Status: COMPLETED | OUTPATIENT
Start: 2021-09-09 | End: 2021-09-09

## 2021-09-09 RX ORDER — CALCIUM CARBONATE/VITAMIN D3 600 MG-10
1 TABLET ORAL DAILY
Status: CANCELLED | OUTPATIENT
Start: 2021-09-09

## 2021-09-09 RX ORDER — SODIUM CHLORIDE 0.9 % (FLUSH) 0.9 %
10 SYRINGE (ML) INJECTION EVERY 12 HOURS SCHEDULED
Status: CANCELLED | OUTPATIENT
Start: 2021-09-09

## 2021-09-09 RX ORDER — HYDROXYZINE HYDROCHLORIDE 25 MG/1
25 TABLET, FILM COATED ORAL 3 TIMES DAILY PRN
Status: CANCELLED | OUTPATIENT
Start: 2021-09-09

## 2021-09-09 RX ORDER — MAGNESIUM SULFATE 1 G/100ML
1000 INJECTION INTRAVENOUS PRN
Status: CANCELLED | OUTPATIENT
Start: 2021-09-09

## 2021-09-09 RX ORDER — HYDROCHLOROTHIAZIDE 25 MG/1
25 TABLET ORAL EVERY MORNING
Status: CANCELLED | OUTPATIENT
Start: 2021-09-09

## 2021-09-09 RX ORDER — SODIUM CHLORIDE 0.9 % (FLUSH) 0.9 %
10 SYRINGE (ML) INJECTION PRN
Status: CANCELLED | OUTPATIENT
Start: 2021-09-09

## 2021-09-09 RX ORDER — SODIUM CHLORIDE, SODIUM LACTATE, POTASSIUM CHLORIDE, CALCIUM CHLORIDE 600; 310; 30; 20 MG/100ML; MG/100ML; MG/100ML; MG/100ML
INJECTION, SOLUTION INTRAVENOUS CONTINUOUS
Status: DISCONTINUED | OUTPATIENT
Start: 2021-09-09 | End: 2021-09-09 | Stop reason: HOSPADM

## 2021-09-09 RX ORDER — POTASSIUM CHLORIDE 7.45 MG/ML
10 INJECTION INTRAVENOUS PRN
Status: CANCELLED | OUTPATIENT
Start: 2021-09-09

## 2021-09-09 RX ORDER — SODIUM CHLORIDE 9 MG/ML
25 INJECTION, SOLUTION INTRAVENOUS PRN
Status: CANCELLED | OUTPATIENT
Start: 2021-09-09

## 2021-09-09 RX ORDER — OXYCODONE HYDROCHLORIDE AND ACETAMINOPHEN 5; 325 MG/1; MG/1
2 TABLET ORAL EVERY 4 HOURS PRN
Status: CANCELLED | OUTPATIENT
Start: 2021-09-09 | End: 2021-09-16

## 2021-09-09 RX ORDER — ALPRAZOLAM 0.5 MG/1
0.5 TABLET ORAL PRN
Status: CANCELLED | OUTPATIENT
Start: 2021-09-09

## 2021-09-09 RX ORDER — LISINOPRIL 2.5 MG/1
2.5 TABLET ORAL DAILY
Status: CANCELLED | OUTPATIENT
Start: 2021-09-09

## 2021-09-09 RX ORDER — PANTOPRAZOLE SODIUM 40 MG/10ML
40 INJECTION, POWDER, LYOPHILIZED, FOR SOLUTION INTRAVENOUS 2 TIMES DAILY
Status: CANCELLED | OUTPATIENT
Start: 2021-09-09

## 2021-09-09 RX ORDER — HYDROMORPHONE HCL 110MG/55ML
1 PATIENT CONTROLLED ANALGESIA SYRINGE INTRAVENOUS
Status: CANCELLED | OUTPATIENT
Start: 2021-09-09

## 2021-09-09 RX ADMIN — SODIUM CHLORIDE, POTASSIUM CHLORIDE, SODIUM LACTATE AND CALCIUM CHLORIDE: 600; 310; 30; 20 INJECTION, SOLUTION INTRAVENOUS at 08:08

## 2021-09-09 RX ADMIN — ONDANSETRON 4 MG: 2 INJECTION INTRAMUSCULAR; INTRAVENOUS at 09:24

## 2021-09-09 RX ADMIN — HEPARIN SODIUM 5000 UNITS: 5000 INJECTION, SOLUTION INTRAVENOUS; SUBCUTANEOUS at 09:24

## 2021-09-09 RX ADMIN — CHLORHEXIDINE GLUCONATE: 4 LIQUID TOPICAL at 08:07

## 2021-09-09 ASSESSMENT — PAIN SCALES - GENERAL: PAINLEVEL_OUTOF10: 0

## 2021-09-09 ASSESSMENT — PAIN - FUNCTIONAL ASSESSMENT: PAIN_FUNCTIONAL_ASSESSMENT: 0-10

## 2021-09-09 NOTE — TELEPHONE ENCOUNTER
Patient case reviewed with Dr. Felicia Riley  Patient does have a known history of second-degree heart block Mobitz type I since before 2019. Patient had ischemic cardiac work-up in 2019. Patient recently had heads-up tilt table test and Holter monitor due to syncope to rule out progression of heart block. No advancement of arrhythmia noted. Called patient and reviewed she is not having chest pain shortness of breath or additional syncope since starting midodrine. Reviewed with patient clearance being sent to surgeon's office today. Dr. Felicia Riley cleared patient at moderate risk for MACE no additional testing is needed at this time.     Cardiac clearance is sent to Dr. Pruett [facility] office

## 2021-09-09 NOTE — CONSULTS
INPATIENT CARDIOLOGY CONSULT NOTE       Reason for consultation: Mobitz type I AV Block     Referring physician:  Marcelo Maddox MD     Primary care physician: YESSICA Catalan - NP      Dear Marcelo Maddox MD Thank you for the consult       History of present illness:Beverly is a 49 y. o.year old who  presents for elective outpatient abdominal surgery. Patient noted to have second-degree AV block Mobitz type I     Surgical procedure has been canceled. Cardiologist consulted for evaluation    Patient history of coronary disease with last left heart cath in       She denies any active chest pain shortness of breath. Admits to occasional palpitations and dizziness      Past medical history:    has a past medical history of Anxiety, Arthritis, Chronic back pain, COPD (chronic obstructive pulmonary disease) ([address] Depression, Diabetes mellitus ([address] Emphysema, Fibromyalgia, H/O abdominoplasty, H/O cardiovascular stress test, H/O echocardiogram, H/O tilt table evaluation, Hyperlipidemia, Hypertension, Migraines, Mobitz (type) I (Wenckebach's) atrioventricular block, Neuropathy, Piriformis syndrome, Psoriasis, Rotator cuff impingement syndrome, right, Shortness of breath on exertion, Syncope and collapse, Teeth missing, and Wears glasses. Past surgical history:   has a past surgical history that includes Elbow surgery (Bilateral, Last Done In ); Dental surgery; laparoscopy ();  section ( And ); Tubal ligation (); Carpal tunnel release (Right, ); Sleeve Gastrectomy (10/26/2017); hernia repair (10/26/2017); Rotator cuff repair (Right, ); Rotator cuff repair (Right, 2017); pr excise excess skin tissue,abdomen (N/A, 2018); HYSTERECTOMY VAGINAL (); Endoscopy, colon, diagnostic (2017); Appendectomy (); Abdomen surgery (N/A, 2019); Abdomen surgery (N/A, 2019); Abdominoplasty (N/A, 2019); Colonoscopy (2017);  Breast surgery (Right, 1/29/2020); and Colonoscopy (N/A, 7/27/2020). Social History:   reports that she quit smoking about 5 years ago. Her smoking use included cigarettes and e-cigarettes. She started smoking about 35 years ago. She has a 7.50 pack-year smoking history. She has never used smokeless tobacco. She reports current alcohol use. Drug: Marijuana. Family history:   no family history of CAD, STROKE of DM    Allergies   Allergen Reactions    Other      Patient states the electrodes irritated her skin.        ceFAZolin (ANCEF) 1,000 mg in dextrose 5 % 50 mL IVPB (mini-bag), Once  metronidazole (FLAGYL) 500 mg in NaCl 100 mL IVPB premix, Once  lactated ringers infusion, Continuous      Current Facility-Administered Medications   Medication Dose Route Frequency Provider Last Rate Last Admin    ceFAZolin (ANCEF) 1,000 mg in dextrose 5 % 50 mL IVPB (mini-bag)  1,000 mg IntraVENous Once Santos Harrison MD        metronidazole (FLAGYL) 500 mg in NaCl 100 mL IVPB premix  500 mg IntraVENous Once Santos Harrison MD        lactated ringers infusion   IntraVENous Continuous Santos Harrison  mL/hr at 09/09/21 0808 New Bag at 09/09/21 0808         Review of Systems:     · Constitutional: No Fever or Weight Loss   · Eyes: No Decreased Vision  · ENT: No Headaches, Hearing Loss or Vertigo  · Cardiovascular: No chest pain, dyspnea on exertion, palpitations or loss of consciousness  · Respiratory: No cough or wheezing    · Gastrointestinal: No abdominal pain, appetite loss, blood in stools, constipation, diarrhea or heartburn  · Genitourinary: No dysuria, trouble voiding, or hematuria  · Musculoskeletal:  No gait disturbance, weakness or joint complaints  · Integumentary: No rash or pruritis  · Neurological: No TIA or stroke symptoms  · Psychiatric: No anxiety or depression  · Endocrine: No malaise, fatigue or temperature intolerance  · Hematologic/Lymphatic: No bleeding problems, blood clots or swollen lymph nodes  · Allergic/Immunologic: No nasal congestion or hives    All other systems were reviewed and were negative otherwise. Physical Examination:      Vitals:    09/09/21 0746   BP: 122/68   Pulse: 53   Resp: 16   Temp: 96.4 °F (35.8 °C)   SpO2: 96%      Wt Readings from Last 3 Encounters:   09/09/21 154 lb 3.2 oz (69.9 kg)   09/08/21 165 lb (74.8 kg)   08/20/21 167 lb (75.8 kg)     Body mass index is 24.15 kg/m². General Appearance:  No distress, conversant  Constitutional:  Well developed, Well nourished  HEENT:  Normocephalic, Atraumatic, Oropharynx moist, No oral exudates,   Nose normal. Neck Supple Carotid: no carotid bruit  Eyes:  Conjunctiva normal, No discharge. Respiratory:    Normal breath sounds, No respiratory distress, No wheezing, no use of accessory muscles, diaphragm movement is normal  No chest Tenderness  Cardiovascular: S1-S2 No murmurs auscultated. No rubs, thrills or gallops. Normal  rhythm. Pedal pulses are normal. No pedal edema  GI:  Soft Non tender, non distended. :  o CVA tenderness. Musculoskeletal:   No tenderness, No cyanosis, No clubbing. Integument:  Warm, Dry, No erythema, No rash. Lymphatic:  No lymphadenopathy noted. Neurologic:  Alert & oriented x 3  No focal deficits noted. Psychiatric:  Affect normal, Judgment normal, Mood normal.       Lab Review     No results for input(s): WBC, HGB, HCT, PLT in the last 72 hours. No results for input(s): NA, K, CL, CO2, PHOS, BUN, CREATININE in the last 72 hours. Invalid input(s): CA  No results for input(s): AST, ALT, ALB, BILIDIR, BILITOT, ALKPHOS in the last 72 hours. No results for input(s): TROPONINI in the last 72 hours. No results found for: BNP  No results found for: INR, PROTIME      All labs, images, EKGs were personally reviewed      Assessment: 49 y. o.year old with PMH of  has a past medical history of Anxiety, Arthritis, Chronic back pain, COPD (chronic obstructive pulmonary disease) ([facility] [address]

## 2021-09-09 NOTE — PROGRESS NOTES
1115-Patient arrived in PACU, procedure cancelled due to bradycardia. Monitors applied, HR 40-50s, BP stable, and O2 saturations  on room air. 1116-Jose Engel Cardiologist at bedside.   1125-Dr. Mika Trevizo notified that patient will need cardiac clearance for procedure  1149-Phase 1 care complete  1150-Patient transported to same day for discharge  1151-Bedside report given to [facility]
1152 pt denies needs, procedure cancelled.  Pt states she just wants to leave  1204 pt discharged
Anes progress note:    Pt presents to OR with prolonged IN with HR as low as 30's  H/o Mobitz I    D/W Dr Sumner Co pre induction HR abberations  He recommends f/u with Dr Jaycee Sanderson prior to elective surgery    Dr Lisa Subramanian concur
morning of your procedure, do not apply any lotion, oil or powder. 13. Wear a mask covering your nose & mouth when entering the hospital. Have your covid-19 test performed within 2-7 days of your                  surgery. Quarantine yourself after the test until after your surgery. Patient instructed to take her prilosec and inderal morning of surgwey.

## 2021-09-09 NOTE — TELEPHONE ENCOUNTER
SPOKE TO  FOR Beverly AC [address] [facility] (PANNICULECTOMY) SCHEDULED @ [facility].  NOTIFIED OF DATES, TIMES AND LOCATION    PHONE ASSESSMENT /PAT -  COVID - 9/17/21 320  SURGERY - 9/23/21 11:30  P/O - 10/1/21 245    NPO AFTER MIDNIGHT

## 2021-09-09 NOTE — TELEPHONE ENCOUNTER
Patient daughter call stating that her mother was to have surgery today and it was cancel do to the her heart rate. Patient is still currently in the hospital.    Patient daughter said the doctor there said that the tilt table test gave her a new problem. But the test is not in the chart. Patient's daughter wants us to call Dr. Jack Orlando to tell them the update and tell them it is okay for her to have surgery. Because she does not want the surgery to be postpone any longer. I told her We will see what we can do.

## 2021-09-10 NOTE — DISCHARGE SUMMARY
Physician Discharge Summary     Patient ID:  [facility]res [facility]  [id number]  49 y.o.  1965    Admit date: 9/9/2021    Discharge date: 9/10/2021     Admitting Physician: Bryan Eduardo MD     Admission Diagnoses: Pannus, abdominal [E65]  Abdominal pannus [E65]  Patient Active Problem List   Diagnosis    DM [facility]diabetes mellitus) [facility]    Essential hypertension    Chronic pain syndrome    COPD, severe ([facility]    Gastroesophageal reflux disease with esophagitis    History of MRSA infection    Anxiety    Chronic right-sided low back pain with sciatica    Hiatal hernia    Status post laparoscopic sleeve gastrectomy    Abdominal pannus    S/P abdominoplasty    Fibromyalgia    Sebaceous cyst of breast, right    Mobitz type 1 second degree AV block    Polyp of cecum    Adenomatous polyp of ascending colon    Chronic edema BLE    Mixed hyperlipidemia    Neuropathy    Umbilical pain    Lower abdominal pain    S/P panniculectomy    Latent tuberculosis    Gastroesophageal reflux disease    Vitamin D deficiency    Psoriasis    Pannus, abdominal    Herpes genitalis in women    Admission for therapeutic drug monitoring    URIEL (obstructive sleep apnea)    Hypersomnia    Ex-smoker     Past Medical History:   Diagnosis Date    Anxiety     Arthritis     \"Back, Hands, Shoulders\"    Chronic back pain     Arthritis - NKI    [facility]     Sees Dr. Cassandra Torres with Mental Health    Diabetes mellitus ([facility] Dx 2008    Type II - follows with PCP    Emphysema     Fibromyalgia     H/O abdominoplasty 12/21/2018    H/O cardiovascular stress test 03/04/2019    ECG portion of stress test is neg for ischemia by diagnostic criteria. No infarct or ischemia noted. Normal stress myocardial perfusion.  This is a normal study    H/O echocardiogram 03/04/2019    Left ventricular function is normal. Ejection fraction is visually estimated at 55-60%. No significant valvular abnormalitites.  H/O tilt table evaluation 08/26/2021    Orthostatic syncope.  Hyperlipidemia     Hypertension     Follows with PCP    Migraines Last Migraine 7/20/21    Mobitz (type) I (Wenckebach's) atrioventricular block 01/29/2020    Neuropathy     From feet to knees    Piriformis syndrome 06/24/2016    Psoriasis     Rotator cuff impingement syndrome, right 02/26/2018    RTC repair Dr Deysi Lopez 2015  Treated by Dr Huey Cooper 2/18/17  Arthrocentesis 12/19/17    Shortness of breath on exertion     Syncope and collapse 03/04/2019    Teeth missing     Upper And Lower    Wears glasses        Discharge Diagnoses: same + Bradycardia, needs eval by EP. Admission Condition: fair    Discharged Condition: fair    Indication for Admission: Abdominal pannus - BUT the surgery was cancelled due to bradycardia needs EP workup. Hospital Course: Patient had an uneventful hospital course after the above mentioned treatment, and was tolerating diet and ambulating without difficulty at the time of discharge. Consults: cardiology    Treatments:  IV hydration, antibiotics, analgesia, cardiac meds, anticoagulation, respiratory therapy / incentive spirometry,  and surgery: As above and please refer to daily progress notes for details.     Discharge Exam:  BP (!) 147/76   Pulse (!) 47   Temp 97.2 °F (36.2 °C) (Temporal)   Resp 16   Ht 5' 7\" (1.702 m)   Wt 154 lb 3.2 oz (69.9 kg)   SpO2 96%   BMI 24.15 kg/m²     General Appearance:    Alert, cooperative, no distress, appears stated age   Head:    Normocephalic, without obvious abnormality, atraumatic   Eyes:    PERRL, conjunctiva/corneas clear, EOM's intact, fundi     benign, both eyes   Ears:    Normal TM's and external ear canals, both ears   Nose:   Nares normal, septum midline, mucosa normal, no drainage    or sinus tenderness   Throat:   Lips, mucosa, and tongue normal; teeth and gums normal Neck:   Supple, symmetrical, trachea midline, no adenopathy;     thyroid:  no enlargement/tenderness/nodules; no carotid    bruit or JVD   Back:     Symmetric, no curvature, ROM normal, no CVA tenderness   Lungs:     Clear to auscultation bilaterally, respirations unlabored   Chest Wall:    No tenderness or deformity    Heart:    Regular rate and rhythm, S1 and S2 normal, no murmur, rub   or gallop   Breast Exam:    No tenderness, masses, or nipple abnormality   Abdomen:     Soft, non-tender, bowel sounds active all four quadrants,     no masses, no organomegaly   Genitalia:    Normal female without lesion, discharge or tenderness   Rectal:    Normal tone ;guaiac negative stool   Extremities:   Extremities normal, atraumatic, no cyanosis or edema   Pulses:   2+ and symmetric all extremities   Skin:   Skin color, texture, turgor normal, no rashes or lesions   Lymph nodes:   Cervical, supraclavicular, and axillary nodes normal   Neurologic:   CNII-XII intact, normal strength, sensation and reflexes     throughout       Discharge vitals:    Wt Readings from Last 3 Encounters:   09/09/21 154 lb 3.2 oz (69.9 kg)   09/08/21 165 lb (74.8 kg)   08/20/21 167 lb (75.8 kg)   ,  Temp Readings from Last 3 Encounters:   09/09/21 97.2 °F (36.2 °C) (Temporal)   08/12/21 96.8 °F (36 °C) (Temporal)   08/10/21 97.3 °F (36.3 °C)   ,  BP Readings from Last 3 Encounters:   09/09/21 (!) 147/76   08/26/21 (!) 140/78   08/20/21 104/62   ,  Pulse Readings from Last 3 Encounters:   09/09/21 (!) 47   09/08/21 74   08/26/21 63        Disposition: home    Patient Instructions:   Discharge Medication List as of 9/9/2021 12:06 PM      CONTINUE these medications which have NOT CHANGED    Details   !! albuterol sulfate HFA (PROAIR HFA) 108 (90 Base) MCG/ACT inhaler Inhale 2 puffs into the lungs every 6 hours as needed for Wheezing, Disp-18 g, R-5Normal      !! tiotropium (SPIRIVA RESPIMAT) 2.5 MCG/ACT AERS inhaler Inhale 2 puffs into the lungs daily, Disp-1 each, R-5Normal      !! fluticasone-vilanterol (BREO ELLIPTA) 100-25 MCG/INH AEPB inhaler Inhale 1 puff into the lungs daily, Disp-1 each, R-5Normal      midodrine (PROAMATINE) 2.5 MG tablet Take 1 tablet by mouth 3 times daily as needed (SBP < 100), Disp-90 tablet, R-3Normal      Compression Stockings MISC Starting Fri 8/20/2021, Disp-2 each, R-1, Print20 - 30 mmh wear daily and take off at night Thigh High      nortriptyline (PAMELOR) 10 MG capsule Take 10 mg by mouth nightlyHistorical Med      propranolol (INDERAL LA) 60 MG extended release capsule Take 60 mg by mouth dailyHistorical Med      rizatriptan (MAXALT) 10 MG tablet Take 10 mg by mouth once as needed for Migraine May repeat in 2 hours if needed Historical Med      lisinopril (PRINIVIL;ZESTRIL) 2.5 MG tablet Take 1 tablet by mouth daily, Disp-30 tablet, R-1Normal      hydroCHLOROthiazide (HYDRODIURIL) 25 MG tablet Take 1 tablet by mouth every morning, Disp-30 tablet, R-5Normal      valACYclovir (VALTREX) 500 MG tablet Take 1 tablet by mouth daily, Disp-30 tablet, R-5Normal      omeprazole (PRILOSEC) 40 MG delayed release capsule Take 1 capsule by mouth daily, Disp-90 capsule, R-2Normal      atorvastatin (LIPITOR) 80 MG tablet Take 1 tablet by mouth daily, Disp-90 tablet, R-2Normal      Blood Glucose Monitoring Suppl (TRUE METRIX AIR GLUCOSE METER) w/Device KIT 1 Device by Does not apply route continuous, Disp-1 kit, R-0Normal      TRUEplus Lancets 28G MISC DAILY Starting Thu 4/29/2021, Disp-100 each, R-3, Normal      blood glucose test strips (TRUE METRIX PRO BLOOD GLUCOSE) strip 1 each by In Vitro route daily As needed. , Disp-100 each, R-3Normal      ezetimibe (ZETIA) 10 MG tablet Take 1 tablet by mouth daily, Disp-90 tablet, R-1Normal      traZODone (DESYREL) 100 MG tablet Historical Med      alogliptin (NESINA) 6.25 MG TABS tablet Take 1 tablet by mouth daily, Disp-90 tablet, R-1Normal      ascorbic acid (V-R VITAMIN C) 250 MG tablet Take 4 tablets by mouth daily, Disp-30 tablet, R-3Normal      zinc sulfate (ORAZINC) 220 (50 Zn) MG capsule Take 4 capsules by mouth daily, Disp-30 capsule, R-3Normal      Nutritional Supplements (ENSURE HIGH PROTEIN) LIQD Take 1 Can by mouth Daily with lunch, Disp-32 Can, R-5Normal      Multiple Vitamins-Minerals (THERAPEUTIC MULTIVITAMIN-MINERALS) tablet Take 1 tablet by mouth daily, Disp-90 tablet, R-3Normal      Calcium Carb-Cholecalciferol (CALCIUM-VITAMIN D) 600-400 MG-UNIT TABS Take 1 tablet by mouth daily, Disp-90 tablet, R-3Normal      hydrOXYzine (ATARAX) 25 MG tablet TAKE 1 TABLET EVERY 8 HOURS AS NEEDED FOR ANXIETY AVOID ALCOHOL/CAUSES DROWSINESS. Do not take with Xanax, Disp-60 tablet,R-1Normal      !! albuterol sulfate HFA (PROAIR HFA) 108 (90 Base) MCG/ACT inhaler Inhale 2 puffs into the lungs every 6 hours as needed for Wheezing, Disp-1 Inhaler,R-5Normal      !! tiotropium (SPIRIVA RESPIMAT) 2.5 MCG/ACT AERS inhaler Inhale 2 puffs into the lungs daily, Disp-1 Inhaler,R-5Normal      ammonium lactate (LAC-HYDRIN) 12 % lotion Historical Med      calcipotriene (DOVONEX) 0.005 % ointment Historical Med      clobetasol (TEMOVATE) 0.05 % ointment Historical Med      tiZANidine (ZANAFLEX) 4 MG tablet Take 4 mg by mouth every 8 hours as needed Historical Med      oxyCODONE-acetaminophen (PERCOCET) 7.5-325 MG per tablet Take 1 tablet by mouth every 8 hours as needed for Pain (Patient states takes every 6 hours).  Historical Med      Psyllium (METAMUCIL) 48.57 % POWD Take 1 Units by mouth daily, Disp-1 Bottle, R-5Normal      potassium chloride (KLOR-CON M) 20 MEQ extended release tablet Take 1 tablet by mouth 2 times daily, Disp-180 tablet, R-1Normal      venlafaxine (EFFEXOR) 37.5 MG tablet Take 37.5 mg by mouth 2 times dailyHistorical Med      !! fluticasone-vilanterol (BREO ELLIPTA) 100-25 MCG/INH AEPB inhaler Inhale 1 puff into the lungs daily, Disp-1 each, R-5Normal      ALPRAZolam (XANAX) 0.5 MG tablet Take 0.5 mg by mouth as needed for Sleep. Heddy  Historical Med       !! - Potential duplicate medications found. Please discuss with provider.         Activity: activity as tolerated  Diet: regular diet  Wound Care: none needed    Call  [phone]  to make a follow-up appointment  with Dr Davis Bravo MD, Roizna Bartlett in 1 week.    ____________________________________________    Signed:      Davis Bravo MD, [facility], [facility]    9/10/2021  4:56 PM

## 2021-09-15 DIAGNOSIS — Z01.818 PREOP TESTING: Primary | ICD-10-CM

## 2021-09-17 ENCOUNTER — HOSPITAL ENCOUNTER (OUTPATIENT)
Age: 56
Setting detail: SPECIMEN
Discharge: HOME | End: 2021-09-17
Payer: COMMERCIAL

## 2021-09-17 ENCOUNTER — HOSPITAL ENCOUNTER (OUTPATIENT)
Dept: SLEEP CENTER | Age: 56
Discharge: HOME | End: 2021-09-17
Payer: COMMERCIAL

## 2021-09-17 ENCOUNTER — NURSE ONLY (OUTPATIENT)
Dept: SURGERY | Age: 56
End: 2021-09-17
Payer: COMMERCIAL

## 2021-09-17 VITALS
HEART RATE: 66 BPM | DIASTOLIC BLOOD PRESSURE: 78 MMHG | OXYGEN SATURATION: 95 % | SYSTOLIC BLOOD PRESSURE: 114 MMHG | TEMPERATURE: 97.3 F

## 2021-09-17 DIAGNOSIS — Z01.818 PRE-OP TESTING: Primary | ICD-10-CM

## 2021-09-17 DIAGNOSIS — G47.33 OSA (OBSTRUCTIVE SLEEP APNEA): ICD-10-CM

## 2021-09-17 PROCEDURE — U0005 INFEC AGEN DETEC AMPLI PROBE: HCPCS

## 2021-09-17 PROCEDURE — 99211 OFF/OP EST MAY X REQ PHY/QHP: CPT | Performed by: SURGERY

## 2021-09-17 PROCEDURE — 95810 POLYSOM 6/> YRS 4/> PARAM: CPT

## 2021-09-17 PROCEDURE — U0003 INFECTIOUS AGENT DETECTION BY NUCLEIC ACID (DNA OR RNA); SEVERE ACUTE RESPIRATORY SYNDROME CORONAVIRUS 2 (SARS-COV-2) (CORONAVIRUS DISEASE [COVID-19]), AMPLIFIED PROBE TECHNIQUE, MAKING USE OF HIGH THROUGHPUT TECHNOLOGIES AS DESCRIBED BY CMS-2020-01-R: HCPCS

## 2021-09-17 RX ORDER — LISINOPRIL 2.5 MG/1
2.5 TABLET ORAL DAILY
Qty: 30 TABLET | Refills: 1 | OUTPATIENT
Start: 2021-09-17

## 2021-09-17 NOTE — PROGRESS NOTES
.Surgery 9/23/21, you will be called 9/22/21 with times               1. Do not eat or drink anything after midnight - unless instructed by your doctor prior to surgery. This includes                   no water, chewing gum or mints. 2. Follow your directions as prescribed by the doctor for your procedure and medications. Take Midodrine and Omeprazole in am with a sip. 3. Check with your Doctor regarding stopping Plavix, Coumadin, Lovenox,Effient,Pradaxa,Xarelto, Fragmin or other blood thinners and                   follow their instructions. 4. Do not smoke, and do not drink any alcoholic beverages 24 hours prior to surgery. This includes NA Beer. 5. You may brush your teeth and gargle the morning of surgery. DO NOT SWALLOW WATER   6. You MUST make arrangements for a responsible adult to take you home after your surgery and be able to check on you every couple                   hours for the day. You will not be allowed to leave alone or drive yourself home. It is strongly suggested someone stay with you the first 24                   hrs. Your surgery will be cancelled if you do not have a ride home. 7. Please wear simple, loose fitting clothing to the hospital.  Sandra Salmon not bring valuables (money, credit cards, checkbooks, etc.) Do not wear any                   makeup (including no eye makeup) or nail polish on your fingers or toes. 8. DO NOT wear any jewelry or piercings on day of surgery. All body piercing jewelry must be removed. 9. If you have dentures, they will be removed before going to the OR; we will provide you a container. If you wear contact lenses or glasses,                  they will be removed; please bring a case for them. 10. If you  have a Living Will and Durable Power of  for Healthcare, please bring in a copy.            11. Please bring picture ID,  insurance card, paperwork from the doctors office    (H & P, Consent, & card for implantable devices). 12. Take a shower the night before or morning of your procedure, do not apply any lotion, oil or powder. 13. Wear a mask covering your nose & mouth when entering the hospital. Have your covid-19 test performed within 2-7 days of your                  Surgery-tested 9/17/21-results pending. Quarantine yourself after the test until after your surgery.

## 2021-09-17 NOTE — PATIENT INSTRUCTIONS
Pre-Procedure COVID-19 Self Testing  Quarantine Instructions  Day of Surgery Instructions         What to do before my surgery:    All patients scheduled for elective surgery must test for COVID19 72-96 hours prior to the surgery date.  Pre-Procedure COVID-19 Self-Test will be scheduled for you by your provider.  You can receive your Pre-Procedure COVID-19 Self-Test at:  [facility]. [address], [facility], [facility], [address]   If you do not have the COVID-19 test we will cancel or reschedule your procedure   Once you test you must quarantine at home until after your procedure with only your immediate family members or whoever lives with you.  If you must work during your quarantine period, we ask that you continue to practice social distancing, wear a mask that covers your mouth and nose and perform all hand hygiene as recommended by the CDC.  If you must go to the grocery, etc. and cannot get someone to do this for you please wear a mask that covers your mouth and nose and perform all hand hygiene as recommended by the CDC.  Your surgeon's office will notify you with any concerns about your test result. What can I expect on the day of surgery?  Arrive at the time the office or hospital staff tell you on the day of your procedure.  Wear a mask when entering the hospital.     A member of the hospital staff will take your temperature and ask you a few questions as you enter the building.  In abundance of caution for the safety of all our patients and staff, please follow all hospital visitor guidelines in place at the time of your procedure. The staff caring for you will stay in close communication with your loved one and keep them updated on progress.  Please provide a phone number for us to use when communicating with your family or ride home.    When you are ready to discharge, we will notify your family/person with you to bring the car to the front entrance. We will take you to them after you receive all of your discharge instructions.

## 2021-09-18 LAB
SARS-COV-2: NOT DETECTED
SOURCE: NORMAL

## 2021-09-18 NOTE — PROGRESS NOTES
9/18/2021  sleep study  for Uche Hawkins  1965 is complete. Results are pending physician review.     Electronically signed by Demario Sanders on 9/18/2021 at 6:59 AM

## 2021-09-22 ENCOUNTER — ANESTHESIA EVENT (OUTPATIENT)
Dept: OPERATING ROOM | Age: 56
End: 2021-09-22
Payer: COMMERCIAL

## 2021-09-22 LAB — STATUS: NORMAL

## 2021-09-22 PROCEDURE — 95810 POLYSOM 6/> YRS 4/> PARAM: CPT | Performed by: INTERNAL MEDICINE

## 2021-09-22 ASSESSMENT — ENCOUNTER SYMPTOMS: SHORTNESS OF BREATH: 1

## 2021-09-22 ASSESSMENT — COPD QUESTIONNAIRES: CAT_SEVERITY: SEVERE

## 2021-09-22 NOTE — ANESTHESIA PRE PROCEDURE
Department of Anesthesiology  Preprocedure Note       Name:  Young Oliveira   Age:  49 y.o.  :  1965                                          MRN:  [id number]         Date:  2021      Surgeon: Hoda Gomez):  Rosa Maria Wright MD    Procedure: Procedure(s):  PANNICULECTOMY    Medications prior to admission:   Prior to Admission medications    Medication Sig Start Date End Date Taking?  Authorizing Provider   albuterol sulfate HFA (PROAIR HFA) 108 (90 Base) MCG/ACT inhaler Inhale 2 puffs into the lungs every 6 hours as needed for Wheezing 21   Ismael Hernandez MD   tiotropium (SPIRIVA RESPIMAT) 2.5 MCG/ACT AERS inhaler Inhale 2 puffs into the lungs daily 21   Ismael Hernandez MD   fluticasone-vilanterol (BREO ELLIPTA) 100-25 MCG/INH AEPB inhaler Inhale 1 puff into the lungs daily 9/8/21 10/8/21  Ismael Hernandez MD   midodrine (PROAMATINE) 2.5 MG tablet Take 1 tablet by mouth 3 times daily as needed (SBP < 100)  Patient taking differently: Take 2.5 mg by mouth 2 times daily  21   YESSICA Cr CNP   Compression Stockings MISC by Does not apply route 20 - 30 mmh wear daily and take off at night  Thigh High 21   YESSICA Cr CNP   rizatriptan (MAXALT) 10 MG tablet Take 10 mg by mouth once as needed for Migraine May repeat in 2 hours if needed     Historical Provider, MD   hydroCHLOROthiazide (HYDRODIURIL) 25 MG tablet Take 1 tablet by mouth every morning 7/15/21   YESSICA Cr CNP   valACYclovir (VALTREX) 500 MG tablet Take 1 tablet by mouth daily 21  Aster Josue MD   omeprazole (PRILOSEC) 40 MG delayed release capsule Take 1 capsule by mouth daily 21   YESSICA Bravo NP   atorvastatin (LIPITOR) 80 MG tablet Take 1 tablet by mouth daily 21   YESSICA Bravo NP   Blood Glucose Monitoring Suppl (TRUE METRIX AIR GLUCOSE METER) w/Device KIT 1 Device by Does not apply route continuous 21   Polly Aguirre, APRN - NP TRUEplus Lancets 28G MISC 1 each by Does not apply route daily 4/29/21   YESSICA Moe NP   blood glucose test strips (TRUE METRIX PRO BLOOD GLUCOSE) strip 1 each by In Vitro route daily As needed. 4/29/21   YESSICA Moe NP   traZODone (DESYREL) 100 MG tablet Take 100 mg by mouth nightly as needed  3/1/21   Historical Provider, MD   alogliptin (NESINA) 6.25 MG TABS tablet Take 1 tablet by mouth daily 3/8/21 9/9/21  YESSICA Moe NP   ascorbic acid (V-R VITAMIN C) 250 MG tablet Take 4 tablets by mouth daily 3/5/21   Haydee Zaragoza MD   zinc sulfate (ORAZINC) 220 (50 Zn) MG capsule Take 4 capsules by mouth daily 3/5/21 3/5/22  Haydee Zaragoza MD   Nutritional Supplements (ENSURE HIGH PROTEIN) LIQD Take 1 Can by mouth Daily with lunch 3/5/21   Haydee Zaragoza MD   Multiple Vitamins-Minerals (THERAPEUTIC MULTIVITAMIN-MINERALS) tablet Take 1 tablet by mouth daily 3/4/21 3/4/22  Anna PrimesYESSICA - CNP   Calcium Carb-Cholecalciferol (CALCIUM-VITAMIN D) 600-400 MG-UNIT TABS Take 1 tablet by mouth daily 3/4/21   Anna PrimesYESSICA - CNP   hydrOXYzine (ATARAX) 25 MG tablet TAKE 1 TABLET EVERY 8 HOURS AS NEEDED FOR ANXIETY AVOID ALCOHOL/CAUSES DROWSINESS.   Do not take with Xanax 10/29/20   YESSICA Moe NP   albuterol sulfate HFA (PROAIR HFA) 108 (90 Base) MCG/ACT inhaler Inhale 2 puffs into the lungs every 6 hours as needed for Wheezing 9/2/20   Fermín Mcdermott MD   tiotropium (SPIRIVA RESPIMAT) 2.5 MCG/ACT AERS inhaler Inhale 2 puffs into the lungs daily 9/2/20   Fermín Mcdermott MD   ammonium lactate (LAC-HYDRIN) 12 % lotion  6/24/20   Historical Provider, MD   calcipotriene (DOVONEX) 0.005 % ointment  5/4/20   Historical Provider, MD   clobetasol (TEMOVATE) 0.05 % ointment  5/4/20   Historical Provider, MD   tiZANidine (ZANAFLEX) 4 MG tablet Take 4 mg by mouth every 8 hours as needed  1/17/20   Historical Provider, MD   oxyCODONE-acetaminophen (PERCOCET) 7.5-325 MG per tablet Take 1 tablet by mouth every 8 hours as needed for Pain (Patient states takes every 6 hours). 1/17/20   Historical Provider, MD   Psyllium (METAMUCIL) 48.57 % POWD Take 1 Units by mouth daily 1/24/20   Delvis Vann MD   potassium chloride (KLOR-CON M) 20 MEQ extended release tablet Take 1 tablet by mouth 2 times daily 8/2/19   Delvis Vann MD   venlafaxine (EFFEXOR) 37.5 MG tablet Take 37.5 mg by mouth 2 times daily    Historical Provider, MD   fluticasone-vilanterol (BREO ELLIPTA) 100-25 MCG/INH AEPB inhaler Inhale 1 puff into the lungs daily 3/5/18   Ezequiel oMseley MD   ALPRAZolam Bri Gaytan) 0.5 MG tablet Take 0.5 mg by mouth as needed for Sleep. Madonna Morales     Historical Provider, MD       Current medications:    Current Outpatient Medications   Medication Sig Dispense Refill    albuterol sulfate HFA (PROAIR HFA) 108 (90 Base) MCG/ACT inhaler Inhale 2 puffs into the lungs every 6 hours as needed for Wheezing 18 g 5    tiotropium (SPIRIVA RESPIMAT) 2.5 MCG/ACT AERS inhaler Inhale 2 puffs into the lungs daily 1 each 5    fluticasone-vilanterol (BREO ELLIPTA) 100-25 MCG/INH AEPB inhaler Inhale 1 puff into the lungs daily 1 each 5    midodrine (PROAMATINE) 2.5 MG tablet Take 1 tablet by mouth 3 times daily as needed (SBP < 100) (Patient taking differently: Take 2.5 mg by mouth 2 times daily ) 90 tablet 3    Compression Stockings MISC by Does not apply route 20 - 30 mmh wear daily and take off at night  Thigh High 2 each 1    rizatriptan (MAXALT) 10 MG tablet Take 10 mg by mouth once as needed for Migraine May repeat in 2 hours if needed       hydroCHLOROthiazide (HYDRODIURIL) 25 MG tablet Take 1 tablet by mouth every morning 30 tablet 5    valACYclovir (VALTREX) 500 MG tablet Take 1 tablet by mouth daily 30 tablet 5    omeprazole (PRILOSEC) 40 MG delayed release capsule Take 1 capsule by mouth daily 90 capsule 2    atorvastatin (LIPITOR) 80 MG tablet Take 1 tablet by mouth daily 90 tablet 2    Blood Glucose Monitoring Suppl (TRUE METRIX AIR GLUCOSE METER) w/Device KIT 1 Device by Does not apply route continuous 1 kit 0    TRUEplus Lancets 28G MISC 1 each by Does not apply route daily 100 each 3    blood glucose test strips (TRUE METRIX PRO BLOOD GLUCOSE) strip 1 each by In Vitro route daily As needed. 100 each 3    traZODone (DESYREL) 100 MG tablet Take 100 mg by mouth nightly as needed       alogliptin (NESINA) 6.25 MG TABS tablet Take 1 tablet by mouth daily 90 tablet 1    ascorbic acid (V-R VITAMIN C) 250 MG tablet Take 4 tablets by mouth daily 30 tablet 3    zinc sulfate (ORAZINC) 220 (50 Zn) MG capsule Take 4 capsules by mouth daily 30 capsule 3    Nutritional Supplements (ENSURE HIGH PROTEIN) LIQD Take 1 Can by mouth Daily with lunch 32 Can 5    Multiple Vitamins-Minerals (THERAPEUTIC MULTIVITAMIN-MINERALS) tablet Take 1 tablet by mouth daily 90 tablet 3    Calcium Carb-Cholecalciferol (CALCIUM-VITAMIN D) 600-400 MG-UNIT TABS Take 1 tablet by mouth daily 90 tablet 3    hydrOXYzine (ATARAX) 25 MG tablet TAKE 1 TABLET EVERY 8 HOURS AS NEEDED FOR ANXIETY AVOID ALCOHOL/CAUSES DROWSINESS. Do not take with Xanax 60 tablet 1    albuterol sulfate HFA (PROAIR HFA) 108 (90 Base) MCG/ACT inhaler Inhale 2 puffs into the lungs every 6 hours as needed for Wheezing 1 Inhaler 5    tiotropium (SPIRIVA RESPIMAT) 2.5 MCG/ACT AERS inhaler Inhale 2 puffs into the lungs daily 1 Inhaler 5    ammonium lactate (LAC-HYDRIN) 12 % lotion       calcipotriene (DOVONEX) 0.005 % ointment       clobetasol (TEMOVATE) 0.05 % ointment       tiZANidine (ZANAFLEX) 4 MG tablet Take 4 mg by mouth every 8 hours as needed       oxyCODONE-acetaminophen (PERCOCET) 7.5-325 MG per tablet Take 1 tablet by mouth every 8 hours as needed for Pain (Patient states takes every 6 hours).        Psyllium (METAMUCIL) 48.57 % POWD Take 1 Units by mouth daily 1 Bottle 5    potassium chloride (KLOR-CON M) 20 MEQ extended release tablet Follows with Mental Health    [facility] 2008    Type II - follows with PCP    Emphysema     Fibromyalgia     H/O abdominoplasty 12/21/2018    H/O cardiovascular stress test 03/04/2019    ECG portion of stress test is neg for ischemia by diagnostic criteria. No infarct or ischemia noted. Normal stress myocardial perfusion. This is a normal study    H/O echocardiogram 03/04/2019    Left ventricular function is normal. Ejection fraction is visually estimated at 55-60%. No significant valvular abnormalitites.  H/O tilt table evaluation 08/26/2021    Orthostatic syncope.     Hyperlipidemia     Hypertension     Follows with PCP    Migraines Last Migraine 7/20/21    Mobitz (type) I (Wenckebach's) atrioventricular block 01/29/2020    Neuropathy     From feet to knees    Piriformis syndrome 06/24/2016    Psoriasis     Rotator cuff impingement syndrome, right 02/26/2018    RTC repair Dr Amina Rodriguez 2015  Treated by Dr Femi Maurice 2/18/17  Arthrocentesis 12/19/17    Shortness of breath on exertion     Syncope and collapse 03/04/2019    Teeth missing     Upper And Lower    Wears glasses        Past Surgical History:        Procedure Laterality Date    ABDOMEN SURGERY N/A 1/14/2019    SECONDARY CLOSURE OF DEHISCED ABDOMINAL WOUND performed by Bryan Eduardo MD at [facility] 71 N/A 1/24/2019    IRRIGATION OF LOWER ABDOMINAL WOUND performed by Bryan Eduardo MD at [facility] 73 ABDOMINOPLASTY N/A 7/29/2019    ABDOMINOPLASTY REVISION performed by Bryan Eduardo MD at [facility] 75 Right 1/29/2020    EXCISION OF RIGHT BREAST SABACEOUS CYST performed by Jose Alberto Moreira MD at [facility] 19 Right 2017    Dr. Femi Maurice (Right)   [address]    Tubal Ligation Also Done In 1997    COLONOSCOPY  03/2017    Polyp Removed - Dr. Gaby Santos COLONOSCOPY N/A 7/27/2020    COLONOSCOPY POLYPECTOMY SNARE/COLD BIOPSY performed by Bryan Eduardo MD at [address] [facility]""      Teeth Extracted In Past    ELBOW SURGERY Bilateral Last Done In 2013    Right Done Twice, Left Done Once left cubital tunnel release ; right 2014    ENDOSCOPY, COLON, DIAGNOSTIC  2017    HERNIA REPAIR  10/26/2017    hiatal hernia with gastrectomy    HYSTERECTOMY VAGINAL  2000    LAPAROSCOPY      FL EXCISE EXCESS SKIN TISSUE,ABDOMEN N/A 2018    ABDOMINOPLASTY performed by Bryan Eduardo MD at [address] Right     Dr. Jessie Tapia Right 2017    Dr Lamonte Patiño  10/26/2017    Robotic Laparoscopic, Pre Op Weight 237  Or 238 lbs.  TUBAL LIGATION      Done With        Social History:    Social History     Tobacco Use    Smoking status: Former Smoker     Packs/day: 0.25     Years: 30.00     Pack years: 7.50     Types: Cigarettes, E-Cigarettes     Start date:      Quit date:      Years since quittin.7    Smokeless tobacco: Never Used   Substance Use Topics    Alcohol use: Yes     Comment: \"Occ. Maybe Twice A Month\"                                Counseling given: Not Answered      Vital Signs (Current): There were no vitals filed for this visit.                                            BP Readings from Last 3 Encounters:   21 114/78   21 (!) 147/76   21 (!) 140/78       NPO Status:                                                                                 BMI:   Wt Readings from Last 3 Encounters:   21 154 lb 3.2 oz (69.9 kg)   21 165 lb (74.8 kg)   21 167 lb (75.8 kg)     There is no height or weight on file to calculate BMI.    CBC:   Lab Results   Component Value Date    WBC 7.7 2021    RBC 6.13 2021    HGB 15.2 2021    HCT 46.1 2021    MCV 75.2 2021    RDW 14.0 2021     2021       CMP:   Lab Results   Component Value Date     08/24/2021    K 4.3 08/24/2021     08/24/2021    CO2 28 08/24/2021    BUN 15 08/24/2021    CREATININE 0.7 08/24/2021    GFRAA >60 08/24/2021    AGRATIO 1.3 10/21/2020    LABGLOM >60 08/24/2021    GLUCOSE 117 08/24/2021    PROT 7.3 05/26/2021    PROT 7.9 03/02/2013    CALCIUM 10.2 08/24/2021    BILITOT 0.8 05/26/2021    ALKPHOS 87 05/26/2021    AST 20 05/26/2021    ALT 15 05/26/2021       POC Tests: No results for input(s): POCGLU, POCNA, POCK, POCCL, POCBUN, POCHEMO, POCHCT in the last 72 hours. Coags: No results found for: PROTIME, INR, APTT    HCG (If Applicable):   Lab Results   Component Value Date    PREGTESTUR NEGATIVE 02/12/2020        ABGs: No results found for: PHART, PO2ART, UBV7WYG, TLK2TSJ, BEART, G3BINDMY     Type & Screen (If Applicable):  No results found for: LABABO, LABRH    Drug/Infectious Status (If Applicable):  Lab Results   Component Value Date    HEPCAB NON REACTIVE 04/08/2021       COVID-19 Screening (If Applicable):   Lab Results   Component Value Date    COVID19 NOT DETECTED 09/17/2021           Anesthesia Evaluation  Patient summary reviewed  Airway: Mallampati: II  TM distance: >3 FB   Neck ROM: full  Mouth opening: > = 3 FB Dental:          Pulmonary:normal exam  breath sounds clear to auscultation  (+) COPD: severe,  shortness of breath:  sleep apnea:                             Cardiovascular:  Exercise tolerance: poor (<4 METS),   (+) hypertension:, dysrhythmias (Mobitz (type) I (Wenckebach's) atrioventricular block):, TOMAS:, hyperlipidemia        Rhythm: regular        Cleared by cardiology     Beta Blocker:  Not on Beta Blocker      ROS comment: echocardiogram Left ventricular function is normal. Ejection fraction is visually estimated at 55-60%. No significant valvular abnormalitites.       Gabriela Dong was evaluated from a cardiac standpoint for her surgery or procedure and based on her history, diagnosis, recent cardiac testing, she is considered a medium risk candidate for any becca-operative cardiac complications.     Patients with known CAD with moderate or high risk should have surgical procedures done where they have access to invasive cardiology services if emergently needed.     Antiplatelet/anticoagulant therapy can be held prior to the surgery or procedure at the discretion of the surgeon to be resumed as soon as possible if held.     Please call with any further questions.     Respectfully,     Migel Hernandez MD, [address]  STR/bl  This cardiac clearance is good for 6 months from 9/9/2021 unless new cardiac symptoms   Patient case reviewed with Dr. Canelo Rose  Patient does have a known history of second-degree heart block Mobitz type I since before 2019. Patient had ischemic cardiac work-up in 2019. Patient recently had heads-up tilt table test and Holter monitor due to syncope to rule out progression of heart block. No advancement of arrhythmia noted.     Called patient and reviewed she is not having chest pain shortness of breath or additional syncope since starting midodrine. Reviewed with patient clearance being sent to surgeon's office today.     Dr. Canelo Rose cleared patient at moderate risk for MACE no additional testing is needed at this time.     Cardiac clearance is sent to Dr. Reynoso Ours office              Telephone on 9/9/2021    Telephone on 9/9/2021      Detailed Report    Note shared with patient         Neuro/Psych:   (+) headaches: migraine headaches, depression/anxiety             GI/Hepatic/Renal:   (+) hiatal hernia, GERD:,           Endo/Other:    (+) Diabetes, : arthritis: OA., electrolyte abnormalities, . Abdominal:   (+) obese,           Vascular: Other Findings:             Anesthesia Plan      general     ASA 3     (Chart review only 9/22/21  covid - 9/17/21)  Induction: intravenous. MIPS: Postoperative opioids intended and Prophylactic antiemetics administered. Anesthetic plan and risks discussed with patient.       Plan discussed with CRNA. Attending anesthesiologist reviewed and agrees with Pre Eval content   Pre Anesthesia Assessment complete. Chart reviewed on 9/22/2021        YESSICA Vaca - YAQUELIN   9/22/2021      Pre Anesthesia Evaluation complete. Anesthesia plan, risks, benefits, alternatives, and personnel discussed with patient and/or legal guardian. Patient and/or legal guardian verbalized an understanding and agreed to proceed. Anesthesia plan discussed with care team members and agreed upon.   YESSICA Vaca - YAQUELIN  9/22/2021

## 2021-09-23 ENCOUNTER — HOSPITAL ENCOUNTER (OUTPATIENT)
Age: 56
Setting detail: OBSERVATION
Discharge: HOME | End: 2021-09-24
Attending: SURGERY | Admitting: SURGERY
Payer: COMMERCIAL

## 2021-09-23 ENCOUNTER — ANESTHESIA (OUTPATIENT)
Dept: OPERATING ROOM | Age: 56
End: 2021-09-23
Payer: COMMERCIAL

## 2021-09-23 ENCOUNTER — TELEPHONE (OUTPATIENT)
Dept: CARDIOLOGY CLINIC | Age: 56
End: 2021-09-23

## 2021-09-23 VITALS
TEMPERATURE: 96.7 F | RESPIRATION RATE: 6 BRPM | DIASTOLIC BLOOD PRESSURE: 101 MMHG | SYSTOLIC BLOOD PRESSURE: 173 MMHG | OXYGEN SATURATION: 100 %

## 2021-09-23 DIAGNOSIS — Z98.890 S/P PANNICULECTOMY: Primary | ICD-10-CM

## 2021-09-23 DIAGNOSIS — E65 PANNUS, ABDOMINAL: ICD-10-CM

## 2021-09-23 LAB
GLUCOSE BLD-MCNC: 113 MG/DL (ref 70–99)
GLUCOSE BLD-MCNC: 161 MG/DL (ref 70–99)
GLUCOSE BLD-MCNC: 79 MG/DL (ref 70–99)

## 2021-09-23 PROCEDURE — 6360000002 HC RX W HCPCS: Performed by: SURGERY

## 2021-09-23 PROCEDURE — 2500000003 HC RX 250 WO HCPCS

## 2021-09-23 PROCEDURE — 1200000000 HC SEMI PRIVATE

## 2021-09-23 PROCEDURE — 2500000003 HC RX 250 WO HCPCS: Performed by: SURGERY

## 2021-09-23 PROCEDURE — C9290 INJ, BUPIVACAINE LIPOSOME: HCPCS | Performed by: SURGERY

## 2021-09-23 PROCEDURE — 2580000003 HC RX 258: Performed by: SURGERY

## 2021-09-23 PROCEDURE — 7100000001 HC PACU RECOVERY - ADDTL 15 MIN: Performed by: SURGERY

## 2021-09-23 PROCEDURE — 2709999900 HC NON-CHARGEABLE SUPPLY: Performed by: SURGERY

## 2021-09-23 PROCEDURE — 2500000003 HC RX 250 WO HCPCS: Performed by: NURSE ANESTHETIST, CERTIFIED REGISTERED

## 2021-09-23 PROCEDURE — 2580000003 HC RX 258

## 2021-09-23 PROCEDURE — 2720000010 HC SURG SUPPLY STERILE: Performed by: SURGERY

## 2021-09-23 PROCEDURE — 3700000001 HC ADD 15 MINUTES (ANESTHESIA): Performed by: SURGERY

## 2021-09-23 PROCEDURE — 82962 GLUCOSE BLOOD TEST: CPT

## 2021-09-23 PROCEDURE — 3600000003 HC SURGERY LEVEL 3 BASE: Performed by: SURGERY

## 2021-09-23 PROCEDURE — 6360000002 HC RX W HCPCS

## 2021-09-23 PROCEDURE — 15830 EXC EXCESSIVE SKIN ABDOMEN: CPT | Performed by: SURGERY

## 2021-09-23 PROCEDURE — 3600000013 HC SURGERY LEVEL 3 ADDTL 15MIN: Performed by: SURGERY

## 2021-09-23 PROCEDURE — 6360000002 HC RX W HCPCS: Performed by: ANESTHESIOLOGY

## 2021-09-23 PROCEDURE — 6360000002 HC RX W HCPCS: Performed by: NURSE ANESTHETIST, CERTIFIED REGISTERED

## 2021-09-23 PROCEDURE — 15847 EXC SKIN ABD ADD-ON: CPT | Performed by: SURGERY

## 2021-09-23 PROCEDURE — 88302 TISSUE EXAM BY PATHOLOGIST: CPT | Performed by: PATHOLOGY

## 2021-09-23 PROCEDURE — 3700000000 HC ANESTHESIA ATTENDED CARE: Performed by: SURGERY

## 2021-09-23 PROCEDURE — G0378 HOSPITAL OBSERVATION PER HR: HCPCS

## 2021-09-23 PROCEDURE — 6370000000 HC RX 637 (ALT 250 FOR IP): Performed by: SURGERY

## 2021-09-23 PROCEDURE — 7100000000 HC PACU RECOVERY - FIRST 15 MIN: Performed by: SURGERY

## 2021-09-23 RX ORDER — 0.9 % SODIUM CHLORIDE 0.9 %
500 INTRAVENOUS SOLUTION INTRAVENOUS
Status: DISCONTINUED | OUTPATIENT
Start: 2021-09-23 | End: 2021-09-23 | Stop reason: HOSPADM

## 2021-09-23 RX ORDER — ONDANSETRON 2 MG/ML
4 INJECTION INTRAMUSCULAR; INTRAVENOUS ONCE
Status: COMPLETED | OUTPATIENT
Start: 2021-09-23 | End: 2021-09-23

## 2021-09-23 RX ORDER — CEFAZOLIN SODIUM 2 G/100ML
2000 INJECTION, SOLUTION INTRAVENOUS EVERY 8 HOURS
Status: COMPLETED | OUTPATIENT
Start: 2021-09-23 | End: 2021-09-24

## 2021-09-23 RX ORDER — FENTANYL CITRATE 50 UG/ML
INJECTION, SOLUTION INTRAMUSCULAR; INTRAVENOUS PRN
Status: DISCONTINUED | OUTPATIENT
Start: 2021-09-23 | End: 2021-09-23 | Stop reason: SDUPTHER

## 2021-09-23 RX ORDER — FENTANYL CITRATE 50 UG/ML
50 INJECTION, SOLUTION INTRAMUSCULAR; INTRAVENOUS EVERY 5 MIN PRN
Status: DISCONTINUED | OUTPATIENT
Start: 2021-09-23 | End: 2021-09-23 | Stop reason: HOSPADM

## 2021-09-23 RX ORDER — ROCURONIUM BROMIDE 10 MG/ML
INJECTION, SOLUTION INTRAVENOUS PRN
Status: DISCONTINUED | OUTPATIENT
Start: 2021-09-23 | End: 2021-09-23 | Stop reason: SDUPTHER

## 2021-09-23 RX ORDER — HYDROCODONE BITARTRATE AND ACETAMINOPHEN 5; 325 MG/1; MG/1
1 TABLET ORAL PRN
Status: DISCONTINUED | OUTPATIENT
Start: 2021-09-23 | End: 2021-09-23 | Stop reason: HOSPADM

## 2021-09-23 RX ORDER — ATORVASTATIN CALCIUM 20 MG/1
80 TABLET, FILM COATED ORAL DAILY
Status: DISCONTINUED | OUTPATIENT
Start: 2021-09-24 | End: 2021-09-24 | Stop reason: HOSPADM

## 2021-09-23 RX ORDER — ACYCLOVIR 200 MG/1
400 CAPSULE ORAL 3 TIMES DAILY
Status: DISCONTINUED | OUTPATIENT
Start: 2021-09-23 | End: 2021-09-24 | Stop reason: HOSPADM

## 2021-09-23 RX ORDER — BUDESONIDE AND FORMOTEROL FUMARATE DIHYDRATE 80; 4.5 UG/1; UG/1
2 AEROSOL RESPIRATORY (INHALATION) 2 TIMES DAILY
Status: DISCONTINUED | OUTPATIENT
Start: 2021-09-23 | End: 2021-09-24 | Stop reason: HOSPADM

## 2021-09-23 RX ORDER — SODIUM CHLORIDE, SODIUM LACTATE, POTASSIUM CHLORIDE, CALCIUM CHLORIDE 600; 310; 30; 20 MG/100ML; MG/100ML; MG/100ML; MG/100ML
INJECTION, SOLUTION INTRAVENOUS CONTINUOUS
Status: DISCONTINUED | OUTPATIENT
Start: 2021-09-23 | End: 2021-09-24 | Stop reason: HOSPADM

## 2021-09-23 RX ORDER — HYDROXYZINE HYDROCHLORIDE 25 MG/1
25 TABLET, FILM COATED ORAL 3 TIMES DAILY PRN
Status: DISCONTINUED | OUTPATIENT
Start: 2021-09-23 | End: 2021-09-24 | Stop reason: HOSPADM

## 2021-09-23 RX ORDER — OXYCODONE HYDROCHLORIDE 5 MG/1
15 TABLET ORAL EVERY 4 HOURS PRN
Status: DISCONTINUED | OUTPATIENT
Start: 2021-09-23 | End: 2021-09-24 | Stop reason: HOSPADM

## 2021-09-23 RX ORDER — DIPHENHYDRAMINE HCL 25 MG
1 TABLET ORAL CONTINUOUS
Status: DISCONTINUED | OUTPATIENT
Start: 2021-09-23 | End: 2021-09-23

## 2021-09-23 RX ORDER — MAGNESIUM SULFATE 1 G/100ML
1000 INJECTION INTRAVENOUS PRN
Status: DISCONTINUED | OUTPATIENT
Start: 2021-09-23 | End: 2021-09-24 | Stop reason: HOSPADM

## 2021-09-23 RX ORDER — OXYCODONE AND ACETAMINOPHEN 7.5; 325 MG/1; MG/1
2 TABLET ORAL EVERY 4 HOURS PRN
Status: DISCONTINUED | OUTPATIENT
Start: 2021-09-23 | End: 2021-09-23 | Stop reason: CLARIF

## 2021-09-23 RX ORDER — MIDODRINE HYDROCHLORIDE 5 MG/1
2.5 TABLET ORAL 3 TIMES DAILY PRN
Status: DISCONTINUED | OUTPATIENT
Start: 2021-09-23 | End: 2021-09-24 | Stop reason: HOSPADM

## 2021-09-23 RX ORDER — SUCCINYLCHOLINE/SOD CL,ISO/PF 100 MG/5ML
SYRINGE (ML) INTRAVENOUS PRN
Status: DISCONTINUED | OUTPATIENT
Start: 2021-09-23 | End: 2021-09-23 | Stop reason: SDUPTHER

## 2021-09-23 RX ORDER — TRAZODONE HYDROCHLORIDE 50 MG/1
100 TABLET ORAL NIGHTLY PRN
Status: DISCONTINUED | OUTPATIENT
Start: 2021-09-23 | End: 2021-09-24 | Stop reason: HOSPADM

## 2021-09-23 RX ORDER — ONDANSETRON 2 MG/ML
INJECTION INTRAMUSCULAR; INTRAVENOUS PRN
Status: DISCONTINUED | OUTPATIENT
Start: 2021-09-23 | End: 2021-09-23 | Stop reason: SDUPTHER

## 2021-09-23 RX ORDER — BLOOD PRESSURE TEST KIT
1 KIT MISCELLANEOUS DAILY
Status: DISCONTINUED | OUTPATIENT
Start: 2021-09-23 | End: 2021-09-23

## 2021-09-23 RX ORDER — M-VIT,TX,IRON,MINS/CALC/FOLIC 27MG-0.4MG
1 TABLET ORAL DAILY
Status: DISCONTINUED | OUTPATIENT
Start: 2021-09-23 | End: 2021-09-24 | Stop reason: HOSPADM

## 2021-09-23 RX ORDER — POTASSIUM CHLORIDE 20 MEQ/1
20 TABLET, EXTENDED RELEASE ORAL 2 TIMES DAILY
Status: DISCONTINUED | OUTPATIENT
Start: 2021-09-23 | End: 2021-09-24 | Stop reason: HOSPADM

## 2021-09-23 RX ORDER — HEPARIN SODIUM 5000 [USP'U]/ML
5000 INJECTION, SOLUTION INTRAVENOUS; SUBCUTANEOUS ONCE
Status: COMPLETED | OUTPATIENT
Start: 2021-09-23 | End: 2021-09-23

## 2021-09-23 RX ORDER — PROMETHAZINE HYDROCHLORIDE 25 MG/ML
6.25 INJECTION, SOLUTION INTRAMUSCULAR; INTRAVENOUS
Status: DISCONTINUED | OUTPATIENT
Start: 2021-09-23 | End: 2021-09-23 | Stop reason: HOSPADM

## 2021-09-23 RX ORDER — ONDANSETRON 2 MG/ML
4 INJECTION INTRAMUSCULAR; INTRAVENOUS
Status: DISCONTINUED | OUTPATIENT
Start: 2021-09-23 | End: 2021-09-23 | Stop reason: HOSPADM

## 2021-09-23 RX ORDER — BLOOD PRESSURE TEST KIT
1 KIT MISCELLANEOUS DAILY
Qty: 1 KIT | Refills: 0 | Status: SHIPPED | OUTPATIENT
Start: 2021-09-23 | End: 2022-01-17

## 2021-09-23 RX ORDER — DIPHENHYDRAMINE HYDROCHLORIDE 50 MG/ML
12.5 INJECTION INTRAMUSCULAR; INTRAVENOUS
Status: DISCONTINUED | OUTPATIENT
Start: 2021-09-23 | End: 2021-09-23 | Stop reason: HOSPADM

## 2021-09-23 RX ORDER — VENLAFAXINE 37.5 MG/1
37.5 TABLET ORAL 2 TIMES DAILY
Status: DISCONTINUED | OUTPATIENT
Start: 2021-09-23 | End: 2021-09-24 | Stop reason: HOSPADM

## 2021-09-23 RX ORDER — PANTOPRAZOLE SODIUM 40 MG/1
40 TABLET, DELAYED RELEASE ORAL
Status: DISCONTINUED | OUTPATIENT
Start: 2021-09-24 | End: 2021-09-24 | Stop reason: HOSPADM

## 2021-09-23 RX ORDER — HYDROCODONE BITARTRATE AND ACETAMINOPHEN 5; 325 MG/1; MG/1
2 TABLET ORAL EVERY 6 HOURS PRN
Status: DISCONTINUED | OUTPATIENT
Start: 2021-09-23 | End: 2021-09-23 | Stop reason: HOSPADM

## 2021-09-23 RX ORDER — SODIUM CHLORIDE 9 MG/ML
25 INJECTION, SOLUTION INTRAVENOUS PRN
Status: DISCONTINUED | OUTPATIENT
Start: 2021-09-23 | End: 2021-09-24 | Stop reason: HOSPADM

## 2021-09-23 RX ORDER — MIDAZOLAM HYDROCHLORIDE 1 MG/ML
INJECTION INTRAMUSCULAR; INTRAVENOUS PRN
Status: DISCONTINUED | OUTPATIENT
Start: 2021-09-23 | End: 2021-09-23 | Stop reason: SDUPTHER

## 2021-09-23 RX ORDER — ALPRAZOLAM 0.5 MG/1
0.5 TABLET ORAL NIGHTLY PRN
Status: DISCONTINUED | OUTPATIENT
Start: 2021-09-23 | End: 2021-09-24 | Stop reason: HOSPADM

## 2021-09-23 RX ORDER — GLUCOSAM/CHON-MSM1/C/MANG/BOSW 500-416.6
1 TABLET ORAL DAILY
Status: DISCONTINUED | OUTPATIENT
Start: 2021-09-23 | End: 2021-09-23

## 2021-09-23 RX ORDER — DEXTROSE, SODIUM CHLORIDE, AND POTASSIUM CHLORIDE 5; .45; .15 G/100ML; G/100ML; G/100ML
INJECTION INTRAVENOUS CONTINUOUS
Status: DISCONTINUED | OUTPATIENT
Start: 2021-09-23 | End: 2021-09-24 | Stop reason: HOSPADM

## 2021-09-23 RX ORDER — HYDRALAZINE HYDROCHLORIDE 20 MG/ML
5 INJECTION INTRAMUSCULAR; INTRAVENOUS EVERY 10 MIN PRN
Status: DISCONTINUED | OUTPATIENT
Start: 2021-09-23 | End: 2021-09-23 | Stop reason: HOSPADM

## 2021-09-23 RX ORDER — ZINC SULFATE 50(220)MG
50 CAPSULE ORAL DAILY
Status: DISCONTINUED | OUTPATIENT
Start: 2021-09-24 | End: 2021-09-24 | Stop reason: HOSPADM

## 2021-09-23 RX ORDER — DEXAMETHASONE SODIUM PHOSPHATE 4 MG/ML
INJECTION, SOLUTION INTRA-ARTICULAR; INTRALESIONAL; INTRAMUSCULAR; INTRAVENOUS; SOFT TISSUE PRN
Status: DISCONTINUED | OUTPATIENT
Start: 2021-09-23 | End: 2021-09-23 | Stop reason: SDUPTHER

## 2021-09-23 RX ORDER — LIDOCAINE HYDROCHLORIDE 20 MG/ML
INJECTION, SOLUTION INTRAVENOUS PRN
Status: DISCONTINUED | OUTPATIENT
Start: 2021-09-23 | End: 2021-09-23 | Stop reason: SDUPTHER

## 2021-09-23 RX ORDER — FEEDER CONTAINER WITH PUMP SET
1 EACH MISCELLANEOUS
Status: DISCONTINUED | OUTPATIENT
Start: 2021-09-23 | End: 2021-09-23

## 2021-09-23 RX ORDER — SUMATRIPTAN 50 MG/1
50 TABLET, FILM COATED ORAL
Status: ACTIVE | OUTPATIENT
Start: 2021-09-23 | End: 2021-09-23

## 2021-09-23 RX ORDER — SODIUM CHLORIDE 0.9 % (FLUSH) 0.9 %
10 SYRINGE (ML) INJECTION EVERY 12 HOURS SCHEDULED
Status: DISCONTINUED | OUTPATIENT
Start: 2021-09-23 | End: 2021-09-24 | Stop reason: HOSPADM

## 2021-09-23 RX ORDER — POTASSIUM CHLORIDE 7.45 MG/ML
10 INJECTION INTRAVENOUS PRN
Status: DISCONTINUED | OUTPATIENT
Start: 2021-09-23 | End: 2021-09-24 | Stop reason: HOSPADM

## 2021-09-23 RX ORDER — TIZANIDINE 4 MG/1
4 TABLET ORAL EVERY 8 HOURS PRN
Status: DISCONTINUED | OUTPATIENT
Start: 2021-09-23 | End: 2021-09-24 | Stop reason: HOSPADM

## 2021-09-23 RX ORDER — CALCIUM CARBONATE/VITAMIN D3 600 MG-10
1 TABLET ORAL DAILY
Status: DISCONTINUED | OUTPATIENT
Start: 2021-09-23 | End: 2021-09-23 | Stop reason: CLARIF

## 2021-09-23 RX ORDER — BUDESONIDE AND FORMOTEROL FUMARATE DIHYDRATE 80; 4.5 UG/1; UG/1
2 AEROSOL RESPIRATORY (INHALATION) 2 TIMES DAILY
Status: DISCONTINUED | OUTPATIENT
Start: 2021-09-23 | End: 2021-09-23 | Stop reason: SDUPTHER

## 2021-09-23 RX ORDER — ACETAMINOPHEN 325 MG/1
650 TABLET ORAL EVERY 4 HOURS PRN
Status: DISCONTINUED | OUTPATIENT
Start: 2021-09-23 | End: 2021-09-24 | Stop reason: HOSPADM

## 2021-09-23 RX ORDER — CEFAZOLIN SODIUM 1 G/3ML
1000 INJECTION, POWDER, FOR SOLUTION INTRAMUSCULAR; INTRAVENOUS ONCE
Status: DISCONTINUED | OUTPATIENT
Start: 2021-09-23 | End: 2021-09-23 | Stop reason: CLARIF

## 2021-09-23 RX ORDER — PROPOFOL 10 MG/ML
INJECTION, EMULSION INTRAVENOUS PRN
Status: DISCONTINUED | OUTPATIENT
Start: 2021-09-23 | End: 2021-09-23 | Stop reason: SDUPTHER

## 2021-09-23 RX ORDER — LABETALOL HYDROCHLORIDE 5 MG/ML
5 INJECTION, SOLUTION INTRAVENOUS EVERY 10 MIN PRN
Status: DISCONTINUED | OUTPATIENT
Start: 2021-09-23 | End: 2021-09-23 | Stop reason: HOSPADM

## 2021-09-23 RX ORDER — ALBUTEROL SULFATE 90 UG/1
2 AEROSOL, METERED RESPIRATORY (INHALATION) EVERY 6 HOURS PRN
Status: DISCONTINUED | OUTPATIENT
Start: 2021-09-23 | End: 2021-09-24 | Stop reason: HOSPADM

## 2021-09-23 RX ORDER — HYDROCHLOROTHIAZIDE 25 MG/1
25 TABLET ORAL EVERY MORNING
Status: DISCONTINUED | OUTPATIENT
Start: 2021-09-24 | End: 2021-09-24 | Stop reason: HOSPADM

## 2021-09-23 RX ORDER — ASCORBIC ACID 500 MG
1000 TABLET ORAL DAILY
Status: DISCONTINUED | OUTPATIENT
Start: 2021-09-24 | End: 2021-09-24 | Stop reason: HOSPADM

## 2021-09-23 RX ORDER — SODIUM CHLORIDE 0.9 % (FLUSH) 0.9 %
10 SYRINGE (ML) INJECTION PRN
Status: DISCONTINUED | OUTPATIENT
Start: 2021-09-23 | End: 2021-09-24 | Stop reason: HOSPADM

## 2021-09-23 RX ORDER — ALOGLIPTIN 6.25 MG/1
6.25 TABLET, FILM COATED ORAL DAILY
Status: DISCONTINUED | OUTPATIENT
Start: 2021-09-24 | End: 2021-09-24 | Stop reason: HOSPADM

## 2021-09-23 RX ORDER — ALBUTEROL SULFATE 90 UG/1
2 AEROSOL, METERED RESPIRATORY (INHALATION) EVERY 6 HOURS PRN
Status: DISCONTINUED | OUTPATIENT
Start: 2021-09-23 | End: 2021-09-23 | Stop reason: SDUPTHER

## 2021-09-23 RX ORDER — MORPHINE SULFATE 15 MG/1
15 TABLET, FILM COATED, EXTENDED RELEASE ORAL EVERY 12 HOURS SCHEDULED
Status: DISCONTINUED | OUTPATIENT
Start: 2021-09-23 | End: 2021-09-24 | Stop reason: HOSPADM

## 2021-09-23 RX ORDER — MEPERIDINE HYDROCHLORIDE 25 MG/ML
12.5 INJECTION INTRAMUSCULAR; INTRAVENOUS; SUBCUTANEOUS EVERY 5 MIN PRN
Status: DISCONTINUED | OUTPATIENT
Start: 2021-09-23 | End: 2021-09-23 | Stop reason: HOSPADM

## 2021-09-23 RX ORDER — HYDROMORPHONE HCL 110MG/55ML
0.5 PATIENT CONTROLLED ANALGESIA SYRINGE INTRAVENOUS EVERY 5 MIN PRN
Status: DISCONTINUED | OUTPATIENT
Start: 2021-09-23 | End: 2021-09-23 | Stop reason: HOSPADM

## 2021-09-23 RX ADMIN — PROPOFOL 150 MG: 10 INJECTION, EMULSION INTRAVENOUS at 14:42

## 2021-09-23 RX ADMIN — SODIUM CHLORIDE, POTASSIUM CHLORIDE, SODIUM LACTATE AND CALCIUM CHLORIDE: 600; 310; 30; 20 INJECTION, SOLUTION INTRAVENOUS at 12:40

## 2021-09-23 RX ADMIN — SUGAMMADEX 100 MG: 100 INJECTION, SOLUTION INTRAVENOUS at 17:15

## 2021-09-23 RX ADMIN — TRAZODONE HYDROCHLORIDE 100 MG: 50 TABLET ORAL at 22:55

## 2021-09-23 RX ADMIN — ONDANSETRON 4 MG: 2 INJECTION INTRAMUSCULAR; INTRAVENOUS at 14:04

## 2021-09-23 RX ADMIN — Medication 100 MG: at 14:42

## 2021-09-23 RX ADMIN — FENTANYL CITRATE 25 MCG: 50 INJECTION, SOLUTION INTRAMUSCULAR; INTRAVENOUS at 17:18

## 2021-09-23 RX ADMIN — LIDOCAINE HYDROCHLORIDE 100 MG: 20 INJECTION, SOLUTION INTRAVENOUS at 14:42

## 2021-09-23 RX ADMIN — DEXAMETHASONE SODIUM PHOSPHATE 4 MG: 4 INJECTION, SOLUTION INTRAMUSCULAR; INTRAVENOUS at 14:57

## 2021-09-23 RX ADMIN — POTASSIUM CHLORIDE, DEXTROSE MONOHYDRATE AND SODIUM CHLORIDE: 150; 5; 450 INJECTION, SOLUTION INTRAVENOUS at 17:50

## 2021-09-23 RX ADMIN — BUDESONIDE AND FORMOTEROL FUMARATE DIHYDRATE 2 PUFF: 80; 4.5 AEROSOL RESPIRATORY (INHALATION) at 23:02

## 2021-09-23 RX ADMIN — METRONIDAZOLE 500 MG: 500 INJECTION, SOLUTION INTRAVENOUS at 14:59

## 2021-09-23 RX ADMIN — FENTANYL CITRATE 50 MCG: 50 INJECTION INTRAMUSCULAR; INTRAVENOUS at 17:52

## 2021-09-23 RX ADMIN — ACYCLOVIR 400 MG: 200 CAPSULE ORAL at 22:56

## 2021-09-23 RX ADMIN — ONDANSETRON 4 MG: 2 INJECTION INTRAMUSCULAR; INTRAVENOUS at 16:41

## 2021-09-23 RX ADMIN — FENTANYL CITRATE 25 MCG: 50 INJECTION, SOLUTION INTRAMUSCULAR; INTRAVENOUS at 17:30

## 2021-09-23 RX ADMIN — Medication 1 TABLET: at 21:47

## 2021-09-23 RX ADMIN — FENTANYL CITRATE 100 MCG: 50 INJECTION, SOLUTION INTRAMUSCULAR; INTRAVENOUS at 14:42

## 2021-09-23 RX ADMIN — ROCURONIUM BROMIDE 50 MG: 10 INJECTION INTRAVENOUS at 14:57

## 2021-09-23 RX ADMIN — SODIUM CHLORIDE, POTASSIUM CHLORIDE, SODIUM LACTATE AND CALCIUM CHLORIDE: 600; 310; 30; 20 INJECTION, SOLUTION INTRAVENOUS at 17:00

## 2021-09-23 RX ADMIN — OXYCODONE HYDROCHLORIDE 15 MG: 5 TABLET ORAL at 21:02

## 2021-09-23 RX ADMIN — FENTANYL CITRATE 50 MCG: 50 INJECTION INTRAMUSCULAR; INTRAVENOUS at 17:58

## 2021-09-23 RX ADMIN — CEFAZOLIN SODIUM 2000 MG: 2 INJECTION, SOLUTION INTRAVENOUS at 22:57

## 2021-09-23 RX ADMIN — POTASSIUM CHLORIDE 20 MEQ: 1500 TABLET, EXTENDED RELEASE ORAL at 21:47

## 2021-09-23 RX ADMIN — MIDAZOLAM 2 MG: 1 INJECTION INTRAMUSCULAR; INTRAVENOUS at 14:36

## 2021-09-23 RX ADMIN — ACETAMINOPHEN 650 MG: 325 TABLET ORAL at 21:02

## 2021-09-23 RX ADMIN — HEPARIN SODIUM 5000 UNITS: 5000 INJECTION INTRAVENOUS; SUBCUTANEOUS at 14:07

## 2021-09-23 RX ADMIN — CEFAZOLIN 2000 MG: 1 INJECTION, POWDER, FOR SOLUTION INTRAMUSCULAR; INTRAVENOUS; PARENTERAL at 14:47

## 2021-09-23 RX ADMIN — HYDROXYZINE HYDROCHLORIDE 25 MG: 25 TABLET, FILM COATED ORAL at 22:56

## 2021-09-23 RX ADMIN — FENTANYL CITRATE 50 MCG: 50 INJECTION, SOLUTION INTRAMUSCULAR; INTRAVENOUS at 16:12

## 2021-09-23 ASSESSMENT — PULMONARY FUNCTION TESTS
PIF_VALUE: 17
PIF_VALUE: 18
PIF_VALUE: 16
PIF_VALUE: 17
PIF_VALUE: 18
PIF_VALUE: 17
PIF_VALUE: 19
PIF_VALUE: 18
PIF_VALUE: 19
PIF_VALUE: 17
PIF_VALUE: 18
PIF_VALUE: 16
PIF_VALUE: 17
PIF_VALUE: 18
PIF_VALUE: 20
PIF_VALUE: 17
PIF_VALUE: 18
PIF_VALUE: 17
PIF_VALUE: 18
PIF_VALUE: 1
PIF_VALUE: 17
PIF_VALUE: 17
PIF_VALUE: 18
PIF_VALUE: 17
PIF_VALUE: 17
PIF_VALUE: 18
PIF_VALUE: 18
PIF_VALUE: 16
PIF_VALUE: 17
PIF_VALUE: 17
PIF_VALUE: 16
PIF_VALUE: 17
PIF_VALUE: 19
PIF_VALUE: 18
PIF_VALUE: 17
PIF_VALUE: 2
PIF_VALUE: 17
PIF_VALUE: 18
PIF_VALUE: 17
PIF_VALUE: 19
PIF_VALUE: 17
PIF_VALUE: 17
PIF_VALUE: 18
PIF_VALUE: 18
PIF_VALUE: 17
PIF_VALUE: 18
PIF_VALUE: 25
PIF_VALUE: 17
PIF_VALUE: 17
PIF_VALUE: 18
PIF_VALUE: 16
PIF_VALUE: 17
PIF_VALUE: 18
PIF_VALUE: 18
PIF_VALUE: 17
PIF_VALUE: 2
PIF_VALUE: 18
PIF_VALUE: 19
PIF_VALUE: 18
PIF_VALUE: 17
PIF_VALUE: 18
PIF_VALUE: 18
PIF_VALUE: 15
PIF_VALUE: 17
PIF_VALUE: 18
PIF_VALUE: 4
PIF_VALUE: 17
PIF_VALUE: 19
PIF_VALUE: 18
PIF_VALUE: 17
PIF_VALUE: 18
PIF_VALUE: 19
PIF_VALUE: 17
PIF_VALUE: 17
PIF_VALUE: 18
PIF_VALUE: 19
PIF_VALUE: 17
PIF_VALUE: 16
PIF_VALUE: 19
PIF_VALUE: 0
PIF_VALUE: 17
PIF_VALUE: 18
PIF_VALUE: 18
PIF_VALUE: 17
PIF_VALUE: 17
PIF_VALUE: 20
PIF_VALUE: 20
PIF_VALUE: 15
PIF_VALUE: 17
PIF_VALUE: 20
PIF_VALUE: 16
PIF_VALUE: 17
PIF_VALUE: 17
PIF_VALUE: 18
PIF_VALUE: 17
PIF_VALUE: 17
PIF_VALUE: 18
PIF_VALUE: 1
PIF_VALUE: 19
PIF_VALUE: 18
PIF_VALUE: 1
PIF_VALUE: 18
PIF_VALUE: 16
PIF_VALUE: 19
PIF_VALUE: 17
PIF_VALUE: 16
PIF_VALUE: 17
PIF_VALUE: 16
PIF_VALUE: 17
PIF_VALUE: 16
PIF_VALUE: 18
PIF_VALUE: 17
PIF_VALUE: 18
PIF_VALUE: 18
PIF_VALUE: 19
PIF_VALUE: 4
PIF_VALUE: 17
PIF_VALUE: 20
PIF_VALUE: 18
PIF_VALUE: 18
PIF_VALUE: 17
PIF_VALUE: 18
PIF_VALUE: 17
PIF_VALUE: 18
PIF_VALUE: 15
PIF_VALUE: 17
PIF_VALUE: 18
PIF_VALUE: 17
PIF_VALUE: 18
PIF_VALUE: 17
PIF_VALUE: 18
PIF_VALUE: 17
PIF_VALUE: 16
PIF_VALUE: 19
PIF_VALUE: 18
PIF_VALUE: 15
PIF_VALUE: 18
PIF_VALUE: 19
PIF_VALUE: 18
PIF_VALUE: 17
PIF_VALUE: 17
PIF_VALUE: 18
PIF_VALUE: 18
PIF_VALUE: 19
PIF_VALUE: 18

## 2021-09-23 ASSESSMENT — PAIN DESCRIPTION - PAIN TYPE
TYPE: SURGICAL PAIN

## 2021-09-23 ASSESSMENT — PAIN DESCRIPTION - ONSET
ONSET: ON-GOING
ONSET: ON-GOING
ONSET: GRADUAL
ONSET: ON-GOING

## 2021-09-23 ASSESSMENT — PAIN DESCRIPTION - LOCATION
LOCATION: ABDOMEN

## 2021-09-23 ASSESSMENT — PAIN SCALES - GENERAL
PAINLEVEL_OUTOF10: 8
PAINLEVEL_OUTOF10: 5
PAINLEVEL_OUTOF10: 8
PAINLEVEL_OUTOF10: 7

## 2021-09-23 ASSESSMENT — PAIN DESCRIPTION - DESCRIPTORS
DESCRIPTORS: DISCOMFORT
DESCRIPTORS: BURNING

## 2021-09-23 ASSESSMENT — PAIN DESCRIPTION - FREQUENCY
FREQUENCY: INTERMITTENT
FREQUENCY: CONTINUOUS

## 2021-09-23 ASSESSMENT — PAIN DESCRIPTION - ORIENTATION
ORIENTATION: MID

## 2021-09-23 NOTE — PROGRESS NOTES
[phone] Patient arrived to PACU from OR. Monitors applied and alarms on. No drainage from site. Report from [address].   3930 Patient repositioned in bed.   1803 Patient's daughter updated via phone. 1616 PACU care complete. Patient in holding while waiting on room. [phone] Patient transferred to same day surgery. Report to [employer].

## 2021-09-23 NOTE — OP NOTE
OPERATIVE / PROCEDURE NOTE    Ana Ramsey, 1965, 49 y.o.,  female, CSN: [id number]  September 23, 2021    PREOPERATIVE DIAGNOSIS:  Chronic abdominal pain caused by chronic abdominal  ulcers due to pannus abdominis + divaricated recti    POSTOPERATIVE DIAGNOSIS:  Chronic abdominal pain caused by chronic  abdominal ulcers due to pannus abdominis plus divaricated recti. PROCEDURES DONE:  1.  Bilateral panniculectomies. 2.  Complex closure of the abdominal wound and new umbilical implantation. 3.  Excision of 20 x 15 x 5 cm sheath bilaterally. SURGEON:  Tanmay Garcia, Timbo LOPEZ. ANESTHESIA:  Endotracheal anesthesia as well as local to the wounds using  1% lidocaine with epinephrine and 0.5% Marcaine 50:50 mixture  preoperatively and postoperatively using Exparel 20 mL combined with 140  mL. SPECIMENS:  Bilateral panniculectomy skin with subcutaneous fat. ESTIMATED BLOOD LOSS:  Less than 10 mL. DRAINS:  A 19-French Jaylan drains x 2. COMPLICATIONS:  None. CONDITION:  Stable, extubated to the PACU. DESCRIPTION OF PROCEDURE:  After proper informed consent was signed by the  patient knowing all the risks, benefits, potential complications, and  possible alternatives of the procedure, including wound infection and wound  problems, chronic pain from the scars, infections, and the need for other  procedures among others. The patient was appropriately identified. In the  holding area, the patient received antibiotic IV. TEDs and SCDs were placed on the   legs and activated bilaterally. Patient voided the urinary bladder on call to  the OR and Hibiclens skin prep was performed. The patient was taken to the  operating room after he was marked by me for the excision area and  suprapubic line and then the patient was taken to the operating room, was placed  supine on the operating room table.   After institution of general  endotracheal anesthesia, the abdomen was prepped and draped in usual  sterile fashion. Incision was performed in the suprapubic region from  anterior superior spine bilaterally going into the mons pubis and then  dissection was carried with electrocautery in a very meticulous way all the  way to the anterior rectus fascia medially and external oblique aponeurosis  laterally. This flap was developed and then went all the way from the  pubic tubercle to the xiphoid cartilage and ribs bilaterally. In the  middle of the dissection, the umbilical scar was encountered and incision  around it was performed. Dissection was carried all the way to the fascia  to preserve the umbilical scar and the dissection was carried all the way  to the xiphoid and then the completion of the excision of the  abdominoplasty was performed to the right and to the left after the midline  was divided with cautery from the belly button inferiorly. The blade was  used to perform the incisions bilaterally with the above-mentioned  measurements on each side was done. Dissection was carried down all the  way to the subcutaneous fat and then the one specimen was sent for  permanent pathology and the skin was re-attached back to the suprapubic  skin using 0 Vicryl in an interrupted figure-of-eight fashion including  the dermis and then the skin was complexly closed, approximated using # 1 Stratafix   in a running subcuticular fashion, followed by dermabond Premeo, skin glue over its meshes x 3. After 19-[place] Luis Antonio Davey drain was  left in the subcutaneous fat and the new umbilical wound was created by  removing [place] of the skin and subcutaneous fat and re-creating the  umbilical scar from the original umbilicus. The Jaylan drain was secured to  the skin using 3-0 Prolene and then Steri-Strips were applied on top of the  wound and then profuse 4 x 8 sterile gauze was applied and then it was  taped and abdominal bind.       The patient tolerated the procedure very  well without any complications, was extubated in the OR after the Exparel  was injected profusely and to the whole scar as well as to the subfascial  rectus muscle bilaterally.   ____________________________________________    Ana Chou MD, JOHN, FICS    9/23/2021  2:44 PM

## 2021-09-23 NOTE — H&P
HISTORY AND PHYSICAL EXAM    Date of Admission:  2021    PCP:  YESSICA Cobb NP    Chief Complaint / History of Present Illness:  Yadiel Davis is a very pleasant 49 y.o. female who presents for her panniculectomy. Continue NPO/Bowel rest, IV Fluids, Pain control, Nausea control, IV Antibiotics. PMH:   has a past medical history of Anxiety, Arthritis, Chronic back pain, COPD (chronic obstructive pulmonary disease) ([facility] [address] Depression, Diabetes mellitus ([facility] [address] (Dx ), Emphysema, Fibromyalgia, H/O abdominoplasty (2018), H/O cardiovascular stress test (2019), H/O echocardiogram (2019), H/O tilt table evaluation (2021), Hyperlipidemia, Hypertension, Migraines (Last Migraine 21), Mobitz (type) I (Wenckebach's) atrioventricular block (2020), Neuropathy, Piriformis syndrome (2016), Psoriasis, Rotator cuff impingement syndrome, right (2018), Shortness of breath on exertion, Syncope and collapse (2019), Teeth missing, and Wears glasses. PSH:   has a past surgical history that includes Elbow surgery (Bilateral, Last Done In ); Dental surgery; laparoscopy ();  section ( And ); Tubal ligation (); Carpal tunnel release (Right, ); Sleeve Gastrectomy (10/26/2017); hernia repair (10/26/2017); Rotator cuff repair (Right, ); Rotator cuff repair (Right, 2017); pr excise excess skin tissue,abdomen (N/A, 2018); HYSTERECTOMY VAGINAL (); Endoscopy, colon, diagnostic (2017); Appendectomy (); Abdomen surgery (N/A, 2019); Abdomen surgery (N/A, 2019); Abdominoplasty (N/A, 2019); Colonoscopy (2017); Breast surgery (Right, 2020); and Colonoscopy (N/A, 2020). Allergies: Allergies   Allergen Reactions    Other      Patient states the electrodes irritated her skin. Home Meds:    Prior to Admission medications    Medication Sig Start Date End Date Taking? Authorizing Provider   fluticasone-vilanterol (BREO ELLIPTA) 100-25 MCG/INH AEPB inhaler Inhale 1 puff into the lungs daily 9/8/21 10/8/21 Yes Sparkle Olivera MD   midodrine (PROAMATINE) 2.5 MG tablet Take 1 tablet by mouth 3 times daily as needed (SBP < 100)  Patient taking differently: Take 2.5 mg by mouth 2 times daily  8/20/21  Yes YESSICA Dong CNP   rizatriptan (MAXALT) 10 MG tablet Take 10 mg by mouth once as needed for Migraine May repeat in 2 hours if needed    Yes Historical Provider, MD   hydroCHLOROthiazide (HYDRODIURIL) 25 MG tablet Take 1 tablet by mouth every morning 7/15/21  Yes YESSICA Dong CNP   valACYclovir (VALTREX) 500 MG tablet Take 1 tablet by mouth daily 5/26/21 11/22/21 Yes David Perdue MD   omeprazole (PRILOSEC) 40 MG delayed release capsule Take 1 capsule by mouth daily 5/6/21  Yes YESSICA Arechiga NP   atorvastatin (LIPITOR) 80 MG tablet Take 1 tablet by mouth daily 5/6/21  Yes YESSICA Arechiga NP   traZODone (DESYREL) 100 MG tablet Take 100 mg by mouth nightly as needed  3/1/21  Yes Historical Provider, MD   hydrOXYzine (ATARAX) 25 MG tablet TAKE 1 TABLET EVERY 8 HOURS AS NEEDED FOR ANXIETY AVOID ALCOHOL/CAUSES DROWSINESS.   Do not take with Xanax 10/29/20  Yes YESSICA Arechiga NP   albuterol sulfate HFA (PROAIR HFA) 108 (90 Base) MCG/ACT inhaler Inhale 2 puffs into the lungs every 6 hours as needed for Wheezing 9/2/20  Yes Sparkle Olivera MD   tiotropium (SPIRIVA RESPIMAT) 2.5 MCG/ACT AERS inhaler Inhale 2 puffs into the lungs daily 9/2/20  Yes Sparkle Olivera MD   ammonium lactate (LAC-HYDRIN) 12 % lotion  6/24/20  Yes Historical Provider, MD   calcipotriene (DOVONEX) 0.005 % ointment  5/4/20  Yes Historical Provider, MD   tiZANidine (ZANAFLEX) 4 MG tablet Take 4 mg by mouth every 8 hours as needed  1/17/20  Yes Historical Provider, MD   oxyCODONE-acetaminophen (PERCOCET) 7.5-325 MG per tablet Take 1 tablet by mouth every 8 hours as needed for Pain (Patient states takes every 6 hours). 1/17/20  Yes Historical Provider, MD   ALPRAZolam Ritu Rhodes) 0.5 MG tablet Take 0.5 mg by mouth as needed for Sleep. Sabrina Hong Historical Provider, MD   Blood Pressure KIT 1 each by Does not apply route daily 9/23/21   YESSICA Resendiz CNP   albuterol sulfate HFA (PROAIR HFA) 108 (90 Base) MCG/ACT inhaler Inhale 2 puffs into the lungs every 6 hours as needed for Wheezing 9/8/21   Maddie Kaiser MD   tiotropium (SPIRIVA RESPIMAT) 2.5 MCG/ACT AERS inhaler Inhale 2 puffs into the lungs daily 9/8/21   Maddie Kaiser MD   Compression Stockings MISC by Does not apply route 20 - 30 mmh wear daily and take off at night  Thigh High 8/20/21   Malik RiserYESSICA CNP   Blood Glucose Monitoring Suppl (TRUE METRIX AIR GLUCOSE METER) w/Device KIT 1 Device by Does not apply route continuous 4/29/21   [facility], APRN - BARRIE   TRUEplus Lancets 28G MISC 1 each by Does not apply route daily 4/29/21   [facility], APRN - NP   blood glucose test strips (TRUE METRIX PRO BLOOD GLUCOSE) strip 1 each by In Vitro route daily As needed.  4/29/21   [facility], APRN - NP   alogliptin (NESINA) 6.25 MG TABS tablet Take 1 tablet by mouth daily 3/8/21 9/9/21  [facility], APRN - NP   ascorbic acid (V-R VITAMIN C) 250 MG tablet Take 4 tablets by mouth daily 3/5/21   Ivan March MD   zinc sulfate (ORAZINC) 220 (50 Zn) MG capsule Take 4 capsules by mouth daily 3/5/21 3/5/22  Ivan March MD   Nutritional Supplements (ENSURE HIGH PROTEIN) LIQD Take 1 Can by mouth Daily with lunch 3/5/21   Ivan March MD   Multiple Vitamins-Minerals (THERAPEUTIC MULTIVITAMIN-MINERALS) tablet Take 1 tablet by mouth daily 3/4/21 3/4/22  YESSICA Huntley CNP   Calcium Carb-Cholecalciferol (CALCIUM-VITAMIN D) 600-400 MG-UNIT TABS Take 1 tablet by mouth daily 3/4/21   Eloina Hodgson APRN - CNP   clobetasol (TEMOVATE) 0.05 % ointment  5/4/20   Historical Provider, MD   Psyllium (METAMUCIL) 48.57 % POWD Take 1 Units by mouth daily 20   Silvia Solomon MD   potassium chloride (KLOR-CON M) 20 MEQ extended release tablet Take 1 tablet by mouth 2 times daily 19   Silvia Solomon MD   venlafaxine (EFFEXOR) 37.5 MG tablet Take 37.5 mg by mouth 2 times daily    Historical Provider, MD   fluticasone-vilanterol (BREO ELLIPTA) 100-25 MCG/INH AEPB inhaler Inhale 1 puff into the lungs daily 3/5/18   Katja Duque MD        [facility] Meds:    Current Facility-Administered Medications   Medication Dose Route Frequency Provider Last Rate Last Admin    metronidazole (FLAGYL) 500 mg in NaCl 100 mL IVPB premix  500 mg IntraVENous Once Silvia Solomon MD        lactated ringers infusion   IntraVENous Continuous Silvia Solomon  mL/hr at 21 1240 New Bag at 21 1240    ceFAZolin (ANCEF) 1,000 mg in dextrose 5 % 50 mL IVPB (mini-bag)  1,000 mg IntraVENous Once Cristobal Loera [facility]          lactated ringers 100 mL/hr at 21 1240       Social History / Family History:        Social History     Socioeconomic History    Marital status: Single     Spouse name: None    Number of children: None    Years of education: None    Highest education level: None   Occupational History    Occupation: unemployed   Tobacco Use    Smoking status: Former Smoker     Packs/day: 0.25     Years: 30.00     Pack years: 7.50     Types: Cigarettes, E-Cigarettes     Start date:      Quit date: 2016     Years since quittin.7    Smokeless tobacco: Never Used   Vaping Use    Vaping Use: Never used   Substance and Sexual Activity    Alcohol use: Yes     Comment: \"Occ.  Maybe Twice A Month\"    Drug use: None     Comment: Marijuana Card  - last used: 21    Sexual activity: Yes     Partners: Male   Other Topics Concern    None   Social History Narrative    None     Social Determinants of Health     Financial Resource Strain:     Difficulty of Paying Living Expenses:    Food BLE    Mixed hyperlipidemia    Neuropathy    Umbilical pain    Lower abdominal pain    S/P panniculectomy    Latent tuberculosis    Gastroesophageal reflux disease    Vitamin D deficiency    Psoriasis    Pannus, abdominal    Herpes genitalis in women    Admission for therapeutic drug monitoring    URIEL (obstructive sleep apnea)    Hypersomnia    Ex-smoker           Assessment & Plan:    Pannus abdominus causing trouble, will proceed with her panniculectomy today.    ____________________________________________    Wendy Gloria MD, FACS, FICS    9/23/2021  2:15 PM

## 2021-09-23 NOTE — ANESTHESIA POSTPROCEDURE EVALUATION
Department of Anesthesiology  Postprocedure Note    Patient: Andres Asa  MRN: [id number]  YOB: 1965  Date of evaluation: 9/23/2021  Time:  5:37 PM     Procedure Summary     Date: 09/23/21 Room / Location: [address] [place] [address]    Anesthesia Start: 1436 Anesthesia Stop: 0337    Procedure: PANNICULECTOMY (N/A Abdomen) Diagnosis:       Pannus, abdominal      (Pannus, abdominal [E65])    Surgeons: Bryan Eduardo MD Responsible Provider: Tayla Grier MD    Anesthesia Type: general ASA Status: 3          Anesthesia Type: general    Morgan Phase I:      Morgan Phase II:      Last vitals: Reviewed and per EMR flowsheets.        Anesthesia Post Evaluation    Patient location during evaluation: bedside  Patient participation: complete - patient participated  Level of consciousness: awake and alert  Pain score: 2  Airway patency: patent  Nausea & Vomiting: no vomiting and no nausea  Complications: no  Cardiovascular status: blood pressure returned to baseline and hemodynamically stable  Respiratory status: acceptable, spontaneous ventilation, nonlabored ventilation and nasal cannula  Hydration status: stable

## 2021-09-24 ENCOUNTER — TELEPHONE (OUTPATIENT)
Dept: BARIATRICS/WEIGHT MGMT | Age: 56
End: 2021-09-24

## 2021-09-24 VITALS
OXYGEN SATURATION: 97 % | BODY MASS INDEX: 25.11 KG/M2 | WEIGHT: 160 LBS | HEIGHT: 67 IN | RESPIRATION RATE: 16 BRPM | DIASTOLIC BLOOD PRESSURE: 70 MMHG | SYSTOLIC BLOOD PRESSURE: 123 MMHG | TEMPERATURE: 97.8 F | HEART RATE: 99 BPM

## 2021-09-24 LAB
ANION GAP SERPL CALCULATED.3IONS-SCNC: 12 MMOL/L (ref 4–16)
BASOPHILS ABSOLUTE: 0 K/CU MM
BASOPHILS RELATIVE PERCENT: 0.3 % (ref 0–1)
BUN BLDV-MCNC: 18 MG/DL (ref 6–23)
CALCIUM SERPL-MCNC: 8.8 MG/DL (ref 8.3–10.6)
CHLORIDE BLD-SCNC: 104 MMOL/L (ref 99–110)
CO2: 21 MMOL/L (ref 21–32)
CREAT SERPL-MCNC: 0.9 MG/DL (ref 0.6–1.1)
DIFFERENTIAL TYPE: ABNORMAL
EOSINOPHILS ABSOLUTE: 0 K/CU MM
EOSINOPHILS RELATIVE PERCENT: 0 % (ref 0–3)
GFR AFRICAN AMERICAN: >60 ML/MIN/1.73M2
GFR NON-AFRICAN AMERICAN: >60 ML/MIN/1.73M2
GLUCOSE BLD-MCNC: 161 MG/DL (ref 70–99)
GLUCOSE BLD-MCNC: 175 MG/DL (ref 70–99)
HCT VFR BLD CALC: 35.8 % (ref 37–47)
HEMOGLOBIN: 12.6 GM/DL (ref 12.5–16)
IMMATURE NEUTROPHIL %: 0.2 % (ref 0–0.43)
LYMPHOCYTES ABSOLUTE: 1.7 K/CU MM
LYMPHOCYTES RELATIVE PERCENT: 13.6 % (ref 24–44)
MCH RBC QN AUTO: 27.2 PG (ref 27–31)
MCHC RBC AUTO-ENTMCNC: 35.2 % (ref 32–36)
MCV RBC AUTO: 77.2 FL (ref 78–100)
MONOCYTES ABSOLUTE: 0.9 K/CU MM
MONOCYTES RELATIVE PERCENT: 7.4 % (ref 0–4)
NUCLEATED RBC %: 0 %
PDW BLD-RTO: 14.2 % (ref 11.7–14.9)
PLATELET # BLD: 288 K/CU MM (ref 140–440)
PMV BLD AUTO: 10.6 FL (ref 7.5–11.1)
POTASSIUM SERPL-SCNC: 4.7 MMOL/L (ref 3.5–5.1)
RBC # BLD: 4.64 M/CU MM (ref 4.2–5.4)
SEGMENTED NEUTROPHILS ABSOLUTE COUNT: 9.6 K/CU MM
SEGMENTED NEUTROPHILS RELATIVE PERCENT: 78.5 % (ref 36–66)
SODIUM BLD-SCNC: 137 MMOL/L (ref 135–145)
TOTAL IMMATURE NEUTOROPHIL: 0.03 K/CU MM
TOTAL NUCLEATED RBC: 0 K/CU MM
WBC # BLD: 12.3 K/CU MM (ref 4–10.5)

## 2021-09-24 PROCEDURE — 85025 COMPLETE CBC W/AUTO DIFF WBC: CPT

## 2021-09-24 PROCEDURE — 2580000003 HC RX 258: Performed by: SURGERY

## 2021-09-24 PROCEDURE — 82962 GLUCOSE BLOOD TEST: CPT

## 2021-09-24 PROCEDURE — 6370000000 HC RX 637 (ALT 250 FOR IP): Performed by: SURGERY

## 2021-09-24 PROCEDURE — 80048 BASIC METABOLIC PNL TOTAL CA: CPT

## 2021-09-24 PROCEDURE — 36415 COLL VENOUS BLD VENIPUNCTURE: CPT

## 2021-09-24 PROCEDURE — G0378 HOSPITAL OBSERVATION PER HR: HCPCS

## 2021-09-24 PROCEDURE — 6360000002 HC RX W HCPCS: Performed by: SURGERY

## 2021-09-24 PROCEDURE — 99024 POSTOP FOLLOW-UP VISIT: CPT | Performed by: SURGERY

## 2021-09-24 RX ORDER — ONDANSETRON 4 MG/1
4 TABLET, ORALLY DISINTEGRATING ORAL 3 TIMES DAILY PRN
Qty: 21 TABLET | Refills: 2 | Status: SHIPPED | OUTPATIENT
Start: 2021-09-24

## 2021-09-24 RX ORDER — HYDROCODONE BITARTRATE AND ACETAMINOPHEN 5; 325 MG/1; MG/1
1 TABLET ORAL EVERY 6 HOURS PRN
Qty: 20 TABLET | Refills: 0 | Status: SHIPPED | OUTPATIENT
Start: 2021-09-24 | End: 2021-09-29

## 2021-09-24 RX ORDER — AMOXICILLIN 250 MG
1 CAPSULE ORAL DAILY
Qty: 14 TABLET | Refills: 0 | Status: SHIPPED | OUTPATIENT
Start: 2021-09-24 | End: 2021-10-08

## 2021-09-24 RX ADMIN — CEFAZOLIN SODIUM 2000 MG: 2 INJECTION, SOLUTION INTRAVENOUS at 06:06

## 2021-09-24 RX ADMIN — ZINC SULFATE 220 MG (50 MG) CAPSULE 50 MG: CAPSULE at 09:10

## 2021-09-24 RX ADMIN — Medication 1 PACKET: at 09:10

## 2021-09-24 RX ADMIN — BUDESONIDE AND FORMOTEROL FUMARATE DIHYDRATE 2 PUFF: 80; 4.5 AEROSOL RESPIRATORY (INHALATION) at 07:50

## 2021-09-24 RX ADMIN — POTASSIUM CHLORIDE 20 MEQ: 1500 TABLET, EXTENDED RELEASE ORAL at 08:19

## 2021-09-24 RX ADMIN — VENLAFAXINE 37.5 MG: 37.5 TABLET ORAL at 08:19

## 2021-09-24 RX ADMIN — Medication 1 TABLET: at 08:19

## 2021-09-24 RX ADMIN — OXYCODONE HYDROCHLORIDE AND ACETAMINOPHEN 1000 MG: 500 TABLET ORAL at 09:10

## 2021-09-24 RX ADMIN — HYDROXYZINE HYDROCHLORIDE 25 MG: 25 TABLET, FILM COATED ORAL at 07:48

## 2021-09-24 RX ADMIN — OXYCODONE HYDROCHLORIDE 15 MG: 5 TABLET ORAL at 06:05

## 2021-09-24 RX ADMIN — HYDROCHLOROTHIAZIDE 25 MG: 25 TABLET ORAL at 09:10

## 2021-09-24 RX ADMIN — ATORVASTATIN CALCIUM 80 MG: 20 TABLET, FILM COATED ORAL at 08:22

## 2021-09-24 RX ADMIN — ACYCLOVIR 400 MG: 200 CAPSULE ORAL at 09:11

## 2021-09-24 RX ADMIN — ACETAMINOPHEN 650 MG: 325 TABLET ORAL at 06:06

## 2021-09-24 RX ADMIN — Medication 1 TABLET: at 09:10

## 2021-09-24 RX ADMIN — Medication 10 ML: at 08:18

## 2021-09-24 RX ADMIN — ALOGLIPTIN 6.25 MG: 6.25 TABLET, FILM COATED ORAL at 09:10

## 2021-09-24 RX ADMIN — PANTOPRAZOLE SODIUM 40 MG: 40 TABLET, DELAYED RELEASE ORAL at 07:05

## 2021-09-24 RX ADMIN — OXYCODONE HYDROCHLORIDE 15 MG: 5 TABLET ORAL at 01:48

## 2021-09-24 ASSESSMENT — PAIN SCALES - GENERAL
PAINLEVEL_OUTOF10: 8
PAINLEVEL_OUTOF10: 6

## 2021-09-24 NOTE — DISCHARGE SUMMARY
Physician Discharge Summary     Patient ID:  Vanita Rider  [id number]  49 y.o.  1965    Admit date: 9/23/2021    Discharge date and time: No discharge date for patient encounter. Admission Physician: Dr. Ana Chou    Discharge Physician: Mina Villagran MD    Admission Diagnoses: Pannus, abdominal [E65]    Discharge Diagnoses: same    Admission Condition:good    Discharged Condition: good    Indication for Admission: elective panniculectomy    Hospital Course:  Patient had uneventful hospital course. Patient was able to tolerate diet, ambulate, and have bowel function present prior to discharge. Consults: hospitalist    Outstanding Order Results     No orders found for last 30 day(s). Treatments: analgesia: Dilaudid and surgery: panniculectomy    Discharge Exam:  Physical Exam  See daily progress note    Disposition: home    Patient Instructions: Activity: activity as tolerated, no driving while on analgesics and no heavy lifting for 4 weeks  Diet:regular diet  Wound Care: keep wound clean and dry    Follow-up with Dr. Mina Villagran by calling [phone]. The Office staff will determine follow up time perprotocol.     Signed:  YESSICA Sweet - EVANGELINA

## 2021-09-24 NOTE — PROGRESS NOTES
Discharge instructions reviewed w/ pt, drain teaching reviewed w/ pt, repeat demonstration by patient.  Pt dressing; IV removed, pt discharged via daughter driving

## 2021-09-24 NOTE — PROGRESS NOTES
General Surgery-Dr. Jenny Rogers Day: 2    Chief Complaint on Admission: elective panniculectomy      Subjective:     Doing well. Some abdominal soreness. Denies F/C. Denies N/V. Tolerating PO. Some c/o back pain from lying in the bed and states she usually sleeps on her side. ROS:  Review of Systems  Negative except as above. Allergies  Other          Diagnosis Date    Anxiety     Arthritis     \"Back, Hands, Shoulders\"    Chronic back pain     Arthritis - NKI    COPD (chronic obstructive pulmonary disease) ([facility] [address]     Sees Dr. Eduarda Baca with Mental Health    Diabetes mellitus ([facility] [address] Dx 2008    Type II - follows with PCP    Emphysema     Fibromyalgia     H/O abdominoplasty 2018    H/O cardiovascular stress test 2019    ECG portion of stress test is neg for ischemia by diagnostic criteria. No infarct or ischemia noted. Normal stress myocardial perfusion. This is a normal study    H/O echocardiogram 2019    Left ventricular function is normal. Ejection fraction is visually estimated at 55-60%. No significant valvular abnormalitites.  H/O tilt table evaluation 2021    Orthostatic syncope.  Hyperlipidemia     Hypertension     Follows with PCP    Migraines Last Migraine 21    Mobitz (type) I (Wenckebach's) atrioventricular block 2020    Neuropathy     From feet to knees    Piriformis syndrome 2016    Psoriasis     Rotator cuff impingement syndrome, right 2018    RTC repair Dr Adrienne Nieto   Treated by Dr Janell Hawkins 17  Arthrocentesis 17    Shortness of breath on exertion     Syncope and collapse 2019    Teeth missing     Upper And Lower    Wears glasses        Objective:     Vitals:    21 0730   BP:    Pulse: 78   Resp: 18   Temp: 97.5 °F (36.4 °C)   SpO2: 98%       TEMPERATURE:  Current -Temp: 97.5 °F (36.4 °C);  Max - Temp  Av.1 °F (35.6 °C)  Min: 90.7 °F (32.6 °C)  Max: 97.5 °F (36.4 °C)    I/O this shift:  In: 130 [P.O.:120; I.V.:10]  Out: - I/O last 3 completed shifts: In: 1822 [P.O.:622; I.V.:1200]  Out: 1220 [Urine:1200; Blood:20]      Physical Exam:  Physical Exam  A&Ox3, NAD at rest  AT. NC. Breathing unlabored. RRR.   Soft, min and appropriately TTP, ND, no PS, incisions with dressings are C/D/I and there are two JEREL drains one with nothing and other with minimal dark sang output  MICHAEL  Warm, dry    Scheduled Meds:   venlafaxine  37.5 mg Oral BID    potassium chloride  20 mEq Oral BID    psyllium  1 Package Oral Daily    tiotropium  2 puff Inhalation Daily    therapeutic multivitamin-minerals  1 tablet Oral Daily    ascorbic acid  1,000 mg Oral Daily    zinc sulfate  50 mg Oral Daily    alogliptin  6.25 mg Oral Daily    pantoprazole  40 mg Oral QAM AC    atorvastatin  80 mg Oral Daily    acyclovir  400 mg Oral TID    hydroCHLOROthiazide  25 mg Oral QAM    budesonide-formoterol  2 puff Inhalation BID    sodium chloride flush  10 mL IntraVENous 2 times per day    morphine  15 mg Oral 2 times per day    calcium-cholecalciferol  1 tablet Oral Daily     ContinuousInfusions:   lactated ringers 100 mL/hr at 09/23/21 1700    dextrose 5% and 0.45% NaCl with KCl 20 mEq 125 mL/hr at 09/23/21 1750    sodium chloride       PRN Meds:ALPRAZolam, tiZANidine, albuterol sulfate HFA, hydrOXYzine, traZODone, midodrine, sodium chloride flush, sodium chloride, potassium chloride, magnesium sulfate, HYDROmorphone, oxyCODONE **AND** acetaminophen      Labs/Imaging Results:   Lab Results   Component Value Date    WBC 12.3 (H) 09/24/2021    HGB 12.6 09/24/2021    HCT 35.8 (L) 09/24/2021    MCV 77.2 (L) 09/24/2021     09/24/2021     Lab Results   Component Value Date     09/24/2021    K 4.7 09/24/2021     09/24/2021    CO2 21 09/24/2021    BUN 18 09/24/2021    CREATININE 0.9 09/24/2021    GLUCOSE 161 (H) 09/24/2021    CALCIUM 8.8 09/24/2021    PROT 7.3 05/26/2021    LABALBU 4.6 05/26/2021    BILITOT 0.8 05/26/2021    ALKPHOS 87 05/26/2021    AST 20 05/26/2021    ALT 15 05/26/2021    LABGLOM >60 09/24/2021    GFRAA >60 09/24/2021    AGRATIO 1.3 10/21/2020    GLOB 3.2 10/21/2020       Assessment:       50 y/o F POD 1 s/p panniculectomy    Plan:       -Diet as tolerated  -Pain control  -Drain care and teaching prior to d/c  -Home with abdominal binder  -D/c today  -F/u with Dr. Babak Stanton as scheduled  -Above plan d/w pt. All questions answered.        Electronically signed by Donna Salter II, MD on 9/24/2021 at 8:59 AM

## 2021-09-24 NOTE — FLOWSHEET NOTE
Referral visit. This  provided post/op pastoral care to patient. Patient was in good spirit and stated that things are going well. Patient said that she is going home in a few hours. Patient denied any needs at this time.  Spiritual care will follow up as needed

## 2021-09-29 ENCOUNTER — OFFICE VISIT (OUTPATIENT)
Dept: CARDIOLOGY CLINIC | Age: 56
End: 2021-09-29

## 2021-09-29 VITALS
DIASTOLIC BLOOD PRESSURE: 86 MMHG | SYSTOLIC BLOOD PRESSURE: 140 MMHG | WEIGHT: 163.2 LBS | RESPIRATION RATE: 16 BRPM | BODY MASS INDEX: 25.56 KG/M2 | HEART RATE: 74 BPM

## 2021-09-29 DIAGNOSIS — I10 ESSENTIAL HYPERTENSION: ICD-10-CM

## 2021-09-29 DIAGNOSIS — I44.1 MOBITZ TYPE 1 SECOND DEGREE AV BLOCK: Primary | ICD-10-CM

## 2021-09-29 PROCEDURE — 99999 PR OFFICE/OUTPT VISIT,PROCEDURE ONLY: CPT | Performed by: NURSE PRACTITIONER

## 2021-09-30 ENCOUNTER — OFFICE VISIT (OUTPATIENT)
Dept: BARIATRICS/WEIGHT MGMT | Age: 56
End: 2021-09-30

## 2021-09-30 VITALS
WEIGHT: 162.7 LBS | DIASTOLIC BLOOD PRESSURE: 82 MMHG | SYSTOLIC BLOOD PRESSURE: 130 MMHG | BODY MASS INDEX: 26.15 KG/M2 | HEART RATE: 72 BPM | HEIGHT: 66 IN

## 2021-09-30 DIAGNOSIS — Z98.890 S/P PANNICULECTOMY: Primary | ICD-10-CM

## 2021-09-30 PROCEDURE — 99024 POSTOP FOLLOW-UP VISIT: CPT | Performed by: SURGERY
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
## 2021-10-06 ENCOUNTER — HOSPITAL ENCOUNTER (OUTPATIENT)
Dept: SLEEP CENTER | Age: 56
Discharge: HOME | End: 2021-10-06
Payer: COMMERCIAL

## 2021-10-06 DIAGNOSIS — J44.9 COPD, SEVERE (HCC): ICD-10-CM

## 2021-10-06 DIAGNOSIS — G47.10 HYPERSOMNIA: ICD-10-CM

## 2021-10-06 DIAGNOSIS — Z87.891 EX-SMOKER: ICD-10-CM

## 2021-10-06 DIAGNOSIS — G47.33 OSA (OBSTRUCTIVE SLEEP APNEA): ICD-10-CM

## 2021-10-06 PROCEDURE — 99214 OFFICE O/P EST MOD 30 MIN: CPT | Performed by: INTERNAL MEDICINE

## 2021-10-06 PROCEDURE — 9990000010 HC NO CHARGE VISIT

## 2021-10-06 ASSESSMENT — ENCOUNTER SYMPTOMS
COUGH: 0
BACK PAIN: 0
EYE DISCHARGE: 0
SHORTNESS OF BREATH: 0
ABDOMINAL DISTENTION: 0
ABDOMINAL PAIN: 0
EYE ITCHING: 0

## 2021-10-06 NOTE — ASSESSMENT & PLAN NOTE
Advised to c/w quitting smoking  PFT and Low Dose CT chest in 1 year  C/w Inhalers  No flu vaccine per patient

## 2021-10-06 NOTE — ASSESSMENT & PLAN NOTE
She has no URIEL  Her EDS is because is sec to the Insufficient sleep and the Opiods and Benzo and Muscle relaxants she is on  Sleep Hygiene measures

## 2021-10-06 NOTE — PROGRESS NOTES
Imer Chapa  1965  Referring Provider: YESSICA Holloway    Subjective:     Chief Complaint   Patient presents with    Sleep Apnea     G47.33    2 Week Follow-Up       HPI  Vi Alanis is a 49 y.o. female is doing a telephone follow up visit. She had a PSG done on 09/17/21 and it showed that she has no URIEL with an AHI of 1.5 and desat to 86% and sleep efficiency is 82.3%. She has no loss of weight. She is still sleepy and tired during the day time. She goes to bed at 10 PM and finally fall asleep by 1 AM to 2 AM. She watches TV. She gets up at 7:30 AM. She is on Percocet 10 mg 4 times and Xanax 0.5 mg, Zanaflex TID. She has a 32 pk yr smoking quit 3 years. She has no symptoms. She had her flu vaccine but no flu vaccine. She had a PFT done on 09/02/21 and It showed that she has Moderate obstructive airway disease with FEV1 of 1.14 litres( 45%) and moderate decrease in DLCO of 57%.     Current Outpatient Medications   Medication Sig Dispense Refill    Elastic Bandages & Supports (ABDOMINAL BINDER/ELASTIC MED) MISC 1 each by Does not apply route daily 1 each 0    Blood Pressure Monitoring (BLOOD PRESSURE KIT) DAYTON 1 Device by Does not apply route daily 1 each 0    ondansetron (ZOFRAN-ODT) 4 MG disintegrating tablet Take 1 tablet by mouth 3 times daily as needed for Nausea or Vomiting 21 tablet 2    senna-docusate (SENOKOT S) 8.6-50 MG per tablet Take 1 tablet by mouth daily for 14 days 14 tablet 0    Blood Pressure KIT 1 each by Does not apply route daily 1 kit 0    albuterol sulfate HFA (PROAIR HFA) 108 (90 Base) MCG/ACT inhaler Inhale 2 puffs into the lungs every 6 hours as needed for Wheezing 18 g 5    tiotropium (SPIRIVA RESPIMAT) 2.5 MCG/ACT AERS inhaler Inhale 2 puffs into the lungs daily 1 each 5    fluticasone-vilanterol (BREO ELLIPTA) 100-25 MCG/INH AEPB inhaler Inhale 1 puff into the lungs daily 1 each 5    midodrine (PROAMATINE) 2.5 MG tablet Take 1 tablet by mouth 3 times daily as needed (SBP < 100) (Patient taking differently: Take 2.5 mg by mouth 2 times daily ) 90 tablet 3    Compression Stockings MISC by Does not apply route 20 - 30 mmh wear daily and take off at night  Thigh High 2 each 1    rizatriptan (MAXALT) 10 MG tablet Take 10 mg by mouth once as needed for Migraine May repeat in 2 hours if needed       hydroCHLOROthiazide (HYDRODIURIL) 25 MG tablet Take 1 tablet by mouth every morning 30 tablet 5    valACYclovir (VALTREX) 500 MG tablet Take 1 tablet by mouth daily 30 tablet 5    omeprazole (PRILOSEC) 40 MG delayed release capsule Take 1 capsule by mouth daily 90 capsule 2    atorvastatin (LIPITOR) 80 MG tablet Take 1 tablet by mouth daily 90 tablet 2    Blood Glucose Monitoring Suppl (TRUE METRIX AIR GLUCOSE METER) w/Device KIT 1 Device by Does not apply route continuous 1 kit 0    TRUEplus Lancets 28G MISC 1 each by Does not apply route daily 100 each 3    blood glucose test strips (TRUE METRIX PRO BLOOD GLUCOSE) strip 1 each by In Vitro route daily As needed. 100 each 3    traZODone (DESYREL) 100 MG tablet Take 100 mg by mouth nightly as needed       ascorbic acid (V-R VITAMIN C) 250 MG tablet Take 4 tablets by mouth daily 30 tablet 3    zinc sulfate (ORAZINC) 220 (50 Zn) MG capsule Take 4 capsules by mouth daily 30 capsule 3    Nutritional Supplements (ENSURE HIGH PROTEIN) LIQD Take 1 Can by mouth Daily with lunch 32 Can 5    Multiple Vitamins-Minerals (THERAPEUTIC MULTIVITAMIN-MINERALS) tablet Take 1 tablet by mouth daily 90 tablet 3    Calcium Carb-Cholecalciferol (CALCIUM-VITAMIN D) 600-400 MG-UNIT TABS Take 1 tablet by mouth daily 90 tablet 3    hydrOXYzine (ATARAX) 25 MG tablet TAKE 1 TABLET EVERY 8 HOURS AS NEEDED FOR ANXIETY AVOID ALCOHOL/CAUSES DROWSINESS.   Do not take with Xanax 60 tablet 1    albuterol sulfate HFA (PROAIR HFA) 108 (90 Base) MCG/ACT inhaler Inhale 2 puffs into the lungs every 6 hours as needed for Wheezing 1 Inhaler 5    tiotropium (SPIRIVA RESPIMAT) 2.5 MCG/ACT AERS inhaler Inhale 2 puffs into the lungs daily 1 Inhaler 5    calcipotriene (DOVONEX) 0.005 % ointment       clobetasol (TEMOVATE) 0.05 % ointment       tiZANidine (ZANAFLEX) 4 MG tablet Take 4 mg by mouth every 8 hours as needed       oxyCODONE-acetaminophen (PERCOCET) 7.5-325 MG per tablet Take 1 tablet by mouth every 8 hours as needed for Pain (Patient states takes every 6 hours).  Psyllium (METAMUCIL) 48.57 % POWD Take 1 Units by mouth daily 1 Bottle 5    potassium chloride (KLOR-CON M) 20 MEQ extended release tablet Take 1 tablet by mouth 2 times daily 180 tablet 1    venlafaxine (EFFEXOR) 37.5 MG tablet Take 37.5 mg by mouth 2 times daily      fluticasone-vilanterol (BREO ELLIPTA) 100-25 MCG/INH AEPB inhaler Inhale 1 puff into the lungs daily 1 each 5    ALPRAZolam (XANAX) 0.5 MG tablet Take 0.5 mg by mouth as needed for Sleep. Gualberto Keepers alogliptin (NESINA) 6.25 MG TABS tablet Take 1 tablet by mouth daily 90 tablet 1     No current facility-administered medications for this encounter. Allergies   Allergen Reactions    Other      Patient states the electrodes irritated her skin. Past Medical History:   Diagnosis Date    Anxiety     Arthritis     \"Back, Hands, Shoulders\"    Chronic back pain     Arthritis - NKI    COPD [facility]chronic obstructive pulmonary disease) ([facility])     Sees Dr. Edson Gabriel with Mental Health    [facility] Dx 2008    Type II - follows with PCP    Emphysema     Fibromyalgia     H/O abdominoplasty 12/21/2018    H/O cardiovascular stress test 03/04/2019    ECG portion of stress test is neg for ischemia by diagnostic criteria. No infarct or ischemia noted. Normal stress myocardial perfusion. This is a normal study    H/O echocardiogram 03/04/2019    Left ventricular function is normal. Ejection fraction is visually estimated at 55-60%. No significant valvular abnormalitites.      H/O tilt table evaluation 08/26/2021    Orthostatic syncope.     Hyperlipidemia     Hypertension     Follows with PCP    Migraines Last Migraine 7/20/21    Mobitz (type) I (Wenckebach's) atrioventricular block 01/29/2020    Neuropathy     From feet to knees    Piriformis syndrome 06/24/2016    Psoriasis     Rotator cuff impingement syndrome, right 02/26/2018    RTC repair Dr Jason Pearce 2015  Treated by Dr Bull Tapia 2/18/17  Arthrocentesis 12/19/17    Shortness of breath on exertion     Syncope and collapse 03/04/2019    Teeth missing     Upper And Lower    Wears glasses        Past Surgical History:   Procedure Laterality Date    ABDOMEN SURGERY N/A 1/14/2019    SECONDARY CLOSURE OF DEHISCED ABDOMINAL WOUND performed by Heather Dueñas MD at [facility] 71 N/A 1/24/2019    IRRIGATION OF LOWER ABDOMINAL WOUND performed by Heather Dueñas MD at [facility] 73 ABDOMINOPLASTY N/A 7/29/2019    ABDOMINOPLASTY REVISION performed by Heather Dueñas MD at [facility] 75 Right 1/29/2020    EXCISION OF RIGHT BREAST SABACEOUS CYST performed by Roni Vasques MD at [facility] 19 Right 2017    Dr. Bull Tapia (Right)   [address]    Tubal Ligation Also Done In 1997    COLONOSCOPY  03/2017    Polyp Removed - Dr. Marian San COLONOSCOPY N/A 7/27/2020    COLONOSCOPY POLYPECTOMY SNARE/COLD BIOPSY performed by Heather Dueñas MD at [address] [place] [address]      Teeth Extracted In Past    ELBOW SURGERY Bilateral Last Done In 2013    Right Done Twice, Left Done Once left cubital tunnel release 2009; right 2014    ENDOSCOPY, COLON, DIAGNOSTIC  03/2017    HERNIA REPAIR  10/26/2017    hiatal hernia with gastrectomy    HYSTERECTOMY VAGINAL  2000    LAPAROSCOPY  2000    LIPECTOMY N/A 9/23/2021    PANNICULECTOMY performed by Heather Dueñas MD at [place] [address] [address] [facility] N/A 11/29/2018    ABDOMINOPLASTY performed by [facility] Vin Zendejas MD at [facility]     Dr. Sparkle Cortez 2017    Dr Darshana Brown  10/26/2017    Robotic Laparoscopic, Pre Op Weight 237  Or 238 lbs.  TUBAL LIGATION  1997    Done With        Social History     Socioeconomic History    Marital status: Single     Spouse name: Not on file    Number of children: Not on file    Years of education: Not on file    Highest education level: Not on file   Occupational History    Occupation: unemployed   Tobacco Use    Smoking status: Former Smoker     Packs/day: 0.25     Years: 30.00     Pack years: 7.50     Types: Cigarettes, E-Cigarettes     Start date:      Quit date:      Years since quittin.7    Smokeless tobacco: Never Used   Vaping Use    Vaping Use: Never used   Substance and Sexual Activity    Alcohol use: Yes     Comment: \"Occ. Maybe Twice A Month\"    Drug use: Not on file     Comment: Marijuana Card  - last used: 21    Sexual activity: Yes     Partners: Male   Other Topics Concern    Not on file   Social History Narrative    Not on file     Social Determinants of Health     Financial Resource Strain:     Difficulty of Paying Living Expenses:    Food Insecurity:     Worried About Running Out of Food in the Last Year:     [address] in the Last Year:    Transportation Needs:     Lack of Transportation (Medical):      Lack of Transportation (Non-Medical):    Physical Activity:     Days of Exercise per Week:     Minutes of Exercise per Session:    Stress:     Feeling of Stress :    Social Connections:     Frequency of Communication with Friends and Family:     Frequency of Social Gatherings with Friends and Family:     Attends Jewish Services:     Active Member of Clubs or Organizations:     Attends Club or Organization Meetings:     Marital Status:    Intimate Partner Violence:     Fear of Current or Ex-Partner:     Emotionally Abused:  Physically Abused:     Sexually Abused:        Review of Systems   Constitutional: Positive for fatigue. HENT: Negative for congestion and postnasal drip. Eyes: Negative for discharge and itching. Respiratory: Negative for cough and shortness of breath. Cardiovascular: Negative for chest pain and leg swelling. Gastrointestinal: Negative for abdominal distention and abdominal pain. Endocrine: Negative for cold intolerance and heat intolerance. Genitourinary: Negative for enuresis and frequency. Musculoskeletal: Negative for arthralgias and back pain. Allergic/Immunologic: Negative for environmental allergies and food allergies. Neurological: Negative for light-headedness and headaches. Hematological: Negative for adenopathy. Psychiatric/Behavioral: Negative for agitation and behavioral problems. Objective: There were no vitals taken for this visit. There is no height or weight on file to calculate BMI.   Sleep Medicine 8/5/2021   Sitting and reading 0   Watching TV 2   Sitting, inactive in a public place (e.g. a theatre or a meeting) 0   As a passenger in a car for an hour without a break 0   Lying down to rest in the afternoon when circumstances permit 2   Sitting and talking to someone 0   Sitting quietly after a lunch without alcohol 0   In a car, while stopped for a few minutes in traffic 0   Total score 4       Radiology: None    Assessment and Plan     Problem List        Pulmonary Problems    COPD, severe ([address]      Advised to c/w quitting smoking  PFT and Low Dose CT chest in 1 year  C/w Inhalers  No flu vaccine per patient           Relevant Medications    fluticasone-vilanterol (BREO ELLIPTA) 100-25 MCG/INH AEPB inhaler    albuterol sulfate HFA (PROAIR HFA) 108 (90 Base) MCG/ACT inhaler    tiotropium (SPIRIVA RESPIMAT) 2.5 MCG/ACT AERS inhaler    albuterol sulfate HFA (PROAIR HFA) 108 (90 Base) MCG/ACT inhaler    tiotropium (SPIRIVA RESPIMAT) 2.5 MCG/ACT AERS inhaler fluticasone-vilanterol (BREO ELLIPTA) 100-25 MCG/INH AEPB inhaler    Other Relevant Orders    CT LUNG SCREENING    Full PFT Study With Bronchodilator    URIEL (obstructive sleep apnea)      She has no URIEL  Her EDS is because is sec to the Insufficient sleep and the Opiods and Benzo and Muscle relaxants she is on  Sleep Hygiene measures            Other    Hypersomnia      She has no URIEL  And her EDS is sec to the insufficient sleep and pain medications she is on         Ex-smoker      Advised to c/w quitting smoking         Relevant Orders    CT LUNG SCREENING    Full PFT Study With Bronchodilator               No follow-ups on file. Follow-Up:    No follow-ups on file. Progress notes sent to the referring Provider    Renate Gilford is a 49 y.o. female being evaluated by a Virtual Visit (video visit) encounter to address concerns as mentioned above. A caregiver was present when appropriate. Due to this being a TeleHealth encounter ([facility] public health emergency), evaluation of the following organ systems was limited: Vitals/Constitutional/EENT/Resp/CV/GI//MS/Neuro/Skin/Heme-Lymph-Imm. Pursuant to the emergency declaration under the [address] [place] [address], [address] [place] [address] authority and the [employer] and [employer] Act, this Virtual Visit was conducted with patient's (and/or legal guardian's) consent, to reduce the patient's risk of exposure to COVID-19 and provide necessary medical care. The patient (and/or legal guardian) has also been advised to contact this office for worsening conditions or problems, and seek emergency medical treatment and/or call 911 if deemed necessary. Patient identification was verified at the start of the visit: Yes    Total time spent for this encounter: 21 minutes    Services were provided through a video synchronous discussion virtually to substitute for in-person clinic visit.  Patient and provider were located at their individual homes. --Jacob Matta MD on 10/6/2021 at 9:22 AM    An electronic signature was used to authenticate this note.      Jacob Matta MD MD  10/6/2021  9:22 AM

## 2021-10-08 ENCOUNTER — TELEPHONE (OUTPATIENT)
Dept: SURGERY | Age: 56
End: 2021-10-08

## 2021-10-08 NOTE — TELEPHONE ENCOUNTER
Pt called into the office stating that her drain tube is not draining,- and is leaking around her sx site of the drain. pt has an appt with on tue.  Please advise

## 2021-10-11 ENCOUNTER — TELEPHONE (OUTPATIENT)
Dept: BARIATRICS/WEIGHT MGMT | Age: 56
End: 2021-10-11

## 2021-10-12 ENCOUNTER — OFFICE VISIT (OUTPATIENT)
Dept: BARIATRICS/WEIGHT MGMT | Age: 56
End: 2021-10-12

## 2021-10-12 VITALS
DIASTOLIC BLOOD PRESSURE: 90 MMHG | HEIGHT: 66 IN | SYSTOLIC BLOOD PRESSURE: 130 MMHG | HEART RATE: 77 BPM | OXYGEN SATURATION: 89 % | BODY MASS INDEX: 26.2 KG/M2 | WEIGHT: 163 LBS

## 2021-10-12 DIAGNOSIS — Z98.890 S/P PANNICULECTOMY: Primary | ICD-10-CM

## 2021-10-12 PROCEDURE — 99024 POSTOP FOLLOW-UP VISIT: CPT | Performed by: NURSE PRACTITIONER

## 2021-10-12 NOTE — PROGRESS NOTES
Post-Operative Clinic Note    Chief Complaint   Patient presents with    Weight Management     abd leaking w/1 drain not draining         SUBJECTIVE:  Patient is here today for a post-operative visit. Patient is s/p panniculectomy with Dr. Phill Valentino on 9/23/2021    She is here today with complaints of increase drainage from her belly button. Dressing taken off with serous drainage. Lower abdominal incision is healing nicely. No signs of infections    Right JEREL drain in place with minimal output since Friday.     No signs of infection noted    Denies fevers/chills    Past Surgical History:   Procedure Laterality Date    ABDOMEN SURGERY N/A 1/14/2019    SECONDARY CLOSURE OF DEHISCED ABDOMINAL WOUND performed by Divya Travis MD at [address] N/A 1/24/2019    IRRIGATION OF LOWER ABDOMINAL WOUND performed by Divya Travis MD at [facility] 73 ABDOMINOPLASTY N/A 7/29/2019    ABDOMINOPLASTY REVISION performed by Divya Travis MD at [facility] 75 Right 1/29/2020    EXCISION OF RIGHT BREAST SABACEOUS CYST performed by Irma Salvador MD at [facility] 19 Right 2017    Dr. Irasema Best (Right)   [address]    Tubal Ligation Also Done In 1997    COLONOSCOPY  03/2017    Polyp Removed - Dr. Viviane Paget COLONOSCOPY N/A 7/27/2020    COLONOSCOPY POLYPECTOMY SNARE/COLD BIOPSY performed by Divya Travis MD at [facility]      Teeth Extracted In Past    ELBOW SURGERY Bilateral Last Done In 2013    Right Done Twice, Left Done Once left cubital tunnel release 2009; right 2014    ENDOSCOPY, COLON, DIAGNOSTIC  03/2017    HERNIA REPAIR  10/26/2017    hiatal hernia with gastrectomy    HYSTERECTOMY VAGINAL  2000    LAPAROSCOPY  2000    LIPECTOMY N/A 9/23/2021    PANNICULECTOMY performed by Divya Travis MD at [place] [address] [address] [facility] N/A 11/29/2018    ABDOMINOPLASTY \"Kidney Cancer\"    High Blood Pressure Mother     High Blood Pressure Sister     Diabetes Brother     Early Death Daughter 21        \"Killed In A Car Accident\"     Social History     Socioeconomic History    Marital status: Single     Spouse name: Not on file    Number of children: Not on file    Years of education: Not on file    Highest education level: Not on file   Occupational History    Occupation: unemployed   Tobacco Use    Smoking status: Former Smoker     Packs/day: 0.25     Years: 30.00     Pack years: 7.50     Types: Cigarettes, E-Cigarettes     Start date:      Quit date:      Years since quittin.7    Smokeless tobacco: Never Used   Vaping Use    Vaping Use: Never used   Substance and Sexual Activity    Alcohol use: Yes     Comment: \"Occ. Maybe Twice A Month\"    Drug use: Not on file     Comment: Marijuana Card  - last used: 21    Sexual activity: Yes     Partners: Male   Other Topics Concern    Not on file   Social History Narrative    Not on file     Social Determinants of Health     Financial Resource Strain:     Difficulty of Paying Living Expenses:    Food Insecurity:     Worried About Running Out of Food in the Last Year:     [address] in the Last Year:    Transportation Needs:     Lack of Transportation (Medical):      Lack of Transportation (Non-Medical):    Physical Activity:     Days of Exercise per Week:     Minutes of Exercise per Session:    Stress:     Feeling of Stress :    Social Connections:     Frequency of Communication with Friends and Family:     Frequency of Social Gatherings with Friends and Family:     Attends Rastafarian Services:     Active Member of Clubs or Organizations:     Attends Club or Organization Meetings:     Marital Status:    Intimate Partner Violence:     Fear of Current or Ex-Partner:     Emotionally Abused:     Physically Abused:     Sexually Abused:        OBJECTIVE:  Physical Exam  Constitutional: Appearance: Normal appearance. She is normal weight. HENT:      Head: Normocephalic and atraumatic. Right Ear: External ear normal.      Left Ear: External ear normal.      Nose: Nose normal.      Mouth/Throat:      Mouth: Mucous membranes are moist.   Cardiovascular:      Rate and Rhythm: Normal rate and regular rhythm. Pulses: Normal pulses. Heart sounds: Normal heart sounds. Pulmonary:      Effort: Pulmonary effort is normal.      Breath sounds: Normal breath sounds. Abdominal:      General: Abdomen is flat. There is distension. Palpations: Abdomen is soft. Comments: Appears slightly distended. Likely from seroma formation. When palpating abdomen drainage leaks from reimplanted umbilicus. JEREL drain to right lower abdomen with minimal output   Musculoskeletal:         General: Normal range of motion. Cervical back: Normal range of motion and neck supple. Skin:     General: Skin is warm. Comments: Lower abdominal incision. Still with some of the Dermabond Primeo intact. No erythema noted. Denies pain. Reimplanted umbilicus with serous drainage. No signs of infection. Neurological:      Mental Status: She is alert. ASSESSMENT:    Erica Mancilla is a 50 yo female s/p panniculectomy on 9/23/2021    1. S/P panniculectomy        PLAN:  1. Drainage likely from seroma formation. 2. JEREL drain to right lower abdomen clogged. Flushed today in the office and about 150cc or serous fluid drained. 3. Activity as tolerated  4. Keep follow up appointment for next tuesday  5. Provided flushes and education provided to patient on how to flush if drain becomes clogged again  6. Continue dressing changes until seroma resolves  7. Educated to monitor for any signs of infection  8. Call with any questions or concerns. No orders of the defined types were placed in this encounter. No orders of the defined types were placed in this encounter.        Follow Up: Return in about 1 year (around 10/12/2022) for post op visit.     YESSICA Cardenas - CNP

## 2021-10-22 ENCOUNTER — HOSPITAL ENCOUNTER (OUTPATIENT)
Age: 56
Setting detail: SPECIMEN
Discharge: HOME | End: 2021-10-22
Payer: COMMERCIAL

## 2021-10-22 ENCOUNTER — TELEPHONE (OUTPATIENT)
Dept: BARIATRICS/WEIGHT MGMT | Age: 56
End: 2021-10-22

## 2021-10-22 ENCOUNTER — OFFICE VISIT (OUTPATIENT)
Dept: BARIATRICS/WEIGHT MGMT | Age: 56
End: 2021-10-22

## 2021-10-22 VITALS
BODY MASS INDEX: 26.03 KG/M2 | DIASTOLIC BLOOD PRESSURE: 80 MMHG | WEIGHT: 162 LBS | SYSTOLIC BLOOD PRESSURE: 130 MMHG | HEART RATE: 72 BPM | HEIGHT: 66 IN

## 2021-10-22 DIAGNOSIS — Z98.890 S/P PANNICULECTOMY: Primary | ICD-10-CM

## 2021-10-22 PROCEDURE — 87070 CULTURE OTHR SPECIMN AEROBIC: CPT

## 2021-10-22 PROCEDURE — 87186 SC STD MICRODIL/AGAR DIL: CPT

## 2021-10-22 PROCEDURE — 87077 CULTURE AEROBIC IDENTIFY: CPT

## 2021-10-22 PROCEDURE — 99024 POSTOP FOLLOW-UP VISIT: CPT | Performed by: NURSE PRACTITIONER

## 2021-10-22 PROCEDURE — 87075 CULTR BACTERIA EXCEPT BLOOD: CPT

## 2021-10-22 PROCEDURE — 87076 CULTURE ANAEROBE IDENT EACH: CPT

## 2021-10-22 NOTE — TELEPHONE ENCOUNTER
Pt called after leaving the office stating that her drain tube has a leak in it and will not stay inflated. - pt states she is wearing her binder, and her belly button has a bigger wound on it and it tracking. Pt has an appt with Dr. Stephanie Villanueva  Next week please advise.

## 2021-10-22 NOTE — PROGRESS NOTES
tunnel release 2009; right 2014    ENDOSCOPY, COLON, DIAGNOSTIC  2017    HERNIA REPAIR  10/26/2017    hiatal hernia with gastrectomy    HYSTERECTOMY VAGINAL      LAPAROSCOPY      LIPECTOMY N/A 2021    [facility] N[facility]A [facility]    ABDOMINOPLASTY performed by Divya Travis MD at [address] Right     Dr. Charla Moore Right 2017    Dr Amirah Escobar  10/26/2017    Robotic Laparoscopic, Pre Op Weight 237  Or 238 lbs.  TUBAL LIGATION      Done With      Past Medical History:   Diagnosis Date    Anxiety     Arthritis     \"Back, Hands, Shoulders\"    Chronic back pain     Arthritis - NKI    COPD (chronic obstructive [facility] disease) ([facility] [facility]     Sees Dr. Felix Meeks with Mental Health    Diabetes mellitus ([facility] [facility] Dx     Type II - follows with PCP    Emphysema     Fibromyalgia     H/O abdominoplasty 2018    H/O cardiovascular stress test 2019    ECG portion of stress test is neg for ischemia by diagnostic criteria. No infarct or ischemia noted. Normal stress myocardial perfusion. This is a normal study    H/O echocardiogram 2019    Left ventricular function is normal. Ejection fraction is visually estimated at 55-60%. No significant valvular abnormalitites.  H/O tilt table evaluation 2021    Orthostatic syncope.     Hyperlipidemia     Hypertension     Follows with PCP    Migraines Last Migraine 21    Mobitz (type) I (Wenckebach's) atrioventricular block 2020    Neuropathy     From feet to knees    Piriformis syndrome 2016    Psoriasis     Rotator cuff impingement syndrome, right 2018    RTC repair Dr Dane Tellez   Treated by Dr Irasema Best 17  Arthrocentesis 17    Shortness of breath on exertion     Syncope and collapse 2019    Teeth missing     Upper And Lower    Wears glasses      Family History   Problem Relation Age of Onset    Heart Attack Father     Heart Disease Father     Early Death Father 46        Heart Attack    Alcohol Abuse Father     Substance Abuse Father         Alcoholic    Arthritis Mother     Heart Disease Mother     Diabetes Mother     Cancer Mother         \"Kidney Cancer\"    High Blood Pressure Mother     High Blood Pressure Sister     Diabetes Brother     Early Death Daughter 21        \"Killed In A Car Accident\"     Social History     Socioeconomic History    Marital status: Single     Spouse name: Not on file    Number of children: Not on file    Years of education: Not on file    Highest education level: Not on file   Occupational History    Occupation: unemployed   Tobacco Use    Smoking status: Former Smoker     Packs/day: 0.25     Years: 30.00     Pack years: 7.50     Types: Cigarettes, E-Cigarettes     Start date:      Quit date:      Years since quittin.8    Smokeless tobacco: Never Used   Vaping Use    Vaping Use: Never used   Substance and Sexual Activity    Alcohol use: Yes     Comment: \"Occ. Maybe Twice A Month\"    Drug use: Not on file     Comment: Marijuana Card  - last used: 21    Sexual activity: Yes     Partners: Male   Other Topics Concern    Not on file   Social History Narrative    Not on file     Social Determinants of Health     Financial Resource Strain:     Difficulty of Paying Living Expenses:    Food Insecurity:     Worried About Running Out of Food in the Last Year:     [address] in the Last Year:    Transportation Needs:     Lack of Transportation (Medical):      Lack of Transportation (Non-Medical):    Physical Activity:     Days of Exercise per Week:     Minutes of Exercise per Session:    Stress:     Feeling of Stress :    Social Connections:     Frequency of Communication with Friends and Family:     Frequency of Social Gatherings with Friends and Family:     Attends Muslim Services:     Active Member of Clubs or Organizations:     Attends Club or Organization Meetings:     Marital Status:    Intimate Partner Violence:     Fear of Current or Ex-Partner:     Emotionally Abused:     Physically Abused:     Sexually Abused:        OBJECTIVE:  Physical Exam  Vitals reviewed. Constitutional:       Appearance: Normal appearance. HENT:      Head: Normocephalic and atraumatic. Right Ear: External ear normal.      Left Ear: External ear normal.      Nose: Nose normal.      Mouth/Throat:      Mouth: Mucous membranes are moist.   Eyes:      Extraocular Movements: Extraocular movements intact. Pupils: Pupils are equal, round, and reactive to light. Cardiovascular:      Rate and Rhythm: Normal rate and regular rhythm. Pulses: Normal pulses. Heart sounds: Normal heart sounds. Pulmonary:      Effort: Pulmonary effort is normal.      Breath sounds: Normal breath sounds. Abdominal:      General: Bowel sounds are normal.      Palpations: Abdomen is soft. Comments: Superior aspect of umbilicus with wound dehiscence and tunneling. Musculoskeletal:         General: Normal range of motion. Cervical back: Normal range of motion and neck supple. Skin:     General: Skin is warm and dry. Comments: Lower abdominal incision is healing nicely. Dehiscence noted at the superior edge of the umbilicus with tunneling. Neurological:      General: No focal deficit present. Mental Status: She is alert and oriented to person, place, and time. Mental status is at baseline. Psychiatric:         Mood and Affect: Mood normal.         Behavior: Behavior normal.         ASSESSMENT:  Viry Rubi is a 48 yo female s/p panniculectomy    1. S/P panniculectomy        PLAN:  1. Drainage likely from seroma formation  2. JEREL drain to right lower abdomen still with about 50 cc daily of output.  Continue to record and monitor output daily  3. Activity as tolerated  4. Discussed with patient that I do not recommend removing drain at this time  5. Packed with wound with 1/4 in iodoform and covered with ABD and abdominal binder. 6. Need follow up next week. 7. Culture of wound sent. Orders Placed This Encounter   Procedures    Culture, Wound        No orders of the defined types were placed in this encounter. Follow Up: Return in about 1 week (around 10/29/2021) for post op visit.     Bree Blood, YESSICA - CNP

## 2021-10-25 LAB
CULTURE: ABNORMAL
Lab: ABNORMAL
SPECIMEN: ABNORMAL

## 2021-10-26 RX ORDER — SULFAMETHOXAZOLE AND TRIMETHOPRIM 800; 160 MG/1; MG/1
1 TABLET ORAL 2 TIMES DAILY
Qty: 14 TABLET | Refills: 0 | Status: SHIPPED | OUTPATIENT
Start: 2021-10-26 | End: 2021-11-02

## 2021-10-28 ENCOUNTER — OFFICE VISIT (OUTPATIENT)
Dept: SURGERY | Age: 56
End: 2021-10-28

## 2021-10-28 VITALS
HEART RATE: 60 BPM | OXYGEN SATURATION: 99 % | DIASTOLIC BLOOD PRESSURE: 84 MMHG | SYSTOLIC BLOOD PRESSURE: 120 MMHG | HEIGHT: 66 IN | WEIGHT: 162.7 LBS | BODY MASS INDEX: 26.15 KG/M2

## 2021-10-28 DIAGNOSIS — Z09 POSTOPERATIVE EXAMINATION: Primary | ICD-10-CM

## 2021-10-28 PROCEDURE — 99024 POSTOP FOLLOW-UP VISIT: CPT | Performed by: SURGERY

## 2021-10-28 NOTE — PROGRESS NOTES
Chief Complaint   Patient presents with    Post-Op Check     3rd PO panniculectomy with ronel 9/23/21. Patient is ready for tube to be removed as well as stitches. SUBJECTIVE:  Patient here for post op visit. Her drain has minimal output. Does have a small wound at the umbilicus with minimal drainage. Patient is doing peroxide daily and has stopped packing it.   Pain is minimal.    Past Surgical History:   Procedure Laterality Date    ABDOMEN SURGERY N/A 1/14/2019    SECONDARY CLOSURE OF DEHISCED ABDOMINAL WOUND performed by Lesley Hernandez MD at [facility] 71 N/A 1/24/2019    IRRIGATION OF LOWER ABDOMINAL WOUND performed by Lesley Hernandez MD at [facility] 73 ABDOMINOPLASTY N/A 7/29/2019    ABDOMINOPLASTY REVISION performed by Lesley Hernandez MD at [facility] 75 Right 1/29/2020    EXCISION OF RIGHT BREAST SABACEOUS CYST performed by Maggy Molina MD at [facility] 2017    Dr. Tameka Woods (Right)   [address]    Tubal Ligation Also Done In 1997    COLONOSCOPY  03/2017    Polyp Removed - Dr. Brie Hammer COLONOSCOPY N/A 7/27/2020    COLONOSCOPY POLYPECTOMY SNARE/COLD BIOPSY performed by Lesley Hernandez MD at [address] [place] [address]      Teeth Extracted In Past    ELBOW SURGERY Bilateral Last Done In 2013    Right Done Twice, Left Done Once left cubital tunnel release 2009; right 2014    ENDOSCOPY, COLON, DIAGNOSTIC  03/2017    HERNIA REPAIR  10/26/2017    hiatal hernia with gastrectomy    HYSTERECTOMY VAGINAL  2000    LAPAROSCOPY  2000    LIPECTOMY N/A 9/23/2021    PANNICULECTOMY performed by Lesley Hernandez MD at [facility] 79. N/A 11/29/2018    ABDOMINOPLASTY performed by Lesley eHrnandez MD at [address] [place] [address] Right 2015    Dr. Sparkle Moeller Right 03/2017    Dr Darshana Brown  10/26/2017 Robotic Laparoscopic, Pre Op Weight 237  Or 238 lbs.  TUBAL LIGATION      Done With      Past Medical History:   Diagnosis Date    Anxiety     Arthritis     \"Back, Hands, Shoulders\"    Chronic back pain     Arthritis - NKI    COPD (chronic obstructive pulmonary disease) ([facility] [address]     Sees Dr. Suellen Gottron with Mental Health    Diabetes mellitus ([facility] [address] Dx 2008    Type II - follows with PCP    Emphysema     Fibromyalgia     H/O abdominoplasty 2018    H/O cardiovascular stress test 2019    ECG portion of stress test is neg for ischemia by diagnostic criteria. No infarct or ischemia noted. Normal stress myocardial perfusion. This is a normal study    H/O echocardiogram 2019    Left ventricular function is normal. Ejection fraction is visually estimated at 55-60%. No significant valvular abnormalitites.  H/O tilt table evaluation 2021    Orthostatic syncope.     Hyperlipidemia     Hypertension     Follows with PCP    Migraines Last Migraine 21    Mobitz (type) I (Wenckebach's) atrioventricular block 2020    Neuropathy     From feet to knees    Piriformis syndrome 2016    Psoriasis     Rotator cuff impingement syndrome, right 2018    RTC repair Dr Maurice Smith   Treated by Dr Willa Hernandez 17  Arthrocentesis 17    Shortness of breath on exertion     Syncope and collapse 2019    Teeth missing     Upper And Lower    Wears glasses      Family History   Problem Relation Age of Onset    Heart Attack Father     Heart Disease Father     Early Death Father 46        Heart Attack    Alcohol Abuse Father     Substance Abuse Father         Alcoholic    Arthritis Mother     Heart Disease Mother     Diabetes Mother     Cancer Mother         \"Kidney Cancer\"    High Blood Pressure Mother     High Blood Pressure Sister     Diabetes Brother     Early Death Daughter 21        \"Killed In A Car Accident\"     Social History     Socioeconomic History    Marital status: Single     Spouse name: Not on file    Number of children: Not on file    Years of education: Not on file    Highest education level: Not on file   Occupational History    Occupation: unemployed   Tobacco Use    Smoking status: Former Smoker     Packs/day: 0.25     Years: 30.00     Pack years: 7.50     Types: Cigarettes, E-Cigarettes     Start date:      Quit date: 2016     Years since quittin.8    Smokeless tobacco: Never Used   Vaping Use    Vaping Use: Never used   Substance and Sexual Activity    Alcohol use: Yes     Comment: \"Occ. Maybe Twice A Month\"    Drug use: Not on file     Comment: Marijuana Card  - last used: 21    Sexual activity: Yes     Partners: Male   Other Topics Concern    Not on file   Social History Narrative    Not on file     Social Determinants of Health     Financial Resource Strain:     Difficulty of Paying Living Expenses:    Food Insecurity:     Worried About Running Out of Food in the Last Year:     [address] in the Last Year:    Transportation Needs:     Lack of Transportation (Medical):  Lack of Transportation (Non-Medical):    Physical Activity:     Days of Exercise per Week:     Minutes of Exercise per Session:    Stress:     Feeling of Stress :    Social Connections:     Frequency of Communication with Friends and Family:     Frequency of Social Gatherings with Friends and Family:     Attends Congregation Services:     Active Member of Clubs or Organizations:     Attends Club or Organization Meetings:     Marital Status:    Intimate Partner Violence:     Fear of Current or Ex-Partner:     Emotionally Abused:     Physically Abused:     Sexually Abused:        OBJECTIVE:   Physical Exam    Wound well healed without signs of active infection. Suture line intact. Abdomen soft, nontender, nondistended. ASSESSMENT:  Patient doing well on this post operative check.   Wounds well healed. 1. Postoperative examination        PLAN:  Drain removed and encouraged patient to maintain proper cleansing as of the umbilical wound. Patient is to call if she has any increased tenderness or redness. Continue same  Increase activity as tolerated        No orders of the defined types were placed in this encounter. No orders of the defined types were placed in this encounter. Follow Up: No follow-ups on file.     Cristal Grimes MD

## 2021-11-22 RX ORDER — GLUCOSAM/CHON-MSM1/C/MANG/BOSW 500-416.6
TABLET ORAL
Qty: 100 EACH | Refills: 5 | Status: SHIPPED | OUTPATIENT
Start: 2021-11-22 | End: 2022-09-07

## 2021-12-01 DIAGNOSIS — E11.9 TYPE 2 DIABETES MELLITUS WITHOUT COMPLICATION, WITHOUT LONG-TERM CURRENT USE OF INSULIN (HCC): ICD-10-CM

## 2021-12-02 RX ORDER — ALOGLIPTIN 6.25 MG/1
6.25 TABLET, FILM COATED ORAL DAILY
Qty: 90 TABLET | Refills: 3 | Status: SHIPPED | OUTPATIENT
Start: 2021-12-02 | End: 2024-07-07

## 2021-12-06 ENCOUNTER — TELEPHONE (OUTPATIENT)
Dept: FAMILY MEDICINE CLINIC | Age: 56
End: 2021-12-06

## 2021-12-06 NOTE — TELEPHONE ENCOUNTER
Alogliptin  Request for PA recieved via fax. Accessed chart to verirmay PA need.  Outcome:  PA needed & initiated awaiting response

## 2021-12-14 ENCOUNTER — OFFICE VISIT (OUTPATIENT)
Dept: CARDIOLOGY CLINIC | Age: 56
End: 2021-12-14
Payer: COMMERCIAL

## 2021-12-14 VITALS
BODY MASS INDEX: 25.96 KG/M2 | HEART RATE: 84 BPM | WEIGHT: 165.4 LBS | SYSTOLIC BLOOD PRESSURE: 130 MMHG | DIASTOLIC BLOOD PRESSURE: 70 MMHG | HEIGHT: 67 IN

## 2021-12-14 DIAGNOSIS — E78.2 MIXED HYPERLIPIDEMIA: ICD-10-CM

## 2021-12-14 PROCEDURE — 99211 OFF/OP EST MAY X REQ PHY/QHP: CPT | Performed by: NURSE PRACTITIONER

## 2021-12-14 PROCEDURE — G8417 CALC BMI ABV UP PARAM F/U: HCPCS | Performed by: NURSE PRACTITIONER

## 2021-12-14 PROCEDURE — G8427 DOCREV CUR MEDS BY ELIG CLIN: HCPCS | Performed by: NURSE PRACTITIONER

## 2021-12-14 RX ORDER — MIDODRINE HYDROCHLORIDE 2.5 MG/1
2.5 TABLET ORAL 3 TIMES DAILY PRN
Qty: 90 TABLET | Refills: 3 | Status: SHIPPED | OUTPATIENT
Start: 2021-12-14 | End: 2022-03-15 | Stop reason: SDUPTHER

## 2021-12-14 RX ORDER — HYDROCHLOROTHIAZIDE 25 MG/1
25 TABLET ORAL DAILY PRN
Qty: 30 TABLET | Refills: 5
Start: 2021-12-14 | End: 2022-03-15 | Stop reason: SDUPTHER

## 2021-12-14 RX ORDER — LISINOPRIL 2.5 MG/1
2.5 TABLET ORAL DAILY PRN
Qty: 30 TABLET | Refills: 3 | Status: SHIPPED | OUTPATIENT
Start: 2021-12-14 | End: 2022-03-15 | Stop reason: SDUPTHER

## 2021-12-14 RX ORDER — ATORVASTATIN CALCIUM 80 MG/1
80 TABLET, FILM COATED ORAL DAILY
Qty: 90 TABLET | Refills: 3 | Status: SHIPPED | OUTPATIENT
Start: 2021-12-14 | End: 2022-03-15 | Stop reason: SDUPTHER

## 2021-12-14 NOTE — PROGRESS NOTES
[facility] ([facility] [place]  [address], [address] [facility],Third [address]  Phone: [phone]    Fax [phone]                  Ivette Ferrer MD, Stewart Flores MD, Aleena Echeverria MD, MD Jong Dickerson MD [place], MD Bindu Damon, YESSICA Gilliland, YESSICA Finch, YESSICA Infante    BP Check Visit    Pt is here for a 8 week BP check. At the last office visit patient was found to have a blood pressure of 130/80. At that time the patient was instructed to take midodrine. For SBP < 100 and to take lisinopril 2.5 mg for SBP > 135. She does a great job monitoring her blood pressure and managing her medications    BP Readings from Last 3 Encounters:   10/28/21 120/84   10/22/21 130/80   10/12/21 (!) 130/90     Plan for pt is to Continue midodrine and lisinopril as she has been doing really well. Follow up in 3 months for OV.       Pt is to report any dizziness or syncope to the office    Electronically signed by YESSICA Adams CNP on 12/14/2021 at 6:38 AM

## 2021-12-14 NOTE — PATIENT INSTRUCTIONS
After    **It is YOUR responsibilty to bring medication bottles and/or updated medication list to [address] [place] [address].  This will allow us to better serve you and all your healthcare needs**

## 2021-12-16 ENCOUNTER — OFFICE VISIT (OUTPATIENT)
Dept: INFECTIOUS DISEASES | Age: 56
End: 2021-12-16
Payer: COMMERCIAL

## 2021-12-16 ENCOUNTER — OFFICE VISIT (OUTPATIENT)
Dept: BARIATRICS/WEIGHT MGMT | Age: 56
End: 2021-12-16

## 2021-12-16 VITALS
HEART RATE: 81 BPM | DIASTOLIC BLOOD PRESSURE: 89 MMHG | TEMPERATURE: 97.2 F | BODY MASS INDEX: 25.25 KG/M2 | RESPIRATION RATE: 18 BRPM | WEIGHT: 161.2 LBS | SYSTOLIC BLOOD PRESSURE: 131 MMHG

## 2021-12-16 VITALS
OXYGEN SATURATION: 99 % | BODY MASS INDEX: 25.28 KG/M2 | DIASTOLIC BLOOD PRESSURE: 86 MMHG | WEIGHT: 161.1 LBS | SYSTOLIC BLOOD PRESSURE: 124 MMHG | HEART RATE: 86 BPM | HEIGHT: 67 IN

## 2021-12-16 DIAGNOSIS — Z90.3 HISTORY OF SLEEVE GASTRECTOMY: Primary | ICD-10-CM

## 2021-12-16 DIAGNOSIS — A60.09 HERPES GENITALIS IN WOMEN: Primary | ICD-10-CM

## 2021-12-16 DIAGNOSIS — Z98.890 S/P PANNICULECTOMY: ICD-10-CM

## 2021-12-16 DIAGNOSIS — A60.09 HERPES GENITALIS IN WOMEN: ICD-10-CM

## 2021-12-16 PROCEDURE — 3017F COLORECTAL CA SCREEN DOC REV: CPT | Performed by: INTERNAL MEDICINE

## 2021-12-16 PROCEDURE — 1036F TOBACCO NON-USER: CPT | Performed by: INTERNAL MEDICINE

## 2021-12-16 PROCEDURE — 99024 POSTOP FOLLOW-UP VISIT: CPT | Performed by: SURGERY

## 2021-12-16 PROCEDURE — G8484 FLU IMMUNIZE NO ADMIN: HCPCS | Performed by: INTERNAL MEDICINE

## 2021-12-16 PROCEDURE — G8427 DOCREV CUR MEDS BY ELIG CLIN: HCPCS | Performed by: INTERNAL MEDICINE

## 2021-12-16 PROCEDURE — 99213 OFFICE O/P EST LOW 20 MIN: CPT | Performed by: INTERNAL MEDICINE

## 2021-12-16 PROCEDURE — G8417 CALC BMI ABV UP PARAM F/U: HCPCS | Performed by: INTERNAL MEDICINE

## 2021-12-16 RX ORDER — VALACYCLOVIR HYDROCHLORIDE 500 MG/1
500 TABLET, FILM COATED ORAL DAILY
Qty: 30 TABLET | Refills: 5 | Status: SHIPPED | OUTPATIENT
Start: 2021-12-16 | End: 2022-05-23 | Stop reason: SDUPTHER

## 2021-12-16 ASSESSMENT — PATIENT HEALTH QUESTIONNAIRE - PHQ9
SUM OF ALL RESPONSES TO PHQ9 QUESTIONS 1 & 2: 0
SUM OF ALL RESPONSES TO PHQ QUESTIONS 1-9: 0
2. FEELING DOWN, DEPRESSED OR HOPELESS: 0
1. LITTLE INTEREST OR PLEASURE IN DOING THINGS: 0
SUM OF ALL RESPONSES TO PHQ QUESTIONS 1-9: 0
SUM OF ALL RESPONSES TO PHQ QUESTIONS 1-9: 0

## 2021-12-16 NOTE — PROGRESS NOTES
Post-Operative Clinic Note    Chief Complaint   Patient presents with    Follow-up     1st f/u -s/p p[facility]lectomy [facility] 9/23/21 c/o hole in belly button          SUBJECTIVE:  Patient is here today for a post-operative visit. Patient is s/p panniculectomy by Dr. Truett Litten on 9/23/21. Doing well except one issue - pt noticed small opening at center of umbilicus when she is cleaning it with q-tip. Denies color change in that area. Denies drainage. Denies F/C.      Past Surgical History:   Procedure Laterality Date    ABDOMEN SURGERY N/A 1/14/2019    SECONDARY CLOSURE OF DEHISCED ABDOMINAL WOUND performed by Perez Nickerson MD at [address] N/A 1/24/2019    IRRIGATION OF LOWER ABDOMINAL WOUND performed by Perez Nickerson MD at [facility] 73 ABDOMINOPLASTY N/A 7/29/2019    ABDOMINOPLASTY REVISION performed by Perez Nickerson MD at [facility] 75 Right 1/29/2020    EXCISION OF RIGHT BREAST SABACEOUS CYST performed by Orlando Gagnon MD at [facility] Right 2017    Dr. Sera Shaikh (Right)   [address]    Tubal Ligation Also Done In 1997    COLONOSCOPY  03/2017    Polyp Removed - Dr. Grecia Orourke COLONOSCOPY N/A 7/27/2020    COLONOSCOPY POLYPECTOMY SNARE/COLD BIOPSY performed by Perez Nickerson MD at [address] [facility]      Teeth Extracted In Past    ELBOW SURGERY Bilateral Last Done In 2013    Right Done Twice, Left Done Once left cubital tunnel release 2009; right 2014    ENDOSCOPY, COLON, DIAGNOSTIC  03/2017    HERNIA REPAIR  10/26/2017    hiatal hernia with gastrectomy    HYSTERECTOMY VAGINAL  2000    LAPAROSCOPY  2000    LIPECTOMY N/A 9/23/2021    PANNICULECTOMY performed by Perez Nickerson MD at [facility]. N/A 11/29/2018    ABDOMINOPLASTY performed by Perez Nickerson MD at [address] [facility] Right 2015    Dr. Silvio Bowser  Early Death Daughter 21        \"Killed In A Car Accident\"     Social History     Socioeconomic History    Marital status: Single     Spouse name: Not on file    Number of children: Not on file    Years of education: Not on file    Highest education level: Not on file   Occupational History    Occupation: unemployed   Tobacco Use    Smoking status: Former Smoker     Packs/day: 0.25     Years: 30.00     Pack years: 7.50     Types: Cigarettes, E-Cigarettes     Start date:      Quit date: 2016     Years since quittin.9    Smokeless tobacco: Never Used   Vaping Use    Vaping Use: Never used   Substance and Sexual Activity    Alcohol use: Yes     Comment: \"Occ. Maybe Twice A Month\"    Drug use: Not on file     Comment: Marijuana Card  - last used: 21    Sexual activity: Yes     Partners: Male   Other Topics Concern    Not on file   Social History Narrative    Not on file     Social Determinants of Health     Financial Resource Strain:     Difficulty of Paying Living Expenses: Not on file   Food Insecurity:     Worried About Running Out of Food in the Last Year: Not on file    Yanira of Food in the Last Year: Not on file   Transportation Needs:     Lack of Transportation (Medical): Not on file    Lack of Transportation (Non-Medical):  Not on file   Physical Activity:     Days of Exercise per Week: Not on file    Minutes of Exercise per Session: Not on file   Stress:     Feeling of Stress : Not on file   Social Connections:     Frequency of Communication with Friends and Family: Not on file    Frequency of Social Gatherings with Friends and Family: Not on file    Attends Restoration Services: Not on file    Active Member of Clubs or Organizations: Not on file    Attends Club or Organization Meetings: Not on file    Marital Status: Not on file   Intimate Partner Violence:     Fear of Current or Ex-Partner: Not on file    Emotionally Abused: Not on file    Physically Abused: Not on file    Sexually Abused: Not on file   Housing Stability:     Unable to Pay for Housing in the Last Year: Not on file    Number of Places Lived in the Last Year: Not on file    Unstable Housing in the Last Year: Not on file       OBJECTIVE:  Physical Exam  Vitals reviewed. Constitutional:       General: She is not in acute distress. Appearance: She is not ill-appearing, toxic-appearing or diaphoretic. HENT:      Head: Normocephalic and atraumatic. Right Ear: External ear normal.      Left Ear: External ear normal.      Nose: Nose normal.   Eyes:      General:         Right eye: No discharge. Left eye: No discharge. Extraocular Movements: Extraocular movements intact. Pulmonary:      Effort: Pulmonary effort is normal.   Abdominal:      Palpations: Abdomen is soft. Tenderness: There is no abdominal tenderness. Comments: Umbilicus appears healthy, normal. No irritation or erythema. No active drainage. Center of umbilicus probed with sterile cotton tip applicator. There is small opening in center. Skin:     General: Skin is warm. Neurological:      General: No focal deficit present. Mental Status: She is alert. Psychiatric:         Mood and Affect: Mood normal.           ASSESSMENT:      1. History of sleeve gastrectomy    2. S/P panniculectomy        PLAN:  1. Center of umbilicus probed with sterile cotton tip applicator. Appears there is small opening. No s/s of infection. Too small of area to place suture. DD/w pt to avoid putting q-tip at home in through area as it is likely preventing it from healing by secondary intention. Pack the opening lightly daily. Instructed on packing technique. F/u in 2 months. Call with any questions, concerns, or issues whatsoever. No orders of the defined types were placed in this encounter. No orders of the defined types were placed in this encounter. Follow Up: No follow-ups on file.     [address] [place] [address] MD CAMILLA

## 2021-12-16 NOTE — PROGRESS NOTES
12/17/2021         Referring Physician: No ref. provider found  Primary Care Physician: YESSICA Mariscal NP    Impression/Plan:   Diagnosis Orders   1. Herpes genitalis in women         Discussion:  Herpes genitalis in women  Reports no breakthrough lesions. Advised to use barrier protection, since viral shedding may still occur      Return in about 6 months (around 6/16/2022). History: Saúl Burch is a 49 y.o.  female presenting today. She was originally referred to us for positive QuantiFERON test.  However, she states that she has been to the health department and they will be starting her regimen (rifapentine and isoniazid). She is here for HSV II IgG positive test.  She tested positive for HSV antibody as part of the screen for STDs. She does not recall having any painful genital lesions ever. She last had sex about a year ago. She would like to be treated for Herpes. She is asymptomatic and has not had a flare. She denies headache, fever, chills, abdominal pain. 6/23/2021: denies any genital lesions. Tolerating medication. 12/16/2021: tolerating acyclovir, adherent to it, has no ulcers. Is in a relationship, yet to have sex but is thinking about it. She is inclined to break the news to her new partner. Review of Systems   All other systems reviewed and are negative. Allergies   Allergen Reactions    Other      Patient states the electrodes irritated her skin.        Patient Active Problem List   Diagnosis    DM (diabetes mellitus) ([address]    Essential hypertension    Chronic pain syndrome    COPD, severe ([address]    Gastroesophageal reflux disease with esophagitis    History of MRSA infection    Anxiety    Chronic right-sided low back pain with sciatica    Hiatal hernia    Status post laparoscopic sleeve gastrectomy    Abdominal pannus    S/P abdominoplasty    Fibromyalgia    Sebaceous cyst of breast, right    Mobitz type 1 second degree AV block  Polyp of cecum    Adenomatous polyp of ascending colon    Chronic edema BLE    Mixed hyperlipidemia    Neuropathy    Umbilical pain    Lower abdominal pain    S/P panniculectomy    Latent tuberculosis    Gastroesophageal reflux disease    Vitamin D deficiency    Psoriasis    Pannus, abdominal    Herpes genitalis in women    Admission for therapeutic drug monitoring    URIEL (obstructive sleep apnea)    Hypersomnia    Ex-smoker       Current Outpatient Medications   Medication Sig Dispense Refill    midodrine (PROAMATINE) 2.5 MG tablet Take 1 tablet by mouth 3 times daily as needed (SBP less than 100) 90 tablet 3    hydroCHLOROthiazide (HYDRODIURIL) 25 MG tablet Take 1 tablet by mouth daily as needed (swelling) 30 tablet 5    lisinopril (PRINIVIL;ZESTRIL) 2.5 MG tablet Take 1 tablet by mouth daily as needed (for SBP  more than 135) 30 tablet 3    atorvastatin (LIPITOR) 80 MG tablet Take 1 tablet by mouth daily 90 tablet 3    alogliptin (NESINA) 6.25 MG TABS tablet TAKE 1 TABLET BY MOUTH DAILY 90 tablet 3    TRUEplus Lancets 33G MISC USE AS DIRECTED 100 each 5    Elastic Bandages & Supports (ABDOMINAL BINDER/ELASTIC MED) MISC 1 each by Does not apply route daily 1 each 0    Blood Pressure Monitoring (BLOOD PRESSURE KIT) DAYTON 1 Device by Does not apply route daily 1 each 0    ondansetron (ZOFRAN-ODT) 4 MG disintegrating tablet Take 1 tablet by mouth 3 times daily as needed for Nausea or Vomiting 21 tablet 2    Blood Pressure KIT 1 each by Does not apply route daily 1 kit 0    albuterol sulfate HFA (PROAIR HFA) 108 (90 Base) MCG/ACT inhaler Inhale 2 puffs into the lungs every 6 hours as needed for Wheezing 18 g 5    tiotropium (SPIRIVA RESPIMAT) 2.5 MCG/ACT AERS inhaler Inhale 2 puffs into the lungs daily 1 each 5    Compression Stockings MISC by Does not apply route 20 - 30 mmh wear daily and take off at night  Thigh High 2 each 1    rizatriptan (MAXALT) 10 MG tablet Take 10 mg by mouth once as needed for Migraine May repeat in 2 hours if needed       omeprazole (PRILOSEC) 40 MG delayed release capsule Take 1 capsule by mouth daily 90 capsule 2    Blood Glucose Monitoring Suppl (TRUE METRIX AIR GLUCOSE METER) w/Device KIT 1 Device by Does not apply route continuous 1 kit 0    blood glucose test strips (TRUE METRIX PRO BLOOD GLUCOSE) strip 1 each by In Vitro route daily As needed. 100 each 3    traZODone (DESYREL) 100 MG tablet Take 100 mg by mouth nightly as needed       ascorbic acid (V-R VITAMIN C) 250 MG tablet Take 4 tablets by mouth daily 30 tablet 3    zinc sulfate (ORAZINC) 220 (50 Zn) MG capsule Take 4 capsules by mouth daily 30 capsule 3    Nutritional Supplements (ENSURE HIGH PROTEIN) LIQD Take 1 Can by mouth Daily with lunch 32 Can 5    Multiple Vitamins-Minerals (THERAPEUTIC MULTIVITAMIN-MINERALS) tablet Take 1 tablet by mouth daily 90 tablet 3    Calcium Carb-Cholecalciferol (CALCIUM-VITAMIN D) 600-400 MG-UNIT TABS Take 1 tablet by mouth daily 90 tablet 3    hydrOXYzine (ATARAX) 25 MG tablet TAKE 1 TABLET EVERY 8 HOURS AS NEEDED FOR ANXIETY AVOID ALCOHOL/CAUSES DROWSINESS. Do not take with Xanax 60 tablet 1    albuterol sulfate HFA (PROAIR HFA) 108 (90 Base) MCG/ACT inhaler Inhale 2 puffs into the lungs every 6 hours as needed for Wheezing 1 Inhaler 5    tiotropium (SPIRIVA RESPIMAT) 2.5 MCG/ACT AERS inhaler Inhale 2 puffs into the lungs daily 1 Inhaler 5    calcipotriene (DOVONEX) 0.005 % ointment       clobetasol (TEMOVATE) 0.05 % ointment       tiZANidine (ZANAFLEX) 4 MG tablet Take 4 mg by mouth every 8 hours as needed       oxyCODONE-acetaminophen (PERCOCET) 7.5-325 MG per tablet Take 1 tablet by mouth every 8 hours as needed for Pain (Patient states takes every 6 hours).        Psyllium (METAMUCIL) 48.57 % POWD Take 1 Units by mouth daily 1 Bottle 5    potassium chloride (KLOR-CON M) 20 MEQ extended release tablet Take 1 tablet by mouth 2 times daily 180 tablet 1    venlafaxine (EFFEXOR) 37.5 MG tablet Take 37.5 mg by mouth 2 times daily      fluticasone-vilanterol (BREO ELLIPTA) 100-25 MCG/INH AEPB inhaler Inhale 1 puff into the lungs daily 1 each 5    ALPRAZolam (XANAX) 0.5 MG tablet Take 0.5 mg by mouth as needed for Sleep. . (Patient not taking: Reported on 12/16/2021)      valACYclovir (VALTREX) 500 MG tablet Take 1 tablet by mouth daily 30 tablet 5    fluticasone-vilanterol (BREO ELLIPTA) 100-25 MCG/INH AEPB inhaler Inhale 1 puff into the lungs daily 1 each 5     No current facility-administered medications for this visit. Past Medical History:   Diagnosis Date    Anxiety     Arthritis     \"Back, Hands, Shoulders\"    Chronic back pain     Arthritis - NKI    COPD [facility]chronic obstructive pulmonary disease) ([facility]     Sees Dr. Angelia Montenegro with Mental Health    Diabetes mellitus ([facility] Dx 2008    Type II - follows with PCP    Emphysema     Fibromyalgia     H/O abdominoplasty 12/21/2018    H/O cardiovascular stress test 03/04/2019    ECG portion of stress test is neg for ischemia by diagnostic criteria. No infarct or ischemia noted. Normal stress myocardial perfusion. This is a normal study    H/O echocardiogram 03/04/2019    Left ventricular function is normal. Ejection fraction is visually estimated at 55-60%. No significant valvular abnormalitites.  H/O tilt table evaluation 08/26/2021    Orthostatic syncope.     Hyperlipidemia     Hypertension     Follows with PCP    Migraines Last Migraine 7/20/21    Mobitz (type) I (Wenckebach's) atrioventricular block 01/29/2020    Neuropathy     From feet to knees    Piriformis syndrome 06/24/2016    Psoriasis     Rotator cuff impingement syndrome, right 02/26/2018    RTC repair Dr Nico Rowe 2015  Treated by Dr Ben Dumont 2/18/17  Arthrocentesis 12/19/17    Shortness of breath on exertion     Syncope and collapse 03/04/2019    Teeth missing     Upper And Lower    Wears glasses Past Surgical History:   Procedure Laterality Date    ABDOMEN SURGERY N/A 2019    SECONDARY CLOSURE OF DEHISCED ABDOMINAL WOUND performed by Margot Helton MD at [facility] N/A 2019    IRRIGATION OF LOWER ABDOMINAL WOUND performed by Margot Helton MD at [facility] ABDOMINOPLASTY N/A 2019    ABDOMINOPLASTY REVISION performed by Margot Helton MD at [facility] Right 2020    EXCISION OF RIGHT BREAST SABACEOUS CYST performed by Sabiha Ibanez MD at [facility] Right     Dr. Janell Hawkins (Right)   [address]    Tubal Ligation Also Done In     COLONOSCOPY  2017    Polyp Removed - Dr. Carrion Case COLONOSCOPY N/A 2020    COLONOSCOPY POLYPECTOMY SNARE/COLD BIOPSY performed by Margot Helton MD at [facility]      Teeth Extracted In Past    ELBOW SURGERY Bilateral Last Done In     Right Done Twice, Left Done Once left cubital tunnel release ; right 2014    ENDOSCOPY, COLON, DIAGNOSTIC  2017    HERNIA REPAIR  10/26/2017    hiatal hernia with gastrectomy    HYSTERECTOMY VAGINAL  2000    LAPAROSCOPY  2000    LIPECTOMY N/A 2021    PANNICULECTOMY performed by Margot Helton MD at [facility]. N/A 2018    ABDOMINOPLASTY performed by Margot Helton MD at [facility] Right 2015    Dr. Shannon Carbajal Right 2017    Dr Danilo Brown  10/26/2017    Robotic Laparoscopic, Pre Op Weight 237  Or 238 lbs.     TUBAL LIGATION      Done With        Social History     Socioeconomic History    Marital status: Single     Spouse name: Not on file    Number of children: Not on file    Years of education: Not on file    Highest education level: Not on file   Occupational History    Occupation: unemployed   Tobacco Use    Smoking Father 46        Heart Attack    Alcohol Abuse Father     Substance Abuse Father         Alcoholic    Arthritis Mother     Heart Disease Mother     Diabetes Mother     Cancer Mother         \"Kidney Cancer\"    High Blood Pressure Mother     High Blood Pressure Sister     Diabetes Brother     Early Death Daughter 21        \"Killed In A Car Accident\"       Vital Signs:  Vitals:    12/16/21 1002   BP: 131/89   Site: Left Upper Arm   Position: Sitting   Cuff Size: Medium Adult   Pulse: 81   Resp: 18   Temp: 97.2 °F (36.2 °C)   TempSrc: Infrared   Weight: 161 lb 3.2 oz (73.1 kg)        Wt Readings from Last 3 Encounters:   12/16/21 161 lb 1.6 oz (73.1 kg)   12/16/21 161 lb 3.2 oz (73.1 kg)   12/14/21 165 lb 6.4 oz (75 kg)        Physical Exam:   Gen: alert and NAD  HEENT: sclera clear, pupils equal and reactive, extra ocular muscles intact, oropharynx clear, mucus membranes moist, tympanic membranes clear bilaterally, no cervical lymphadenopathy noted and neck supple  Neck: supple, no significant adenopathy  Chest: clear to auscultation, no wheezes, rales or rhonchi, symmetric air entry  Heart: regular rate and rhythm, no murmurs  ABD: abdomen is soft without significant tenderness, masses, organomegaly or guarding.   EXT:peripheral pulses normal, no pedal edema, no clubbing or cyanosis  NEURO: alert, oriented, normal speech, no focal findings or movement disorder noted  Skin: well hydrated, no lesions, surgical site examined  Wounds:   Labs:   WBC   Date Value Ref Range Status   09/24/2021 12.3 (H) 4.0 - 10.5 K/CU MM Final   07/22/2021 7.7 4.0 - 10.5 K/CU MM Final   05/26/2021 9.2 4.0 - 11.0 K/uL Final     CREATININE   Date Value Ref Range Status   09/24/2021 0.9 0.6 - 1.1 MG/DL Final   08/24/2021 0.7 0.6 - 1.1 MG/DL Final   07/22/2021 0.8 0.6 - 1.1 MG/DL Final       Cultures:  Culture   Date Value Ref Range Status   10/22/2021 Final Report  Final   10/22/2021 ENTEROBACTER CLOACAE COMPLEX Moderate growth (A) Final   10/22/2021 (A)  Final    CORYNEBACTERIUM STRIATUM Moderate growth No further workup   10/22/2021 FINEGOLDIA MAGNA Moderate growth No further workup (A)  Final       Imaging Studies:

## 2021-12-17 NOTE — ASSESSMENT & PLAN NOTE
Reports no breakthrough lesions.  Advised to use barrier protection, since viral shedding may still occur

## 2021-12-20 ENCOUNTER — OFFICE VISIT (OUTPATIENT)
Dept: FAMILY MEDICINE CLINIC | Age: 56
End: 2021-12-20
Payer: COMMERCIAL

## 2021-12-20 VITALS
DIASTOLIC BLOOD PRESSURE: 76 MMHG | WEIGHT: 162.2 LBS | HEIGHT: 67 IN | BODY MASS INDEX: 25.46 KG/M2 | SYSTOLIC BLOOD PRESSURE: 128 MMHG | OXYGEN SATURATION: 100 % | HEART RATE: 87 BPM

## 2021-12-20 DIAGNOSIS — E78.2 MIXED HYPERLIPIDEMIA: ICD-10-CM

## 2021-12-20 DIAGNOSIS — M54.41 CHRONIC RIGHT-SIDED LOW BACK PAIN WITH RIGHT-SIDED SCIATICA: ICD-10-CM

## 2021-12-20 DIAGNOSIS — G89.29 CHRONIC RIGHT-SIDED LOW BACK PAIN WITH RIGHT-SIDED SCIATICA: ICD-10-CM

## 2021-12-20 DIAGNOSIS — G89.4 CHRONIC PAIN SYNDROME: ICD-10-CM

## 2021-12-20 DIAGNOSIS — A60.09 HERPES GENITALIS IN WOMEN: ICD-10-CM

## 2021-12-20 DIAGNOSIS — E11.42 TYPE 2 DIABETES MELLITUS WITH DIABETIC POLYNEUROPATHY, WITHOUT LONG-TERM CURRENT USE OF INSULIN (HCC): Primary | Chronic | ICD-10-CM

## 2021-12-20 DIAGNOSIS — E55.9 VITAMIN D DEFICIENCY: ICD-10-CM

## 2021-12-20 DIAGNOSIS — I10 ESSENTIAL HYPERTENSION: ICD-10-CM

## 2021-12-20 DIAGNOSIS — F41.9 ANXIETY: ICD-10-CM

## 2021-12-20 DIAGNOSIS — J44.9 COPD, SEVERE (HCC): ICD-10-CM

## 2021-12-20 DIAGNOSIS — K21.00 GASTROESOPHAGEAL REFLUX DISEASE WITH ESOPHAGITIS WITHOUT HEMORRHAGE: ICD-10-CM

## 2021-12-20 DIAGNOSIS — K21.9 GASTROESOPHAGEAL REFLUX DISEASE, UNSPECIFIED WHETHER ESOPHAGITIS PRESENT: ICD-10-CM

## 2021-12-20 PROCEDURE — G8417 CALC BMI ABV UP PARAM F/U: HCPCS | Performed by: NURSE PRACTITIONER

## 2021-12-20 PROCEDURE — 3023F SPIROM DOC REV: CPT | Performed by: NURSE PRACTITIONER

## 2021-12-20 PROCEDURE — G8427 DOCREV CUR MEDS BY ELIG CLIN: HCPCS | Performed by: NURSE PRACTITIONER

## 2021-12-20 PROCEDURE — 1036F TOBACCO NON-USER: CPT | Performed by: NURSE PRACTITIONER

## 2021-12-20 PROCEDURE — G8926 SPIRO NO PERF OR DOC: HCPCS | Performed by: NURSE PRACTITIONER

## 2021-12-20 PROCEDURE — 3017F COLORECTAL CA SCREEN DOC REV: CPT | Performed by: NURSE PRACTITIONER

## 2021-12-20 PROCEDURE — G8484 FLU IMMUNIZE NO ADMIN: HCPCS | Performed by: NURSE PRACTITIONER

## 2021-12-20 PROCEDURE — 2022F DILAT RTA XM EVC RTNOPTHY: CPT | Performed by: NURSE PRACTITIONER

## 2021-12-20 PROCEDURE — 99214 OFFICE O/P EST MOD 30 MIN: CPT | Performed by: NURSE PRACTITIONER

## 2021-12-20 PROCEDURE — 3044F HG A1C LEVEL LT 7.0%: CPT | Performed by: NURSE PRACTITIONER

## 2021-12-20 RX ORDER — OMEPRAZOLE 40 MG/1
40 CAPSULE, DELAYED RELEASE ORAL DAILY
Qty: 90 CAPSULE | Refills: 2 | Status: SHIPPED | OUTPATIENT
Start: 2021-12-20 | End: 2022-07-07 | Stop reason: SDUPTHER

## 2021-12-20 NOTE — PROGRESS NOTES
2021     Josie Hartmann (:  1965) is a 49 y.o. female, here for evaluation of the following medical concerns:         Presents to follow-up on current diagnoses:     Diabetes type 2- taking alogliptin.    hgba1c 6.5 May 2021       GERD- prilosec, no complaints       Hyperlipidemia- ordered a statin, not taking. States she is not good at taking her medications.        Chronic lower extremity edema  no longer taking lasix, but is on thiazide as needed.  No lower extremity edema right now.  Following with cardiology who is managing.       Anxiety depression- follows with mental health. Taking hydroxyzine in the AM. Stopped xanax by . She is supposed to be taking Effexor but she is not.  Denies suicidal thoughts. She does not want to be on any medications and does not want to see MH anymore. She is upset that she cannot have her xanax anymore.        COPD- taking proair, spiriva and breo. Follows with Dr. Roa Fails shortness of breath or cough. no recent COPD exacerbations       Chronic painfollows with pain management.  Has done physical therapy in the past.  Has been trying to get disability since . Was referred to specialists for disability workup and paperwork completion.   Has not worked since .  Taking zanaflex and percocet  Also has diagnosis of fibromyalgiaall managed by pain management  Following with Dr. Pooja Garcia as well  Has had steroid injections.         HTN-controlledmanaged by cardiology  Lisinopril and HCTZ as needed   Midodrine for low BP as needed.  <635 systolic         VIt D deficiency noted 21- no longer taking over-the-counter supplement.         Psoriasisfollowing with dermatology     Neurology for [employer] [facility] [place].         Reports having partial hysterectomy,still has cervix.            HSV2  +  May 2021  Valtrex daily  Follows with infectious disease  No hx of ulcers  Worried about telling her new partner    sleep study normal august 2021 - normal. No CPAP    Needs mammogram. She is agreeable to schedule. Had paniculectomy surgery sept 2021           Review of Systems    Prior to Visit Medications    Medication Sig Taking?  Authorizing Provider   valACYclovir (VALTREX) 500 MG tablet Take 1 tablet by mouth daily Yes Sheila Briceno MD   midodrine (PROAMATINE) 2.5 MG tablet Take 1 tablet by mouth 3 times daily as needed (SBP less than 100) Yes YESSICA Apodaca CNP   hydroCHLOROthiazide (HYDRODIURIL) 25 MG tablet Take 1 tablet by mouth daily as needed (swelling) Yes YESSICA Apodaca CNP   lisinopril (PRINIVIL;ZESTRIL) 2.5 MG tablet Take 1 tablet by mouth daily as needed (for SBP  more than 135) Yes YESSICA Apodaca CNP   atorvastatin (LIPITOR) 80 MG tablet Take 1 tablet by mouth daily Yes YESSICA John CNP   alogliptin (NESINA) 6.25 MG TABS tablet TAKE 1 TABLET BY MOUTH DAILY Yes YESSICA Munguia NP   TRUEplus Lancets 33G MISC USE AS DIRECTED Yes YESSICA Munguia NP   Elastic Bandages & Supports (ABDOMINAL BINDER/ELASTIC MED) MISC 1 each by Does not apply route daily Yes Marquita Najjar II, MD   Blood Pressure Monitoring (BLOOD PRESSURE KIT) [place] 1 Device by Does not apply route daily Yes YESSICA John CNP   ondansetron (ZOFRAN-ODT) 4 MG disintegrating tablet Take 1 tablet by mouth 3 times daily as needed for Nausea or Vomiting Yes YESSICA Lin CNP   Blood Pressure KIT 1 each by Does not apply route daily Yes YESSICA Lai CNP   albuterol sulfate HFA (PROAIR HFA) 108 (90 Base) MCG/ACT inhaler Inhale 2 puffs into the lungs every 6 hours as needed for Wheezing Yes Jenni Gaines MD   tiotropium (SPIRIVA RESPIMAT) 2.5 MCG/ACT AERS inhaler Inhale 2 puffs into the lungs daily Yes Jenni Gaines MD   Compression Stockings MISC by Does not apply route 20 - 30 mmh wear daily and take off at night  Thigh High Yes YESSICA John CNP   rizatriptan (MAXALT) 10 MG tablet Take 10 mg by mouth once as needed for Migraine May repeat in 2 hours if needed  Yes Historical Provider, MD   omeprazole (PRILOSEC) 40 MG delayed release capsule Take 1 capsule by mouth daily Yes YESSICA Cobb NP   Blood Glucose Monitoring Suppl (TRUE METRIX AIR GLUCOSE METER) w/Device KIT 1 Device by Does not apply route continuous Yes YESSICA Cobb NP   blood glucose test strips (TRUE METRIX PRO BLOOD GLUCOSE) strip 1 each by In Vitro route daily As needed. Yes YESSICA Cobb NP   traZODone (DESYREL) 100 MG tablet Take 100 mg by mouth nightly as needed  Yes Historical Provider, MD   ascorbic acid (V-R VITAMIN C) 250 MG tablet Take 4 tablets by mouth daily Yes Bryan Arguelles MD   zinc sulfate (ORAZINC) 220 (50 Zn) MG capsule Take 4 capsules by mouth daily Yes Bryan Arguelles MD   Nutritional Supplements (ENSURE HIGH PROTEIN) LIQD Take 1 Can by mouth Daily with lunch Yes Bryan Arguelles MD   Multiple Vitamins-Minerals (THERAPEUTIC MULTIVITAMIN-MINERALS) tablet Take 1 tablet by mouth daily Yes YESSICA Peter CNP   Calcium Carb-Cholecalciferol (CALCIUM-VITAMIN D) 600-400 MG-UNIT TABS Take 1 tablet by mouth daily Yes YESSICA Peter CNP   hydrOXYzine (ATARAX) 25 MG tablet TAKE 1 TABLET EVERY 8 HOURS AS NEEDED FOR ANXIETY AVOID ALCOHOL/CAUSES DROWSINESS.   Do not take with Xanax Yes YESSICA Cobb NP   albuterol sulfate HFA (PROAIR HFA) 108 (90 Base) MCG/ACT inhaler Inhale 2 puffs into the lungs every 6 hours as needed for Wheezing Yes Caron Yeung MD   tiotropium (SPIRIVA RESPIMAT) 2.5 MCG/ACT AERS inhaler Inhale 2 puffs into the lungs daily Yes Caron Yeung MD   calcipotriene (DOVONEX) 0.005 % ointment  Yes Historical Provider, MD   clobetasol (TEMOVATE) 0.05 % ointment  Yes Historical Provider, MD   tiZANidine (ZANAFLEX) 4 MG tablet Take 4 mg by mouth every 8 hours as needed  Yes Historical Provider, MD oxyCODONE-acetaminophen (PERCOCET) 7.5-325 MG per tablet Take 1 tablet by mouth every 8 hours as needed for Pain (Patient states takes every 6 hours). Yes Historical Provider, MD   Psyllium (METAMUCIL) 48.57 % POWD Take 1 Units by mouth daily Yes Toby Winchester MD   potassium chloride (KLOR-CON M) 20 MEQ extended release tablet Take 1 tablet by mouth 2 times daily Yes Toby Winchester MD   venlafaxine (EFFEXOR) 37.5 MG tablet Take 37.5 mg by mouth 2 times daily Yes Historical Provider, MD   fluticasone-vilanterol (BREO ELLIPTA) 100-25 MCG/INH AEPB inhaler Inhale 1 puff into the lungs daily Yes Bob Cunha MD   ALPRAZolam Otelia Adjutant) 0.5 MG tablet Take 0.5 mg by mouth as needed for Sleep. Yes Historical Provider, MD   fluticasone-vilanterol (BREO ELLIPTA) 100-25 MCG/INH AEPB inhaler Inhale 1 puff into the lungs daily  Bob Cuhna MD        Social History     Tobacco Use    Smoking status: Former Smoker     Packs/day: 0.25     Years: 30.00     Pack years: 7.50     Types: Cigarettes, E-Cigarettes     Start date:      Quit date: 2016     Years since quittin.9    Smokeless tobacco: Never Used   Substance Use Topics    Alcohol use: Yes     Comment: \"Occ. Maybe Twice A Month\"        Vitals:    21 1027   BP: 128/76   Site: Left Upper Arm   Position: Sitting   Cuff Size: Medium Adult   Pulse: 87   SpO2: 100%   Weight: 162 lb 3.2 oz (73.6 kg)   Height: 5' 7\" (1.702 m)     Estimated body mass index is 25.4 kg/m² as calculated from the following:    Height as of this encounter: 5' 7\" (1.702 m). Weight as of this encounter: 162 lb 3.2 oz (73.6 kg). Physical Exam  Vitals reviewed. Constitutional:       General: She is not in acute distress. Appearance: Normal appearance. She is not ill-appearing, toxic-appearing or diaphoretic. HENT:      Head: Normocephalic and atraumatic. Nose: Nose normal.   Eyes:      Extraocular Movements: Extraocular movements intact.       Pupils: Pupils are equal, round, and reactive to light. Cardiovascular:      Rate and Rhythm: Normal rate and regular rhythm. Heart sounds: Normal heart sounds. Pulmonary:      Effort: Pulmonary effort is normal.      Breath sounds: Normal breath sounds. Abdominal:      General: Bowel sounds are normal. There is no distension. Palpations: Abdomen is soft. There is no mass. Tenderness: There is no abdominal tenderness. Hernia: No hernia is present. Musculoskeletal:         General: Normal range of motion. Cervical back: Normal range of motion and neck supple. Right lower leg: No edema. Left lower leg: No edema. Skin:     General: Skin is warm and dry. Neurological:      General: No focal deficit present. Mental Status: She is alert and oriented to person, place, and time. Mental status is at baseline. Psychiatric:         Mood and Affect: Mood normal.         Behavior: Behavior normal.         Thought Content: Thought content normal.         Judgment: Judgment normal.         ASSESSMENT/PLAN:  1. Gastroesophageal reflux disease, unspecified whether esophagitis present  Continue PPI controlled    2. Type 2 diabetes mellitus with diabetic polyneuropathy, without long-term current use of insulin ([address]  Continue current medicationcontrolled    3. Essential hypertension  Controlled. Continue current medication regimen. DASH diet. BP goal less than 140/90.      4. Chronic pain syndrome  Follows with pain management    5. COPD, severe ([address]  Follows with pulmonology. Continue current inhalers, controlled        7. Anxiety  Follows with mental health. They stopped her Xanax, she is not happy about that. Hydroxyzine does not help her. She did not start taking the Effexor as they directed. Reports she has a follow-up next month. 8. Chronic right-sided low back pain with right-sided sciatica  Follow with pain management    9.  Mixed hyperlipidemia  Not taking a statin per her choice  Ordered for 80 mg Lipitor  Discussed starting a low-dose statin once labs result and cholesterol still elevated, patient agreed    10. Vitamin D deficiency  Restart supplement    11. Herpes genitalis in women  Following with infectious disease. Daily Valtrex. Long discussion today about how to tell partners, how to cope          All care gaps addressed     All questions answered    Discussed use, benefit, and side effects of prescribed medications. Barriers to compliance discussed. All patient questions answered. Pt voiced understanding. Present to the ER for any emergent or acute symptoms not managed at home or in office. Please note that this chart was generated using dragon dictation software. Although every effort was made to ensure the accuracy of this automated transcription, some errors in transcription may have occurred. Return in about 6 months (around 6/20/2022). An electronic signature was used to authenticate this note.     --YESSICA Poole NP on 12/20/2021 at 4:04 PM

## 2022-01-06 DIAGNOSIS — E11.42 TYPE 2 DIABETES MELLITUS WITH DIABETIC POLYNEUROPATHY, WITHOUT LONG-TERM CURRENT USE OF INSULIN (HCC): Chronic | ICD-10-CM

## 2022-01-06 DIAGNOSIS — E78.2 MIXED HYPERLIPIDEMIA: ICD-10-CM

## 2022-01-06 LAB
A/G RATIO: 1.4 (ref 1.1–2.2)
ALBUMIN SERPL-MCNC: 4.7 G/DL (ref 3.4–5)
ALP BLD-CCNC: 107 U/L (ref 40–129)
ALT SERPL-CCNC: 12 U/L (ref 10–40)
ANION GAP SERPL CALCULATED.3IONS-SCNC: 14 MMOL/L (ref 3–16)
AST SERPL-CCNC: 19 U/L (ref 15–37)
BILIRUB SERPL-MCNC: 0.5 MG/DL (ref 0–1)
BUN BLDV-MCNC: 21 MG/DL (ref 7–20)
CALCIUM SERPL-MCNC: 10.2 MG/DL (ref 8.3–10.6)
CHLORIDE BLD-SCNC: 98 MMOL/L (ref 99–110)
CHOLESTEROL, TOTAL: 303 MG/DL (ref 0–199)
CO2: 24 MMOL/L (ref 21–32)
CREAT SERPL-MCNC: 0.9 MG/DL (ref 0.6–1.1)
CREATININE URINE: 207.7 MG/DL (ref 28–259)
GFR AFRICAN AMERICAN: >60
GFR NON-AFRICAN AMERICAN: >60
GLUCOSE BLD-MCNC: 110 MG/DL (ref 70–99)
HDLC SERPL-MCNC: 84 MG/DL (ref 40–60)
LDL CHOLESTEROL CALCULATED: 194 MG/DL
MICROALBUMIN UR-MCNC: <1.2 MG/DL
MICROALBUMIN/CREAT UR-RTO: NORMAL MG/G (ref 0–30)
POTASSIUM SERPL-SCNC: 4.6 MMOL/L (ref 3.5–5.1)
SODIUM BLD-SCNC: 136 MMOL/L (ref 136–145)
TOTAL PROTEIN: 8.1 G/DL (ref 6.4–8.2)
TRIGL SERPL-MCNC: 126 MG/DL (ref 0–150)
VLDLC SERPL CALC-MCNC: 25 MG/DL

## 2022-01-07 LAB
ESTIMATED AVERAGE GLUCOSE: 139.9 MG/DL
HBA1C MFR BLD: 6.5 %

## 2022-01-17 ENCOUNTER — NURSE ONLY (OUTPATIENT)
Dept: FAMILY MEDICINE CLINIC | Age: 57
End: 2022-01-17

## 2022-01-17 ENCOUNTER — TELEPHONE (OUTPATIENT)
Dept: FAMILY MEDICINE CLINIC | Age: 57
End: 2022-01-17

## 2022-01-17 DIAGNOSIS — Z98.84 S/P LAPAROSCOPIC SLEEVE GASTRECTOMY: ICD-10-CM

## 2022-01-17 DIAGNOSIS — Z79.899 ON POTASSIUM WASTING DIURETIC THERAPY: Primary | ICD-10-CM

## 2022-01-17 RX ORDER — CALCIUM CARBONATE/VITAMIN D3 600 MG-10
1 TABLET ORAL DAILY
Qty: 90 TABLET | Refills: 3 | Status: SHIPPED | OUTPATIENT
Start: 2022-01-17

## 2022-01-17 RX ORDER — CETIRIZINE HYDROCHLORIDE 10 MG/1
10 TABLET ORAL DAILY
COMMUNITY
End: 2022-10-06

## 2022-01-17 NOTE — PROGRESS NOTES
Patient states she is not taking her potassium-----   k 4.6 on labs last week --- can hold potassium for now. Is she taking it  For hx of gastric sleeve? Looks like weight management has her on multiple vitamins and supps.    She at least needs to be taking a multivitamin    Will refill calcium vit D     Check potasisum in 4-6 weeks     She is taking lipitor via cardiology - 80mg daily

## 2022-01-17 NOTE — TELEPHONE ENCOUNTER
Patient notified of pcp note. States she was taking potassium because she was on water pill but has not been taking it regularly, will recheck labs in 4-6 weeks. Patient will also  OTC multivitamin. Continue current meds.

## 2022-01-31 ENCOUNTER — OFFICE VISIT (OUTPATIENT)
Dept: FAMILY MEDICINE CLINIC | Age: 57
End: 2022-01-31
Payer: COMMERCIAL

## 2022-01-31 ENCOUNTER — HOSPITAL ENCOUNTER (OUTPATIENT)
Age: 57
Setting detail: SPECIMEN
Discharge: HOME | End: 2022-01-31
Payer: COMMERCIAL

## 2022-01-31 ENCOUNTER — TELEPHONE (OUTPATIENT)
Dept: FAMILY MEDICINE CLINIC | Age: 57
End: 2022-01-31

## 2022-01-31 VITALS
OXYGEN SATURATION: 97 % | HEIGHT: 67 IN | TEMPERATURE: 96.6 F | BODY MASS INDEX: 26.68 KG/M2 | HEART RATE: 68 BPM | WEIGHT: 170 LBS

## 2022-01-31 DIAGNOSIS — R51.9 GENERALIZED HEADACHE: ICD-10-CM

## 2022-01-31 DIAGNOSIS — R09.81 NASAL CONGESTION: Primary | ICD-10-CM

## 2022-01-31 DIAGNOSIS — M79.10 MUSCLE ACHE: ICD-10-CM

## 2022-01-31 PROCEDURE — G8484 FLU IMMUNIZE NO ADMIN: HCPCS | Performed by: NURSE PRACTITIONER

## 2022-01-31 PROCEDURE — G8417 CALC BMI ABV UP PARAM F/U: HCPCS | Performed by: NURSE PRACTITIONER

## 2022-01-31 PROCEDURE — 99213 OFFICE O/P EST LOW 20 MIN: CPT | Performed by: NURSE PRACTITIONER

## 2022-01-31 PROCEDURE — U0005 INFEC AGEN DETEC AMPLI PROBE: HCPCS

## 2022-01-31 PROCEDURE — 1036F TOBACCO NON-USER: CPT | Performed by: NURSE PRACTITIONER

## 2022-01-31 PROCEDURE — G8427 DOCREV CUR MEDS BY ELIG CLIN: HCPCS | Performed by: NURSE PRACTITIONER

## 2022-01-31 PROCEDURE — 3017F COLORECTAL CA SCREEN DOC REV: CPT | Performed by: NURSE PRACTITIONER

## 2022-01-31 PROCEDURE — U0003 INFECTIOUS AGENT DETECTION BY NUCLEIC ACID (DNA OR RNA); SEVERE ACUTE RESPIRATORY SYNDROME CORONAVIRUS 2 (SARS-COV-2) (CORONAVIRUS DISEASE [COVID-19]), AMPLIFIED PROBE TECHNIQUE, MAKING USE OF HIGH THROUGHPUT TECHNOLOGIES AS DESCRIBED BY CMS-2020-01-R: HCPCS

## 2022-01-31 NOTE — PROGRESS NOTES
1/31/2022    HPI:  Chief complaint and history of present illness as per medical assistant/nurse documented today in the Flu/COVID-19 clinic. Patient is here with complaints of nasal congestion, headache, sore throat off and on, and eyes/nose burning x 5 days. Patient states she has had the covid vaccine. MEDICATIONS:  Prior to Visit Medications    Medication Sig Taking?  Authorizing Provider   PROAIR  (21 Base) MCG/ACT inhaler Inhale 2 puffs into the lungs every 6 hours as needed for Nakia Joyce MD   cetirizine (ZYRTEC) 10 MG tablet Take 10 mg by mouth daily  Historical Provider, MD   Calcium Carb-Cholecalciferol (CALCIUM-VITAMIN D) 600-400 MG-UNIT TABS Take 1 tablet by mouth daily  YESSICA Johnson NP   omeprazole (PRILOSEC) 40 MG delayed release capsule Take 1 capsule by mouth daily  YESSICA Johnson NP   valACYclovir (VALTREX) 500 MG tablet Take 1 tablet by mouth daily  Hiram Colbert MD   midodrine (PROAMATINE) 2.5 MG tablet Take 1 tablet by mouth 3 times daily as needed (SBP less than 100)  YESSICA Vasques CNP   hydroCHLOROthiazide (HYDRODIURIL) 25 MG tablet Take 1 tablet by mouth daily as needed (swelling)  YESSICA Rico CNP   lisinopril (PRINIVIL;ZESTRIL) 2.5 MG tablet Take 1 tablet by mouth daily as needed (for SBP  more than 135)  YESSICA Vasques CNP   atorvastatin (LIPITOR) 80 MG tablet Take 1 tablet by mouth daily  YESSICA Vasques CNP   alogliptin (NESINA) 6.25 MG TABS tablet TAKE 1 TABLET BY MOUTH DAILY  YESSICA Johnson NP   TRUEplus Lancets 33G MISC USE AS DIRECTED  YESSICA Johnson NP   Elastic Bandages & Supports (ABDOMINAL BINDER/ELASTIC MED) MISC 1 each by Does not apply route daily  Beryle Oxford II, MD   Blood Pressure Monitoring (BLOOD PRESSURE KIT) [place] 1 Device by Does not apply route daily  YESSICA Vasques CNP   ondansetron (ZOFRAN-ODT) 4 MG disintegrating tablet Take 1 tablet by mouth 3 times daily as needed for Nausea or Vomiting  YESSICA Blackmon CNP   tiotropium (SPIRIVA RESPIMAT) 2.5 MCG/ACT AERS inhaler Inhale 2 puffs into the lungs daily  Jaya Vines MD   rizatriptan (MAXALT) 10 MG tablet Take 10 mg by mouth once as needed for Migraine May repeat in 2 hours if needed   Historical Provider, MD   Blood Glucose Monitoring Suppl (TRUE METRIX AIR GLUCOSE METER) w/Device KIT 1 Device by Does not apply route continuous  YESSICA Salas NP   blood glucose test strips (TRUE METRIX PRO BLOOD GLUCOSE) strip 1 each by In Vitro route daily As needed. YESSICA Salas NP   traZODone (DESYREL) 100 MG tablet Take 100 mg by mouth nightly as needed   Historical Provider, MD   Multiple Vitamins-Minerals (THERAPEUTIC MULTIVITAMIN-MINERALS) tablet Take 1 tablet by mouth daily  YESSICA Blackmon CNP   hydrOXYzine (ATARAX) 25 MG tablet TAKE 1 TABLET EVERY 8 HOURS AS NEEDED FOR ANXIETY AVOID ALCOHOL/CAUSES DROWSINESS. Do not take with Xanax  YESSICA Salas NP   calcipotriene (DOVONEX) 0.005 % ointment   Historical Provider, MD   clobetasol (TEMOVATE) 0.05 % ointment   Historical Provider, MD   tiZANidine (ZANAFLEX) 4 MG tablet Take 4 mg by mouth every 8 hours as needed   Historical Provider, MD   oxyCODONE-acetaminophen (PERCOCET) 7.5-325 MG per tablet Take 1 tablet by mouth every 8 hours as needed for Pain (Patient states takes every 6 hours). Historical Provider, MD   fluticasone-vilanterol (BREO ELLIPTA) 100-25 MCG/INH AEPB inhaler Inhale 1 puff into the lungs daily  Jaya Vines MD       Allergies   Allergen Reactions    Other      Patient states the electrodes irritated her skin.    ,   Past Medical History:   Diagnosis Date    Anxiety     Arthritis     \"Back, Hands, Shoulders\"    Chronic back pain     Arthritis - NKI    COPD (chronic obstructive pulmonary disease) ([facility])     Sees Dr. Cephas Hashimoto with Mental Health    Diabetes mellitus [facility]) Dx 2008    Type II - follows with PCP    Emphysema     Fibromyalgia     H/O abdominoplasty 12/21/2018    H/O cardiovascular stress test 03/04/2019    ECG portion of stress test is neg for ischemia by diagnostic criteria. No infarct or ischemia noted. Normal stress myocardial perfusion. This is a normal study    H/O echocardiogram 03/04/2019    Left ventricular function is normal. Ejection fraction is visually estimated at 55-60%. No significant valvular abnormalitites.  H/O tilt table evaluation 08/26/2021    Orthostatic syncope.     Hyperlipidemia     Hypertension     Follows with PCP    Migraines Last Migraine 7/20/21    Mobitz (type) I (Wenckebach's) atrioventricular block 01/29/2020    Neuropathy     From feet to knees    Piriformis syndrome 06/24/2016    Psoriasis     Rotator cuff impingement syndrome, right 02/26/2018    RTC repair Dr Hughes Sa 2015  Treated by Dr Surinder Matos 2/18/17  Arthrocentesis 12/19/17    Shortness of breath on exertion     Syncope and collapse 03/04/2019    Teeth missing     Upper And Lower    Wears glasses    ,   Past Surgical History:   Procedure Laterality Date    ABDOMEN SURGERY N/A 1/14/2019    SECONDARY CLOSURE OF DEHISCED ABDOMINAL WOUND performed by Cheryl Blue MD at [place] [address] N/A 1/24/2019    IRRIGATION OF LOWER ABDOMINAL WOUND performed by Cheryl Blue MD at [facility] ABDOMINOPLASTY N/A 7/29/2019    ABDOMINOPLASTY REVISION performed by Cheryl Blue MD at [address] [place] [address] Right 1/29/2020    EXCISION OF RIGHT BREAST SABACEOUS CYST performed by Venecia Hernandez MD at [facility] Right 2017    Dr. Surinder Matos (Right)   [address]    Tubal Ligation Also Done In 1997    COLONOSCOPY  03/2017    Polyp Removed - Dr. Fredis Gaytan COLONOSCOPY N/A 7/27/2020    COLONOSCOPY POLYPECTOMY SNARE/COLD BIOPSY performed by Cheryl Blue MD at [facility]  DENTAL SURGERY      Teeth Extracted In Past    ELBOW SURGERY Bilateral Last Done In     Right Done Twice, Left Done Once left cubital tunnel release ; right 2014    ENDOSCOPY, COLON, DIAGNOSTIC  2017    HERNIA REPAIR  10/26/2017    hiatal hernia with gastrectomy    HYSTERECTOMY VAGINAL      LAPAROSCOPY  2000    LIPECTOMY N/A 2021    PANNICULECTOMY performed by Boris Olmedo MD at [facility]. N/A 2018    ABDOMINOPLASTY performed by Boris Olmedo MD at [address] [place] [address] Right     Dr. Becca Boaetng Right 2017    Dr Ember Davis  10/26/2017    Robotic Laparoscopic, Pre Op Weight 237  Or 238 lbs.  TUBAL LIGATION      Done With    ,   Social History     Tobacco Use    Smoking status: Former Smoker     Packs/day: 0.25     Years: 30.00     Pack years: 7.50     Types: Cigarettes, E-Cigarettes     Start date:      Quit date:      Years since quittin.0    Smokeless tobacco: Never Used   Vaping Use    Vaping Use: Never used   Substance Use Topics    Alcohol use: Yes     Comment: \"Occ.  Maybe Twice A Month\"    Drug use: Not on file     Comment: Marijuana Card  - last used: 23   ,   Family History   Problem Relation Age of Onset    Heart Attack Father     Heart Disease Father     Early Death Father 46        Heart Attack    Alcohol Abuse Father     Substance Abuse Father         Alcoholic    Arthritis Mother     Heart Disease Mother     Diabetes Mother     Cancer Mother         \"Kidney Cancer\"    High Blood Pressure Mother     High Blood Pressure Sister     Diabetes Brother     Early Death Daughter 21        \"Killed In A Car Accident\"   ,   Immunization History   Administered Date(s) Administered    COVID-19, Pfizer Purple top, DILUTE for use, 12+ yrs, 30mcg/0.3mL dose 2021, 2021    Pneumococcal Conjugate 13-valent (Clemon Anes) 12/05/2014, 04/25/2018    Tdap (Boostrix, Adacel) 12/20/2011    Zoster Recombinant (Shingrix) 04/25/2018, 05/16/2018, 09/28/2018   ,   Health Maintenance   Topic Date Due    Hepatitis B vaccine (1 of 3 - Risk 3-dose series) Never done    Diabetic foot exam  04/03/2020    Breast cancer screen  04/19/2020    Flu vaccine (1) Never done    COVID-19 Vaccine (3 - Booster for Pfizer series) 09/13/2021    DTaP/Tdap/Td vaccine (2 - Td or Tdap) 12/20/2021    Pneumococcal 0-64 years Vaccine (1 of 2 - PPSV23) 04/01/2023 (Originally 6/20/2018)    Diabetic retinal exam  08/19/2022    Depression Screen  12/16/2022    A1C test (Diabetic or Prediabetic)  01/06/2023    Diabetic microalbuminuria test  01/06/2023    Lipid screen  01/06/2023    Potassium monitoring  01/06/2023    Creatinine monitoring  01/06/2023    Colon cancer screen colonoscopy  07/27/2030    Shingles Vaccine  Completed    Hepatitis C screen  Completed    HIV screen  Completed    Hepatitis A vaccine  Aged Out    Hib vaccine  Aged Out    Meningococcal (ACWY) vaccine  Aged Out       PHYSICAL EXAM:  Physical Exam  Constitutional:       Appearance: Normal appearance. HENT:      Head: Normocephalic. Right Ear: Tympanic membrane, ear canal and external ear normal.      Left Ear: Tympanic membrane, ear canal and external ear normal.      Nose: Mucosal edema and congestion (slight) present. Mouth/Throat:      Lips: Pink. Mouth: Mucous membranes are moist.      Pharynx: Oropharynx is clear. Cardiovascular:      Rate and Rhythm: Normal rate and regular rhythm. Heart sounds: Normal heart sounds. Pulmonary:      Effort: Pulmonary effort is normal.      Breath sounds: Normal breath sounds. Musculoskeletal:      Cervical back: Neck supple. Skin:     General: Skin is warm and dry. Neurological:      Mental Status: She is alert and oriented to person, place, and time.    Psychiatric:         Mood and Affect: Mood normal. Behavior: Behavior normal.         ASSESSMENT/PLAN:  1. Nasal congestion  Discussed with patient if her covid test is negative then would consider an antibiotic at that time. Your COVID 19 test can take 1-5 days for the results to come back. We ask that you make a Mychart page and view your test results this way. You will need to Self quarantine until you know your results. If positive, please work on contact tracing. Increase fluids and rest  Saline nasal spray as needed for nasal congestion  Warm salt gargles as needed for throat discomfort  Monitor temperature twice a day  Tylenol as needed for fevers and/or discomfort. Big deep breaths periodically throughout the day  If symptoms worsen -Go to the ER. Follow up with your primary care provider  - Covid-19 Ambulatory    2. Generalized headache  - Covid-19 Ambulatory    3. Muscle ache  - Covid-19 Ambulatory      FOLLOW-UP:  Return if symptoms worsen or fail to improve.     In addition to other information, the printed after visit summary provided to the patient includes:  [x] COVID-19 Self care instructions  [x] COVID-19 General patient information

## 2022-01-31 NOTE — PATIENT INSTRUCTIONS
Your COVID 19 test can take 1-5 days for the results to come back. We ask that you make a Mychart page and view your test results this way. You will need to Self quarantine until you know your results. If positive, please work on contact tracing. Increase fluids and rest  Saline nasal spray as needed for nasal congestion  Warm salt gargles as needed for throat discomfort  Monitor temperature twice a day  Tylenol as needed for fevers and/or discomfort. Big deep breaths periodically throughout the day  If symptoms worsen -Go to the ER. Follow up with your primary care provider      To Whom it May Concern:    Samaria Ford was tested for COVID-19 1/31/2022. He/she must stay home until test results are back. If test is positive, isolate for a total of 5 days, starting from day 1 of symptom onset. After 5 days, if fever-free for 24 hours and there has been a gradual improvement in symptoms, may come out of isolation, but must consistently wear a mask when around other people for 5 additional days. (5 days isolation, 5 days mask-wearing). We do not recommend retesting as patients may continue to test positive for months even though no longer contagious. It is suggested you call [address] or [address] with any questions regarding isolation timeframe/return to work/school details.

## 2022-01-31 NOTE — TELEPHONE ENCOUNTER
Pt has a sinus infection ongoing for about a   week, Pt wants to know if pcp can send a script to pharmacy for Sinus   Infection. Symptoms are headache, runny nose, sore throat, stated that she has not been out of the house for 9 days. Stated that eyes are burning and heavy.

## 2022-01-31 NOTE — TELEPHONE ENCOUNTER
----- Message from Mitch Orlando sent at 1/31/2022  8:32 AM EST -----  Subject: Message to Provider    QUESTIONS  Information for Provider? Pt has a sinus infection ongoing for about a   week, Pt wants to know if pcp can send a script to pharmacy for Sinus   Infection.   ---------------------------------------------------------------------------  --------------  CALL BACK INFO  What is the best way for the office to contact you? OK to leave message on   voicemail  Preferred Call Back Phone Number? [phone]  ---------------------------------------------------------------------------  --------------  SCRIPT ANSWERS  Relationship to Patient?  Self

## 2022-01-31 NOTE — PROGRESS NOTES
1/31/22  Beverly Castro  1965    FLU/COVID-19 CLINIC EVALUATION    HPI SYMPTOMS:    Employer: disabled    [] Fevers  [] Chills  [] Cough  [] Coughing up blood  [] Chest Congestion  [x] Nasal Congestion  [] Feeling short of breath  [] Sometimes  [] Frequently  [] All the time  [] Chest pain  [x] Headaches  [x]Tolerable  [] Severe  [x] Sore throat  [x] Muscle aches  [] Nausea  [] Vomiting  []Unable to keep fluids down  [] Diarrhea  []Severe    [x] OTHER SYMPTOMS:  Eyes and nose burning  Symptom Duration:   [] 1  [] 2   [] 3   [] 4    [] 5   [] 6   [] 7   [] 8   [] 9   [] 10   [] 11   [] 12   [] 13   [] 14   [] Longer than 14 days    Symptom course:   [] Worsening     [] Stable     [] Improving    RISK FACTORS:    [] Pregnant or possibly pregnant  [] Age over 61  [] Diabetes  [] Heart disease  [] Asthma  [] COPD/Other chronic lung diseases  [] Active Cancer  [] On Chemotherapy  [] Taking oral steroids  [] History Lymphoma/Leukemia  [] Close contact with a lab confirmed COVID-19 patient within 14 days of symptom onset  [] History of travel from affected geographical areas within 14 days of symptom onset       VITALS:  There were no vitals filed for this visit. TESTS:    POCT FLU:  [] Positive     []Negative    ASSESSMENT:    [] Flu  [] Possible COVID-19  [] Strep    PLAN:    [] Discharge home with written instructions for:  [] Flu management  [] Possible COVID-19 infection with self-quarantine and management of symptoms  [] Follow-up with primary care physician or emergency department if worsens  [] Evaluation per physician/NP/PA in clinic  [] Sent to ER       An  electronic signature was used to authenticate this note.      --Nguyen Waller MA on 1/31/2022 at 2:21 PM

## 2022-02-01 LAB
SARS-COV-2: DETECTED
SOURCE: ABNORMAL

## 2022-02-13 ENCOUNTER — HOSPITAL ENCOUNTER (EMERGENCY)
Age: 57
Discharge: HOME | End: 2022-02-13
Payer: COMMERCIAL

## 2022-02-13 VITALS
OXYGEN SATURATION: 97 % | BODY MASS INDEX: 26.68 KG/M2 | DIASTOLIC BLOOD PRESSURE: 109 MMHG | HEIGHT: 67 IN | HEART RATE: 68 BPM | RESPIRATION RATE: 16 BRPM | SYSTOLIC BLOOD PRESSURE: 144 MMHG | WEIGHT: 170 LBS | TEMPERATURE: 97.9 F

## 2022-02-13 DIAGNOSIS — G89.29 ACUTE EXACERBATION OF CHRONIC LOW BACK PAIN: Primary | ICD-10-CM

## 2022-02-13 DIAGNOSIS — M54.50 ACUTE EXACERBATION OF CHRONIC LOW BACK PAIN: Primary | ICD-10-CM

## 2022-02-13 DIAGNOSIS — R03.0 ELEVATED BLOOD PRESSURE READING: ICD-10-CM

## 2022-02-13 PROCEDURE — 6360000002 HC RX W HCPCS: Performed by: PHYSICIAN ASSISTANT

## 2022-02-13 PROCEDURE — 99284 EMERGENCY DEPT VISIT MOD MDM: CPT

## 2022-02-13 PROCEDURE — 96372 THER/PROPH/DIAG INJ SC/IM: CPT

## 2022-02-13 PROCEDURE — 6370000000 HC RX 637 (ALT 250 FOR IP): Performed by: PHYSICIAN ASSISTANT

## 2022-02-13 RX ORDER — METHOCARBAMOL 500 MG/1
500 TABLET, FILM COATED ORAL ONCE
Status: COMPLETED | OUTPATIENT
Start: 2022-02-13 | End: 2022-02-13

## 2022-02-13 RX ORDER — LIDOCAINE 4 G/G
1 PATCH TOPICAL DAILY PRN
Qty: 15 PATCH | Refills: 0 | Status: SHIPPED | OUTPATIENT
Start: 2022-02-13 | End: 2022-03-15

## 2022-02-13 RX ORDER — KETOROLAC TROMETHAMINE 30 MG/ML
30 INJECTION, SOLUTION INTRAMUSCULAR; INTRAVENOUS ONCE
Status: COMPLETED | OUTPATIENT
Start: 2022-02-13 | End: 2022-02-13

## 2022-02-13 RX ORDER — MORPHINE SULFATE 2 MG/ML
4 INJECTION, SOLUTION INTRAMUSCULAR; INTRAVENOUS ONCE
Status: COMPLETED | OUTPATIENT
Start: 2022-02-13 | End: 2022-02-13

## 2022-02-13 RX ORDER — METHOCARBAMOL 500 MG/1
500 TABLET, FILM COATED ORAL 3 TIMES DAILY
Qty: 12 TABLET | Refills: 0 | Status: SHIPPED | OUTPATIENT
Start: 2022-02-13 | End: 2022-09-07

## 2022-02-13 RX ORDER — LIDOCAINE 4 G/G
1 PATCH TOPICAL DAILY
Status: DISCONTINUED | OUTPATIENT
Start: 2022-02-13 | End: 2022-02-13 | Stop reason: HOSPADM

## 2022-02-13 RX ADMIN — KETOROLAC TROMETHAMINE 30 MG: 30 INJECTION, SOLUTION INTRAMUSCULAR; INTRAVENOUS at 11:26

## 2022-02-13 RX ADMIN — METHOCARBAMOL 500 MG: 500 TABLET ORAL at 11:26

## 2022-02-13 RX ADMIN — MORPHINE SULFATE 4 MG: 2 INJECTION, SOLUTION INTRAMUSCULAR; INTRAVENOUS at 11:25

## 2022-02-13 ASSESSMENT — PAIN DESCRIPTION - PAIN TYPE: TYPE: CHRONIC PAIN

## 2022-02-13 ASSESSMENT — PAIN DESCRIPTION - LOCATION: LOCATION: BACK

## 2022-02-13 ASSESSMENT — PAIN SCALES - GENERAL
PAINLEVEL_OUTOF10: 10
PAINLEVEL_OUTOF10: 10

## 2022-02-13 NOTE — ED PROVIDER NOTES
EMERGENCY DEPARTMENT ENCOUNTER      PCP: YESSICA Au NP    Chief Complaint   Patient presents with    Lower Back Pain     chronic back       This patient was not evaluated by the attending physician. I have independently evaluated this patient. My supervising physician was available for consultation. HPI    Gissel Perales is a 49 y.o. female who presents with back pain, located in the left low back region. Patient reports history of chronic back pain. The onset was yesterday. Context is patient reports \"when back when out as I was walking into the store. Patient denies any fall, trauma, direct injury. The duration of pain has been constant since the onset. The pain does radiate into her left leg. The pain worsens with movement. Patient has tried her home medication of percocet without relief of pain. No known alleviating factors. Pt admits to having this type of back pain in the past.    Patient denies fever, chills, bowel/bladder incontinence, urine retention, saddle anesthesia, lower extremity weakness or altered sensation. Patient denies personal history of alcohol abuse, recent spinal trauma, recent spinal procedure, indwelling urinary catheter, IV drug use, or cancer. REVIEW OF SYSTEMS    Constitutional:  Denies fever, chills, weight loss, or weakness. Cardiovascular:  Denies chest pain, palpitations, or swelling  Respiratory:  Denies cough or shortness of breath    GI:  Denies abdominal pain, nausea, vomiting, or diarrhea  :  Denies any urinary symptoms or vaginal symptoms. Denies pregnancy. Musculoskeletal:  See HPI above   Skin:  Denies rash  Neurologic:  No bowel incontinence or bladder retention, no saddle anesthesia. No lower extremity weakness or altered sensation.   Endocrine:  Denies polyuria or polydypsia   Lymphatic:  Denies swollen glands     All other review of systems are negative  See HPI and nursing notes for additional information       PAST MEDICAL & SURGICAL HISTORY    Past Medical History:   Diagnosis Date    Anxiety     Arthritis     \"Back, Hands, Shoulders\"    Chronic back pain     Arthritis - NKI    COPD (chronic obstructive pulmonary disease) ([facility]     Sees Dr. Aylin Hudson with Mental Health    Diabetes mellitus ([facility] Dx 2008    Type II - follows with PCP    Emphysema     Fibromyalgia     H/O abdominoplasty 12/21/2018    H/O cardiovascular stress test 03/04/2019    ECG portion of stress test is neg for ischemia by diagnostic criteria. No infarct or ischemia noted. Normal stress myocardial perfusion. This is a normal study    H/O echocardiogram 03/04/2019    Left ventricular function is normal. Ejection fraction is visually estimated at 55-60%. No significant valvular abnormalitites.  H/O tilt table evaluation 08/26/2021    Orthostatic syncope.     Hyperlipidemia     Hypertension     Follows with PCP    Migraines Last Migraine 7/20/21    Mobitz (type) I (Wenckebach's) atrioventricular block 01/29/2020    Neuropathy     From feet to knees    Piriformis syndrome 06/24/2016    Psoriasis     Rotator cuff impingement syndrome, right 02/26/2018    RTC repair Dr Anamaria Stone 2015  Treated by Dr Destiney Mejia 2/18/17  Arthrocentesis 12/19/17    Shortness of breath on exertion     Syncope and collapse 03/04/2019    Teeth missing     Upper And Lower    Wears glasses      Past Surgical History:   Procedure Laterality Date    ABDOMEN SURGERY N/A 1/14/2019    SECONDARY CLOSURE OF DEHISCED ABDOMINAL WOUND performed by Paulo Overton MD at [facility] N/A 1/24/2019    IRRIGATION OF LOWER ABDOMINAL WOUND performed by Paulo Overton MD at [facility] ABDOMINOPLASTY N/A 7/29/2019    ABDOMINOPLASTY REVISION performed by Paulo Overton MD at [facility] Right 1/29/2020    EXCISION OF RIGHT BREAST SABACEOUS CYST performed by Mike Jiang MD at [facility] CARPAL TUNNEL RELEASE Right 2017    Dr. Kala Ruggiero (Right)   [address]    Tubal Ligation Also Done In     COLONOSCOPY  2017    Polyp Removed - Dr. Mahendra Santiago COLONOSCOPY N/A 2020    COLONOSCOPY POLYPECTOMY SNARE/COLD BIOPSY performed by Maryellen Trejo MD at [address] [place] [address]      Teeth Extracted In Past    ELBOW SURGERY Bilateral Last Done In     Right Done Twice, Left Done Once left cubital tunnel release ; right     ENDOSCOPY, COLON, DIAGNOSTIC  2017    HERNIA REPAIR  10/26/2017    hiatal hernia with gastrectomy    HYSTERECTOMY VAGINAL      LAPAROSCOPY      LIPECTOMY N/A 2021    PANNICULECTOMY performed by Maryellen Trejo MD at [facility]. N/A 2018    ABDOMINOPLASTY performed by Maryellen Trejo MD at [address] [place] [address] Right     Dr. Jason Arenas Right 2017    Dr Sumit Oro  10/26/2017    Robotic Laparoscopic, Pre Op Weight 237  Or 238 lbs.  TUBAL LIGATION      Done With        CURRENT MEDICATIONS    Current Outpatient Rx   Medication Sig Dispense Refill    methocarbamol (ROBAXIN) 500 MG tablet Take 1 tablet by mouth 3 times daily As needed for muscle spasm.  12 tablet 0    lidocaine 4 % external patch Place 1 patch onto the skin daily as needed (for pain) 12 hrs on, 12 hrs off. 15 patch 0    PROAIR  (90 Base) MCG/ACT inhaler Inhale 2 puffs into the lungs every 6 hours as needed for Wheezing 1 each 5    cetirizine (ZYRTEC) 10 MG tablet Take 10 mg by mouth daily      Calcium Carb-Cholecalciferol (CALCIUM-VITAMIN D) 600-400 MG-UNIT TABS Take 1 tablet by mouth daily 90 tablet 3    omeprazole (PRILOSEC) 40 MG delayed release capsule Take 1 capsule by mouth daily 90 capsule 2    valACYclovir (VALTREX) 500 MG tablet Take 1 tablet by mouth daily 30 tablet 5    midodrine (PROAMATINE) 2.5 MG tablet Take 1 tablet by mouth 3 times daily as needed (SBP less than 100) 90 tablet 3    hydroCHLOROthiazide (HYDRODIURIL) 25 MG tablet Take 1 tablet by mouth daily as needed (swelling) 30 tablet 5    lisinopril (PRINIVIL;ZESTRIL) 2.5 MG tablet Take 1 tablet by mouth daily as needed (for SBP  more than 135) 30 tablet 3    atorvastatin (LIPITOR) 80 MG tablet Take 1 tablet by mouth daily 90 tablet 3    alogliptin (NESINA) 6.25 MG TABS tablet TAKE 1 TABLET BY MOUTH DAILY 90 tablet 3    TRUEplus Lancets 33G MISC USE AS DIRECTED 100 each 5    Elastic Bandages & Supports (ABDOMINAL BINDER/ELASTIC MED) MISC 1 each by Does not apply route daily 1 each 0    Blood Pressure Monitoring (BLOOD PRESSURE KIT) DAYTON 1 Device by Does not apply route daily 1 each 0    ondansetron (ZOFRAN-ODT) 4 MG disintegrating tablet Take 1 tablet by mouth 3 times daily as needed for Nausea or Vomiting 21 tablet 2    tiotropium (SPIRIVA RESPIMAT) 2.5 MCG/ACT AERS inhaler Inhale 2 puffs into the lungs daily 1 each 5    rizatriptan (MAXALT) 10 MG tablet Take 10 mg by mouth once as needed for Migraine May repeat in 2 hours if needed       Blood Glucose Monitoring Suppl (TRUE METRIX AIR GLUCOSE METER) w/Device KIT 1 Device by Does not apply route continuous 1 kit 0    blood glucose test strips (TRUE METRIX PRO BLOOD GLUCOSE) strip 1 each by In Vitro route daily As needed. 100 each 3    traZODone (DESYREL) 100 MG tablet Take 100 mg by mouth nightly as needed       Multiple Vitamins-Minerals (THERAPEUTIC MULTIVITAMIN-MINERALS) tablet Take 1 tablet by mouth daily 90 tablet 3    hydrOXYzine (ATARAX) 25 MG tablet TAKE 1 TABLET EVERY 8 HOURS AS NEEDED FOR ANXIETY AVOID ALCOHOL/CAUSES DROWSINESS.   Do not take with Xanax 60 tablet 1    calcipotriene (DOVONEX) 0.005 % ointment       clobetasol (TEMOVATE) 0.05 % ointment       tiZANidine (ZANAFLEX) 4 MG tablet Take 4 mg by mouth every 8 hours as needed       oxyCODONE-acetaminophen (PERCOCET) 7.5-325 MG per tablet Take 1 tablet by mouth every 8 hours as needed for Pain (Patient states takes every 6 hours).  fluticasone-vilanterol (BREO ELLIPTA) 100-25 MCG/INH AEPB inhaler Inhale 1 puff into the lungs daily 1 each 5       ALLERGIES    Allergies   Allergen Reactions    Other      Patient states the electrodes irritated her skin. SOCIAL HISTORY    Social History     Socioeconomic History    Marital status: Single     Spouse name: None    Number of children: None    Years of education: None    Highest education level: None   Occupational History    Occupation: unemployed   Tobacco Use    Smoking status: Former Smoker     Packs/day: 0.25     Years: 30.00     Pack years: 7.50     Types: Cigarettes, E-Cigarettes     Start date:      Quit date:      Years since quittin.1    Smokeless tobacco: Never Used   Vaping Use    Vaping Use: Never used   Substance and Sexual Activity    Alcohol use: Yes     Comment: \"Occ. Maybe Twice A Month\"    Drug use: None     Comment: Marijuana Card  - last used: 23    Sexual activity: Yes     Partners: Male   Other Topics Concern    None   Social History Narrative    None     Social Determinants of Health     Financial Resource Strain:     Difficulty of Paying Living Expenses: Not on file   Food Insecurity:     Worried About Running Out of Food in the Last Year: Not on file    Yanira of Food in the Last Year: Not on file   Transportation Needs:     Lack of Transportation (Medical): Not on file    Lack of Transportation (Non-Medical):  Not on file   Physical Activity:     Days of Exercise per Week: Not on file    Minutes of Exercise per Session: Not on file   Stress:     Feeling of Stress : Not on file   Social Connections:     Frequency of Communication with Friends and Family: Not on file    Frequency of Social Gatherings with Friends and Family: Not on file    Attends Confucianist Services: Not on file    Active Member of Clubs or Organizations: distal legs, feet, perineal sensation    5/5 strength bilaterally for adduction thighs, flexion/extension knees, feet dorsiflexion/extension, all toe extension/curling toes. 2+ patellar reflexes bilaterally    Vascular:  Distal pulses and capillary refill intact in bilateral lower extremities      RADIOLOGY/PROCEDURES    No orders to display        ED COURSE & MEDICAL DECISION MAKING       Vital signs and nursing notes reviewed during ED course. I have independently evaluated this patient. Supervising physician present in the Emergency Department, available for consultation, throughout entirety of patient care. History and exam is consistent with acute on chronic exacerbation of musculoskeletal back pain. While in the ED today, patient was provided with symptomatic treatment with Robaxin, topical lidocaine patch, IM morphine, and IM Toradol. At bedside today, I discussed stretching exercises and advised patient to avoid vigorous activity. Patient was discharged with prescriptions for Robaxin and topical lidocaine patches- we discussed medications. Patient is nontoxic appearing. Vital signs stable-blood pressure is elevated patient is asymptomatic elevated blood pressure. Patient is stable for outpatient management. I completed a structured, evidence-based clinical evaluation to screen for acute non-traumatic spinal emergencies. The patient has a normal detailed neurologic exam and a low red flag score. The evidence indicates that the patient is very low risk for an acute spinal emergency and this is consistent with my clinical intuition. The risk of further workup is higher than the likelihood of the patient having a spinal epidural abscess or other dangerous emergency spinal condition. It is, therefore, in the patients best interest not to do additional emergent testing at this time. All pertinent lab data and radiographic results reviewed with patient at bedside.   The patient and/or the family were informed of the results of any tests/labs/imaging, the treatment plan, and time was allotted to answer questions. Patient agrees to followup with primary care provider over the next 2-3 days for recheck and reports that she has appoint with her pain management specialist in the next several days. Diagnosis, disposition, and plan discussed in detail with patient and/or family today who understands and agrees. Patient agrees to return emergency department if symptoms worsen or any new symptoms develop including but not limited to numbness, weakness, tingling, bowel/bladder incontinence, urine retention, fever, chills- strict return precautions discussed. Clinical  IMPRESSION    1. Acute exacerbation of chronic low back pain    2. Elevated blood pressure reading          Disposition referral (if applicable):  YESSICA Johnson - NP  [address]  [phone]    Call today  [address] in 2-3 days    [facility] Emergency Department  [facility] [id number]  [phone]  Go to   Return to ED if symptoms worsen or new symptoms      Disposition medications (if applicable):  Discharge Medication List as of 2/13/2022 12:02 PM      START taking these medications    Details   methocarbamol (ROBAXIN) 500 MG tablet Take 1 tablet by mouth 3 times daily As needed for muscle spasm., Disp-12 tablet, R-0Normal      lidocaine 4 % external patch Place 1 patch onto the skin daily as needed (for pain) 12 hrs on, 12 hrs off., TransDERmal, DAILY PRN Starting Sun 2/13/2022, Until Tue 3/15/2022 at 2359, For 30 days, Disp-15 patch, R-0, Normal               Comment: Please note this report has been produced using speech recognition software and may contain errors related to that system including errors in grammar, punctuation, and spelling, as well as words and phrases that may be inappropriate.  If there are any questions or concerns please feel free to contact the dictating provider for clarification.                Ya Hart PA-C  02/13/22 1624

## 2022-03-09 ENCOUNTER — TELEPHONE (OUTPATIENT)
Dept: FAMILY MEDICINE CLINIC | Age: 57
End: 2022-03-09

## 2022-03-09 DIAGNOSIS — Z12.31 SCREENING MAMMOGRAM FOR BREAST CANCER: Primary | ICD-10-CM

## 2022-03-15 ENCOUNTER — OFFICE VISIT (OUTPATIENT)
Dept: CARDIOLOGY CLINIC | Age: 57
End: 2022-03-15
Payer: COMMERCIAL

## 2022-03-15 VITALS
DIASTOLIC BLOOD PRESSURE: 86 MMHG | WEIGHT: 176.4 LBS | BODY MASS INDEX: 27.69 KG/M2 | SYSTOLIC BLOOD PRESSURE: 132 MMHG | HEART RATE: 98 BPM | HEIGHT: 67 IN

## 2022-03-15 DIAGNOSIS — E78.2 MIXED HYPERLIPIDEMIA: ICD-10-CM

## 2022-03-15 PROCEDURE — G8417 CALC BMI ABV UP PARAM F/U: HCPCS | Performed by: NURSE PRACTITIONER

## 2022-03-15 PROCEDURE — G8427 DOCREV CUR MEDS BY ELIG CLIN: HCPCS | Performed by: NURSE PRACTITIONER

## 2022-03-15 PROCEDURE — 1036F TOBACCO NON-USER: CPT | Performed by: NURSE PRACTITIONER

## 2022-03-15 PROCEDURE — 99214 OFFICE O/P EST MOD 30 MIN: CPT | Performed by: NURSE PRACTITIONER

## 2022-03-15 PROCEDURE — 3017F COLORECTAL CA SCREEN DOC REV: CPT | Performed by: NURSE PRACTITIONER

## 2022-03-15 PROCEDURE — G8484 FLU IMMUNIZE NO ADMIN: HCPCS | Performed by: NURSE PRACTITIONER

## 2022-03-15 RX ORDER — LISINOPRIL 2.5 MG/1
2.5 TABLET ORAL DAILY PRN
Qty: 30 TABLET | Refills: 3 | Status: SHIPPED | OUTPATIENT
Start: 2022-03-15

## 2022-03-15 RX ORDER — MIDODRINE HYDROCHLORIDE 2.5 MG/1
2.5 TABLET ORAL 3 TIMES DAILY PRN
Qty: 90 TABLET | Refills: 3 | Status: SHIPPED | OUTPATIENT
Start: 2022-03-15 | End: 2022-10-06 | Stop reason: SDUPTHER

## 2022-03-15 RX ORDER — ATORVASTATIN CALCIUM 80 MG/1
80 TABLET, FILM COATED ORAL DAILY
Qty: 90 TABLET | Refills: 3 | Status: SHIPPED | OUTPATIENT
Start: 2022-03-15

## 2022-03-15 RX ORDER — HYDROCHLOROTHIAZIDE 25 MG/1
25 TABLET ORAL DAILY PRN
Qty: 30 TABLET | Refills: 5 | Status: SHIPPED | OUTPATIENT
Start: 2022-03-15 | End: 2022-10-24 | Stop reason: SDUPTHER

## 2022-03-15 ASSESSMENT — ENCOUNTER SYMPTOMS
COUGH: 0
SHORTNESS OF BREATH: 0

## 2022-03-15 NOTE — PATIENT INSTRUCTIONS
**It is YOUR responsibilty to bring medication bottles and/or updated medication list to [address] [place] [address]. This will allow us to better serve you and all your healthcare needs**    Please be informed that if you contact our office outside of normal business hours the physician on call cannot help with any scheduling or rescheduling issues, procedure instruction questions or any type of medication issue. We advise you for any urgent/emergency that you go to the nearest emergency room!     PLEASE CALL OUR OFFICE DURING NORMAL BUSINESS HOURS    Monday - Friday   8 am to 5 pm    [place]: [address]: [phone]    [place]:  [phone]

## 2022-03-15 NOTE — PROGRESS NOTES
[facility] ([facility] PHYSICAL REHABILITATION [place]  [address]  Phone: [phone]    Fax [phone]    Chris Johnson MD, Elayne Toro MD, [address], MD, MD Roopa Camacho MD Mariana Gang, MD Georgiann Euler, MD Pennie Rout, YESSICA Cho, YESSICA Partida, APRKEN Victor, APRN    CARDIOLOGY  NOTE    3/15/2022    Brenda Haas (:  1965) is a 49 y.o. female,Established patient with Dr. Janiya Baer, here for evaluation of the following chief complaint(s):  3 Month Follow-Up (Patient has no complaints. )    SUBJECTIVE/OBJECTIVE:    Sam Lewis is a 50y.o. year old who has Past medical history as noted below. She is doing very well. She states that she has been feeling very well since she had her surgery and is not having any issues. She was actually evaluated in February when she was undergoing surgery was noted to have second-degree heart block type I Wenckebach. She is not symptomatic. During her event monitor which she wore for 14 days in 2019 she was noted to have Wenckebach type I. She was not symptomatic  Currently she is feeling well blood pressure is well controlled. Neurologist started her on propanolol for migraine prevention. Her blood pressure dropped significantly with first dose and she has not taken again. She has been having more swelling in her hands nd feet even with HTCZ daily. Review of Systems   Constitutional: Negative for fatigue and fever. Respiratory: Negative for cough and shortness of breath. Cardiovascular: Positive for leg swelling. Negative for chest pain and palpitations. Musculoskeletal: Negative for arthralgias and gait problem. Neurological: Positive for dizziness. Negative for syncope, weakness, light-headedness and headaches.        Vitals:    03/15/22 1418   BP: 132/86   Pulse: 98   Weight: 176 lb 6.4 oz (80 kg)   Height: 5' 7\" (1.702 m)       Wt Readings from Last 3 Encounters:   03/15/22 176 lb 6.4 oz (80 kg)   03/09/22 170 lb (77.1 kg)   02/13/22 170 lb (77.1 kg)       BP Readings from Last 3 Encounters:   03/15/22 132/86   02/13/22 (!) 144/109   12/20/21 128/76       Prior to Admission medications    Medication Sig Start Date End Date Taking? Authorizing Provider   SYMBICORT 160-4.5 MCG/ACT AERO Inhale 2 puffs into the lungs 2 times daily 3/11/22  Yes Roni Dominguez MD   fluticasone-vilanterol (BREO ELLIPTA) 100-25 MCG/INH AEPB inhaler Inhale 1 puff into the lungs daily 3/9/22 4/8/22 Yes Roni Dominguez MD   tiotropium (SPIRIVA RESPIMAT) 2.5 MCG/ACT AERS inhaler Inhale 2 puffs into the lungs daily 3/9/22  Yes Roni Dominguez MD   albuterol sulfate HFA (VENTOLIN HFA) 108 (90 Base) MCG/ACT inhaler Inhale 2 puffs into the lungs 4 times daily as needed for Wheezing 3/9/22  Yes Roni Dominguez MD   albuterol (PROVENTIL) (2.5 MG/3ML) 0.083% nebulizer solution Take 3 mLs by nebulization every 6 hours 3/9/22  Yes Roni Dominguez MD   methocarbamol (ROBAXIN) 500 MG tablet Take 1 tablet by mouth 3 times daily As needed for muscle spasm.  2/13/22  Yes Viridiana Snyder PA-C   lidocaine 4 % external patch Place 1 patch onto the skin daily as needed (for pain) 12 hrs on, 12 hrs off. 2/13/22 3/15/22 Yes Viridiana Snyder PA-C   PROAIR  (47 Base) MCG/ACT inhaler Inhale 2 puffs into the lungs every 6 hours as needed for Wheezing 1/31/22  Yes Roni Dominguez MD   cetirizine (ZYRTEC) 10 MG tablet Take 10 mg by mouth daily   Yes Historical Provider, MD   Calcium Carb-Cholecalciferol (CALCIUM-VITAMIN D) 600-400 MG-UNIT TABS Take 1 tablet by mouth daily 1/17/22  Yes YESSICA Turcios NP   omeprazole (PRILOSEC) 40 MG delayed release capsule Take 1 capsule by mouth daily 12/20/21  Yes YESSICA Turcios NP   valACYclovir (VALTREX) 500 MG tablet Take 1 tablet by mouth daily 12/16/21 6/14/22 Yes Pedro Ramsey MD   midodrine (PROAMATINE) 2.5 MG tablet Take 1 tablet by mouth 3 times daily as needed (SBP less than 100) 12/14/21  Yes Aurora Duran Art, APRN - CNP   hydroCHLOROthiazide (HYDRODIURIL) 25 MG tablet Take 1 tablet by mouth daily as needed (swelling) 12/14/21  Yes Fidelina Check, APRN - CNP   lisinopril (PRINIVIL;ZESTRIL) 2.5 MG tablet Take 1 tablet by mouth daily as needed (for SBP  more than 135) 12/14/21  Yes Fidelina Check, APRN - CNP   atorvastatin (LIPITOR) 80 MG tablet Take 1 tablet by mouth daily 12/14/21  Yes Fidelina Check, APRN - CNP   alogliptin (NESINA) 6.25 MG TABS tablet TAKE 1 TABLET BY MOUTH DAILY 12/2/21 3/15/22 Yes YESSICA Adan NP   TRUEplus Lancets 33G MISC USE AS DIRECTED 11/22/21  Yes YESSICA Adan NP   Elastic Bandages & Supports (ABDOMINAL BINDER/ELASTIC MED) MISC 1 each by Does not apply route daily 9/30/21  Yes Demetrius Graves II, MD   Blood Pressure Monitoring (BLOOD PRESSURE KIT) [place] 1 Device by Does not apply route daily 9/29/21  Yes Fidelina Check, YESSICA - EVANGELINA   ondansetron (ZOFRAN-ODT) 4 MG disintegrating tablet Take 1 tablet by mouth 3 times daily as needed for Nausea or Vomiting 9/24/21  Yes Lesle Side, APRKEN - CNP   tiotropium (SPIRIVA RESPIMAT) 2.5 MCG/ACT AERS inhaler Inhale 2 puffs into the lungs daily 9/8/21  Yes Isha Evans MD   rizatriptan (MAXALT) 10 MG tablet Take 10 mg by mouth once as needed for Migraine May repeat in 2 hours if needed    Yes Historical Provider, MD   Blood Glucose Monitoring Suppl (TRUE METRIX AIR GLUCOSE METER) w/Device KIT 1 Device by Does not apply route continuous 4/29/21  Yes YESSICA Adan NP   blood glucose test strips (TRUE METRIX PRO BLOOD GLUCOSE) strip 1 each by In Vitro route daily As needed.  4/29/21  Yes YESSICA Adan NP   traZODone (DESYREL) 100 MG tablet Take 100 mg by mouth nightly as needed  3/1/21  Yes Historical Provider, MD   Multiple Vitamins-Minerals (THERAPEUTIC MULTIVITAMIN-MINERALS) tablet Take 1 tablet by mouth daily 3/4/21 3/15/22 Yes Jacinta Carls YESSICA Leblanc - CNP   hydrOXYzine (ATARAX) 25 MG tablet TAKE 1 TABLET EVERY 8 HOURS AS NEEDED FOR ANXIETY AVOID ALCOHOL/CAUSES DROWSINESS. Do not take with Xanax 10/29/20  Yes YESSICA Leroy NP   calcipotriene (DOVONEX) 0.005 % ointment  5/4/20  Yes Historical Provider, MD   clobetasol (TEMOVATE) 0.05 % ointment  5/4/20  Yes Historical Provider, MD   tiZANidine (ZANAFLEX) 4 MG tablet Take 4 mg by mouth every 8 hours as needed  1/17/20  Yes Historical Provider, MD   oxyCODONE-acetaminophen (PERCOCET) 7.5-325 MG per tablet Take 1 tablet by mouth every 8 hours as needed for Pain (Patient states takes every 6 hours). 1/17/20  Yes Historical Provider, MD   fluticasone-vilanterol (BREO ELLIPTA) 100-25 MCG/INH AEPB inhaler Inhale 1 puff into the lungs daily 3/5/18  Yes Fab Guerrero MD       Physical Exam  Vitals reviewed. Constitutional:       General: She is not in acute distress. Appearance: Normal appearance. She is not ill-appearing. HENT:      Head: Atraumatic. Neck:      Vascular: No carotid bruit. Cardiovascular:      Rate and Rhythm: Normal rate and regular rhythm. Pulses: Normal pulses. Heart sounds: Normal heart sounds. No murmur heard. Pulmonary:      Effort: Pulmonary effort is normal. No respiratory distress. Breath sounds: Normal breath sounds. Musculoskeletal:         General: No swelling or deformity. Cervical back: Neck supple. No muscular tenderness. Neurological:      Mental Status: She is alert.          Health Maintenance   Topic Date Due    Hepatitis B vaccine (1 of 3 - Risk 3-dose series) Never done    Diabetic foot exam  04/03/2020    Breast cancer screen  04/19/2020    Flu vaccine (1) Never done    COVID-19 Vaccine (3 - Booster for Pfizer series) 09/13/2021    DTaP/Tdap/Td vaccine (2 - Td or Tdap) 12/20/2021    Pneumococcal 0-64 years Vaccine (1 of 2 - PPSV23) 04/01/2023 (Originally 6/20/2018)    Diabetic retinal exam  08/19/2022    Depression Screen  12/16/2022    A1C test (Diabetic or Prediabetic)  01/06/2023    Diabetic microalbuminuria test  01/06/2023    Lipid screen  01/06/2023    Potassium monitoring  01/06/2023    Creatinine monitoring  01/06/2023    Colorectal Cancer Screen  07/27/2030    Shingles Vaccine  Completed    Hepatitis C screen  Completed    HIV screen  Completed    Hepatitis A vaccine  Aged Out    Hib vaccine  Aged Out    Meningococcal (ACWY) vaccine  Aged Out       Stress tests 3/4/2019       Summary    ECG portion of stress test is negative for ischemia by diagnostic criteria.    No infarct or ischemia noted.    Normal EF 79 % with normal ventricular contractility.    Normal stress myocardial perfusion.    This is a normal study.         Echo 3/4/2019  Summary   technically difficult study   Left ventricular function is normal.   Ejection fraction is visually estimated at 55-60%.   No significant valvular abnormalities.         ASSESSMENT/PLAN:    Essential hypertension  Assessment & Plan:  Controlled. On lisinopril as needed for a systolic blood pressure greater than 150. No reported adverse events or reported side effects from medication(s). She is stable on current dose. Mixed hyperlipidemia  Assessment & Plan:   Uncontrolled, lifestyle modifications recommended and recommend patient to take zetia for cholesterol. Patient is leery of starting medication for her cholesterol as she has had issues in the past with medications causing myalgias. Patient continues to not want to take medication for her cholesterol.   The 10-year ASCVD risk score (Melia Concepcion et al., 2013) is: 11.2%    Values used to calculate the score:      Age: 49 years      Sex: Female      Is Non- : Yes      Diabetic: Yes      Tobacco smoker: No      Systolic Blood Pressure: 354 mmHg      Is BP treated: Yes      HDL Cholesterol: 84 mg/dL      Total Cholesterol: 303 mg/dL    Mobitz type 1 second degree AV block  Assessment & Plan:  She took propanolol while on event monitor she had low HR with mobitz type 1 2nd degree AV block noted. No change in rhythm while on holter monitor. Patient has been completely asymptomatic and tolerated surgery without event. Syncope  Passed out on 8/12/2021. BP low the next day. Did not check it the day of passing out. Patient has been using midodrine when systolic blood pressure less than 110 and has not had issues with near syncope. Encouraged patient to stay hydrated and wear compression stockings. Sleep study negative  COVID positive   1/31/2022  She was having sore throat and came back positive. Return in about 3 months (around 6/15/2022). An electronic signature was used to authenticate this note.     Electronically signed by Fredricka Mcardle, APRN - CNP on 3/15/2022 at 3:11 PM

## 2022-03-24 ENCOUNTER — OFFICE VISIT (OUTPATIENT)
Dept: BARIATRICS/WEIGHT MGMT | Age: 57
End: 2022-03-24
Payer: COMMERCIAL

## 2022-03-24 VITALS
SYSTOLIC BLOOD PRESSURE: 124 MMHG | HEIGHT: 66 IN | WEIGHT: 172.4 LBS | HEART RATE: 85 BPM | BODY MASS INDEX: 27.71 KG/M2 | DIASTOLIC BLOOD PRESSURE: 72 MMHG

## 2022-03-24 DIAGNOSIS — Z98.890 S/P PANNICULECTOMY: ICD-10-CM

## 2022-03-24 DIAGNOSIS — Z98.84 S/P LAPAROSCOPIC SLEEVE GASTRECTOMY: Primary | ICD-10-CM

## 2022-03-24 PROCEDURE — G8484 FLU IMMUNIZE NO ADMIN: HCPCS | Performed by: NURSE PRACTITIONER

## 2022-03-24 PROCEDURE — 3017F COLORECTAL CA SCREEN DOC REV: CPT | Performed by: NURSE PRACTITIONER

## 2022-03-24 PROCEDURE — G8427 DOCREV CUR MEDS BY ELIG CLIN: HCPCS | Performed by: NURSE PRACTITIONER

## 2022-03-24 PROCEDURE — 1036F TOBACCO NON-USER: CPT | Performed by: NURSE PRACTITIONER

## 2022-03-24 PROCEDURE — 99213 OFFICE O/P EST LOW 20 MIN: CPT | Performed by: NURSE PRACTITIONER

## 2022-03-24 PROCEDURE — G8417 CALC BMI ABV UP PARAM F/U: HCPCS | Performed by: NURSE PRACTITIONER

## 2022-03-24 NOTE — PROGRESS NOTES
Post-Operative Clinic Note    Chief Complaint   Patient presents with    Weight Management     2 month F/U S/P Panniculectomy, 9/23/21         SUBJECTIVE:  Patient is here today for a post-operative visit. Patient is s/p paniculectomy on 9/23/2021    Doing better. Umbilical issue has resolved. Denies drainage. Incision well healed    + 11.3 lbs since last visit. Had covid in January. Not tracking calories. Not exercising but states that she will start going to the gym.       Past Surgical History:   Procedure Laterality Date    ABDOMEN SURGERY N/A 1/14/2019    SECONDARY CLOSURE OF DEHISCED ABDOMINAL WOUND performed by Sagrario Zaragoza MD at [address] N/A 1/24/2019    IRRIGATION OF LOWER ABDOMINAL WOUND performed by Sagrario Zaragoza MD at [facility] ABDOMINOPLASTY N/A 7/29/2019    ABDOMINOPLASTY REVISION performed by Sagrario Zaragoza MD at [place] [address] Right 1/29/2020    EXCISION OF RIGHT BREAST SABACEOUS CYST performed by Niyah Alamo MD at [address] [place] [address] Right 2017    Dr. Gerber Franklin (Right)   [address]     Tubal Ligation Also Done In 1997    COLONOSCOPY  03/2017    Polyp Removed - Dr. Joya Dillard COLONOSCOPY N/A 7/27/2020    COLONOSCOPY POLYPECTOMY SNARE/COLD BIOPSY performed by Sagrario Zaragoza MD at [address] [facility]      Teeth Extracted In Past    ELBOW SURGERY Bilateral Last Done In 2013    Right Done Twice, Left Done Once left cubital tunnel release 2009; right 2014    ENDOSCOPY, COLON, DIAGNOSTIC  03/2017    HERNIA REPAIR  10/26/2017    hiatal hernia with gastrectomy    HYSTERECTOMY VAGINAL  2000    LAPAROSCOPY  2000    LIPECTOMY N/A 9/23/2021    PANNICULECTOMY performed by Sagrario Zaragoza MD at [place] [address] EX[address] [facility] N/A 11/29/2018    ABDOMINOPLASTY performed by Sagrario Zaragoza MD at [address] Right 2015 Dr. Preciado Boy Right 2017    Dr Sofia Sosa  10/26/2017    Robotic Laparoscopic, Pre Op Weight 237  Or 238 lbs.  TUBAL LIGATION      Done With      Past Medical History:   Diagnosis Date    Anxiety     Arthritis     \"Back, Hands, Shoulders\"    Chronic back pain     Arthritis - NKI    COPD (chronic obstructive pulmonary disease) ([facility]     Sees Dr. Mejia Beat with Mental Health    Diabetes mellitus ([facility] Dx     Type II - follows with PCP    Emphysema     Fibromyalgia     H/O abdominoplasty 2018    H/O cardiovascular stress test 2019    ECG portion of stress test is neg for ischemia by diagnostic criteria. No infarct or ischemia noted. Normal stress myocardial perfusion. This is a normal study    H/O echocardiogram 2019    Left ventricular function is normal. Ejection fraction is visually estimated at 55-60%. No significant valvular abnormalitites.  H/O tilt table evaluation 2021    Orthostatic syncope.     Hyperlipidemia     Hypertension     Follows with PCP    Migraines Last Migraine 21    Mobitz (type) I (Wenckebach's) atrioventricular block 2020    Neuropathy     From feet to knees    Piriformis syndrome 2016    Psoriasis     Rotator cuff impingement syndrome, right 2018    RTC repair Dr David Zimmerman   Treated by Dr Melissa Sofia 17  Arthrocentesis 17    Shortness of breath on exertion     Syncope and collapse 2019    Teeth missing     Upper And Lower    Wears glasses      Family History   Problem Relation Age of Onset    Heart Attack Father     Heart Disease Father     Early Death Father 46        Heart Attack    Alcohol Abuse Father     Substance Abuse Father         Alcoholic    Arthritis Mother     Heart Disease Mother     Diabetes Mother     Cancer Mother         \"Kidney Cancer\"    High Blood Pressure Mother     High Blood Pressure Sister  Diabetes Brother     Early Death Daughter 21        \"Killed In A Car Accident\"     Social History     Socioeconomic History    Marital status: Single     Spouse name: Not on file    Number of children: Not on file    Years of education: Not on file    Highest education level: Not on file   Occupational History    Occupation: unemployed   Tobacco Use    Smoking status: Former Smoker     Packs/day: 0.25     Years: 30.00     Pack years: 7.50     Types: Cigarettes, E-Cigarettes     Start date:      Quit date:      Years since quittin.2    Smokeless tobacco: Never Used   Vaping Use    Vaping Use: Never used   Substance and Sexual Activity    Alcohol use: Yes     Comment: \"Occ. Maybe Twice A Month\"    Drug use: Not on file     Comment: Marijuana Card  - last used: 21    Sexual activity: Yes     Partners: Male   Other Topics Concern    Not on file   Social History Narrative    Not on file     Social Determinants of Health     Financial Resource Strain:     Difficulty of Paying Living Expenses: Not on file   Food Insecurity:     Worried About Running Out of Food in the Last Year: Not on file    Yanira of Food in the Last Year: Not on file   Transportation Needs:     Lack of Transportation (Medical): Not on file    Lack of Transportation (Non-Medical):  Not on file   Physical Activity:     Days of Exercise per Week: Not on file    Minutes of Exercise per Session: Not on file   Stress:     Feeling of Stress : Not on file   Social Connections:     Frequency of Communication with Friends and Family: Not on file    Frequency of Social Gatherings with Friends and Family: Not on file    Attends Episcopal Services: Not on file    Active Member of Clubs or Organizations: Not on file    Attends Club or Organization Meetings: Not on file    Marital Status: Not on file   Intimate Partner Violence:     Fear of Current or Ex-Partner: Not on file    Emotionally Abused: Not on file   Tobin Ibarra Physically Abused: Not on file    Sexually Abused: Not on file   Housing Stability:     Unable to Pay for Housing in the Last Year: Not on file    Number of Places Lived in the Last Year: Not on file    Unstable Housing in the Last Year: Not on file       OBJECTIVE:  Physical Exam  Vitals reviewed: overweight. Constitutional:       Appearance: Normal appearance. HENT:      Head: Normocephalic and atraumatic. Right Ear: External ear normal.      Left Ear: External ear normal.      Nose: Nose normal.      Mouth/Throat:      Mouth: Mucous membranes are moist.   Eyes:      Pupils: Pupils are equal, round, and reactive to light. Cardiovascular:      Rate and Rhythm: Normal rate and regular rhythm. Pulses: Normal pulses. Heart sounds: Normal heart sounds. Pulmonary:      Effort: Pulmonary effort is normal.      Breath sounds: Normal breath sounds. Abdominal:      General: Abdomen is flat. Palpations: Abdomen is soft. Musculoskeletal:         General: Normal range of motion. Cervical back: Normal range of motion and neck supple. Skin:     General: Skin is warm and dry. Comments: Incisions well healed. Prior opening at umbilicus healed. Denies drainage   Neurological:      General: No focal deficit present. Mental Status: She is alert and oriented to person, place, and time. Mental status is at baseline. Psychiatric:         Mood and Affect: Mood normal.         Behavior: Behavior normal.         ASSESSMENT:  Shae Moody is a 55 yo female s/p sleeve gastrectomy in 2017 and s/p panniculectomy 9/2021    1. S/P laparoscopic sleeve gastrectomy    2. S/P panniculectomy        PLAN:  1. Doing well from panniculectomy  2. Diet as tolerated. Recommend tracking calories. Up 11.3 lbs since last visit. Offered to meet with RD but patient declined at this time  3. Activity- increase. 30 mins of exercise 3-5 days a week  4.  Follow up 3 month  5. Obtain nutrition labs      Orders Placed This Encounter   Procedures    CBC with Auto Differential    Comprehensive Metabolic Panel    Hemoglobin A1C    Iron and TIBC    Lipid Panel    Magnesium    TSH with Reflex    Vitamin B1    Vitamin B12 & Folate    Vitamin D 25 Hydroxy    Zinc        No orders of the defined types were placed in this encounter. Follow Up: Return in about 3 months (around 6/24/2022).     Christi Leyden, APRN - CNP

## 2022-03-25 ENCOUNTER — HOSPITAL ENCOUNTER (OUTPATIENT)
Dept: WOMENS IMAGING | Age: 57
Discharge: HOME | End: 2022-03-25
Payer: COMMERCIAL

## 2022-03-25 DIAGNOSIS — Z12.31 SCREENING MAMMOGRAM FOR BREAST CANCER: ICD-10-CM

## 2022-03-25 PROCEDURE — 77067 SCR MAMMO BI INCL CAD: CPT

## 2022-05-23 DIAGNOSIS — A60.09 HERPES GENITALIS IN WOMEN: ICD-10-CM

## 2022-05-23 RX ORDER — VALACYCLOVIR HYDROCHLORIDE 500 MG/1
500 TABLET, FILM COATED ORAL DAILY
Qty: 30 TABLET | Refills: 5 | Status: SHIPPED | OUTPATIENT
Start: 2022-05-23 | End: 2022-11-19

## 2022-05-23 NOTE — TELEPHONE ENCOUNTER
Pt needs refill on medication soon. Pt appt had to be moved to later June and will be out of medication.

## 2022-06-13 ENCOUNTER — OFFICE VISIT (OUTPATIENT)
Dept: CARDIOLOGY CLINIC | Age: 57
End: 2022-06-13
Payer: COMMERCIAL

## 2022-06-13 VITALS
SYSTOLIC BLOOD PRESSURE: 120 MMHG | DIASTOLIC BLOOD PRESSURE: 82 MMHG | BODY MASS INDEX: 27.4 KG/M2 | HEIGHT: 67 IN | HEART RATE: 62 BPM | WEIGHT: 174.6 LBS

## 2022-06-13 DIAGNOSIS — I10 ESSENTIAL HYPERTENSION: ICD-10-CM

## 2022-06-13 DIAGNOSIS — I44.1 MOBITZ TYPE 1 SECOND DEGREE AV BLOCK: Primary | ICD-10-CM

## 2022-06-13 DIAGNOSIS — R55 SYNCOPE, UNSPECIFIED SYNCOPE TYPE: ICD-10-CM

## 2022-06-13 DIAGNOSIS — E78.2 MIXED HYPERLIPIDEMIA: ICD-10-CM

## 2022-06-13 PROCEDURE — 1036F TOBACCO NON-USER: CPT | Performed by: NURSE PRACTITIONER

## 2022-06-13 PROCEDURE — G8417 CALC BMI ABV UP PARAM F/U: HCPCS | Performed by: NURSE PRACTITIONER

## 2022-06-13 PROCEDURE — 99214 OFFICE O/P EST MOD 30 MIN: CPT | Performed by: NURSE PRACTITIONER

## 2022-06-13 PROCEDURE — G8427 DOCREV CUR MEDS BY ELIG CLIN: HCPCS | Performed by: NURSE PRACTITIONER

## 2022-06-13 PROCEDURE — 3017F COLORECTAL CA SCREEN DOC REV: CPT | Performed by: NURSE PRACTITIONER

## 2022-06-13 RX ORDER — CHOLECALCIFEROL (VITAMIN D3) 125 MCG
100 CAPSULE ORAL DAILY
Qty: 90 CAPSULE | Refills: 3 | Status: SHIPPED | OUTPATIENT
Start: 2022-06-13

## 2022-06-13 ASSESSMENT — ENCOUNTER SYMPTOMS
COUGH: 0
SHORTNESS OF BREATH: 0

## 2022-06-13 NOTE — PROGRESS NOTES
[facility] ([facility] [place] [facility] [place]  [address]  Phone: [phone]    Fax [phone]    Med Katz MD, Orlando Angel MD, Mando Paul MD, MD Juli Santiago MD Paulett Kohler, MD Ronie Euler, MD Dinah Harding, APRN      Kamar Marrow, APRN  Jaelyn Anaya, APRN     Mayito Chaves, APRN    CARDIOLOGY  NOTE    2022    Nat Lamb (:  1965) is a 56 y.o. female,Established patient with Dr. Cassie Caicedo, here for evaluation of the following chief complaint(s):  Follow-up (3 mo pt denies any new cardiac sx, did not bring BP log, pt does not smoke, drinks alcohol. no future procedures )    SUBJECTIVE/OBJECTIVE:    Rebeca Gibbs is a 50y.o. year old who has Past medical history as noted below. She is doing very well. She states that she has been feeling very well since she had her surgery and is not having any issues. She was actually evaluated in February when she was undergoing surgery was noted to have second-degree heart block type I Wenckebach. She is not symptomatic. During her event monitor which she wore for 14 days in 2019 she was noted to have Wenckebach type I. She was not symptomatic  Currently she is feeling well blood pressure is well controlled. Neurologist started her on propanolol for migraine prevention. Her blood pressure dropped significantly with first dose and she has not taken again. She has been having more swelling in her hands nd feet even with HTCZ daily. Review of Systems   Constitutional: Negative for fatigue and fever. Respiratory: Negative for cough and shortness of breath. Cardiovascular: Positive for leg swelling (intermittent). Negative for chest pain and palpitations. Musculoskeletal: Negative for arthralgias and gait problem. Neurological: Negative for dizziness, syncope, weakness, light-headedness and headaches.        Vitals:    22 1010   BP: 120/82   Pulse: 62 Weight: 174 lb 9.6 oz (79.2 kg)   Height: 5' 7\" (1.702 m)       Wt Readings from Last 3 Encounters:   06/13/22 174 lb 9.6 oz (79.2 kg)   03/24/22 172 lb 6.4 oz (78.2 kg)   03/15/22 176 lb 6.4 oz (80 kg)       BP Readings from Last 3 Encounters:   06/13/22 120/82   03/24/22 124/72   03/15/22 132/86       Prior to Admission medications    Medication Sig Start Date End Date Taking? Authorizing Provider   coenzyme Q-10 100 MG capsule Take 1 capsule by mouth daily 6/13/22  Yes YESSICA Cadrenas CNP   valACYclovir (VALTREX) 500 MG tablet Take 1 tablet by mouth daily 5/23/22 11/19/22 Yes Cynthia Gr MD   midodrine (PROAMATINE) 2.5 MG tablet Take 1 tablet by mouth 3 times daily as needed (SBP less than 100) 3/15/22  Yes YESSICA Cardenas CNP   lisinopril (PRINIVIL;ZESTRIL) 2.5 MG tablet Take 1 tablet by mouth daily as needed (for SBP  more than 135) 3/15/22  Yes YESSICA Cardenas CNP   hydroCHLOROthiazide (HYDRODIURIL) 25 MG tablet Take 1 tablet by mouth daily as needed (swelling) 3/15/22  Yes YESSICA Cardenas CNP   atorvastatin (LIPITOR) 80 MG tablet Take 1 tablet by mouth daily 3/15/22  Yes YESSICA Cardenas CNP   SYMBICORT 160-4.5 MCG/ACT AERO Inhale 2 puffs into the lungs 2 times daily 3/11/22  Yes Sultana Pastor MD   fluticasone-vilanterol (BREO ELLIPTA) 100-25 MCG/INH AEPB inhaler Inhale 1 puff into the lungs daily 3/9/22 6/13/22 Yes Sultana Patsor MD   tiotropium (SPIRIVA RESPIMAT) 2.5 MCG/ACT AERS inhaler Inhale 2 puffs into the lungs daily 3/9/22  Yes Sultana Pastor MD   albuterol sulfate HFA (VENTOLIN HFA) 108 (90 Base) MCG/ACT inhaler Inhale 2 puffs into the lungs 4 times daily as needed for Wheezing 3/9/22  Yes Sultana Pastor MD   albuterol (PROVENTIL) (2.5 MG/3ML) 0.083% nebulizer solution Take 3 mLs by nebulization every 6 hours 3/9/22  Yes Sultana Pastor MD   methocarbamol (ROBAXIN) 500 MG tablet Take 1 tablet by mouth 3 times daily As needed for muscle spasm.  2/13/22  Yes Lawayne Felty, PA-C   PROAIR  (38 Base) MCG/ACT inhaler Inhale 2 puffs into the lungs every 6 hours as needed for Wheezing 1/31/22  Yes Sultana Pastor MD   cetirizine (ZYRTEC) 10 MG tablet Take 10 mg by mouth daily   Yes Historical Provider, MD   Calcium Carb-Cholecalciferol (CALCIUM-VITAMIN D) 600-400 MG-UNIT TABS Take 1 tablet by mouth daily 1/17/22  Yes YESSICA Ruiz NP   omeprazole (PRILOSEC) 40 MG delayed release capsule Take 1 capsule by mouth daily 12/20/21  Yes YESSICA Ruiz NP   alogliptin (NESINA) 6.25 MG TABS tablet TAKE 1 TABLET BY MOUTH DAILY 12/2/21 6/13/22 Yes YESSICA Ruiz NP   TRUEplus Lancets 33G MISC USE AS DIRECTED 11/22/21  Yes YESSICA Ruiz NP   Elastic Bandages & Supports (ABDOMINAL BINDER/ELASTIC MED) MISC 1 each by Does not apply route daily 9/30/21  Yes Dasha Perez II, MD   Blood Pressure Monitoring (BLOOD PRESSURE KIT) [place] 1 Device by Does not apply route daily 9/29/21  Yes YESSICA Cardenas CNP   ondansetron (ZOFRAN-ODT) 4 MG disintegrating tablet Take 1 tablet by mouth 3 times daily as needed for Nausea or Vomiting 9/24/21  Yes YESSICA Metz CNP   tiotropium (SPIRIVA RESPIMAT) 2.5 MCG/ACT AERS inhaler Inhale 2 puffs into the lungs daily 9/8/21  Yes Sultana Pastor MD   rizatriptan (MAXALT) 10 MG tablet Take 10 mg by mouth once as needed for Migraine May repeat in 2 hours if needed    Yes Historical Provider, MD   Blood Glucose Monitoring Suppl (TRUE METRIX AIR GLUCOSE METER) w/Device KIT 1 Device by Does not apply route continuous 4/29/21  Yes YESSICA Ruiz NP   blood glucose test strips (TRUE METRIX PRO BLOOD GLUCOSE) strip 1 each by In Vitro route daily As needed.  4/29/21  Yes YESSICA Ruiz NP   traZODone (DESYREL) 100 MG tablet Take 100 mg by mouth nightly as needed  3/1/21  Yes Historical Provider, MD   Multiple Vitamins-Minerals (THERAPEUTIC MULTIVITAMIN-MINERALS) tablet Take 1 tablet by mouth daily 3/4/21 6/13/22 Yes YESSICA Page - CNP   hydrOXYzine (ATARAX) 25 MG tablet TAKE 1 TABLET EVERY 8 HOURS AS NEEDED FOR ANXIETY AVOID ALCOHOL/CAUSES DROWSINESS. Do not take with Xanax 10/29/20  Yes YESSICA Melendez NP   calcipotriene (DOVONEX) 0.005 % ointment  5/4/20  Yes Historical Provider, MD   clobetasol (TEMOVATE) 0.05 % ointment  5/4/20  Yes Historical Provider, MD   tiZANidine (ZANAFLEX) 4 MG tablet Take 4 mg by mouth every 8 hours as needed  1/17/20  Yes Historical Provider, MD   oxyCODONE-acetaminophen (PERCOCET) 7.5-325 MG per tablet Take 1 tablet by mouth every 8 hours as needed for Pain (Patient states takes every 6 hours). 1/17/20  Yes Historical Provider, MD   fluticasone-vilanterol (BREO ELLIPTA) 100-25 MCG/INH AEPB inhaler Inhale 1 puff into the lungs daily 3/5/18  Yes Sebas Frederick MD       Physical Exam  Vitals reviewed. Constitutional:       General: She is not in acute distress. Appearance: Normal appearance. She is not ill-appearing. HENT:      Head: Atraumatic. Neck:      Vascular: No carotid bruit. Cardiovascular:      Rate and Rhythm: Normal rate and regular rhythm. Pulses: Normal pulses. Heart sounds: Normal heart sounds. No murmur heard. Pulmonary:      Effort: Pulmonary effort is normal. No respiratory distress. Breath sounds: Normal breath sounds. Musculoskeletal:         General: No swelling or deformity. Cervical back: Neck supple. No muscular tenderness. Neurological:      Mental Status: She is alert.          Health Maintenance   Topic Date Due    Hepatitis B vaccine (1 of 3 - Risk 3-dose series) Never done    Diabetic foot exam  04/03/2020    COVID-19 Vaccine (3 - Booster for Pfizer series) 09/13/2021    DTaP/Tdap/Td vaccine (2 - Td or Tdap) 12/20/2021    Pneumococcal 0-64 years Vaccine (2 - PPSV23 or PCV20) 04/01/2023 (Originally 4/25/2019)    Diabetic retinal exam  08/19/2022    Flu vaccine (Season Ended) 09/01/2022    Depression Screen  12/16/2022    A1C test (Diabetic or Prediabetic)  01/06/2023    Diabetic microalbuminuria test  01/06/2023    Lipids  01/06/2023    Breast cancer screen  03/25/2024    Colorectal Cancer Screen  07/27/2030    Shingles vaccine  Completed    Hepatitis C screen  Completed    HIV screen  Completed    Hepatitis A vaccine  Aged Out    Hib vaccine  Aged Out    Meningococcal (ACWY) vaccine  Aged Out       Stress tests 3/4/2019       Summary    ECG portion of stress test is negative for ischemia by diagnostic criteria.    No infarct or ischemia noted.    Normal EF 79 % with normal ventricular contractility.    Normal stress myocardial perfusion.    This is a normal study.         Echo 3/4/2019  Summary   technically difficult study   Left ventricular function is normal.   Ejection fraction is visually estimated at 55-60%.   No significant valvular abnormalities.         ASSESSMENT/PLAN:    Essential hypertension  Assessment & Plan:  Controlled. On lisinopril as needed for a systolic blood pressure greater than 150. No reported adverse events or reported side effects from medication(s). She is stable on current dose. Most often BP is < 130/80 therefore no medication needed at this time. Mixed hyperlipidemia  Assessment & Plan:   Uncontrolled,  lifestyle modifications recommended and recommend patient to take zetia for cholesterol. She was placed on lipitor by PCP. Patient is leery of starting medication for her cholesterol as she has had issues in the past with medications causing myalgias. She is going to start taking her lipitor. Encouraged to start co q 10 with it. rx given. The 10-year ASCVD risk score (Armida De La Cruz et al., 2013) is: 9%    Values used to calculate the score:      Age: 56 years      Sex: Female      Is Non- : Yes      Diabetic: Yes      Tobacco smoker: No      Systolic Blood Pressure: 335 mmHg      Is BP treated:  Yes HDL Cholesterol: 84 mg/dL      Total Cholesterol: 303 mg/dL    Mobitz type 1 second degree AV block  Assessment & Plan:  She took propanolol while on event monitor she had low HR with mobitz type 1 2nd degree AV block noted. No change in rhythm while on holter monitor. Patient has been completely asymptomatic and tolerated surgery without event. Syncope  No additional episode of near syncope or syncope since 8/2021  Passed out on 8/12/2021. BP low the next day. Did not check it the day of passing out. Patient has not needed midodrine but knows to use midodrine when systolic blood pressure less than 110 to prevent near syncope. Encouraged patient to stay hydrated and wear compression stockings. Sleep study negative    COVID positive   1/31/2022-   No residual loss from COVID. Return in about 3 months (around 9/13/2022). An electronic signature was used to authenticate this note.     Electronically signed by YESSICA Carter CNP on 6/13/2022 at 10:24 AM

## 2022-06-14 ENCOUNTER — OFFICE VISIT (OUTPATIENT)
Dept: BARIATRICS/WEIGHT MGMT | Age: 57
End: 2022-06-14
Payer: COMMERCIAL

## 2022-06-14 VITALS
DIASTOLIC BLOOD PRESSURE: 88 MMHG | SYSTOLIC BLOOD PRESSURE: 124 MMHG | WEIGHT: 175.2 LBS | BODY MASS INDEX: 28.16 KG/M2 | HEIGHT: 66 IN | HEART RATE: 90 BPM

## 2022-06-14 DIAGNOSIS — Z98.84 S/P LAPAROSCOPIC SLEEVE GASTRECTOMY: ICD-10-CM

## 2022-06-14 DIAGNOSIS — Z98.890 S/P PANNICULECTOMY: Primary | ICD-10-CM

## 2022-06-14 PROCEDURE — 99213 OFFICE O/P EST LOW 20 MIN: CPT | Performed by: NURSE PRACTITIONER

## 2022-06-14 PROCEDURE — 1036F TOBACCO NON-USER: CPT | Performed by: NURSE PRACTITIONER

## 2022-06-14 PROCEDURE — G8417 CALC BMI ABV UP PARAM F/U: HCPCS | Performed by: NURSE PRACTITIONER

## 2022-06-14 PROCEDURE — 3017F COLORECTAL CA SCREEN DOC REV: CPT | Performed by: NURSE PRACTITIONER

## 2022-06-14 PROCEDURE — G8427 DOCREV CUR MEDS BY ELIG CLIN: HCPCS | Performed by: NURSE PRACTITIONER

## 2022-06-14 NOTE — PROGRESS NOTES
Follow-up Clinic Note    Chief Complaint   Patient presents with   [facility] Post Op Follow Up     9 month S/P Panniculectomy @ [facility] 9/23/21     SUBJECTIVE:  Patient is here today for a follow up visit. Patient is s/p panniculectomy    Doing well. Wants to discuss weight loss medications  BMI 28  Needs to get labs done.   Tracking calories 800-1000  Drinks flavored    Past Surgical History:   Procedure Laterality Date    ABDOMEN SURGERY N/A 1/14/2019    SECONDARY CLOSURE OF DEHISCED ABDOMINAL WOUND performed by Bobby Ramirez MD at [facility] 71 N/A 1/24/2019    IRRIGATION OF LOWER ABDOMINAL WOUND performed by Bobby Ramirez MD at [facility] 73 ABDOMINOPLASTY N/A 7/29/2019    ABDOMINOPLASTY REVISION performed by Bobby Ramirez MD at [facility] 75 Right 1/29/2020    EXCISION OF RIGHT BREAST SABACEOUS CYST performed by Rita Avery MD at [address] Right 2017    Dr. Jessica Fitzgerald (Right)   [address]    Tubal Ligation Also Done In 1997    COLONOSCOPY  03/2017    Polyp Removed - Dr. Rosenberg Seat COLONOSCOPY N/A 7/27/2020    COLONOSCOPY POLYPECTOMY SNARE/COLD BIOPSY performed by Bobby Ramirez MD at [facility]      Teeth Extracted In Past    ELBOW SURGERY Bilateral Last Done In 2013    Right Done Twice, Left Done Once left cubital tunnel release 2009; right 2014    ENDOSCOPY, COLON, DIAGNOSTIC  03/2017    HERNIA REPAIR  10/26/2017    hiatal hernia with gastrectomy    HYSTERECTOMY VAGINAL  2000    LAPAROSCOPY  2000    LIPECTOMY N/A 9/23/2021    PANNICULECTOMY performed by Bobby Ramirez MD at [facility]. N/A 11/29/2018    ABDOMINOPLASTY performed by Bobby Ramirez MD at [address] Right 2015    Dr. Ishaan Evansate Right 03/2017    Dr Shelli Denney  10/26/2017    Robotic Laparoscopic, Pre Op Weight 237  Or 238 lbs.  TUBAL LIGATION      Done With      Past Medical History:   Diagnosis Date    Anxiety     Arthritis     \"Back, Hands, Shoulders\"    Chronic back pain     Arthritis - NKI    COPD (chronic obstructive pulmonary disease) ([address]     Sees Dr. Em Massey with Mental Health    Diabetes mellitus ([address] Dx 2008    Type II - follows with PCP    Emphysema     Fibromyalgia     H/O abdominoplasty 2018    H/O cardiovascular stress test 2019    ECG portion of stress test is neg for ischemia by diagnostic criteria. No infarct or ischemia noted. Normal stress myocardial perfusion. This is a normal study    H/O echocardiogram 2019    Left ventricular function is normal. Ejection fraction is visually estimated at 55-60%. No significant valvular abnormalitites.  H/O tilt table evaluation 2021    Orthostatic syncope.     Hyperlipidemia     Hypertension     Follows with PCP    Migraines Last Migraine 21    Mobitz (type) I (Wenckebach's) atrioventricular block 2020    Neuropathy     From feet to knees    Piriformis syndrome 2016    Psoriasis     Rotator cuff impingement syndrome, right 2018    RTC repair Dr Cesilia Best   Treated by Dr Sophie Rodriguez 17  Arthrocentesis 17    Shortness of breath on exertion     Syncope and collapse 2019    Teeth missing     Upper And Lower    Wears glasses      Family History   Problem Relation Age of Onset    Heart Attack Father     Heart Disease Father     Early Death Father 46        Heart Attack    Alcohol Abuse Father     Substance Abuse Father         Alcoholic    Arthritis Mother     Heart Disease Mother     Diabetes Mother     Cancer Mother         \"Kidney Cancer\"    High Blood Pressure Mother     High Blood Pressure Sister     Diabetes Brother     Early Death Daughter 21        \"Killed In A Car Accident\"    Ovarian Cancer Neg Hx     Breast Cancer Neg Hx      Social History     Socioeconomic History    Marital status: Single     Spouse name: Not on file    Number of children: Not on file    Years of education: Not on file    Highest education level: Not on file   Occupational History    Occupation: unemployed   Tobacco Use    Smoking status: Former Smoker     Packs/day: 0.25     Years: 30.00     Pack years: 7.50     Types: Cigarettes, E-Cigarettes     Start date:      Quit date: 2016     Years since quittin.4    Smokeless tobacco: Never Used   Vaping Use    Vaping Use: Never used   Substance and Sexual Activity    Alcohol use: Yes     Comment: \"Occ. Maybe Twice A Month\"    Drug use: Not on file     Comment: Marijuana Card  - last used: 21    Sexual activity: Yes     Partners: Male   Other Topics Concern    Not on file   Social History Narrative    Not on file     Social Determinants of Health     Financial Resource Strain:     Difficulty of Paying Living Expenses: Not on file   Food Insecurity:     Worried About Running Out of Food in the Last Year: Not on file    Yanira of Food in the Last Year: Not on file   Transportation Needs:     Lack of Transportation (Medical): Not on file    Lack of Transportation (Non-Medical):  Not on file   Physical Activity:     Days of Exercise per Week: Not on file    Minutes of Exercise per Session: Not on file   Stress:     Feeling of Stress : Not on file   Social Connections:     Frequency of Communication with Friends and Family: Not on file    Frequency of Social Gatherings with Friends and Family: Not on file    Attends Catholic Services: Not on file    Active Member of Clubs or Organizations: Not on file    Attends Club or Organization Meetings: Not on file    Marital Status: Not on file   Intimate Partner Violence:     Fear of Current or Ex-Partner: Not on file    Emotionally Abused: Not on file    Physically Abused: Not on file    Sexually Abused: Not on file   Housing Stability:     Unable to Pay for Housing in the Last Year: Not on file    Number of Places Lived in the Last Year: Not on file    Unstable Housing in the Last Year: Not on file     OBJECTIVE:  Physical Exam  Vitals reviewed. Constitutional:       Appearance: Normal appearance. HENT:      Head: Normocephalic and atraumatic. Right Ear: External ear normal.      Left Ear: External ear normal.      Nose: Nose normal.      Mouth/Throat:      Mouth: Mucous membranes are moist.   Eyes:      Extraocular Movements: Extraocular movements intact. Pupils: Pupils are equal, round, and reactive to light. Cardiovascular:      Rate and Rhythm: Normal rate and regular rhythm. Pulses: Normal pulses. Heart sounds: Normal heart sounds. Pulmonary:      Effort: Pulmonary effort is normal.      Breath sounds: Normal breath sounds. Abdominal:      General: Bowel sounds are normal.   Musculoskeletal:         General: Normal range of motion. Cervical back: Normal range of motion and neck supple. Skin:     General: Skin is warm and dry. Neurological:      General: No focal deficit present. Mental Status: She is alert and oriented to person, place, and time. Mental status is at baseline. Psychiatric:         Mood and Affect: Mood normal.         Behavior: Behavior normal.       ASSESSMENT:  Berta Camacho is a 55 yo female s/p gastric sleeve and abdominal panniculectomy    1. S/P panniculectomy    2. S/P laparoscopic sleeve gastrectomy      PLAN:  1. Patient is not a candidate for weight loss medications. BMI is 28  2. Diet as tolerated. Recommend tracking calories consistently  3. Activity as tolerated. Increase activity as able to help aid in weight loss  4. Follow up in October for yearly bariatric visit  5. Call with questions or concerns    No orders of the defined types were placed in this encounter. No orders of the defined types were placed in this encounter. Follow Up: Return in about 5 years (around 6/14/2027) for weight check.     YESSICA Dale - CNP

## 2022-06-16 ENCOUNTER — OFFICE VISIT (OUTPATIENT)
Dept: NEUROLOGY | Age: 57
End: 2022-06-16
Payer: COMMERCIAL

## 2022-06-16 DIAGNOSIS — M25.511 BILATERAL SHOULDER PAIN, UNSPECIFIED CHRONICITY: ICD-10-CM

## 2022-06-16 DIAGNOSIS — M25.512 BILATERAL SHOULDER PAIN, UNSPECIFIED CHRONICITY: ICD-10-CM

## 2022-06-16 DIAGNOSIS — Z98.890 HISTORY OF CARPAL TUNNEL RELEASE: Primary | ICD-10-CM

## 2022-06-16 DIAGNOSIS — R20.2 PARESTHESIAS: ICD-10-CM

## 2022-06-16 DIAGNOSIS — Z98.890 HISTORY OF DECOMPRESSION OF BOTH ULNAR NERVES: ICD-10-CM

## 2022-06-16 PROCEDURE — 95886 MUSC TEST DONE W/N TEST COMP: CPT | Performed by: STUDENT IN AN ORGANIZED HEALTH CARE EDUCATION/TRAINING PROGRAM

## 2022-06-16 PROCEDURE — 95911 NRV CNDJ TEST 9-10 STUDIES: CPT | Performed by: STUDENT IN AN ORGANIZED HEALTH CARE EDUCATION/TRAINING PROGRAM

## 2022-06-16 NOTE — PROGRESS NOTES
EMG Upper Limbs:   EMG/NCS UPPER EXTREMITIES:    Reason for referral/Clinical data:  Oscar is obtaining bilateral upper extremity EMG to evaluate hand pain that she reports radiates up into her shoulders. This is not specific to any fingers or dermatomal patterns. She also reports bilateral shoulder pain. She has had multiple nerve releases completed including carpal tunnel and bilateral cubital tunnel release. Refer to the Electrodiagnostic Data Sheet for normative values, specific techniques utilized for conductions, the numeric values obtained on nerve conductions, and specific muscles sampled for needle examination. Informed consent was given by the patient after discussion of the following - Expected potential benefits of procedure include the following: identifying diagnoses, excluding diagnoses, and helping the treating practitioners to prescribe treatment, testing, and follow-up. Possible adverse events of electrodiagnostic testing include local discomfort, mild bleeding or bruising (common) and rare/uncommon events such as infection, nausea, fainting, or other idiosyncratic adverse events. Motor studies:  -- Right median motor response demonstrates amplitude which is normal, distal latency which is normal, and conduction velocity which is normal.  -- Left median motor response demonstrates amplitude which is normal, distal latency which is normal, and conduction velocity which is normal.  -- Right ulnar motor response demonstrates amplitude which is normal, distal latency which is normal, and conduction velocity which is normal in both forearm and elbow segments. Edgar-Vitaly anastamosis findings are not identified. -- Left ulnar motor response demonstrates amplitude which is normal, distal latency which is normal, and conduction velocity which is normal in both forearm and elbow segments. Edgar-Vitaly anastamosis findings are not identified.     Sensory studies:  -- Right median sensory nerve conduction response demonstrates amplitude which is normal, and distal latency which is normal.   -- Left median sensory nerve conduction response demonstrates normal amplitude, and distal latency which is normal.   -- Right ulnar sensory nerve conduction response demonstrates normal amplitude, and distal latency which is normal.   -- Left ulnar sensory nerve conduction response demonstrates normal amplitude, and distal latency which is normal.   -- Left radial sensory study is normal for amplitude and latency. -- Right radial sensory study is normal for amplitude and latency. NEEDLE ELECTRODE EXAMINATION  Needle examination performed throughout multiple, selected muscles of the bilateral upper limbs  (as detailed on the data sheet) demonstrates findings which minimally abnormal with enlargement of motor units isolated to the left triceps. This is of uncertain clinical significance. Diagnosis Orders   1. History of carpal tunnel release     2. History of decompression of both ulnar nerves     3. Bilateral shoulder pain, unspecified chronicity     4. Paresthesias          Impression: This is a normal electrodiagnostic study of the upper limbs in that there is no evidence of a mononeuropathy, generalized neuropathy, cervical radiculopathy, brachial plexopathy, or myopathy. Specifically there is no evidence to suggest a recurrent entrapment neuropathy.      Santos Webber DO  6/16/2022 11:28 AM

## 2022-07-07 ENCOUNTER — OFFICE VISIT (OUTPATIENT)
Dept: FAMILY MEDICINE CLINIC | Age: 57
End: 2022-07-07
Payer: COMMERCIAL

## 2022-07-07 VITALS
SYSTOLIC BLOOD PRESSURE: 130 MMHG | HEART RATE: 59 BPM | HEIGHT: 66 IN | WEIGHT: 166.6 LBS | BODY MASS INDEX: 26.78 KG/M2 | OXYGEN SATURATION: 99 % | DIASTOLIC BLOOD PRESSURE: 84 MMHG

## 2022-07-07 DIAGNOSIS — J44.9 COPD, SEVERE (HCC): ICD-10-CM

## 2022-07-07 DIAGNOSIS — E78.2 MIXED HYPERLIPIDEMIA: ICD-10-CM

## 2022-07-07 DIAGNOSIS — A60.09 HERPES GENITALIS IN WOMEN: ICD-10-CM

## 2022-07-07 DIAGNOSIS — E11.42 TYPE 2 DIABETES MELLITUS WITH DIABETIC POLYNEUROPATHY, WITHOUT LONG-TERM CURRENT USE OF INSULIN (HCC): Primary | ICD-10-CM

## 2022-07-07 DIAGNOSIS — N95.1 HOT FLASHES DUE TO MENOPAUSE: ICD-10-CM

## 2022-07-07 DIAGNOSIS — M79.7 FIBROMYALGIA: ICD-10-CM

## 2022-07-07 DIAGNOSIS — E11.42 DIABETIC PERIPHERAL NEUROPATHY ASSOCIATED WITH TYPE 2 DIABETES MELLITUS (HCC): ICD-10-CM

## 2022-07-07 DIAGNOSIS — E55.9 VITAMIN D DEFICIENCY: ICD-10-CM

## 2022-07-07 DIAGNOSIS — K21.9 GASTROESOPHAGEAL REFLUX DISEASE, UNSPECIFIED WHETHER ESOPHAGITIS PRESENT: ICD-10-CM

## 2022-07-07 DIAGNOSIS — F41.9 ANXIETY: ICD-10-CM

## 2022-07-07 DIAGNOSIS — I10 ESSENTIAL HYPERTENSION: ICD-10-CM

## 2022-07-07 DIAGNOSIS — G89.4 CHRONIC PAIN SYNDROME: ICD-10-CM

## 2022-07-07 PROCEDURE — 2022F DILAT RTA XM EVC RTNOPTHY: CPT | Performed by: NURSE PRACTITIONER

## 2022-07-07 PROCEDURE — G8417 CALC BMI ABV UP PARAM F/U: HCPCS | Performed by: NURSE PRACTITIONER

## 2022-07-07 PROCEDURE — 3044F HG A1C LEVEL LT 7.0%: CPT | Performed by: NURSE PRACTITIONER

## 2022-07-07 PROCEDURE — G8427 DOCREV CUR MEDS BY ELIG CLIN: HCPCS | Performed by: NURSE PRACTITIONER

## 2022-07-07 PROCEDURE — 99214 OFFICE O/P EST MOD 30 MIN: CPT | Performed by: NURSE PRACTITIONER

## 2022-07-07 PROCEDURE — 3023F SPIROM DOC REV: CPT | Performed by: NURSE PRACTITIONER

## 2022-07-07 PROCEDURE — 1036F TOBACCO NON-USER: CPT | Performed by: NURSE PRACTITIONER

## 2022-07-07 PROCEDURE — 3017F COLORECTAL CA SCREEN DOC REV: CPT | Performed by: NURSE PRACTITIONER

## 2022-07-07 RX ORDER — OMEPRAZOLE 40 MG/1
40 CAPSULE, DELAYED RELEASE ORAL DAILY
Qty: 90 CAPSULE | Refills: 2 | Status: SHIPPED | OUTPATIENT
Start: 2022-07-07

## 2022-07-07 RX ORDER — BUSPIRONE HYDROCHLORIDE 5 MG/1
5 TABLET ORAL 2 TIMES DAILY PRN
Qty: 60 TABLET | Refills: 0 | Status: SHIPPED | OUTPATIENT
Start: 2022-07-07 | End: 2022-08-06

## 2022-07-07 ASSESSMENT — PATIENT HEALTH QUESTIONNAIRE - PHQ9
7. TROUBLE CONCENTRATING ON THINGS, SUCH AS READING THE NEWSPAPER OR WATCHING TELEVISION: 2
9. THOUGHTS THAT YOU WOULD BE BETTER OFF DEAD, OR OF HURTING YOURSELF: 0
6. FEELING BAD ABOUT YOURSELF - OR THAT YOU ARE A FAILURE OR HAVE LET YOURSELF OR YOUR FAMILY DOWN: 0
2. FEELING DOWN, DEPRESSED OR HOPELESS: 0
4. FEELING TIRED OR HAVING LITTLE ENERGY: 1
5. POOR APPETITE OR OVEREATING: 0
1. LITTLE INTEREST OR PLEASURE IN DOING THINGS: 1
SUM OF ALL RESPONSES TO PHQ QUESTIONS 1-9: 7
3. TROUBLE FALLING OR STAYING ASLEEP: 3
SUM OF ALL RESPONSES TO PHQ QUESTIONS 1-9: 7
SUM OF ALL RESPONSES TO PHQ QUESTIONS 1-9: 7
8. MOVING OR SPEAKING SO SLOWLY THAT OTHER PEOPLE COULD HAVE NOTICED. OR THE OPPOSITE, BEING SO FIGETY OR RESTLESS THAT YOU HAVE BEEN MOVING AROUND A LOT MORE THAN USUAL: 0
SUM OF ALL RESPONSES TO PHQ QUESTIONS 1-9: 7
SUM OF ALL RESPONSES TO PHQ9 QUESTIONS 1 & 2: 1

## 2022-07-07 ASSESSMENT — ANXIETY QUESTIONNAIRES
6. BECOMING EASILY ANNOYED OR IRRITABLE: 1
7. FEELING AFRAID AS IF SOMETHING AWFUL MIGHT HAPPEN: 0
2. NOT BEING ABLE TO STOP OR CONTROL WORRYING: 1
IF YOU CHECKED OFF ANY PROBLEMS ON THIS QUESTIONNAIRE, HOW DIFFICULT HAVE THESE PROBLEMS MADE IT FOR YOU TO DO YOUR WORK, TAKE CARE OF THINGS AT HOME, OR GET ALONG WITH OTHER PEOPLE: NOT DIFFICULT AT ALL
4. TROUBLE RELAXING: 1
3. WORRYING TOO MUCH ABOUT DIFFERENT THINGS: 1
5. BEING SO RESTLESS THAT IT IS HARD TO SIT STILL: 0
1. FEELING NERVOUS, ANXIOUS, OR ON EDGE: 0
GAD7 TOTAL SCORE: 4

## 2022-07-07 NOTE — PROGRESS NOTES
supplement. States that she cannot remember to take it.          Psoriasis  following with dermatology         Neurology for migraines  neuro [facility] at [facility].           Reports having partial hysterectomy,still has cervix.              HSV2  +  May 2021 - Valtrex daily  Follows with infectious disease  No hx of ulcers  Worried about kissing her grandson. Reports she broke out when she had a [place] wax        sleep study normal august 2021 - normal. No CPAP     Mammogram march 2022 normal      Had paniculectomy surgery sept 2021           Having night sweats menopausal.   Was on hormones previously, but stopped for no specific reason. She would like to see GYN to restart hormones          Review of Systems    Prior to Visit Medications    Medication Sig Taking?  Authorizing Provider   coenzyme Q-10 100 MG capsule Take 1 capsule by mouth daily Yes YESSICA Lowe CNP   valACYclovir (VALTREX) 500 MG tablet Take 1 tablet by mouth daily Yes Jhonathan Finch MD   midodrine (PROAMATINE) 2.5 MG tablet Take 1 tablet by mouth 3 times daily as needed (SBP less than 100) Yes YESSICA Vasques CNP   lisinopril (PRINIVIL;ZESTRIL) 2.5 MG tablet Take 1 tablet by mouth daily as needed (for SBP  more than 135) Yes YESSICA Connell CNP   hydroCHLOROthiazide (HYDRODIURIL) 25 MG tablet Take 1 tablet by mouth daily as needed (swelling) Yes YESSICA Lowe CNP   atorvastatin (LIPITOR) 80 MG tablet Take 1 tablet by mouth daily Yes YESSICA Vasques CNP   SYMBICORT 160-4.5 MCG/ACT AERO Inhale 2 puffs into the lungs 2 times daily Yes Marta Bynum MD   albuterol sulfate HFA (VENTOLIN HFA) 108 (90 Base) MCG/ACT inhaler Inhale 2 puffs into the lungs 4 times daily as needed for Wheezing Yes Marta Bynum MD   albuterol (PROVENTIL) (2.5 MG/3ML) 0.083% nebulizer solution Take 3 mLs by nebulization every 6 hours Yes Marta Bynum MD   methocarbamol (ROBAXIN) 500 MG tablet Take 1 tablet by mouth 3 times daily As needed for muscle spasm. Yes Nana Bernheim, PA-C   PROAIR  (11 Base) MCG/ACT inhaler Inhale 2 puffs into the lungs every 6 hours as needed for Wheezing Yes Jose Juan Torres MD   cetirizine (ZYRTEC) 10 MG tablet Take 10 mg by mouth daily Yes Historical Provider, MD   Calcium Carb-Cholecalciferol (CALCIUM-VITAMIN D) 600-400 MG-UNIT TABS Take 1 tablet by mouth daily Yes YESSICA Pascual NP   omeprazole (PRILOSEC) 40 MG delayed release capsule Take 1 capsule by mouth daily Yes YESSICA Pascual NP   alogliptin (NESINA) 6.25 MG TABS tablet TAKE 1 TABLET BY MOUTH DAILY Yes YESSICA Pascual NP   TRUEplus Lancets 33G MISC USE AS DIRECTED Yes YESSICA Pascual NP   Blood Pressure Monitoring (BLOOD PRESSURE KIT) DAYTON 1 Device by Does not apply route daily Yes YESSICA Mohan CNP   ondansetron (ZOFRAN-ODT) 4 MG disintegrating tablet Take 1 tablet by mouth 3 times daily as needed for Nausea or Vomiting Yes YESSICA Escamilla CNP   tiotropium (SPIRIVA RESPIMAT) 2.5 MCG/ACT AERS inhaler Inhale 2 puffs into the lungs daily Yes Jose Juan Torres MD   rizatriptan (MAXALT) 10 MG tablet Take 10 mg by mouth once as needed for Migraine May repeat in 2 hours if needed  Yes Historical Provider, MD   Blood Glucose Monitoring Suppl (TRUE METRIX AIR GLUCOSE METER) w/Device KIT 1 Device by Does not apply route continuous Yes YESSICA Pascual NP   blood glucose test strips (TRUE METRIX PRO BLOOD GLUCOSE) strip 1 each by In Vitro route daily As needed. Yes YESSICA Pascual NP   traZODone (DESYREL) 100 MG tablet Take 100 mg by mouth nightly as needed  Yes Historical Provider, MD   Multiple Vitamins-Minerals (THERAPEUTIC MULTIVITAMIN-MINERALS) tablet Take 1 tablet by mouth daily Yes YESSICA Escamilla CNP   hydrOXYzine (ATARAX) 25 MG tablet TAKE 1 TABLET EVERY 8 HOURS AS NEEDED FOR ANXIETY AVOID ALCOHOL/CAUSES DROWSINESS.   Do not take with Xanax Yes Karrie Farris, APRN - NP   calcipotriene (DOVONEX) 0.005 % ointment  Yes Historical Provider, MD   clobetasol (TEMOVATE) 0.05 % ointment  Yes Historical Provider, MD   tiZANidine (ZANAFLEX) 4 MG tablet Take 4 mg by mouth every 8 hours as needed  Yes Historical Provider, MD   oxyCODONE-acetaminophen (PERCOCET) 7.5-325 MG per tablet Take 1 tablet by mouth every 8 hours as needed for Pain (Patient states takes every 6 hours). Yes Historical Provider, MD   fluticasone-vilanterol (BREO ELLIPTA) 100-25 MCG/INH AEPB inhaler Inhale 1 puff into the lungs daily Yes Caron Yeung MD   Elastic Bandages & Supports (ABDOMINAL BINDER/ELASTIC MED) MISC 1 each by Does not apply route daily  Juanita Lang MD        Social History     Tobacco Use    Smoking status: Former Smoker     Packs/day: 0.25     Years: 30.00     Pack years: 7.50     Types: Cigarettes, E-Cigarettes     Start date:      Quit date: 2016     Years since quittin.5    Smokeless tobacco: Never Used   Substance Use Topics    Alcohol use: Yes     Comment: \"Occ. Maybe Twice A Month\"        Vitals:    22 0901   BP: 130/84   Pulse: 59   SpO2: 99%   Weight: 166 lb 9.6 oz (75.6 kg)   Height: 5' 6\" (1.676 m)     Estimated body mass index is 26.89 kg/m² as calculated from the following:    Height as of this encounter: 5' 6\" (1.676 m). Weight as of this encounter: 166 lb 9.6 oz (75.6 kg). Physical Exam  Vitals reviewed. Constitutional:       General: She is not in acute distress. Appearance: Normal appearance. She is not ill-appearing, toxic-appearing or diaphoretic. HENT:      Head: Normocephalic and atraumatic. Nose: Nose normal.   Eyes:      Extraocular Movements: Extraocular movements intact. Pupils: Pupils are equal, round, and reactive to light. Cardiovascular:      Rate and Rhythm: Normal rate and regular rhythm. Heart sounds: Normal heart sounds.    Pulmonary:      Effort: Pulmonary effort is normal.      Breath sounds: Normal breath sounds. Abdominal:      General: Bowel sounds are normal. There is no distension. Palpations: Abdomen is soft. There is no mass. Tenderness: There is no abdominal tenderness. Hernia: No hernia is present. Musculoskeletal:         General: Normal range of motion. Cervical back: Normal range of motion and neck supple. Right lower leg: No edema. Left lower leg: No edema. Skin:     General: Skin is warm and dry. Neurological:      General: No focal deficit present. Mental Status: She is alert and oriented to person, place, and time. Mental status is at baseline. Psychiatric:         Mood and Affect: Mood normal.         Behavior: Behavior normal.         Thought Content: Thought content normal.         Judgment: Judgment normal.         ASSESSMENT/PLAN:  1. Type 2 diabetes mellitus with diabetic polyneuropathy, without long-term current use of insulin (HCC)  Continue current regimen. Controlled    2. Essential hypertension  Controlled. Continue current medication regimen. DASH diet. BP goal less than 140/90. Only on as needed medications    3. Chronic pain syndrome  Follows with pain management    4. Gastroesophageal reflux disease, unspecified whether esophagitis present  Controlled with PPI    5. COPD, severe ([address]  Follows with pulmonology. Controlled    6. Anxiety  Wanting to try BuSpar. Will add BuSpar 5 mg twice daily as needed. Call PCP in the next few weeks to update on moods    7. Mixed hyperlipidemia  Should be taking statin. She is noncompliant with this. Low cholesterol diet. Avoid fast food, fried food, processed foods. Exercise as tolerated. Start with walking 20 minutes three times per week. Increase fiber and omega 3 fatty acids in diet. - elevated cholesterol increases risk for heart attack and stroke       8. Vitamin D deficiency  Advised daily 2000 units    9. Herpes genitalis in women  Continue Valtrex daily.   Follows with infectious disease. Controlle    10. Fibromyalgia  Follows with pain management    11. Diabetic peripheral neuropathy associated with type 2 diabetes mellitus ([address]  Unable to tolerate gabapentin and Lyrica. Sees pain management today. If she is wanting to see podiatry, let PCP know where to send referral.      Fasting labs today ordered by weight management. PCP will review once patient notifies they are complete. Declines Tdap today    All care gaps addressed     All questions answered    Discussed use, benefit, and side effects of prescribed medications. Barriers to compliance discussed. All patient questions answered. Pt voiced understanding. Present to the ER for any emergent or acute symptoms not managed at home or in office. Please note that this chart was generated using dragon dictation software. Although every effort was made to ensure the accuracy of this automated transcription, some errors in transcription may have occurred. Return in about 3 months (around 10/7/2022) for HTN DM cholesterol check, depression, anxiety. An electronic signature was used to authenticate this note.     --YESSICA Lizarraga - NP on 7/7/2022 at 2:57 PM

## 2022-07-14 DIAGNOSIS — Z98.84 S/P LAPAROSCOPIC SLEEVE GASTRECTOMY: ICD-10-CM

## 2022-07-14 LAB
A/G RATIO: 1.7 (ref 1.1–2.2)
ALBUMIN SERPL-MCNC: 4.8 G/DL (ref 3.4–5)
ALP BLD-CCNC: 107 U/L (ref 40–129)
ALT SERPL-CCNC: 13 U/L (ref 10–40)
ANION GAP SERPL CALCULATED.3IONS-SCNC: 12 MMOL/L (ref 3–16)
AST SERPL-CCNC: 20 U/L (ref 15–37)
BASOPHILS ABSOLUTE: 0.1 K/UL (ref 0–0.2)
BASOPHILS RELATIVE PERCENT: 0.9 %
BILIRUB SERPL-MCNC: 0.5 MG/DL (ref 0–1)
BUN BLDV-MCNC: 11 MG/DL (ref 7–20)
CALCIUM SERPL-MCNC: 10.2 MG/DL (ref 8.3–10.6)
CHLORIDE BLD-SCNC: 96 MMOL/L (ref 99–110)
CHOLESTEROL, TOTAL: 261 MG/DL (ref 0–199)
CO2: 30 MMOL/L (ref 21–32)
CREAT SERPL-MCNC: 0.9 MG/DL (ref 0.6–1.1)
EOSINOPHILS ABSOLUTE: 0.1 K/UL (ref 0–0.6)
EOSINOPHILS RELATIVE PERCENT: 1.2 %
FOLATE: 14.08 NG/ML (ref 4.78–24.2)
GFR AFRICAN AMERICAN: >60
GFR NON-AFRICAN AMERICAN: >60
GLUCOSE BLD-MCNC: 139 MG/DL (ref 70–99)
HCT VFR BLD CALC: 45.7 % (ref 36–48)
HDLC SERPL-MCNC: 82 MG/DL (ref 40–60)
HEMOGLOBIN: 15.1 G/DL (ref 12–16)
IRON SATURATION: 17 % (ref 15–50)
IRON: 62 UG/DL (ref 37–145)
LDL CHOLESTEROL CALCULATED: 150 MG/DL
LYMPHOCYTES ABSOLUTE: 2.4 K/UL (ref 1–5.1)
LYMPHOCYTES RELATIVE PERCENT: 41.6 %
MAGNESIUM: 2.6 MG/DL (ref 1.8–2.4)
MCH RBC QN AUTO: 26.9 PG (ref 26–34)
MCHC RBC AUTO-ENTMCNC: 33.1 G/DL (ref 31–36)
MCV RBC AUTO: 81.1 FL (ref 80–100)
MONOCYTES ABSOLUTE: 0.5 K/UL (ref 0–1.3)
MONOCYTES RELATIVE PERCENT: 8.1 %
NEUTROPHILS ABSOLUTE: 2.8 K/UL (ref 1.7–7.7)
NEUTROPHILS RELATIVE PERCENT: 48.2 %
PDW BLD-RTO: 13.9 % (ref 12.4–15.4)
PLATELET # BLD: 269 K/UL (ref 135–450)
PMV BLD AUTO: 9.7 FL (ref 5–10.5)
POTASSIUM SERPL-SCNC: 4.2 MMOL/L (ref 3.5–5.1)
RBC # BLD: 5.63 M/UL (ref 4–5.2)
SODIUM BLD-SCNC: 138 MMOL/L (ref 136–145)
TOTAL IRON BINDING CAPACITY: 356 UG/DL (ref 260–445)
TOTAL PROTEIN: 7.7 G/DL (ref 6.4–8.2)
TRIGL SERPL-MCNC: 144 MG/DL (ref 0–150)
TSH REFLEX: 0.91 UIU/ML (ref 0.27–4.2)
VITAMIN B-12: 601 PG/ML (ref 211–911)
VITAMIN D 25-HYDROXY: 20 NG/ML
VLDLC SERPL CALC-MCNC: 29 MG/DL
WBC # BLD: 5.8 K/UL (ref 4–11)

## 2022-07-15 LAB
ESTIMATED AVERAGE GLUCOSE: 151.3 MG/DL
HBA1C MFR BLD: 6.9 %

## 2022-07-18 LAB — ZINC: 94 UG/DL (ref 60–120)

## 2022-07-20 LAB — VITAMIN B1, PLASMA: 5 NMOL/L (ref 4–15)

## 2022-07-25 ENCOUNTER — TELEPHONE (OUTPATIENT)
Dept: FAMILY MEDICINE CLINIC | Age: 57
End: 2022-07-25

## 2022-07-27 ENCOUNTER — OFFICE VISIT (OUTPATIENT)
Dept: INFECTIOUS DISEASES | Age: 57
End: 2022-07-27
Payer: COMMERCIAL

## 2022-07-27 VITALS
TEMPERATURE: 97 F | WEIGHT: 172.2 LBS | HEIGHT: 67 IN | HEART RATE: 74 BPM | OXYGEN SATURATION: 99 % | DIASTOLIC BLOOD PRESSURE: 101 MMHG | BODY MASS INDEX: 27.03 KG/M2 | SYSTOLIC BLOOD PRESSURE: 149 MMHG

## 2022-07-27 DIAGNOSIS — A60.09 HERPES GENITALIS IN WOMEN: Primary | ICD-10-CM

## 2022-07-27 PROCEDURE — 99213 OFFICE O/P EST LOW 20 MIN: CPT | Performed by: INTERNAL MEDICINE

## 2022-07-27 NOTE — PROGRESS NOTES
laparoscopic sleeve gastrectomy    Abdominal pannus    S/P abdominoplasty    Fibromyalgia    Sebaceous cyst of breast, right    Mobitz type 1 second degree AV block    Polyp of cecum    Adenomatous polyp of ascending colon    Chronic edema BLE    Mixed hyperlipidemia    Neuropathy    Umbilical pain    Lower abdominal pain    S/P panniculectomy    Latent tuberculosis    Gastroesophageal reflux disease    Vitamin D deficiency    Psoriasis    Pannus, abdominal    Herpes genitalis in women    Admission for therapeutic drug monitoring    URIEL (obstructive sleep apnea)    Hypersomnia    Ex-smoker       Current Outpatient Medications   Medication Sig Dispense Refill    omeprazole (PRILOSEC) 40 MG delayed release capsule Take 1 capsule by mouth daily 90 capsule 2    busPIRone (BUSPAR) 5 MG tablet Take 1 tablet by mouth 2 times daily as needed (anxiety) 60 tablet 0    coenzyme Q-10 100 MG capsule Take 1 capsule by mouth daily 90 capsule 3    valACYclovir (VALTREX) 500 MG tablet Take 1 tablet by mouth daily 30 tablet 5    midodrine (PROAMATINE) 2.5 MG tablet Take 1 tablet by mouth 3 times daily as needed (SBP less than 100) 90 tablet 3    lisinopril (PRINIVIL;ZESTRIL) 2.5 MG tablet Take 1 tablet by mouth daily as needed (for SBP  more than 135) 30 tablet 3    hydroCHLOROthiazide (HYDRODIURIL) 25 MG tablet Take 1 tablet by mouth daily as needed (swelling) 30 tablet 5    atorvastatin (LIPITOR) 80 MG tablet Take 1 tablet by mouth daily 90 tablet 3    SYMBICORT 160-4.5 MCG/ACT AERO Inhale 2 puffs into the lungs 2 times daily 1 each 5    albuterol sulfate HFA (VENTOLIN HFA) 108 (90 Base) MCG/ACT inhaler Inhale 2 puffs into the lungs 4 times daily as needed for Wheezing 54 g 5    albuterol (PROVENTIL) (2.5 MG/3ML) 0.083% nebulizer solution Take 3 mLs by nebulization every 6 hours 120 each 5    methocarbamol (ROBAXIN) 500 MG tablet Take 1 tablet by mouth 3 times daily As needed for muscle spasm.  12 tablet 0    PROAIR  (90 Base) MCG/ACT inhaler Inhale 2 puffs into the lungs every 6 hours as needed for Wheezing 1 each 5    cetirizine (ZYRTEC) 10 MG tablet Take 10 mg by mouth daily      Calcium Carb-Cholecalciferol (CALCIUM-VITAMIN D) 600-400 MG-UNIT TABS Take 1 tablet by mouth daily 90 tablet 3    alogliptin (NESINA) 6.25 MG TABS tablet TAKE 1 TABLET BY MOUTH DAILY 90 tablet 3    TRUEplus Lancets 33G MISC USE AS DIRECTED 100 each 5    Elastic Bandages & Supports (ABDOMINAL BINDER/ELASTIC MED) MISC 1 each by Does not apply route daily 1 each 0    Blood Pressure Monitoring (BLOOD PRESSURE KIT) DAYTON 1 Device by Does not apply route daily 1 each 0    ondansetron (ZOFRAN-ODT) 4 MG disintegrating tablet Take 1 tablet by mouth 3 times daily as needed for Nausea or Vomiting 21 tablet 2    tiotropium (SPIRIVA RESPIMAT) 2.5 MCG/ACT AERS inhaler Inhale 2 puffs into the lungs daily 1 each 5    rizatriptan (MAXALT) 10 MG tablet Take 10 mg by mouth once as needed for Migraine May repeat in 2 hours if needed       Blood Glucose Monitoring Suppl (TRUE METRIX AIR GLUCOSE METER) w/Device KIT 1 Device by Does not apply route continuous 1 kit 0    blood glucose test strips (TRUE METRIX PRO BLOOD GLUCOSE) strip 1 each by In Vitro route daily As needed. 100 each 3    traZODone (DESYREL) 100 MG tablet Take 100 mg by mouth nightly as needed       Multiple Vitamins-Minerals (THERAPEUTIC MULTIVITAMIN-MINERALS) tablet Take 1 tablet by mouth daily 90 tablet 3    hydrOXYzine (ATARAX) 25 MG tablet TAKE 1 TABLET EVERY 8 HOURS AS NEEDED FOR ANXIETY AVOID ALCOHOL/CAUSES DROWSINESS. Do not take with Xanax 60 tablet 1    calcipotriene (DOVONEX) 0.005 % ointment       clobetasol (TEMOVATE) 0.05 % ointment       tiZANidine (ZANAFLEX) 4 MG tablet Take 4 mg by mouth every 8 hours as needed       oxyCODONE-acetaminophen (PERCOCET) 7.5-325 MG per tablet Take 1 tablet by mouth every 8 hours as needed for Pain (Patient states takes every 6 hours).        fluticasone-vilanterol (BREO ELLIPTA) 100-25 MCG/INH AEPB inhaler Inhale 1 puff into the lungs daily 1 each 5     No current facility-administered medications for this visit. Past Medical History:   Diagnosis Date    Anxiety     Arthritis     \"Back, Hands, Shoulders\"    Chronic back pain     Arthritis - NKI    COPD (chronic obstructive pulmonary disease) ([facility]     Sees Dr. Claire Padron with Mental Health    Diabetes mellitus ([facility] Dx 2008    Type II - follows with PCP    Emphysema     Fibromyalgia     H/O abdominoplasty 12/21/2018    H/O cardiovascular stress test 03/04/2019    ECG portion of stress test is neg for ischemia by diagnostic criteria. No infarct or ischemia noted. Normal stress myocardial perfusion. This is a normal study    H/O echocardiogram 03/04/2019    Left ventricular function is normal. Ejection fraction is visually estimated at 55-60%. No significant valvular abnormalitites. H/O tilt table evaluation 08/26/2021    Orthostatic syncope.     Hyperlipidemia     Hypertension     Follows with PCP    Migraines Last Migraine 7/20/21    Mobitz (type) I (Wenckebach's) atrioventricular block 01/29/2020    Neuropathy     From feet to knees    Piriformis syndrome 06/24/2016    Psoriasis     Rotator cuff impingement syndrome, right 02/26/2018    RTC repair Dr Hi Valles 2015  Treated by Dr Grupo Lund 2/18/17  Arthrocentesis 12/19/17    Shortness of breath on exertion     Syncope and collapse 03/04/2019    Teeth missing     Upper And Lower    Wears glasses        Past Surgical History:   Procedure Laterality Date    ABDOMEN SURGERY N/A 1/14/2019    SECONDARY CLOSURE OF DEHISCED ABDOMINAL WOUND performed by Peter Clifford MD at [address] N/A 1/24/2019    IRRIGATION OF LOWER ABDOMINAL WOUND performed by Peter Clifford MD at [address] [facility] OR    ABDOMINOPLASTY N/A 7/29/2019    ABDOMINOPLASTY REVISION performed by Peter Clifford MD at [address] Right 1/29/2020 file   Social History Narrative    Not on file     Social Determinants of Health     Financial Resource Strain: Not on file   Food Insecurity: Not on file   Transportation Needs: Not on file   Physical Activity: Not on file   Stress: Not on file   Social Connections: Not on file   Intimate Partner Violence: Not on file   Housing Stability: Not on file       Family History   Problem Relation Age of Onset    Heart Attack Father     Heart Disease Father     Early Death Father 46        Heart Attack    Alcohol Abuse Father     Substance Abuse Father         Alcoholic    Arthritis Mother     Heart Disease Mother     Diabetes Mother     Cancer Mother         \"Kidney Cancer\"    High Blood Pressure Mother     High Blood Pressure Sister     Diabetes Brother     Early Death Daughter 21        \"Killed In A Car Accident\"    Ovarian Cancer Neg Hx     Breast Cancer Neg Hx        Vital Signs:  Vitals:    07/27/22 1020   BP: (!) 149/101   Site: Left Upper Arm   Position: Sitting   Cuff Size: Medium Adult   Pulse: 74   Temp: 97 °F (36.1 °C)   TempSrc: Infrared   SpO2: 99%   Weight: 172 lb 3.2 oz (78.1 kg)   Height: 5' 7\" (1.702 m)        Wt Readings from Last 3 Encounters:   07/27/22 172 lb 3.2 oz (78.1 kg)   07/07/22 166 lb 9.6 oz (75.6 kg)   06/14/22 175 lb 3.2 oz (79.5 kg)        Physical Exam:   Gen: alert and NAD  HEENT: sclera clear, pupils equal and reactive, extra ocular muscles intact, oropharynx clear, mucus membranes moist, tympanic membranes clear bilaterally, no cervical lymphadenopathy noted and neck supple  Neck: supple, no significant adenopathy  Chest: clear to auscultation, no wheezes, rales or rhonchi, symmetric air entry  Heart: regular rate and rhythm, no murmurs  ABD: abdomen is soft without significant tenderness, masses, organomegaly or guarding.   EXT:peripheral pulses normal, no pedal edema, no clubbing or cyanosis  NEURO: alert, oriented, normal speech, no focal findings or movement disorder noted  Skin: well hydrated, no lesions, surgical site examined  Wounds:   Labs:   WBC   Date Value Ref Range Status   07/14/2022 5.8 4.0 - 11.0 K/uL Final   09/24/2021 12.3 (H) 4.0 - 10.5 K/CU MM Final   07/22/2021 7.7 4.0 - 10.5 K/CU MM Final     Creatinine   Date Value Ref Range Status   07/14/2022 0.9 0.6 - 1.1 mg/dL Final   01/06/2022 0.9 0.6 - 1.1 mg/dL Final   09/24/2021 0.9 0.6 - 1.1 MG/DL Final       Cultures:  Culture   Date Value Ref Range Status   10/22/2021 Final Report  Final   10/22/2021 ENTEROBACTER CLOACAE COMPLEX Moderate growth (A)  Final   10/22/2021 (A)  Final    CORYNEBACTERIUM STRIATUM Moderate growth No further workup   10/22/2021 FINEGOLDIA MAGNA Moderate growth No further workup (A)  Final       Imaging Studies:

## 2022-07-27 NOTE — ASSESSMENT & PLAN NOTE
Advised that she does not need to continue valacyclovir, but she will like to continue the medication.

## 2022-08-05 ENCOUNTER — HOSPITAL ENCOUNTER (OUTPATIENT)
Age: 57
Setting detail: SPECIMEN
Discharge: HOME | End: 2022-08-05
Payer: COMMERCIAL

## 2022-08-05 ENCOUNTER — INITIAL CONSULT (OUTPATIENT)
Dept: OBGYN | Age: 57
End: 2022-08-05
Payer: COMMERCIAL

## 2022-08-05 VITALS
DIASTOLIC BLOOD PRESSURE: 86 MMHG | HEIGHT: 67 IN | WEIGHT: 173 LBS | SYSTOLIC BLOOD PRESSURE: 149 MMHG | BODY MASS INDEX: 27.15 KG/M2

## 2022-08-05 DIAGNOSIS — Z01.419 WOMEN'S ANNUAL ROUTINE GYNECOLOGICAL EXAMINATION: Primary | ICD-10-CM

## 2022-08-05 DIAGNOSIS — L73.9 FOLLICULITIS: ICD-10-CM

## 2022-08-05 DIAGNOSIS — R23.2 HOT FLASHES: ICD-10-CM

## 2022-08-05 DIAGNOSIS — R61 NIGHT SWEATS: ICD-10-CM

## 2022-08-05 DIAGNOSIS — L29.2 VULVAR ITCHING: ICD-10-CM

## 2022-08-05 PROCEDURE — 88142 CYTOPATH C/V THIN LAYER: CPT

## 2022-08-05 PROCEDURE — 87624 HPV HI-RISK TYP POOLED RSLT: CPT

## 2022-08-05 PROCEDURE — 99396 PREV VISIT EST AGE 40-64: CPT | Performed by: NURSE PRACTITIONER

## 2022-08-05 PROCEDURE — 87801 DETECT AGNT MULT DNA AMPLI: CPT

## 2022-08-05 RX ORDER — CLOTRIMAZOLE AND BETAMETHASONE DIPROPIONATE 10; .64 MG/G; MG/G
CREAM TOPICAL
Qty: 15 G | Refills: 0 | Status: SHIPPED | OUTPATIENT
Start: 2022-08-05

## 2022-08-05 RX ORDER — ESTRADIOL 1 MG/1
1 TABLET ORAL DAILY
Qty: 21 TABLET | Refills: 3 | Status: SHIPPED | OUTPATIENT
Start: 2022-08-05

## 2022-08-05 RX ORDER — CLINDAMYCIN PHOSPHATE 10 MG/G
GEL TOPICAL
Qty: 30 G | Refills: 0 | Status: SHIPPED | OUTPATIENT
Start: 2022-08-05 | End: 2022-10-06

## 2022-08-05 SDOH — ECONOMIC STABILITY: FOOD INSECURITY: WITHIN THE PAST 12 MONTHS, THE FOOD YOU BOUGHT JUST DIDN'T LAST AND YOU DIDN'T HAVE MONEY TO GET MORE.: NEVER TRUE

## 2022-08-05 SDOH — ECONOMIC STABILITY: HOUSING INSECURITY
IN THE LAST 12 MONTHS, WAS THERE A TIME WHEN YOU DID NOT HAVE A STEADY PLACE TO SLEEP OR SLEPT IN A SHELTER (INCLUDING NOW)?: NO

## 2022-08-05 SDOH — ECONOMIC STABILITY: INCOME INSECURITY: IN THE LAST 12 MONTHS, WAS THERE A TIME WHEN YOU WERE NOT ABLE TO PAY THE MORTGAGE OR RENT ON TIME?: NO

## 2022-08-05 SDOH — ECONOMIC STABILITY: TRANSPORTATION INSECURITY
IN THE PAST 12 MONTHS, HAS LACK OF TRANSPORTATION KEPT YOU FROM MEETINGS, WORK, OR FROM GETTING THINGS NEEDED FOR DAILY LIVING?: NO

## 2022-08-05 SDOH — ECONOMIC STABILITY: FOOD INSECURITY: WITHIN THE PAST 12 MONTHS, YOU WORRIED THAT YOUR FOOD WOULD RUN OUT BEFORE YOU GOT MONEY TO BUY MORE.: NEVER TRUE

## 2022-08-05 SDOH — ECONOMIC STABILITY: INCOME INSECURITY: HOW HARD IS IT FOR YOU TO PAY FOR THE VERY BASICS LIKE FOOD, HOUSING, MEDICAL CARE, AND HEATING?: NOT HARD AT ALL

## 2022-08-05 SDOH — ECONOMIC STABILITY: TRANSPORTATION INSECURITY
IN THE PAST 12 MONTHS, HAS THE LACK OF TRANSPORTATION KEPT YOU FROM MEDICAL APPOINTMENTS OR FROM GETTING MEDICATIONS?: NO

## 2022-08-05 ASSESSMENT — ENCOUNTER SYMPTOMS
GASTROINTESTINAL NEGATIVE: 1
RESPIRATORY NEGATIVE: 1

## 2022-08-05 NOTE — PROGRESS NOTES
8/5/22    Dione Been  1965    Chief Complaint   Patient presents with    New Patient     Pt here for consult from [facility] due to hot flashes and night sweats. Pt here for annual, had hyster without ovary removal, hrt-none, is sexually active, pap-unsure, mammo-3/9/22-normal, dexa-never, colonoscopy-2021-normal.         The patient is a 56 y.o. female, [id number] who presents for her annual exam. She is menopausal.  She is not taking HRT. Jeana Mills She reports additional symptoms of bilateral itching; inner labia . She has had a hysterectomy without removal of ovaries. She is  sexually active. Pap smear history: Her last PAP smear was in unsure. Her results were normal per patient    Breast history: her most recent mammogram was in 3/2022. The results were: Normal    Osteoporosis Status: she has not had a bone density scan    Colonoscopy Status: she had a colonoscopy in 2021. The results were normal.    Past Medical History:   Diagnosis Date    Anxiety     Arthritis     \"Back, Hands, Shoulders\"    Chronic back pain     Arthritis - NKI    COPD (chronic obstructive pulmonary disease) ([address]     Sees Dr. Juan M MAXWELL-19     Depression     Follows with Mental Health    Diabetes mellitus ([address] Dx 2008    Type II - follows with PCP    Emphysema     Fibromyalgia     H/O abdominoplasty 12/21/2018    H/O cardiovascular stress test 03/04/2019    ECG portion of stress test is neg for ischemia by diagnostic criteria. No infarct or ischemia noted. Normal stress myocardial perfusion. This is a normal study    H/O echocardiogram 03/04/2019    Left ventricular function is normal. Ejection fraction is visually estimated at 55-60%. No significant valvular abnormalitites. H/O tilt table evaluation 08/26/2021    Orthostatic syncope.     Hot flashes     Hyperlipidemia     Hypertension     Follows with PCP    Migraines Last Migraine 7/20/21    Daniel (type) I [facility]) atrioventricular block 01/29/2020 Neuropathy     From feet to knees    Night sweat     Piriformis syndrome 06/24/2016    Psoriasis     Rotator cuff impingement syndrome, right 02/26/2018    RTC repair Dr Stefanie Hernandez 2015  Treated by Dr Tameka Woods 2/18/17  Arthrocentesis 12/19/17    Shortness of breath on exertion     Syncope and collapse 03/04/2019    Teeth missing     Upper And Lower    Wears glasses        Past Surgical History:   Procedure Laterality Date    ABDOMEN SURGERY N/A 1/14/2019    SECONDARY CLOSURE OF DEHISCED ABDOMINAL WOUND performed by Lesley Hernandez MD at [address] N/A 1/24/2019    IRRIGATION OF LOWER ABDOMINAL WOUND performed by Lesley Hernandez MD at [facility] OR    ABDOMINOPLASTY N/A 7/29/2019    ABDOMINOPLASTY REVISION performed by Lesley Hernandez MD at [address] Right 1/29/2020    EXCISION OF RIGHT BREAST SABACEOUS CYST performed [facility] Mag[facility]ina [facility] Right 2017    Dr. Tameka Woods (Right)    [address]    Tubal Ligation Also Done In 1997    COLONOSCOPY  03/2017    Polyp Removed - Dr. Jenni Porras    COLONOSCOPY N/A 7/27/2020    COLONOSCOPY POLYPECTOMY SNARE/COLD BIOPSY performed by Lesley Hernandez MD at [address] [place] [address]      Teeth Extracted In Past    ELBOW SURGERY Bilateral Last Done In 2013    Right Done Twice, Left Done Once left cubital tunnel release 2009; right 2014    ENDOSCOPY, COLON, DIAGNOSTIC  03/2017    HERNIA REPAIR  10/26/2017    hiatal hernia with gastrectomy    HYSTERECTOMY VAGINAL  2000    LAPAROSCOPY  2000    LIPECTOMY N/A 9/23/2021    PANNICULECTOMY performed by Lesley Hernandez MD at [address] EXCISE [facility] N/A 11/29/2018    ABDOMINOPLASTY performed by Lesley Hernandez MD at [address] Right 2015    Dr. Faisal Hickman Right 03/2017    Dr Scott Randall  10/26/2017    Robotic Laparoscopic, Pre Op Weight 237  Or 238 lbs.    TUBAL LIGATION      Done With        Family History   Problem Relation Age of Onset    Heart Attack Father     Heart Disease Father     Early Death Father 46        Heart Attack    Alcohol Abuse Father     Substance Abuse Father         Alcoholic    Arthritis Mother     Heart Disease Mother     Diabetes Mother     Cancer Mother         \"Kidney Cancer\"    High Blood Pressure Mother     High Blood Pressure Sister     Diabetes Brother     Early Death Daughter 21        \"Killed In A Car Accident\"    Ovarian Cancer Neg Hx     Breast Cancer Neg Hx        Social History     Tobacco Use    Smoking status: Former     Packs/day: 0.25     Years: 30.00     Pack years: 7.50     Types: Cigarettes, E-Cigarettes     Start date:      Quit date:      Years since quittin.5    Smokeless tobacco: Never   Vaping Use    Vaping Use: Never used   Substance Use Topics    Alcohol use: Yes     Comment: \"Occ.  Maybe Twice A Month\"       Current Outpatient Medications   Medication Sig Dispense Refill    SPIRIVA RESPIMAT 2.5 MCG/ACT AERS inhaler Inhale 2 puffs into the lungs daily 4 g 5    omeprazole (PRILOSEC) 40 MG delayed release capsule Take 1 capsule by mouth daily 90 capsule 2    busPIRone (BUSPAR) 5 MG tablet Take 1 tablet by mouth 2 times daily as needed (anxiety) 60 tablet 0    valACYclovir (VALTREX) 500 MG tablet Take 1 tablet by mouth daily 30 tablet 5    midodrine (PROAMATINE) 2.5 MG tablet Take 1 tablet by mouth 3 times daily as needed (SBP less than 100) 90 tablet 3    lisinopril (PRINIVIL;ZESTRIL) 2.5 MG tablet Take 1 tablet by mouth daily as needed (for SBP  more than 135) 30 tablet 3    hydroCHLOROthiazide (HYDRODIURIL) 25 MG tablet Take 1 tablet by mouth daily as needed (swelling) 30 tablet 5    atorvastatin (LIPITOR) 80 MG tablet Take 1 tablet by mouth daily 90 tablet 3    SYMBICORT 160-4.5 MCG/ACT AERO Inhale 2 puffs into the lungs 2 times daily 1 each 5    albuterol sulfate HFA (VENTOLIN HFA) coenzyme Q-10 100 MG capsule Take 1 capsule by mouth daily (Patient not taking: Reported on 8/5/2022) 90 capsule 3    methocarbamol (ROBAXIN) 500 MG tablet Take 1 tablet by mouth 3 times daily As needed for muscle spasm. (Patient not taking: Reported on 8/5/2022) 12 tablet 0    Elastic Bandages & Supports (ABDOMINAL BINDER/ELASTIC MED) MISC 1 each by Does not apply route daily (Patient not taking: Reported on 8/5/2022) 1 each 0    rizatriptan (MAXALT) 10 MG tablet Take 10 mg by mouth once as needed for Migraine May repeat in 2 hours if needed  (Patient not taking: Reported on 8/5/2022)       No current facility-administered medications for this visit. Allergies   Allergen Reactions    Other      Patient states the electrodes irritated her skin. [id number]    Immunization History   Administered Date(s) Administered    COVID-19, PFIZER PURPLE top, DILUTE for use, (age 15 y+), 30mcg/0.3mL 03/23/2021, 04/13/2021    Pneumococcal Conjugate 13-valent ([id number]) 12/05/2014, 04/25/2018    Tdap (Boostrix, Adacel) 12/20/2011    Zoster Recombinant (Shingrix) 04/25/2018, 05/16/2018, 09/28/2018       Review of Systems   Constitutional: Negative. Respiratory: Negative. Gastrointestinal: Negative. Genitourinary: Negative. BP (!) 149/86   Ht 5' 7\" (1.702 m)   Wt 173 lb (78.5 kg)   BMI 27.10 kg/m²     Physical Exam  Vitals and nursing note reviewed. Constitutional:       Appearance: Normal appearance. HENT:      Head: Normocephalic. Pulmonary:      Effort: Pulmonary effort is normal.   Chest:   Breasts:     Right: Normal.      Left: Normal.   Abdominal:      Palpations: Abdomen is soft. Genitourinary:     General: Normal vulva. Vagina: Normal.      Cervix: Normal.      Adnexa: Right adnexa normal and left adnexa normal.      Rectum: Normal.   Musculoskeletal:         General: Normal range of motion. Cervical back: Normal range of motion. Skin:     General: Skin is warm and dry. Neurological:      Mental Status: She is alert. Psychiatric:         Attention and Perception: Attention normal.         Mood and Affect: Mood normal.       No results found for this visit on 08/05/22. Assessment and Plan   Diagnosis Orders   1. Women's annual routine gynecological examination        2. Hot flashes        3. Night sweats        4. Vulvar itching            Allyn s/s reviewed; Gabapentin intolerable, no improvement with SSRI. Will trial HRT, risks reviewed to include heart disease, strokes/VTE and cancer    Vaginal hygiene reviewed; stop Oil of Olay and vagisil wipes. Ensure adequate control of BS      Return in about 1 year (around 8/5/2023).     Nicholette Epley, APRN - CNP

## 2022-08-10 ENCOUNTER — TELEPHONE (OUTPATIENT)
Dept: FAMILY MEDICINE CLINIC | Age: 57
End: 2022-08-10

## 2022-08-10 NOTE — TELEPHONE ENCOUNTER
----- Message from [facility] sent at 8/10/2022 11:43 AM EDT -----  Subject: Referral Request    Reason for referral request? Pt called and would like to request a   referral to see a neurologist. PT is having sharp quick pain in the back   of her head. Provider patient wants to be referred to(if known):     Provider Phone Number(if known):     Additional Information for Provider?   ---------------------------------------------------------------------------  --------------  [address]    [phone]; OK to leave message on voicemail  ---------------------------------------------------------------------------  --------------

## 2022-08-11 LAB
CHLAMYDIA BY THIN PREP: NEGATIVE
GC BY THIN PREP: NEGATIVE
HPV HIGH RISK: NOT DETECTED
HPV, GENOTYPE 16: NOT DETECTED
HPV, GENOTYPE 18: NOT DETECTED

## 2022-08-11 NOTE — TELEPHONE ENCOUNTER
Patient has been referred to neurology in March 2022  YESSICA Garrison CNP      She is also previously seen neuro [facility] at [facility] or been referred there. For chronic migraines        Patient to see either of these neurologist or check with insurance on new neurology referral.  We can likely send her to [facility] for neuro; however, she will probably have a 3-month wait        If she has severe thunderclap, change in level of consciousness, consistent migraine lasting greater than 24 hours for multiple days-she needs to be seen at the emergency room.

## 2022-08-12 NOTE — TELEPHONE ENCOUNTER
Patient notified of pcp note. Patient will check with insurance and call office back if she needs a referral and to which doctor.

## 2022-08-15 ENCOUNTER — TELEPHONE (OUTPATIENT)
Dept: OBGYN | Age: 57
End: 2022-08-15

## 2022-08-15 NOTE — TELEPHONE ENCOUNTER
Please let pt know I spoke with Dr Yang Samson and with her current BMI she would not be a candidate for Adipex at this time.

## 2022-08-23 ENCOUNTER — TELEPHONE (OUTPATIENT)
Dept: SURGERY | Age: 57
End: 2022-08-23

## 2022-08-23 NOTE — TELEPHONE ENCOUNTER
Called patient left message advising appt has been moved needs to be with Dr. Desi olson March Vidhya to call back to confirm new appt on 9/15

## 2022-08-25 ENCOUNTER — OFFICE VISIT (OUTPATIENT)
Dept: FAMILY MEDICINE CLINIC | Age: 57
End: 2022-08-25
Payer: COMMERCIAL

## 2022-08-25 VITALS
OXYGEN SATURATION: 100 % | SYSTOLIC BLOOD PRESSURE: 138 MMHG | HEIGHT: 67 IN | BODY MASS INDEX: 26.71 KG/M2 | WEIGHT: 170.2 LBS | DIASTOLIC BLOOD PRESSURE: 86 MMHG | HEART RATE: 81 BPM

## 2022-08-25 DIAGNOSIS — H66.90 ACUTE OTITIS MEDIA, UNSPECIFIED OTITIS MEDIA TYPE: Primary | ICD-10-CM

## 2022-08-25 PROCEDURE — G8427 DOCREV CUR MEDS BY ELIG CLIN: HCPCS | Performed by: NURSE PRACTITIONER

## 2022-08-25 PROCEDURE — G8417 CALC BMI ABV UP PARAM F/U: HCPCS | Performed by: NURSE PRACTITIONER

## 2022-08-25 PROCEDURE — 1036F TOBACCO NON-USER: CPT | Performed by: NURSE PRACTITIONER

## 2022-08-25 PROCEDURE — 3017F COLORECTAL CA SCREEN DOC REV: CPT | Performed by: NURSE PRACTITIONER

## 2022-08-25 PROCEDURE — 99213 OFFICE O/P EST LOW 20 MIN: CPT | Performed by: NURSE PRACTITIONER

## 2022-08-25 RX ORDER — AMOXICILLIN AND CLAVULANATE POTASSIUM 875; 125 MG/1; MG/1
1 TABLET, FILM COATED ORAL 2 TIMES DAILY
Qty: 20 TABLET | Refills: 0 | Status: SHIPPED | OUTPATIENT
Start: 2022-08-25 | End: 2022-09-04

## 2022-08-25 NOTE — PROGRESS NOTES
2022     Álvaro Barriga (:  1965) is a 56 y.o. female, here for evaluation of the following medical concerns:    Complaint of right ear pain that started yesterday. Pain is radiating down the side of her face. Reports sound makes the pain worse. Pain is sharp stabbing, aching. No drainage. No other sinus symptoms. Reports her Percocet is not even helping with the pain                    Review of Systems    Prior to Visit Medications    Medication Sig Taking? Authorizing Provider   amoxicillin-clavulanate (AUGMENTIN) 875-125 MG per tablet Take 1 tablet by mouth 2 times daily for 10 days Yes YESSICA Calix NP   estradiol (ESTRACE) 1 MG tablet Take 1 tablet by mouth in the morning. Yes YESSICA Eden CNP   clotrimazole-betamethasone (LOTRISONE) 1-0.05 % cream Apply topically 2 times daily.  Yes YESSICA Eden CNP   clindamycin (CLEOCIN-T) 1 % gel Apply topically 2 times daily x 5 days  prn Yes Camron Corporal YESSICA Dennis CNP   SPIRIVA RESPIMAT 2.5 MCG/ACT AERS inhaler Inhale 2 puffs into the lungs daily Yes Ronni Son MD   omeprazole (PRILOSEC) 40 MG delayed release capsule Take 1 capsule by mouth daily Yes YESSICA Calix NP   coenzyme Q-10 100 MG capsule Take 1 capsule by mouth daily Yes YESSICA Guillen CNP   valACYclovir (VALTREX) 500 MG tablet Take 1 tablet by mouth daily Yes Holli Diallo MD   midodrine (PROAMATINE) 2.5 MG tablet Take 1 tablet by mouth 3 times daily as needed (SBP less than 100) Yes YESSICA Vasques CNP   lisinopril (PRINIVIL;ZESTRIL) 2.5 MG tablet Take 1 tablet by mouth daily as needed (for SBP  more than 135) Yes YESSICA Rosenberg CNP   hydroCHLOROthiazide (HYDRODIURIL) 25 MG tablet Take 1 tablet by mouth daily as needed (swelling) Yes YESSICA Guillen CNP   atorvastatin (LIPITOR) 80 MG tablet Take 1 tablet by mouth daily Yes Dann Hu APRN - CNP   SYMBICORT 160-4.5 MCG/ACT AERO Inhale 2 puffs into the lungs 2 times daily Yes Arnulfo Jarquin MD   albuterol sulfate HFA (VENTOLIN HFA) 108 (90 Base) MCG/ACT inhaler Inhale 2 puffs into the lungs 4 times daily as needed for Wheezing Yes Arnulfo Jarquin MD   albuterol (PROVENTIL) (2.5 MG/3ML) 0.083% nebulizer solution Take 3 mLs by nebulization every 6 hours Yes Arnulfo Jarquin MD   methocarbamol (ROBAXIN) 500 MG tablet Take 1 tablet by mouth 3 times daily As needed for muscle spasm. Yes Genevieve Love PA-C   PROAIR  (07 Base) MCG/ACT inhaler Inhale 2 puffs into the lungs every 6 hours as needed for Wheezing Yes Arnulfo Jarquin MD   cetirizine (ZYRTEC) 10 MG tablet Take 10 mg by mouth daily Yes Historical Provider, MD   Calcium Carb-Cholecalciferol (CALCIUM-VITAMIN D) 600-400 MG-UNIT TABS Take 1 tablet by mouth daily Yes Read YESSICA Davis - NP   alogliptin (NESINA) 6.25 MG TABS tablet TAKE 1 TABLET BY MOUTH DAILY Yes Read SHRUTI DavisN - NP   TRUEplus Lancets 33G MISC USE AS DIRECTED Yes Read SHRUTI DavisN - NP   Elastic Bandages & Supports (ABDOMINAL BINDER/ELASTIC MED) MISC 1 each by Does not apply route daily Yes Boris Santos II, MD   Blood Pressure Monitoring (BLOOD PRESSURE KIT) [place] 1 Device by Does not apply route daily Yes YESSICA Wong CNP   ondansetron (ZOFRAN-ODT) 4 MG disintegrating tablet Take 1 tablet by mouth 3 times daily as needed for Nausea or Vomiting Yes YESSICA Trinidad CNP   rizatriptan (MAXALT) 10 MG tablet Take 10 mg by mouth once as needed for Migraine May repeat in 2 hours if needed Yes Historical Provider, MD   Blood Glucose Monitoring Suppl (TRUE METRIX AIR GLUCOSE METER) w/Device KIT 1 Device by Does not apply route continuous Yes Read YESSICA Davis - NP   blood glucose test strips (TRUE METRIX PRO BLOOD GLUCOSE) strip 1 each by In Vitro route daily As needed.  Yes Read SHRUTI DavisN - NP   traZODone (DESYREL) 100 MG tablet Take 100 mg by mouth nightly as needed  Yes Historical Provider, MD is alert. ASSESSMENT/PLAN:  1. Acute otitis media, unspecified otitis media type  Augmentin-finish complete antibiotic  Tylenol as needed for pain        All care gaps addressed     All questions answered    Discussed use, benefit, and side effects of prescribed medications. Barriers to compliance discussed. All patient questions answered. Pt voiced understanding. Present to the ER for any emergent or acute symptoms not managed at home or in office. Please note that this chart was generated using dragon dictation software. Although every effort was made to ensure the accuracy of this automated transcription, some errors in transcription may have occurred. No follow-ups on file. An electronic signature was used to authenticate this note.     --YESSICA Yanez - NP on 8/25/2022 at 9:53 AM

## 2022-09-07 ENCOUNTER — OFFICE VISIT (OUTPATIENT)
Dept: FAMILY MEDICINE CLINIC | Age: 57
End: 2022-09-07
Payer: COMMERCIAL

## 2022-09-07 VITALS
BODY MASS INDEX: 27.69 KG/M2 | WEIGHT: 176.4 LBS | HEIGHT: 67 IN | DIASTOLIC BLOOD PRESSURE: 78 MMHG | SYSTOLIC BLOOD PRESSURE: 126 MMHG

## 2022-09-07 DIAGNOSIS — N89.8 VAGINAL ITCHING: Primary | ICD-10-CM

## 2022-09-07 DIAGNOSIS — T14.8XXA BRUISING: ICD-10-CM

## 2022-09-07 PROCEDURE — G8417 CALC BMI ABV UP PARAM F/U: HCPCS | Performed by: NURSE PRACTITIONER

## 2022-09-07 PROCEDURE — G8427 DOCREV CUR MEDS BY ELIG CLIN: HCPCS | Performed by: NURSE PRACTITIONER

## 2022-09-07 PROCEDURE — 3017F COLORECTAL CA SCREEN DOC REV: CPT | Performed by: NURSE PRACTITIONER

## 2022-09-07 PROCEDURE — 1036F TOBACCO NON-USER: CPT | Performed by: NURSE PRACTITIONER

## 2022-09-07 PROCEDURE — 99214 OFFICE O/P EST MOD 30 MIN: CPT | Performed by: NURSE PRACTITIONER

## 2022-09-07 NOTE — PROGRESS NOTES
2022     Ashley Davis (:  1965) is a 56 y.o. female, here for evaluation of the following medical concerns:        Multiple complaints today       Vaginal itching. Started after ATB use . Finished Augmentin for ear infection three days ago. Sx started one week ago. No abnormal discharge. She is also diabetic. Last a1c 6.2022  No urinary freq urgency dysuria pelvic or flank pain   Pap, GC neg with GYN aug 2022       Reports easy bruising to her arms and legs. CBC 2022 WNL  No ASA   NSAID use   No blood in stool, bleeding gums, blood in urine. Review of Systems    Prior to Visit Medications    Medication Sig Taking? Authorizing Provider   estradiol (ESTRACE) 1 MG tablet Take 1 tablet by mouth in the morning. Yes YESSICA Easton CNP   clotrimazole-betamethasone (LOTRISONE) 1-0.05 % cream Apply topically 2 times daily.  Yes YESSICA Easton CNP   clindamycin (CLEOCIN-T) 1 % gel Apply topically 2 times daily x 5 days  prn Yes YESSICA Whiteside CNP   SPIRIVA RESPIMAT 2.5 MCG/ACT AERS inhaler Inhale 2 puffs into the lungs daily Yes Trisha Perry MD   omeprazole (PRILOSEC) 40 MG delayed release capsule Take 1 capsule by mouth daily Yes YESSICA Davila NP   coenzyme Q-10 100 MG capsule Take 1 capsule by mouth daily Yes YESSICA Burnett CNP   valACYclovir (VALTREX) 500 MG tablet Take 1 tablet by mouth daily Yes Lali Humphrey MD   midodrine (PROAMATINE) 2.5 MG tablet Take 1 tablet by mouth 3 times daily as needed (SBP less than 100) Yes YESSICA Vasques CNP   lisinopril (PRINIVIL;ZESTRIL) 2.5 MG tablet Take 1 tablet by mouth daily as needed (for SBP  more than 135) Yes YESSICA Vasques CNP   hydroCHLOROthiazide (HYDRODIURIL) 25 MG tablet Take 1 tablet by mouth daily as needed (swelling) Yes YESSICA Burnett CNP   atorvastatin (LIPITOR) 80 MG tablet Take 1 tablet by mouth daily Yes YESSICA Burnett CNP   SYMBICORT 160-4.5 MCG/ACT AERO Inhale 2 puffs into the lungs 2 times daily Yes Jenni Gaines MD   albuterol sulfate HFA (VENTOLIN HFA) 108 (90 Base) MCG/ACT inhaler Inhale 2 puffs into the lungs 4 times daily as needed for Wheezing Yes Jenni Gaines MD   albuterol (PROVENTIL) (2.5 MG/3ML) 0.083% nebulizer solution Take 3 mLs by nebulization every 6 hours Yes Jenni Gaines MD   PROAIR  (17 Base) MCG/ACT inhaler Inhale 2 puffs into the lungs every 6 hours as needed for Wheezing Yes Jenni Gaines MD   cetirizine (ZYRTEC) 10 MG tablet Take 10 mg by mouth daily Yes Historical Provider, MD   Calcium Carb-Cholecalciferol (CALCIUM-VITAMIN D) 600-400 MG-UNIT TABS Take 1 tablet by mouth daily Yes YESSICA Munguia NP   alogliptin (NESINA) 6.25 MG TABS tablet TAKE 1 TABLET BY MOUTH DAILY Yes YESSICA Munguia NP   ondansetron (ZOFRAN-ODT) 4 MG disintegrating tablet Take 1 tablet by mouth 3 times daily as needed for Nausea or Vomiting Yes [facility], APRN - [facility]   traZODone (DESYREL) 100 MG tablet Take 100 mg by mouth nightly as needed  Yes Historical Provider, MD   Multiple Vitamins-Minerals (THERAPEUTIC MULTIVITAMIN-MINERALS) tablet Take 1 tablet by mouth daily Yes [facility], APRN - CNP   hydrOXYzine (ATARAX) 25 MG tablet TAKE 1 TABLET EVERY 8 HOURS AS NEEDED FOR ANXIETY AVOID ALCOHOL/CAUSES DROWSINESS. Do not take with Xanax Yes YESSICA Munguia NP   tiZANidine (ZANAFLEX) 4 MG tablet Take 4 mg by mouth every 8 hours as needed  Yes Historical Provider, MD   oxyCODONE-acetaminophen (PERCOCET) 7.5-325 MG per tablet Take 1 tablet by mouth every 8 hours as needed for Pain (Patient states takes every 6 hours).   Yes Historical Provider, MD   fluticasone-vilanterol (BREO ELLIPTA) 100-25 MCG/INH AEPB inhaler Inhale 1 puff into the lungs daily Yes Jenni Gaines MD        Social History     Tobacco Use    Smoking status: Former     Packs/day: 0.25     Years: 30.00     Pack years: 7.50     Types:

## 2022-09-08 LAB
CANDIDA SPECIES, DNA PROBE: NORMAL
GARDNERELLA VAGINALIS, DNA PROBE: NORMAL
TRICHOMONAS VAGINALIS DNA: NORMAL

## 2022-09-15 ENCOUNTER — OFFICE VISIT (OUTPATIENT)
Dept: BARIATRICS/WEIGHT MGMT | Age: 57
End: 2022-09-15
Payer: COMMERCIAL

## 2022-09-15 VITALS
HEART RATE: 68 BPM | WEIGHT: 167.5 LBS | BODY MASS INDEX: 25.39 KG/M2 | SYSTOLIC BLOOD PRESSURE: 132 MMHG | DIASTOLIC BLOOD PRESSURE: 86 MMHG | HEIGHT: 68 IN | OXYGEN SATURATION: 100 %

## 2022-09-15 DIAGNOSIS — T81.30XS ABDOMINAL WOUND DEHISCENCE, SEQUELA: ICD-10-CM

## 2022-09-15 DIAGNOSIS — Z90.3 HISTORY OF SLEEVE GASTRECTOMY: Primary | ICD-10-CM

## 2022-09-15 DIAGNOSIS — E55.9 VITAMIN D DEFICIENCY: ICD-10-CM

## 2022-09-15 PROCEDURE — G8417 CALC BMI ABV UP PARAM F/U: HCPCS | Performed by: SURGERY

## 2022-09-15 PROCEDURE — G8427 DOCREV CUR MEDS BY ELIG CLIN: HCPCS | Performed by: SURGERY

## 2022-09-15 PROCEDURE — 99214 OFFICE O/P EST MOD 30 MIN: CPT | Performed by: SURGERY

## 2022-09-15 PROCEDURE — 1036F TOBACCO NON-USER: CPT | Performed by: SURGERY

## 2022-09-15 PROCEDURE — 3017F COLORECTAL CA SCREEN DOC REV: CPT | Performed by: SURGERY

## 2022-09-15 RX ORDER — ERGOCALCIFEROL 1.25 MG/1
50000 CAPSULE ORAL WEEKLY
Qty: 12 CAPSULE | Refills: 1 | Status: SHIPPED | OUTPATIENT
Start: 2022-09-15

## 2022-09-15 ASSESSMENT — PATIENT HEALTH QUESTIONNAIRE - PHQ9
SUM OF ALL RESPONSES TO PHQ QUESTIONS 1-9: 0
2. FEELING DOWN, DEPRESSED OR HOPELESS: 0
1. LITTLE INTEREST OR PLEASURE IN DOING THINGS: 0
SUM OF ALL RESPONSES TO PHQ QUESTIONS 1-9: 0
SUM OF ALL RESPONSES TO PHQ QUESTIONS 1-9: 0
SUM OF ALL RESPONSES TO PHQ9 QUESTIONS 1 & 2: 0
SUM OF ALL RESPONSES TO PHQ QUESTIONS 1-9: 0

## 2022-09-15 NOTE — PROGRESS NOTES
BARIATRIC SURGERY OFFICE PROGRESS NOTE    SUBJECTIVE:    Patient presenting today referred from YESSICA Torrez NP, for   Chief Complaint   Patient presents with    Follow-up     FU S/P PANNICUECTOMY [facility] 09/21/21     . Vitals:    09/15/22 0918   BP: 132/86   Pulse: 68   SpO2: 100%        BMI: Body mass index is 25.47 kg/m². Weight History: Wt Readings from Last 3 Encounters:   09/15/22 167 lb 8 oz (76 kg)   09/14/22 165 lb (74.8 kg)   09/07/22 176 lb 6.4 oz (80 kg)        If within 30 days of bariatric surgery date, have you been to the ED: Griffin Mancilla is a 56 y.o. female presenting in  ~ 5 year  bariatric POST-OP visit. Weight change:     -7.4 lbs since last visit.    -71.7 lbs since surgery.    -64.1 lbs since starting program.    Changes in health since last visit: Has small non-healing wound from previous Dr. Vasiliy Clement panniculectomy site. Pre-op clearances completed: N/A. Pt tracking calories/protein: Yes ~ 800 allyson / d. Pt exercising: Yes. Pt taking vitamins: No.    Fluid intake: Appropriate. Past Medical History:   Diagnosis Date    Anxiety     Arthritis     \"Back, Hands, Shoulders\"    Chronic back pain     Arthritis - NKI    COPD (chronic obstructive pulmonary disease) ([facility] [address]     Sees Dr. Lluvia Barrera    COVID-19     Depression     Follows with Mental Health    Diabetes mellitus ([facility] [address] Dx 2008    Type II - follows with PCP    Emphysema     Fibromyalgia     H/O abdominoplasty 12/21/2018    H/O cardiovascular stress test 03/04/2019    ECG portion of stress test is neg for ischemia by diagnostic criteria. No infarct or ischemia noted. Normal stress myocardial perfusion. This is a normal study    H/O echocardiogram 03/04/2019    Left ventricular function is normal. Ejection fraction is visually estimated at 55-60%. No significant valvular abnormalitites. H/O tilt table evaluation 08/26/2021    Orthostatic syncope.     Hot flashes     Hyperlipidemia     Hypertension Follows with PCP    Migraines Last Migraine 7/20/21    Daniel (type) I [facility]) atrioventricular block 01/29/2020    Neuropathy     From feet to knees    Night sweat     Piriformis syndrome 06/24/2016    Psoriasis     Rotator cuff impingement syndrome, right 02/26/2018    RTC repair Dr Hui Gunn 2015  Treated by Dr Shaheen Dunn 2/18/17  Arthrocentesis 12/19/17    Shortness of breath on exertion     Syncope and collapse 03/04/2019    Teeth missing     Upper And Lower    Wears glasses         Patient Active Problem List   Diagnosis    DM (diabetes mellitus) ([address]    Essential hypertension    Chronic pain syndrome    COPD, severe ([address]    Gastroesophageal reflux disease with esophagitis    History of MRSA infection    Anxiety    Chronic right-sided low back pain with sciatica    Hiatal hernia    Status post laparoscopic sleeve gastrectomy    Abdominal pannus    S/P abdominoplasty    Fibromyalgia    Sebaceous cyst of breast, right    Daniel type 1 second degree AV block    Polyp of cecum    Adenomatous polyp of ascending colon    Chronic edema BLE    Mixed hyperlipidemia    Neuropathy    Umbilical pain    Lower abdominal pain    S/P panniculectomy    Latent tuberculosis    Gastroesophageal reflux disease    Vitamin D deficiency    Psoriasis    Pannus, abdominal    Herpes genitalis in women    Admission for therapeutic drug monitoring    URIEL (obstructive sleep apnea)    Hypersomnia    Ex-smoker    Hot flashes       Past Surgical History:   Procedure Laterality Date    ABDOMEN SURGERY N/A 1/14/2019    SECONDARY CLOSURE OF DEHISCED ABDOMINAL WOUND performed by Mike White MD at [address] [place] [address] N/A 1/24/2019    IRRIGATION OF LOWER ABDOMINAL WOUND performed by Mike White MD at [facility] OR    ABDOMINOPLASTY N/A 7/29/2019    ABDOMINOPLASTY REVISION performed by Mike White MD at [address] Right 1/29/2020    EXCISION OF RIGHT BREAST SABACEOUS CYST performed by Cholo Gee MD at [address] Right 2017    Dr. Elba Alberts (Right)    [address]    Tubal Ligation Also Done In     COLONOSCOPY  2017    Polyp Removed - Dr. Harris July    COLONOSCOPY N/A 2020    COLONOSCOPY POLYPECTOMY SNARE/COLD BIOPSY performed by Dinora Orellana MD at [address] [facility]      Teeth Extracted In Past    ELBOW SURGERY Bilateral Last Done In     Right Done Twice, Left Done Once left cubital tunnel release ; right 2014    ENDOSCOPY, COLON, DIAGNOSTIC  2017    HERNIA REPAIR  10/26/2017    hiatal hernia with gastrectomy    HYSTERECTOMY VAGINAL      LAPAROSCOPY      LIPECTOMY N/A 2021    PANNICULECTOMY performed by Dinora Orellana MD at [address] [facility] N/A 2018    ABDOMINOPLASTY performed by Dinora Orellana MD at [address] Right     Dr. Garth Cuellar Right 2017    Dr David Hurley  10/26/2017    Robotic Laparoscopic, Pre Op Weight 237  Or 238 lbs. TUBAL LIGATION      Done With        Current Outpatient Medications   Medication Sig Dispense Refill    vitamin D (ERGOCALCIFEROL) 1.25 MG (99593 UT) CAPS capsule Take 1 capsule by mouth once a week 12 capsule 1    albuterol (PROVENTIL) (2.5 MG/3ML) 0.083% nebulizer solution Take 3 mLs by nebulization every 6 hours 120 each 5    fluticasone-vilanterol (BREO ELLIPTA) 100-25 MCG/INH AEPB inhaler Inhale 1 puff into the lungs daily 1 each 5    albuterol sulfate HFA (VENTOLIN HFA) 108 (90 Base) MCG/ACT inhaler Inhale 2 puffs into the lungs in the morning and 2 puffs at noon and 2 puffs in the evening and 2 puffs before bedtime. 18 g 5    estradiol (ESTRACE) 1 MG tablet Take 1 tablet by mouth in the morning. 21 tablet 3    clotrimazole-betamethasone (LOTRISONE) 1-0.05 % cream Apply topically 2 times daily.  15 g 0    clindamycin (CLEOCIN-T) 1 % gel Apply topically 2 times daily x 5 days  prn 30 g 0    SPIRIVA RESPIMAT 2.5 MCG/ACT AERS inhaler Inhale 2 puffs into the lungs daily 4 g 5    omeprazole (PRILOSEC) 40 MG delayed release capsule Take 1 capsule by mouth daily 90 capsule 2    coenzyme Q-10 100 MG capsule Take 1 capsule by mouth daily 90 capsule 3    midodrine (PROAMATINE) 2.5 MG tablet Take 1 tablet by mouth 3 times daily as needed (SBP less than 100) 90 tablet 3    lisinopril (PRINIVIL;ZESTRIL) 2.5 MG tablet Take 1 tablet by mouth daily as needed (for SBP  more than 135) 30 tablet 3    hydroCHLOROthiazide (HYDRODIURIL) 25 MG tablet Take 1 tablet by mouth daily as needed (swelling) 30 tablet 5    atorvastatin (LIPITOR) 80 MG tablet Take 1 tablet by mouth daily 90 tablet 3    SYMBICORT 160-4.5 MCG/ACT AERO Inhale 2 puffs into the lungs 2 times daily 1 each 5    albuterol sulfate HFA (VENTOLIN HFA) 108 (90 Base) MCG/ACT inhaler Inhale 2 puffs into the lungs 4 times daily as needed for Wheezing 54 g 5    albuterol (PROVENTIL) (2.5 MG/3ML) 0.083% nebulizer solution Take 3 mLs by nebulization every 6 hours 120 each 5    PROAIR  (90 Base) MCG/ACT inhaler Inhale 2 puffs into the lungs every 6 hours as needed for Wheezing 1 each 5    cetirizine (ZYRTEC) 10 MG tablet Take 10 mg by mouth daily      Calcium Carb-Cholecalciferol (CALCIUM-VITAMIN D) 600-400 MG-UNIT TABS Take 1 tablet by mouth daily 90 tablet 3    alogliptin (NESINA) 6.25 MG TABS tablet TAKE 1 TABLET BY MOUTH DAILY 90 tablet 3    ondansetron (ZOFRAN-ODT) 4 MG disintegrating tablet Take 1 tablet by mouth 3 times daily as needed for Nausea or Vomiting 21 tablet 2    traZODone (DESYREL) 100 MG tablet Take 100 mg by mouth nightly as needed       Multiple Vitamins-Minerals (THERAPEUTIC MULTIVITAMIN-MINERALS) tablet Take 1 tablet by mouth daily 90 tablet 3    hydrOXYzine (ATARAX) 25 MG tablet TAKE 1 TABLET EVERY 8 HOURS AS NEEDED FOR ANXIETY AVOID ALCOHOL/CAUSES DROWSINESS.   Do not take with Xanax 60 tablet 1    tiZANidine (ZANAFLEX) 4 MG tablet Take 4 mg by mouth every 8 hours as needed       oxyCODONE-acetaminophen (PERCOCET) 7.5-325 MG per tablet Take 1 tablet by mouth every 8 hours as needed for Pain (Patient states takes every 6 hours). fluticasone-vilanterol (BREO ELLIPTA) 100-25 MCG/INH AEPB inhaler Inhale 1 puff into the lungs daily 1 each 5    valACYclovir (VALTREX) 500 MG tablet Take 1 tablet by mouth daily 30 tablet 5     No current facility-administered medications for this visit. Allergies   Allergen Reactions    Other      Patient states the electrodes irritated her skin. Review of Systems   Skin:  Positive for wound. All other systems reviewed and are negative. OBJECTIVE:    /86 (Site: Right Upper Arm, Position: Sitting, Cuff Size: Medium Adult)   Pulse 68   Ht 5' 8\" (1.727 m)   Wt 167 lb 8 oz (76 kg)   SpO2 100%   BMI 25.47 kg/m²      Physical Exam  Vitals reviewed. HENT:      Head: Normocephalic and atraumatic. Right Ear: External ear normal.      Left Ear: External ear normal.   Eyes:      General:         Right eye: No discharge. Left eye: No discharge. Extraocular Movements: Extraocular movements intact. Pulmonary:      Effort: No respiratory distress. Abdominal:      Palpations: Abdomen is soft. Comments: Lower incision with small non-healing area in center ~ 2-3 mm in size. Currently without drainage. Musculoskeletal:         General: No swelling. Skin:     General: Skin is warm. Neurological:      General: No focal deficit present. Mental Status: She is alert. ASSESSMENT & PLAN:    1. History of sleeve gastrectomy  -Start taking MVI  -Reviewed labs with pt  -Annual f/u  -Pt encouraged to continue exercising. Minimum goal of 150 minutes per week with ideal goal of 300 min per week. Exercise regimen should include at least 2-3 sessions per week of strength training.  These recommendations are current with the most recent ASMBS guidelines. 2. Abdominal wound dehiscence, sequela  -Has had previous panniculectomy with Dr. Jenni Porras and wound dehiscence  -Now fat and skin distribution requires more complex excision, so refer to [facility] PRS. -If no intervention planned by PRS then I will plan on excising the non-healing portion, otherwise it should be removed as part of the excision. 3. Vitamin D deficiency  -Vit D PO prescribed. -Routine monitoring. As of current visit, regarding obesity-related co-morbid conditions:  URIEL [] compliant [] no longer using [] resolved per sleep study; hypertension [] medications; hyperlipidemia [] medications; GERD [] medications; DM [] insulin [] non-insulin [] no meds         Orders Placed This Encounter   Medications    vitamin D (ERGOCALCIFEROL) 1.25 MG (03662 UT) CAPS capsule     Sig: Take 1 capsule by mouth once a week     Dispense:  12 capsule     Refill:  1     No orders of the defined types were placed in this encounter. Follow Up:  No follow-ups on file.     Nuvia Cesar MD

## 2022-09-22 DIAGNOSIS — T81.30XS ABDOMINAL WOUND DEHISCENCE, SEQUELA: ICD-10-CM

## 2022-09-22 DIAGNOSIS — Z90.3 HISTORY OF SLEEVE GASTRECTOMY: Primary | ICD-10-CM

## 2022-10-06 ENCOUNTER — OFFICE VISIT (OUTPATIENT)
Dept: CARDIOLOGY CLINIC | Age: 57
End: 2022-10-06
Payer: COMMERCIAL

## 2022-10-06 VITALS
SYSTOLIC BLOOD PRESSURE: 118 MMHG | BODY MASS INDEX: 26.46 KG/M2 | DIASTOLIC BLOOD PRESSURE: 82 MMHG | WEIGHT: 168.6 LBS | HEART RATE: 48 BPM | HEIGHT: 67 IN

## 2022-10-06 DIAGNOSIS — I10 ESSENTIAL HYPERTENSION: ICD-10-CM

## 2022-10-06 DIAGNOSIS — E78.5 DYSLIPIDEMIA: ICD-10-CM

## 2022-10-06 DIAGNOSIS — E78.2 MIXED HYPERLIPIDEMIA: Primary | ICD-10-CM

## 2022-10-06 DIAGNOSIS — R00.1 BRADYCARDIA: ICD-10-CM

## 2022-10-06 DIAGNOSIS — I44.1 MOBITZ TYPE 1 SECOND DEGREE AV BLOCK: ICD-10-CM

## 2022-10-06 DIAGNOSIS — I95.9 HYPOTENSION, UNSPECIFIED HYPOTENSION TYPE: ICD-10-CM

## 2022-10-06 DIAGNOSIS — R55 SYNCOPE, UNSPECIFIED SYNCOPE TYPE: ICD-10-CM

## 2022-10-06 PROCEDURE — 1036F TOBACCO NON-USER: CPT | Performed by: NURSE PRACTITIONER

## 2022-10-06 PROCEDURE — 93000 ELECTROCARDIOGRAM COMPLETE: CPT | Performed by: NURSE PRACTITIONER

## 2022-10-06 PROCEDURE — 99214 OFFICE O/P EST MOD 30 MIN: CPT | Performed by: NURSE PRACTITIONER

## 2022-10-06 PROCEDURE — 3017F COLORECTAL CA SCREEN DOC REV: CPT | Performed by: NURSE PRACTITIONER

## 2022-10-06 PROCEDURE — G8484 FLU IMMUNIZE NO ADMIN: HCPCS | Performed by: NURSE PRACTITIONER

## 2022-10-06 PROCEDURE — G8417 CALC BMI ABV UP PARAM F/U: HCPCS | Performed by: NURSE PRACTITIONER

## 2022-10-06 PROCEDURE — G8427 DOCREV CUR MEDS BY ELIG CLIN: HCPCS | Performed by: NURSE PRACTITIONER

## 2022-10-06 RX ORDER — MIDODRINE HYDROCHLORIDE 2.5 MG/1
2.5 TABLET ORAL 3 TIMES DAILY PRN
Qty: 90 TABLET | Refills: 3 | Status: SHIPPED | OUTPATIENT
Start: 2022-10-06

## 2022-10-06 ASSESSMENT — ENCOUNTER SYMPTOMS
COUGH: 0
SHORTNESS OF BREATH: 0

## 2022-10-06 NOTE — PROGRESS NOTES
[facility] [address] [facility],[address]  Phone: [phone]    Fax [phone]    Jose Manuel Estrella MD, Mila Dorman MD, [address] [facility], MD, MD Emery Alexis MD Geannie Prows, MD Patricia Daniels, MD Tati Fountain, YESSICA Baca, YESSICA Concepcion, YESSICA Odom, APRN    CARDIOLOGY  NOTE    10/6/2022    Susi Galeano (:  1965) is a 56 y.o. female,Established patient with Dr. Pedro Nickerson, here for evaluation of the following chief complaint(s):  3 Month Follow-Up (Pt denies cardiac symptoms)    SUBJECTIVE/OBJECTIVE:    DALIA Jansen has a Past medical history as noted below. She is doing very well. She states that she has been feeling very well since she had her surgery and is not having any issues. She was actually evaluated in February when she was undergoing surgery was noted to have second-degree heart block type I Wenckebach. She is not symptomatic. During her event monitor which she wore for 14 days in 2019 she was noted to have Wenckebach type I. She was not symptomatic  Currently she is feeling well blood pressure is well controlled. Neurologist started her on propanolol for migraine prevention. Her blood pressure dropped significantly with first dose and she has not taken again. She has been having more swelling in her hands nd feet even with HTCZ daily. Review of Systems   Constitutional:  Negative for fatigue and fever. Respiratory:  Negative for cough and shortness of breath. Cardiovascular:  Positive for leg swelling (intermittent). Negative for chest pain and palpitations. Musculoskeletal:  Negative for arthralgias and gait problem. Neurological:  Negative for dizziness, syncope, weakness, light-headedness and headaches.      Vitals:    10/06/22 0925   BP: 118/82   Site: Left Upper Arm   Position: Sitting   Cuff Size: Medium Adult   Pulse: (!) 48   Weight: 168 lb 9.6 oz (76.5 kg)   Height: 5' 7\" (1.702 m)       Wt Readings from Last 3 Encounters:   10/06/22 168 lb 9.6 oz (76.5 kg)   09/15/22 167 lb 8 oz (76 kg)   09/14/22 165 lb (74.8 kg)       BP Readings from Last 3 Encounters:   10/06/22 118/82   09/15/22 132/86   09/07/22 126/78       Prior to Admission medications    Medication Sig Start Date End Date Taking? Authorizing Provider   vitamin D (ERGOCALCIFEROL) 1.25 MG (83687 UT) CAPS capsule Take 1 capsule by mouth once a week 9/15/22  Yes Willie Sepulveda MD   fluticasone-vilanterol (BREO ELLIPTA) 100-25 MCG/INH AEPB inhaler Inhale 1 puff into the lungs daily 9/14/22 10/14/22 Yes Antwan Parada MD   estradiol (ESTRACE) 1 MG tablet Take 1 tablet by mouth in the morning. 8/5/22  Yes YESSICA Holland CNP   clotrimazole-betamethasone (LOTRISONE) 1-0.05 % cream Apply topically 2 times daily.  8/5/22  Yes YESSICA Holland CNP   SPIRIVA RESPIMAT 2.5 MCG/ACT AERS inhaler Inhale 2 puffs into the lungs daily 8/1/22  Yes Antwan Parada MD   omeprazole (PRILOSEC) 40 MG delayed release capsule Take 1 capsule by mouth daily 7/7/22  Yes YESSICA Hunt NP   coenzyme Q-10 100 MG capsule Take 1 capsule by mouth daily 6/13/22  Yes Beryle Schneider, APRN - CNP   valACYclovir (VALTREX) 500 MG tablet Take 1 tablet by mouth daily 5/23/22 11/19/22 Yes Antoni Sorto MD   midodrine (PROAMATINE) 2.5 MG tablet Take 1 tablet by mouth 3 times daily as needed (SBP less than 100) 3/15/22  Yes Beryle Schneider, APRN - CNP   lisinopril (PRINIVIL;ZESTRIL) 2.5 MG tablet Take 1 tablet by mouth daily as needed (for SBP  more than 135) 3/15/22  Yes Beryle Schneider, APRN - CNP   hydroCHLOROthiazide (HYDRODIURIL) 25 MG tablet Take 1 tablet by mouth daily as needed (swelling) 3/15/22  Yes Beryle Schneider, APRN - CNP   atorvastatin (LIPITOR) 80 MG tablet Take 1 tablet by mouth daily 3/15/22  Yes YESSICA Vasques CNP   SYMBICORT 160-4.5 MCG/ACT AERO Inhale 2 puffs into the lungs 2 times daily 3/11/22  Yes Basilio Rock MD   albuterol sulfate HFA (VENTOLIN HFA) 108 (90 Base) MCG/ACT inhaler Inhale 2 puffs into the lungs 4 times daily as needed for Wheezing 3/9/22  Yes Basilio Rock MD   albuterol (PROVENTIL) (2.5 MG/3ML) 0.083% nebulizer solution Take 3 mLs by nebulization every 6 hours 3/9/22  Yes Basilio Rock MD   [facility] 108 (09 Base) MCG/ACT inhaler Inhale 2 puffs into the lungs every 6 hours as needed for Wheezing 1/31/22  Yes Basilio Rock MD   Calcium Carb-Cholecalciferol (CALCIUM-VITAMIN D) 600-400 MG-UNIT TABS Take 1 tablet by mouth daily 1/17/22  Yes YESSICA Abraham NP   alogliptin (NESINA) 6.25 MG TABS tablet TAKE 1 TABLET BY MOUTH DAILY 12/2/21 7/7/24 Yes YESSICA Abraham NP   ondansetron (ZOFRAN-ODT) 4 MG disintegrating tablet Take 1 tablet by mouth 3 times daily as needed for Nausea or Vomiting 9/24/21  Yes YESSICA Lu CNP   traZODone (DESYREL) 100 MG tablet Take 100 mg by mouth nightly as needed  3/1/21  Yes Historical Provider, MD   Multiple Vitamins-Minerals (THERAPEUTIC MULTIVITAMIN-MINERALS) tablet Take 1 tablet by mouth daily 3/4/21 7/7/24 Yes YESSICA Lu CNP   hydrOXYzine (ATARAX) 25 MG tablet TAKE 1 TABLET EVERY 8 HOURS AS NEEDED FOR ANXIETY AVOID ALCOHOL/CAUSES DROWSINESS. Do not take with Xanax 10/29/20  Yes YESSICA Abraham NP   tiZANidine (ZANAFLEX) 4 MG tablet Take 4 mg by mouth every 8 hours as needed  1/17/20  Yes Historical Provider, MD   oxyCODONE-acetaminophen (PERCOCET) 7.5-325 MG per tablet Take 1 tablet by mouth every 8 hours as needed for Pain (Patient states takes every 6 hours). 1/17/20  Yes Historical Provider, MD       Physical Exam  Vitals reviewed. Constitutional:       General: She is not in acute distress. Appearance: Normal appearance. She is not ill-appearing. HENT:      Head: Atraumatic. Neck:      Vascular: No carotid bruit.    Cardiovascular:      Rate and Rhythm: Normal rate and regular rhythm. Pulses: Normal pulses. Heart sounds: Normal heart sounds. No murmur heard. Pulmonary:      Effort: Pulmonary effort is normal. No respiratory distress. Breath sounds: Normal breath sounds. Musculoskeletal:         General: No swelling or deformity. Cervical back: Neck supple. No muscular tenderness. Neurological:      Mental Status: She is alert. Health Maintenance   Topic Date Due    Diabetic foot exam  04/03/2020    COVID-19 Vaccine (3 - Booster for Pfizer series) 09/13/2021    DTaP/Tdap/Td vaccine (2 - Td or Tdap) 12/20/2021    Flu vaccine (1) Never done    Diabetic retinal exam  08/19/2022    Pneumococcal 0-64 years Vaccine (2 - PPSV23 or PCV20) 04/01/2023 (Originally 4/25/2019)    Diabetic microalbuminuria test  01/06/2023    A1C test (Diabetic or Prediabetic)  07/14/2023    Lipids  07/14/2023    Depression Screen  09/15/2023    Breast cancer screen  03/25/2024    Cervical cancer screen  08/05/2027    Colorectal Cancer Screen  07/27/2030    Shingles vaccine  Completed    Hepatitis C screen  Completed    HIV screen  Completed    Hepatitis A vaccine  Aged Out    Hepatitis B vaccine  Aged Out    Hib vaccine  Aged Out    Meningococcal (ACWY) vaccine  Aged Out       Stress tests 3/4/2019       Summary    ECG portion of stress test is negative for ischemia by diagnostic criteria. No infarct or ischemia noted. Normal EF 79 % with normal ventricular contractility. Normal stress myocardial perfusion. This is a normal study. Echo 3/4/2019  Summary   technically difficult study   Left ventricular function is normal.   Ejection fraction is visually estimated at 55-60%. No significant valvular abnormalities. ASSESSMENT/PLAN:    Essential hypertension  Assessment & Plan:  Fairly well controlled  Had episode of hypotension day after drinking ETOH and she does not think she drank any water. She did not think to take her midodrine.    On lisinopril as needed for a systolic blood pressure greater than 150. No reported adverse events or reported side effects from medication(s). She is stable on current dose. Most often BP is < 130/80 therefore no medication needed at this time. Mixed hyperlipidemia  Assessment & Plan:   Uncontrolled,   lifestyle modifications recommended and recommend patient to take zetia for cholesterol. She is doing well with Co Q 10 and Lipitor at this time. will continue to monitor lipid panel. Please get done, she has slip she states will go get. The 10-year ASCVD risk score (Fallon BEATTY, et al., 2019) is: 7.6%    Values used to calculate the score:      Age: 56 years      Sex: Female      Is Non- : Yes      Diabetic: Yes      Tobacco smoker: No      Systolic Blood Pressure: 436 mmHg      Is BP treated: Yes      HDL Cholesterol: 82 mg/dL      Total Cholesterol: 261 mg/dL    Mobitz type 1 second degree AV block  Assessment & Plan:  She took propanolol while on event monitor she had low HR with mobitz type 1 2nd degree AV block noted. No change in rhythm while on holter monitor. Patient has been completely asymptomatic and tolerated surgery without event. Syncope  No additional episode of near syncope or syncope since 8/2021  Passed out on 8/12/2021. Patient has not needed midodrine but knows to use midodrine when systolic blood pressure less than 110 to prevent near syncope. Encouraged patient to stay hydrated and wear compression stockings. Sleep study negative    COVID positive   1/31/2022-   No residual loss from COVID. Return in about 3 months (around 1/6/2023). An electronic signature was used to authenticate this note.     Electronically signed by YESSICA Morejon CNP on 10/6/2022 at 9:46 AM

## 2022-10-06 NOTE — PATIENT INSTRUCTIONS
Please be informed that if you contact our office outside of normal business hours the physician on call cannot help with any scheduling or rescheduling issues, procedure instruction questions or any type of medication issue. We advise you for any urgent/emergency that you go to the nearest emergency room! PLEASE CALL OUR OFFICE DURING NORMAL BUSINESS HOURS    Monday - Friday   8 am to 5 pm    [place]: [address]: [phone]    [place]:  [phone]    **It is YOUR responsibilty to bring medication bottles and/or updated medication list to [address] [place] [address].  This will allow us to better serve you and all your healthcare needs**

## 2022-10-07 ENCOUNTER — OFFICE VISIT (OUTPATIENT)
Dept: FAMILY MEDICINE CLINIC | Age: 57
End: 2022-10-07
Payer: COMMERCIAL

## 2022-10-07 VITALS
DIASTOLIC BLOOD PRESSURE: 80 MMHG | HEIGHT: 67 IN | SYSTOLIC BLOOD PRESSURE: 126 MMHG | WEIGHT: 168.6 LBS | BODY MASS INDEX: 26.46 KG/M2 | HEART RATE: 80 BPM

## 2022-10-07 DIAGNOSIS — N95.1 HOT FLASHES DUE TO MENOPAUSE: ICD-10-CM

## 2022-10-07 DIAGNOSIS — E11.42 TYPE 2 DIABETES MELLITUS WITH DIABETIC POLYNEUROPATHY, WITHOUT LONG-TERM CURRENT USE OF INSULIN (HCC): Primary | ICD-10-CM

## 2022-10-07 DIAGNOSIS — E11.42 DIABETIC PERIPHERAL NEUROPATHY ASSOCIATED WITH TYPE 2 DIABETES MELLITUS (HCC): ICD-10-CM

## 2022-10-07 DIAGNOSIS — M79.7 FIBROMYALGIA: ICD-10-CM

## 2022-10-07 DIAGNOSIS — E78.2 MIXED HYPERLIPIDEMIA: ICD-10-CM

## 2022-10-07 DIAGNOSIS — F41.9 ANXIETY: ICD-10-CM

## 2022-10-07 DIAGNOSIS — G89.4 CHRONIC PAIN SYNDROME: ICD-10-CM

## 2022-10-07 DIAGNOSIS — E55.9 VITAMIN D DEFICIENCY: ICD-10-CM

## 2022-10-07 DIAGNOSIS — I10 ESSENTIAL HYPERTENSION: ICD-10-CM

## 2022-10-07 DIAGNOSIS — A60.09 HERPES GENITALIS IN WOMEN: ICD-10-CM

## 2022-10-07 DIAGNOSIS — K21.9 GASTROESOPHAGEAL REFLUX DISEASE, UNSPECIFIED WHETHER ESOPHAGITIS PRESENT: ICD-10-CM

## 2022-10-07 DIAGNOSIS — J44.9 COPD, SEVERE (HCC): ICD-10-CM

## 2022-10-07 PROCEDURE — 3044F HG A1C LEVEL LT 7.0%: CPT | Performed by: NURSE PRACTITIONER

## 2022-10-07 PROCEDURE — G8427 DOCREV CUR MEDS BY ELIG CLIN: HCPCS | Performed by: NURSE PRACTITIONER

## 2022-10-07 PROCEDURE — 1036F TOBACCO NON-USER: CPT | Performed by: NURSE PRACTITIONER

## 2022-10-07 PROCEDURE — 99214 OFFICE O/P EST MOD 30 MIN: CPT | Performed by: NURSE PRACTITIONER

## 2022-10-07 PROCEDURE — G8417 CALC BMI ABV UP PARAM F/U: HCPCS | Performed by: NURSE PRACTITIONER

## 2022-10-07 PROCEDURE — 2022F DILAT RTA XM EVC RTNOPTHY: CPT | Performed by: NURSE PRACTITIONER

## 2022-10-07 PROCEDURE — 3017F COLORECTAL CA SCREEN DOC REV: CPT | Performed by: NURSE PRACTITIONER

## 2022-10-07 PROCEDURE — 3023F SPIROM DOC REV: CPT | Performed by: NURSE PRACTITIONER

## 2022-10-07 PROCEDURE — G8484 FLU IMMUNIZE NO ADMIN: HCPCS | Performed by: NURSE PRACTITIONER

## 2022-10-07 NOTE — PROGRESS NOTES
10/7/2022     Paulo Dominguez (:  1965) is a 56 y.o. female, here for evaluation of the following medical concerns:      Presents to follow-up on current diagnoses:        Diabetes type 2  taking alogliptin.    hgba1c 6.2022  Reports she has all the supplies at home  Denies any feet wounds  Does have peripheral neuropathy chronic. Reports the right foot has been worse since last night. She has tried Lyrica and gabapentin in the past and did not tolerate either. She does follow with pain management    eye exam 2022       GERD  Controlled with Prilosec        Hyperlipidemia  Lipitor without side effects        Chronic lower extremity edema   no longer taking lasix, but is on thiazide and ACE as needed. No lower extremity edema right now. Following with cardiology who is managing. Anxiety depression  She is to take Xanax, but that was stopped by mental health. She denies any suicidal thought plan idea, self-harm, insomnia. Does not want to see mental health  No longer taking Buspar PRN        COPD   taking proair, spiriva and breo. Follows with Dr. Sukumar Barrientos. Denies shortness of breath or cough. no recent COPD exacerbations        Chronic pain  follows with pain management. Has done physical therapy in the past.    Has been trying to get disability since . Was referred to specialists for disability workup and paperwork completion. Has not worked since . Taking zanaflex and percocet  Also has diagnosis of fibromyalgia-all managed by pain management  Following with Dr. Cox Shirlene as well  Has had steroid injections. HTN  controlled-managed by cardiology  Lisinopril and HCTZ as needed   Midodrine for low BP as needed.  <869 systolic            VIt D deficiency - on 50k supp       Psoriasis  following with dermatology        Neurology for migraines  neuro [place] at [place].         Reports having partial hysterectomy,still has cervix. HSV2  +  May 2021 - Valtrex daily  Follows with infectious disease  No hx of ulcers       sleep study normal august 2021 - normal. No CPAP     Mammogram march 2022 normal      Had paniculectomy surgery sept 2021      Having night sweats menopausal.   Was on hormones previously, but stopped for no specific reason. Referred to GYN -- they started estrace        reporting tenderness to abdomen around surgical sites and healed wound. States she told gen surg and was referred to [facility] but cannot be seen until [facility]. She is requesting imaging today   Per gen surg note 9/15/22  Abdominal wound dehiscence, sequela  -Has had previous panniculectomy with Dr. Alyx Gill and wound dehiscence  -Now fat and skin distribution requires more complex excision, so refer to [facility]. -If no intervention planned by PRS then I will plan on excising the non-healing portion, otherwise it should be removed as part of the excision. Review of Systems    Prior to Visit Medications    Medication Sig Taking? Authorizing Provider   midodrine (PROAMATINE) 2.5 MG tablet Take 1 tablet by mouth 3 times daily as needed (SBP less than 100) Yes Aurora Domingo, APRN - CNP   vitamin D (ERGOCALCIFEROL) 1.25 MG (54344 UT) CAPS capsule Take 1 capsule by mouth once a week Yes Vivi Polanco MD   fluticasone-vilanterol (BREO ELLIPTA) 100-25 MCG/INH AEPB inhaler Inhale 1 puff into the lungs daily Yes Carolann Smith MD   estradiol (ESTRACE) 1 MG tablet Take 1 tablet by mouth in the morning. Yes YESSICA Sanders - CNP   clotrimazole-betamethasone (LOTRISONE) 1-0.05 % cream Apply topically 2 times daily.  Yes Anastasia Jayce YESSICA Dennis - CNP   SPIRIVA RESPIMAT 2.5 MCG/ACT AERS inhaler Inhale 2 puffs into the lungs daily Yes Carolann Smith MD   omeprazole (PRILOSEC) 40 MG delayed release capsule Take 1 capsule by mouth daily Yes YESSICA Gaspar - NP   coenzyme Q-10 100 MG capsule Take 1 capsule by mouth daily Yes Karrie Stovall YESSICA Newman CNP   valACYclovir (VALTREX) 500 MG tablet Take 1 tablet by mouth daily Yes Willie Monreal MD   lisinopril (PRINIVIL;ZESTRIL) 2.5 MG tablet Take 1 tablet by mouth daily as needed (for SBP  more than 135) Yes YESSICA Pope CNP   hydroCHLOROthiazide (HYDRODIURIL) 25 MG tablet Take 1 tablet by mouth daily as needed (swelling) Yes YESSICA Escobar CNP   atorvastatin (LIPITOR) 80 MG tablet Take 1 tablet by mouth daily Yes YESSICA Escobar CNP   albuterol sulfate HFA (VENTOLIN HFA) 108 (90 Base) MCG/ACT inhaler Inhale 2 puffs into the lungs 4 times daily as needed for Wheezing Yes Angelita Mckeon MD   albuterol (PROVENTIL) (2.5 MG/3ML) 0.083% nebulizer solution Take 3 mLs by nebulization every 6 hours Yes Angelita Mckeon MD   PROAIR  (05 Base) MCG/ACT inhaler Inhale 2 puffs into the lungs every 6 hours as needed for Wheezing Yes Angelita Mckeon MD   Calcium Carb-Cholecalciferol (CALCIUM-VITAMIN D) 600-400 MG-UNIT TABS Take 1 tablet by mouth daily Yes YESSICA Castillo NP   alogliptin (NESINA) 6.25 MG TABS tablet TAKE 1 TABLET BY MOUTH DAILY Yes YESSICA Castillo NP   ondansetron (ZOFRAN-ODT) 4 MG disintegrating tablet Take 1 tablet by mouth 3 times daily as needed for Nausea or Vomiting Yes Marylene Cree, APRN - CNP   Multiple Vitamins-Minerals (THERAPEUTIC MULTIVITAMIN-MINERALS) tablet Take 1 tablet by mouth daily Yes Marylene Cree, APRN - CNP   hydrOXYzine (ATARAX) 25 MG tablet TAKE 1 TABLET EVERY 8 HOURS AS NEEDED FOR ANXIETY AVOID ALCOHOL/CAUSES DROWSINESS. Do not take with Xanax Yes YESSICA Castillo NP   tiZANidine (ZANAFLEX) 4 MG tablet Take 4 mg by mouth every 8 hours as needed  Yes Historical Provider, MD   oxyCODONE-acetaminophen (PERCOCET) 7.5-325 MG per tablet Take 1 tablet by mouth every 8 hours as needed for Pain (Patient states takes every 6 hours).   Yes Historical Provider, MD        Social History     Tobacco Use    Smoking status: Former Packs/day: 0.25     Years: 30.00     Pack years: 7.50     Types: Cigarettes, E-Cigarettes     Start date: 14     Quit date: 2016     Years since quittin.7    Smokeless tobacco: Never   Substance Use Topics    Alcohol use: Yes     Comment: \"Occ. Maybe Twice A Month\"        Vitals:    10/07/22 0905   BP: 126/80   Pulse: 80   Weight: 168 lb 9.6 oz (76.5 kg)   Height: 5' 7\" (1.702 m)     Estimated body mass index is 26.41 kg/m² as calculated from the following:    Height as of this encounter: 5' 7\" (1.702 m). Weight as of this encounter: 168 lb 9.6 oz (76.5 kg). Physical Exam  Vitals reviewed. Constitutional:       General: She is not in acute distress. Appearance: Normal appearance. She is not ill-appearing, toxic-appearing or diaphoretic. HENT:      Head: Normocephalic and atraumatic. Nose: Nose normal.   Eyes:      Extraocular Movements: Extraocular movements intact. Pupils: Pupils are equal, round, and reactive to light. Cardiovascular:      Rate and Rhythm: Normal rate and regular rhythm. Heart sounds: Normal heart sounds. Pulmonary:      Effort: Pulmonary effort is normal.      Breath sounds: Normal breath sounds. Abdominal:      General: Bowel sounds are normal. There is no distension. Palpations: Abdomen is soft. Tenderness: There is abdominal tenderness. Musculoskeletal:         General: Normal range of motion. Cervical back: Normal range of motion and neck supple. Skin:     General: Skin is warm and dry. Neurological:      General: No focal deficit present. Mental Status: She is alert and oriented to person, place, and time. Mental status is at baseline. Psychiatric:         Mood and Affect: Mood normal.         Behavior: Behavior normal.         Thought Content: Thought content normal.         Judgment: Judgment normal.       ASSESSMENT/PLAN:  1.  Type 2 diabetes mellitus with diabetic polyneuropathy, without long-term current use of insulin ([facility] [address]  - Comprehensive Metabolic Panel; Future    2. Essential hypertension    3. Chronic pain syndrome    4. Gastroesophageal reflux disease, unspecified whether esophagitis present    5. COPD, severe ([facility] [address]    6. Anxiety    7. Mixed hyperlipidemia  - Lipid Panel; Future    8. Vitamin D deficiency    9. Herpes genitalis in women    10. Fibromyalgia    11. Diabetic peripheral neuropathy associated with type 2 diabetes mellitus ([facility] [address]    12. Hot flashes due to menopause    Cont. Current meds. CC chart note sent to dr Noelle Miller to see if he wants imaging for abdominal tenderness. He is already aware and referred her to [address] [place] [address]. I do not believe she needs imaging. Will let shabbir decide. Need dm eye exam results. Fasting labs jan 2023  Follow up April 2023        All care gaps addressed     All questions answered    Discussed use, benefit, and side effects of prescribed medications. Barriers to compliance discussed. All patient questions answered. Pt voiced understanding. Present to the ER for any emergent or acute symptoms not managed at home or in office. Please note that this chart was generated using dragon dictation software. Although every effort was made to ensure the accuracy of this automated transcription, some errors in transcription may have occurred. No follow-ups on file. An electronic signature was used to authenticate this note.     --YESSICA Castillo NP on 10/7/2022 at 10:20 AM

## 2022-10-07 NOTE — Clinical Note
pt seen in office today. reporting tenderness to abdomen around surgical sites and healed wound. States she discussed with you and was referred to [facility] but cannot be seen until jan. She is requesting imaging today. I think her tenderness is likely due to adhesions, post op pain ect. Do you want her to have imaging?

## 2022-10-13 ENCOUNTER — TELEPHONE (OUTPATIENT)
Dept: FAMILY MEDICINE CLINIC | Age: 57
End: 2022-10-13

## 2022-10-13 NOTE — TELEPHONE ENCOUNTER
----- Message from YESSICA Hill NP sent at 10/13/2022  1:10 PM EDT -----  Please call patient and let her know dr Kwasi Giraldo advised she follow up with him and he will eval and order what needs to be done for her abdominal discomfort. Si Wise CNP     ----- Message -----  From: Brannon Coronado MD  Sent: 10/13/2022   6:06 AM EDT  To: YESSICA Hill NP 57, Si Wise--    Sorry I missed this message earlier. Please have the pt make appointment and we will evaluate and order any imaging / tests. Thanks,    Adam Filter  ----- Message -----  From: YESSICA Hill NP  Sent: 10/7/2022  10:19 AM EDT  To: Brannon Coronado MD       pt seen in office today. reporting tenderness to abdomen around surgical sites and healed wound. States she discussed with you and was referred to [facility] but cannot be seen until jan. She is requesting imaging today. I think her tenderness is likely due to adhesions, post op pain ect. Do you want her to have imaging?

## 2022-10-24 RX ORDER — HYDROCHLOROTHIAZIDE 25 MG/1
25 TABLET ORAL DAILY PRN
Qty: 30 TABLET | Refills: 5 | Status: SHIPPED | OUTPATIENT
Start: 2022-10-24

## 2022-11-22 RX ORDER — ESTRADIOL 1 MG/1
1 TABLET ORAL DAILY
Qty: 21 TABLET | Refills: 3 | OUTPATIENT
Start: 2022-11-22

## 2022-11-22 RX ORDER — ESTRADIOL 1 MG/1
1 TABLET ORAL DAILY
Qty: 30 TABLET | Refills: 10 | Status: SHIPPED | OUTPATIENT
Start: 2022-11-22

## 2022-11-28 ENCOUNTER — TELEPHONE (OUTPATIENT)
Dept: CARDIOLOGY CLINIC | Age: 57
End: 2022-11-28

## 2022-11-28 NOTE — TELEPHONE ENCOUNTER
Patient called and stated that for the last 3 nights she has had what she thinks is heartburn symptoms accompanied with chest pain and upper back pain. Patient stated that she took some nausea medication last night and vomited. Patient states that she now has had a racing heart today.  Please advise

## 2022-11-29 ENCOUNTER — NURSE ONLY (OUTPATIENT)
Dept: CARDIOLOGY CLINIC | Age: 57
End: 2022-11-29
Payer: COMMERCIAL

## 2022-11-29 ENCOUNTER — TELEPHONE (OUTPATIENT)
Dept: GASTROENTEROLOGY | Age: 57
End: 2022-11-29

## 2022-11-29 ENCOUNTER — OFFICE VISIT (OUTPATIENT)
Dept: CARDIOLOGY CLINIC | Age: 57
End: 2022-11-29
Payer: COMMERCIAL

## 2022-11-29 VITALS
OXYGEN SATURATION: 95 % | HEIGHT: 67 IN | DIASTOLIC BLOOD PRESSURE: 74 MMHG | BODY MASS INDEX: 25.58 KG/M2 | HEART RATE: 89 BPM | WEIGHT: 163 LBS | SYSTOLIC BLOOD PRESSURE: 128 MMHG

## 2022-11-29 DIAGNOSIS — R07.9 CHEST PAIN, UNSPECIFIED TYPE: ICD-10-CM

## 2022-11-29 DIAGNOSIS — R00.2 PALPITATIONS: ICD-10-CM

## 2022-11-29 DIAGNOSIS — I10 ESSENTIAL HYPERTENSION: ICD-10-CM

## 2022-11-29 DIAGNOSIS — R07.2 PRECORDIAL PAIN: ICD-10-CM

## 2022-11-29 DIAGNOSIS — K21.9 GASTROESOPHAGEAL REFLUX DISEASE, UNSPECIFIED WHETHER ESOPHAGITIS PRESENT: ICD-10-CM

## 2022-11-29 DIAGNOSIS — K21.00 GASTROESOPHAGEAL REFLUX DISEASE WITH ESOPHAGITIS WITHOUT HEMORRHAGE: ICD-10-CM

## 2022-11-29 DIAGNOSIS — R00.0 TACHYCARDIA: Primary | ICD-10-CM

## 2022-11-29 DIAGNOSIS — R00.2 PALPITATION: ICD-10-CM

## 2022-11-29 DIAGNOSIS — R07.9 CHEST PAIN, UNSPECIFIED TYPE: Primary | ICD-10-CM

## 2022-11-29 PROCEDURE — 93000 ELECTROCARDIOGRAM COMPLETE: CPT | Performed by: INTERNAL MEDICINE

## 2022-11-29 PROCEDURE — 3017F COLORECTAL CA SCREEN DOC REV: CPT | Performed by: INTERNAL MEDICINE

## 2022-11-29 PROCEDURE — 93225 XTRNL ECG REC<48 HRS REC: CPT | Performed by: INTERNAL MEDICINE

## 2022-11-29 PROCEDURE — G8417 CALC BMI ABV UP PARAM F/U: HCPCS | Performed by: INTERNAL MEDICINE

## 2022-11-29 PROCEDURE — G8484 FLU IMMUNIZE NO ADMIN: HCPCS | Performed by: INTERNAL MEDICINE

## 2022-11-29 PROCEDURE — 99204 OFFICE O/P NEW MOD 45 MIN: CPT | Performed by: INTERNAL MEDICINE

## 2022-11-29 PROCEDURE — 3074F SYST BP LT 130 MM HG: CPT | Performed by: INTERNAL MEDICINE

## 2022-11-29 PROCEDURE — 3078F DIAST BP <80 MM HG: CPT | Performed by: INTERNAL MEDICINE

## 2022-11-29 PROCEDURE — 1036F TOBACCO NON-USER: CPT | Performed by: INTERNAL MEDICINE

## 2022-11-29 PROCEDURE — G8427 DOCREV CUR MEDS BY ELIG CLIN: HCPCS | Performed by: INTERNAL MEDICINE

## 2022-11-29 RX ORDER — AMITRIPTYLINE HYDROCHLORIDE 25 MG/1
TABLET, FILM COATED ORAL
COMMUNITY
Start: 2022-11-07

## 2022-11-29 RX ORDER — OMEPRAZOLE 40 MG/1
40 CAPSULE, DELAYED RELEASE ORAL DAILY
Qty: 90 CAPSULE | Refills: 2 | Status: SHIPPED | OUTPATIENT
Start: 2022-11-29

## 2022-11-29 RX ORDER — SUCRALFATE 1 G/1
1 TABLET ORAL 4 TIMES DAILY
Qty: 120 TABLET | Refills: 3 | Status: SHIPPED | OUTPATIENT
Start: 2022-11-29

## 2022-11-29 NOTE — TELEPHONE ENCOUNTER
Called pt. In regards to a referral for chest pain and GERD.  Made appt for pt to see dr. Sophie Mckeon on 12/1/22 @3:45pm

## 2022-11-29 NOTE — PROGRESS NOTES
CARDIOLOGY  NOTE           Chief Complaint: bradycardia    HPI:   Apple Shine is a 56y.o. year old who has Past medical history as noted below. She reports ongoing rotations epigastric pain and heartburn after she eats any food she says the pain is severe 8 out of 10 history of gastric sleeve surgery   she was actually evaluated in February thousand 19 when she was undergoing surgery was noted to have second-degree heart block type I Wenckebach. S.  During her event monitor which she wore for 14 days in March 2019 she was noted to have Wenckebach type I. She was not symptomatic        Current Outpatient Medications   Medication Sig Dispense Refill    amitriptyline (ELAVIL) 25 MG tablet TAKE 1 TABLET AT BEDTIME      estradiol (ESTRACE) 1 MG tablet Take 1 tablet by mouth daily 30 tablet 10    hydroCHLOROthiazide (HYDRODIURIL) 25 MG tablet Take 1 tablet by mouth daily as needed (swelling) 30 tablet 5    midodrine (PROAMATINE) 2.5 MG tablet Take 1 tablet by mouth 3 times daily as needed (SBP less than 100) 90 tablet 3    vitamin D (ERGOCALCIFEROL) 1.25 MG (36892 UT) CAPS capsule Take 1 capsule by mouth once a week 12 capsule 1    fluticasone-vilanterol (BREO ELLIPTA) 100-25 MCG/INH AEPB inhaler Inhale 1 puff into the lungs daily 1 each 5    clotrimazole-betamethasone (LOTRISONE) 1-0.05 % cream Apply topically 2 times daily.  15 g 0    SPIRIVA RESPIMAT 2.5 MCG/ACT AERS inhaler Inhale 2 puffs into the lungs daily 4 g 5    omeprazole (PRILOSEC) 40 MG delayed release capsule Take 1 capsule by mouth daily 90 capsule 2    coenzyme Q-10 100 MG capsule Take 1 capsule by mouth daily 90 capsule 3    valACYclovir (VALTREX) 500 MG tablet Take 1 tablet by mouth daily 30 tablet 5    lisinopril (PRINIVIL;ZESTRIL) 2.5 MG tablet Take 1 tablet by mouth daily as needed (for SBP  more than 135) 30 tablet 3    atorvastatin (LIPITOR) 80 MG tablet Take 1 tablet by mouth daily 90 tablet 3    albuterol sulfate HFA (VENTOLIN HFA) 108 (90 Base) MCG/ACT inhaler Inhale 2 puffs into the lungs 4 times daily as needed for Wheezing 54 g 5    albuterol (PROVENTIL) (2.5 MG/3ML) 0.083% nebulizer solution Take 3 mLs by nebulization every 6 hours 120 each 5    PROAIR  (90 Base) MCG/ACT inhaler Inhale 2 puffs into the lungs every 6 hours as needed for Wheezing 1 each 5    Calcium Carb-Cholecalciferol (CALCIUM-VITAMIN D) 600-400 MG-UNIT TABS Take 1 tablet by mouth daily 90 tablet 3    alogliptin (NESINA) 6.25 MG TABS tablet TAKE 1 TABLET BY MOUTH DAILY 90 tablet 3    ondansetron (ZOFRAN-ODT) 4 MG disintegrating tablet Take 1 tablet by mouth 3 times daily as needed for Nausea or Vomiting 21 tablet 2    Multiple Vitamins-Minerals (THERAPEUTIC MULTIVITAMIN-MINERALS) tablet Take 1 tablet by mouth daily 90 tablet 3    hydrOXYzine (ATARAX) 25 MG tablet TAKE 1 TABLET EVERY 8 HOURS AS NEEDED FOR ANXIETY AVOID ALCOHOL/CAUSES DROWSINESS. Do not take with Xanax 60 tablet 1    tiZANidine (ZANAFLEX) 4 MG tablet Take 4 mg by mouth every 8 hours as needed       oxyCODONE-acetaminophen (PERCOCET) 7.5-325 MG per tablet Take 1 tablet by mouth every 8 hours as needed for Pain (Patient states takes every 6 hours). No current facility-administered medications for this visit. Allergies: Other    Patient History:  Past Medical History:   Diagnosis Date    Anxiety     Arthritis     \"Back, Hands, Shoulders\"    Chronic back pain     Arthritis - NKI    COPD (chronic obstructive pulmonary disease) ([facility] [address]     Sees Dr. Alanna Carson    COVID-19     Depression     Follows with Mental Health    Diabetes mellitus ([facility] [address] Dx 2008    Type II - follows with PCP    Emphysema     Fibromyalgia     H/O abdominoplasty 12/21/2018    H/O cardiovascular stress test 03/04/2019    ECG portion of stress test is neg for ischemia by diagnostic criteria. No infarct or ischemia noted. Normal stress myocardial perfusion.  This is a normal study    H/O echocardiogram LIPECTOMY N/A 2021    PANNICULECTOMY performed by Husam Barron MD at [address] [facility] N/A 2018    ABDOMINOPLASTY performed by Husam Barron MD at [address] Right     Dr. Lennox Piper Right 2017    Dr Keren Desouza  10/26/2017    Robotic Laparoscopic, Pre Op Weight 237  Or 238 lbs. TUBAL LIGATION      Done With      Family History   Problem Relation Age of Onset    Heart Attack Father     Heart Disease Father     Early Death Father 46        Heart Attack    Alcohol Abuse Father     Substance Abuse Father         Alcoholic    Arthritis Mother     Heart Disease Mother     Diabetes Mother     Cancer Mother         \"Kidney Cancer\"    High Blood Pressure Mother     High Blood Pressure Sister     Diabetes Brother     Early Death Daughter 21        \"Killed In A Car Accident\"    Ovarian Cancer Neg Hx     Breast Cancer Neg Hx      Social History     Tobacco Use    Smoking status: Former     Packs/day: 0.25     Years: 30.00     Pack years: 7.50     Types: Cigarettes, E-Cigarettes     Start date:      Quit date:      Years since quittin.9    Smokeless tobacco: Never   Substance Use Topics    Alcohol use: Yes     Comment: \"Occ. Maybe Twice A Month\"  caffeine iced coffee occ        Review of Systems:   Constitutional: No Fever or Weight Loss   Eyes: No Decreased Vision  ENT: No Headaches, Hearing Loss or Vertigo  Cardiovascular: as per note above   Respiratory: No cough or wheezing and as per note above.    Gastrointestinal: No abdominal pain, appetite loss, blood in stools, constipation, diarrhea or heartburn  Genitourinary: No dysuria, trouble voiding, or hematuria  Musculoskeletal:  denies any new  joint aches , swelling  or pain   Integumentary: No rash or pruritis  Neurological: No TIA or stroke symptoms  Psychiatric: No anxiety or depression  Endocrine: No malaise, fatigue or temperature intolerance  Hematologic/Lymphatic: No bleeding problems, blood clots or swollen lymph nodes  Allergic/Immunologic: No nasal congestion or hives    Objective:      Physical Exam:  /74 (Site: Left Upper Arm, Position: Sitting, Cuff Size: Medium Adult)   Pulse 89   Ht 5' 7\" (1.702 m)   Wt 163 lb (73.9 kg)   SpO2 95%   BMI 25.53 kg/m²   Wt Readings from Last 3 Encounters:   11/29/22 163 lb (73.9 kg)   10/07/22 168 lb 9.6 oz (76.5 kg)   10/06/22 168 lb 9.6 oz (76.5 kg)     Body mass index is 25.53 kg/m². Vitals:    11/29/22 1521   BP: 128/74   Pulse: 89   SpO2: 95%        General Appearance:  No distress, conversant  Constitutional:  Well developed, Well nourished, No acute distress, Non-toxic appearance. HENT:  Normocephalic, Atraumatic, Bilateral external ears normal, Oropharynx moist, No oral exudates, Nose normal. Neck- Normal range of motion, No tenderness, Supple, No stridor,no apical-carotid delay  Eyes:  PERRL, EOMI, Conjunctiva normal, No discharge. Respiratory:  Normal breath sounds, No respiratory distress, No wheezing, No chest tenderness. ,no use of accessory muscles, NO crackles  Cardiovascular: (PMI) apex non displaced,no lifts no thrills,S1 and S2 audible, No added heart sounds, No signs of ankle edema, or volume overload, No evidence of JVD, No crackles   GI:  Bowel sounds normal, Soft, No tenderness, No masses, No gross visceromegaly   :  No costovertebral angle tenderness   Musculoskeletal:  No edema, no tenderness, no deformities.  Back- no tenderness  Integument:  Well hydrated, no rash   Lymphatic:  No lymphadenopathy noted   Neurologic:  Alert & oriented x 3, CN 2-12 normal, normal motor function, normal sensory function, no focal deficits noted   Psychiatric:  Speech and behavior appropriate       Medical decision making and Data review:  DATA:  Lab Results   Component Value Date    TROPONINT <0.010 03/04/2019     BNP:    Lab Results   Component Value Date    PROBNP 52 09/03/2020 PT/INR:  No results found for: PTINR  Lab Results   Component Value Date    LABA1C 6.9 07/14/2022    LABA1C 6.5 01/06/2022     Lab Results   Component Value Date    CHOL 261 (H) 07/14/2022    TRIG 144 07/14/2022    HDL 82 (H) 07/14/2022    LDLCALC 150 (H) 07/14/2022    LDLDIRECT 163 (H) 04/08/2021     Lab Results   Component Value Date    ALT 13 07/14/2022    AST 20 07/14/2022     No results for input(s): WBC, HGB, HCT, MCV, PLT in the last 72 hours. TSH: No results found for: TSH  Lab Results   Component Value Date    AST 20 07/14/2022    ALT 13 07/14/2022    BILIDIR <0.2 05/26/2021    BILITOT 0.5 07/14/2022    ALKPHOS 107 07/14/2022     Lab Results   Component Value Date    PROBNP 52 09/03/2020    PROBNP 321.6 (H) 03/04/2019     Lab Results   Component Value Date    LABA1C 6.9 07/14/2022    LABA1C 6.5 01/06/2022     Lab Results   Component Value Date    WBC 5.8 07/14/2022    HGB 15.1 07/14/2022    HCT 45.7 07/14/2022     07/14/2022     Stress tests 3/4/2019      Summary    ECG portion of stress test is negative for ischemia by diagnostic criteria. No infarct or ischemia noted. Normal EF 79 % with normal ventricular contractility. Normal stress myocardial perfusion. This is a normal study. Echo 3/4/2019  Summary   technically difficult study   Left ventricular function is normal.   Ejection fraction is visually estimated at 55-60%. No significant valvular abnormalities. All labs, medications and tests reviewed by myself including data and history from outside source , patient and available family . Assessment & Plan:      1. Chest pain, unspecified type    2.  Palpitation         Chest pain  Every time she eats she gest palpitations and has chest pain and pain on left side and goes to back , she is s/p gastrtic bypass surgery will refer to gastroenterology for further work-up we will also get a stress test  .Omeprazole twice a day as well as use sucralfate use nitro as needed      Mobitz type 1 second degree AV block  Continue to monitor she is not symptomatic no further intervention at this time. Echo shows preserved EF normal stress test in 2019  Due to ongoing palpitations get 48-hour Holter monitor    SOB (shortness of breath)  Status post gastric bypass surgery multifactorial echo shows no abnormality she uses Lasix as needed basis when she notices ankle swelling I do not think she is in heart failure     Dyslipidemia :  All available lab work was reviewed. Patient was advised to repeat lab work before next visit. Necessary orders were placed , instructions given by myself   all labs as well as lipid panel      Counseled extensively and medication compliance urged. We discussed that for the  prevention of ASCVD our  goal is aggressive risk modification. Patient is encouraged to exercise even a brisk walk for 30 minutes  at least 3 to 4 times a week   Various goals were discussed and questions answered. Continue current medications. Appropriate prescriptions are addressed and refills ordered. Questions answered and patient verbalizes understanding. Call for any problems, questions, or concerns. Continue all other medications of all above medical condition listed as is. No follow-ups on file. Please note this report has been partially produced using speech recognition software and may contain errors related to that system including errors in grammar, punctuation, and spelling, as well as words and phrases that may be inappropriate. If there are any questions or concerns please feel free to contact the dictating provider for clarification. An  electronic signature was used to authenticate this note.     --Edson Odonnell MD on 11/29/2022 at 3:40 PM    [id number]}

## 2022-11-29 NOTE — PROGRESS NOTES
48 hour holter monitor [email or web address] for Dr Raina Nevarez Serial # [id number] . Instructed patient on monitor and proper use. Instructed on diary. When to remove and bring it back. Must leave the holter monitor on  without removing for the duration of time ordered. Answered all questions the patient had. In[facility]ed [facility] at [phone] with any questions or concerns with the monitor.      Monitor registered and applied by Merlin Vaughn

## 2022-12-06 DIAGNOSIS — A60.09 HERPES GENITALIS IN WOMEN: ICD-10-CM

## 2022-12-06 DIAGNOSIS — R00.2 PALPITATIONS: ICD-10-CM

## 2022-12-06 DIAGNOSIS — K21.9 GASTROESOPHAGEAL REFLUX DISEASE, UNSPECIFIED WHETHER ESOPHAGITIS PRESENT: ICD-10-CM

## 2022-12-06 DIAGNOSIS — K21.00 GASTROESOPHAGEAL REFLUX DISEASE WITH ESOPHAGITIS WITHOUT HEMORRHAGE: ICD-10-CM

## 2022-12-06 DIAGNOSIS — R07.9 CHEST PAIN, UNSPECIFIED TYPE: ICD-10-CM

## 2022-12-06 DIAGNOSIS — I10 ESSENTIAL HYPERTENSION: ICD-10-CM

## 2022-12-06 DIAGNOSIS — R00.2 PALPITATION: ICD-10-CM

## 2022-12-06 DIAGNOSIS — R07.2 PRECORDIAL PAIN: ICD-10-CM

## 2022-12-06 LAB
HCT VFR BLD CALC: 42.6 % (ref 36–48)
HEMOGLOBIN: 14.1 G/DL (ref 12–16)
MCH RBC QN AUTO: 27.4 PG (ref 26–34)
MCHC RBC AUTO-ENTMCNC: 33.1 G/DL (ref 31–36)
MCV RBC AUTO: 82.9 FL (ref 80–100)
PDW BLD-RTO: 14.7 % (ref 12.4–15.4)
PLATELET # BLD: 283 K/UL (ref 135–450)
PMV BLD AUTO: 9.8 FL (ref 5–10.5)
RBC # BLD: 5.13 M/UL (ref 4–5.2)
WBC # BLD: 7.9 K/UL (ref 4–11)

## 2022-12-06 RX ORDER — VALACYCLOVIR HYDROCHLORIDE 500 MG/1
500 TABLET, FILM COATED ORAL DAILY
Qty: 30 TABLET | Refills: 5 | Status: SHIPPED | OUTPATIENT
Start: 2022-12-06 | End: 2023-06-04

## 2022-12-07 ENCOUNTER — OFFICE VISIT (OUTPATIENT)
Dept: GASTROENTEROLOGY | Age: 57
End: 2022-12-07
Payer: COMMERCIAL

## 2022-12-07 ENCOUNTER — TELEPHONE (OUTPATIENT)
Dept: CARDIOLOGY CLINIC | Age: 57
End: 2022-12-07

## 2022-12-07 VITALS
OXYGEN SATURATION: 99 % | BODY MASS INDEX: 26.24 KG/M2 | SYSTOLIC BLOOD PRESSURE: 136 MMHG | TEMPERATURE: 97 F | WEIGHT: 167.2 LBS | DIASTOLIC BLOOD PRESSURE: 86 MMHG | HEART RATE: 71 BPM | HEIGHT: 67 IN

## 2022-12-07 DIAGNOSIS — E78.2 MIXED HYPERLIPIDEMIA: ICD-10-CM

## 2022-12-07 DIAGNOSIS — Z98.84 S/P LAPAROSCOPIC SLEEVE GASTRECTOMY: ICD-10-CM

## 2022-12-07 DIAGNOSIS — R10.13 EPIGASTRIC ABDOMINAL PAIN: Primary | ICD-10-CM

## 2022-12-07 DIAGNOSIS — K21.9 GASTROESOPHAGEAL REFLUX DISEASE, UNSPECIFIED WHETHER ESOPHAGITIS PRESENT: ICD-10-CM

## 2022-12-07 DIAGNOSIS — A60.09 HERPES GENITALIS IN WOMEN: ICD-10-CM

## 2022-12-07 LAB
A/G RATIO: 1.5 (ref 1.1–2.2)
ALBUMIN SERPL-MCNC: 4.5 G/DL (ref 3.4–5)
ALP BLD-CCNC: 77 U/L (ref 40–129)
ALT SERPL-CCNC: 13 U/L (ref 10–40)
ANION GAP SERPL CALCULATED.3IONS-SCNC: 14 MMOL/L (ref 3–16)
AST SERPL-CCNC: 16 U/L (ref 15–37)
BILIRUB SERPL-MCNC: 0.6 MG/DL (ref 0–1)
BUN BLDV-MCNC: 12 MG/DL (ref 7–20)
CALCIUM SERPL-MCNC: 9.6 MG/DL (ref 8.3–10.6)
CHLORIDE BLD-SCNC: 101 MMOL/L (ref 99–110)
CHOLESTEROL, TOTAL: 258 MG/DL (ref 0–199)
CO2: 26 MMOL/L (ref 21–32)
CREAT SERPL-MCNC: 0.7 MG/DL (ref 0.6–1.1)
GFR SERPL CREATININE-BSD FRML MDRD: >60 ML/MIN/{1.73_M2}
GLUCOSE BLD-MCNC: 119 MG/DL (ref 70–99)
HDLC SERPL-MCNC: 77 MG/DL (ref 40–60)
LDL CHOLESTEROL CALCULATED: 159 MG/DL
POTASSIUM SERPL-SCNC: 4.1 MMOL/L (ref 3.5–5.1)
SODIUM BLD-SCNC: 141 MMOL/L (ref 136–145)
TOTAL PROTEIN: 7.6 G/DL (ref 6.4–8.2)
TRIGL SERPL-MCNC: 108 MG/DL (ref 0–150)
TSH REFLEX: 1.07 UIU/ML (ref 0.27–4.2)
VLDLC SERPL CALC-MCNC: 22 MG/DL

## 2022-12-07 PROCEDURE — G8427 DOCREV CUR MEDS BY ELIG CLIN: HCPCS | Performed by: INTERNAL MEDICINE

## 2022-12-07 PROCEDURE — 1036F TOBACCO NON-USER: CPT | Performed by: INTERNAL MEDICINE

## 2022-12-07 PROCEDURE — G8417 CALC BMI ABV UP PARAM F/U: HCPCS | Performed by: INTERNAL MEDICINE

## 2022-12-07 PROCEDURE — G8484 FLU IMMUNIZE NO ADMIN: HCPCS | Performed by: INTERNAL MEDICINE

## 2022-12-07 PROCEDURE — 3074F SYST BP LT 130 MM HG: CPT | Performed by: INTERNAL MEDICINE

## 2022-12-07 PROCEDURE — 3078F DIAST BP <80 MM HG: CPT | Performed by: INTERNAL MEDICINE

## 2022-12-07 PROCEDURE — 99204 OFFICE O/P NEW MOD 45 MIN: CPT | Performed by: INTERNAL MEDICINE

## 2022-12-07 PROCEDURE — 3017F COLORECTAL CA SCREEN DOC REV: CPT | Performed by: INTERNAL MEDICINE

## 2022-12-07 RX ORDER — ATORVASTATIN CALCIUM 80 MG/1
80 TABLET, FILM COATED ORAL DAILY
Qty: 90 TABLET | Refills: 3 | Status: CANCELLED | OUTPATIENT
Start: 2022-12-07

## 2022-12-07 RX ORDER — CHLORHEXIDINE GLUCONATE 0.12 MG/ML
RINSE ORAL
COMMUNITY
Start: 2022-09-28

## 2022-12-07 RX ORDER — SUCRALFATE 1 G/1
1 TABLET ORAL 4 TIMES DAILY
Qty: 120 TABLET | Refills: 3 | Status: SHIPPED | OUTPATIENT
Start: 2022-12-07

## 2022-12-07 RX ORDER — ATORVASTATIN CALCIUM 80 MG/1
80 TABLET, FILM COATED ORAL DAILY
Qty: 90 TABLET | Refills: 3 | Status: SHIPPED | OUTPATIENT
Start: 2022-12-07

## 2022-12-07 RX ORDER — CLOBETASOL PROPIONATE 0.5 MG/G
OINTMENT TOPICAL
COMMUNITY
Start: 2022-09-14

## 2022-12-07 RX ORDER — AMMONIUM LACTATE 12 G/100G
LOTION TOPICAL
COMMUNITY
Start: 2022-09-14

## 2022-12-07 NOTE — TELEPHONE ENCOUNTER
----- Message from Yared Hernandez MD sent at 12/7/2022  9:00 AM EST -----  LDL is high , is she taking her lipitor 80 Mg ? If she is not restart it   If she is compliant and taking it then add zetia 10 mg daily     Spoke to pt about test results and if she is taking her lipitor 80mg. She stated she takes it when she thinks about it. She also asked for a new prescription to be called in.  She will follow up with appt on 01/09/23 at 1015am.

## 2022-12-07 NOTE — H&P (VIEW-ONLY)
[address] [facility]             MD Chrissy Dorado MD Estelle Bon, APRN-CNP             [address] [place], [address] [facility]             [phone] fax [phone]        Gastroenterology Clinic Consultation    Chrissy Hogan MD  Encounter Date: 12/07/22     CC: Gastroesophageal Reflux and Chest Pain       Milan Blackmon MD  [id number],  [address] [facility]     History obtained from: patient, medical records     Subjective:       Yusufkayce Patel is an 56 y. o.  female with past medical history of type 2 diabetes, GERD, COPD, status post laparoscopic sleeve gastrectomy, abdominoplasty, panniculectomy who presents for Gastroesophageal Reflux and Chest Pain    Patient was recently evaluated on 29 November by her cardiologist where she had complained about epigastric pain and heartburn after p.o. intake. Pain is severe 8 out of 10. LFTs noted to be unremarkable, electrolytes, kidney function unremarkable. CBC within normal limits. Last colonoscopy done in July 2020 noted to be within normal limits. Small polyp described however biopsy was nondiagnostic. Currently the patient mentions that her pain has improved a little bit since her medications were increased to Prilosec twice daily and the Carafate by her cardiologist Dr. Randy Purcell.   She is also following up with Dr. Kandis Louie in bariatric surgery and has been referred to [facility] as well    Patient Active Problem List   Diagnosis    DM (diabetes mellitus) ([facility]    Essential hypertension    Chronic pain syndrome    COPD, severe ([address]    Gastroesophageal reflux disease with esophagitis    History of MRSA infection    Anxiety    Chronic right-sided low back pain with sciatica    Hiatal hernia    S/P laparoscopic sleeve gastrectomy    Abdominal pannus    S/P abdominoplasty    Fibromyalgia    Sebaceous cyst of breast, right    Mobitz type 1 second degree AV block    Polyp of cecum    Adenomatous polyp of ascending colon    Chronic edema BLE    Mixed hyperlipidemia    Neuropathy    Umbilical pain    Lower abdominal pain    S/P panniculectomy    Latent tuberculosis    Gastroesophageal reflux disease    Vitamin D deficiency    Psoriasis    Pannus, abdominal    Herpes genitalis in women    Admission for therapeutic drug monitoring    URIEL (obstructive sleep apnea)    Hypersomnia    Ex-smoker    Hot flashes    Chest pain    Palpitations    Epigastric abdominal pain         Past Medical History:   Diagnosis Date    Anxiety     Arthritis     \"Back, Hands, Shoulders\"    Chronic back pain     Arthritis - NKI    COPD (chronic obstructive pulmonary disease) ([facility]     Sees Dr. Marquis Rubén RUFFID-19     Depression     Follows with Mental Health    Diabetes mellitus ([facility] Dx 2008    Type II - follows with PCP    Emphysema     Fibromyalgia     H/O abdominoplasty 12/21/2018    H/O cardiovascular stress test 03/04/2019    ECG portion of stress test is neg for ischemia by diagnostic criteria. No infarct or ischemia noted. Normal stress myocardial perfusion. This is a normal study    H/O echocardiogram 03/04/2019    Left ventricular function is normal. Ejection fraction is visually estimated at 55-60%. No significant valvular abnormalitites. H/O tilt table evaluation 08/26/2021    Orthostatic syncope.     Hot flashes     Hyperlipidemia     Hypertension     Follows with PCP    Migraines Last Migraine 7/20/21    Mobitz (type) I (Wenckebach's) atrioventricular block 01/29/2020    Neuropathy     From feet to knees    Night sweat     Piriformis syndrome 06/24/2016    Psoriasis     Rotator cuff impingement syndrome, right 02/26/2018    RTC repair Dr Elma Cuevas 2015  Treated by Dr Mare Saibllon 2/18/17  Arthrocentesis 12/19/17    Shortness of breath on exertion     Syncope and collapse 03/04/2019    Teeth missing     Upper And Lower    Wears glasses         Past Surgical History:   Procedure Laterality Date ABDOMEN SURGERY N/A 2019    SECONDARY CLOSURE OF DEHISCED ABDOMINAL WOUND performed by Anish Barrera MD at [address] N/A 2019    IRRIGATION OF LOWER ABDOMINAL WOUND performed by Anish Barrera MD at [facility] OR    ABDOMINOPLASTY N/A 2019    ABDOMINOPLASTY REVISION performed by Anish Barrera MD at [address] Right 2020    EXCISION OF RIGHT BREAST SABACEOUS CYST performed by Kelby Zayas MD at [address] Right 2017    Dr. Paulette Khan (Right)    [address]    Tubal Ligation Also Done In     COLONOSCOPY  2017    Polyp Removed - Dr. Oneil Ivan    COLONOSCOPY N/A 2020    COLONOSCOPY POLYPECTOMY SNARE/COLD BIOPSY performed by Anish Barrera MD at [address] [facility]      Teeth Extracted In Past    ELBOW SURGERY Bilateral Last Done In     Right Done Twice, Left Done Once left cubital tunnel release ; right 2014    ENDOSCOPY, COLON, DIAGNOSTIC  2017    HERNIA REPAIR  10/26/2017    hiatal hernia with gastrectomy    HYSTERECTOMY VAGINAL  2000    LAPAROSCOPY  2000    LIPECTOMY N/A 2021    PANNICULECTOMY performed by Anish Barrera MD at [address] [facility] N/A 2018    ABDOMINOPLASTY performed by Anish Barrera MD at [address] Right     Dr. Fausto Ross Right 2017    Dr Nancy Auguste  10/26/2017    Robotic Laparoscopic, Pre Op Weight 237  Or 238 lbs.     TUBAL LIGATION      Done With         Family History   Problem Relation Age of Onset    Heart Attack Father     Heart Disease Father     Early Death Father 46        Heart Attack    Alcohol Abuse Father     Substance Abuse Father         Alcoholic    Arthritis Mother     Heart Disease Mother     Diabetes Mother     Cancer Mother         \"Kidney Cancer\"    High Blood Pressure Mother     High Blood Pressure Sister     Diabetes Brother     Early Death Daughter 21        \"Killed In A Car Accident\"    Ovarian Cancer Neg Hx     Breast Cancer Neg Hx         Social History     Socioeconomic History    Marital status: Single     Spouse name: Not on file    Number of children: Not on file    Years of education: Not on file    Highest education level: Not on file   Occupational History    Occupation: unemployed   Tobacco Use    Smoking status: Former     Packs/day: 0.25     Years: 30.00     Pack years: 7.50     Types: Cigarettes, E-Cigarettes     Start date:      Quit date:      Years since quittin.9    Smokeless tobacco: Never   Vaping Use    Vaping Use: Never used   Substance and Sexual Activity    Alcohol use: Yes     Comment: \"Occ. Maybe Twice A Month\"  caffeine iced coffee occ    Drug use: Not on file     Comment: Marijuana Card  - last used: 23    Sexual activity: Yes     Partners: Male   Other Topics Concern    Not on file   Social History Narrative    Not on file     Social Determinants of Health     Financial Resource Strain: Low Risk     Difficulty of Paying Living Expenses: Not hard at all   Food Insecurity: No Food Insecurity    Worried About Running Out of Food in the Last Year: Never true    [address] in the Last Year: Never true   Transportation Needs: No Transportation Needs    Lack of Transportation (Medical): No    Lack of Transportation (Non-Medical): No   Physical Activity: Not on file   Stress: Not on file   Social Connections: Not on file   Intimate Partner Violence: Not on file   Housing Stability: Unknown    Unable to Pay for Housing in the Last Year: No    Number of Places Lived in the Last Year: Not on file    Unstable Housing in the Last Year: No        Social History     Social History Narrative    Not on file           Review of Systems  Reviewed all 14 systems with patient/family member. Pertinent items in HPI.      Medications:    Current Outpatient Medications: atorvastatin (LIPITOR) 80 MG tablet, Take 1 tablet by mouth daily, Disp: 90 tablet, Rfl: 3    ammonium lactate (LAC-HYDRIN) 12 % lotion, APPLY TO SCALY AREAS ONCE A DAY AS NEEDED, Disp: , Rfl:     chlorhexidine (PERIDEX) 0.12 % solution, RINSE WITH 1/2OZ TWICE DAILY ONLY AFTER BREAKFAST AND BEFORE BED, Disp: , Rfl:     clobetasol (TEMOVATE) 0.05 % ointment, APPLY TO PSORIASIS TWICE A DAY MONDAY - FRIDAY, NO WEEKENDS, Disp: , Rfl:     hydrocortisone 2.5 % ointment, APPLY TO PSORIASIS ONCE A DAY AS NEEDED FOR FLARES, Disp: , Rfl:     Psyllium 33 % POWD, Fiber (psyllium husk-sugar) 3.4 gram/12 gram oral powder, Disp: , Rfl:     valACYclovir (VALTREX) 500 MG tablet, Take 1 tablet by mouth daily, Disp: 30 tablet, Rfl: 5    amitriptyline (ELAVIL) 25 MG tablet, TAKE 1 TABLET AT BEDTIME, Disp: , Rfl:     omeprazole (PRILOSEC) 40 MG delayed release capsule, Take 1 capsule by mouth daily, Disp: 90 capsule, Rfl: 2    sucralfate (CARAFATE) 1 GM tablet, Take 1 tablet by mouth 4 times daily, Disp: 120 tablet, Rfl: 3    estradiol (ESTRACE) 1 MG tablet, Take 1 tablet by mouth daily, Disp: 30 tablet, Rfl: 10    hydroCHLOROthiazide (HYDRODIURIL) 25 MG tablet, Take 1 tablet by mouth daily as needed (swelling), Disp: 30 tablet, Rfl: 5    midodrine (PROAMATINE) 2.5 MG tablet, Take 1 tablet by mouth 3 times daily as needed (SBP less than 100), Disp: 90 tablet, Rfl: 3    vitamin D (ERGOCALCIFEROL) 1.25 MG (38060 UT) CAPS capsule, Take 1 capsule by mouth once a week, Disp: 12 capsule, Rfl: 1    fluticasone-vilanterol (BREO ELLIPTA) 100-25 MCG/INH AEPB inhaler, Inhale 1 puff into the lungs daily, Disp: 1 each, Rfl: 5    clotrimazole-betamethasone (LOTRISONE) 1-0.05 % cream, Apply topically 2 times daily. , Disp: 15 g, Rfl: 0    SPIRIVA RESPIMAT 2.5 MCG/ACT AERS inhaler, Inhale 2 puffs into the lungs daily, Disp: 4 g, Rfl: 5    coenzyme Q-10 100 MG capsule, Take 1 capsule by mouth daily, Disp: 90 capsule, Rfl: 3    lisinopril (PRINIVIL;ZESTRIL) 2.5 MG tablet, Take 1 tablet by mouth daily as needed (for SBP  more than 135), Disp: 30 tablet, Rfl: 3    albuterol sulfate HFA (VENTOLIN HFA) 108 (90 Base) MCG/ACT inhaler, Inhale 2 puffs into the lungs 4 times daily as needed for Wheezing, Disp: 54 g, Rfl: 5    albuterol (PROVENTIL) (2.5 MG/3ML) 0.083% nebulizer solution, Take 3 mLs by nebulization every 6 hours, Disp: 120 each, Rfl: 5    PROAIR  (90 Base) MCG/ACT inhaler, Inhale 2 puffs into the lungs every 6 hours as needed for Wheezing, Disp: 1 each, Rfl: 5    Calcium Carb-Cholecalciferol (CALCIUM-VITAMIN D) 600-400 MG-UNIT TABS, Take 1 tablet by mouth daily, Disp: 90 tablet, Rfl: 3    alogliptin (NESINA) 6.25 MG TABS tablet, TAKE 1 TABLET BY MOUTH DAILY, Disp: 90 tablet, Rfl: 3    ondansetron (ZOFRAN-ODT) 4 MG disintegrating tablet, Take 1 tablet by mouth 3 times daily as needed for Nausea or Vomiting, Disp: 21 tablet, Rfl: 2    Multiple Vitamins-Minerals (THERAPEUTIC MULTIVITAMIN-MINERALS) tablet, Take 1 tablet by mouth daily, Disp: 90 tablet, Rfl: 3    hydrOXYzine (ATARAX) 25 MG tablet, TAKE 1 TABLET EVERY 8 HOURS AS NEEDED FOR ANXIETY AVOID ALCOHOL/CAUSES DROWSINESS. Do not take with Xanax, Disp: 60 tablet, Rfl: 1    tiZANidine (ZANAFLEX) 4 MG tablet, Take 4 mg by mouth every 8 hours as needed , Disp: , Rfl:     oxyCODONE-acetaminophen (PERCOCET) 7.5-325 MG per tablet, Take 1 tablet by mouth every 8 hours as needed for Pain (Patient states takes every 6 hours). , Disp: , Rfl:      Allergies: Other     Objective:    Blood pressure 136/86, pulse 71, temperature 97 °F (36.1 °C), temperature source Infrared, height 5' 7\" (1.702 m), weight 167 lb 3.2 oz (75.8 kg), SpO2 99 %, not currently breastfeeding. General appearance: alert, cooperative, no distress, appears stated age  Head: Normocephalic, without obvious abnormality, atraumatic  Eyes: conjunctivae/corneas clear. EOM's intact.    Nose: Nares normal. No discharge  Throat: Lips, mucosa, and tongue normal. Teeth and gums normal  Neck: supple, symmetrical, trachea midline and no adenopathy  Back: symmetric, no curvature. No CVA tenderness. , no kyphosis present, no scoliosis present  Lungs: clear to auscultation bilaterally, no wheezes, rales, or ronchi, normal respiratory effort  Heart: regular rate and rhythm, S1, S2 normal, no murmur, click, rub or gallop  Abdomen: soft, tender to palpation in the epigastric region, no masses,  no hepatospleenomegally  Extremities: extremities normal, atraumatic, no cyanosis or edema  Skin: Skin color, texture, turgor normal. No rashes or lesions  Neurologic: Non focal, speech clear,   Psychiatry: Mood appropriate, no evidence of psychosis        Labs: Reviewed last labs/outside records as mentioned in HPI        Assessment and Plan:  Shamar Delaney was seen today for gastroesophageal reflux and chest pain. Diagnoses and all orders for this visit:    Epigastric abdominal pain    Gastroesophageal reflux disease, unspecified whether esophagitis present    S/P laparoscopic sleeve gastrectomy     Diagnosis Orders   1. Epigastric abdominal pain        2. Gastroesophageal reflux disease, unspecified whether esophagitis present        3. S/P laparoscopic sleeve gastrectomy          No orders of the defined types were placed in this encounter. #1 epigastric abdominal pain  Worse with p.o. intake, history of laparoscopic sleeve gastrectomy. Possible etiologies include peptic ulcer disease versus gastroparesis (pain medicine use)  Agree with increase PPI use  Discussed to crush Carafate and 5 cc of water to make into a slurry and to  from other medications  Upper GI  Plan for EGD with possible dilation. #2 GERD  Continue PPI  Plan for EGD  Discussed with the patient that they should eat small frequent meals, discussed about making a conscious effort of eating 1/4-1/2 of his portion and then eating the rest 2 to 3 hours later.   Also instructed to sit upright and eat his meals. Also recommended weight loss and healthy habits. Also instructed to avoid eating 3 to 4 hours prior to sleeping. #3 status post laparoscopic sleeve gastrectomy     No follow-ups on file.     Jessica Mancilla MD 12/7/2022 4:01 PM     Time of note may not reflect time of encounter     CC: Referring MD

## 2022-12-07 NOTE — PROGRESS NOTES
[address] [facility] Gastroenterology and Hepatology             MD Steffany Patel MD             Sara , APRN-CNP             [address] [facility] [place], [address] [facility]             [phone] fax [phone]        Gastroenterology Clinic Consultation    Steffany Talavera MD  Encounter Date: 12/07/22     CC: Gastroesophageal Reflux and Chest Pain       Joan Moseley MD  [address],  [address] [facility]     History obtained from: patient, medical records     Subjective:       Yung Olivera is an 56 y. o.  female with past medical history of type 2 diabetes, GERD, COPD, status post laparoscopic sleeve gastrectomy, abdominoplasty, panniculectomy who presents for Gastroesophageal Reflux and Chest Pain    Patient was recently evaluated on 29 November by her cardiologist where she had complained about epigastric pain and heartburn after p.o. intake. Pain is severe 8 out of 10. LFTs noted to be unremarkable, electrolytes, kidney function unremarkable. CBC within normal limits. Last colonoscopy done in July 2020 noted to be within normal limits. Small polyp described however biopsy was nondiagnostic. Currently the patient mentions that her pain has improved a little bit since her medications were increased to Prilosec twice daily and the Carafate by her cardiologist Dr. Yanira Owen.   She is also following up with Dr. Maikol Mauricio in bariatric surgery and has been referred to [place] as well    Patient Active Problem List   Diagnosis    DM (diabetes mellitus) ([facility] [address]    Essential hypertension    Chronic pain syndrome    COPD, severe ([facility] [address]    Gastroesophageal reflux disease with esophagitis    History of MRSA infection    Anxiety    Chronic right-sided low back pain with sciatica    Hiatal hernia    S/P laparoscopic sleeve gastrectomy    Abdominal pannus    S/P abdominoplasty    Fibromyalgia    Sebaceous cyst of breast, right    Mobitz type 1 second degree AV block    Polyp of cecum    Adenomatous polyp of ascending colon    Chronic edema BLE    Mixed hyperlipidemia    Neuropathy    Umbilical pain    Lower abdominal pain    S/P panniculectomy    Latent tuberculosis    Gastroesophageal reflux disease    Vitamin D deficiency    Psoriasis    Pannus, abdominal    Herpes genitalis in women    Admission for therapeutic drug monitoring    URIEL (obstructive sleep apnea)    Hypersomnia    Ex-smoker    Hot flashes    Chest pain    Palpitations    Epigastric abdominal pain         Past Medical History:   Diagnosis Date    Anxiety     Arthritis     \"Back, Hands, Shoulders\"    Chronic back pain     Arthritis - NKI    COPD (chronic obstructive pulmonary disease) ([facility] [address]     Sees Dr. Loretta Jeffries    COVID-19     Depression     Follows with Mental Health    Diabetes mellitus ([facility] [address] Dx 2008    Type II - follows with PCP    Emphysema     Fibromyalgia     H/O abdominoplasty 12/21/2018    H/O cardiovascular stress test 03/04/2019    ECG portion of stress test is neg for ischemia by diagnostic criteria. No infarct or ischemia noted. Normal stress myocardial perfusion. This is a normal study    H/O echocardiogram 03/04/2019    Left ventricular function is normal. Ejection fraction is visually estimated at 55-60%. No significant valvular abnormalitites. H/O tilt table evaluation 08/26/2021    Orthostatic syncope.     Hot flashes     Hyperlipidemia     Hypertension     Follows with PCP    Migraines Last Migraine 7/20/21    Mobitz (type) I (Wenckebach's) atrioventricular block 01/29/2020    Neuropathy     From feet to knees    Night sweat     Piriformis syndrome 06/24/2016    Psoriasis     Rotator cuff impingement syndrome, right 02/26/2018    RTC repair Dr Jaxson Hernandez 2015  Treated by Dr Trevor Tejada 2/18/17  Arthrocentesis 12/19/17    Shortness of breath on exertion     Syncope and collapse 03/04/2019    Teeth missing     Upper And Lower    Wears glasses         Past Surgical History:   Procedure Laterality Date ABDOMEN SURGERY N/A 2019    SECONDARY CLOSURE OF DEHISCED ABDOMINAL WOUND performed by Delores Dodge MD at [address] N/A 2019    IRRIGATION OF LOWER ABDOMINAL WOUND performed by Delores Dodge MD at [facility] OR    ABDOMINOPLASTY N/A 2019    ABDOMINOPLASTY REVISION performed by Delores Dodge MD at [address] Right 2020    EXCISION OF RIGHT BREAST SABACEOUS CYST performed by Jossy De Souza MD at [address] Right 2017    Dr. Dayron Moore (Right)    [address]    Tubal Ligation Also Done In     COLONOSCOPY  2017    Polyp Removed - Dr. Destiney Daly    COLONOSCOPY N/A 2020    COLONOSCOPY POLYPECTOMY SNARE/COLD BIOPSY performed by Delores Dodge MD at [address]      Teeth Extracted In Past    ELBOW SURGERY Bilateral Last Done In     Right Done Twice, Left Done Once left cubital tunnel release ; right 2014    ENDOSCOPY, COLON, DIAGNOSTIC  2017    HERNIA REPAIR  10/26/2017    hiatal hernia with gastrectomy    HYSTERECTOMY VAGINAL  2000    LAPAROSCOPY  2000    LIPECTOMY N/A 2021    PANNICULECTOMY performed by Delores Dodge MD at [address] [facility] N/A 2018    ABDOMINOPLASTY performed by Delores Dodge MD at [address] Right     Dr. Shola Tello Right 2017    Dr Wagner Sheridan  10/26/2017    Robotic Laparoscopic, Pre Op Weight 237  Or 238 lbs.     TUBAL LIGATION      Done With         Family History   Problem Relation Age of Onset    Heart Attack Father     Heart Disease Father     Early Death Father 46        Heart Attack    Alcohol Abuse Father     Substance Abuse Father         Alcoholic    Arthritis Mother     Heart Disease Mother     Diabetes Mother     Cancer Mother         \"Kidney Cancer\"    High Blood Pressure Mother     High Blood Pressure Sister     Diabetes Brother     Early Death Daughter 21        \"Killed In A Car Accident\"    Ovarian Cancer Neg Hx     Breast Cancer Neg Hx         Social History     Socioeconomic History    Marital status: Single     Spouse name: Not on file    Number of children: Not on file    Years of education: Not on file    Highest education level: Not on file   Occupational History    Occupation: unemployed   Tobacco Use    Smoking status: Former     Packs/day: 0.25     Years: 30.00     Pack years: 7.50     Types: Cigarettes, E-Cigarettes     Start date:      Quit date:      Years since quittin.9    Smokeless tobacco: Never   Vaping Use    Vaping Use: Never used   Substance and Sexual Activity    Alcohol use: Yes     Comment: \"Occ. Maybe Twice A Month\"  caffeine iced coffee occ    Drug use: Not on file     Comment: Marijuana Card  - last used: 23    Sexual activity: Yes     Partners: Male   Other Topics Concern    Not on file   Social History Narrative    Not on file     Social Determinants of Health     Financial Resource Strain: Low Risk     Difficulty of Paying Living Expenses: Not hard at all   Food Insecurity: No Food Insecurity    Worried About Running Out of Food in the Last Year: Never true    [address] in the Last Year: Never true   Transportation Needs: No Transportation Needs    Lack of Transportation (Medical): No    Lack of Transportation (Non-Medical): No   Physical Activity: Not on file   Stress: Not on file   Social Connections: Not on file   Intimate Partner Violence: Not on file   Housing Stability: Unknown    Unable to Pay for Housing in the Last Year: No    Number of Places Lived in the Last Year: Not on file    Unstable Housing in the Last Year: No        Social History     Social History Narrative    Not on file           Review of Systems  Reviewed all 14 systems with patient/family member. Pertinent items in HPI.      Medications:    Current Outpatient Medications: atorvastatin (LIPITOR) 80 MG tablet, Take 1 tablet by mouth daily, Disp: 90 tablet, Rfl: 3    ammonium lactate (LAC-HYDRIN) 12 % lotion, APPLY TO SCALY AREAS ONCE A DAY AS NEEDED, Disp: , Rfl:     chlorhexidine (PERIDEX) 0.12 % solution, RINSE WITH 1/2OZ TWICE DAILY ONLY AFTER BREAKFAST AND BEFORE BED, Disp: , Rfl:     clobetasol (TEMOVATE) 0.05 % ointment, APPLY TO PSORIASIS TWICE A DAY MONDAY - FRIDAY, NO WEEKENDS, Disp: , Rfl:     hydrocortisone 2.5 % ointment, APPLY TO PSORIASIS ONCE A DAY AS NEEDED FOR FLARES, Disp: , Rfl:     Psyllium 33 % POWD, Fiber (psyllium husk-sugar) 3.4 gram/12 gram oral powder, Disp: , Rfl:     valACYclovir (VALTREX) 500 MG tablet, Take 1 tablet by mouth daily, Disp: 30 tablet, Rfl: 5    amitriptyline (ELAVIL) 25 MG tablet, TAKE 1 TABLET AT BEDTIME, Disp: , Rfl:     omeprazole (PRILOSEC) 40 MG delayed release capsule, Take 1 capsule by mouth daily, Disp: 90 capsule, Rfl: 2    sucralfate (CARAFATE) 1 GM tablet, Take 1 tablet by mouth 4 times daily, Disp: 120 tablet, Rfl: 3    estradiol (ESTRACE) 1 MG tablet, Take 1 tablet by mouth daily, Disp: 30 tablet, Rfl: 10    hydroCHLOROthiazide (HYDRODIURIL) 25 MG tablet, Take 1 tablet by mouth daily as needed (swelling), Disp: 30 tablet, Rfl: 5    midodrine (PROAMATINE) 2.5 MG tablet, Take 1 tablet by mouth 3 times daily as needed (SBP less than 100), Disp: 90 tablet, Rfl: 3    vitamin D (ERGOCALCIFEROL) 1.25 MG (55090 UT) CAPS capsule, Take 1 capsule by mouth once a week, Disp: 12 capsule, Rfl: 1    fluticasone-vilanterol (BREO ELLIPTA) 100-25 MCG/INH AEPB inhaler, Inhale 1 puff into the lungs daily, Disp: 1 each, Rfl: 5    clotrimazole-betamethasone (LOTRISONE) 1-0.05 % cream, Apply topically 2 times daily. , Disp: 15 g, Rfl: 0    SPIRIVA RESPIMAT 2.5 MCG/ACT AERS inhaler, Inhale 2 puffs into the lungs daily, Disp: 4 g, Rfl: 5    coenzyme Q-10 100 MG capsule, Take 1 capsule by mouth daily, Disp: 90 capsule, Rfl: 3    lisinopril (PRINIVIL;ZESTRIL) 2.5 MG tablet, Take 1 tablet by mouth daily as needed (for SBP  more than 135), Disp: 30 tablet, Rfl: 3    albuterol sulfate HFA (VENTOLIN HFA) 108 (90 Base) MCG/ACT inhaler, Inhale 2 puffs into the lungs 4 times daily as needed for Wheezing, Disp: 54 g, Rfl: 5    albuterol (PROVENTIL) (2.5 MG/3ML) 0.083% nebulizer solution, Take 3 mLs by nebulization every 6 hours, Disp: 120 each, Rfl: 5    PROAIR  (90 Base) MCG/ACT inhaler, Inhale 2 puffs into the lungs every 6 hours as needed for Wheezing, Disp: 1 each, Rfl: 5    Calcium Carb-Cholecalciferol (CALCIUM-VITAMIN D) 600-400 MG-UNIT TABS, Take 1 tablet by mouth daily, Disp: 90 tablet, Rfl: 3    alogliptin (NESINA) 6.25 MG TABS tablet, TAKE 1 TABLET BY MOUTH DAILY, Disp: 90 tablet, Rfl: 3    ondansetron (ZOFRAN-ODT) 4 MG disintegrating tablet, Take 1 tablet by mouth 3 times daily as needed for Nausea or Vomiting, Disp: 21 tablet, Rfl: 2    Multiple Vitamins-Minerals (THERAPEUTIC MULTIVITAMIN-MINERALS) tablet, Take 1 tablet by mouth daily, Disp: 90 tablet, Rfl: 3    hydrOXYzine (ATARAX) 25 MG tablet, TAKE 1 TABLET EVERY 8 HOURS AS NEEDED FOR ANXIETY AVOID ALCOHOL/CAUSES DROWSINESS. Do not take with Xanax, Disp: 60 tablet, Rfl: 1    tiZANidine (ZANAFLEX) 4 MG tablet, Take 4 mg by mouth every 8 hours as needed , Disp: , Rfl:     oxyCODONE-acetaminophen (PERCOCET) 7.5-325 MG per tablet, Take 1 tablet by mouth every 8 hours as needed for Pain (Patient states takes every 6 hours). , Disp: , Rfl:      Allergies: Other     Objective:    Blood pressure 136/86, pulse 71, temperature 97 °F (36.1 °C), temperature source Infrared, height 5' 7\" (1.702 m), weight 167 lb 3.2 oz (75.8 kg), SpO2 99 %, not currently breastfeeding. General appearance: alert, cooperative, no distress, appears stated age  Head: Normocephalic, without obvious abnormality, atraumatic  Eyes: conjunctivae/corneas clear. EOM's intact.    Nose: Nares normal. No discharge  Throat: Lips, mucosa, and tongue normal. Teeth and gums normal  Neck: supple, symmetrical, trachea midline and no adenopathy  Back: symmetric, no curvature. No CVA tenderness. , no kyphosis present, no scoliosis present  Lungs: clear to auscultation bilaterally, no wheezes, rales, or ronchi, normal respiratory effort  Heart: regular rate and rhythm, S1, S2 normal, no murmur, click, rub or gallop  Abdomen: soft, tender to palpation in the epigastric region, no masses,  no hepatospleenomegally  Extremities: extremities normal, atraumatic, no cyanosis or edema  Skin: Skin color, texture, turgor normal. No rashes or lesions  Neurologic: Non focal, speech clear,   Psychiatry: Mood appropriate, no evidence of psychosis        Labs: Reviewed last labs/outside records as mentioned in HPI        Assessment and Plan:  Jocelyn Cyr was seen today for gastroesophageal reflux and chest pain. Diagnoses and all orders for this visit:    Epigastric abdominal pain    Gastroesophageal reflux disease, unspecified whether esophagitis present    S/P laparoscopic sleeve gastrectomy     Diagnosis Orders   1. Epigastric abdominal pain        2. Gastroesophageal reflux disease, unspecified whether esophagitis present        3. S/P laparoscopic sleeve gastrectomy          No orders of the defined types were placed in this encounter. #1 epigastric abdominal pain  Worse with p.o. intake, history of laparoscopic sleeve gastrectomy. Possible etiologies include peptic ulcer disease versus gastroparesis (pain medicine use)  Agree with increase PPI use  Discussed to crush Carafate and 5 cc of water to make into a slurry and to  from other medications  Upper GI  Plan for EGD with possible dilation. #2 GERD  Continue PPI  Plan for EGD  Discussed with the patient that they should eat small frequent meals, discussed about making a conscious effort of eating 1/4-1/2 of his portion and then eating the rest 2 to 3 hours later.   Also instructed to sit upright and eat his meals. Also recommended weight loss and healthy habits. Also instructed to avoid eating 3 to 4 hours prior to sleeping. #3 status post laparoscopic sleeve gastrectomy     No follow-ups on file.     Dre Lomax MD 12/7/2022 4:01 PM     Time of note may not reflect time of encounter     CC: Referring MD

## 2022-12-08 ENCOUNTER — TELEPHONE (OUTPATIENT)
Dept: CARDIOLOGY CLINIC | Age: 57
End: 2022-12-08

## 2022-12-08 NOTE — TELEPHONE ENCOUNTER
Cardiologist: Dr. Cheryl Chester  Surgeon: Dr. Elmira Dudley  Surgery: EGD DILATION BALLOON  Anesthesia: Propofol  Date: 1/5/2023  FAX# [phone]  # [phone]    Last OV 11/29/2022 w/Emiliano      Chest pain  Every time she eats she gest palpitations and has chest pain and pain on left side and goes to back , she is s/p gastrtic bypass surgery will refer to gastroenterology for further work-up we will also get a stress test  .Omeprazole twice a day as well as use sucralfate use nitro as needed        Mobitz type 1 second degree AV block  Continue to monitor she is not symptomatic no further intervention at this time. Echo shows preserved EF normal stress test in 2019  Due to ongoing palpitations get 48-hour Holter monitor     SOB (shortness of breath)  Status post gastric bypass surgery multifactorial echo shows no abnormality she uses Lasix as needed basis when she notices ankle swelling I do not think she is in heart failure      Dyslipidemia :  All available lab work was reviewed. Patient was advised to repeat lab work before next visit. Necessary orders were placed , instructions given by myself   all labs as well as lipid panel        Last EKG- 11/29/2022        Stress Test- 3/4/2019  ECG portion of stress test is negative for ischemia by diagnostic criteria. No infarct or ischemia noted. Normal EF 79 % with normal ventricular contractility. Normal stress myocardial perfusion. This is a normal study. Echo- 3/4/2019   technically difficult study   Left ventricular function is normal.   Ejection fraction is visually estimated at 55-60%. No significant valvular abnormalities.

## 2022-12-09 ENCOUNTER — PROCEDURE VISIT (OUTPATIENT)
Dept: CARDIOLOGY CLINIC | Age: 57
End: 2022-12-09

## 2022-12-09 VITALS
BODY MASS INDEX: 26.21 KG/M2 | DIASTOLIC BLOOD PRESSURE: 80 MMHG | HEIGHT: 67 IN | HEART RATE: 71 BPM | WEIGHT: 167 LBS | SYSTOLIC BLOOD PRESSURE: 120 MMHG

## 2022-12-09 DIAGNOSIS — K21.00 GASTROESOPHAGEAL REFLUX DISEASE WITH ESOPHAGITIS WITHOUT HEMORRHAGE: ICD-10-CM

## 2022-12-09 DIAGNOSIS — Z82.49 FAMILY HISTORY OF CORONARY ARTERY DISEASE: ICD-10-CM

## 2022-12-09 DIAGNOSIS — R00.0 TACHYCARDIA: ICD-10-CM

## 2022-12-09 DIAGNOSIS — R07.9 CHEST PAIN, UNSPECIFIED TYPE: ICD-10-CM

## 2022-12-09 DIAGNOSIS — R07.2 PRECORDIAL PAIN: Primary | ICD-10-CM

## 2022-12-09 DIAGNOSIS — I10 ESSENTIAL HYPERTENSION: ICD-10-CM

## 2022-12-09 DIAGNOSIS — R00.2 PALPITATION: ICD-10-CM

## 2022-12-09 DIAGNOSIS — K21.9 GASTROESOPHAGEAL REFLUX DISEASE, UNSPECIFIED WHETHER ESOPHAGITIS PRESENT: ICD-10-CM

## 2022-12-09 DIAGNOSIS — R00.2 PALPITATIONS: ICD-10-CM

## 2022-12-09 DIAGNOSIS — R94.31 ABNORMAL EKG: ICD-10-CM

## 2022-12-15 PROCEDURE — 93227 XTRNL ECG REC<48 HR R&I: CPT | Performed by: INTERNAL MEDICINE

## 2022-12-19 ENCOUNTER — TELEPHONE (OUTPATIENT)
Dept: CARDIOLOGY CLINIC | Age: 57
End: 2022-12-19

## 2022-12-19 NOTE — TELEPHONE ENCOUNTER
Pt just took BP meds and it is running 180/18, advised to call in about an hour to report BP     After 1 hr BP is running 180/114  States she doubles her BP meds on Saturday when it was increased.    Lisinopril 2.5 as needed

## 2022-12-28 ENCOUNTER — TELEPHONE (OUTPATIENT)
Dept: GASTROENTEROLOGY | Age: 57
End: 2022-12-28

## 2022-12-28 DIAGNOSIS — K21.9 GASTROESOPHAGEAL REFLUX DISEASE, UNSPECIFIED WHETHER ESOPHAGITIS PRESENT: ICD-10-CM

## 2022-12-28 NOTE — TELEPHONE ENCOUNTER
Patient states that she needs a new script of omeprazole. Originally written by Dr. Yvonne Turner, but patient states that you increased dosage to 2 times a day and she will soon run out.   [address]

## 2022-12-30 ENCOUNTER — HOSPITAL ENCOUNTER (OUTPATIENT)
Dept: GENERAL RADIOLOGY | Age: 57
Discharge: HOME | End: 2022-12-30
Payer: COMMERCIAL

## 2022-12-30 DIAGNOSIS — Z98.84 S/P LAPAROSCOPIC SLEEVE GASTRECTOMY: ICD-10-CM

## 2022-12-30 DIAGNOSIS — K21.9 GASTROESOPHAGEAL REFLUX DISEASE, UNSPECIFIED WHETHER ESOPHAGITIS PRESENT: ICD-10-CM

## 2022-12-30 DIAGNOSIS — R10.13 EPIGASTRIC ABDOMINAL PAIN: ICD-10-CM

## 2022-12-30 PROCEDURE — 74246 X-RAY XM UPR GI TRC 2CNTRST: CPT

## 2022-12-30 RX ORDER — SODIUM CHLORIDE, SODIUM LACTATE, POTASSIUM CHLORIDE, CALCIUM CHLORIDE 600; 310; 30; 20 MG/100ML; MG/100ML; MG/100ML; MG/100ML
INJECTION, SOLUTION INTRAVENOUS CONTINUOUS
Status: CANCELLED | OUTPATIENT
Start: 2023-01-05

## 2022-12-30 RX ORDER — SODIUM CHLORIDE, SODIUM LACTATE, POTASSIUM CHLORIDE, CALCIUM CHLORIDE 600; 310; 30; 20 MG/100ML; MG/100ML; MG/100ML; MG/100ML
INJECTION, SOLUTION INTRAVENOUS CONTINUOUS
Status: CANCELLED | OUTPATIENT
Start: 2022-12-30

## 2022-12-30 NOTE — PROGRESS NOTES
Patient will be called with an arrival time on 1/4/2023 for her procedure at [facility] on 1/5/2023.               1. Do not eat or drink anything after midnight - unless instructed by your doctor prior to surgery. This includes                   no water, chewing gum or mints. 2. Follow your directions as prescribed by the doctor for your procedure and medications. 3. Check with your Doctor regarding stopping vitamins, supplements, blood thinners and follow their instructions. Stop vitamins, supplements and NSAIDS:    4. Do not smoke, vape or use chewing tobacco morning of surgery. Do not drink any alcoholic beverages 24 hours prior to surgery. This includes NA Beer. No street drugs 7 days prior to surgery. 5. You may brush your teeth and gargle the morning of surgery. DO NOT SWALLOW WATER   6. You MUST make arrangements for a responsible adult to take you home after your surgery and be able to check on you every couple                   hours for the day. You will not be allowed to leave alone or drive yourself home. It is strongly suggested someone stay with you the first 24                   hrs. Your surgery will be cancelled if you do not have a ride home. 7. Please wear simple, loose fitting clothing to the hospital.  Kristeen Cava not bring valuables (money, credit cards, checkbooks, etc.) Do not wear any                   makeup (including no eye makeup) or nail polish on your fingers or toes. 8. DO NOT wear any jewelry or piercings on day of surgery. All body piercing jewelry must be removed. 9. If you have dentures, they will be removed before going to the OR; we will provide you a container. If you wear contact lenses or glasses,                  they will be removed; please bring a case for them. 10. If you  have a Living Will and Durable Power of  for Healthcare, please bring in a copy.            11. Please bring picture ID,  insurance card, paperwork from the doctors office    (H & P, Consent, & card for implantable devices).           12. Take a shower the morning of your procedure with Hibiclens or an anti-bacterial soap.                  Do not apply any make-up, deodorant, lotion, oil or powder.           13.  Enter thru the main entrance on the day of surgery. Patient will take a breathing treatment if she feels like she needs one that morning, and she will use her inhalers and bring her rescue inhaler with her that day. She will take her percocet and prilosec the morning of her procedure. She had no questions concerning her egd instructions at this time.

## 2023-01-03 RX ORDER — OMEPRAZOLE 40 MG/1
40 CAPSULE, DELAYED RELEASE ORAL DAILY
Qty: 90 CAPSULE | Refills: 2 | Status: SHIPPED | OUTPATIENT
Start: 2023-01-03

## 2023-01-04 ENCOUNTER — ANESTHESIA EVENT (OUTPATIENT)
Dept: ENDOSCOPY | Age: 58
End: 2023-01-04
Payer: COMMERCIAL

## 2023-01-04 RX ORDER — SODIUM CHLORIDE, SODIUM LACTATE, POTASSIUM CHLORIDE, CALCIUM CHLORIDE 600; 310; 30; 20 MG/100ML; MG/100ML; MG/100ML; MG/100ML
INJECTION, SOLUTION INTRAVENOUS CONTINUOUS
Status: CANCELLED | OUTPATIENT
Start: 2023-01-05

## 2023-01-04 NOTE — ANESTHESIA PRE PROCEDURE
Department of Anesthesiology  Preprocedure Note       Name:  Bety Rodriguez   Age:  56 y.o.  :  1965                                          MRN:  [id number]         Date:  2023      Surgeon: Oj Luevano):  Kayden Bright MD    Procedure: Procedure(s):  EGD DILATION BALLOON    Medications prior to admission:   Prior to Admission medications    Medication Sig Start Date End Date Taking? Authorizing Provider   omeprazole (PRILOSEC) 40 MG delayed release capsule Take 1 capsule by mouth daily 1/3/23   Kayden Bright MD   atorvastatin (LIPITOR) 80 MG tablet Take 1 tablet by mouth daily 22   Ronak Albright MD   ammonium lactate (LAC-HYDRIN) 12 % lotion APPLY TO SCALY AREAS ONCE A DAY AS NEEDED 22   Historical Provider, MD   chlorhexidine (PERIDEX) 0.12 % solution RINSE WITH 1/2OZ TWICE DAILY ONLY AFTER BREAKFAST AND BEFORE BED 22   Historical Provider, MD   clobetasol (TEMOVATE) 0.05 % ointment APPLY TO PSORIASIS TWICE A DAY MONDAY - [address] [place] [address], NO WEEKENDS 22   Historical Provider, MD   hydrocortisone 2.5 % ointment APPLY TO PSORIASIS ONCE A DAY AS NEEDED FOR FLARES 22   Historical Provider, MD   Psyllium 33 % POWD Fiber (psyllium husk-sugar) 3.4 gram/12 gram oral powder    Historical Provider, MD   sucralfate (CARAFATE) 1 GM tablet Take 1 tablet by mouth 4 times daily Please crush and 5 mL of water and take a slurry.   Please  out from other medication 22   Kayden Bright MD   valACYclovir (VALTREX) 500 MG tablet Take 1 tablet by mouth daily 22  Chance Hyatt MD   amitriptyline (ELAVIL) 25 MG tablet TAKE 1 TABLET AT BEDTIME 22   Historical Provider, MD   estradiol (ESTRACE) 1 MG tablet Take 1 tablet by mouth daily 22   YESSICA Walters CNP   hydroCHLOROthiazide (HYDRODIURIL) 25 MG tablet Take 1 tablet by mouth daily as needed (swelling) 10/24/22   YESSICA Warren CNP   midodrine (PROAMATINE) 2.5 MG tablet Take 1 tablet by mouth 3 times daily as needed (SBP less than 100)  Patient not taking: Reported on 12/19/2022 10/6/22   YESSICA Mullins CNP   vitamin D (ERGOCALCIFEROL) 1.25 MG (90637 UT) CAPS capsule Take 1 capsule by mouth once a week 9/15/22   Nas Meyers II, MD   fluticasone-vilanterol (BREO ELLIPTA) 100-25 MCG/INH AEPB inhaler Inhale 1 puff into the lungs daily 9/14/22 12/7/22  Caron Yeung MD   clotrimazole-betamethasone (LOTRISONE) 1-0.05 % cream Apply topically 2 times daily.  8/5/22   YESSICA Minaya CNP   SPIRIVA RESPIMAT 2.5 MCG/ACT AERS inhaler Inhale 2 puffs into the lungs daily 8/1/22   Caron Yeung MD   coenzyme Q-10 100 MG capsule Take 1 capsule by mouth daily 6/13/22   YESSICA Mullins CNP   lisinopril (PRINIVIL;ZESTRIL) 2.5 MG tablet Take 1 tablet by mouth daily as needed (for SBP  more than 135)  Patient taking differently: Take 5 mg by mouth daily as needed (for SBP  more than 135) 3/15/22   YESSICA Escalante CNP   albuterol sulfate HFA (VENTOLIN HFA) 108 (90 Base) MCG/ACT inhaler Inhale 2 puffs into the lungs 4 times daily as needed for Wheezing 3/9/22   Caron Yeung MD   albuterol (PROVENTIL) (2.5 MG/3ML) 0.083% nebulizer solution Take 3 mLs by nebulization every 6 hours 3/9/22   Caron Yeung MD   [facility] 108 (66 Base) MCG/ACT inhaler Inhale 2 puffs into the lungs every 6 hours as needed for Wheezing 1/31/22   Caron Yeung MD   Calcium Carb-Cholecalciferol (CALCIUM-VITAMIN D) 600-400 MG-UNIT TABS Take 1 tablet by mouth daily 1/17/22   YESSICA Cobb NP   alogliptin (NESINA) 6.25 MG TABS tablet TAKE 1 TABLET BY MOUTH DAILY 12/2/21 7/7/24  YESSICA Cobb NP   ondansetron (ZOFRAN-ODT) 4 MG disintegrating tablet Take 1 tablet by mouth 3 times daily as needed for Nausea or Vomiting 9/24/21   YESSICA Peter CNP   Multiple Vitamins-Minerals (THERAPEUTIC MULTIVITAMIN-MINERALS) tablet Take 1 tablet by mouth daily 3/4/21 7/7/24  YESSICA Peter CNP   hydrOXYzine (ATARAX) 25 MG tablet TAKE 1 TABLET EVERY 8 HOURS AS NEEDED FOR ANXIETY AVOID ALCOHOL/CAUSES DROWSINESS. Do not take with Xanax 10/29/20   YESSICA Camp - NP   tiZANidine (ZANAFLEX) 4 MG tablet Take 4 mg by mouth every 8 hours as needed  1/17/20   Historical Provider, MD   oxyCODONE-acetaminophen (PERCOCET) 7.5-325 MG per tablet Take 1 tablet by mouth every 8 hours as needed for Pain (Patient states takes every 6 hours). 1/17/20   Historical Provider, MD       Current medications:    No current facility-administered medications for this encounter. Current Outpatient Medications   Medication Sig Dispense Refill    omeprazole (PRILOSEC) 40 MG delayed release capsule Take 1 capsule by mouth daily 90 capsule 2    atorvastatin (LIPITOR) 80 MG tablet Take 1 tablet by mouth daily 90 tablet 3    ammonium lactate (LAC-HYDRIN) 12 % lotion APPLY TO SCALY AREAS ONCE A DAY AS NEEDED      chlorhexidine (PERIDEX) 0.12 % solution RINSE WITH 1/2OZ TWICE DAILY ONLY AFTER BREAKFAST AND BEFORE BED      clobetasol (TEMOVATE) 0.05 % ointment APPLY TO PSORIASIS TWICE A DAY MONDAY - FRIDAY, NO WEEKENDS      hydrocortisone 2.5 % ointment APPLY TO PSORIASIS ONCE A DAY AS NEEDED FOR FLARES      Psyllium 33 % POWD Fiber (psyllium husk-sugar) 3.4 gram/12 gram oral powder      sucralfate (CARAFATE) 1 GM tablet Take 1 tablet by mouth 4 times daily Please crush and 5 mL of water and take a slurry.   Please  out from other medication 120 tablet 3    valACYclovir (VALTREX) 500 MG tablet Take 1 tablet by mouth daily 30 tablet 5    amitriptyline (ELAVIL) 25 MG tablet TAKE 1 TABLET AT BEDTIME      estradiol (ESTRACE) 1 MG tablet Take 1 tablet by mouth daily 30 tablet 10    hydroCHLOROthiazide (HYDRODIURIL) 25 MG tablet Take 1 tablet by mouth daily as needed (swelling) 30 tablet 5    midodrine (PROAMATINE) 2.5 MG tablet Take 1 tablet by mouth 3 times daily as needed (SBP less than 100) (Patient not taking: Reported on 12/19/2022) 90 tablet 3    vitamin D (ERGOCALCIFEROL) 1.25 MG (15348 UT) CAPS capsule Take 1 capsule by mouth once a week 12 capsule 1    fluticasone-vilanterol (BREO ELLIPTA) 100-25 MCG/INH AEPB inhaler Inhale 1 puff into the lungs daily 1 each 5    clotrimazole-betamethasone (LOTRISONE) 1-0.05 % cream Apply topically 2 times daily. 15 g 0    SPIRIVA RESPIMAT 2.5 MCG/ACT AERS inhaler Inhale 2 puffs into the lungs daily 4 g 5    coenzyme Q-10 100 MG capsule Take 1 capsule by mouth daily 90 capsule 3    lisinopril (PRINIVIL;ZESTRIL) 2.5 MG tablet Take 1 tablet by mouth daily as needed (for SBP  more than 135) (Patient taking differently: Take 5 mg by mouth daily as needed (for SBP  more than 135)) 30 tablet 3    albuterol sulfate HFA (VENTOLIN HFA) 108 (90 Base) MCG/ACT inhaler Inhale 2 puffs into the lungs 4 times daily as needed for Wheezing 54 g 5    albuterol (PROVENTIL) (2.5 MG/3ML) 0.083% nebulizer solution Take 3 mLs by nebulization every 6 hours 120 each 5    PROAIR  (90 Base) MCG/ACT inhaler Inhale 2 puffs into the lungs every 6 hours as needed for Wheezing 1 each 5    Calcium Carb-Cholecalciferol (CALCIUM-VITAMIN D) 600-400 MG-UNIT TABS Take 1 tablet by mouth daily 90 tablet 3    alogliptin (NESINA) 6.25 MG TABS tablet TAKE 1 TABLET BY MOUTH DAILY 90 tablet 3    ondansetron (ZOFRAN-ODT) 4 MG disintegrating tablet Take 1 tablet by mouth 3 times daily as needed for Nausea or Vomiting 21 tablet 2    Multiple Vitamins-Minerals (THERAPEUTIC MULTIVITAMIN-MINERALS) tablet Take 1 tablet by mouth daily 90 tablet 3    hydrOXYzine (ATARAX) 25 MG tablet TAKE 1 TABLET EVERY 8 HOURS AS NEEDED FOR ANXIETY AVOID ALCOHOL/CAUSES DROWSINESS.   Do not take with Xanax 60 tablet 1    tiZANidine (ZANAFLEX) 4 MG tablet Take 4 mg by mouth every 8 hours as needed       oxyCODONE-acetaminophen (PERCOCET) 7.5-325 MG per tablet Take 1 tablet by mouth every 8 hours as needed for Pain (Patient states takes every 6 hours). Allergies: Allergies   Allergen Reactions    Other      Patient states the electrodes irritated her skin.        Problem List:    Patient Active Problem List   Diagnosis Code    DM (diabetes mellitus) ([facility] E11.9    Essential hypertension I10    Chronic pain syndrome G89.4    COPD, severe ([facility] J44.9    Gastroesophageal reflux disease with esophagitis K21.00    History of MRSA infection Z86.14    Anxiety F41.9    Chronic right-sided low back pain with sciatica M54.40, G89.29    Hiatal hernia K44.9    S/P laparoscopic sleeve gastrectomy Z98.84    Abdominal pannus E65    S/P abdominoplasty Z98.890    Fibromyalgia M79.7    Sebaceous cyst of breast, right N60.81    Mobitz type 1 second degree AV block I44.1    Polyp of cecum K63.5    Adenomatous polyp of ascending colon D12.2    Chronic edema BLE R60.9    Mixed hyperlipidemia E78.2    Neuropathy N09.4    Umbilical pain U91.16    Lower abdominal pain R10.30    S/P panniculectomy Z98.890    Latent tuberculosis Z22.7    Gastroesophageal reflux disease K21.9    Vitamin D deficiency E55.9    Psoriasis L40.9    Pannus, abdominal E65    Herpes genitalis in women A60.09    Admission for therapeutic drug monitoring Z51.81    URIEL (obstructive sleep apnea) G47.33    Hypersomnia G47.10    Ex-smoker Z87.891    Hot flashes R23.2    Chest pain R07.9    Palpitations R00.2    Epigastric abdominal pain R10.13       Past Medical History:        Diagnosis Date    Anxiety     Arthritis     \"Back, Hands, Shoulders\"    Chronic back pain     Arthritis - NKI    COPD (chronic obstructive pulmonary disease) ([facility]     Sees Dr. Jona Hughes COVID-19     Depression     Follows with Mental Health    Diabetes mellitus ([facility] Dx 2008    Type II - follows with PCP    Emphysema     Fibromyalgia     H/O abdominoplasty 12/21/2018    H/O cardiovascular stress test 03/04/2019    ECG portion of stress test is neg for ischemia by diagnostic criteria. No infarct or ischemia noted. Normal stress myocardial perfusion. This is a normal study    H/O echocardiogram 2019    Left ventricular function is normal. Ejection fraction is visually estimated at 55-60%. No significant valvular abnormalitites.  H/O tilt table evaluation 2021    Orthostatic syncope.     Hot flashes     Hyperlipidemia     Hypertension     Follows with PCP    Migraines Last Migraine 21    Mobitz (type) I (Wenckebach's) atrioventricular block 2020    Neuropathy     From feet to knees    Night sweat     Piriformis syndrome 2016    Prolonged emergence from general anesthesia     Psoriasis     Rotator cuff impingement syndrome, right 2018    RTC repair Dr Magali Olmos   Treated by Dr Cozetta Fothergill 17  Arthrocentesis 17    Shortness of breath on exertion     Syncope and collapse 2019    Teeth missing     Upper And Lower    Wears glasses        Past Surgical History:        Procedure Laterality Date    ABDOMEN SURGERY N/A 2019    SECONDARY CLOSURE OF DEHISCED ABDOMINAL WOUND performed by Edi Benz MD at [facility] N/A 2019    IRRIGATION OF LOWER ABDOMINAL WOUND performed by Edi Benz MD at [facility] 73 ABDOMINOPLASTY N/A 2019    ABDOMINOPLASTY REVISION performed by Edi Benz MD at [facility] Right 2020    EXCISION OF RIGHT BREAST SABACEOUS CYST performed by Loraine Olivo MD at [address] Right     Dr. Cozetta Fothergill (Right)     SECTION Bilateral [address]    Tubal Ligation Also Done In     COLONOSCOPY  2017    Polyp Removed - Dr. Campos Red COLONOSCOPY N/A 2020    COLONOSCOPY POLYPECTOMY SNARE/COLD BIOPSY performed by Edi Benz MD at [address] [facility]      Teeth Extracted In Past    ELBOW SURGERY Bilateral Last Done In     Right Done Twice, Left Done Once left cubital tunnel release ; right 2014    ENDOSCOPY, COLON, DIAGNOSTIC  2017    HERNIA REPAIR  10/26/2017    hiatal hernia with gastrectomy    HYSTERECTOMY VAGINAL      LAPAROSCOPY      LIPECTOMY N/A 2021    PANNICULECTOMY performed by [facility]y Winchester [facility] at [facility]. N/A 2018    ABDOMINOPLASTY performed by Toby Winchester MD at [address] Right     Dr. Pandya Reasons Right 2017    Dr Lasalle Olszewski  10/26/2017    Robotic Laparoscopic, Pre Op Weight 237  Or 238 lbs.  TUBAL LIGATION      Done With        Social History:    Social History     Tobacco Use    Smoking status: Former     Packs/day: 0.25     Years: 30.00     Pack years: 7.50     Types: Cigarettes, E-Cigarettes     Start date:      Quit date:      Years since quittin.0    Smokeless tobacco: Never   Substance Use Topics    Alcohol use: Yes     Comment: \"Occ. Maybe Twice A Month\"  caffeine iced coffee occ                                Counseling given: Not Answered      Vital Signs (Current):   Vitals:    22 1140   Weight: 162 lb (73.5 kg)   Height: 5' 7\" (1.702 m)                                              BP Readings from Last 3 Encounters:   22 120/80   22 136/86   22 128/74       NPO Status:                                                                                 BMI:   Wt Readings from Last 3 Encounters:   22 167 lb (75.8 kg)   22 167 lb 3.2 oz (75.8 kg)   22 163 lb (73.9 kg)     Body mass index is 25.37 kg/m².     CBC:   Lab Results   Component Value Date/Time    WBC 7.9 2022 11:20 AM    RBC 5.13 2022 11:20 AM    HGB 14.1 2022 11:20 AM    HCT 42.6 2022 11:20 AM    MCV 82.9 2022 11:20 AM    RDW 14.7 2022 11:20 AM     2022 11:20 AM       CMP:   Lab Results   Component Value Date/Time  2022 11:20 AM    K 4.1 2022 11:20 AM     2022 11:20 AM    CO2 26 2022 11:20 AM    BUN 12 2022 11:20 AM    CREATININE 0.7 2022 11:20 AM    GFRAA >60 2022 11:00 AM    AGRATIO 1.5 2022 11:20 AM    LABGLOM >60 2022 11:20 AM    GLUCOSE 119 2022 11:20 AM    PROT 7.6 2022 11:20 AM    PROT 7.9 2013 08:39 PM    CALCIUM 9.6 2022 11:20 AM    BILITOT 0.6 2022 11:20 AM    ALKPHOS 77 2022 11:20 AM    AST 16 2022 11:20 AM    ALT 13 2022 11:20 AM       POC Tests: No results for input(s): POCGLU, POCNA, POCK, POCCL, POCBUN, POCHEMO, POCHCT in the last 72 hours. Coags: No results found for: PROTIME, INR, APTT    HCG (If Applicable):   Lab Results   Component Value Date    PREGTESTUR NEGATIVE 2020        ABGs: No results found for: PHART, PO2ART, AYO1WIZ, KHD3EBD, BEART, P6IYHTZN     Type & Screen (If Applicable):  No results found for: LABABO, LABRH    Drug/Infectious Status (If Applicable):  Lab Results   Component Value Date/Time    HEPCAB NON REACTIVE 2021 09:24 AM       COVID-19 Screening (If Applicable):   Lab Results   Component Value Date/Time    COVID19 DETECTED 2022 02:24 PM           Anesthesia Evaluation  Patient summary reviewed history of anesthetic complications (Prolonged emergence from general anesthesia):    Airway: Mallampati: II  TM distance: >3 FB   Neck ROM: full  Mouth opening: > = 3 FB   Dental: normal exam         Pulmonary: breath sounds clear to auscultation  (+) COPD:  sleep apnea:                            ROS comment: Smoking Status: Former - 7.5 pack years  Quit Smokin16       Cardiovascular:  Exercise tolerance: good (>4 METS),   (+) hypertension:, hyperlipidemia        Rhythm: regular  Rate: normal           Beta Blocker:  Not on Beta Blocker      ROS comment: Stress test 2022:  Summary   Overall Impression: normal stress test, THR achieved Functional Capacity: Poor Exercise Tolerance. No chest pain noted during   testing. Conclusion: normal stress test    Echo 3/2019:  Summary   technically difficult study   Left ventricular function is normal.   Ejection fraction is visually estimated at 55-60%. No significant valvular abnormalities. Sinus rhythm with 2nd degree AV block (Mobitz I)      Neuro/Psych:   (+) headaches:, depression/anxiety             GI/Hepatic/Renal:   (+) hiatal hernia, GERD:,           Endo/Other:    (+) DiabetesType II DM, , .                 Abdominal:             Vascular: negative vascular ROS. Other Findings:           Anesthesia Plan      MAC     ASA 3       Induction: intravenous. Anesthetic plan and risks discussed with patient. Plan discussed with CRNA. Pre Anesthesia Evaluation complete. Anesthesia plan, risks, benefits, alternatives, and personal involved discussed with patient. Patients and/or legal guardian verbalized an understanding  and agreed to proceed.   Marquez House DO  1/5/2023

## 2023-01-04 NOTE — PROGRESS NOTES
Spoke with patient and she will arrive at 0830 at [facility] on 1/5/2023 for her procedure at 1000. IV order in epic, placed by Angelica GILBERT.

## 2023-01-05 ENCOUNTER — ANESTHESIA (OUTPATIENT)
Dept: ENDOSCOPY | Age: 58
End: 2023-01-05
Payer: COMMERCIAL

## 2023-01-05 ENCOUNTER — HOSPITAL ENCOUNTER (OUTPATIENT)
Age: 58
Setting detail: OUTPATIENT SURGERY
Discharge: HOME | End: 2023-01-05
Attending: INTERNAL MEDICINE | Admitting: INTERNAL MEDICINE
Payer: COMMERCIAL

## 2023-01-05 VITALS
OXYGEN SATURATION: 95 % | SYSTOLIC BLOOD PRESSURE: 147 MMHG | BODY MASS INDEX: 25.43 KG/M2 | HEART RATE: 76 BPM | HEIGHT: 67 IN | DIASTOLIC BLOOD PRESSURE: 87 MMHG | WEIGHT: 162 LBS | TEMPERATURE: 97.2 F | RESPIRATION RATE: 16 BRPM

## 2023-01-05 DIAGNOSIS — K21.9 GASTROESOPHAGEAL REFLUX DISEASE, UNSPECIFIED WHETHER ESOPHAGITIS PRESENT: ICD-10-CM

## 2023-01-05 LAB — GLUCOSE BLD-MCNC: 124 MG/DL (ref 70–99)

## 2023-01-05 PROCEDURE — 7100000011 HC PHASE II RECOVERY - ADDTL 15 MIN: Performed by: INTERNAL MEDICINE

## 2023-01-05 PROCEDURE — 88342 IMHCHEM/IMCYTCHM 1ST ANTB: CPT

## 2023-01-05 PROCEDURE — 82962 GLUCOSE BLOOD TEST: CPT

## 2023-01-05 PROCEDURE — 2709999900 HC NON-CHARGEABLE SUPPLY: Performed by: INTERNAL MEDICINE

## 2023-01-05 PROCEDURE — 6360000002 HC RX W HCPCS: Performed by: NURSE ANESTHETIST, CERTIFIED REGISTERED

## 2023-01-05 PROCEDURE — 3609012400 HC EGD TRANSORAL BIOPSY SINGLE/MULTIPLE: Performed by: INTERNAL MEDICINE

## 2023-01-05 PROCEDURE — 2500000003 HC RX 250 WO HCPCS: Performed by: NURSE ANESTHETIST, CERTIFIED REGISTERED

## 2023-01-05 PROCEDURE — 88305 TISSUE EXAM BY PATHOLOGIST: CPT

## 2023-01-05 PROCEDURE — 3700000000 HC ANESTHESIA ATTENDED CARE: Performed by: INTERNAL MEDICINE

## 2023-01-05 PROCEDURE — 3700000001 HC ADD 15 MINUTES (ANESTHESIA): Performed by: INTERNAL MEDICINE

## 2023-01-05 PROCEDURE — 7100000010 HC PHASE II RECOVERY - FIRST 15 MIN: Performed by: INTERNAL MEDICINE

## 2023-01-05 PROCEDURE — 2580000003 HC RX 258: Performed by: NURSE ANESTHETIST, CERTIFIED REGISTERED

## 2023-01-05 RX ORDER — LIDOCAINE HYDROCHLORIDE 20 MG/ML
INJECTION, SOLUTION EPIDURAL; INFILTRATION; INTRACAUDAL; PERINEURAL PRN
Status: DISCONTINUED | OUTPATIENT
Start: 2023-01-05 | End: 2023-01-05 | Stop reason: SDUPTHER

## 2023-01-05 RX ORDER — SODIUM CHLORIDE, SODIUM LACTATE, POTASSIUM CHLORIDE, CALCIUM CHLORIDE 600; 310; 30; 20 MG/100ML; MG/100ML; MG/100ML; MG/100ML
INJECTION, SOLUTION INTRAVENOUS CONTINUOUS PRN
Status: DISCONTINUED | OUTPATIENT
Start: 2023-01-05 | End: 2023-01-05 | Stop reason: SDUPTHER

## 2023-01-05 RX ORDER — PROPOFOL 10 MG/ML
INJECTION, EMULSION INTRAVENOUS PRN
Status: DISCONTINUED | OUTPATIENT
Start: 2023-01-05 | End: 2023-01-05 | Stop reason: SDUPTHER

## 2023-01-05 RX ORDER — GLYCOPYRROLATE 1 MG/5 ML
SYRINGE (ML) INTRAVENOUS PRN
Status: DISCONTINUED | OUTPATIENT
Start: 2023-01-05 | End: 2023-01-05 | Stop reason: SDUPTHER

## 2023-01-05 RX ADMIN — LIDOCAINE HYDROCHLORIDE 60 MG: 20 INJECTION, SOLUTION EPIDURAL; INFILTRATION; INTRACAUDAL; PERINEURAL at 09:20

## 2023-01-05 RX ADMIN — PROPOFOL 100 MG: 10 INJECTION, EMULSION INTRAVENOUS at 09:20

## 2023-01-05 RX ADMIN — PROPOFOL 50 MG: 10 INJECTION, EMULSION INTRAVENOUS at 09:24

## 2023-01-05 RX ADMIN — SODIUM CHLORIDE, POTASSIUM CHLORIDE, SODIUM LACTATE AND CALCIUM CHLORIDE: 600; 310; 30; 20 INJECTION, SOLUTION INTRAVENOUS at 09:13

## 2023-01-05 RX ADMIN — PROPOFOL 50 MG: 10 INJECTION, EMULSION INTRAVENOUS at 09:22

## 2023-01-05 RX ADMIN — Medication 0.2 MG: at 09:20

## 2023-01-05 ASSESSMENT — PAIN SCALES - GENERAL: PAINLEVEL_OUTOF10: 0

## 2023-01-05 ASSESSMENT — PAIN - FUNCTIONAL ASSESSMENT: PAIN_FUNCTIONAL_ASSESSMENT: 0-10

## 2023-01-05 NOTE — DISCHARGE INSTRUCTIONS
EGD    _[facility]_    OFFICE NUMBER_   [phone]      FOLLOW UP APPOINTMENT IN ____5-7____DAYS OR AS NEEDED. REPEAT PROCEDURE  AS  NEEDED. TEST ORDERED:      biopsies. What to Expect at Home  Your Recovery:  The only discomfort after your EGD is generally limited to a mild soreness of the throat, which may last a day or two. Call your physician immediately if you have severe chest pain, shortness of breath or a temperature of 100 degrees or higher if taken orally. How can you care for yourself at home? Activity  Rest as much as you need to after you go home. You should be able to go back to your usual activities the day after the test.  Diet  Follow your doctor's directions for eating after the test.  Drink plenty of fluids (unless your doctor has told you not to). Medications  If you have a sore throat the day after the test, use an over-the-counter spray to numb your throat. Your doctor will tell you if and when you can restart your medicines. He or she will also give you instructions about taking any new medicines. If you take blood thinners, such as warfarin (Coumadin), clopidogrel (Plavix), or aspirin, be sure to talk to your doctor. He or she will tell you if and when to start taking those medicines again. Make sure that you understand exactly what your doctor wants you to do. If a biopsy was done during the test, your doctor may tell you not to take aspirin or other anti-inflammatory medicines for a few days. These include ibuprofen (Advil, Motrin) and naproxen (Aleve). DO NOT DRINK ANYTHING WITH ALCOHOL TODAY. Other instructions:Anesthesia  For your safety, do not drive or operate machinery for 24 hours. Do not sign legal documents or make major decisions for 24 hours. The anesthesia can make it hard for you to fully understand what you are agreeing to. Follow-up care is a key part of your treatment and safety.  Be sure to make and go to all appointments, and call your doctor if you are having problems. It's also a good idea to know your test results and keep a list of the medicines you take. When should you call for help? [address] [facility] [address] Haresh Gutiérrez [phone]  Call 911 anytime you think you may need emergency care. For example, call if:  You passed out (lost consciousness). You cough up blood. You vomit blood or what looks like coffee grounds. You pass maroon or very bloody stools. Call your doctor now or seek immediate medical care if:  You have trouble swallowing. You have belly pain. Your stools are black and tarlike or have streaks of blood. You are sick to your stomach or cannot keep fluids down. Watch closely for changes in your health, and be sure to contact your doctor if:  Your throat still hurts after a day or two. You do not get better as expected. Where can you learn more? Go to https://[employer]. org and sign in to your [employer] account. Enter T630 in the [place] box to learn more about Upper GI Endoscopy: What to Expect at Home.     If you do not have an account, please click on the Sign Up Now link. © 0509-4000 Healthwise, Incorporated. Care instructions adapted under license by [facility] ([facility]). This care instruction is for use with your licensed healthcare professional. If you have questions about a medical condition or this instruction, always ask your healthcare professional. [place] [address] any warranty or liability for your use of this information.   Content Version: 36.6.174848; Current as of: November 20, 2015

## 2023-01-05 NOTE — ANESTHESIA POSTPROCEDURE EVALUATION
Department of Anesthesiology  Postprocedure Note    Patient: Josie Hartmann  MRN: [id number]  YOB: 1965  Date of evaluation: 1/5/2023      Procedure Summary     Date: 01/05/23 Room / Location: [address]    Anesthesia Start: 0915 Anesthesia Stop: 0932    Procedure: EGD BIOPSY Diagnosis:       Gastroesophageal reflux disease, unspecified whether esophagitis present      (s/p gastric sleeve , gerd)    Surgeons: Tessa Robbins MD Responsible Provider: Dina Marinelli DO    Anesthesia Type: MAC ASA Status: 3          Anesthesia Type: MAC    Morgan Phase I: Morgan Score: 10    Morgan Phase II:        Anesthesia Post Evaluation    Patient location during evaluation: bedside  Patient participation: waiting for patient participation  Level of consciousness: sleepy but conscious  Pain score: 0  Airway patency: patent  Nausea & Vomiting: no vomiting and no nausea  Complications: no  Cardiovascular status: hemodynamically stable  Respiratory status: room air  Hydration status: euvolemic  There was medical reason for not using a multimodal analgesia pain management approach.

## 2023-01-05 NOTE — INTERVAL H&P NOTE
Update History & Physical    The patient's History and Physical of December 7, 2022 was reviewed with the patient and I examined the patient. There was no change. The surgical site was confirmed by the patient and me. Plan: The risks, benefits, expected outcome, and alternative to the recommended procedure have been discussed with the patient. Patient understands and wants to proceed with the procedure.      Electronically signed by Gentry Olguin MD on 1/5/2023 at 8:33 AM

## 2023-01-05 NOTE — PROGRESS NOTES
0027  Patient arrived back to [facility]. Report given to this nurse from [facility] [place] [facility]. Patient A&O. Beverage of choice offered to patient. Call light in reach and bed in lowest position. 1000 patient attempting to call ride but no answer. 1015 IV removed. 56 Got a hold of daughter but she is at work and trying to come get her. Discharge instructions given to daughter Mitchell Petit. .  1021 Patient sitting on side of bed getting dressed unassisted.   1035 Patient escorted to car via wheelchair transported home by  daughter

## 2023-01-07 DIAGNOSIS — E11.9 TYPE 2 DIABETES MELLITUS WITHOUT COMPLICATION, WITHOUT LONG-TERM CURRENT USE OF INSULIN (HCC): ICD-10-CM

## 2023-01-09 ENCOUNTER — OFFICE VISIT (OUTPATIENT)
Dept: CARDIOLOGY CLINIC | Age: 58
End: 2023-01-09
Payer: COMMERCIAL

## 2023-01-09 ENCOUNTER — NURSE ONLY (OUTPATIENT)
Dept: FAMILY MEDICINE CLINIC | Age: 58
End: 2023-01-09
Payer: COMMERCIAL

## 2023-01-09 VITALS
DIASTOLIC BLOOD PRESSURE: 90 MMHG | BODY MASS INDEX: 26.46 KG/M2 | HEART RATE: 70 BPM | SYSTOLIC BLOOD PRESSURE: 154 MMHG | HEIGHT: 67 IN | WEIGHT: 168.6 LBS

## 2023-01-09 DIAGNOSIS — R00.2 PALPITATIONS: ICD-10-CM

## 2023-01-09 DIAGNOSIS — E11.9 TYPE 2 DIABETES MELLITUS WITHOUT COMPLICATION, WITHOUT LONG-TERM CURRENT USE OF INSULIN (HCC): Primary | ICD-10-CM

## 2023-01-09 DIAGNOSIS — E11.42 TYPE 2 DIABETES MELLITUS WITH DIABETIC POLYNEUROPATHY, WITHOUT LONG-TERM CURRENT USE OF INSULIN (HCC): Chronic | ICD-10-CM

## 2023-01-09 DIAGNOSIS — G89.4 CHRONIC PAIN SYNDROME: ICD-10-CM

## 2023-01-09 DIAGNOSIS — R07.2 PRECORDIAL PAIN: Primary | ICD-10-CM

## 2023-01-09 DIAGNOSIS — I10 ESSENTIAL HYPERTENSION: ICD-10-CM

## 2023-01-09 DIAGNOSIS — I44.1 MOBITZ TYPE 1 SECOND DEGREE AV BLOCK: ICD-10-CM

## 2023-01-09 DIAGNOSIS — M79.7 FIBROMYALGIA: ICD-10-CM

## 2023-01-09 DIAGNOSIS — G47.33 OSA (OBSTRUCTIVE SLEEP APNEA): ICD-10-CM

## 2023-01-09 LAB
ESTIMATED AVERAGE GLUCOSE: 151.3 MG/DL
HBA1C MFR BLD: 6.9 %

## 2023-01-09 PROCEDURE — 3080F DIAST BP >= 90 MM HG: CPT | Performed by: INTERNAL MEDICINE

## 2023-01-09 PROCEDURE — 36415 COLL VENOUS BLD VENIPUNCTURE: CPT | Performed by: NURSE PRACTITIONER

## 2023-01-09 PROCEDURE — 99214 OFFICE O/P EST MOD 30 MIN: CPT | Performed by: INTERNAL MEDICINE

## 2023-01-09 PROCEDURE — 2022F DILAT RTA XM EVC RTNOPTHY: CPT | Performed by: INTERNAL MEDICINE

## 2023-01-09 PROCEDURE — G8484 FLU IMMUNIZE NO ADMIN: HCPCS | Performed by: INTERNAL MEDICINE

## 2023-01-09 PROCEDURE — 3017F COLORECTAL CA SCREEN DOC REV: CPT | Performed by: INTERNAL MEDICINE

## 2023-01-09 PROCEDURE — 3077F SYST BP >= 140 MM HG: CPT | Performed by: INTERNAL MEDICINE

## 2023-01-09 PROCEDURE — 3046F HEMOGLOBIN A1C LEVEL >9.0%: CPT | Performed by: INTERNAL MEDICINE

## 2023-01-09 PROCEDURE — G8427 DOCREV CUR MEDS BY ELIG CLIN: HCPCS | Performed by: INTERNAL MEDICINE

## 2023-01-09 PROCEDURE — 1036F TOBACCO NON-USER: CPT | Performed by: INTERNAL MEDICINE

## 2023-01-09 PROCEDURE — G8417 CALC BMI ABV UP PARAM F/U: HCPCS | Performed by: INTERNAL MEDICINE

## 2023-01-09 RX ORDER — ALOGLIPTIN 6.25 MG/1
6.25 TABLET, FILM COATED ORAL DAILY
Qty: 90 TABLET | Refills: 0 | Status: SHIPPED | OUTPATIENT
Start: 2023-01-09 | End: 2023-04-09

## 2023-01-09 RX ORDER — LISINOPRIL 10 MG/1
10 TABLET ORAL DAILY
Qty: 90 TABLET | Refills: 1 | Status: SHIPPED | OUTPATIENT
Start: 2023-01-09

## 2023-01-09 NOTE — PATIENT INSTRUCTIONS
Please be informed that if you contact our office outside of normal business hours the physician on call cannot help with any scheduling or rescheduling issues, procedure instruction questions or any type of medication issue. We advise you for any urgent/emergency that you go to the nearest emergency room! PLEASE CALL OUR OFFICE DURING NORMAL BUSINESS HOURS    Monday - Friday   8 am to 5 pm    [address]: [phone]    [place]:  [phone]      **It is YOUR responsibilty to bring medication bottles and/or updated medication list to [address] [place] [address]. This will allow us to better serve you and all your healthcare needs**      Thank you for allowing us to care for you today! We want to ensure we can follow your treatment plan and we strive to give you the best outcomes and experience possible. If you ever have a life threatening emergency and call 911 - for an ambulance (EMS)   Our providers can only care for you at:   [facility] or [facility]. Even if you have someone take you or you drive yourself we can only care for you in a [facility] facility. Our providers are not setup at the other healthcare locations!

## 2023-01-09 NOTE — PROGRESS NOTES
CARDIOLOGY  NOTE           Chief Complaint: bradycardia    HPI:   Maame Jenkins is a 56y.o. year old who has Past medical history as noted below. She reports ongoing  epigastric pain and heartburn after she eats any food she says the pain is severe 8 out of 10 history of gastric sleeve surgery, sucralfate has helped   She brings bp log which shows sbp in 160's to 150's   She had stress test  in Dec 2022 showed no ischemia but reduced exercise capacity    she was actually evaluated in February 2019 when she was undergoing surgery was noted to have second-degree heart block type I Wenckebach. S.  During her event monitor which she wore for 14 days in March 2019 she was noted to have Wenckebach type I. She was not symptomatic        Current Outpatient Medications   Medication Sig Dispense Refill    alogliptin (NESINA) 6.25 MG TABS tablet TAKE 1 TABLET BY MOUTH DAILY 90 tablet 0    lisinopril (PRINIVIL;ZESTRIL) 10 MG tablet Take 1 tablet by mouth daily 90 tablet 1    omeprazole (PRILOSEC) 40 MG delayed release capsule Take 1 capsule by mouth daily 90 capsule 2    atorvastatin (LIPITOR) 80 MG tablet Take 1 tablet by mouth daily 90 tablet 3    ammonium lactate (LAC-HYDRIN) 12 % lotion APPLY TO SCALY AREAS ONCE A DAY AS NEEDED      chlorhexidine (PERIDEX) 0.12 % solution RINSE WITH 1/2OZ TWICE DAILY ONLY AFTER BREAKFAST AND BEFORE BED      clobetasol (TEMOVATE) 0.05 % ointment APPLY TO PSORIASIS TWICE A DAY MONDAY - FRIDAY, NO WEEKENDS      hydrocortisone 2.5 % ointment APPLY TO PSORIASIS ONCE A DAY AS NEEDED FOR FLARES      Psyllium 33 % POWD Fiber (psyllium husk-sugar) 3.4 gram/12 gram oral powder      sucralfate (CARAFATE) 1 GM tablet Take 1 tablet by mouth 4 times daily Please crush and 5 mL of water and take a slurry.   Please  out from other medication 120 tablet 3    valACYclovir (VALTREX) 500 MG tablet Take 1 tablet by mouth daily 30 tablet 5    amitriptyline (ELAVIL) 25 MG tablet TAKE 1 TABLET AT BEDTIME      estradiol (ESTRACE) 1 MG tablet Take 1 tablet by mouth daily 30 tablet 10    hydroCHLOROthiazide (HYDRODIURIL) 25 MG tablet Take 1 tablet by mouth daily as needed (swelling) 30 tablet 5    vitamin D (ERGOCALCIFEROL) 1.25 MG (96361 UT) CAPS capsule Take 1 capsule by mouth once a week 12 capsule 1    fluticasone-vilanterol (BREO ELLIPTA) 100-25 MCG/INH AEPB inhaler Inhale 1 puff into the lungs daily 1 each 5    clotrimazole-betamethasone (LOTRISONE) 1-0.05 % cream Apply topically 2 times daily. 15 g 0    SPIRIVA RESPIMAT 2.5 MCG/ACT AERS inhaler Inhale 2 puffs into the lungs daily 4 g 5    coenzyme Q-10 100 MG capsule Take 1 capsule by mouth daily 90 capsule 3    albuterol sulfate HFA (VENTOLIN HFA) 108 (90 Base) MCG/ACT inhaler Inhale 2 puffs into the lungs 4 times daily as needed for Wheezing 54 g 5    albuterol (PROVENTIL) (2.5 MG/3ML) 0.083% nebulizer solution Take 3 mLs by nebulization every 6 hours 120 each 5    PROAIR  (90 Base) MCG/ACT inhaler Inhale 2 puffs into the lungs every 6 hours as needed for Wheezing 1 each 5    Calcium Carb-Cholecalciferol (CALCIUM-VITAMIN D) 600-400 MG-UNIT TABS Take 1 tablet by mouth daily 90 tablet 3    ondansetron (ZOFRAN-ODT) 4 MG disintegrating tablet Take 1 tablet by mouth 3 times daily as needed for Nausea or Vomiting 21 tablet 2    hydrOXYzine (ATARAX) 25 MG tablet TAKE 1 TABLET EVERY 8 HOURS AS NEEDED FOR ANXIETY AVOID ALCOHOL/CAUSES DROWSINESS. Do not take with Xanax 60 tablet 1    tiZANidine (ZANAFLEX) 4 MG tablet Take 4 mg by mouth every 8 hours as needed       oxyCODONE-acetaminophen (PERCOCET) 7.5-325 MG per tablet Take 1 tablet by mouth every 8 hours as needed for Pain (Patient states takes every 6 hours).        midodrine (PROAMATINE) 2.5 MG tablet Take 1 tablet by mouth 3 times daily as needed (SBP less than 100) (Patient not taking: No sig reported) 90 tablet 3    Multiple Vitamins-Minerals (THERAPEUTIC MULTIVITAMIN-MINERALS) tablet Take 1 tablet by mouth daily (Patient not taking: No sig reported) 90 tablet 3     No current facility-administered medications for this visit. Allergies: Other    Patient History:  Past Medical History:   Diagnosis Date    Anxiety     Arthritis     \"Back, Hands, Shoulders\"    Chronic back pain     Arthritis - NKI    COPD (chronic obstructive pulmonary disease) ([facility] [address]     Sees Dr. Praveen Perry    COVID-19     Depression     Follows with Mental Health    Diabetes mellitus ([facility] [address] Dx 2008    Type II - follows with PCP    Emphysema     Fibromyalgia     H/O abdominoplasty 12/21/2018    H/O cardiovascular stress test 03/04/2019    ECG portion of stress test is neg for ischemia by diagnostic criteria. No infarct or ischemia noted. Normal stress myocardial perfusion. This is a normal study    H/O echocardiogram 03/04/2019    Left ventricular function is normal. Ejection fraction is visually estimated at 55-60%. No significant valvular abnormalitites. H/O tilt table evaluation 08/26/2021    Orthostatic syncope.     Hot flashes     Hyperlipidemia     Hypertension     Follows with PCP    Migraines Last Migraine 7/20/21    Mobitz (type) I [facility]) atrioventricular block 01/29/2020    Neuropathy     From feet to knees    Night sweat     Piriformis syndrome 06/24/2016    Prolonged emergence from general anesthesia     Psoriasis     Rotator cuff impingement syndrome, right 02/26/2018    RTC repair Dr Jeevan Delaney 2015  Treated by Dr Leonid Patiño 2/18/17  Arthrocentesis 12/19/17    Shortness of breath on exertion     Syncope and collapse 03/04/2019    Teeth missing     Upper And Lower    Wears glasses      Past Surgical History:   Procedure Laterality Date    ABDOMEN SURGERY N/A 01/14/2019    SECONDARY CLOSURE OF DEHISCED ABDOMINAL WOUND performed by Bryan Arguelles MD at [address] [place] [address] 01/24/2019    IRRIGATION OF LOWER ABDOMINAL WOUND performed by Bryan Arguelles MD at [facility] ABDOMINOPLASTY N/A 2019    ABDOMINOPLASTY REVISION performed by Ryan Gusman MD at [address] Right 2020    EXCISION OF RIGHT BREAST SABACEOUS CYST performed by Erik Armijo MD at [address] Right 2017    Dr. Dee Carrera (Right)     SECTION Bilateral [address]    Tubal Ligation Also Done In     COLONOSCOPY  2017    Polyp Removed - Dr. Yovany Guerra    COLONOSCOPY N/A 2020    COLONOSCOPY POLYPECTOMY SNARE/COLD BIOPSY performed by Ryan Gusman MD at [facility]      Teeth Extracted In Past    ELBOW SURGERY Bilateral Last Done In     Right Done Twice, Left Done Once left cubital tunnel release ; right 2014    ENDOSCOPY, COLON, DIAGNOSTIC  2017    HERNIA REPAIR  10/26/2017    hiatal hernia with gastrectomy    HYSTERECTOMY VAGINAL  2000    LAPAROSCOPY  2000    LIPECTOMY N/A 2021    PANNICULECTOMY performed by Ryan Gusman MD at [address] ABD INFRAUMBILICAL PANNICULECTOMY N/A 2018    ABDOMINOPLASTY performed by Ryan Gusman MD at [address] Right     Dr. Caitlin Stanford Right 2017    Dr Brandon Shelton  10/26/2017    Robotic Laparoscopic, Pre Op Weight 237  Or 238 lbs.     TUBAL LIGATION      Done With     UPPER GASTROINTESTINAL ENDOSCOPY N/A 2023    EGD BIOPSY performed by Dhruv Hayden MD at [facility] ENDOSCOPY     Family History   Problem Relation Age of Onset    Heart Attack Father     Heart Disease Father     Early Death Father 46        Heart Attack    Alcohol Abuse Father     Substance Abuse Father         Alcoholic    Arthritis Mother     Heart Disease Mother     Diabetes Mother     Cancer Mother         \"Kidney Cancer\"    High Blood Pressure Mother     High Blood Pressure Sister     Diabetes Brother     Early Death Daughter 21        \"Killed In A Car Accident\"    Ovarian Cancer Neg Hx     Breast Cancer Neg Hx      Social History     Tobacco Use    Smoking status: Former     Packs/day: 0.25     Years: 30.00     Pack years: 7.50     Types: Cigarettes, E-Cigarettes     Start date:      Quit date: 2016     Years since quittin.0    Smokeless tobacco: Never   Substance Use Topics    Alcohol use: Yes     Comment: \"Occ. Maybe Twice A Month\"  caffeine iced coffee occ        Review of Systems:   Constitutional: No Fever or Weight Loss   Eyes: No Decreased Vision  ENT: No Headaches, Hearing Loss or Vertigo  Cardiovascular: as per note above   Respiratory: No cough or wheezing and as per note above. Gastrointestinal: No abdominal pain, appetite loss, blood in stools, constipation, diarrhea or heartburn  Genitourinary: No dysuria, trouble voiding, or hematuria  Musculoskeletal:  denies any new  joint aches , swelling  or pain   Integumentary: No rash or pruritis  Neurological: No TIA or stroke symptoms  Psychiatric: No anxiety or depression  Endocrine: No malaise, fatigue or temperature intolerance  Hematologic/Lymphatic: No bleeding problems, blood clots or swollen lymph nodes  Allergic/Immunologic: No nasal congestion or hives    Objective:      Physical Exam:  BP (!) 154/90   Pulse 70   Ht 5' 7\" (1.702 m)   Wt 168 lb 9.6 oz (76.5 kg)   BMI 26.41 kg/m²   Wt Readings from Last 3 Encounters:   23 168 lb 9.6 oz (76.5 kg)   22 162 lb (73.5 kg)   22 167 lb (75.8 kg)     Body mass index is 26.41 kg/m². Vitals:    23 1021   BP: (!) 154/90   Pulse:         General Appearance:  No distress, conversant  Constitutional:  Well developed, Well nourished, No acute distress, Non-toxic appearance. HENT:  Normocephalic, Atraumatic, Bilateral external ears normal, Oropharynx moist, No oral exudates, Nose normal. Neck- Normal range of motion, No tenderness, Supple, No stridor,no apical-carotid delay  Eyes:  PERRL, EOMI, Conjunctiva normal, No discharge.    Respiratory: Normal breath sounds, No respiratory distress, No wheezing, No chest tenderness. ,no use of accessory muscles, NO crackles  Cardiovascular: (PMI) apex non displaced,no lifts no thrills,S1 and S2 audible, No added heart sounds, No signs of ankle edema, or volume overload, No evidence of JVD, No crackles   GI:  Bowel sounds normal, Soft, No tenderness, No masses, No gross visceromegaly   :  No costovertebral angle tenderness   Musculoskeletal:  No edema, no tenderness, no deformities. Back- no tenderness  Integument:  Well hydrated, no rash   Lymphatic:  No lymphadenopathy noted   Neurologic:  Alert & oriented x 3, CN 2-12 normal, normal motor function, normal sensory function, no focal deficits noted   Psychiatric:  Speech and behavior appropriate       Medical decision making and Data review:  DATA:  Lab Results   Component Value Date    TROPONINT <0.010 03/04/2019     BNP:    Lab Results   Component Value Date    PROBNP 52 09/03/2020     PT/INR:  No results found for: PTINR  Lab Results   Component Value Date    LABA1C 6.9 07/14/2022    LABA1C 6.5 01/06/2022     Lab Results   Component Value Date    CHOL 258 (H) 12/06/2022    TRIG 108 12/06/2022    HDL 77 (H) 12/06/2022    LDLCALC 159 (H) 12/06/2022    LDLDIRECT 163 (H) 04/08/2021     Lab Results   Component Value Date    ALT 13 12/06/2022    AST 16 12/06/2022     No results for input(s): WBC, HGB, HCT, MCV, PLT in the last 72 hours.   TSH: No results found for: TSH  Lab Results   Component Value Date    AST 16 12/06/2022    ALT 13 12/06/2022    BILIDIR <0.2 05/26/2021    BILITOT 0.6 12/06/2022    ALKPHOS 77 12/06/2022     Lab Results   Component Value Date    PROBNP 52 09/03/2020    PROBNP 321.6 (H) 03/04/2019     Lab Results   Component Value Date    LABA1C 6.9 07/14/2022    LABA1C 6.5 01/06/2022     Lab Results   Component Value Date    WBC 7.9 12/06/2022    HGB 14.1 12/06/2022    HCT 42.6 12/06/2022     12/06/2022     Stress tests 3/4/2019      Summary ECG portion of stress test is negative for ischemia by diagnostic criteria. No infarct or ischemia noted. Normal EF 79 % with normal ventricular contractility. Normal stress myocardial perfusion. This is a normal study. Echo 3/4/2019  Summary   technically difficult study   Left ventricular function is normal.   Ejection fraction is visually estimated at 55-60%. No significant valvular abnormalities. Stress  test  12/9/2022  Overall Impression: normal stress test, THR achieved      Functional Capacity: Poor Exercise Tolerance. No chest pain noted during   testing. Stress Type: Exercise      Peak HR: 151 bpm                      HR Response: Appropriate   Peak BP: 158/84 mmHg                  BP Response: Resting hypertension -   Predicted HR: 165 bpm                 appropriate response   % of predicted HR: 92                 HR BP Product: 07910   Exercise Duration: 03:00 min          Max Exercise: 4.6 METS   Reason for Termination: Target heart   rate                                  Exercise Effort: Poor      Holter 12/15/2022  48-hour Holter monitor worn from 29 November to 1 December 2022 with lowest heart rate 57 at 5:09 AM average heart rate 33 maximum was 145 no episodes of A. fib primarily in sinus rhythm there were 34 ventricular ectopy episodes of sinus tachycardia maximum 144              All labs, medications and tests reviewed by myself including data and history from outside source , patient and available family . Assessment & Plan:      1. Precordial pain    2. Chronic pain syndrome    3. Type 2 diabetes mellitus with diabetic polyneuropathy, without long-term current use of insulin ([address]    4. Essential hypertension    5. Fibromyalgia    6. Mobitz type 1 second degree AV block    7. URIEL (obstructive sleep apnea)    8.  Palpitations         Chest pain  Every time she eats she gest palpitations and has chest pain and pain on left side and goes to back , she is s/p gastrtic bypass surgery , also seeing gastroenterology work up was normal , stress test IN dec 2022 and 2019 were normal , sucralfate has helped   . Omeprazole twice a day as well as use sucralfate use nitro as needed      Hypertension  She is not on midodrine any more , her bp log shows bp in 150 to 160's increase lisinopril to 10 mg daily         Mobitz type 1 second degree AV block  Continue to monitor she is not symptomatic no further intervention at this time. Echo shows preserved EF normal stress test in 2019  Due to ongoing palpitations , most symptoms are after eating? Holter in 2022 was normal     SOB (shortness of breath)  Status post gastric bypass surgery multifactorial echo shows no abnormality she uses Lasix as needed basis when she notices ankle swelling I do not think she is in heart failure     Dyslipidemia :  All available lab work was reviewed. Patient was advised to repeat lab work before next visit. Necessary orders were placed , instructions given by myself   all labs as well as lipid panel      Counseled extensively and medication compliance urged. We discussed that for the  prevention of ASCVD our  goal is aggressive risk modification. Patient is encouraged to exercise even a brisk walk for 30 minutes  at least 3 to 4 times a week   Various goals were discussed and questions answered. Continue current medications. Appropriate prescriptions are addressed and refills ordered. Questions answered and patient verbalizes understanding. Call for any problems, questions, or concerns. Continue all other medications of all above medical condition listed as is. Return in about 6 months (around 7/9/2023). Please note this report has been partially produced using speech recognition software and may contain errors related to that system including errors in grammar, punctuation, and spelling, as well as words and phrases that may be inappropriate.  If there are any questions or concerns please feel free to contact the dictating provider for clarification. An  electronic signature was used to authenticate this note.     --Janina Varela MD on 1/9/2023 at 10:47 AM    [id number]}

## 2023-01-25 ENCOUNTER — SCHEDULED TELEPHONE ENCOUNTER (OUTPATIENT)
Dept: INFECTIOUS DISEASES | Age: 58
End: 2023-01-25

## 2023-01-25 DIAGNOSIS — A60.09 HERPES GENITALIS IN WOMEN: Primary | ICD-10-CM

## 2023-01-25 NOTE — PROGRESS NOTES
Wesly Goddard is a 56 y.o. female evaluated via telephone on 1/25/2023 for Follow-up (6 mo f/u Positive quantiferron test)  . Assessment & Plan   1. Herpes genitalis in women  Assessment & Plan:  Labs reviewed. No eruptions. Continue Valtrex. Return in about 18 weeks (around 5/31/2023). Subjective   Prior to Visit Medications    Medication Sig Taking? Authorizing Provider   alogliptin (NESINA) 6.25 MG TABS tablet TAKE 1 TABLET BY MOUTH DAILY Yes Alonso Ford MD   lisinopril (PRINIVIL;ZESTRIL) 10 MG tablet Take 1 tablet by mouth daily Yes Wander Mehta MD   omeprazole (PRILOSEC) 40 MG delayed release capsule Take 1 capsule by mouth daily Yes Amauri Peter MD   atorvastatin (LIPITOR) 80 MG tablet Take 1 tablet by mouth daily Yes Wander Mehta MD   ammonium lactate (LAC-HYDRIN) 12 % lotion APPLY TO SCALY AREAS ONCE A DAY AS NEEDED Yes Historical Provider, MD   chlorhexidine (PERIDEX) 0.12 % solution RINSE WITH 1/2OZ TWICE DAILY ONLY AFTER BREAKFAST AND BEFORE BED Yes Historical Provider, MD   clobetasol (TEMOVATE) 0.05 % ointment APPLY TO PSORIASIS TWICE A DAY [address] [place] [address], NO WEEKENDS Yes Historical Provider, MD   hydrocortisone 2.5 % ointment APPLY TO PSORIASIS ONCE A DAY AS NEEDED FOR FLARES Yes Historical Provider, MD   Psyllium 33 % POWD Fiber (psyllium husk-sugar) 3.4 gram/12 gram oral powder Yes Historical Provider, MD   sucralfate (CARAFATE) 1 GM tablet Take 1 tablet by mouth 4 times daily Please crush and 5 mL of water and take a slurry.   Please  out from other medication Yes Amauri Peter MD   valACYclovir (VALTREX) 500 MG tablet Take 1 tablet by mouth daily Yes Bria Read MD   amitriptyline (ELAVIL) 25 MG tablet TAKE 1 TABLET AT BEDTIME Yes Historical Provider, MD   estradiol (ESTRACE) 1 MG tablet Take 1 tablet by mouth daily Yes YESSICA Mallory CNP   hydroCHLOROthiazide (HYDRODIURIL) 25 MG tablet Take 1 tablet by mouth daily as needed (swelling) Yes Aurora PADILLA YESSICA Domingo CNP   midodrine (PROAMATINE) 2.5 MG tablet Take 1 tablet by mouth 3 times daily as needed (SBP less than 100) Yes Tiffany Oksana Galeazzi, APRN - CNP   vitamin D (ERGOCALCIFEROL) 1.25 MG (66194 UT) CAPS capsule Take 1 capsule by mouth once a week Yes Libby Velazco II, MD   clotrimazole-betamethasone (LOTRISONE) 1-0.05 % cream Apply topically 2 times daily. Yes YESSICA Jackson CNP   SPIRIVA RESPIMAT 2.5 MCG/ACT AERS inhaler Inhale 2 puffs into the lungs daily Yes Dequan Urbina MD   coenzyme Q-10 100 MG capsule Take 1 capsule by mouth daily Yes YESSICA Betts CNP   albuterol sulfate HFA (VENTOLIN HFA) 108 (90 Base) MCG/ACT inhaler Inhale 2 puffs into the lungs 4 times daily as needed for Wheezing Yes Dequan Urbina MD   albuterol (PROVENTIL) (2.5 MG/3ML) 0.083% nebulizer solution Take 3 mLs by nebulization every 6 hours Yes Dequan Urbina MD   PROAIR  (79 Base) MCG/ACT inhaler Inhale 2 puffs into the lungs every 6 hours as needed for Wheezing Yes Dequan Urbina MD   Calcium Carb-Cholecalciferol (CALCIUM-VITAMIN D) 600-400 MG-UNIT TABS Take 1 tablet by mouth daily Yes YESSICA Mayberry NP   ondansetron (ZOFRAN-ODT) 4 MG disintegrating tablet Take 1 tablet by mouth 3 times daily as needed for Nausea or Vomiting Yes YESSICA Bani CNP   Multiple Vitamins-Minerals (THERAPEUTIC MULTIVITAMIN-MINERALS) tablet Take 1 tablet by mouth daily Yes YESSICA Bain CNP   hydrOXYzine (ATARAX) 25 MG tablet TAKE 1 TABLET EVERY 8 HOURS AS NEEDED FOR ANXIETY AVOID ALCOHOL/CAUSES DROWSINESS. Do not take with Xanax Yes YESSICA Mayberry NP   tiZANidine (ZANAFLEX) 4 MG tablet Take 4 mg by mouth every 8 hours as needed  Yes Giovanny Aguilar MD   oxyCODONE-acetaminophen (PERCOCET) 7.5-325 MG per tablet Take 1 tablet by mouth every 8 hours as needed for Pain (Patient states takes every 6 hours).   Yes Historical Provider, MD   fluticasone-vilanterol (BREO ELLIPTA) 100-25 MCG/INH AEPB inhaler Inhale 1 puff into the lungs daily  Michelet Dueñas MD     HPI   female presenting today.  She was originally referred to us for positive QuantiFERON test.  However, she states that she has been to the health department and they will be starting her regimen (rifapentine and isoniazid).  She is here for HSV II IgG positive test.  She tested positive for HSV antibody as part of the screen for STDs.  She does not recall having any painful genital lesions ever.  She last had sex about a year ago.  She would like to be treated for Herpes. She is asymptomatic and has not had a flare.  She denies headache, fever, chills, abdominal pain.  6/23/2021: denies any genital lesions. Tolerating medication.   12/16/2021: tolerating acyclovir, adherent to it, has no ulcers. Is in a relationship, yet to have sex but is thinking about it. She is inclined to break the news to her new partner.  7/27/2022:  no outbreak, was in a relationship with a man and she revealed her status. They used condoms once, but he stopped using condoms. Now,they are not together.   1/25/2023: doing well on Valtrex. No genital lesions. She is intimate with a partner.  Review of Systems      Objective   Patient-Reported Vitals  No data recorded       Total Time: minutes: 11-20 minutes     Beverly Valencia was evaluated through a synchronous (real-time) audio only encounter. Patient identification was verified at the start of the visit. She (or guardian if applicable) is aware that this is a billable service, which includes applicable co-pays. This visit was conducted with patient's (and/or legal guardian's) verbal consent. She has not had a related appointment within my department in the past 7 days or scheduled within the next 24 hours.   The patient was located at Home: [address] [id number].  The provider was located at Facility (Appt Dept): [address] [place] [address].     Deandre Dale,  MD

## 2023-02-08 ENCOUNTER — E-VISIT (OUTPATIENT)
Dept: PRIMARY CARE CLINIC | Age: 58
End: 2023-02-08
Payer: COMMERCIAL

## 2023-02-08 DIAGNOSIS — J40 BRONCHITIS: Primary | ICD-10-CM

## 2023-02-08 PROCEDURE — 99422 OL DIG E/M SVC 11-20 MIN: CPT | Performed by: NURSE PRACTITIONER

## 2023-02-08 RX ORDER — AZITHROMYCIN 250 MG/1
TABLET, FILM COATED ORAL
Qty: 1 PACKET | Refills: 0 | Status: SHIPPED | OUTPATIENT
Start: 2023-02-08 | End: 2023-02-18

## 2023-02-08 RX ORDER — BENZONATATE 200 MG/1
200 CAPSULE ORAL 3 TIMES DAILY PRN
Qty: 30 CAPSULE | Refills: 0 | Status: SHIPPED | OUTPATIENT
Start: 2023-02-08 | End: 2023-02-15

## 2023-02-08 ASSESSMENT — LIFESTYLE VARIABLES: SMOKING_STATUS: NO, I'VE NEVER SMOKED

## 2023-02-08 NOTE — PROGRESS NOTES
Huey Dumont (1965) initiated an asynchronous digital communication through [address] [place] [address]. HPI: per patient questionnaire     Exam: not applicable    Diagnoses and all orders for this visit:  Diagnoses and all orders for this visit:    Bronchitis    Other orders  -     azithromycin (ZITHROMAX) 250 MG tablet; 2 tablets now then 1 daily until gone. -     benzonatate (TESSALON) 200 MG capsule; Take 1 capsule by mouth 3 times daily as needed for Cough    Supportive care measures provided  Recommend f/u with pcp as needed    Time: EV2 - 11-20 minutes were spent on the digital evaluation and management of this patient.  17 min     YESSICA Bird - CNP

## 2023-02-17 ENCOUNTER — PROCEDURE VISIT (OUTPATIENT)
Dept: PHYSICAL MEDICINE AND REHAB | Age: 58
End: 2023-02-17

## 2023-02-17 VITALS — TEMPERATURE: 97.3 F

## 2023-02-17 DIAGNOSIS — M79.601 PARESTHESIA AND PAIN OF BOTH UPPER EXTREMITIES: ICD-10-CM

## 2023-02-17 DIAGNOSIS — G56.03 BILATERAL CARPAL TUNNEL SYNDROME: Primary | ICD-10-CM

## 2023-02-17 DIAGNOSIS — R20.2 PARESTHESIA AND PAIN OF BOTH UPPER EXTREMITIES: ICD-10-CM

## 2023-02-17 DIAGNOSIS — G56.23 ULNAR NEUROPATHY OF BOTH UPPER EXTREMITIES: ICD-10-CM

## 2023-02-17 DIAGNOSIS — M79.602 PARESTHESIA AND PAIN OF BOTH UPPER EXTREMITIES: ICD-10-CM

## 2023-02-17 NOTE — PROGRESS NOTES
EMG REPORT     CHIEF COMPLAINT: Right shoulder pain, particularly with movement. HISTORY OF PRESENT ILLNESS: 56 y.o. right hand dominant female with a long history of spinal, paraspinal and limb pains. She has had carpal tunnel release surgeries at each wrist and bilateral ulnar nerve transposition surgeries at each elbow. Recently she describes persistence of this chronic pain and ongoing throbbing in the right shoulder. She gets some numbness and tingling in each hand, particularly in the little fingers. She reported sleep disturbance and dropping things from her . She rated the pain severity as 9/10. She has no history of diabetes or any thyroid disorder. PHYSICAL EXAMINATION: Alert. About 75 degrees of active cervical spine rotation bilaterally. Spurling's maneuver was negative. Upper limb reflexes were trace and symmetric. Tinel's sign was negative. There was no focal weakness with upper limb manual muscle testing, although some giveaway was noted with shoulder pain during abduction testing. There was no atrophy, tremor or clonus noted. Perception of touch was minimally distorted over the pinky finger of each hand. NERVE CONDUCTION STUDIES:     MOTOR         LATENCY NORMAL AMPLITUDE DISTANCE COND. GISELLA.    RIGHT  MEDIAN 3.4 < 4.2 msec 7.1 8 cm 57   LEFT  MEDIAN 3.4 < 4.2 msec 6.9 8 cm >50   RIGHT  ULNAR 2.7 < 4.2 msec 8.3/9.2 8 cm 65/46   LEFT  ULNAR 2.9 < 4.2 msec 7.8 8 cm >50      SENSORY  ORTHODROMIC        LATENCY NORMAL AMPLITUDE DISTANCE   RIGHT MEDIAN 2.8 <2.3 msec 13 10 cm   LEFT  MEDIAN 2.7 < 2.3 msec 28 10 cm   RIGHT  ULNAR 2.1 < 2.3 msec 4 10 cm   LEFT  ULNAR 2.5 < 2.3 msec 5 10 cm         NEEDLE EMG:      RIGHT   LEFT     Insertional Activity Spontaneous  Activity Volutional  MUAP's Insertional Activity Spontaneous  Activity Volutional  MUAP's   Cerv Parasp Normal None Normal Normal None Normal   Infraspinatus Normal None Normal Normal None Normal   Deltoid   Normal None Normal Normal None Normal   Biceps   Normal None Normal Normal None Normal   Triceps   Normal None Normal Normal None Normal   Pronator Teres Normal None Normal Normal None Normal   Extensor Indicis Normal None Normal Normal None Normal   1st Dorsal Interosseus Normal None Dec #  Normal None Dec #   ADM Normal None Dec # Normal None Dec #   Abductor Pollicis Br. Normal None Normal Normal None Normal       FINDINGS:   EMG of the cervical paraspinals and both upper limbs demonstrated no paraspinal nor limb muscle membrane irritability. Motor units were mildly reduced in number and the ulnar innervated muscles of each hand. All other motor units were of normal configuration and recruitment. Median sensory distal latencies were just outside of normal limits. Median motor nerve conductions were well-maintained. There was minor ulnar motor conduction slowing around the right elbow and the ulnar SNAP amplitudes were less than expected. IMPRESSION:      1. Mildly abnormal EMG. 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     2.  Minimal evidence of residual median neuropathies at the wrist (minimal signs of persistent CTS bilaterally). Electrical changes are improved compared to 2020. 3.  No evidence of a focal spinal nerve root lesion (radiculopathy), plexopathy, generalized neuropathy or primary muscle disease.          Thank you for this interesting referral.

## 2023-02-17 NOTE — LETTER
February 17, 2023            Adelfo Babcock MD  [address] [place] [address], [address] [place], [place] [id number]            Patient Name: Dione Ga   MRN Number: [id number]   YOB: 1965   Date Of Visit: 02/17/2023            Dear Adelfo Babcock MD,      Thank you for referring Dione Ga to me for electrodiagnostic testing. Attached are the findings of the EMG and my assessment. If you have any further questions, please do not hesitate to call me. Thank you for this interesting referral.      Sincerely,          PRIMO Montanez MD.

## 2023-02-20 ENCOUNTER — TELEPHONE (OUTPATIENT)
Dept: FAMILY MEDICINE CLINIC | Age: 58
End: 2023-02-20

## 2023-02-20 NOTE — TELEPHONE ENCOUNTER
----- Message from Mitchell Patient sent at 2/20/2023  1:08 PM EST -----  Subject: Referral Request    Reason for referral request? patient states she is going to need surgery   but needs a CAT Scan of her abdomen/ pelvis area and needs to be done   before the March 2 or after March 5th , she will be gone for three days -   please call her   Provider patient wants to be referred to(if known):     Provider Phone Number(if known):     Additional Information for Provider?   ---------------------------------------------------------------------------  --------------  [id number]    [phone]; OK to leave message on voicemail  ---------------------------------------------------------------------------  --------------

## 2023-02-20 NOTE — TELEPHONE ENCOUNTER
patient states she is going to need surgery   but needs a CAT Scan of her abdomen/ pelvis area and needs to be done   before the March 2 or after March 5th , she will be gone for three days -   please call her       Called and left vm for pt to call to schedule appointment for pre op

## 2023-02-24 ENCOUNTER — TELEPHONE (OUTPATIENT)
Dept: CARDIOLOGY CLINIC | Age: 58
End: 2023-02-24

## 2023-02-24 NOTE — TELEPHONE ENCOUNTER
Patient states B/P yesterday was 78/60. Patient is very dizzy but eating potato chips and starting to feel better. Advised to increase hydration.

## 2023-02-24 NOTE — TELEPHONE ENCOUNTER
Patient is dizzy and sweating needs to know what she can eat or do as she feels faint. Please call asap.

## 2023-02-27 NOTE — TELEPHONE ENCOUNTER
Patient called her blood pressure is elevated  Sunday 1pm 143/87 10:00 pm 160/83  Today 134/88 HR 86

## 2023-02-27 NOTE — TELEPHONE ENCOUNTER
2/24 stopped by HCTZ and Lisinopril  due to low B/P,  patient reports Sunday 1pm 143/87   10:00 pm 160/83  Today 134/88 HR 86

## 2023-02-28 RX ORDER — AMLODIPINE BESYLATE 2.5 MG/1
2.5 TABLET ORAL DAILY
Qty: 90 TABLET | Refills: 1 | Status: SHIPPED | OUTPATIENT
Start: 2023-02-28

## 2023-03-09 ENCOUNTER — OFFICE VISIT (OUTPATIENT)
Dept: FAMILY MEDICINE CLINIC | Age: 58
End: 2023-03-09
Payer: COMMERCIAL

## 2023-03-09 VITALS
OXYGEN SATURATION: 98 % | HEART RATE: 74 BPM | WEIGHT: 166.6 LBS | BODY MASS INDEX: 26.15 KG/M2 | HEIGHT: 67 IN | SYSTOLIC BLOOD PRESSURE: 132 MMHG | DIASTOLIC BLOOD PRESSURE: 86 MMHG

## 2023-03-09 DIAGNOSIS — J44.9 COPD, SEVERE (HCC): ICD-10-CM

## 2023-03-09 DIAGNOSIS — E11.9 TYPE 2 DIABETES MELLITUS WITHOUT COMPLICATION, WITHOUT LONG-TERM CURRENT USE OF INSULIN (HCC): ICD-10-CM

## 2023-03-09 DIAGNOSIS — R60.9 CHRONIC EDEMA: ICD-10-CM

## 2023-03-09 DIAGNOSIS — E78.2 MIXED HYPERLIPIDEMIA: ICD-10-CM

## 2023-03-09 DIAGNOSIS — K66.0 ABDOMINAL ADHESIONS: Primary | ICD-10-CM

## 2023-03-09 DIAGNOSIS — I10 ESSENTIAL HYPERTENSION: ICD-10-CM

## 2023-03-09 DIAGNOSIS — F41.9 ANXIETY: ICD-10-CM

## 2023-03-09 DIAGNOSIS — G89.4 CHRONIC PAIN SYNDROME: ICD-10-CM

## 2023-03-09 PROCEDURE — G8427 DOCREV CUR MEDS BY ELIG CLIN: HCPCS | Performed by: NURSE PRACTITIONER

## 2023-03-09 PROCEDURE — 3017F COLORECTAL CA SCREEN DOC REV: CPT | Performed by: NURSE PRACTITIONER

## 2023-03-09 PROCEDURE — 99214 OFFICE O/P EST MOD 30 MIN: CPT | Performed by: NURSE PRACTITIONER

## 2023-03-09 PROCEDURE — 3023F SPIROM DOC REV: CPT | Performed by: NURSE PRACTITIONER

## 2023-03-09 PROCEDURE — G8484 FLU IMMUNIZE NO ADMIN: HCPCS | Performed by: NURSE PRACTITIONER

## 2023-03-09 PROCEDURE — G8417 CALC BMI ABV UP PARAM F/U: HCPCS | Performed by: NURSE PRACTITIONER

## 2023-03-09 PROCEDURE — 1036F TOBACCO NON-USER: CPT | Performed by: NURSE PRACTITIONER

## 2023-03-09 PROCEDURE — 3044F HG A1C LEVEL LT 7.0%: CPT | Performed by: NURSE PRACTITIONER

## 2023-03-09 PROCEDURE — 3075F SYST BP GE 130 - 139MM HG: CPT | Performed by: NURSE PRACTITIONER

## 2023-03-09 PROCEDURE — 3079F DIAST BP 80-89 MM HG: CPT | Performed by: NURSE PRACTITIONER

## 2023-03-09 PROCEDURE — 2022F DILAT RTA XM EVC RTNOPTHY: CPT | Performed by: NURSE PRACTITIONER

## 2023-03-09 ASSESSMENT — PATIENT HEALTH QUESTIONNAIRE - PHQ9
SUM OF ALL RESPONSES TO PHQ QUESTIONS 1-9: 0
1. LITTLE INTEREST OR PLEASURE IN DOING THINGS: 0
SUM OF ALL RESPONSES TO PHQ QUESTIONS 1-9: 0
SUM OF ALL RESPONSES TO PHQ9 QUESTIONS 1 & 2: 0
2. FEELING DOWN, DEPRESSED OR HOPELESS: 0

## 2023-03-09 NOTE — PROGRESS NOTES
3/9/2023     Álvaro Barriga (:  1965) is a 56 y.o. female, here for evaluation of the following medical concerns:        Patient here for preop testing.       -Has had previous panniculectomy with Dr. Kathleen Campos and wound dehiscence. Two surgeries with dr Mike Tipton.   -Now fat and skin distribution requires more complex excision    Presents with remaining overhanging pannus with overhanging lower abdominal skin. She also reports pain throughout her abdomen specifically over her lower scar. Her pain description is vague and unclear if this is superficial pain or deeper pain. Follows with plastic surgery at [facility]. They are requesting labs and an abdominal CT scan and previous surgical notes. She does not have any forms and does not know exactly what needs done. Surgery is not scheduled, per plastic surgeon notes-patient needs to provide all tests and notes and he will consider doing her surgery. 2017 sleeve gastrectomy    2019 paniculectomy surgery     paniculectomy surgery         Needs cardiac clearance per plastics, better BP control, DM control           Cardiac--  2019 when she was undergoing surgery was noted to have second-degree heart block type I Wenckebach  Echo shows preserved EF normal stress test in   stress test  in Dec 2022 showed no ischemia but reduced exercise capacity   Seeing timmy duenas. Dr Lindsay Almonte. Last seen 2023          --------------------------------    Chronics       Uncontrolled hypertension-stopped lisinopril hydrochlorothiazide due to low blood pressure 2023. Added midodrine. Blood pressure went back up. Stopped midodrine and added amlodipine 2.5 mg daily. Blood pressure has been less than 140/90 \" most of the time\". 132/86 today  She did go see cardiology. Dr. Ilana Cronin          Type 2 diabetes-taking alogliptin. Hemoglobin A1c 6.2022.         Hyperlipidemia-taking Lipitor       Chronic lower extremity edema   no longer taking lasix. Also stopped thiazide due to low BP  No lower extremity edema right now. Following with cardiology who is managing. Anxiety depression  She used to take Xanax, but that was stopped by mental health. She denies any suicidal thought plan idea, self-harm, insomnia. Does not want to see mental health  No longer taking Buspar PRN        COPD   taking proair, spiriva and breo. Follows with Dr. Brody Zhu. Denies shortness of breath or cough. no recent COPD exacerbations        Chronic pain  follows with pain management. Has done physical therapy in the past.    Has been trying to get disability since 2008. Was referred to specialists for disability workup and paperwork completion. Has not worked since 2008. Taking zanaflex and percocet  Also has diagnosis of fibromyalgia-all managed by pain management  Following with Dr. Silver Arguello as well  Has had steroid injections. Neurology for migraines  neuro Conroy at [place].         Reports having partial hysterectomy,still has cervix. HSV2  +  May 2021 - Valtrex daily  Follows with infectious disease  No hx of ulcers        sleep study normal august 2021 - normal. No CPAP     Mammogram march 2022 normal      Having night sweats menopausal.   Was on hormones previously, but stopped for no specific reason. Referred to GYN -- they started estrace          Stopped smoking 2016           Review of Systems    Prior to Visit Medications    Medication Sig Taking?  Authorizing Provider   amLODIPine (NORVASC) 2.5 MG tablet Take 1 tablet by mouth daily Yes YESSICA Hanson - CNP   alogliptin (NESINA) 6.25 MG TABS tablet TAKE 1 TABLET BY MOUTH DAILY Yes Eugene Dc MD   omeprazole (PRILOSEC) 40 MG delayed release capsule Take 1 capsule by mouth daily Yes Raúl Braun MD   atorvastatin (LIPITOR) 80 MG tablet Take 1 tablet by mouth daily Yes Lilian Rees MD   ammonium lactate (LAC-HYDRIN) 12 % lotion APPLY TO SCALY AREAS ONCE A DAY AS NEEDED Yes Historical Provider, MD   chlorhexidine (PERIDEX) 0.12 % solution RINSE WITH 1/2OZ TWICE DAILY ONLY AFTER BREAKFAST AND BEFORE BED Yes Historical Provider, MD   clobetasol (TEMOVATE) 0.05 % ointment APPLY TO PSORIASIS TWICE A DAY MONDAY - FRIDAY, NO WEEKENDS Yes Historical Provider, MD   hydrocortisone 2.5 % ointment APPLY TO PSORIASIS ONCE A DAY AS NEEDED FOR FLARES Yes Historical Provider, MD   Psyllium 33 % POWD Fiber (psyllium husk-sugar) 3.4 gram/12 gram oral powder Yes Historical Provider, MD   sucralfate (CARAFATE) 1 GM tablet Take 1 tablet by mouth 4 times daily Please crush and 5 mL of water and take a slurry. Please  out from other medication Yes Danielito Capps MD   valACYclovir (VALTREX) 500 MG tablet Take 1 tablet by mouth daily Yes Jo-Ann Olivera MD   amitriptyline (ELAVIL) 25 MG tablet TAKE 1 TABLET AT BEDTIME Yes Historical Provider, MD   estradiol (ESTRACE) 1 MG tablet Take 1 tablet by mouth daily Yes Rosalene Bleacher, APRN - CNP   midodrine (PROAMATINE) 2.5 MG tablet Take 1 tablet by mouth 3 times daily as needed (SBP less than 100) Yes YESSICA Gaines CNP   vitamin D (ERGOCALCIFEROL) 1.25 MG (26380 UT) CAPS capsule Take 1 capsule by mouth once a week Yes Maged Love II, MD   clotrimazole-betamethasone (LOTRISONE) 1-0.05 % cream Apply topically 2 times daily.  Yes Rosagilese Bleacher, APRN - CNP   SPIRIVA RESPIMAT 2.5 MCG/ACT AERS inhaler Inhale 2 puffs into the lungs daily Yes Nancy Mcgraw MD   coenzyme Q-10 100 MG capsule Take 1 capsule by mouth daily Yes YESSICA Huber CNP   albuterol sulfate HFA (VENTOLIN HFA) 108 (90 Base) MCG/ACT inhaler Inhale 2 puffs into the lungs 4 times daily as needed for Wheezing Yes Nancy Mcgraw MD   albuterol (PROVENTIL) (2.5 MG/3ML) 0.083% nebulizer solution Take 3 mLs by nebulization every 6 hours Yes Nancy Mcgraw MD   PROAIR  (76 Base) MCG/ACT inhaler Inhale 2 puffs into the lungs every 6 hours as needed for Wheezing Yes Arnulfo Jarquin MD   Calcium Carb-Cholecalciferol (CALCIUM-VITAMIN D) 600-400 MG-UNIT TABS Take 1 tablet by mouth daily Yes YESSICA Stratton NP   ondansetron (ZOFRAN-ODT) 4 MG disintegrating tablet Take 1 tablet by mouth 3 times daily as needed for Nausea or Vomiting Yes Meredith LeaksYESSICA - CNP   Multiple Vitamins-Minerals (THERAPEUTIC MULTIVITAMIN-MINERALS) tablet Take 1 tablet by mouth daily Yes Meredith LeaksYESSICA - CNP   hydrOXYzine (ATARAX) 25 MG tablet TAKE 1 TABLET EVERY 8 HOURS AS NEEDED FOR ANXIETY AVOID ALCOHOL/CAUSES DROWSINESS. Do not take with Xanax Yes YESSICA Stratton - BARRIE   tiZANidine (ZANAFLEX) 4 MG tablet Take 4 mg by mouth every 8 hours as needed  Yes Historical Provider, MD   oxyCODONE-acetaminophen (PERCOCET) 7.5-325 MG per tablet Take 1 tablet by mouth every 8 hours as needed for Pain (Patient states takes every 6 hours). Yes Historical Provider, MD   fluticasone-vilanterol (BREO ELLIPTA) 100-25 MCG/INH AEPB inhaler Inhale 1 puff into the lungs daily  Arnulfo Jarquin MD        Social History     Tobacco Use    Smoking status: Former     Packs/day: 0.25     Years: 30.00     Pack years: 7.50     Types: Cigarettes, E-Cigarettes     Start date:      Quit date: 2016     Years since quittin.1    Smokeless tobacco: Never   Substance Use Topics    Alcohol use: Yes     Comment: \"Occ. Maybe Twice A Month\"  caffeine iced coffee occ        Vitals:    23 0859   BP: 132/86   Site: Left Upper Arm   Position: Sitting   Pulse: 74   SpO2: 98%   Weight: 166 lb 9.6 oz (75.6 kg)   Height: 5' 7\" (1.702 m)     Estimated body mass index is 26.09 kg/m² as calculated from the following:    Height as of this encounter: 5' 7\" (1.702 m). Weight as of this encounter: 166 lb 9.6 oz (75.6 kg). Physical Exam  Constitutional:       General: She is not in acute distress. Appearance: Normal appearance. She is not ill-appearing, toxic-appearing or diaphoretic. HENT:      Head: Normocephalic and atraumatic. Cardiovascular:      Rate and Rhythm: Normal rate. Pulmonary:      Effort: Pulmonary effort is normal.   Abdominal:      Palpations: Abdomen is soft. There is no mass. Comments: Two separate pannus in yellow. Deep surgical scar with tightness of the skin marked in red.  the two pannus'   Musculoskeletal:         General: Normal range of motion. Cervical back: Normal range of motion. Skin:     General: Skin is warm and dry. Neurological:      General: No focal deficit present. Mental Status: She is alert and oriented to person, place, and time. Mental status is at baseline. ASSESSMENT/PLAN:  1. Abdominal adhesions    2. Type 2 diabetes mellitus without complication, without long-term current use of insulin ([address]    3. Essential hypertension    4. Chronic pain syndrome    5. COPD, severe ([address]    6. Anxiety    7. Mixed hyperlipidemia    8. Chronic edema BLE        This PCP office called surgeon to request a list of what they need in regards to notes, imaging, labs. We will then put in the orders and fax them all back. Patient will need to see her cardiologist for clearance. Blood pressure within normal limits today. Advised patient to monitor at home. All care gaps addressed     All questions answered    Discussed use, benefit, and side effects of prescribed medications. Barriers to compliance discussed. All patient questions answered. Pt voiced understanding. Present to the ER for any emergent or acute symptoms not managed at home or in office. Please note that this chart was generated using dragon dictation software. Although every effort was made to ensure the accuracy of this automated transcription, some errors in transcription may have occurred. No follow-ups on file. An electronic signature was used to authenticate this note.     --YESSICA Carranza - NP on 3/9/2023 at 10:27 AM

## 2023-03-14 ENCOUNTER — TELEPHONE (OUTPATIENT)
Dept: FAMILY MEDICINE CLINIC | Age: 58
End: 2023-03-14

## 2023-03-14 NOTE — TELEPHONE ENCOUNTER
Pt called today. Still feeling congested, coughing and sore throat. She said she was prescribed an antibiotic not too long ago for the same thing and it hasn't gone away. Would like something called into [facility] on [address] [facility]. Please advise.

## 2023-03-15 DIAGNOSIS — E11.9 TYPE 2 DIABETES MELLITUS WITHOUT COMPLICATION, WITHOUT LONG-TERM CURRENT USE OF INSULIN (HCC): ICD-10-CM

## 2023-03-15 NOTE — TELEPHONE ENCOUNTER
I do not prescribe without seeing patient. Can she see walk in clinic today. She will be seeing pulm today it looks like.

## 2023-03-16 DIAGNOSIS — Z98.890 S/P ABDOMINOPLASTY: ICD-10-CM

## 2023-03-16 DIAGNOSIS — E11.9 TYPE 2 DIABETES MELLITUS WITHOUT COMPLICATION, WITHOUT LONG-TERM CURRENT USE OF INSULIN (HCC): ICD-10-CM

## 2023-03-16 DIAGNOSIS — R10.30 POSTOPERATIVE LOWER ABDOMINAL PAIN: ICD-10-CM

## 2023-03-16 DIAGNOSIS — Z98.84 S/P LAPAROSCOPIC SLEEVE GASTRECTOMY: ICD-10-CM

## 2023-03-16 DIAGNOSIS — G89.18 POSTOPERATIVE LOWER ABDOMINAL PAIN: ICD-10-CM

## 2023-03-16 DIAGNOSIS — K66.0 ABDOMINAL ADHESIONS: Primary | ICD-10-CM

## 2023-03-16 DIAGNOSIS — Z98.890 S/P PANNICULECTOMY: ICD-10-CM

## 2023-03-16 DIAGNOSIS — E65 ABDOMINAL PANNUS: ICD-10-CM

## 2023-03-16 NOTE — PROGRESS NOTES
Proceed with surgery no further testing needed   moderate  risk for surgery  Hold aspirin for procedure

## 2023-03-16 NOTE — PROGRESS NOTES
Pre op for surgical correction of paniculectomy. Dr Derek Pope     Per surgeon, we Need:  A1C -ordered  CT abdomin pelvis -ordered  Cardio and pulm clearance  dr Elayne Alejandro notes      A1C 6.9    ----1/9/23   I will order a1c but insurance will not likely cover due to recent completion        Office staff to please update patient.    PCP CC'd note to dr Jennifer Prater and dr Shahbaz Meade

## 2023-03-17 RX ORDER — HYDROXYZINE HYDROCHLORIDE 25 MG/1
TABLET, FILM COATED ORAL
Qty: 60 TABLET | Refills: 1 | Status: SHIPPED | OUTPATIENT
Start: 2023-03-17

## 2023-03-17 NOTE — TELEPHONE ENCOUNTER
Patient has not been using a lot.  Reports just occasionally and feels like she needs a refill of it now

## 2023-03-30 ENCOUNTER — HOSPITAL ENCOUNTER (OUTPATIENT)
Dept: CT IMAGING | Age: 58
Discharge: HOME | End: 2023-03-30
Payer: COMMERCIAL

## 2023-03-30 DIAGNOSIS — G89.18 POSTOPERATIVE LOWER ABDOMINAL PAIN: ICD-10-CM

## 2023-03-30 DIAGNOSIS — K66.0 ABDOMINAL ADHESIONS: ICD-10-CM

## 2023-03-30 DIAGNOSIS — Z98.84 S/P LAPAROSCOPIC SLEEVE GASTRECTOMY: ICD-10-CM

## 2023-03-30 DIAGNOSIS — R10.30 POSTOPERATIVE LOWER ABDOMINAL PAIN: ICD-10-CM

## 2023-03-30 PROCEDURE — 74176 CT ABD & PELVIS W/O CONTRAST: CPT

## 2023-03-31 ENCOUNTER — TELEPHONE (OUTPATIENT)
Dept: FAMILY MEDICINE CLINIC | Age: 58
End: 2023-03-31

## 2023-03-31 RX ORDER — BLOOD-GLUCOSE METER
1 KIT MISCELLANEOUS DAILY
Qty: 100 STRIP | Refills: 11 | Status: SHIPPED | OUTPATIENT
Start: 2023-03-31

## 2023-03-31 RX ORDER — LANCETS 30 GAUGE
1 EACH MISCELLANEOUS DAILY
Qty: 100 EACH | Refills: 5 | Status: SHIPPED | OUTPATIENT
Start: 2023-03-31

## 2023-03-31 NOTE — TELEPHONE ENCOUNTER
Patient called and received her results, if anything was seen near the belly button. She stated  that she can stick almost a whole Q-tip when cleaning.

## 2023-03-31 NOTE — TELEPHONE ENCOUNTER
Patient is not on metformin. We can discuss when she comes in next week.    I will fill strips and lancets

## 2023-03-31 NOTE — TELEPHONE ENCOUNTER
Nothing noted explaining that on imaging. Please have her follow up with general surgery that is seeing her ASAP. Call on Monday to notify. I thought her wound had healed?

## 2023-04-04 ENCOUNTER — HOSPITAL ENCOUNTER (EMERGENCY)
Age: 58
Discharge: HOME | End: 2023-04-04
Attending: EMERGENCY MEDICINE
Payer: COMMERCIAL

## 2023-04-04 ENCOUNTER — APPOINTMENT (OUTPATIENT)
Dept: GENERAL RADIOLOGY | Age: 58
End: 2023-04-04
Payer: COMMERCIAL

## 2023-04-04 ENCOUNTER — TELEPHONE (OUTPATIENT)
Dept: CARDIOLOGY CLINIC | Age: 58
End: 2023-04-04

## 2023-04-04 VITALS
HEIGHT: 68 IN | SYSTOLIC BLOOD PRESSURE: 93 MMHG | BODY MASS INDEX: 24.25 KG/M2 | HEART RATE: 47 BPM | TEMPERATURE: 97.5 F | RESPIRATION RATE: 17 BRPM | DIASTOLIC BLOOD PRESSURE: 68 MMHG | OXYGEN SATURATION: 96 % | WEIGHT: 160 LBS

## 2023-04-04 DIAGNOSIS — R55 NEAR SYNCOPE: Primary | ICD-10-CM

## 2023-04-04 LAB
ALBUMIN SERPL-MCNC: 4 GM/DL (ref 3.4–5)
ALP BLD-CCNC: 67 IU/L (ref 40–129)
ALT SERPL-CCNC: 11 U/L (ref 10–40)
ANION GAP SERPL CALCULATED.3IONS-SCNC: 8 MMOL/L (ref 4–16)
AST SERPL-CCNC: 19 IU/L (ref 15–37)
BILIRUB SERPL-MCNC: 0.6 MG/DL (ref 0–1)
BUN SERPL-MCNC: 12 MG/DL (ref 6–23)
CALCIUM SERPL-MCNC: 8.7 MG/DL (ref 8.3–10.6)
CHLORIDE BLD-SCNC: 103 MMOL/L (ref 99–110)
CO2: 27 MMOL/L (ref 21–32)
CREAT SERPL-MCNC: 0.9 MG/DL (ref 0.6–1.1)
EKG ATRIAL RATE: 57 BPM
EKG DIAGNOSIS: NORMAL
EKG P AXIS: 64 DEGREES
EKG Q-T INTERVAL: 512 MS
EKG QRS DURATION: 70 MS
EKG QTC CALCULATION (BAZETT): 422 MS
EKG R AXIS: 83 DEGREES
EKG T AXIS: 92 DEGREES
EKG VENTRICULAR RATE: 41 BPM
GFR SERPL CREATININE-BSD FRML MDRD: >60 ML/MIN/1.73M2
GLUCOSE SERPL-MCNC: 124 MG/DL (ref 70–99)
HCT VFR BLD CALC: 36.6 % (ref 37–47)
HEMOGLOBIN: 12.3 GM/DL (ref 12.5–16)
MAGNESIUM: 2 MG/DL (ref 1.8–2.4)
MCH RBC QN AUTO: 26.7 PG (ref 27–31)
MCHC RBC AUTO-ENTMCNC: 33.6 % (ref 32–36)
MCV RBC AUTO: 79.4 FL (ref 78–100)
PDW BLD-RTO: 14.2 % (ref 11.7–14.9)
PLATELET # BLD: 305 K/CU MM (ref 140–440)
PMV BLD AUTO: 10.6 FL (ref 7.5–11.1)
POTASSIUM SERPL-SCNC: 4.4 MMOL/L (ref 3.5–5.1)
RBC # BLD: 4.61 M/CU MM (ref 4.2–5.4)
SODIUM BLD-SCNC: 138 MMOL/L (ref 135–145)
TOTAL PROTEIN: 6.5 GM/DL (ref 6.4–8.2)
TROPONIN T: <0.01 NG/ML
TSH SERPL DL<=0.005 MIU/L-ACNC: 2.21 UIU/ML (ref 0.27–4.2)
WBC # BLD: 6.8 K/CU MM (ref 4–10.5)

## 2023-04-04 PROCEDURE — 93005 ELECTROCARDIOGRAM TRACING: CPT | Performed by: EMERGENCY MEDICINE

## 2023-04-04 PROCEDURE — 96360 HYDRATION IV INFUSION INIT: CPT

## 2023-04-04 PROCEDURE — 85027 COMPLETE CBC AUTOMATED: CPT

## 2023-04-04 PROCEDURE — 71045 X-RAY EXAM CHEST 1 VIEW: CPT

## 2023-04-04 PROCEDURE — 6370000000 HC RX 637 (ALT 250 FOR IP): Performed by: EMERGENCY MEDICINE

## 2023-04-04 PROCEDURE — 83735 ASSAY OF MAGNESIUM: CPT

## 2023-04-04 PROCEDURE — 99285 EMERGENCY DEPT VISIT HI MDM: CPT

## 2023-04-04 PROCEDURE — 93010 ELECTROCARDIOGRAM REPORT: CPT | Performed by: INTERNAL MEDICINE

## 2023-04-04 PROCEDURE — 80053 COMPREHEN METABOLIC PANEL: CPT

## 2023-04-04 PROCEDURE — 2580000003 HC RX 258: Performed by: EMERGENCY MEDICINE

## 2023-04-04 PROCEDURE — 84443 ASSAY THYROID STIM HORMONE: CPT

## 2023-04-04 PROCEDURE — 84484 ASSAY OF TROPONIN QUANT: CPT

## 2023-04-04 RX ORDER — OXYCODONE HYDROCHLORIDE AND ACETAMINOPHEN 5; 325 MG/1; MG/1
2 TABLET ORAL ONCE
Status: DISCONTINUED | OUTPATIENT
Start: 2023-04-04 | End: 2023-04-04 | Stop reason: HOSPADM

## 2023-04-04 RX ORDER — MIDODRINE HYDROCHLORIDE 2.5 MG/1
2.5 TABLET ORAL 3 TIMES DAILY PRN
Qty: 90 TABLET | Refills: 0 | Status: SHIPPED | OUTPATIENT
Start: 2023-04-04

## 2023-04-04 RX ORDER — MIDODRINE HYDROCHLORIDE 5 MG/1
2.5 TABLET ORAL ONCE
Status: COMPLETED | OUTPATIENT
Start: 2023-04-04 | End: 2023-04-04

## 2023-04-04 RX ORDER — 0.9 % SODIUM CHLORIDE 0.9 %
1000 INTRAVENOUS SOLUTION INTRAVENOUS ONCE
Status: COMPLETED | OUTPATIENT
Start: 2023-04-04 | End: 2023-04-04

## 2023-04-04 RX ADMIN — MIDODRINE HYDROCHLORIDE 2.5 MG: 5 TABLET ORAL at 10:38

## 2023-04-04 RX ADMIN — SODIUM CHLORIDE 1000 ML: 9 INJECTION, SOLUTION INTRAVENOUS at 10:35

## 2023-04-04 ASSESSMENT — PAIN DESCRIPTION - LOCATION: LOCATION: ABDOMEN;SHOULDER

## 2023-04-04 ASSESSMENT — PAIN DESCRIPTION - ORIENTATION: ORIENTATION: RIGHT

## 2023-04-04 ASSESSMENT — PAIN - FUNCTIONAL ASSESSMENT: PAIN_FUNCTIONAL_ASSESSMENT: 0-10

## 2023-04-04 ASSESSMENT — PAIN SCALES - GENERAL: PAINLEVEL_OUTOF10: 3

## 2023-04-04 NOTE — DISCHARGE INSTRUCTIONS
You should be taking midodrine if you start to feel lightheaded or if your blood pressure drops. Prescription has been provided.   If 1 dose of 2.5mg does not increase her blood pressure can increase to 5 mg

## 2023-04-04 NOTE — ED NOTES
Pt d/c reviewed and all questions answered and reeducated pt on medication regimen pt voiced understanding.      Tha Black RN  04/04/23 [id number]

## 2023-04-04 NOTE — TELEPHONE ENCOUNTER
Returned call, patient already called 911. States body feels cold.  Encouraged her to go to [facility]

## 2023-04-04 NOTE — ED TRIAGE NOTES
Pt stating started filing funny  this morning, checked her  systolic B/P was around 70 and diastolic around 50, and c/o fling light headed at the time, noted pt HR around 40. denies taking any medications this morning. Pt currently on 2.5 mg Amlodipine.

## 2023-04-04 NOTE — ED PROVIDER NOTES
Glom Filt Rate >60 >60 mL/min/1.73m2    Glucose 124 (H) 70 - 99 MG/DL    Calcium 8.7 8.3 - 10.6 MG/DL    Albumin 4.0 3.4 - 5.0 GM/DL    Total Protein 6.5 6.4 - 8.2 GM/DL    Total Bilirubin 0.6 0.0 - 1.0 MG/DL    ALT 11 10 - 40 U/L    AST 19 15 - 37 IU/L    Alkaline Phosphatase 67 40 - 129 IU/L    Anion Gap 8 4 - 16   Troponin   Result Value Ref Range    Troponin T <0.010 <0.01 NG/ML   Magnesium   Result Value Ref Range    Magnesium 2.0 1.8 - 2.4 mg/dl   TSH   Result Value Ref Range    TSH, High Sensitivity 2.210 0.270 - 4.20 uIu/ml   EKG 12 Lead   Result Value Ref Range    Ventricular Rate 41 BPM    Atrial Rate 57 BPM    QRS Duration 70 ms    Q-T Interval 512 ms    QTc Calculation (Bazett) 422 ms    P Axis 64 degrees    R Axis 83 degrees    T Axis 92 degrees    Diagnosis       Undetermined rhythm  Otherwise normal ECG  When compared with ECG of 29-JAN-2020 09:24,  Current undetermined rhythm precludes rhythm comparison, needs review        Radiographs (if obtained):  [] The following radiograph was interpreted by myself in the absence of a radiologist:   [x] Radiologist's Report Reviewed:  XR CHEST PORTABLE   Final Result   No acute cardiopulmonary findings. Procedures:  12 lead EKG per my interpretation:  Normal sinus rhythm, with 2nd degree AV block- type I  Rate: 41  QTc is  normal  There are no T wave changes  There are no ST concerning segment changes    Prior EKG to compare with was available and is the same    (All EKG's are interpreted by myself in the absence of a cardiologist)    Critical Care: Total critical care time today provided was 33 minutes. This excludes seperately billable procedure. Critical care time provided for potential or impending cardiac compromise that required close evaluation and/or intervention with concern for patient decompensation.       Chart review shows recent radiographs:  CT ABDOMEN PELVIS WO CONTRAST Additional Contrast? None    Result Date:

## 2023-04-04 NOTE — ED NOTES
EKG completed, results given to Dr. Anat Powell at bedside. Pt on life pack pads.      Jason Nichols RN  04/04/23 1024

## 2023-04-06 ENCOUNTER — TELEPHONE (OUTPATIENT)
Dept: FAMILY MEDICINE CLINIC | Age: 58
End: 2023-04-06

## 2023-04-06 NOTE — TELEPHONE ENCOUNTER
----- Message from Miya Kingston, [address] sent at 4/6/2023 11:28 AM EDT -----  Subject: Message to Provider    QUESTIONS  Information for Provider? Patient has been seeing a provider [facility] for a   hernia. Patient states she recieved a call from their office on Friday to   call ASAP and she has tried to call the practice back but they are not   answering her calls. Patient wants to know if someone in [place] office   can call [facility] general surgery to see if they can get an answer of what is   going on.   ---------------------------------------------------------------------------  --------------  [id number] [employer]  [phone]; OK to leave message on voicemail  ---------------------------------------------------------------------------  --------------  SCRIPT ANSWERS  Relationship to Patient?  Self

## 2023-04-07 ENCOUNTER — OFFICE VISIT (OUTPATIENT)
Dept: FAMILY MEDICINE CLINIC | Age: 58
End: 2023-04-07
Payer: COMMERCIAL

## 2023-04-07 VITALS
OXYGEN SATURATION: 98 % | WEIGHT: 167 LBS | HEIGHT: 67 IN | HEART RATE: 79 BPM | SYSTOLIC BLOOD PRESSURE: 138 MMHG | BODY MASS INDEX: 26.21 KG/M2 | DIASTOLIC BLOOD PRESSURE: 86 MMHG

## 2023-04-07 DIAGNOSIS — E11.9 TYPE 2 DIABETES MELLITUS WITHOUT COMPLICATION, WITHOUT LONG-TERM CURRENT USE OF INSULIN (HCC): Primary | ICD-10-CM

## 2023-04-07 DIAGNOSIS — R23.3 EASY BRUISING: ICD-10-CM

## 2023-04-07 DIAGNOSIS — Z12.31 ENCOUNTER FOR SCREENING MAMMOGRAM FOR BREAST CANCER: ICD-10-CM

## 2023-04-07 DIAGNOSIS — R19.5 HARD STOOL: ICD-10-CM

## 2023-04-07 PROCEDURE — 3017F COLORECTAL CA SCREEN DOC REV: CPT | Performed by: NURSE PRACTITIONER

## 2023-04-07 PROCEDURE — 3075F SYST BP GE 130 - 139MM HG: CPT | Performed by: NURSE PRACTITIONER

## 2023-04-07 PROCEDURE — 3044F HG A1C LEVEL LT 7.0%: CPT | Performed by: NURSE PRACTITIONER

## 2023-04-07 PROCEDURE — 99214 OFFICE O/P EST MOD 30 MIN: CPT | Performed by: NURSE PRACTITIONER

## 2023-04-07 PROCEDURE — 3079F DIAST BP 80-89 MM HG: CPT | Performed by: NURSE PRACTITIONER

## 2023-04-07 PROCEDURE — G8417 CALC BMI ABV UP PARAM F/U: HCPCS | Performed by: NURSE PRACTITIONER

## 2023-04-07 PROCEDURE — G8427 DOCREV CUR MEDS BY ELIG CLIN: HCPCS | Performed by: NURSE PRACTITIONER

## 2023-04-07 PROCEDURE — 2022F DILAT RTA XM EVC RTNOPTHY: CPT | Performed by: NURSE PRACTITIONER

## 2023-04-07 PROCEDURE — 1036F TOBACCO NON-USER: CPT | Performed by: NURSE PRACTITIONER

## 2023-04-07 RX ORDER — DOCUSATE SODIUM 100 MG/1
100 CAPSULE, LIQUID FILLED ORAL 2 TIMES DAILY PRN
Qty: 60 CAPSULE | Refills: 0 | Status: SHIPPED | OUTPATIENT
Start: 2023-04-07

## 2023-04-07 RX ORDER — ALOGLIPTIN 6.25 MG/1
6.25 TABLET, FILM COATED ORAL DAILY
Qty: 90 TABLET | Refills: 0 | Status: SHIPPED | OUTPATIENT
Start: 2023-04-07 | End: 2023-07-06

## 2023-04-08 ENCOUNTER — HOSPITAL ENCOUNTER (OUTPATIENT)
Age: 58
Discharge: HOME | End: 2023-04-08
Payer: COMMERCIAL

## 2023-04-08 PROCEDURE — 82180 ASSAY OF ASCORBIC ACID: CPT

## 2023-04-08 PROCEDURE — 84597 ASSAY OF VITAMIN K: CPT

## 2023-04-20 LAB — PHYTONADIONE SERPL-MCNC: 0.86 NMOL/L (ref 0.22–4.88)

## 2023-04-24 ENCOUNTER — HOSPITAL ENCOUNTER (OUTPATIENT)
Dept: PULMONOLOGY | Age: 58
Discharge: HOME | End: 2023-04-24
Payer: COMMERCIAL

## 2023-04-24 ENCOUNTER — HOSPITAL ENCOUNTER (OUTPATIENT)
Dept: CT IMAGING | Age: 58
Discharge: HOME | End: 2023-04-24
Payer: COMMERCIAL

## 2023-04-24 DIAGNOSIS — J44.9 COPD, SEVERE (HCC): ICD-10-CM

## 2023-04-24 PROCEDURE — 94060 EVALUATION OF WHEEZING: CPT

## 2023-04-24 PROCEDURE — 94726 PLETHYSMOGRAPHY LUNG VOLUMES: CPT

## 2023-04-24 PROCEDURE — 94729 DIFFUSING CAPACITY: CPT

## 2023-04-24 PROCEDURE — 71271 CT THORAX LUNG CANCER SCR C-: CPT

## 2023-04-27 ENCOUNTER — TELEPHONE (OUTPATIENT)
Dept: FAMILY MEDICINE CLINIC | Age: 58
End: 2023-04-27

## 2023-04-27 NOTE — TELEPHONE ENCOUNTER
Patient called and wanted to know if she could get something for Sleep. She stated that has not been sleeping. Please advise.

## 2023-04-28 DIAGNOSIS — K21.9 GASTROESOPHAGEAL REFLUX DISEASE, UNSPECIFIED WHETHER ESOPHAGITIS PRESENT: ICD-10-CM

## 2023-04-28 RX ORDER — SUCRALFATE 1 G/1
1 TABLET ORAL 4 TIMES DAILY
Qty: 120 TABLET | Refills: 3 | OUTPATIENT
Start: 2023-04-28

## 2023-04-28 RX ORDER — OMEPRAZOLE 40 MG/1
40 CAPSULE, DELAYED RELEASE ORAL DAILY
Qty: 90 CAPSULE | Refills: 2 | OUTPATIENT
Start: 2023-04-28

## 2023-04-28 NOTE — TELEPHONE ENCOUNTER
Can try melatonin over the counter or make an appointment to discuss. She has been medicated for anxiety before, but is not currently taking anything.

## 2023-05-01 ENCOUNTER — OFFICE VISIT (OUTPATIENT)
Dept: FAMILY MEDICINE CLINIC | Age: 58
End: 2023-05-01
Payer: COMMERCIAL

## 2023-05-01 VITALS
WEIGHT: 160.2 LBS | SYSTOLIC BLOOD PRESSURE: 128 MMHG | BODY MASS INDEX: 24.28 KG/M2 | HEIGHT: 68 IN | DIASTOLIC BLOOD PRESSURE: 80 MMHG

## 2023-05-01 DIAGNOSIS — F51.01 PRIMARY INSOMNIA: Primary | ICD-10-CM

## 2023-05-01 DIAGNOSIS — M79.7 FIBROMYALGIA: ICD-10-CM

## 2023-05-01 DIAGNOSIS — F41.9 ANXIETY: ICD-10-CM

## 2023-05-01 DIAGNOSIS — G89.4 CHRONIC PAIN SYNDROME: ICD-10-CM

## 2023-05-01 PROBLEM — Z98.84 S/P LAPAROSCOPIC SLEEVE GASTRECTOMY: Status: RESOLVED | Noted: 2017-11-02 | Resolved: 2023-05-01

## 2023-05-01 PROBLEM — G47.33 OSA (OBSTRUCTIVE SLEEP APNEA): Status: RESOLVED | Noted: 2021-08-05 | Resolved: 2023-05-01

## 2023-05-01 PROBLEM — D12.2 ADENOMATOUS POLYP OF ASCENDING COLON: Status: RESOLVED | Noted: 2020-08-07 | Resolved: 2023-05-01

## 2023-05-01 PROBLEM — G47.10 HYPERSOMNIA: Status: RESOLVED | Noted: 2021-08-05 | Resolved: 2023-05-01

## 2023-05-01 PROBLEM — Z98.890 S/P PANNICULECTOMY: Status: RESOLVED | Noted: 2021-03-05 | Resolved: 2023-05-01

## 2023-05-01 PROBLEM — R07.9 CHEST PAIN: Status: RESOLVED | Noted: 2022-11-29 | Resolved: 2023-05-01

## 2023-05-01 PROBLEM — Z51.81 ADMISSION FOR THERAPEUTIC DRUG MONITORING: Status: RESOLVED | Noted: 2021-06-23 | Resolved: 2023-05-01

## 2023-05-01 PROBLEM — E65 PANNUS, ABDOMINAL: Status: RESOLVED | Noted: 2021-05-07 | Resolved: 2023-05-01

## 2023-05-01 PROBLEM — Z98.890 S/P ABDOMINOPLASTY: Status: RESOLVED | Noted: 2019-02-01 | Resolved: 2023-05-01

## 2023-05-01 PROCEDURE — 1036F TOBACCO NON-USER: CPT | Performed by: NURSE PRACTITIONER

## 2023-05-01 PROCEDURE — 99213 OFFICE O/P EST LOW 20 MIN: CPT | Performed by: NURSE PRACTITIONER

## 2023-05-01 PROCEDURE — G8427 DOCREV CUR MEDS BY ELIG CLIN: HCPCS | Performed by: NURSE PRACTITIONER

## 2023-05-01 PROCEDURE — 3074F SYST BP LT 130 MM HG: CPT | Performed by: NURSE PRACTITIONER

## 2023-05-01 PROCEDURE — 3017F COLORECTAL CA SCREEN DOC REV: CPT | Performed by: NURSE PRACTITIONER

## 2023-05-01 PROCEDURE — G8420 CALC BMI NORM PARAMETERS: HCPCS | Performed by: NURSE PRACTITIONER

## 2023-05-01 PROCEDURE — 3079F DIAST BP 80-89 MM HG: CPT | Performed by: NURSE PRACTITIONER

## 2023-05-01 RX ORDER — GUAIFENESIN 600 MG/1
600 TABLET, EXTENDED RELEASE ORAL 2 TIMES DAILY
Qty: 28 TABLET | Refills: 0 | Status: SHIPPED | OUTPATIENT
Start: 2023-05-01 | End: 2023-05-15

## 2023-05-01 RX ORDER — SUCRALFATE 1 G/1
1 TABLET ORAL 4 TIMES DAILY
Qty: 120 TABLET | Refills: 3 | Status: SHIPPED | OUTPATIENT
Start: 2023-05-01

## 2023-05-01 NOTE — PROGRESS NOTES
2023     Cookie Ramírez (:  1965) is a 66 y.o. female, here for evaluation of the following medical concerns:        Insomnia. Chronic, but getting worse. States she wakes up 3-4 AM and states she goes to bed 12AM.   States she wakes up groggy and tired. No naps through the day. Tried trazodone from an old script and gave her heart palpitations for an hour. States she has tried melatonin 20mg nightly and that does not help. 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 On elavil for sleep too, not helping. PM has her on this. Sleep study  - NO URIEL     On zanaflex TID and percocet 5 times per day with PM.   Her PM is closing, but she is following them to [employer] [facility]. --chronic pain syndrome     Denies nighttime urination, anxiety, shortness of breath that is waking her up. Reports cough at night for the last week and is requesting mucinex to clear congestion. Review of Systems    Prior to Visit Medications    Medication Sig Taking? Authorizing Provider   guaiFENesin (MUCINEX) 600 MG extended release tablet Take 1 tablet by mouth 2 times daily for 14 days Yes YESSICA Krueger NP   alogliptin (NESINA) 6.25 MG TABS tablet Take 1 tablet by mouth daily Yes YESSICA Krueger NP   docusate sodium (COLACE) 100 MG capsule Take 1 capsule by mouth 2 times daily as needed for Constipation Yes YESSICA Krueger NP   midodrine (PROAMATINE) 2.5 MG tablet Take 1 tablet by mouth 3 times daily as needed (hypotension) Yes Luci Mosqueda MD   blood glucose test strips (FREESTYLE LITE) strip 1 each by In Vitro route daily As needed.  Yes YESSICA Krueger NP   Lancets MISC 1 each by Does not apply route daily Yes YESSICA Krueger NP   hydrOXYzine HCl (ATARAX) 25 MG tablet TAKE 1 TABLET EVERY 8 HOURS AS NEEDED FOR

## 2023-05-02 ENCOUNTER — TELEPHONE (OUTPATIENT)
Dept: FAMILY MEDICINE CLINIC | Age: 58
End: 2023-05-02

## 2023-05-02 NOTE — TELEPHONE ENCOUNTER
----- Message from YESSICA Mcconnell NP sent at 5/1/2023  2:00 PM EDT -----  Trial seroquel. D/w dr Sharda Chaparro. Pt edu on sedation, weight gain, dm2 adverse. Need to watch for low BP. Risk of respiratory depression. Pt requesting seroquel. Plan to ween off elavil 25mg daily first. Take 1/2 tab for one week and then will send 10mg dose for one week and then 10mg every other day for one week and then follow up in office. Then will add seroquel. She has many interactions for sedation and low bp and CNS depression with perocet zanaflex and elavil.

## 2023-05-02 NOTE — TELEPHONE ENCOUNTER
Patient reports medication does not help so she did not take elavil nor hydroxyzine last night. She reports she is just going to stop taking them.

## 2023-05-04 RX ORDER — QUETIAPINE FUMARATE 25 MG/1
12.5 TABLET, FILM COATED ORAL NIGHTLY
Qty: 30 TABLET | Refills: 0 | Status: SHIPPED | OUTPATIENT
Start: 2023-05-04

## 2023-05-04 RX ORDER — QUETIAPINE FUMARATE 25 MG/1
25 TABLET, FILM COATED ORAL NIGHTLY
Qty: 30 TABLET | Refills: 0 | Status: SHIPPED | OUTPATIENT
Start: 2023-05-04 | End: 2023-05-04 | Stop reason: ALTCHOICE

## 2023-05-04 NOTE — TELEPHONE ENCOUNTER
I sent seroquel for bedtime for sleep. Schedule a follow up in 2 weeks please. Caution low BP, additional CNS depression with her pain medications. Do not take pain medication the same time as seroquel please.        second-degree AV block Mobitz type I   Syncope and collapse,

## 2023-05-04 NOTE — TELEPHONE ENCOUNTER
Notified patient reports \"its too late she has already stopped it\" Reports she has not taking it all week.  She is wanting to start her new medication asap

## 2023-05-04 NOTE — TELEPHONE ENCOUNTER
Provider discussed with pt via phone. Discussed risks and bradycardia and hypotension and CNS depression. Pt states her understanding of the risks and still wants to try the medication since others have failed. She states her HR at home in average 70s and she does monitor BP too. Advised to start with 12.5mg nightly and monitor vitals and sedation level. Call Monday to update provider.

## 2023-05-04 NOTE — TELEPHONE ENCOUNTER
Rx was sent to pharmacy then cancelled. If patient needs rx pharmacy does need new rx. Note not routed. Please advise.

## 2023-05-04 NOTE — OP NOTE
GENERAL SURGERY OPERATIVE REPORT  Madyd Robles MD, FACS    Lisandra Mcintosh  1/29/2020. Indications: The patient was admitted to the hospital with a brief history of a sebaceous cyst of the right breast. This had been infected recently and was opened and drained in the office. The patient now presents for excision after discussing therapeutic alternatives. Pre op Dx:Sebaceous cyst right breast, s/p infection, I/D    Postop Dx:Same    Procedure:Excision 2 cm sebaceous cyst right breast    Anesthesia:MAC    Procedure Details:Pt was brought to the room and placed supine on the OR table. The right breast was prepped and draped in the usual fashion. The area of the cyst was anesthetized with 1% xylocaine plain. The lesion was excised win an ellipse into the subqu tissue. The defect was closed with 4-0 prolene suture. Sterile dressing was applied. Findings:Sebaceous cyst    Estimated Blood Loss:  Minimal           Drains: None           Total IV Fluids: 900 ml           Specimens: Cyst           Implants: None           Complications:  None           Disposition: PACU - hemodynamically stable. Condition: stable    Postoperatively the patient did well and is allowed to go home. They are to follow up per their appointment. They can remove dressings in 48 hours and shower. They are to do no lifting or driving until seen in the office. They can walk, climb stairs and ride in a car. If they have any problems, they are to give us a call. Massiel Steward. 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 The patient was admitted to the SICU for inability to swallow.  The resection went as expected without incident a portion of the colon at the splenic flexure was adherent to the tumor and had to be resection. Give the need for further resuscitation I elected not to perform a primary colonic anastomosis to give the team time to further resuscitate the patient. He is currently on pressors with a MAP of 90 and is being titrated down to keep his MAP between 60- 70. The patient has known renal insufficiency ans was evaluated preop by the Renal service to optimize him preop and to assist with postop management.. The patient is making very little urine over the last 2.5 hrs but he is oxygenating well and is his SBP is responding to fluid . We will have spoken to Dr Jean in the ICU and will transfer him shortly.

## 2023-05-08 ENCOUNTER — TELEPHONE (OUTPATIENT)
Dept: FAMILY MEDICINE CLINIC | Age: 58
End: 2023-05-08

## 2023-05-08 NOTE — TELEPHONE ENCOUNTER
Patient called and wanted to let provider know that she cut the seroquel in half over the weekend and this did not help her with sleeping. Please advise.

## 2023-05-08 NOTE — TELEPHONE ENCOUNTER
Eloina Flurry. As long as she is not over drowsy the next day and or feels fine, she can take the full tab.

## 2023-05-09 ENCOUNTER — TELEPHONE (OUTPATIENT)
Dept: CARDIOLOGY CLINIC | Age: 58
End: 2023-05-09

## 2023-05-09 NOTE — TELEPHONE ENCOUNTER
Increase amlodipine to 5 mg if bp more than 160  Bring bp log to office  Check bp in office with pts bp apparatus and office bp cuff

## 2023-05-11 ENCOUNTER — NURSE ONLY (OUTPATIENT)
Dept: CARDIOLOGY CLINIC | Age: 58
End: 2023-05-11

## 2023-05-11 VITALS
SYSTOLIC BLOOD PRESSURE: 150 MMHG | DIASTOLIC BLOOD PRESSURE: 88 MMHG | HEIGHT: 68 IN | OXYGEN SATURATION: 96 % | HEART RATE: 72 BPM | WEIGHT: 157.6 LBS | BODY MASS INDEX: 23.89 KG/M2

## 2023-05-11 DIAGNOSIS — I10 ESSENTIAL HYPERTENSION: Primary | ICD-10-CM

## 2023-05-11 NOTE — PATIENT INSTRUCTIONS
Please be informed that if you contact our office outside of normal business hours the physician on call cannot help with any scheduling or rescheduling issues, procedure instruction questions or any type of medication issue. We advise you for any urgent/emergency that you go to the nearest emergency room! PLEASE CALL OUR OFFICE DURING NORMAL BUSINESS HOURS    Monday - Friday   8 am to 5 pm    [place]: [phone]    [place]:  [phone]        **It is YOUR responsibilty to bring medication bottles and/or updated medication list to [address] [place] [address]. This will allow us to better serve you and all your healthcare needs**          Thank you for allowing us to care for you today! We want to ensure we can follow your treatment plan and we strive to give you the best outcomes and experience possible. If you ever have a life threatening emergency and call 911 - for an ambulance (EMS)   Our providers can only care for you at:   [facility] or [facility]. Even if you have someone take you or you drive yourself we can only care for you in a [facility] facility. Our providers are not setup at the other healthcare locations!

## 2023-05-11 NOTE — PROGRESS NOTES
BP Check Visit    Pt is here for a BP check. She has been having labile blood pressures. She is not sleeping and the Seroquel dropped her blood pressure. She was very emotional on arrival to the office due to not being able to get into her psychology office and blood pressure was very high. She is pending hernia repair surgery and she is worried they will not do it because of her blood pressure. Recheck of her blood pressure after being in the office for 5 minutes was 138/68. BP Readings from Last 3 Encounters:   05/11/23 (!) 150/88   05/01/23 128/80   04/07/23 138/86       Plan for pt is to hold antihypertensive medications. Take Seroquel and get sleep. If blood pressure is low SBP < 90 take 5 mg midodrine can repeat in 1 hour if does not improve. Increase hydration and lay down.    Bring cuff next OV for evaluation  Will re evaluate on Monday in office      Pt is to report any dizziness or syncope to the office    Electronically signed by YESSICA Merrill CNP on 5/11/2023 at 9:24 AM

## 2023-05-15 ENCOUNTER — NURSE ONLY (OUTPATIENT)
Dept: CARDIOLOGY CLINIC | Age: 58
End: 2023-05-15

## 2023-05-15 VITALS
DIASTOLIC BLOOD PRESSURE: 70 MMHG | SYSTOLIC BLOOD PRESSURE: 120 MMHG | BODY MASS INDEX: 25.07 KG/M2 | WEIGHT: 165.4 LBS | HEIGHT: 68 IN | HEART RATE: 72 BPM

## 2023-05-15 DIAGNOSIS — E78.2 MIXED HYPERLIPIDEMIA: ICD-10-CM

## 2023-05-15 RX ORDER — ATORVASTATIN CALCIUM 80 MG/1
80 TABLET, FILM COATED ORAL DAILY
Qty: 90 TABLET | Refills: 3 | Status: SHIPPED | OUTPATIENT
Start: 2023-05-15

## 2023-05-15 RX ORDER — AMLODIPINE BESYLATE 2.5 MG/1
2.5 TABLET ORAL DAILY
Qty: 90 TABLET | Refills: 1 | Status: CANCELLED | OUTPATIENT
Start: 2023-05-15

## 2023-05-15 RX ORDER — AMLODIPINE BESYLATE 2.5 MG/1
2.5 TABLET ORAL DAILY
Qty: 90 TABLET | Refills: 1
Start: 2023-05-15

## 2023-05-15 RX ORDER — CHOLECALCIFEROL (VITAMIN D3) 125 MCG
100 CAPSULE ORAL DAILY
Qty: 90 CAPSULE | Refills: 3 | Status: SHIPPED | OUTPATIENT
Start: 2023-05-15

## 2023-05-15 NOTE — PROGRESS NOTES
BP Check Visit    Pt is here for a BP check. She has been having labile blood pressures. She was not sleeping. She stopped norvasc and started taking some CBD gummies too. She has been sleeping better. She has noticed her blood pressure being lower but tolerable. She has not needed her midodrine. BP Readings from Last 3 Encounters:   05/15/23 120/70   05/11/23 (!) 150/88   05/01/23 128/80       Plan for pt is to hold antihypertensive medications unless SBP > 190 and her anxiety is controlled. If blood pressure is low SBP < 90 take 5 mg midodrine can repeat in 1 hour if does not improve.      Follow up in 3 months      Pt is to report any dizziness or syncope to the office    Electronically signed by YESSICA Sethi CNP on 5/15/2023 at 7:32 AM

## 2023-05-25 ENCOUNTER — OFFICE VISIT (OUTPATIENT)
Dept: INFECTIOUS DISEASES | Age: 58
End: 2023-05-25

## 2023-05-25 VITALS
BODY MASS INDEX: 24.94 KG/M2 | WEIGHT: 164 LBS | HEART RATE: 73 BPM | SYSTOLIC BLOOD PRESSURE: 163 MMHG | RESPIRATION RATE: 18 BRPM | DIASTOLIC BLOOD PRESSURE: 86 MMHG

## 2023-05-25 DIAGNOSIS — Z51.81 THERAPEUTIC DRUG MONITORING: Primary | ICD-10-CM

## 2023-05-25 DIAGNOSIS — A60.09 HERPES GENITALIS IN WOMEN: ICD-10-CM

## 2023-05-25 RX ORDER — VALACYCLOVIR HYDROCHLORIDE 500 MG/1
500 TABLET, FILM COATED ORAL DAILY
Qty: 30 TABLET | Refills: 5 | Status: SHIPPED | OUTPATIENT
Start: 2023-05-25 | End: 2023-11-21

## 2023-05-25 NOTE — PROGRESS NOTES
5/27/2023         Referring Physician: No ref. provider found  Primary Care Physician: YESSICA Mendez NP    Impression/Plan:   Diagnosis Orders   1. Therapeutic drug monitoring  Hepatic Function Panel    Basic Metabolic Panel    Urinalysis with Microscopic      2. Herpes genitalis in women  valACYclovir (VALTREX) 500 MG tablet          Discussion:  Herpes genitalis in women  Clinically stable. Labs are within normal limits. Continue Valtrex. Return in about 6 months (around 11/25/2023). History: Marilu Miller is a 66 y.o.  female presenting today. She was originally referred to us for positive QuantiFERON test.  However, she states that she has been to the health department and they will be starting her regimen (rifapentine and isoniazid). She is here for HSV II IgG positive test.  She tested positive for HSV antibody as part of the screen for STDs. She does not recall having any painful genital lesions ever. She last had sex about a year ago. She would like to be treated for Herpes. She is asymptomatic and has not had a flare. She denies headache, fever, chills, abdominal pain. 6/23/2021: denies any genital lesions. Tolerating medication. 12/16/2021: tolerating acyclovir, adherent to it, has no ulcers. Is in a relationship, yet to have sex but is thinking about it. She is inclined to break the news to her new partner. 7/27/2022:  no outbreak, was in a relationship with a man and she revealed her status. They used condoms once, but he stopped using condoms. Now,they are not together. 5/25/2023: Seen on 1/25/2023. Valtrex was continued. Labs on 4/4/2023 were reviewed. Renal function and liver enzymes are within normal limits. Reports that she has ventral abdominal hernia. She will have surgery at the [address] [place] [address]. Review of Systems   All other systems reviewed and are negative.     Allergies   Allergen Reactions    Other      Patient states the electrodes irritated

## 2023-05-25 NOTE — PATIENT INSTRUCTIONS
Please have your labs drawn on  Monday in the week of your next scheduled visit. If you have the same tests done within a month of your visit, then do not get the labs drawn.

## 2023-06-08 RX ORDER — QUETIAPINE FUMARATE 25 MG/1
37.5 TABLET, FILM COATED ORAL NIGHTLY
Qty: 45 TABLET | Refills: 0 | Status: SHIPPED | OUTPATIENT
Start: 2023-06-08 | End: 2023-07-08

## 2023-06-08 NOTE — TELEPHONE ENCOUNTER
Spoke with the pt who states she is taking a full tab now and confirms it is helping, she is getting a little more sleep and BP is doing better since as well with better sleep, pt is wondering about increasing the dose again bc she still wakes up in the middle of the night.

## 2023-06-21 ENCOUNTER — OFFICE VISIT (OUTPATIENT)
Dept: FAMILY MEDICINE CLINIC | Age: 58
End: 2023-06-21
Payer: COMMERCIAL

## 2023-06-21 VITALS
DIASTOLIC BLOOD PRESSURE: 96 MMHG | SYSTOLIC BLOOD PRESSURE: 152 MMHG | BODY MASS INDEX: 23.34 KG/M2 | HEART RATE: 83 BPM | HEIGHT: 68 IN | WEIGHT: 154 LBS | OXYGEN SATURATION: 99 %

## 2023-06-21 DIAGNOSIS — E55.9 VITAMIN D DEFICIENCY: ICD-10-CM

## 2023-06-21 DIAGNOSIS — T14.8XXA BRUISE: Primary | ICD-10-CM

## 2023-06-21 DIAGNOSIS — Z90.3 H/O GASTRIC SLEEVE: ICD-10-CM

## 2023-06-21 DIAGNOSIS — R53.83 OTHER FATIGUE: ICD-10-CM

## 2023-06-21 PROCEDURE — 3017F COLORECTAL CA SCREEN DOC REV: CPT | Performed by: NURSE PRACTITIONER

## 2023-06-21 PROCEDURE — 1036F TOBACCO NON-USER: CPT | Performed by: NURSE PRACTITIONER

## 2023-06-21 PROCEDURE — G8427 DOCREV CUR MEDS BY ELIG CLIN: HCPCS | Performed by: NURSE PRACTITIONER

## 2023-06-21 PROCEDURE — 99213 OFFICE O/P EST LOW 20 MIN: CPT | Performed by: NURSE PRACTITIONER

## 2023-06-21 PROCEDURE — G8420 CALC BMI NORM PARAMETERS: HCPCS | Performed by: NURSE PRACTITIONER

## 2023-06-21 PROCEDURE — 3077F SYST BP >= 140 MM HG: CPT | Performed by: NURSE PRACTITIONER

## 2023-06-21 PROCEDURE — 3080F DIAST BP >= 90 MM HG: CPT | Performed by: NURSE PRACTITIONER

## 2023-06-21 NOTE — PROGRESS NOTES
blood glucose test strips (FREESTYLE LITE) strip 1 each by In Vitro route daily As needed. Yes YESSICA Grimm NP   Lancets MISC 1 each by Does not apply route daily Yes YESSICA Grimm NP   omeprazole (PRILOSEC) 40 MG delayed release capsule Take 1 capsule by mouth daily Yes Yvonne Sanders MD   ammonium lactate (LAC-HYDRIN) 12 % lotion APPLY TO SCALY AREAS ONCE A DAY AS NEEDED Yes Historical Provider, MD   clobetasol (TEMOVATE) 0.05 % ointment APPLY TO PSORIASIS TWICE A [address], NO WEEKENDS Yes Historical Provider, MD   hydrocortisone 2.5 % ointment APPLY TO PSORIASIS ONCE A DAY AS NEEDED FOR FLARES Yes Historical Provider, MD   fluticasone-vilanterol (BREO ELLIPTA) 100-25 MCG/INH AEPB inhaler Inhale 1 puff into the lungs daily Yes Lidia Genao MD   SPIRIVA RESPIMAT 2.5 MCG/ACT AERS inhaler Inhale 2 puffs into the lungs daily Yes Lidia Genao MD   albuterol sulfate HFA (VENTOLIN HFA) 108 (90 Base) MCG/ACT inhaler Inhale 2 puffs into the lungs 4 times daily as needed for Wheezing Yes Lidia Genao MD   albuterol (PROVENTIL) (2.5 MG/3ML) 0.083% nebulizer solution Take 3 mLs by nebulization every 6 hours Yes Lidia Genao MD   PROAIR  (31 Base) MCG/ACT inhaler Inhale 2 puffs into the lungs every 6 hours as needed for Wheezing Yes Lidia Genao MD   ondansetron (ZOFRAN-ODT) 4 MG disintegrating tablet Take 1 tablet by mouth 3 times daily as needed for Nausea or Vomiting Yes YESSICA Kelly CNP   Multiple Vitamins-Minerals (THERAPEUTIC MULTIVITAMIN-MINERALS) tablet Take 1 tablet by mouth daily Yes YESSICA Kelly CNP   tiZANidine (ZANAFLEX) 4 MG tablet Take 1 tablet by mouth every 8 hours as needed Yes Historical Provider, MD   oxyCODONE-acetaminophen (PERCOCET) 7.5-325 MG per tablet Take 1 tablet by mouth every 8 hours as needed for Pain (Patient states takes every 6 hours).  Yes Historical Provider, MD   hydrOXYzine HCl (ATARAX) 25 MG tablet TAKE 1 TABLET EVERY 8 HOURS AS

## 2023-06-22 DIAGNOSIS — E78.2 MIXED HYPERLIPIDEMIA: ICD-10-CM

## 2023-06-22 DIAGNOSIS — T14.8XXA BRUISE: ICD-10-CM

## 2023-06-22 DIAGNOSIS — E11.42 TYPE 2 DIABETES MELLITUS WITH DIABETIC POLYNEUROPATHY, WITHOUT LONG-TERM CURRENT USE OF INSULIN (HCC): ICD-10-CM

## 2023-06-22 DIAGNOSIS — T14.8XXA BRUISING: ICD-10-CM

## 2023-06-22 DIAGNOSIS — E55.9 VITAMIN D DEFICIENCY: ICD-10-CM

## 2023-06-22 DIAGNOSIS — R53.83 OTHER FATIGUE: ICD-10-CM

## 2023-06-22 DIAGNOSIS — R23.3 EASY BRUISING: ICD-10-CM

## 2023-06-22 DIAGNOSIS — E11.9 TYPE 2 DIABETES MELLITUS WITHOUT COMPLICATION, WITHOUT LONG-TERM CURRENT USE OF INSULIN (HCC): ICD-10-CM

## 2023-06-22 DIAGNOSIS — Z90.3 H/O GASTRIC SLEEVE: ICD-10-CM

## 2023-06-22 LAB
25(OH)D3 SERPL-MCNC: 16.6 NG/ML
ALBUMIN SERPL-MCNC: 4.6 G/DL (ref 3.4–5)
ALBUMIN/GLOB SERPL: 1.6 {RATIO} (ref 1.1–2.2)
ALP SERPL-CCNC: 86 U/L (ref 40–129)
ALT SERPL-CCNC: 12 U/L (ref 10–40)
ANION GAP SERPL CALCULATED.3IONS-SCNC: 16 MMOL/L (ref 3–16)
AST SERPL-CCNC: 17 U/L (ref 15–37)
BASOPHILS # BLD: 0.1 K/UL (ref 0–0.2)
BASOPHILS NFR BLD: 1.2 %
BILIRUB SERPL-MCNC: 0.9 MG/DL (ref 0–1)
BUN SERPL-MCNC: 15 MG/DL (ref 7–20)
CALCIUM SERPL-MCNC: 9.9 MG/DL (ref 8.3–10.6)
CHLORIDE SERPL-SCNC: 100 MMOL/L (ref 99–110)
CHOLEST SERPL-MCNC: 261 MG/DL (ref 0–199)
CO2 SERPL-SCNC: 25 MMOL/L (ref 21–32)
CREAT SERPL-MCNC: 1 MG/DL (ref 0.6–1.1)
DEPRECATED RDW RBC AUTO: 14.2 % (ref 12.4–15.4)
EOSINOPHIL # BLD: 0.2 K/UL (ref 0–0.6)
EOSINOPHIL NFR BLD: 3 %
FERRITIN SERPL IA-MCNC: 128.6 NG/ML (ref 15–150)
FOLATE SERPL-MCNC: 14.05 NG/ML (ref 4.78–24.2)
GFR SERPLBLD CREATININE-BSD FMLA CKD-EPI: >60 ML/MIN/{1.73_M2}
GLUCOSE SERPL-MCNC: 123 MG/DL (ref 70–99)
HCT VFR BLD AUTO: 43.9 % (ref 36–48)
HDLC SERPL-MCNC: 75 MG/DL (ref 40–60)
HGB BLD-MCNC: 14.7 G/DL (ref 12–16)
INR PPP: 0.92 (ref 0.84–1.16)
IRON SATN MFR SERPL: 39 % (ref 15–50)
IRON SERPL-MCNC: 142 UG/DL (ref 37–145)
LDLC SERPL CALC-MCNC: 159 MG/DL
LYMPHOCYTES # BLD: 3.3 K/UL (ref 1–5.1)
LYMPHOCYTES NFR BLD: 45.7 %
MCH RBC QN AUTO: 26.7 PG (ref 26–34)
MCHC RBC AUTO-ENTMCNC: 33.6 G/DL (ref 31–36)
MCV RBC AUTO: 79.6 FL (ref 80–100)
MONOCYTES # BLD: 0.5 K/UL (ref 0–1.3)
MONOCYTES NFR BLD: 6.4 %
NEUTROPHILS # BLD: 3.1 K/UL (ref 1.7–7.7)
NEUTROPHILS NFR BLD: 43.7 %
PLATELET # BLD AUTO: 236 K/UL (ref 135–450)
PMV BLD AUTO: 10.3 FL (ref 5–10.5)
POTASSIUM SERPL-SCNC: 4.5 MMOL/L (ref 3.5–5.1)
PROT SERPL-MCNC: 7.4 G/DL (ref 6.4–8.2)
PROTHROMBIN TIME: 12.4 SEC (ref 11.5–14.8)
RBC # BLD AUTO: 5.51 M/UL (ref 4–5.2)
SODIUM SERPL-SCNC: 141 MMOL/L (ref 136–145)
TIBC SERPL-MCNC: 366 UG/DL (ref 260–445)
TRIGL SERPL-MCNC: 133 MG/DL (ref 0–150)
VIT B12 SERPL-MCNC: 289 PG/ML (ref 211–911)
VLDLC SERPL CALC-MCNC: 27 MG/DL
WBC # BLD AUTO: 7.1 K/UL (ref 4–11)

## 2023-06-23 LAB
EST. AVERAGE GLUCOSE BLD GHB EST-MCNC: 145.6 MG/DL
HBA1C MFR BLD: 6.7 %

## 2023-06-24 LAB — ZINC SERPL-MCNC: 83.6 UG/DL (ref 60–120)

## 2023-06-26 ENCOUNTER — TELEPHONE (OUTPATIENT)
Dept: FAMILY MEDICINE CLINIC | Age: 58
End: 2023-06-26

## 2023-06-26 LAB — PHYTONADIONE SERPL-MCNC: 1.11 NMOL/L (ref 0.22–4.88)

## 2023-06-28 ENCOUNTER — TELEPHONE (OUTPATIENT)
Dept: FAMILY MEDICINE CLINIC | Age: 58
End: 2023-06-28

## 2023-06-29 LAB — VIT C SERPL-MCNC: 32 UMOL/L (ref 23–114)

## 2023-06-30 DIAGNOSIS — E55.9 VITAMIN D DEFICIENCY: Primary | ICD-10-CM

## 2023-06-30 RX ORDER — ERGOCALCIFEROL 1.25 MG/1
50000 CAPSULE ORAL WEEKLY
Qty: 12 CAPSULE | Refills: 0 | Status: SHIPPED | OUTPATIENT
Start: 2023-06-30

## 2023-07-11 ENCOUNTER — TELEPHONE (OUTPATIENT)
Dept: FAMILY MEDICINE CLINIC | Age: 58
End: 2023-07-11

## 2023-07-11 NOTE — TELEPHONE ENCOUNTER
Patient called and wanted to know if she could get a referral for Mental Health. She stated that she has been seen there in the past for issues such as Depression and Anxiety. She stated that her depression and anxiety are starting to come back and would like to see someone. She state that it does not need to be in [place] she would just like to see someone to discuss her issues and possible see about medication. Please advise.

## 2023-07-12 RX ORDER — QUETIAPINE FUMARATE 50 MG/1
50 TABLET, FILM COATED ORAL NIGHTLY
Qty: 30 TABLET | Refills: 0 | Status: SHIPPED | OUTPATIENT
Start: 2023-07-12 | End: 2023-08-11

## 2023-07-12 RX ORDER — QUETIAPINE FUMARATE 25 MG/1
37.5 TABLET, FILM COATED ORAL NIGHTLY
Qty: 45 TABLET | Refills: 0 | Status: SHIPPED | OUTPATIENT
Start: 2023-07-12 | End: 2023-07-12 | Stop reason: DRUGHIGH

## 2023-07-12 NOTE — TELEPHONE ENCOUNTER
Please give patient our [id number] [facility] resources. [facility]    [phone]    [facility]   [phone]    She is taking seroquel for her insomnia which is working, but this is also a MH medication used for bipolar and depression. Red flag side effect with this medication can be suicidal or self harm thoughts. Is she having these thoughts? When did her moods get worse, after starting seroquel?

## 2023-07-12 NOTE — TELEPHONE ENCOUNTER
Please give patient our [address] [facility] resources. [facility]   [phone]     [facility]   [phone]     She is taking seroquel for her insomnia which is working,* CAN YOU RAISE TO 50 MG ? But this is also a MH medication used for bipolar and depression. Red flag side effect with this medication can be suicidal or self harm thoughts. Is she having these thoughts? NO     When did her moods get worse, after starting seroquel? Way before Seroquel. Patient states this has always been a problem.

## 2023-07-14 RX ORDER — AMLODIPINE BESYLATE 2.5 MG/1
2.5 TABLET ORAL DAILY
Qty: 90 TABLET | Refills: 1 | Status: SHIPPED | OUTPATIENT
Start: 2023-07-14

## 2023-07-20 ENCOUNTER — TELEPHONE (OUTPATIENT)
Dept: GASTROENTEROLOGY | Age: 58
End: 2023-07-20

## 2023-07-20 NOTE — TELEPHONE ENCOUNTER
Patient left vm wanting to confirm appt for tomorrow . TTC patient no answer will try again at later time . Patient is not scheduled until 7/26 @ 9:45 .

## 2023-07-26 ENCOUNTER — OFFICE VISIT (OUTPATIENT)
Dept: GASTROENTEROLOGY | Age: 58
End: 2023-07-26
Payer: COMMERCIAL

## 2023-07-26 VITALS
SYSTOLIC BLOOD PRESSURE: 136 MMHG | HEIGHT: 68 IN | TEMPERATURE: 97.3 F | BODY MASS INDEX: 23.61 KG/M2 | HEART RATE: 57 BPM | WEIGHT: 155.8 LBS | DIASTOLIC BLOOD PRESSURE: 84 MMHG | OXYGEN SATURATION: 99 %

## 2023-07-26 DIAGNOSIS — R10.84 GENERALIZED ABDOMINAL PAIN: Primary | ICD-10-CM

## 2023-07-26 DIAGNOSIS — K21.9 GASTROESOPHAGEAL REFLUX DISEASE, UNSPECIFIED WHETHER ESOPHAGITIS PRESENT: ICD-10-CM

## 2023-07-26 DIAGNOSIS — Z98.84 S/P LAPAROSCOPIC SLEEVE GASTRECTOMY: ICD-10-CM

## 2023-07-26 PROCEDURE — 99213 OFFICE O/P EST LOW 20 MIN: CPT | Performed by: INTERNAL MEDICINE

## 2023-07-26 PROCEDURE — 3017F COLORECTAL CA SCREEN DOC REV: CPT | Performed by: INTERNAL MEDICINE

## 2023-07-26 PROCEDURE — 1036F TOBACCO NON-USER: CPT | Performed by: INTERNAL MEDICINE

## 2023-07-26 PROCEDURE — G8427 DOCREV CUR MEDS BY ELIG CLIN: HCPCS | Performed by: INTERNAL MEDICINE

## 2023-07-26 PROCEDURE — 3079F DIAST BP 80-89 MM HG: CPT | Performed by: INTERNAL MEDICINE

## 2023-07-26 PROCEDURE — 3075F SYST BP GE 130 - 139MM HG: CPT | Performed by: INTERNAL MEDICINE

## 2023-07-26 PROCEDURE — G8420 CALC BMI NORM PARAMETERS: HCPCS | Performed by: INTERNAL MEDICINE

## 2023-07-26 NOTE — PROGRESS NOTES
[facility] Gastroenterology and Hepatology             MD Aamir Schuler MD             Анна Edwards, APRN-CNP             [address], [address] [facility]             [phone] fax [phone]        Gastroenterology Clinic Consultation    Aamir Green MD  Encounter Date: 07/26/23     CC: Abdominal Pain and Emesis         No referring provider defined for this encounter. History obtained from: patient, medical records     Subjective:       Marquez Barker is an 66 y. o.  female with past medical history of type 2 diabetes, GERD, COPD, status post laparoscopic sleeve gastrectomy, abdominoplasty, panniculectomy who presents for Abdominal Pain and Emesis    7/26/2023  Since her last appointment she had an EGD done by me in January 2023 which revealed evidence of sleeve gastrectomy, characterized by healthy-appearing mucosa biopsies were negative for H. pylori. Upper GI study also revealed postsurgical changes status post bariatric surgery otherwise unremarkable. She had a CT abdomen and pelvis done in March of this year which did not reveal any acute intra abdominal or pelvic abnormality however it was noncontrast.  She has been following up with pain medicine and plastic surgery at [facility]. Recently seen by Dr. Nathalia Wolfe. She is on oxycodone from her pain doctor. She has an upcoming appointment with Plastic surgery. Her abdominal pain is chronic and long-lasting. Wwas on Amytryptaline but that has been stopped. Last colonoscopy in 07/2020 with small ascedning colon polyp - read as non diagnostic per pathology. 12/7/2022  Patient was recently evaluated on 29 November by her cardiologist where she had complained about epigastric pain and heartburn after p.o. intake. Pain is severe 8 out of 10. LFTs noted to be unremarkable, electrolytes, kidney function unremarkable. CBC within normal limits.  Last colonoscopy done in July

## 2023-08-14 ENCOUNTER — TELEPHONE (OUTPATIENT)
Dept: CARDIOLOGY CLINIC | Age: 58
End: 2023-08-14

## 2023-08-14 ENCOUNTER — OFFICE VISIT (OUTPATIENT)
Dept: CARDIOLOGY CLINIC | Age: 58
End: 2023-08-14
Payer: COMMERCIAL

## 2023-08-14 VITALS
HEIGHT: 67 IN | SYSTOLIC BLOOD PRESSURE: 122 MMHG | DIASTOLIC BLOOD PRESSURE: 88 MMHG | WEIGHT: 155 LBS | HEART RATE: 66 BPM | BODY MASS INDEX: 24.33 KG/M2

## 2023-08-14 DIAGNOSIS — Z87.891 EX-SMOKER: ICD-10-CM

## 2023-08-14 DIAGNOSIS — E11.9 TYPE 2 DIABETES MELLITUS WITHOUT COMPLICATION, WITHOUT LONG-TERM CURRENT USE OF INSULIN (HCC): ICD-10-CM

## 2023-08-14 DIAGNOSIS — G47.33 OSA (OBSTRUCTIVE SLEEP APNEA): ICD-10-CM

## 2023-08-14 DIAGNOSIS — E78.2 MIXED HYPERLIPIDEMIA: ICD-10-CM

## 2023-08-14 DIAGNOSIS — I10 ESSENTIAL HYPERTENSION: ICD-10-CM

## 2023-08-14 DIAGNOSIS — I44.1 MOBITZ TYPE 1 SECOND DEGREE AV BLOCK: Primary | ICD-10-CM

## 2023-08-14 PROCEDURE — 3074F SYST BP LT 130 MM HG: CPT | Performed by: NURSE PRACTITIONER

## 2023-08-14 PROCEDURE — 3017F COLORECTAL CA SCREEN DOC REV: CPT | Performed by: NURSE PRACTITIONER

## 2023-08-14 PROCEDURE — G8427 DOCREV CUR MEDS BY ELIG CLIN: HCPCS | Performed by: NURSE PRACTITIONER

## 2023-08-14 PROCEDURE — 2022F DILAT RTA XM EVC RTNOPTHY: CPT | Performed by: NURSE PRACTITIONER

## 2023-08-14 PROCEDURE — 3044F HG A1C LEVEL LT 7.0%: CPT | Performed by: NURSE PRACTITIONER

## 2023-08-14 PROCEDURE — 99214 OFFICE O/P EST MOD 30 MIN: CPT | Performed by: NURSE PRACTITIONER

## 2023-08-14 PROCEDURE — G8420 CALC BMI NORM PARAMETERS: HCPCS | Performed by: NURSE PRACTITIONER

## 2023-08-14 PROCEDURE — 1036F TOBACCO NON-USER: CPT | Performed by: NURSE PRACTITIONER

## 2023-08-14 PROCEDURE — 3079F DIAST BP 80-89 MM HG: CPT | Performed by: NURSE PRACTITIONER

## 2023-08-14 RX ORDER — MIDODRINE HYDROCHLORIDE 2.5 MG/1
2.5 TABLET ORAL 3 TIMES DAILY PRN
Qty: 90 TABLET | Refills: 0 | Status: SHIPPED | OUTPATIENT
Start: 2023-08-14

## 2023-08-14 ASSESSMENT — ENCOUNTER SYMPTOMS
COUGH: 0
SHORTNESS OF BREATH: 0

## 2023-08-14 NOTE — PROGRESS NOTES
8/14/2023 3/9/22   Zoey Camarena MD       Physical Exam  Vitals reviewed. Constitutional:       General: She is not in acute distress. Appearance: Normal appearance. She is not ill-appearing. HENT:      Head: Atraumatic. Neck:      Vascular: No carotid bruit. Cardiovascular:      Rate and Rhythm: Normal rate and regular rhythm. Pulses: Normal pulses. Heart sounds: Normal heart sounds. No murmur heard. Pulmonary:      Effort: Pulmonary effort is normal. No respiratory distress. Breath sounds: Normal breath sounds. Musculoskeletal:         General: No swelling or deformity. Cervical back: Neck supple. No muscular tenderness. Neurological:      Mental Status: She is alert. Lab Review   Lab Results   Component Value Date/Time    CHOL 261 06/22/2023 09:40 AM    TRIG 133 06/22/2023 09:40 AM    HDL 75 06/22/2023 09:40 AM    LDLDIRECT 163 04/08/2021 09:34 AM        Stress tests 3/4/2019       Summary    ECG portion of stress test is negative for ischemia by diagnostic criteria. No infarct or ischemia noted. Normal EF 79 % with normal ventricular contractility. Normal stress myocardial perfusion. This is a normal study. Echo 3/4/2019  Summary   technically difficult study   Left ventricular function is normal.   Ejection fraction is visually estimated at 55-60%. No significant valvular abnormalities. Stress  test  12/9/2022  Overall Impression: normal stress test, THR achieved      Functional Capacity: Poor Exercise Tolerance. No chest pain noted during   testing.   Stress Type: Exercise      Peak HR: 151 bpm                      HR Response: Appropriate   Peak BP: 158/84 mmHg                  BP Response: Resting hypertension -   Predicted HR: 165 bpm                 appropriate response   % of predicted HR: 92                 HR BP Product: 51222   Exercise Duration: 03:00 min          Max Exercise: 4.6 METS   Reason for Termination: Target heart

## 2023-08-16 RX ORDER — QUETIAPINE FUMARATE 50 MG/1
50 TABLET, FILM COATED ORAL NIGHTLY
Qty: 30 TABLET | Refills: 0 | Status: SHIPPED | OUTPATIENT
Start: 2023-08-16 | End: 2023-09-15

## 2023-08-22 ENCOUNTER — TELEPHONE (OUTPATIENT)
Dept: CARDIOLOGY CLINIC | Age: 58
End: 2023-08-22

## 2023-08-22 NOTE — TELEPHONE ENCOUNTER
Cardiologist: Dr. Shanta Randhawa  Surgeon: Dr. Butch Grigsby  Surgery: Repair Hernia Abdominal-less than 3 cm open-Bilat. Anesthesia: General  Date: 9/5/2023  FAX# [phone]  # [phone]    Last OV 8/14/2023 w/[facility]    Chest pain  Every time she eats she gets palpitations and has chest pain and pain on left side and goes to back , she is s/p gastrtic bypass surgery , also seeing gastroenterology work up was normal , stress test IN dec 2022 and 2019 were normal , sucralfate has helped   Omeprazole twice a day as well as use sucralfate use nitro as needed     Hypertension  Has occasional hypotension  She is not consistent with her medications and her CBD. Her blood pressure is high when she does not use CBD and Seroquil. She feels the anxiety is controlled. I think it could be better controlled if she was consistent with CBD and Seroquel. Will continue midodrine 2.5 mg PRN SBP < 90 and Amlodipine 2.5 mg PRN for SBP > 150. Mobitz type 1 second degree AV block  Continue to monitor she is not symptomatic no further intervention at this time. Echo shows preserved EF normal stress test in 2019  Holter in 2022 was normal      SOB (shortness of breath)  Status post gastric bypass surgery multifactorial echo shows no abnormality she uses Lasix as needed basis when she notices ankle swelling I do not think she is in heart failure     Dyslipidemia   All available lab work was reviewed. Patient was advised to repeat lab work before next visit. Necessary orders were placed , instructions given by myself all labs as well as lipid panel          Last EKG- 4/4/2023        Stress Test- 12/9/2022   Overall Impression: normal stress test, THR achieved      Echo- 3/4/2019   technically difficult study   Left ventricular function is normal.   Ejection fraction is visually estimated at 55-60%. No significant valvular abnormalities.

## 2023-08-24 RX ORDER — FLUTICASONE FUROATE AND VILANTEROL 100; 25 UG/1; UG/1
1 POWDER RESPIRATORY (INHALATION) DAILY
Qty: 1 EACH | Refills: 5 | Status: SHIPPED | OUTPATIENT
Start: 2023-08-24

## 2023-09-08 DIAGNOSIS — E11.9 TYPE 2 DIABETES MELLITUS WITHOUT COMPLICATION, WITHOUT LONG-TERM CURRENT USE OF INSULIN (HCC): ICD-10-CM

## 2023-09-11 RX ORDER — ALOGLIPTIN 6.25 MG/1
6.25 TABLET, FILM COATED ORAL DAILY
Qty: 90 TABLET | Refills: 1 | Status: SHIPPED | OUTPATIENT
Start: 2023-09-11 | End: 2024-03-09

## 2023-09-20 RX ORDER — QUETIAPINE FUMARATE 50 MG/1
50 TABLET, FILM COATED ORAL NIGHTLY
Qty: 30 TABLET | Refills: 2 | Status: SHIPPED | OUTPATIENT
Start: 2023-09-20 | End: 2023-12-19

## 2023-09-24 NOTE — TELEPHONE ENCOUNTER
Malinda Lopez, YESSICA - NP at 1/17/2022  9:14 AM    Status: Sign when Signing Visit       Patient states she is not taking her potassium-----   k 4.6 on labs last week --- can hold potassium for now. Is she taking it  For hx of gastric sleeve? Looks like weight management has her on multiple vitamins and supps.    She at least needs to be taking a multivitamin     Will refill calcium vit D      Check potasisum in 4-6 weeks      She is taking lipitor via cardiology - 80mg daily 0 = understands/communicates without difficulty

## 2023-10-02 DIAGNOSIS — K21.9 GASTROESOPHAGEAL REFLUX DISEASE, UNSPECIFIED WHETHER ESOPHAGITIS PRESENT: ICD-10-CM

## 2023-10-02 NOTE — TELEPHONE ENCOUNTER
Patient called in requesting a refill of Omeprazole sent to [facility] on [address] [facility].

## 2023-10-03 RX ORDER — OMEPRAZOLE 40 MG/1
40 CAPSULE, DELAYED RELEASE ORAL DAILY
Qty: 90 CAPSULE | Refills: 0 | Status: SHIPPED | OUTPATIENT
Start: 2023-10-03

## 2023-10-04 SDOH — ECONOMIC STABILITY: FOOD INSECURITY: WITHIN THE PAST 12 MONTHS, THE FOOD YOU BOUGHT JUST DIDN'T LAST AND YOU DIDN'T HAVE MONEY TO GET MORE.: NEVER TRUE

## 2023-10-04 SDOH — ECONOMIC STABILITY: FOOD INSECURITY: WITHIN THE PAST 12 MONTHS, YOU WORRIED THAT YOUR FOOD WOULD RUN OUT BEFORE YOU GOT MONEY TO BUY MORE.: NEVER TRUE

## 2023-10-04 SDOH — ECONOMIC STABILITY: INCOME INSECURITY: HOW HARD IS IT FOR YOU TO PAY FOR THE VERY BASICS LIKE FOOD, HOUSING, MEDICAL CARE, AND HEATING?: NOT VERY HARD

## 2023-10-06 ENCOUNTER — HOSPITAL ENCOUNTER (OUTPATIENT)
Age: 58
Discharge: HOME | End: 2023-10-06
Payer: COMMERCIAL

## 2023-10-06 ENCOUNTER — OFFICE VISIT (OUTPATIENT)
Dept: FAMILY MEDICINE CLINIC | Age: 58
End: 2023-10-06
Payer: COMMERCIAL

## 2023-10-06 VITALS
WEIGHT: 160.2 LBS | DIASTOLIC BLOOD PRESSURE: 88 MMHG | BODY MASS INDEX: 25.15 KG/M2 | HEART RATE: 85 BPM | HEIGHT: 67 IN | OXYGEN SATURATION: 98 % | SYSTOLIC BLOOD PRESSURE: 138 MMHG

## 2023-10-06 DIAGNOSIS — K21.9 GASTROESOPHAGEAL REFLUX DISEASE, UNSPECIFIED WHETHER ESOPHAGITIS PRESENT: ICD-10-CM

## 2023-10-06 DIAGNOSIS — G89.4 CHRONIC PAIN SYNDROME: ICD-10-CM

## 2023-10-06 DIAGNOSIS — E11.9 TYPE 2 DIABETES MELLITUS WITHOUT COMPLICATION, WITHOUT LONG-TERM CURRENT USE OF INSULIN (HCC): Primary | ICD-10-CM

## 2023-10-06 DIAGNOSIS — A60.09 HERPES GENITALIS IN WOMEN: ICD-10-CM

## 2023-10-06 DIAGNOSIS — I10 ESSENTIAL HYPERTENSION: ICD-10-CM

## 2023-10-06 DIAGNOSIS — M79.7 FIBROMYALGIA: ICD-10-CM

## 2023-10-06 DIAGNOSIS — E55.9 VITAMIN D DEFICIENCY: ICD-10-CM

## 2023-10-06 DIAGNOSIS — E78.2 MIXED HYPERLIPIDEMIA: ICD-10-CM

## 2023-10-06 DIAGNOSIS — J44.9 COPD, SEVERE (HCC): ICD-10-CM

## 2023-10-06 DIAGNOSIS — F51.01 PRIMARY INSOMNIA: ICD-10-CM

## 2023-10-06 DIAGNOSIS — E11.9 TYPE 2 DIABETES MELLITUS WITHOUT COMPLICATION, WITHOUT LONG-TERM CURRENT USE OF INSULIN (HCC): ICD-10-CM

## 2023-10-06 DIAGNOSIS — F41.9 ANXIETY: ICD-10-CM

## 2023-10-06 DIAGNOSIS — Z90.3 H/O GASTRIC SLEEVE: ICD-10-CM

## 2023-10-06 LAB
25(OH)D3 SERPL-MCNC: 36.91 NG/ML
CREAT UR-MCNC: 73.3 MG/DL (ref 28–217)
MICROALBUMIN 24H UR-MCNC: <1.2 MG/DL
MICROALBUMIN/CREAT UR-RTO: NORMAL MG/G CREAT (ref 0–30)

## 2023-10-06 PROCEDURE — 82306 VITAMIN D 25 HYDROXY: CPT

## 2023-10-06 PROCEDURE — 82570 ASSAY OF URINE CREATININE: CPT

## 2023-10-06 PROCEDURE — 2022F DILAT RTA XM EVC RTNOPTHY: CPT | Performed by: NURSE PRACTITIONER

## 2023-10-06 PROCEDURE — G8417 CALC BMI ABV UP PARAM F/U: HCPCS | Performed by: NURSE PRACTITIONER

## 2023-10-06 PROCEDURE — 3017F COLORECTAL CA SCREEN DOC REV: CPT | Performed by: NURSE PRACTITIONER

## 2023-10-06 PROCEDURE — 3023F SPIROM DOC REV: CPT | Performed by: NURSE PRACTITIONER

## 2023-10-06 PROCEDURE — 3044F HG A1C LEVEL LT 7.0%: CPT | Performed by: NURSE PRACTITIONER

## 2023-10-06 PROCEDURE — 1036F TOBACCO NON-USER: CPT | Performed by: NURSE PRACTITIONER

## 2023-10-06 PROCEDURE — 3078F DIAST BP <80 MM HG: CPT | Performed by: NURSE PRACTITIONER

## 2023-10-06 PROCEDURE — 99214 OFFICE O/P EST MOD 30 MIN: CPT | Performed by: NURSE PRACTITIONER

## 2023-10-06 PROCEDURE — 3074F SYST BP LT 130 MM HG: CPT | Performed by: NURSE PRACTITIONER

## 2023-10-06 PROCEDURE — 36415 COLL VENOUS BLD VENIPUNCTURE: CPT

## 2023-10-06 PROCEDURE — G8484 FLU IMMUNIZE NO ADMIN: HCPCS | Performed by: NURSE PRACTITIONER

## 2023-10-06 PROCEDURE — 82043 UR ALBUMIN QUANTITATIVE: CPT

## 2023-10-06 PROCEDURE — G8427 DOCREV CUR MEDS BY ELIG CLIN: HCPCS | Performed by: NURSE PRACTITIONER

## 2023-10-06 NOTE — PROGRESS NOTES
10/6/2023     Artemus Peabody (:  1965) is a 66 y.o. female, here for evaluation of the following medical concerns: Follow up on chronics     She did have her abdominoplasty. [facility] surgery 23   Doing really well  States she is very pleased with the surgery  States small hernia and had one stitch there. Mostly adhesions. No gi issues now after surgery. No vomiting nausea abdominal pain. BM's are good but slightly constipated. No londer needing milk of mag   Wearing abdominal binder. This was her third abdominal surgery. Initial gastric sleeve, revision, 2nd revision      Insomnia and bipolar ---   On seroquel. Was doing well prior to surgery  States she is having to sleep sitting up due to surgery restrictions. May be able to lay down after shannan on 10/16/23   Denies self harm suicidal TPI, side effects     chronic pain syndrome   Follows with PM   On percocet       Sleep study  - NO URIEL     COPD - controlled. Denies cough dyspnea. [facility]. Albuterol prn, does not use daily. Breo and spiriva. Mammo 2923 benign    Colon cancer screen due     Pap smear follows with OBGYN. Dm2  A1C  6.2023   Need urine microalbumin   Refill alogliptin  Need dm eye exam   Does not check glucose at home but has supplies       Vit d def --- high dose replacement 2023. Ended sept. taking supp now       High cholesterol - Lipitor 80 without  side effects       GERD - on prilsoec. Doing well       HTN   Amlodipine if bp >218 systolic. She has been taking \"here and there\". Reports [id number] systolic at home. States she Has not been to \"the dispensary\" since her surgery. States Medical marijuana was keeping her BP under control. Uses midodrine PRN -- BP fluctuates drastically       HSV - follows with ID   On valtrex. No recent lesions         Review of Systems    Prior to Visit Medications    Medication Sig Taking?  Authorizing Provider   omeprazole

## 2023-11-13 ENCOUNTER — HOSPITAL ENCOUNTER (OUTPATIENT)
Age: 58
Discharge: HOME | End: 2023-11-13
Payer: COMMERCIAL

## 2023-11-13 LAB
ALBUMIN SERPL-MCNC: 4.1 GM/DL (ref 3.4–5)
ALP BLD-CCNC: 86 IU/L (ref 40–129)
ALT SERPL-CCNC: 6 U/L (ref 10–40)
ANION GAP SERPL CALCULATED.3IONS-SCNC: 7 MMOL/L (ref 4–16)
AST SERPL-CCNC: 18 IU/L (ref 15–37)
BILIRUB SERPL-MCNC: 0.7 MG/DL (ref 0–1)
BILIRUBIN DIRECT: 0.2 MG/DL (ref 0–0.3)
BILIRUBIN URINE: NEGATIVE MG/DL
BILIRUBIN, INDIRECT: 0.5 MG/DL (ref 0–0.7)
BLOOD, URINE: NEGATIVE
BUN SERPL-MCNC: 9 MG/DL (ref 6–23)
CALCIUM SERPL-MCNC: 9.7 MG/DL (ref 8.3–10.6)
CHLORIDE BLD-SCNC: 104 MMOL/L (ref 99–110)
CLARITY: CLEAR
CO2: 30 MMOL/L (ref 21–32)
COLOR: YELLOW
COMMENT UA: NORMAL
CREAT SERPL-MCNC: 0.8 MG/DL (ref 0.6–1.1)
GFR SERPL CREATININE-BSD FRML MDRD: >60 ML/MIN/1.73M2
GLUCOSE SERPL-MCNC: 104 MG/DL (ref 70–99)
GLUCOSE, URINE: NEGATIVE MG/DL
KETONES, URINE: NEGATIVE MG/DL
LEUKOCYTE ESTERASE, URINE: NEGATIVE
NITRITE URINE, QUANTITATIVE: NEGATIVE
PH, URINE: 7 (ref 5–8)
POTASSIUM SERPL-SCNC: 4.8 MMOL/L (ref 3.5–5.1)
PROTEIN UA: NEGATIVE MG/DL
SODIUM BLD-SCNC: 141 MMOL/L (ref 135–145)
SPECIFIC GRAVITY UA: 1.01 (ref 1–1.03)
TOTAL PROTEIN: 6.5 GM/DL (ref 6.4–8.2)
UROBILINOGEN, URINE: 0.2 MG/DL (ref 0.2–1)

## 2023-11-13 PROCEDURE — 36415 COLL VENOUS BLD VENIPUNCTURE: CPT

## 2023-11-13 PROCEDURE — 80053 COMPREHEN METABOLIC PANEL: CPT

## 2023-11-13 PROCEDURE — 82248 BILIRUBIN DIRECT: CPT

## 2023-11-13 PROCEDURE — 81003 URINALYSIS AUTO W/O SCOPE: CPT

## 2023-11-15 ENCOUNTER — OFFICE VISIT (OUTPATIENT)
Dept: CARDIOLOGY CLINIC | Age: 58
End: 2023-11-15
Payer: COMMERCIAL

## 2023-11-15 VITALS
DIASTOLIC BLOOD PRESSURE: 80 MMHG | SYSTOLIC BLOOD PRESSURE: 136 MMHG | WEIGHT: 159 LBS | BODY MASS INDEX: 24.1 KG/M2 | HEIGHT: 68 IN | HEART RATE: 60 BPM

## 2023-11-15 DIAGNOSIS — E78.2 MIXED HYPERLIPIDEMIA: ICD-10-CM

## 2023-11-15 DIAGNOSIS — R00.2 PALPITATIONS: ICD-10-CM

## 2023-11-15 DIAGNOSIS — I10 ESSENTIAL HYPERTENSION: Primary | ICD-10-CM

## 2023-11-15 DIAGNOSIS — E11.9 TYPE 2 DIABETES MELLITUS WITHOUT COMPLICATION, WITHOUT LONG-TERM CURRENT USE OF INSULIN (HCC): ICD-10-CM

## 2023-11-15 DIAGNOSIS — I44.1 MOBITZ TYPE 1 SECOND DEGREE AV BLOCK: ICD-10-CM

## 2023-11-15 PROCEDURE — G8420 CALC BMI NORM PARAMETERS: HCPCS | Performed by: NURSE PRACTITIONER

## 2023-11-15 PROCEDURE — 1036F TOBACCO NON-USER: CPT | Performed by: NURSE PRACTITIONER

## 2023-11-15 PROCEDURE — 2022F DILAT RTA XM EVC RTNOPTHY: CPT | Performed by: NURSE PRACTITIONER

## 2023-11-15 PROCEDURE — G8484 FLU IMMUNIZE NO ADMIN: HCPCS | Performed by: NURSE PRACTITIONER

## 2023-11-15 PROCEDURE — 3017F COLORECTAL CA SCREEN DOC REV: CPT | Performed by: NURSE PRACTITIONER

## 2023-11-15 PROCEDURE — 3079F DIAST BP 80-89 MM HG: CPT | Performed by: NURSE PRACTITIONER

## 2023-11-15 PROCEDURE — 3075F SYST BP GE 130 - 139MM HG: CPT | Performed by: NURSE PRACTITIONER

## 2023-11-15 PROCEDURE — G8427 DOCREV CUR MEDS BY ELIG CLIN: HCPCS | Performed by: NURSE PRACTITIONER

## 2023-11-15 PROCEDURE — 99214 OFFICE O/P EST MOD 30 MIN: CPT | Performed by: NURSE PRACTITIONER

## 2023-11-15 PROCEDURE — 3044F HG A1C LEVEL LT 7.0%: CPT | Performed by: NURSE PRACTITIONER

## 2023-11-15 PROCEDURE — 93000 ELECTROCARDIOGRAM COMPLETE: CPT | Performed by: NURSE PRACTITIONER

## 2023-11-15 ASSESSMENT — ENCOUNTER SYMPTOMS
COUGH: 0
SHORTNESS OF BREATH: 0

## 2023-11-15 NOTE — PROGRESS NOTES
(Patient states takes every 6 hours). 1/17/20  Yes Provider, MD Giovanny   hydrOXYzine HCl (ATARAX) 25 MG tablet TAKE 1 TABLET EVERY 8 HOURS AS NEEDED FOR ANXIETY AVOID ALCOHOL/CAUSES DROWSINESS. Do not take with Xanax  Patient not taking: Reported on 11/15/2023 3/17/23   YESSICA Guadarrama NP       Physical Exam  Vitals reviewed. Constitutional:       General: She is not in acute distress. Appearance: Normal appearance. She is not ill-appearing. HENT:      Head: Atraumatic. Neck:      Vascular: No carotid bruit. Cardiovascular:      Rate and Rhythm: Normal rate and regular rhythm. Pulses: Normal pulses. Heart sounds: Normal heart sounds. No murmur heard. Pulmonary:      Effort: Pulmonary effort is normal. No respiratory distress. Breath sounds: Normal breath sounds. Musculoskeletal:         General: No swelling or deformity. Cervical back: Neck supple. No muscular tenderness. Neurological:      Mental Status: She is alert. Lab Review   Lab Results   Component Value Date/Time    CHOL 261 06/22/2023 09:40 AM    TRIG 133 06/22/2023 09:40 AM    HDL 75 06/22/2023 09:40 AM    LDLDIRECT 163 04/08/2021 09:34 AM        Stress tests 3/4/2019       Summary    ECG portion of stress test is negative for ischemia by diagnostic criteria. No infarct or ischemia noted. Normal EF 79 % with normal ventricular contractility. Normal stress myocardial perfusion. This is a normal study. Echo 3/4/2019  Summary   technically difficult study   Left ventricular function is normal.   Ejection fraction is visually estimated at 55-60%. No significant valvular abnormalities. Stress  test  12/9/2022  Overall Impression: normal stress test, THR achieved      Functional Capacity: Poor Exercise Tolerance. No chest pain noted during   testing.   Stress Type: Exercise      Peak HR: 151 bpm                      HR Response: Appropriate   Peak BP: 158/84 mmHg

## 2023-11-16 ENCOUNTER — OFFICE VISIT (OUTPATIENT)
Dept: INFECTIOUS DISEASES | Age: 58
End: 2023-11-16
Payer: COMMERCIAL

## 2023-11-16 VITALS
BODY MASS INDEX: 24.02 KG/M2 | RESPIRATION RATE: 18 BRPM | DIASTOLIC BLOOD PRESSURE: 89 MMHG | WEIGHT: 158 LBS | SYSTOLIC BLOOD PRESSURE: 143 MMHG | HEART RATE: 76 BPM

## 2023-11-16 DIAGNOSIS — A60.09 HERPES GENITALIS IN WOMEN: Primary | ICD-10-CM

## 2023-11-16 PROCEDURE — 3079F DIAST BP 80-89 MM HG: CPT | Performed by: INTERNAL MEDICINE

## 2023-11-16 PROCEDURE — 99213 OFFICE O/P EST LOW 20 MIN: CPT | Performed by: INTERNAL MEDICINE

## 2023-11-16 PROCEDURE — 1036F TOBACCO NON-USER: CPT | Performed by: INTERNAL MEDICINE

## 2023-11-16 PROCEDURE — G8427 DOCREV CUR MEDS BY ELIG CLIN: HCPCS | Performed by: INTERNAL MEDICINE

## 2023-11-16 PROCEDURE — 3077F SYST BP >= 140 MM HG: CPT | Performed by: INTERNAL MEDICINE

## 2023-11-16 PROCEDURE — G8484 FLU IMMUNIZE NO ADMIN: HCPCS | Performed by: INTERNAL MEDICINE

## 2023-11-16 PROCEDURE — G8420 CALC BMI NORM PARAMETERS: HCPCS | Performed by: INTERNAL MEDICINE

## 2023-11-16 PROCEDURE — 3017F COLORECTAL CA SCREEN DOC REV: CPT | Performed by: INTERNAL MEDICINE

## 2023-11-16 RX ORDER — VALACYCLOVIR HYDROCHLORIDE 500 MG/1
500 TABLET, FILM COATED ORAL DAILY
Qty: 30 TABLET | Refills: 5 | Status: SHIPPED | OUTPATIENT
Start: 2023-11-16 | End: 2024-05-14

## 2023-11-16 NOTE — PROGRESS NOTES
11/16/2023         Referring Physician: No ref. provider found  Primary Care Physician: Jeralyn Romberg, APRN - NP    Impression/Plan:   Diagnosis Orders   1. Herpes genitalis in women  valACYclovir (VALTREX) 500 MG tablet    Hepatic Function Panel    Basic Metabolic Panel          Discussion:  Herpes genitalis in women  Hepatic and renal function are within normal limits. No herpes flare. She would like to continue Valtrex. Return in about 6 months (around 5/16/2024). 28 minutes was spent in the encounter; more than 50% of the face-to-face time was spent with the patient providing counseling and coordination of care. History: Benjamin Russo is a 66 y.o.  female presenting today. She was originally referred to us for positive QuantiFERON test.  However, she states that she has been to the health department and they will be starting her regimen (rifapentine and isoniazid). She is here for HSV II IgG positive test.  She tested positive for HSV antibody as part of the screen for STDs. She does not recall having any painful genital lesions ever. She last had sex about a year ago. She would like to be treated for Herpes. She is asymptomatic and has not had a flare. She denies headache, fever, chills, abdominal pain. 6/23/2021: denies any genital lesions. Tolerating medication. 12/16/2021: tolerating acyclovir, adherent to it, has no ulcers. Is in a relationship, yet to have sex but is thinking about it. She is inclined to break the news to her new partner. 7/27/2022:  no outbreak, was in a relationship with a man and she revealed her status. They used condoms once, but he stopped using condoms. Now,they are not together. 5/25/2023: Seen on 1/25/2023. Valtrex was continued. Labs on 4/4/2023 were reviewed. Renal function and liver enzymes are within normal limits. Reports that she has ventral abdominal hernia.  She will have surgery at the [facility].  11/16/2023:Denies fatigue, fever,

## 2023-12-04 ENCOUNTER — TELEPHONE (OUTPATIENT)
Dept: FAMILY MEDICINE CLINIC | Age: 58
End: 2023-12-04

## 2023-12-04 NOTE — TELEPHONE ENCOUNTER
Pt reports she has had several yeast infections this year. She would like to know if her medications can cause this to happen. Reports she is going to the health dept today to be treated but is concerned her medications is causing this.

## 2023-12-07 ENCOUNTER — OFFICE VISIT (OUTPATIENT)
Dept: FAMILY MEDICINE CLINIC | Age: 58
End: 2023-12-07
Payer: COMMERCIAL

## 2023-12-07 VITALS
SYSTOLIC BLOOD PRESSURE: 130 MMHG | BODY MASS INDEX: 24.43 KG/M2 | DIASTOLIC BLOOD PRESSURE: 82 MMHG | HEART RATE: 98 BPM | WEIGHT: 161.2 LBS | HEIGHT: 68 IN | OXYGEN SATURATION: 98 %

## 2023-12-07 DIAGNOSIS — E11.9 TYPE 2 DIABETES MELLITUS WITHOUT COMPLICATION, WITHOUT LONG-TERM CURRENT USE OF INSULIN (HCC): Primary | ICD-10-CM

## 2023-12-07 DIAGNOSIS — N89.8 VAGINAL DISCHARGE: ICD-10-CM

## 2023-12-07 DIAGNOSIS — N89.8 VAGINAL ITCHING: ICD-10-CM

## 2023-12-07 LAB
BILIRUBIN, POC: ABNORMAL
BLOOD URINE, POC: NEGATIVE
CLARITY, POC: ABNORMAL
COLOR, POC: YELLOW
GLUCOSE URINE, POC: NEGATIVE
HBA1C MFR BLD: 6.7 %
KETONES, POC: POSITIVE
LEUKOCYTE EST, POC: NEGATIVE
NITRITE, POC: NEGATIVE
PH, POC: 5
PROTEIN, POC: POSITIVE
SPECIFIC GRAVITY, POC: ABNORMAL
UROBILINOGEN, POC: ABNORMAL

## 2023-12-07 PROCEDURE — 99214 OFFICE O/P EST MOD 30 MIN: CPT | Performed by: NURSE PRACTITIONER

## 2023-12-07 PROCEDURE — 3017F COLORECTAL CA SCREEN DOC REV: CPT | Performed by: NURSE PRACTITIONER

## 2023-12-07 PROCEDURE — 81002 URINALYSIS NONAUTO W/O SCOPE: CPT | Performed by: NURSE PRACTITIONER

## 2023-12-07 PROCEDURE — 3075F SYST BP GE 130 - 139MM HG: CPT | Performed by: NURSE PRACTITIONER

## 2023-12-07 PROCEDURE — 3044F HG A1C LEVEL LT 7.0%: CPT | Performed by: NURSE PRACTITIONER

## 2023-12-07 PROCEDURE — G8484 FLU IMMUNIZE NO ADMIN: HCPCS | Performed by: NURSE PRACTITIONER

## 2023-12-07 PROCEDURE — G8420 CALC BMI NORM PARAMETERS: HCPCS | Performed by: NURSE PRACTITIONER

## 2023-12-07 PROCEDURE — 3079F DIAST BP 80-89 MM HG: CPT | Performed by: NURSE PRACTITIONER

## 2023-12-07 PROCEDURE — 1036F TOBACCO NON-USER: CPT | Performed by: NURSE PRACTITIONER

## 2023-12-07 PROCEDURE — G8427 DOCREV CUR MEDS BY ELIG CLIN: HCPCS | Performed by: NURSE PRACTITIONER

## 2023-12-07 PROCEDURE — 83036 HEMOGLOBIN GLYCOSYLATED A1C: CPT | Performed by: NURSE PRACTITIONER

## 2023-12-07 PROCEDURE — 2022F DILAT RTA XM EVC RTNOPTHY: CPT | Performed by: NURSE PRACTITIONER

## 2023-12-07 RX ORDER — FLUCONAZOLE 150 MG/1
TABLET ORAL
Qty: 2 TABLET | Refills: 1 | Status: SHIPPED | OUTPATIENT
Start: 2023-12-07

## 2023-12-08 LAB
CANDIDA DNA VAG QL NAA+PROBE: ABNORMAL
G VAGINALIS DNA SPEC QL NAA+PROBE: ABNORMAL
T VAGINALIS DNA VAG QL NAA+PROBE: ABNORMAL

## 2023-12-08 RX ORDER — METRONIDAZOLE 500 MG/1
500 TABLET ORAL 2 TIMES DAILY
Qty: 14 TABLET | Refills: 0 | Status: SHIPPED | OUTPATIENT
Start: 2023-12-08 | End: 2023-12-15

## 2023-12-20 NOTE — TELEPHONE ENCOUNTER
>60 >60 mL/min/1.73m2 Final     Comment:             These results are not intended for use in patients <25years of age. eGFR results are calculated without a race factor using the 2021 CKD-EPI equation. Careful clinical correlation is recommended, particularly when comparing to results   calculated using previous equations. The CKD-EPI equation is less accurate in patients with extremes of muscle mass, extra-renal   metabolism of creatine, excessive creatine ingestion, or following therapy that affects   renal tubular secretion. CMP:  Sodium   Date Value Ref Range Status   11/13/2023 141 135 - 145 MMOL/L Final     Potassium   Date Value Ref Range Status   11/13/2023 4.8 3.5 - 5.1 MMOL/L Final     Chloride   Date Value Ref Range Status   11/13/2023 104 99 - 110 mMol/L Final     CO2   Date Value Ref Range Status   11/13/2023 30 21 - 32 MMOL/L Final     BUN   Date Value Ref Range Status   11/13/2023 9 6 - 23 MG/DL Final     Creatinine   Date Value Ref Range Status   11/13/2023 0.8 0.6 - 1.1 MG/DL Final     Glucose   Date Value Ref Range Status   11/13/2023 104 (H) 70 - 99 MG/DL Final     Calcium   Date Value Ref Range Status   11/13/2023 9.7 8.3 - 10.6 MG/DL Final     Total Protein   Date Value Ref Range Status   11/13/2023 6.5 6.4 - 8.2 GM/DL Final   03/02/2013 7.9 6.4 - 8.3 g/dL Final     Albumin   Date Value Ref Range Status   11/13/2023 4.1 3.4 - 5.0 GM/DL Final     Total Bilirubin   Date Value Ref Range Status   11/13/2023 0.7 0.0 - 1.0 MG/DL Final     Alkaline Phosphatase   Date Value Ref Range Status   11/13/2023 86 40 - 129 IU/L Final     AST   Date Value Ref Range Status   11/13/2023 18 15 - 37 IU/L Final     ALT   Date Value Ref Range Status   11/13/2023 6 (L) 10 - 40 U/L Final     Est, Glom Filt Rate   Date Value Ref Range Status   11/13/2023 >60 >60 mL/min/1.73m2 Final     Comment:             These results are not intended for use in patients <25years of age.           eGFR results

## 2023-12-27 RX ORDER — QUETIAPINE FUMARATE 50 MG/1
50 TABLET, FILM COATED ORAL NIGHTLY
Qty: 30 TABLET | Refills: 0 | Status: SHIPPED | OUTPATIENT
Start: 2023-12-27 | End: 2024-01-26

## 2023-12-29 ENCOUNTER — OFFICE VISIT (OUTPATIENT)
Dept: FAMILY MEDICINE CLINIC | Age: 58
End: 2023-12-29
Payer: COMMERCIAL

## 2023-12-29 VITALS
OXYGEN SATURATION: 95 % | BODY MASS INDEX: 23.73 KG/M2 | WEIGHT: 156.6 LBS | SYSTOLIC BLOOD PRESSURE: 124 MMHG | HEIGHT: 68 IN | HEART RATE: 95 BPM | DIASTOLIC BLOOD PRESSURE: 76 MMHG

## 2023-12-29 DIAGNOSIS — R23.2 HOT FLASHES: Primary | ICD-10-CM

## 2023-12-29 DIAGNOSIS — F51.01 PRIMARY INSOMNIA: ICD-10-CM

## 2023-12-29 DIAGNOSIS — F41.9 ANXIETY: ICD-10-CM

## 2023-12-29 PROCEDURE — 99214 OFFICE O/P EST MOD 30 MIN: CPT | Performed by: NURSE PRACTITIONER

## 2023-12-29 PROCEDURE — 3074F SYST BP LT 130 MM HG: CPT | Performed by: NURSE PRACTITIONER

## 2023-12-29 PROCEDURE — 3078F DIAST BP <80 MM HG: CPT | Performed by: NURSE PRACTITIONER

## 2023-12-29 PROCEDURE — G8420 CALC BMI NORM PARAMETERS: HCPCS | Performed by: NURSE PRACTITIONER

## 2023-12-29 PROCEDURE — G8427 DOCREV CUR MEDS BY ELIG CLIN: HCPCS | Performed by: NURSE PRACTITIONER

## 2023-12-29 PROCEDURE — 3017F COLORECTAL CA SCREEN DOC REV: CPT | Performed by: NURSE PRACTITIONER

## 2023-12-29 PROCEDURE — G8484 FLU IMMUNIZE NO ADMIN: HCPCS | Performed by: NURSE PRACTITIONER

## 2023-12-29 PROCEDURE — 1036F TOBACCO NON-USER: CPT | Performed by: NURSE PRACTITIONER

## 2023-12-29 RX ORDER — ESTRADIOL 1 MG/1
1 TABLET ORAL DAILY
Qty: 30 TABLET | Refills: 0 | Status: SHIPPED | OUTPATIENT
Start: 2023-12-29

## 2023-12-29 NOTE — PROGRESS NOTES
daily Yes YESSICA Holloway CNP   coenzyme Q-10 100 MG capsule Take 1 capsule by mouth daily Yes Aurora Domingo APRN - CNP   sucralfate (CARAFATE) 1 GM tablet Take 1 tablet by mouth 4 times daily Please crush and 5 mL of water and take a slurry. Please  out from other medication Yes Marcelino Donis, APRN - CNP   docusate sodium (COLACE) 100 MG capsule Take 1 capsule by mouth 2 times daily as needed for Constipation Yes Mile Lopez JoyusYESSICA NP   blood glucose test strips (FREESTYLE LITE) strip 1 each by In Vitro route daily As needed. Yes YESSICA Murray NP   Lancets MISC 1 each by Does not apply route daily Yes Mile Lopez JoyusYESSICA NP   hydrOXYzine HCl (ATARAX) 25 MG tablet TAKE 1 TABLET EVERY 8 HOURS AS NEEDED FOR ANXIETY AVOID ALCOHOL/CAUSES DROWSINESS. Do not take with Xanax Yes YESSICA Murray NP   ammonium lactate (LAC-HYDRIN) 12 % lotion APPLY TO SCALY AREAS ONCE A DAY AS NEEDED Yes Giovanny Aguilar MD   clobetasol (TEMOVATE) 0.05 % ointment APPLY TO PSORIASIS TWICE A DAY [employer] [place], NO WEEKENDS Yes Giovanny Aguilar MD   hydrocortisone 2.5 % ointment APPLY TO PSORIASIS ONCE A DAY AS NEEDED FOR FLARES Yes Giovanny Aguilar MD   clotrimazole-betamethasone (LOTRISONE) 1-0.05 % cream Apply topically 2 times daily.  Yes Denisse Dennis APRN - CNP   SPIRIVA RESPIMAT 2.5 MCG/ACT AERS inhaler Inhale 2 puffs into the lungs daily Yes Edinson Lamas MD   albuterol sulfate HFA (VENTOLIN HFA) 108 (90 Base) MCG/ACT inhaler Inhale 2 puffs into the lungs 4 times daily as needed for Wheezing Yes Edinson Lamas MD   albuterol (PROVENTIL) (2.5 MG/3ML) 0.083% nebulizer solution Take 3 mLs by nebulization every 6 hours Yes Edinson Lamas MD   [facility]  (91 Base) MCG/ACT inhaler Inhale 2 puffs into the lungs every 6 hours as needed for Wheezing Yes Edinson Lamas MD   Calcium Carb-Cholecalciferol (CALCIUM-VITAMIN D) 600-400 MG-UNIT TABS Take 1 tablet by mouth

## 2024-01-08 RX ORDER — MIDODRINE HYDROCHLORIDE 2.5 MG/1
2.5 TABLET ORAL 3 TIMES DAILY PRN
Qty: 90 TABLET | Refills: 3 | Status: SHIPPED | OUTPATIENT
Start: 2024-01-08

## 2024-01-11 ENCOUNTER — OFFICE VISIT (OUTPATIENT)
Dept: OBGYN | Age: 59
End: 2024-01-11
Payer: COMMERCIAL

## 2024-01-11 VITALS
HEIGHT: 68 IN | DIASTOLIC BLOOD PRESSURE: 83 MMHG | SYSTOLIC BLOOD PRESSURE: 137 MMHG | BODY MASS INDEX: 24.25 KG/M2 | WEIGHT: 160 LBS

## 2024-01-11 DIAGNOSIS — Z12.31 BREAST CANCER SCREENING BY MAMMOGRAM: ICD-10-CM

## 2024-01-11 DIAGNOSIS — Z01.419 WOMEN'S ANNUAL ROUTINE GYNECOLOGICAL EXAMINATION: ICD-10-CM

## 2024-01-11 DIAGNOSIS — R23.2 HOT FLASHES: Primary | ICD-10-CM

## 2024-01-11 PROCEDURE — G8484 FLU IMMUNIZE NO ADMIN: HCPCS | Performed by: NURSE PRACTITIONER

## 2024-01-11 PROCEDURE — 3075F SYST BP GE 130 - 139MM HG: CPT | Performed by: NURSE PRACTITIONER

## 2024-01-11 PROCEDURE — 3079F DIAST BP 80-89 MM HG: CPT | Performed by: NURSE PRACTITIONER

## 2024-01-11 PROCEDURE — 99396 PREV VISIT EST AGE 40-64: CPT | Performed by: NURSE PRACTITIONER

## 2024-01-11 RX ORDER — ESTRADIOL 2 MG/1
2 TABLET ORAL DAILY
Qty: 30 TABLET | Refills: 11 | Status: SHIPPED | OUTPATIENT
Start: 2024-01-11

## 2024-01-11 ASSESSMENT — ENCOUNTER SYMPTOMS
DIARRHEA: 0
RESPIRATORY NEGATIVE: 1
VOMITING: 0
GASTROINTESTINAL NEGATIVE: 1
ABDOMINAL PAIN: 0
SHORTNESS OF BREATH: 0
CONSTIPATION: 0
NAUSEA: 0

## 2024-01-11 ASSESSMENT — PATIENT HEALTH QUESTIONNAIRE - PHQ9
SUM OF ALL RESPONSES TO PHQ QUESTIONS 1-9: 0
2. FEELING DOWN, DEPRESSED OR HOPELESS: 0
1. LITTLE INTEREST OR PLEASURE IN DOING THINGS: 0
SUM OF ALL RESPONSES TO PHQ QUESTIONS 1-9: 0
SUM OF ALL RESPONSES TO PHQ9 QUESTIONS 1 & 2: 0

## 2024-01-11 NOTE — PROGRESS NOTES
24    Beverly Valencia  1965    Chief Complaint   Patient presents with    Gynecologic Exam     Annual exam, had hyster without ovary removal, taking estradiol, is sexually active, pap- hyster, mammo-2023-normal, dexa-never, colonoscopy--normal.     Other     Pt states after intercourse she rotates having BV or yeast. Pt gets an odor after. No sx now. Pt states she used boric acid after last intercourse to prevent and did not get dx.   Pt also c/o hot flashes. Requesting increase in hrt.         The patient is a 58 y.o. female,  who presents for her annual exam. She is menopausal.  She is taking HRT, estradiol 1mg.. She reports no gynecological symptoms.  She has had a supracervical hysterectomy without removal of ovaries.     She is  sexually active.     Pap smear history: Her last PAP smear was in .  Her results were normal.    Breast history: her most recent mammogram was in .  The results were: Normal    Osteoporosis Status: she has not had a bone density scan    Colonoscopy Status: she had a colonoscopy in .  The results were normal.    Past Medical History:   Diagnosis Date    Adenomatous polyp of ascending colon 2020    Anxiety     Arthritis     \"Back, Hands, Shoulders\"    Chronic back pain     Arthritis - NKI    COPD (chronic obstructive pulmonary disease) ([facility])     Sees Dr. Spenser MAXWELL-19     Depression     Follows with Mental Health    Diabetes mellitus ([facility]) Dx     Type II - follows with PCP    Emphysema     Fibromyalgia     H/O abdominoplasty 2018    H/O cardiovascular stress test 2019    ECG portion of stress test is neg for ischemia by diagnostic criteria. No infarct or ischemia noted. Normal stress myocardial perfusion. This is a normal study    H/O echocardiogram 2019    Left ventricular function is normal. Ejection fraction is visually estimated at 55-60%. No significant valvular abnormalitites.     H/O tilt table evaluation

## 2024-01-12 ENCOUNTER — OFFICE VISIT (OUTPATIENT)
Dept: FAMILY MEDICINE CLINIC | Age: 59
End: 2024-01-12

## 2024-01-12 VITALS
DIASTOLIC BLOOD PRESSURE: 70 MMHG | WEIGHT: 160.8 LBS | HEIGHT: 68 IN | SYSTOLIC BLOOD PRESSURE: 126 MMHG | BODY MASS INDEX: 24.37 KG/M2 | HEART RATE: 66 BPM | OXYGEN SATURATION: 97 %

## 2024-01-12 DIAGNOSIS — F51.01 PRIMARY INSOMNIA: ICD-10-CM

## 2024-01-12 DIAGNOSIS — E55.9 VITAMIN D DEFICIENCY: ICD-10-CM

## 2024-01-12 DIAGNOSIS — E78.2 MIXED HYPERLIPIDEMIA: ICD-10-CM

## 2024-01-12 DIAGNOSIS — K21.9 GASTROESOPHAGEAL REFLUX DISEASE, UNSPECIFIED WHETHER ESOPHAGITIS PRESENT: ICD-10-CM

## 2024-01-12 DIAGNOSIS — F41.9 ANXIETY: ICD-10-CM

## 2024-01-12 DIAGNOSIS — A60.09 HERPES GENITALIS IN WOMEN: ICD-10-CM

## 2024-01-12 DIAGNOSIS — I10 ESSENTIAL HYPERTENSION: ICD-10-CM

## 2024-01-12 DIAGNOSIS — Z90.3 H/O GASTRIC SLEEVE: ICD-10-CM

## 2024-01-12 DIAGNOSIS — Z00.00 ANNUAL PHYSICAL EXAM: Primary | ICD-10-CM

## 2024-01-12 DIAGNOSIS — G89.4 CHRONIC PAIN SYNDROME: ICD-10-CM

## 2024-01-12 DIAGNOSIS — M79.7 FIBROMYALGIA: ICD-10-CM

## 2024-01-12 DIAGNOSIS — J44.9 COPD, SEVERE (HCC): ICD-10-CM

## 2024-01-12 DIAGNOSIS — E11.42 TYPE 2 DIABETES MELLITUS WITH DIABETIC POLYNEUROPATHY, WITHOUT LONG-TERM CURRENT USE OF INSULIN (HCC): ICD-10-CM

## 2024-01-12 DIAGNOSIS — R23.2 HOT FLASHES: ICD-10-CM

## 2024-01-12 RX ORDER — QUETIAPINE FUMARATE 100 MG/1
100 TABLET, FILM COATED ORAL NIGHTLY
Qty: 90 TABLET | Refills: 0 | Status: SHIPPED | OUTPATIENT
Start: 2024-01-12

## 2024-01-12 NOTE — PROGRESS NOTES
2024     Beverly Valencia (:  1965) is a 58 y.o. female, here for evaluation of the following medical concerns:      Annual exam   Chronics       Menopausal hot flashes   On estrogen.   OBGYN managing. Just increased to 2mg   Boric acid for recurrent BV and yeast       Bipolar   Anxiety depression   Insomnia   Seroquel -- gradual increase- working well at 100mg dose.   Denies suicidal self harm thought plan idea        hypotension  99/58   States it dropped after that to 50/20..   States she started eating a lot of salt and it came up to   States she was out of it because her seroquel had started kicking in.   She has midodrine for when this happens but she was out of it. Cardio manages this and she now has a refill.       chronic pain syndrome   Follows with PM   On percocet         Sleep study  - NO URIEL      COPD - controlled. Denies cough dyspnea. [facility]. Albuterol prn, does not use daily. Breo and spiriva.         Mammo  benign     Colon cancer screen due      Pap smear follows with OBGYN.         Dm2  A1C  6.7 dec 2023   Urine protein normal oct 2023   alogliptin   eye exam   Does not check glucose at home but has supplies         Vit d def --- high dose replacement 2023. Ended sept.  taking supp now         High cholesterol - Lipitor 80 without  side effects         GERD - on prilsoec. Doing well         HTN   Amlodipine if bp >190 systolic.   She has been taking \"here and there\".   Reports 120-150 systolic at home.   States she Has not been to \"the dispensary\" since her surgery. States Medical marijuana was keeping her BP under control.   Uses midodrine PRN -- BP fluctuates drastically         HSV - follows with ID   On valtrex. No recent lesions              Review of Systems    Prior to Visit Medications    Medication Sig Taking? Authorizing Provider   QUEtiapine (SEROQUEL) 100 MG tablet Take 1 tablet by mouth at bedtime Yes Evi Lopez, APRN - NP

## 2024-01-24 ENCOUNTER — OFFICE VISIT (OUTPATIENT)
Dept: GASTROENTEROLOGY | Age: 59
End: 2024-01-24
Payer: COMMERCIAL

## 2024-01-24 VITALS
HEART RATE: 74 BPM | DIASTOLIC BLOOD PRESSURE: 80 MMHG | BODY MASS INDEX: 24.74 KG/M2 | SYSTOLIC BLOOD PRESSURE: 118 MMHG | OXYGEN SATURATION: 99 % | RESPIRATION RATE: 16 BRPM | WEIGHT: 163.2 LBS | HEIGHT: 68 IN

## 2024-01-24 DIAGNOSIS — R10.84 GENERALIZED ABDOMINAL PAIN: Primary | ICD-10-CM

## 2024-01-24 DIAGNOSIS — K21.9 GASTROESOPHAGEAL REFLUX DISEASE, UNSPECIFIED WHETHER ESOPHAGITIS PRESENT: ICD-10-CM

## 2024-01-24 DIAGNOSIS — K59.00 CONSTIPATION, UNSPECIFIED CONSTIPATION TYPE: ICD-10-CM

## 2024-01-24 DIAGNOSIS — Z98.84 S/P LAPAROSCOPIC SLEEVE GASTRECTOMY: ICD-10-CM

## 2024-01-24 PROCEDURE — 1036F TOBACCO NON-USER: CPT | Performed by: INTERNAL MEDICINE

## 2024-01-24 PROCEDURE — 99214 OFFICE O/P EST MOD 30 MIN: CPT | Performed by: INTERNAL MEDICINE

## 2024-01-24 PROCEDURE — 3074F SYST BP LT 130 MM HG: CPT | Performed by: INTERNAL MEDICINE

## 2024-01-24 PROCEDURE — G8427 DOCREV CUR MEDS BY ELIG CLIN: HCPCS | Performed by: INTERNAL MEDICINE

## 2024-01-24 PROCEDURE — G8484 FLU IMMUNIZE NO ADMIN: HCPCS | Performed by: INTERNAL MEDICINE

## 2024-01-24 PROCEDURE — 3079F DIAST BP 80-89 MM HG: CPT | Performed by: INTERNAL MEDICINE

## 2024-01-24 PROCEDURE — 3017F COLORECTAL CA SCREEN DOC REV: CPT | Performed by: INTERNAL MEDICINE

## 2024-01-24 PROCEDURE — G8420 CALC BMI NORM PARAMETERS: HCPCS | Performed by: INTERNAL MEDICINE

## 2024-01-24 RX ORDER — SUCRALFATE 1 G/1
1 TABLET ORAL 2 TIMES DAILY
Qty: 60 TABLET | Refills: 1 | Status: SHIPPED | OUTPATIENT
Start: 2024-01-24

## 2024-01-24 RX ORDER — POLYETHYLENE GLYCOL 3350 17 G/17G
17 POWDER, FOR SOLUTION ORAL DAILY
Qty: 527 G | Refills: 1 | Status: SHIPPED | OUTPATIENT
Start: 2024-01-24 | End: 2024-03-26

## 2024-01-24 NOTE — PROGRESS NOTES
[facility] Gastroenterology and Hepatology             MD Pauline Foy MD Carol Christensen, APRN-CNP             [address] [place] [address]             [phone] fax [phone]        Gastroenterology Clinic Consultation    Pauline Orozco MD  Encounter Date: 01/24/24     CC: Follow-up (Pain in abdomen since surgery)         No referring provider defined for this encounter.     History obtained from: patient, medical records     Subjective:       Beverly Valencia is an 58 y.o. female with past medical history of type 2 diabetes, GERD, COPD, status post laparoscopic sleeve gastrectomy, abdominoplasty, panniculectomy who presents for Follow-up (Pain in abdomen since surgery)    1/24/2024  Since her last appointment She had her Umbilical Hernia and scar tissues removal with her Plastic surgeon [facility]. Since then her abdominal pain has worsened. Its generalized but pain is centered where her umbilicus used to be. She is on Percocet which has helped her but mentions that she is at the highest point. She mentions that she is constipated. She takes over the counter laxatives. She takes the carafate and prilosec and she feels that helps it.     7/26/2023  Since her last appointment she had an EGD done by me in January 2023 which revealed evidence of sleeve gastrectomy, characterized by healthy-appearing mucosa biopsies were negative for H. pylori.  Upper GI study also revealed postsurgical changes status post bariatric surgery otherwise unremarkable.  She had a CT abdomen and pelvis done in March of this year which did not reveal any acute intra abdominal or pelvic abnormality however it was noncontrast.  She has been following up with pain medicine and plastic surgery at [facility].  Recently seen by Dr. Song.  She is on oxycodone from her pain doctor.  She has an upcoming appointment with Plastic surgery.  Her

## 2024-02-08 ENCOUNTER — TELEPHONE (OUTPATIENT)
Dept: FAMILY MEDICINE CLINIC | Age: 59
End: 2024-02-08

## 2024-02-08 NOTE — TELEPHONE ENCOUNTER
Patient called and wanted to know if provider could please send an script for Mucinex for cough, could not find in medication list.

## 2024-02-09 NOTE — TELEPHONE ENCOUNTER
THIS IS NOT A LONG TERM MED FOR THIS PT. IF SHE IS ILL, PLEASE HAVE HER COME IN OR DO EVISIT AND I WILL TREAT

## 2024-02-21 ENCOUNTER — OFFICE VISIT (OUTPATIENT)
Dept: CARDIOLOGY CLINIC | Age: 59
End: 2024-02-21
Payer: COMMERCIAL

## 2024-02-21 ENCOUNTER — TELEPHONE (OUTPATIENT)
Dept: CARDIOLOGY CLINIC | Age: 59
End: 2024-02-21

## 2024-02-21 VITALS
DIASTOLIC BLOOD PRESSURE: 80 MMHG | WEIGHT: 161.2 LBS | BODY MASS INDEX: 25.3 KG/M2 | HEIGHT: 67 IN | SYSTOLIC BLOOD PRESSURE: 122 MMHG | HEART RATE: 72 BPM

## 2024-02-21 DIAGNOSIS — I10 ESSENTIAL HYPERTENSION: ICD-10-CM

## 2024-02-21 DIAGNOSIS — R00.2 PALPITATIONS: ICD-10-CM

## 2024-02-21 DIAGNOSIS — R07.9 CHEST PAIN, UNSPECIFIED TYPE: ICD-10-CM

## 2024-02-21 DIAGNOSIS — I44.1 MOBITZ TYPE 1 SECOND DEGREE AV BLOCK: Primary | ICD-10-CM

## 2024-02-21 PROCEDURE — G8484 FLU IMMUNIZE NO ADMIN: HCPCS | Performed by: NURSE PRACTITIONER

## 2024-02-21 PROCEDURE — 3079F DIAST BP 80-89 MM HG: CPT | Performed by: NURSE PRACTITIONER

## 2024-02-21 PROCEDURE — 99214 OFFICE O/P EST MOD 30 MIN: CPT | Performed by: NURSE PRACTITIONER

## 2024-02-21 PROCEDURE — G8427 DOCREV CUR MEDS BY ELIG CLIN: HCPCS | Performed by: NURSE PRACTITIONER

## 2024-02-21 PROCEDURE — 3017F COLORECTAL CA SCREEN DOC REV: CPT | Performed by: NURSE PRACTITIONER

## 2024-02-21 PROCEDURE — G8417 CALC BMI ABV UP PARAM F/U: HCPCS | Performed by: NURSE PRACTITIONER

## 2024-02-21 PROCEDURE — 3074F SYST BP LT 130 MM HG: CPT | Performed by: NURSE PRACTITIONER

## 2024-02-21 PROCEDURE — 1036F TOBACCO NON-USER: CPT | Performed by: NURSE PRACTITIONER

## 2024-02-21 RX ORDER — SUCRALFATE 1 G/1
1 TABLET ORAL 4 TIMES DAILY
Qty: 120 TABLET | Refills: 0 | Status: SHIPPED | OUTPATIENT
Start: 2024-02-21 | End: 2024-03-22

## 2024-02-21 ASSESSMENT — ENCOUNTER SYMPTOMS
SHORTNESS OF BREATH: 0
COUGH: 0

## 2024-02-21 NOTE — TELEPHONE ENCOUNTER
Pt just had monitor put on and having really bad chest pains, I told her he may want to go to er she said she will wait and see if the got away

## 2024-02-21 NOTE — TELEPHONE ENCOUNTER
After patient seen in office, went home and took doxycycline. Patient states pill \"exploded \" in her mouth, burned throat and cause severe pain mid chest to top of stomach. At time of phone call symptoms easing up. B/P 164/96

## 2024-02-21 NOTE — PROGRESS NOTES
CARDIOLOGY  NOTE    2024    Beverly Valencia (:  1965) is a 58 y.o. female,an established patient with Dr. Cummings, here for evaluation of the following chief complaint(s):  Hyperlipidemia, Hypertension, and Follow-up (Pt complains of chest pain, and palpitations on monday/Denies dizziness, swelling /)        SUBJECTIVE/OBJECTIVE:    DALIA Paul has Past medical history as noted below.      She states that she has been having chest pain. She has had to increase her gummies intake and has run out earlier  therefore increased drops and some smoking o marijuana. Has only had one occurrence of hypotension.     She has a history of gastric sleeve surgery.    She is feeling better. Her blood pressure is not as labile since pain is controlled and she is managing her anxiety with THC gummies.     She had stress test  in Dec 2022 showed no ischemia but reduced exercise capacity    she was actually evaluated in 2019 when she was undergoing surgery was noted to have second-degree heart block type I Wenckebach.  S.  During her event monitor which she wore for 14 days in 2019 she was noted to have Wenckebach type I.  She was not symptomatic      Review of Systems   Constitutional:  Negative for fatigue and fever.   Respiratory:  Negative for cough and shortness of breath.    Cardiovascular:  Negative for chest pain, palpitations and leg swelling.   Musculoskeletal:  Negative for arthralgias and gait problem.   Neurological:  Negative for dizziness, syncope, weakness, light-headedness and headaches.       Vitals:    24 0824   BP: 122/80   Pulse: 72   Weight: 73.1 kg (161 lb 3.2 oz)   Height: 1.702 m (5' 7\")       Wt Readings from Last 3 Encounters:   24 73.1 kg (161 lb 3.2 oz)   24 74 kg (163 lb 3.2 oz)   24 72.9 kg (160 lb 12.8 oz)       BP Readings from Last 3 Encounters:   24 122/80   24 118/80   24 126/70       Prior to Admission medications    Medication

## 2024-03-18 DIAGNOSIS — E11.9 TYPE 2 DIABETES MELLITUS WITHOUT COMPLICATION, WITHOUT LONG-TERM CURRENT USE OF INSULIN (HCC): ICD-10-CM

## 2024-03-18 RX ORDER — ALOGLIPTIN 6.25 MG/1
6.25 TABLET, FILM COATED ORAL DAILY
Qty: 90 TABLET | Refills: 1 | Status: SHIPPED | OUTPATIENT
Start: 2024-03-18 | End: 2024-09-14

## 2024-03-18 NOTE — TELEPHONE ENCOUNTER
hours of Biotin intake may require additional information for   diagnosis.         UA:  Color, UA   Date Value Ref Range Status   12/07/2023 yellow  Final   11/13/2023 YELLOW YELLOW Final     Clarity, UA   Date Value Ref Range Status   12/07/2023 cloudy  Final   11/13/2023 CLEAR CLEAR Final     Glucose, Urine   Date Value Ref Range Status   11/13/2023 NEGATIVE NEGATIVE MG/DL Final     Bilirubin Urine   Date Value Ref Range Status   11/13/2023 NEGATIVE NEGATIVE MG/DL Final     Ketones, Urine   Date Value Ref Range Status   11/13/2023 NEGATIVE NEGATIVE MG/DL Final     Ketones, UA   Date Value Ref Range Status   12/07/2023 positive  Final     Specific Gravity, UA   Date Value Ref Range Status   11/13/2023 1.015 1.001 - 1.035 Final     Blood, Urine   Date Value Ref Range Status   11/13/2023 NEGATIVE NEGATIVE Final     Blood, UA POC   Date Value Ref Range Status   12/07/2023 negative  Final     pH, Urine   Date Value Ref Range Status   11/13/2023 7.0 5.0 - 8.0 Final     Protein, UA   Date Value Ref Range Status   11/13/2023 NEGATIVE NEGATIVE MG/DL Final     Protein, UA POC   Date Value Ref Range Status   12/07/2023 positive  Final     Urobilinogen, Urine   Date Value Ref Range Status   11/13/2023 0.2 0.2 - 1.0 MG/DL Final     Nitrite Urine, Quantitative   Date Value Ref Range Status   11/13/2023 NEGATIVE NEGATIVE Final     Leukocyte Esterase, Urine   Date Value Ref Range Status   11/13/2023 NEGATIVE NEGATIVE Final     Leukocytes, UA   Date Value Ref Range Status   12/07/2023 negative  Final     RBC, UA   Date Value Ref Range Status   02/12/2020 1 0 - 6 /HPF Final     WBC, UA   Date Value Ref Range Status   02/12/2020 <1 0 - 5 /HPF Final     Bacteria, UA   Date Value Ref Range Status   02/12/2020 RARE (A) NEGATIVE /HPF Final     Trichomonas   Date Value Ref Range Status   02/12/2020 NONE SEEN NONE SEEN /HPF Final       Patient Care Team:  Evi Lopez, YESSICA - NP as PCP - General (Certified Nurse Practitioner)  John

## 2024-03-21 ENCOUNTER — TELEPHONE (OUTPATIENT)
Dept: CARDIOLOGY CLINIC | Age: 59
End: 2024-03-21

## 2024-03-21 NOTE — TELEPHONE ENCOUNTER
Patient called she is having pain under her right  Arm pit  .L hand has shooting pain  This started yesterday please advise.

## 2024-03-21 NOTE — TELEPHONE ENCOUNTER
Patient states occasional pain in right arm pit and left  hand. No radiation. Advised no cardiac symptoms.

## 2024-03-22 ENCOUNTER — OFFICE VISIT (OUTPATIENT)
Dept: FAMILY MEDICINE CLINIC | Age: 59
End: 2024-03-22
Payer: COMMERCIAL

## 2024-03-22 VITALS
BODY MASS INDEX: 24.71 KG/M2 | HEART RATE: 86 BPM | SYSTOLIC BLOOD PRESSURE: 118 MMHG | OXYGEN SATURATION: 99 % | HEIGHT: 67 IN | WEIGHT: 157.4 LBS | DIASTOLIC BLOOD PRESSURE: 82 MMHG

## 2024-03-22 DIAGNOSIS — M79.602 PAIN IN BOTH UPPER EXTREMITIES: Primary | ICD-10-CM

## 2024-03-22 DIAGNOSIS — M79.601 PAIN IN BOTH UPPER EXTREMITIES: Primary | ICD-10-CM

## 2024-03-22 PROCEDURE — 3074F SYST BP LT 130 MM HG: CPT | Performed by: NURSE PRACTITIONER

## 2024-03-22 PROCEDURE — 99213 OFFICE O/P EST LOW 20 MIN: CPT | Performed by: NURSE PRACTITIONER

## 2024-03-22 PROCEDURE — 1036F TOBACCO NON-USER: CPT | Performed by: NURSE PRACTITIONER

## 2024-03-22 PROCEDURE — 3017F COLORECTAL CA SCREEN DOC REV: CPT | Performed by: NURSE PRACTITIONER

## 2024-03-22 PROCEDURE — G8420 CALC BMI NORM PARAMETERS: HCPCS | Performed by: NURSE PRACTITIONER

## 2024-03-22 PROCEDURE — G8427 DOCREV CUR MEDS BY ELIG CLIN: HCPCS | Performed by: NURSE PRACTITIONER

## 2024-03-22 PROCEDURE — 3079F DIAST BP 80-89 MM HG: CPT | Performed by: NURSE PRACTITIONER

## 2024-03-22 PROCEDURE — G8484 FLU IMMUNIZE NO ADMIN: HCPCS | Performed by: NURSE PRACTITIONER

## 2024-03-22 NOTE — PROGRESS NOTES
3/22/2024     Beverly Valencia (:  1965) is a 59 y.o. female, here for evaluation of the following medical concerns:        Shooting pain under the right arm and left hand for over a week   Denies chest pain, palps, dizziness. Just finished halter 30 days for cardio  She does follow with dr suarez [facility]. On percocet  Mild degen changes on cspine xray  --   Had left elbow surgery bilat . States dale told her if it continues to bother her, will have to repeat surgery.   Follows with dr silva / mily       Right axilla shooting pain started one week ago.   Random.   Shooting pain and then it starts throbbing and it will resolve on its own 15 minutes.   Occurs 1-3 times per day.   Putting pressure in the axilla is tender.   Right shoulder surgery x2 last done in . Dr dale did the surgery   Dr suarez injections about 6 months ago. Did complete PT       Seeing chito / ortho on Tuesday.                       Review of Systems    Prior to Visit Medications    Medication Sig Taking? Authorizing Provider   alogliptin (NESINA) 6.25 MG TABS tablet Take 1 tablet by mouth daily Yes Evi Lopez APRN - NP   sucralfate (CARAFATE) 1 GM tablet Take 1 tablet by mouth 4 times daily Yes Aurora Domingo APRN - CNP   doxycycline hyclate (VIBRAMYCIN) 100 MG capsule Take 1 capsule by mouth daily Yes Michelet Dueñas MD   sucralfate (CARAFATE) 1 GM tablet Take 1 tablet by mouth 2 times daily Please crush and 5 mL of water and take a slurry.  Please  out from other medication Yes Pauline Orozco MD   polyethylene glycol (MIRALAX) 17 g packet Take 1 packet by mouth daily Yes Pauline Orozco MD   QUEtiapine (SEROQUEL) 100 MG tablet Take 1 tablet by mouth at bedtime Yes Evi Lopez APRN - NP   estradiol (ESTRACE) 2 MG tablet Take 1 tablet by mouth daily Yes Neela Dennis APRN - CNP   midodrine (PROAMATINE) 2.5 MG tablet Take 1 tablet by mouth 3 times daily as needed (hypotension) Yes

## 2024-03-25 ENCOUNTER — TELEPHONE (OUTPATIENT)
Dept: CARDIOLOGY CLINIC | Age: 59
End: 2024-03-25

## 2024-03-25 DIAGNOSIS — I44.2 INTERMITTENT COMPLETE HEART BLOCK (HCC): Primary | ICD-10-CM

## 2024-03-25 NOTE — TELEPHONE ENCOUNTER
Called patient and consult with Dr Canchola scheduled for 4/5/2024 @ [id number].    Left message for  office to call back

## 2024-04-04 ENCOUNTER — OFFICE VISIT (OUTPATIENT)
Dept: FAMILY MEDICINE CLINIC | Age: 59
End: 2024-04-04
Payer: COMMERCIAL

## 2024-04-04 VITALS
WEIGHT: 153 LBS | DIASTOLIC BLOOD PRESSURE: 80 MMHG | BODY MASS INDEX: 24.01 KG/M2 | OXYGEN SATURATION: 94 % | HEART RATE: 90 BPM | SYSTOLIC BLOOD PRESSURE: 120 MMHG | HEIGHT: 67 IN

## 2024-04-04 DIAGNOSIS — M79.672 LEFT FOOT PAIN: Primary | ICD-10-CM

## 2024-04-04 PROCEDURE — 99214 OFFICE O/P EST MOD 30 MIN: CPT | Performed by: NURSE PRACTITIONER

## 2024-04-04 PROCEDURE — G8427 DOCREV CUR MEDS BY ELIG CLIN: HCPCS | Performed by: NURSE PRACTITIONER

## 2024-04-04 PROCEDURE — 3074F SYST BP LT 130 MM HG: CPT | Performed by: NURSE PRACTITIONER

## 2024-04-04 PROCEDURE — 3017F COLORECTAL CA SCREEN DOC REV: CPT | Performed by: NURSE PRACTITIONER

## 2024-04-04 PROCEDURE — 3079F DIAST BP 80-89 MM HG: CPT | Performed by: NURSE PRACTITIONER

## 2024-04-04 PROCEDURE — G8420 CALC BMI NORM PARAMETERS: HCPCS | Performed by: NURSE PRACTITIONER

## 2024-04-04 PROCEDURE — 1036F TOBACCO NON-USER: CPT | Performed by: NURSE PRACTITIONER

## 2024-04-04 NOTE — PROGRESS NOTES
2024     Beverly Valencia (:  1965) is a 59 y.o. female, here for evaluation of the following medical concerns:      Left foot pain [place] four days ago.   Staying the same.   Tender to touch.   Small bruise to left lateral foot.   States she did wear supportive tennis shoes   She is diabetic, but well controlled.   No wounds.   No swelling in the leg or ankle.   Reports hx of plantar fasciitis     Has not done anything to help   Unable to take nsaids due to gastric surgeries and GERD                 Review of Systems    Prior to Visit Medications    Medication Sig Taking? Authorizing Provider   alogliptin (NESINA) 6.25 MG TABS tablet Take 1 tablet by mouth daily Yes Evi Lopez APRN - NP   predniSONE (DELTASONE) 10 MG tablet Take 1 tablet by mouth daily 40mg daily for 2 days, 20 mg daily for 2 days, 10 mg daily for 2 days, 5 mg daily for 2 days Yes Michelet Dueñas MD   doxycycline hyclate (VIBRAMYCIN) 100 MG capsule Take 1 capsule by mouth daily Yes Michelet Dueñas MD   sucralfate (CARAFATE) 1 GM tablet Take 1 tablet by mouth 2 times daily Please crush and 5 mL of water and take a slurry.  Please  out from other medication Yes Pauline Orozco MD   QUEtiapine (SEROQUEL) 100 MG tablet Take 1 tablet by mouth at bedtime Yes Evi Lopez APRN - NP   estradiol (ESTRACE) 2 MG tablet Take 1 tablet by mouth daily Yes Neela Dennis APRN - CNP   midodrine (PROAMATINE) 2.5 MG tablet Take 1 tablet by mouth 3 times daily as needed (hypotension) Yes Aurora Domingo APRN - CNP   fluconazole (DIFLUCAN) 150 MG tablet Take one tab by mouth now and then take a second tab by mouth in 72 hours. Yes Evi Lopez APRN - NP   valACYclovir (VALTREX) 500 MG tablet Take 1 tablet by mouth daily Yes Deandre Dale MD   omeprazole (PRILOSEC) 40 MG delayed release capsule Take 1 capsule by mouth daily Yes Pauline Orozco MD   fluticasone furoate-vilanterol (BREO ELLIPTA) 100-25

## 2024-04-05 ENCOUNTER — INITIAL CONSULT (OUTPATIENT)
Dept: CARDIOLOGY CLINIC | Age: 59
End: 2024-04-05
Payer: COMMERCIAL

## 2024-04-05 VITALS
OXYGEN SATURATION: 100 % | DIASTOLIC BLOOD PRESSURE: 68 MMHG | HEART RATE: 80 BPM | WEIGHT: 158.8 LBS | HEIGHT: 67 IN | SYSTOLIC BLOOD PRESSURE: 136 MMHG | BODY MASS INDEX: 24.92 KG/M2

## 2024-04-05 DIAGNOSIS — I44.1 MOBITZ TYPE II ATRIOVENTRICULAR BLOCK: Primary | ICD-10-CM

## 2024-04-05 DIAGNOSIS — I44.39 HIGH-GRADE ATRIOVENTRICULAR BLOCK: Primary | ICD-10-CM

## 2024-04-05 DIAGNOSIS — R07.9 CHEST PAIN, UNSPECIFIED TYPE: ICD-10-CM

## 2024-04-05 PROCEDURE — 3075F SYST BP GE 130 - 139MM HG: CPT | Performed by: INTERNAL MEDICINE

## 2024-04-05 PROCEDURE — G8420 CALC BMI NORM PARAMETERS: HCPCS | Performed by: INTERNAL MEDICINE

## 2024-04-05 PROCEDURE — 99244 OFF/OP CNSLTJ NEW/EST MOD 40: CPT | Performed by: INTERNAL MEDICINE

## 2024-04-05 PROCEDURE — G8427 DOCREV CUR MEDS BY ELIG CLIN: HCPCS | Performed by: INTERNAL MEDICINE

## 2024-04-05 PROCEDURE — 3078F DIAST BP <80 MM HG: CPT | Performed by: INTERNAL MEDICINE

## 2024-04-05 PROCEDURE — 93000 ELECTROCARDIOGRAM COMPLETE: CPT | Performed by: INTERNAL MEDICINE

## 2024-04-05 RX ORDER — HYDROCHLOROTHIAZIDE 12.5 MG/1
12.5 CAPSULE, GELATIN COATED ORAL PRN
Qty: 30 CAPSULE | Refills: 0 | Status: SHIPPED | OUTPATIENT
Start: 2024-04-05

## 2024-04-05 RX ORDER — HYDROCHLOROTHIAZIDE 12.5 MG/1
12.5 CAPSULE, GELATIN COATED ORAL PRN
COMMUNITY
End: 2024-04-05 | Stop reason: SDUPTHER

## 2024-04-05 NOTE — PROGRESS NOTES
by Michele Taylor MD at [facility] OR    ROTATOR CUFF REPAIR Right     Dr. Lam    ROTATOR CUFF REPAIR Right 2017    Dr Arellano    SLEEVE GASTRECTOMY  10/26/2017    Robotic Laparoscopic, Pre Op Weight 237  Or 238 lbs.    TUBAL LIGATION      Done With     UPPER GASTROINTESTINAL ENDOSCOPY N/A 2023    EGD BIOPSY performed by Pauline Orozco MD at [facility] ENDOSCOPY       Family history:   Family History   Problem Relation Age of Onset    Heart Attack Father     Heart Disease Father     Early Death Father 52        Heart Attack    Alcohol Abuse Father     Substance Abuse Father         Alcoholic    Arthritis Mother     Heart Disease Mother     Diabetes Mother     Cancer Mother         \"Kidney Cancer\"    High Blood Pressure Mother     High Blood Pressure Sister     Diabetes Brother     Early Death Daughter 23        \"Killed In A Car Accident\"    Ovarian Cancer Neg Hx     Breast Cancer Neg Hx        Social history :  reports that she quit smoking about 4 years ago. Her smoking use included cigarettes and e-cigarettes. She started smoking about 38 years ago. She has a 8.5 pack-year smoking history. She has never used smokeless tobacco. She reports current alcohol use. She reports current drug use. Drug: Marijuana (Weed).    Allergies   Allergen Reactions    Other      Patient states the electrodes irritated her skin.    Wound Dressing Adhesive Rash     Rash from EKG/ HEART MONITOR ADHESIVE WHEN LEFT ON FOR EXTENDED PERIOD OF TIME.       Current Outpatient Medications on File Prior to Visit   Medication Sig Dispense Refill    hydroCHLOROthiazide 12.5 MG capsule Take 1 capsule by mouth as needed      alogliptin (NESINA) 6.25 MG TABS tablet Take 1 tablet by mouth daily 90 tablet 1    predniSONE (DELTASONE) 10 MG tablet Take 1 tablet by mouth daily 40mg daily for 2 days, 20 mg daily for 2 days, 10 mg daily for 2 days, 5 mg daily for 2 days 15 tablet 0    doxycycline hyclate (VIBRAMYCIN) 100 MG capsule Take 1

## 2024-04-05 NOTE — PATIENT INSTRUCTIONS
**It is YOUR responsibilty to bring medication bottles and/or updated medication list to EACH APPOINTMENT. This will allow us to better serve you and all your healthcare needs**   Thank you for allowing us to care for you today!   We want to ensure we can follow your treatment plan and we strive to give you the best outcomes and experience possible.   If you ever have a life threatening emergency and call 911 - for an ambulance (EMS)   Our providers can only care for you at:   [facility] or [facility].   Even if you have someone take you or you drive yourself we can only care for you in a [facility]. Our providers are not setup at the other healthcare locations!   Please be informed that if you contact our office outside of normal business hours the physician on call cannot help with any scheduling or rescheduling issues, procedure instruction questions or any type of medication issue.    We advise you for any urgent/emergency that you go to the nearest emergency room!    PLEASE CALL OUR OFFICE DURING NORMAL BUSINESS HOURS    Monday - Friday   8 am to 5 pm    [place]: [phone]    [place]: [phone]    [place]:  [phone]  We are committed to providing you the best care possible.    If you receive a survey after visiting one of our offices, please take time to share your experience concerning your physician office visit.  These surveys are confidential and no health information about you is shared.    We are eager to improve for you and we are counting on your feedback to help make that happen.

## 2024-04-09 ENCOUNTER — HOSPITAL ENCOUNTER (OUTPATIENT)
Age: 59
Discharge: HOME | End: 2024-04-09
Payer: COMMERCIAL

## 2024-04-09 ENCOUNTER — TELEPHONE (OUTPATIENT)
Dept: CARDIOLOGY CLINIC | Age: 59
End: 2024-04-09

## 2024-04-09 ENCOUNTER — HOSPITAL ENCOUNTER (OUTPATIENT)
Dept: GENERAL RADIOLOGY | Age: 59
Discharge: HOME | End: 2024-04-09
Payer: COMMERCIAL

## 2024-04-09 DIAGNOSIS — Z01.810 PRE-OPERATIVE CARDIOVASCULAR EXAMINATION: ICD-10-CM

## 2024-04-09 DIAGNOSIS — Z79.01 LONG TERM (CURRENT) USE OF ANTICOAGULANTS: ICD-10-CM

## 2024-04-09 LAB
ANION GAP SERPL CALCULATED.3IONS-SCNC: 14 MMOL/L (ref 7–16)
APTT: 25.9 SECONDS (ref 25.1–37.1)
BUN SERPL-MCNC: 9 MG/DL (ref 6–23)
CALCIUM SERPL-MCNC: 9.7 MG/DL (ref 8.3–10.6)
CHLORIDE BLD-SCNC: 101 MMOL/L (ref 99–110)
CO2: 26 MMOL/L (ref 21–32)
CREAT SERPL-MCNC: 0.9 MG/DL (ref 0.6–1.1)
GFR SERPL CREATININE-BSD FRML MDRD: 75 ML/MIN/1.73M2
GLUCOSE SERPL-MCNC: 78 MG/DL (ref 70–99)
HCT VFR BLD CALC: 45.8 % (ref 37–47)
HEMOGLOBIN: 14.9 GM/DL (ref 12.5–16)
INR BLD: 1 INDEX
MAGNESIUM: 1.9 MG/DL (ref 1.8–2.4)
MCH RBC QN AUTO: 26.5 PG (ref 27–31)
MCHC RBC AUTO-ENTMCNC: 32.5 % (ref 32–36)
MCV RBC AUTO: 81.3 FL (ref 78–100)
PDW BLD-RTO: 15 % (ref 11.7–14.9)
PHOSPHORUS: 4 MG/DL (ref 2.5–4.9)
PLATELET # BLD: 286 K/CU MM (ref 140–440)
PMV BLD AUTO: 11.4 FL (ref 7.5–11.1)
POTASSIUM SERPL-SCNC: 4.4 MMOL/L (ref 3.5–5.1)
PROTHROMBIN TIME: 13.2 SECONDS (ref 11.7–14.5)
RBC # BLD: 5.63 M/CU MM (ref 4.2–5.4)
SODIUM BLD-SCNC: 141 MMOL/L (ref 135–145)
WBC # BLD: 6.8 K/CU MM (ref 4–10.5)

## 2024-04-09 PROCEDURE — 85730 THROMBOPLASTIN TIME PARTIAL: CPT

## 2024-04-09 PROCEDURE — 71046 X-RAY EXAM CHEST 2 VIEWS: CPT

## 2024-04-09 PROCEDURE — 36415 COLL VENOUS BLD VENIPUNCTURE: CPT

## 2024-04-09 PROCEDURE — 83735 ASSAY OF MAGNESIUM: CPT

## 2024-04-09 PROCEDURE — 84100 ASSAY OF PHOSPHORUS: CPT

## 2024-04-09 PROCEDURE — 85610 PROTHROMBIN TIME: CPT

## 2024-04-09 PROCEDURE — 85027 COMPLETE CBC AUTOMATED: CPT

## 2024-04-09 PROCEDURE — 80048 BASIC METABOLIC PNL TOTAL CA: CPT

## 2024-04-09 NOTE — TELEPHONE ENCOUNTER
Patient called she is having a pace maker placed 4/11  And has some questions about the procedure please advise

## 2024-04-10 ENCOUNTER — TELEPHONE (OUTPATIENT)
Dept: CARDIOLOGY CLINIC | Age: 59
End: 2024-04-10

## 2024-04-10 NOTE — TELEPHONE ENCOUNTER
Called patient and advised procedure time has been changed from 11am to 0700 with arrival at 0545 on 4/11/2024. Patient agreeable. Cath team notified.

## 2024-04-11 ENCOUNTER — HOSPITAL ENCOUNTER (OUTPATIENT)
Age: 59
Discharge: HOME | End: 2024-04-11
Attending: INTERNAL MEDICINE | Admitting: INTERNAL MEDICINE
Payer: COMMERCIAL

## 2024-04-11 ENCOUNTER — APPOINTMENT (OUTPATIENT)
Dept: GENERAL RADIOLOGY | Age: 59
End: 2024-04-11
Attending: INTERNAL MEDICINE
Payer: COMMERCIAL

## 2024-04-11 VITALS
HEART RATE: 75 BPM | HEIGHT: 67 IN | RESPIRATION RATE: 18 BRPM | OXYGEN SATURATION: 100 % | WEIGHT: 152 LBS | SYSTOLIC BLOOD PRESSURE: 174 MMHG | TEMPERATURE: 96 F | DIASTOLIC BLOOD PRESSURE: 95 MMHG | BODY MASS INDEX: 23.86 KG/M2

## 2024-04-11 DIAGNOSIS — I44.39 HIGH-GRADE ATRIOVENTRICULAR BLOCK: ICD-10-CM

## 2024-04-11 PROBLEM — Z95.0 PACEMAKER: Status: ACTIVE | Noted: 2024-04-11

## 2024-04-11 LAB
ABO/RH: NORMAL
ANTIBODY SCREEN: NEGATIVE
ECHO BSA: 1.81 M2

## 2024-04-11 PROCEDURE — 86901 BLOOD TYPING SEROLOGIC RH(D): CPT

## 2024-04-11 PROCEDURE — 6360000004 HC RX CONTRAST MEDICATION

## 2024-04-11 PROCEDURE — 33208 INSRT HEART PM ATRIAL & VENT: CPT | Performed by: INTERNAL MEDICINE

## 2024-04-11 PROCEDURE — 86850 RBC ANTIBODY SCREEN: CPT

## 2024-04-11 PROCEDURE — 2500000003 HC RX 250 WO HCPCS: Performed by: INTERNAL MEDICINE

## 2024-04-11 PROCEDURE — C1785 PMKR, DUAL, RATE-RESP: HCPCS | Performed by: INTERNAL MEDICINE

## 2024-04-11 PROCEDURE — 2709999900 HC NON-CHARGEABLE SUPPLY: Performed by: INTERNAL MEDICINE

## 2024-04-11 PROCEDURE — 7100000011 HC PHASE II RECOVERY - ADDTL 15 MIN: Performed by: INTERNAL MEDICINE

## 2024-04-11 PROCEDURE — 6360000002 HC RX W HCPCS: Performed by: INTERNAL MEDICINE

## 2024-04-11 PROCEDURE — 2500000003 HC RX 250 WO HCPCS

## 2024-04-11 PROCEDURE — 6360000002 HC RX W HCPCS

## 2024-04-11 PROCEDURE — 6370000000 HC RX 637 (ALT 250 FOR IP): Performed by: NURSE PRACTITIONER

## 2024-04-11 PROCEDURE — 6360000004 HC RX CONTRAST MEDICATION: Performed by: INTERNAL MEDICINE

## 2024-04-11 PROCEDURE — C1892 INTRO/SHEATH,FIXED,PEEL-AWAY: HCPCS | Performed by: INTERNAL MEDICINE

## 2024-04-11 PROCEDURE — C1898 LEAD, PMKR, OTHER THAN TRANS: HCPCS | Performed by: INTERNAL MEDICINE

## 2024-04-11 PROCEDURE — 86900 BLOOD TYPING SEROLOGIC ABO: CPT

## 2024-04-11 PROCEDURE — 2580000003 HC RX 258

## 2024-04-11 PROCEDURE — 71045 X-RAY EXAM CHEST 1 VIEW: CPT

## 2024-04-11 PROCEDURE — 2580000003 HC RX 258: Performed by: INTERNAL MEDICINE

## 2024-04-11 PROCEDURE — 7100000010 HC PHASE II RECOVERY - FIRST 15 MIN: Performed by: INTERNAL MEDICINE

## 2024-04-11 PROCEDURE — A4217 STERILE WATER/SALINE, 500 ML: HCPCS | Performed by: INTERNAL MEDICINE

## 2024-04-11 DEVICE — LEAD 5076-58 MRI US RCMCRD
Type: IMPLANTABLE DEVICE | Site: HEART | Status: FUNCTIONAL
Brand: CAPSUREFIX NOVUS MRI™ SURESCAN®

## 2024-04-11 DEVICE — IPG W1DR01 AZURE XT DR MRI USA
Type: IMPLANTABLE DEVICE | Site: CHEST | Status: FUNCTIONAL
Brand: AZURE™ XT DR MRI SURESCAN™

## 2024-04-11 DEVICE — LEAD 5076-52 MRI US RCMCRD
Type: IMPLANTABLE DEVICE | Site: HEART | Status: FUNCTIONAL
Brand: CAPSUREFIX NOVUS MRI™ SURESCAN®

## 2024-04-11 RX ORDER — TRAMADOL HYDROCHLORIDE 50 MG/1
50 TABLET ORAL EVERY 6 HOURS PRN
Status: DISCONTINUED | OUTPATIENT
Start: 2024-04-11 | End: 2024-04-11 | Stop reason: HOSPADM

## 2024-04-11 RX ORDER — PANTOPRAZOLE SODIUM 40 MG/1
40 TABLET, DELAYED RELEASE ORAL
Status: CANCELLED | OUTPATIENT
Start: 2024-04-11

## 2024-04-11 RX ORDER — MIDAZOLAM HYDROCHLORIDE 1 MG/ML
INJECTION INTRAMUSCULAR; INTRAVENOUS PRN
Status: DISCONTINUED | OUTPATIENT
Start: 2024-04-11 | End: 2024-04-11 | Stop reason: HOSPADM

## 2024-04-11 RX ORDER — FENTANYL CITRATE 50 UG/ML
INJECTION, SOLUTION INTRAMUSCULAR; INTRAVENOUS PRN
Status: DISCONTINUED | OUTPATIENT
Start: 2024-04-11 | End: 2024-04-11 | Stop reason: HOSPADM

## 2024-04-11 RX ORDER — AMLODIPINE BESYLATE 2.5 MG/1
2.5 TABLET ORAL DAILY
Status: CANCELLED | OUTPATIENT
Start: 2024-04-11

## 2024-04-11 RX ORDER — ALBUTEROL SULFATE 90 UG/1
2 AEROSOL, METERED RESPIRATORY (INHALATION) 4 TIMES DAILY PRN
Status: CANCELLED | OUTPATIENT
Start: 2024-04-11

## 2024-04-11 RX ORDER — DOXYCYCLINE HYCLATE 100 MG
100 TABLET ORAL 2 TIMES DAILY
Qty: 10 TABLET | Refills: 0 | Status: SHIPPED | OUTPATIENT
Start: 2024-04-11 | End: 2024-04-16

## 2024-04-11 RX ORDER — ONDANSETRON 2 MG/ML
INJECTION INTRAMUSCULAR; INTRAVENOUS
Status: COMPLETED
Start: 2024-04-11 | End: 2024-04-11

## 2024-04-11 RX ORDER — ESTRADIOL 1 MG/1
2 TABLET ORAL DAILY
Status: CANCELLED | OUTPATIENT
Start: 2024-04-11

## 2024-04-11 RX ORDER — ACETAMINOPHEN 325 MG/1
650 TABLET ORAL EVERY 4 HOURS PRN
Status: DISCONTINUED | OUTPATIENT
Start: 2024-04-11 | End: 2024-04-11 | Stop reason: HOSPADM

## 2024-04-11 RX ORDER — SODIUM CHLORIDE 0.9 % (FLUSH) 0.9 %
5-40 SYRINGE (ML) INJECTION EVERY 12 HOURS SCHEDULED
Status: DISCONTINUED | OUTPATIENT
Start: 2024-04-11 | End: 2024-04-11 | Stop reason: HOSPADM

## 2024-04-11 RX ORDER — ATORVASTATIN CALCIUM 40 MG/1
80 TABLET, FILM COATED ORAL DAILY
Status: CANCELLED | OUTPATIENT
Start: 2024-04-11

## 2024-04-11 RX ORDER — QUETIAPINE FUMARATE 100 MG/1
100 TABLET, FILM COATED ORAL NIGHTLY
Status: CANCELLED | OUTPATIENT
Start: 2024-04-11

## 2024-04-11 RX ORDER — OXYCODONE AND ACETAMINOPHEN 7.5; 325 MG/1; MG/1
1 TABLET ORAL EVERY 6 HOURS PRN
Status: CANCELLED | OUTPATIENT
Start: 2024-04-11

## 2024-04-11 RX ORDER — ONDANSETRON 2 MG/ML
4 INJECTION INTRAMUSCULAR; INTRAVENOUS ONCE
Status: COMPLETED | OUTPATIENT
Start: 2024-04-11 | End: 2024-04-11

## 2024-04-11 RX ORDER — CEFAZOLIN SODIUM 1 G/3ML
INJECTION, POWDER, FOR SOLUTION INTRAMUSCULAR; INTRAVENOUS PRN
Status: DISCONTINUED | OUTPATIENT
Start: 2024-04-11 | End: 2024-04-11 | Stop reason: HOSPADM

## 2024-04-11 RX ORDER — MORPHINE SULFATE 2 MG/ML
2 INJECTION, SOLUTION INTRAMUSCULAR; INTRAVENOUS ONCE
Status: COMPLETED | OUTPATIENT
Start: 2024-04-11 | End: 2024-04-11

## 2024-04-11 RX ORDER — SODIUM CHLORIDE 0.9 % (FLUSH) 0.9 %
5-40 SYRINGE (ML) INJECTION PRN
Status: DISCONTINUED | OUTPATIENT
Start: 2024-04-11 | End: 2024-04-11 | Stop reason: HOSPADM

## 2024-04-11 RX ORDER — BUDESONIDE AND FORMOTEROL FUMARATE DIHYDRATE 80; 4.5 UG/1; UG/1
2 AEROSOL RESPIRATORY (INHALATION)
Status: CANCELLED | OUTPATIENT
Start: 2024-04-11

## 2024-04-11 RX ORDER — SODIUM CHLORIDE 9 MG/ML
INJECTION, SOLUTION INTRAVENOUS PRN
Status: DISCONTINUED | OUTPATIENT
Start: 2024-04-11 | End: 2024-04-11 | Stop reason: HOSPADM

## 2024-04-11 RX ORDER — MIDODRINE HYDROCHLORIDE 5 MG/1
2.5 TABLET ORAL 3 TIMES DAILY PRN
Status: CANCELLED | OUTPATIENT
Start: 2024-04-11

## 2024-04-11 RX ORDER — HYDROCHLOROTHIAZIDE 12.5 MG/1
12.5 CAPSULE, GELATIN COATED ORAL PRN
Status: CANCELLED | OUTPATIENT
Start: 2024-04-11

## 2024-04-11 RX ORDER — VALACYCLOVIR HYDROCHLORIDE 500 MG/1
500 TABLET, FILM COATED ORAL DAILY
Status: CANCELLED | OUTPATIENT
Start: 2024-04-11

## 2024-04-11 RX ORDER — SUCRALFATE 1 G/1
1 TABLET ORAL 2 TIMES DAILY
Status: CANCELLED | OUTPATIENT
Start: 2024-04-11

## 2024-04-11 RX ORDER — ALBUTEROL SULFATE 2.5 MG/3ML
2.5 SOLUTION RESPIRATORY (INHALATION)
Status: CANCELLED | OUTPATIENT
Start: 2024-04-11

## 2024-04-11 RX ORDER — TIZANIDINE 4 MG/1
4 TABLET ORAL EVERY 8 HOURS PRN
Status: CANCELLED | OUTPATIENT
Start: 2024-04-11

## 2024-04-11 RX ADMIN — MORPHINE SULFATE 2 MG: 2 INJECTION, SOLUTION INTRAMUSCULAR; INTRAVENOUS at 11:56

## 2024-04-11 RX ADMIN — TRAMADOL HYDROCHLORIDE 50 MG: 50 TABLET, COATED ORAL at 09:03

## 2024-04-11 RX ADMIN — ONDANSETRON 4 MG: 2 INJECTION INTRAMUSCULAR; INTRAVENOUS at 14:33

## 2024-04-11 RX ADMIN — WATER 2000 MG: 1 INJECTION INTRAMUSCULAR; INTRAVENOUS; SUBCUTANEOUS at 14:23

## 2024-04-11 ASSESSMENT — ENCOUNTER SYMPTOMS
WHEEZING: 0
BACK PAIN: 0
NAUSEA: 0
ABDOMINAL PAIN: 0
CONSTIPATION: 0
COUGH: 0
EYE PAIN: 0
SHORTNESS OF BREATH: 0
DIARRHEA: 0
VOMITING: 0
PHOTOPHOBIA: 0
COLOR CHANGE: 0
CHEST TIGHTNESS: 0
BLOOD IN STOOL: 0

## 2024-04-11 ASSESSMENT — PAIN DESCRIPTION - LOCATION
LOCATION: INCISION;CHEST
LOCATION: CHEST

## 2024-04-11 ASSESSMENT — PAIN SCALES - GENERAL
PAINLEVEL_OUTOF10: 8
PAINLEVEL_OUTOF10: 7

## 2024-04-11 ASSESSMENT — PAIN DESCRIPTION - ORIENTATION
ORIENTATION: LEFT
ORIENTATION: LEFT

## 2024-04-11 ASSESSMENT — PAIN DESCRIPTION - DESCRIPTORS: DESCRIPTORS: ACHING

## 2024-04-11 NOTE — PLAN OF CARE
Patient finishing dose of antibiotic and Rea Guevara NP in to see patient prior to discharge. Patient did have complaints of nausea at this time. Zofran given per orders. All discharge instructions explained to patient in regards to restrictions with left arm due to pacemaker insertion and also the dressing that is not to be removed until seen in the office in 7-10 days. Patient understands all information and denies additional instruction. Patient walked to the bathroom at this time without difficulty. Patient does have dose of home medications per orders from outpatient pharmacy. Yolanda De La Rosa RN 4/11/2024

## 2024-04-11 NOTE — H&P
Electrophysiology h&P Note      Reason for consultation:  AV block    Chief complaint : here for pacemaker implantation    Referring physician:       Primary care physician: Evi Lopez, APRN - NP      History of Present Illness:     Today visit (04/11/24)    Patient here for pacemaker implantation. No change in H&P noted from previous clinic visit.     Previous visit:     Patient is a 59-year-old female with a history of hypertension, hyperlipidemia, diabetes mellitus, COPD referred by Dr. Cummings for AV block.  Patient reports on and off chest pain and intermittent dizziness and she had the monitor placed.  She was called in because abnormality on the monitor but she had not had any discussion about what to abnormality.  Patient reports she had episodes of syncope in the past. She has occasional dizziness.  Patient denies shortness of breath, palpitations        Pastmedical history:   Past Medical History:   Diagnosis Date    Adenomatous polyp of ascending colon 8/7/2020    Anxiety     Arthritis     \"Back, Hands, Shoulders\"    Chronic back pain     Arthritis - NKI    COPD (chronic obstructive pulmonary disease) ([facility])     Sees Dr. Spenser MAXWELL-19     Depression     Follows with Mental Health    Diabetes mellitus ([facility]) Dx 2008    Type II - follows with PCP    Emphysema     Fibromyalgia     H/O abdominoplasty 12/21/2018    H/O cardiovascular stress test 03/04/2019    ECG portion of stress test is neg for ischemia by diagnostic criteria. No infarct or ischemia noted. Normal stress myocardial perfusion. This is a normal study    H/O echocardiogram 03/04/2019    Left ventricular function is normal. Ejection fraction is visually estimated at 55-60%. No significant valvular abnormalitites.     H/O tilt table evaluation 08/26/2021    Orthostatic syncope.    History of MRSA infection 6/23/2013    Hot flashes     Hyperlipidemia     Hypersomnia 8/5/2021    Hypertension

## 2024-04-11 NOTE — FLOWSHEET NOTE
04/11/24 1055   Puncture Site Assessment 1   Location Other (comment)  (left chest pacer implant site)   Site Assessment No redness, drainage, swelling or hematoma   Dressing Applied Transparent occlusive dressing     Pressure dressing removed as per orders. Underlying dressing remains CD&I. Pt assisted on and off of bedpan per staff x2. Voided without difficulties. Pericare provided.

## 2024-04-11 NOTE — FLOWSHEET NOTE
Dr Canchola rounding. Aware of pt pain management issues. Orders received for 1 time dose of morphine (see MAR), however, not given at this time as pt sleeping. Dr Canchola also gave verbal order that pt may d/c home at 1500 today

## 2024-04-11 NOTE — DISCHARGE INSTRUCTIONS
1. Avoid raising the arm above shoulder for 4 weeks  2. No driving for 10 days  3. No lifting more than 10 lbs with the left hand  4. Do not wet the area of surgery for 10 days  5. Follow up appointment with Doctor for wound check in 10 days  6. Notify Doctor if pain, fever, chills, swelling or bleeding in the surgical area  7. Please avoid sleeping on the surgical side.   8. Please do not allow anyone other than Dr Canchola's staff to remove or redress the surgical site. If the dressing were to fall off or fall partially off before the 10 day follow up appointment, please call the office ASAP.

## 2024-04-11 NOTE — FLOWSHEET NOTE
Pt awoke from sleep. C/o significant amt of pain at implant site. Left chest implant site free of redness/swelling/bleeding/drainage. Pt states needs \"something more for pain\" Pt states upper back hurts and hurts when \"I take a deep breath in\". VSS. Respirations even and unlabored. Skin warm and dry. Updated Dr Canchola, requested that EVANGELINA Lucia round on pt prior to d/c home. Spoke with Rea and states she will come to [facility] to evaluate pt

## 2024-04-11 NOTE — PLAN OF CARE
Patients IV's removed per protocol and patient now getting dressed for discharge home. Yolanda De La Rosa RN 4/11/2024

## 2024-04-11 NOTE — FLOWSHEET NOTE
Pt cont to complain of pain and discomfort at implant site, despite offering pt ice pack/tylenol, as pt has received ultram (see MAR). Pt asked this RN for purse which was located on bedside table. Upon handing pt purse, I explained to pt that physician scrubbed in another case. Will discuss pain management when he breaks scrub. Pt responds, \"I have my medications from home. I will just take my own pain medications.\" This RN removed purse from pt and placed back on bedside table out of pt reach. Explained to pt that she is not to take any \"home medications, alphonso pain meds\" until I speak with physician.

## 2024-04-15 ENCOUNTER — TELEPHONE (OUTPATIENT)
Dept: CARDIOLOGY CLINIC | Age: 59
End: 2024-04-15

## 2024-04-15 NOTE — TELEPHONE ENCOUNTER
Called to see if patient is having any issues. If patient is not having issues per [facility] appointment can be moved out 3 months.

## 2024-04-16 NOTE — PROGRESS NOTES
smear and full STI work-up today. Denies any abnormal vaginal discharge or complaints or concerns. Mammogram scheduled for June. Review of Systems    Prior to Visit Medications    Medication Sig Taking? Authorizing Provider   omeprazole (PRILOSEC) 40 MG delayed release capsule Take 1 capsule by mouth daily Yes YESSICA Ortiz NP   atorvastatin (LIPITOR) 80 MG tablet Take 1 tablet by mouth daily Yes YESSICA Ortiz NP   cetirizine (ZYRTEC) 10 MG tablet Take 1 tablet by mouth daily Yes YESSICA Ortiz NP   Blood Glucose Monitoring Suppl (TRUE METRIX AIR GLUCOSE METER) w/Device KIT 1 Device by Does not apply route continuous Yes YESSICA Ortiz NP   TRUEplus Lancets 28G MISC 1 each by Does not apply route daily Yes YESSICA Ortiz NP   blood glucose test strips (TRUE METRIX PRO BLOOD GLUCOSE) strip 1 each by In Vitro route daily As needed.  Yes YESSICA Ortiz NP   lisinopril-hydroCHLOROthiazide (PRINZIDE;ZESTORETIC) 10-12.5 MG per tablet 1 tablet if SBP > 130, do not take if SBP < 110 Yes YESSICA Caruso CNP   traZODone (DESYREL) 100 MG tablet  Yes Historical Provider, MD   alogliptin (NESINA) 6.25 MG TABS tablet Take 1 tablet by mouth daily Yes YESSICA Ortiz NP   ascorbic acid (V-R VITAMIN C) 250 MG tablet Take 4 tablets by mouth daily Yes Tomer Cervantes MD   zinc sulfate (ORAZINC) 220 (50 Zn) MG capsule Take 4 capsules by mouth daily Yes Tomer Cervantes MD   Nutritional Supplements (ENSURE HIGH PROTEIN) LIQD Take 1 Can by mouth Daily with lunch Yes Tomer Cervantes MD   Multiple Vitamins-Minerals (THERAPEUTIC MULTIVITAMIN-MINERALS) tablet Take 1 tablet by mouth daily Yes YESSICA Olivarez CNP   Calcium Carb-Cholecalciferol (CALCIUM-VITAMIN D) 600-400 MG-UNIT TABS Take 1 tablet by mouth daily Yes YESSICA Olivarez CNP   albuterol sulfate HFA (PROAIR HFA) 108 (90 Base) MCG/ACT inhaler Inhale 2 puffs into the lungs every 6 hours as needed for Wheezing Yes Suhail Cheng MD   hydrOXYzine (ATARAX) 25 MG tablet TAKE 1 TABLET EVERY 8 HOURS AS NEEDED FOR ANXIETY AVOID ALCOHOL/CAUSES DROWSINESS. Do not take with Xanax Yes YESSICA Monroy NP   albuterol sulfate HFA (PROAIR HFA) 108 (90 Base) MCG/ACT inhaler Inhale 2 puffs into the lungs every 6 hours as needed for Wheezing Yes Suhail Cheng MD   tiotropium (SPIRIVA RESPIMAT) 2.5 MCG/ACT AERS inhaler Inhale 2 puffs into the lungs daily Yes Suhail Cheng MD   ammonium lactate (LAC-HYDRIN) 12 % lotion  Yes Historical Provider, MD   calcipotriene (DOVONEX) 0.005 % ointment  Yes Historical Provider, MD   clobetasol (TEMOVATE) 0.05 % ointment  Yes Historical Provider, MD   tiZANidine (ZANAFLEX) 4 MG tablet Take 4 mg by mouth every 8 hours as needed  Yes Historical Provider, MD   oxyCODONE-acetaminophen (PERCOCET) 7.5-325 MG per tablet Take 1 tablet by mouth every 8 hours as needed for Pain (Patient states takes every 6 hours). Yes Historical Provider, MD   Psyllium (METAMUCIL) 48.57 % POWD Take 1 Units by mouth daily Yes Rema Haas MD   potassium chloride (KLOR-CON M) 20 MEQ extended release tablet Take 1 tablet by mouth 2 times daily Yes Rema Haas MD   venlafaxine (EFFEXOR) 37.5 MG tablet Take 37.5 mg by mouth 2 times daily Yes Historical Provider, MD   fluticasone-vilanterol (BREO ELLIPTA) 100-25 MCG/INH AEPB inhaler Inhale 1 puff into the lungs daily Yes Suhail Cheng MD   ALPRAZolam Calos Meli) 0.5 MG tablet Take 0.5 mg by mouth as needed for Sleep.  . Yes Historical Provider, MD   ezetimibe (ZETIA) 10 MG tablet Take 1 tablet by mouth daily  Patient not taking: Reported on 5/6/2021  YESSICA Sparks CNP   tiotropium (SPIRIVA RESPIMAT) 2.5 MCG/ACT AERS inhaler Inhale 2 puffs into the lungs daily  Suhail Cheng MD   fluticasone-vilanterol (BREO ELLIPTA) 100-25 MCG/INH AEPB inhaler Inhale 1 puff into the lungs daily  Suhail Cheng MD        Social History Tobacco Use    Smoking status: Former Smoker     Packs/day: 0.25     Years: 30.00     Pack years: 7.50     Types: Cigarettes, E-Cigarettes     Start date:      Quit date: 2016     Years since quittin.3    Smokeless tobacco: Never Used   Substance Use Topics    Alcohol use: Yes     Comment: \"Occ. Maybe Twice A Month\"        Vitals:    21 0956   BP: 126/80   Site: Right Upper Arm   Position: Sitting   Cuff Size: Medium Adult   Pulse: 74   Temp: 97.3 °F (36.3 °C)   SpO2: 97%   Weight: 168 lb (76.2 kg)   Height: 5' 6\" (1.676 m)     Estimated body mass index is 27.12 kg/m² as calculated from the following:    Height as of this encounter: 5' 6\" (1.676 m). Weight as of this encounter: 168 lb (76.2 kg). Physical Exam  Vitals signs reviewed. Exam conducted with a chaperone present. Constitutional:       General: She is not in acute distress. Appearance: Normal appearance. She is obese. She is not ill-appearing, toxic-appearing or diaphoretic. HENT:      Head: Normocephalic and atraumatic. Nose: Nose normal.   Eyes:      Extraocular Movements: Extraocular movements intact. Pupils: Pupils are equal, round, and reactive to light. Neck:      Musculoskeletal: Normal range of motion and neck supple. Cardiovascular:      Rate and Rhythm: Normal rate and regular rhythm. Heart sounds: Normal heart sounds. Pulmonary:      Effort: Pulmonary effort is normal.      Breath sounds: Normal breath sounds. Abdominal:      General: Bowel sounds are normal. There is no distension. Palpations: Abdomen is soft. There is no mass. Tenderness: There is no abdominal tenderness. Hernia: No hernia is present. There is no hernia in the left inguinal area or right inguinal area. Genitourinary:     General: Normal vulva. Exam position: Lithotomy position. Labia:         Right: No rash, tenderness, lesion or injury. Left: No rash, tenderness, lesion or injury. - PAP SMEAR    7. Screen for sexually transmitted diseases  Per patient request  - Syphilis Antibody Cascading Reflex; Future  - VAGINAL PATHOGENS PROBE *A  - HIV-1 AND HIV-2 ANTIBODIES; Future  - Herpes Simplex Virus,I/II,IgG; Future  - Chlamydia trachomatis DNA, Thin Prep    8. Anxiety  ControlledXanax. Mental health    9. Chronic pain syndrome  Follow with pain management    10. Chronic edema BLE  None current today  Discussed that ibuprofen can worsen thisshe has been taking a lot of ibuprofen. She will be stopping that    11. Vitamin D deficiency  Over-the-counter supplement    12. Psoriasis  Following with dermatology. Cannot be treated until TB is resolved    13. COPD, severe ([facility] [address]  Follow with pulmonology          All care gaps addressed     All questions answered    Discussed use, benefit, and side effects of prescribed medications. Barriers to compliance discussed. All patient questions answered. Pt voiced understanding. Present to the ER for any emergent or acute symptoms not managed at home or in office. Please note that this chart was generated using dragon dictation software. Although every effort was made to ensure the accuracy of this automated transcription, some errors in transcription may have occurred. No follow-ups on file. An electronic signature was used to authenticate this note.     --YESSICA Braswell NP on 5/6/2021 at 12:48 PM At risk for aspiration

## 2024-04-18 RX ORDER — QUETIAPINE FUMARATE 100 MG/1
100 TABLET, FILM COATED ORAL NIGHTLY
Qty: 90 TABLET | Refills: 1 | Status: SHIPPED | OUTPATIENT
Start: 2024-04-18

## 2024-04-19 ENCOUNTER — OFFICE VISIT (OUTPATIENT)
Dept: FAMILY MEDICINE CLINIC | Age: 59
End: 2024-04-19
Payer: COMMERCIAL

## 2024-04-19 VITALS
SYSTOLIC BLOOD PRESSURE: 182 MMHG | OXYGEN SATURATION: 98 % | HEART RATE: 74 BPM | DIASTOLIC BLOOD PRESSURE: 90 MMHG | WEIGHT: 154 LBS | HEIGHT: 67 IN | BODY MASS INDEX: 24.17 KG/M2

## 2024-04-19 DIAGNOSIS — M54.41 CHRONIC RIGHT-SIDED LOW BACK PAIN WITH RIGHT-SIDED SCIATICA: ICD-10-CM

## 2024-04-19 DIAGNOSIS — E55.9 VITAMIN D DEFICIENCY: ICD-10-CM

## 2024-04-19 DIAGNOSIS — G89.4 CHRONIC PAIN SYNDROME: Primary | ICD-10-CM

## 2024-04-19 DIAGNOSIS — N95.1 HOT FLASHES DUE TO MENOPAUSE: ICD-10-CM

## 2024-04-19 DIAGNOSIS — A60.09 HERPES GENITALIS IN WOMEN: ICD-10-CM

## 2024-04-19 DIAGNOSIS — Z90.3 H/O GASTRIC SLEEVE: ICD-10-CM

## 2024-04-19 DIAGNOSIS — Z95.0 PACEMAKER: ICD-10-CM

## 2024-04-19 DIAGNOSIS — K66.0 ABDOMINAL ADHESIONS: ICD-10-CM

## 2024-04-19 DIAGNOSIS — F51.01 PRIMARY INSOMNIA: ICD-10-CM

## 2024-04-19 DIAGNOSIS — R63.0 LACK OF APPETITE: ICD-10-CM

## 2024-04-19 DIAGNOSIS — G89.29 CHRONIC RIGHT-SIDED LOW BACK PAIN WITH RIGHT-SIDED SCIATICA: ICD-10-CM

## 2024-04-19 DIAGNOSIS — E11.42 TYPE 2 DIABETES MELLITUS WITH DIABETIC POLYNEUROPATHY, WITHOUT LONG-TERM CURRENT USE OF INSULIN (HCC): ICD-10-CM

## 2024-04-19 DIAGNOSIS — E11.42 DIABETIC PERIPHERAL NEUROPATHY ASSOCIATED WITH TYPE 2 DIABETES MELLITUS (HCC): ICD-10-CM

## 2024-04-19 DIAGNOSIS — K21.9 GASTROESOPHAGEAL REFLUX DISEASE, UNSPECIFIED WHETHER ESOPHAGITIS PRESENT: ICD-10-CM

## 2024-04-19 DIAGNOSIS — F31.31 BIPOLAR AFFECTIVE DISORDER, CURRENTLY DEPRESSED, MILD (HCC): ICD-10-CM

## 2024-04-19 DIAGNOSIS — M79.7 FIBROMYALGIA: ICD-10-CM

## 2024-04-19 DIAGNOSIS — J44.9 COPD, SEVERE (HCC): ICD-10-CM

## 2024-04-19 DIAGNOSIS — F41.9 ANXIETY: ICD-10-CM

## 2024-04-19 DIAGNOSIS — E78.2 MIXED HYPERLIPIDEMIA: ICD-10-CM

## 2024-04-19 PROCEDURE — 3077F SYST BP >= 140 MM HG: CPT | Performed by: NURSE PRACTITIONER

## 2024-04-19 PROCEDURE — 3080F DIAST BP >= 90 MM HG: CPT | Performed by: NURSE PRACTITIONER

## 2024-04-19 PROCEDURE — 3046F HEMOGLOBIN A1C LEVEL >9.0%: CPT | Performed by: NURSE PRACTITIONER

## 2024-04-19 PROCEDURE — G8420 CALC BMI NORM PARAMETERS: HCPCS | Performed by: NURSE PRACTITIONER

## 2024-04-19 PROCEDURE — 3017F COLORECTAL CA SCREEN DOC REV: CPT | Performed by: NURSE PRACTITIONER

## 2024-04-19 PROCEDURE — 99214 OFFICE O/P EST MOD 30 MIN: CPT | Performed by: NURSE PRACTITIONER

## 2024-04-19 PROCEDURE — 3023F SPIROM DOC REV: CPT | Performed by: NURSE PRACTITIONER

## 2024-04-19 PROCEDURE — 2022F DILAT RTA XM EVC RTNOPTHY: CPT | Performed by: NURSE PRACTITIONER

## 2024-04-19 PROCEDURE — 1036F TOBACCO NON-USER: CPT | Performed by: NURSE PRACTITIONER

## 2024-04-19 PROCEDURE — G8427 DOCREV CUR MEDS BY ELIG CLIN: HCPCS | Performed by: NURSE PRACTITIONER

## 2024-04-19 NOTE — PROGRESS NOTES
up 75 mg to help with her chronic abd deidre   She states dr suarez told her he could try abdominal nerve blocks but she hasn't decided yet    I feel that her pain is due to Adhesions and IBS -C, this has been made worse with opioid therapy and recurrent surgeries.     Wt Readings from Last 3 Encounters:   04/19/24 69.9 kg (154 lb)   04/11/24 68.9 kg (152 lb)   04/05/24 72 kg (158 lb 12.8 oz)            EGD January 2023 which revealed evidence of sleeve gastrectomy, characterized by healthy-appearing mucosa biopsies were negative for H. pylori.  Upper GI study also revealed postsurgical changes status post bariatric surgery otherwise unremarkable.  She had a CT abdomen and pelvis done in March of this year which did not reveal any acute intra abdominal or pelvic abnormality however it was noncontrast  Last colonoscopy in 07/2020 with small ascedning colon polyp - read as non diagnostic per pathology.         New pacemaker w dr canchola team 4/11/24. Having some post op pain through her upper chest shoulders upper back and left arm. Improving.   Mobitz type II AV block and also almost intermittent complete heart block but it could be atrial run with block too  History of syncope  Patient with high grade AV block                  Review of Systems    Prior to Visit Medications    Medication Sig Taking? Authorizing Provider   QUEtiapine (SEROQUEL) 100 MG tablet Take 1 tablet by mouth at bedtime Yes Evi Lopez, APRN - NP   hydroCHLOROthiazide 12.5 MG capsule Take 1 capsule by mouth as needed (prn) Yes Jay Jay Canchola MD   alogliptin (NESINA) 6.25 MG TABS tablet Take 1 tablet by mouth daily Yes Evi Lopez APRN - NP   sucralfate (CARAFATE) 1 GM tablet Take 1 tablet by mouth 2 times daily Please crush and 5 mL of water and take a slurry.  Please  out from other medication Yes Pauline Orozco MD   estradiol (ESTRACE) 2 MG tablet Take 1 tablet by mouth daily Yes Neela Dennis APRN - CNP   midodrine

## 2024-04-22 ENCOUNTER — NURSE ONLY (OUTPATIENT)
Dept: CARDIOLOGY CLINIC | Age: 59
End: 2024-04-22

## 2024-04-22 VITALS — TEMPERATURE: 97.4 F

## 2024-04-22 DIAGNOSIS — Z95.0 STATUS POST PLACEMENT OF CARDIAC PACEMAKER: Primary | ICD-10-CM

## 2024-04-22 PROCEDURE — 99024 POSTOP FOLLOW-UP VISIT: CPT | Performed by: INTERNAL MEDICINE

## 2024-04-22 NOTE — PROGRESS NOTES
Patient seen for site check post pacer implant. Dressing removed. No signs of inflammation or infection noted.  There is a small opening on the left edge of incision. It was cleaned in sterile fashion and skin glue applied. Edges well approximated. Patient has no complaints of pain or discomfort. Single steri strip applied. Patient instructed to not lift arm higher than shoulder level. Instructions given on the use of Swank phone shannan for home device checks.

## 2024-05-01 ENCOUNTER — OFFICE VISIT (OUTPATIENT)
Dept: CARDIOLOGY CLINIC | Age: 59
End: 2024-05-01
Payer: COMMERCIAL

## 2024-05-01 VITALS
SYSTOLIC BLOOD PRESSURE: 136 MMHG | HEIGHT: 67 IN | HEART RATE: 70 BPM | OXYGEN SATURATION: 98 % | WEIGHT: 152.2 LBS | BODY MASS INDEX: 23.89 KG/M2 | DIASTOLIC BLOOD PRESSURE: 80 MMHG

## 2024-05-01 DIAGNOSIS — I10 ESSENTIAL HYPERTENSION: ICD-10-CM

## 2024-05-01 DIAGNOSIS — E78.5 DYSLIPIDEMIA: ICD-10-CM

## 2024-05-01 DIAGNOSIS — I44.39 HIGH-GRADE ATRIOVENTRICULAR BLOCK: ICD-10-CM

## 2024-05-01 DIAGNOSIS — G47.33 OSA (OBSTRUCTIVE SLEEP APNEA): ICD-10-CM

## 2024-05-01 DIAGNOSIS — Z95.0 STATUS POST PLACEMENT OF CARDIAC PACEMAKER: Primary | ICD-10-CM

## 2024-05-01 DIAGNOSIS — E78.2 MIXED HYPERLIPIDEMIA: ICD-10-CM

## 2024-05-01 PROCEDURE — 1036F TOBACCO NON-USER: CPT | Performed by: NURSE PRACTITIONER

## 2024-05-01 PROCEDURE — 3079F DIAST BP 80-89 MM HG: CPT | Performed by: NURSE PRACTITIONER

## 2024-05-01 PROCEDURE — 99214 OFFICE O/P EST MOD 30 MIN: CPT | Performed by: NURSE PRACTITIONER

## 2024-05-01 PROCEDURE — G8427 DOCREV CUR MEDS BY ELIG CLIN: HCPCS | Performed by: NURSE PRACTITIONER

## 2024-05-01 PROCEDURE — 3075F SYST BP GE 130 - 139MM HG: CPT | Performed by: NURSE PRACTITIONER

## 2024-05-01 PROCEDURE — G8420 CALC BMI NORM PARAMETERS: HCPCS | Performed by: NURSE PRACTITIONER

## 2024-05-01 PROCEDURE — 3017F COLORECTAL CA SCREEN DOC REV: CPT | Performed by: NURSE PRACTITIONER

## 2024-05-01 NOTE — PROGRESS NOTES
7.01\")       Wt Readings from Last 3 Encounters:   05/01/24 69 kg (152 lb 3.2 oz)   04/19/24 69.9 kg (154 lb)   04/11/24 68.9 kg (152 lb)       BP Readings from Last 3 Encounters:   05/01/24 136/80   04/19/24 (!) 182/90   04/11/24 (!) 174/95       Prior to Admission medications    Medication Sig Start Date End Date Taking? Authorizing Provider   QUEtiapine (SEROQUEL) 100 MG tablet Take 1 tablet by mouth at bedtime 4/18/24  Yes Evi Lopez, APRN - NP   hydroCHLOROthiazide 12.5 MG capsule Take 1 capsule by mouth as needed (prn) 4/5/24  Yes Jay Jay Canchola MD   alogliptin (NESINA) 6.25 MG TABS tablet Take 1 tablet by mouth daily 3/18/24 9/14/24 Yes Evi Lopez APRN - NP   sucralfate (CARAFATE) 1 GM tablet Take 1 tablet by mouth 4 times daily 2/21/24 5/1/24 Yes Aurora Domingo APRN - CNP   sucralfate (CARAFATE) 1 GM tablet Take 1 tablet by mouth 2 times daily Please crush and 5 mL of water and take a slurry.  Please  out from other medication 1/24/24  Yes Pauline Orozco MD   estradiol (ESTRACE) 2 MG tablet Take 1 tablet by mouth daily 1/11/24  Yes Neela Dennis APRN - CNP   midodrine (PROAMATINE) 2.5 MG tablet Take 1 tablet by mouth 3 times daily as needed (hypotension) 1/8/24  Yes Aurora Domingo APRN - CNP   valACYclovir (VALTREX) 500 MG tablet Take 1 tablet by mouth daily 11/16/23 5/14/24 Yes Deandre Dale MD   omeprazole (PRILOSEC) 40 MG delayed release capsule Take 1 capsule by mouth daily 10/3/23  Yes Pauline Orozco MD   fluticasone furoate-vilanterol (BREO ELLIPTA) 100-25 MCG/ACT inhaler Inhale 1 puff into the lungs daily 8/24/23  Yes Michelet Dueñas MD   VENTOLIN  (90 Base) MCG/ACT inhaler Inhale 2 puffs into the lungs in the morning and 2 puffs at noon and 2 puffs in the evening and 2 puffs before bedtime. SHAKE WELL 8/21/23  Yes Michelet Dueñas MD   amLODIPine (NORVASC) 2.5 MG tablet Take 1 tablet by mouth daily Take for SBP > 190 7/14/23  Yes Cherelle Estes, APRN - CNP

## 2024-05-01 NOTE — PATIENT INSTRUCTIONS
Please be informed that if you contact our office outside of normal business hours the physician on call cannot help with any scheduling or rescheduling issues, procedure instruction questions or any type of medication issue.    We advise you for any urgent/emergency that you go to the nearest emergency room!    PLEASE CALL OUR OFFICE DURING NORMAL BUSINESS HOURS    Monday - Friday   8 am to 5 pm    [place]: [phone]    [place]: [phone]    [place]:  [phone]    **It is YOUR responsibilty to bring medication bottles and/or updated medication list to EACH APPOINTMENT. This will allow us to better serve you and all your healthcare needs**    Thank you for allowing us to care for you today!   We want to ensure we can follow your treatment plan and we strive to give you the best outcomes and experience possible.   If you ever have a life threatening emergency and call 911 - for an ambulance (EMS)   Our providers can only care for you at:   [facility] or [facility].   Even if you have someone take you or you drive yourself we can only care for you in a [facility] facility. Our providers are not setup at the other healthcare locations!

## 2024-05-09 ENCOUNTER — TELEPHONE (OUTPATIENT)
Dept: FAMILY MEDICINE CLINIC | Age: 59
End: 2024-05-09

## 2024-05-09 NOTE — TELEPHONE ENCOUNTER
Pt came into office upset due to being discharged from pain management due to having tramadol in her system. Pt had a pacemaker placed on 4/11/24 and states she was given tramadol at that time. I attempted to call pain management twice today but had to leave a message both times. I did advise pt to contact cardiology to see if she can get a note stating Tramadol was given during procedure. Pt states she did get her billing statement from the hospital which shows Tramadol. Advised pt to try to give bill to Pain Management to notify them.

## 2024-05-13 ENCOUNTER — HOSPITAL ENCOUNTER (OUTPATIENT)
Age: 59
Discharge: HOME | End: 2024-05-13
Payer: COMMERCIAL

## 2024-05-13 LAB
ALBUMIN SERPL-MCNC: 4.8 GM/DL (ref 3.4–5)
ALP BLD-CCNC: 82 IU/L (ref 40–129)
ALT SERPL-CCNC: 8 U/L (ref 10–40)
ANION GAP SERPL CALCULATED.3IONS-SCNC: 14 MMOL/L (ref 7–16)
AST SERPL-CCNC: 16 IU/L (ref 15–37)
BILIRUB SERPL-MCNC: 0.9 MG/DL (ref 0–1)
BILIRUBIN DIRECT: 0.2 MG/DL (ref 0–0.3)
BILIRUBIN, INDIRECT: 0.7 MG/DL (ref 0–0.7)
BUN SERPL-MCNC: 13 MG/DL (ref 6–23)
CALCIUM SERPL-MCNC: 9.6 MG/DL (ref 8.3–10.6)
CHLORIDE BLD-SCNC: 104 MMOL/L (ref 99–110)
CO2: 24 MMOL/L (ref 21–32)
CREAT SERPL-MCNC: 0.8 MG/DL (ref 0.6–1.1)
GFR, ESTIMATED: 86 ML/MIN/1.73M2
GLUCOSE SERPL-MCNC: 127 MG/DL (ref 70–99)
POTASSIUM SERPL-SCNC: 4.2 MMOL/L (ref 3.5–5.1)
SODIUM BLD-SCNC: 142 MMOL/L (ref 135–145)
TOTAL PROTEIN: 7.8 GM/DL (ref 6.4–8.2)

## 2024-05-13 PROCEDURE — 80053 COMPREHEN METABOLIC PANEL: CPT

## 2024-05-13 PROCEDURE — 36415 COLL VENOUS BLD VENIPUNCTURE: CPT

## 2024-05-13 PROCEDURE — 82248 BILIRUBIN DIRECT: CPT

## 2024-05-16 ENCOUNTER — OFFICE VISIT (OUTPATIENT)
Dept: CARDIOLOGY CLINIC | Age: 59
End: 2024-05-16

## 2024-05-16 ENCOUNTER — OFFICE VISIT (OUTPATIENT)
Dept: INFECTIOUS DISEASES | Age: 59
End: 2024-05-16
Payer: COMMERCIAL

## 2024-05-16 VITALS
SYSTOLIC BLOOD PRESSURE: 138 MMHG | HEIGHT: 68 IN | WEIGHT: 153.8 LBS | BODY MASS INDEX: 23.31 KG/M2 | DIASTOLIC BLOOD PRESSURE: 84 MMHG | HEART RATE: 71 BPM | OXYGEN SATURATION: 97 %

## 2024-05-16 VITALS
RESPIRATION RATE: 18 BRPM | HEART RATE: 81 BPM | BODY MASS INDEX: 24.02 KG/M2 | SYSTOLIC BLOOD PRESSURE: 171 MMHG | DIASTOLIC BLOOD PRESSURE: 100 MMHG | WEIGHT: 153.4 LBS

## 2024-05-16 DIAGNOSIS — K21.9 GASTROESOPHAGEAL REFLUX DISEASE, UNSPECIFIED WHETHER ESOPHAGITIS PRESENT: ICD-10-CM

## 2024-05-16 DIAGNOSIS — Z51.81 THERAPEUTIC DRUG MONITORING: ICD-10-CM

## 2024-05-16 DIAGNOSIS — Z95.0 PACEMAKER: Primary | ICD-10-CM

## 2024-05-16 DIAGNOSIS — A60.09 HERPES GENITALIS IN WOMEN: Primary | ICD-10-CM

## 2024-05-16 PROCEDURE — 99024 POSTOP FOLLOW-UP VISIT: CPT | Performed by: NURSE PRACTITIONER

## 2024-05-16 PROCEDURE — 1036F TOBACCO NON-USER: CPT | Performed by: INTERNAL MEDICINE

## 2024-05-16 PROCEDURE — 3080F DIAST BP >= 90 MM HG: CPT | Performed by: INTERNAL MEDICINE

## 2024-05-16 PROCEDURE — 3077F SYST BP >= 140 MM HG: CPT | Performed by: INTERNAL MEDICINE

## 2024-05-16 PROCEDURE — G8420 CALC BMI NORM PARAMETERS: HCPCS | Performed by: INTERNAL MEDICINE

## 2024-05-16 PROCEDURE — 3017F COLORECTAL CA SCREEN DOC REV: CPT | Performed by: INTERNAL MEDICINE

## 2024-05-16 PROCEDURE — G8427 DOCREV CUR MEDS BY ELIG CLIN: HCPCS | Performed by: INTERNAL MEDICINE

## 2024-05-16 PROCEDURE — 99213 OFFICE O/P EST LOW 20 MIN: CPT | Performed by: INTERNAL MEDICINE

## 2024-05-16 RX ORDER — DILTIAZEM HYDROCHLORIDE 120 MG/1
120 CAPSULE, COATED, EXTENDED RELEASE ORAL DAILY
Qty: 90 CAPSULE | Refills: 1 | Status: SHIPPED | OUTPATIENT
Start: 2024-05-16

## 2024-05-16 RX ORDER — VALACYCLOVIR HYDROCHLORIDE 500 MG/1
500 TABLET, FILM COATED ORAL DAILY
Qty: 30 TABLET | Refills: 5 | Status: SHIPPED | OUTPATIENT
Start: 2024-05-16 | End: 2024-11-12

## 2024-05-16 RX ORDER — DILTIAZEM HYDROCHLORIDE 120 MG/1
120 CAPSULE, COATED, EXTENDED RELEASE ORAL DAILY
Qty: 30 CAPSULE | Refills: 3 | Status: SHIPPED | OUTPATIENT
Start: 2024-05-16 | End: 2024-05-16

## 2024-05-16 NOTE — ASSESSMENT & PLAN NOTE
Labs were reviewed, hepatic function renal function are within normal limits.  Continue Valtrex.

## 2024-05-16 NOTE — PATIENT INSTRUCTIONS
**It is YOUR responsibilty to bring medication bottles and/or updated medication list to EACH APPOINTMENT. This will allow us to better serve you and all your healthcare needs**   Thank you for allowing us to care for you today!   We want to ensure we can follow your treatment plan and we strive to give you the best outcomes and experience possible.   If you ever have a life threatening emergency and call 911 - for an ambulance (EMS)   Our providers can only care for you at:   [facility] or [facility].   Even if you have someone take you or you drive yourself we can only care for you in a [facility]. Our providers are not setup at the other healthcare locations!   Please be informed that if you contact our office outside of normal business hours the physician on call cannot help with any scheduling or rescheduling issues, procedure instruction questions or any type of medication issue.    We advise you for any urgent/emergency that you go to the nearest emergency room!    PLEASE CALL OUR OFFICE DURING NORMAL BUSINESS HOURS    Monday - Friday   8 am to 5 pm    [place]: [phone]    [place]: [phone]    [place]:  [phone]  We are committed to providing you the best care possible.    If you receive a survey after visiting one of our offices, please take time to share your experience concerning your physician office visit.  These surveys are confidential and no health information about you is shared.    We are eager to improve for you and we are counting on your feedback to help make that happen.

## 2024-05-16 NOTE — PROGRESS NOTES
5/18/2024         Referring Physician: No ref. provider found  Primary Care Physician: Evi Lopez, APRN - NP    Impression/Plan:   Diagnosis Orders   1. Herpes genitalis in women  valACYclovir (VALTREX) 500 MG tablet      2. Therapeutic drug monitoring  Hepatic Function Panel    CBC    Basic Metabolic Panel          Discussion:  Herpes genitalis in women  Labs were reviewed, hepatic function renal function are within normal limits.  Continue Valtrex.        Return in about 6 months (around 11/16/2024).      25 minutes was spent in the encounter; more than 50% of the face-to-face time was spent with the patient providing counseling and coordination of care.    History: Beverly Valencia is a 59 y.o.  female presenting today.  She was originally referred to us for positive QuantiFERON test.  However, she states that she has been to the health department and they will be starting her regimen (rifapentine and isoniazid).  She is here for HSV II IgG positive test.  She tested positive for HSV antibody as part of the screen for STDs.  She does not recall having any painful genital lesions ever.  She last had sex about a year ago.  She would like to be treated for Herpes. She is asymptomatic and has not had a flare.  She denies headache, fever, chills, abdominal pain.  6/23/2021: denies any genital lesions. Tolerating medication.   12/16/2021: tolerating acyclovir, adherent to it, has no ulcers. Is in a relationship, yet to have sex but is thinking about it. She is inclined to break the news to her new partner.  7/27/2022:  no outbreak, was in a relationship with a man and she revealed her status. They used condoms once, but he stopped using condoms. Now,they are not together.   5/25/2023: Seen on 1/25/2023.  Valtrex was continued.  Labs on 4/4/2023 were reviewed.  Renal function and liver enzymes are within normal limits. Reports that she has ventral abdominal hernia. She will have surgery at the

## 2024-05-16 NOTE — PROGRESS NOTES
Patient is here today for 1 month follow-up status post implantation of dual-chamber pacemaker  Patient reports that she is feeling well.  She denies chest pain, palpitations, shortness of breath, lightheadedness, dizziness, edema or syncope  Left upper chest site well-approximated.  No redness no swelling no hematoma  Device Assessment:  The device is Medtronic pacemaker - Dual Chamber chamber      MRI Compatible : yes    Device interrogation was performed.    Mode: DDD     Sensing is normal. Impedence is normal.  Threshold is normal.     There has not been interval changes.     Estimated battery life is 11.5 years     The underlying rhythm is AS..  2.9 % atrial paced; 84.1 % ventricular paced.      Atrial Arrhythmia : No    Fast a and V: 13 episodes    Non sustained VT episodes : No    Sustained VT episodes : No    Patient activity reported 3.8 hrs/day    The patient is pacemaker dependent.      Patient having SVT episodes  We will start Cardizem 120 mg daily  Patient to discontinue amlodipine 2.5 mg daily  Patient has no restrictions at this time  Patient to follow-up in 3 months or sooner if needed

## 2024-05-17 RX ORDER — SUCRALFATE 1 G/1
1 TABLET ORAL 2 TIMES DAILY
Qty: 60 TABLET | Refills: 1 | Status: SHIPPED | OUTPATIENT
Start: 2024-05-17

## 2024-05-17 RX ORDER — OMEPRAZOLE 40 MG/1
40 CAPSULE, DELAYED RELEASE ORAL DAILY
Qty: 90 CAPSULE | Refills: 0 | Status: SHIPPED | OUTPATIENT
Start: 2024-05-17

## 2024-05-22 ENCOUNTER — TELEPHONE (OUTPATIENT)
Dept: CARDIOLOGY CLINIC | Age: 59
End: 2024-05-22

## 2024-06-06 ENCOUNTER — OFFICE VISIT (OUTPATIENT)
Dept: FAMILY MEDICINE CLINIC | Age: 59
End: 2024-06-06
Payer: COMMERCIAL

## 2024-06-06 VITALS
SYSTOLIC BLOOD PRESSURE: 138 MMHG | HEIGHT: 68 IN | HEART RATE: 73 BPM | WEIGHT: 161 LBS | BODY MASS INDEX: 24.4 KG/M2 | DIASTOLIC BLOOD PRESSURE: 80 MMHG | OXYGEN SATURATION: 94 %

## 2024-06-06 DIAGNOSIS — E11.9 TYPE 2 DIABETES MELLITUS WITHOUT COMPLICATION, WITHOUT LONG-TERM CURRENT USE OF INSULIN (HCC): Primary | ICD-10-CM

## 2024-06-06 PROCEDURE — 1036F TOBACCO NON-USER: CPT | Performed by: NURSE PRACTITIONER

## 2024-06-06 PROCEDURE — 2022F DILAT RTA XM EVC RTNOPTHY: CPT | Performed by: NURSE PRACTITIONER

## 2024-06-06 PROCEDURE — 3075F SYST BP GE 130 - 139MM HG: CPT | Performed by: NURSE PRACTITIONER

## 2024-06-06 PROCEDURE — 3079F DIAST BP 80-89 MM HG: CPT | Performed by: NURSE PRACTITIONER

## 2024-06-06 PROCEDURE — 3017F COLORECTAL CA SCREEN DOC REV: CPT | Performed by: NURSE PRACTITIONER

## 2024-06-06 PROCEDURE — 99214 OFFICE O/P EST MOD 30 MIN: CPT | Performed by: NURSE PRACTITIONER

## 2024-06-06 PROCEDURE — G8427 DOCREV CUR MEDS BY ELIG CLIN: HCPCS | Performed by: NURSE PRACTITIONER

## 2024-06-06 PROCEDURE — G8420 CALC BMI NORM PARAMETERS: HCPCS | Performed by: NURSE PRACTITIONER

## 2024-06-06 PROCEDURE — 3046F HEMOGLOBIN A1C LEVEL >9.0%: CPT | Performed by: NURSE PRACTITIONER

## 2024-06-06 NOTE — PROGRESS NOTES
drop or prominent metatarsal heads.      Left foot: Normal range of motion. No deformity, bunion, Charcot foot, foot drop or prominent metatarsal heads.   Feet:      Right foot:      Protective Sensation: 10 sites tested.  10 sites sensed.      Skin integrity: Skin integrity normal.      Toenail Condition: Right toenails are normal.      Left foot:      Protective Sensation: 10 sites tested.  10 sites sensed.      Skin integrity: Skin integrity normal.      Toenail Condition: Left toenails are normal.   Neurological:      General: No focal deficit present.      Mental Status: She is alert and oriented to person, place, and time. Mental status is at baseline.         ASSESSMENT/PLAN:  1. Type 2 diabetes mellitus without complication, without long-term current use of insulin ([facility])  -      DIABETES FOOT EXAM      Mammo   Foot exam   No changes   Follow up as scheduled oct 2024  Cont. Alogliptin. Monitor glucose as needed at home.   Need eye exam         All care gaps addressed     All questions answered    Discussed use, benefit, and side effects of prescribed medications.  Barriers to compliance discussed.  All patient questions answered.  Pt voiced understanding.     Present to the ER for any emergent or acute symptoms not managed at home or in office.    Please note that this chart was generated using dragon dictation software.  Although every effort was made to ensure the accuracy of this automated transcription, some errors in transcription may have occurred.    No follow-ups on file.    An electronic signature was used to authenticate this note.    --YESSICA Curtis NP on 6/6/2024 at 9:04 AM

## 2024-06-18 ENCOUNTER — HOSPITAL ENCOUNTER (OUTPATIENT)
Dept: WOMENS IMAGING | Age: 59
Discharge: HOME | End: 2024-06-18
Payer: COMMERCIAL

## 2024-06-18 VITALS — WEIGHT: 152 LBS | HEIGHT: 68 IN | BODY MASS INDEX: 23.04 KG/M2

## 2024-06-18 DIAGNOSIS — Z12.31 BREAST CANCER SCREENING BY MAMMOGRAM: ICD-10-CM

## 2024-06-18 PROCEDURE — 77063 BREAST TOMOSYNTHESIS BI: CPT

## 2024-07-10 ENCOUNTER — PROCEDURE VISIT (OUTPATIENT)
Dept: CARDIOLOGY CLINIC | Age: 59
End: 2024-07-10

## 2024-07-10 DIAGNOSIS — Z95.0 CARDIAC PACEMAKER IN SITU: Primary | ICD-10-CM

## 2024-07-15 RX ORDER — ESTRADIOL 2 MG/1
2 TABLET ORAL DAILY
Qty: 30 TABLET | Refills: 11 | Status: SHIPPED | OUTPATIENT
Start: 2024-07-15

## 2024-07-16 ENCOUNTER — TELEPHONE (OUTPATIENT)
Dept: FAMILY MEDICINE CLINIC | Age: 59
End: 2024-07-16

## 2024-07-16 NOTE — TELEPHONE ENCOUNTER
----- Message from Soraya Flores sent at 7/15/2024  3:33 PM EDT -----  Regarding: ECC Referral Request  ECC Referral Request    Reason for referral request: Lab/Test Order    Specialist/Lab/Test patient is requesting (if known): Lung X-RAY    Specialist Phone Number (if applicable): N/A    Additional Information: N/A  --------------------------------------------------------------------------------------------------------------------------    Relationship to Patient: Self     Call Back Information: OK to leave message on voicemail  Preferred Call Back Number: Phone: [phone]

## 2024-07-22 DIAGNOSIS — A60.09 HERPES GENITALIS IN WOMEN: ICD-10-CM

## 2024-07-22 NOTE — TELEPHONE ENCOUNTER
Pt called and states that she needs a refill due to [facility] closed down and the bottle she has states no refills. Pt is now using [facility] on  [address].    Please advise.

## 2024-07-23 RX ORDER — VALACYCLOVIR HYDROCHLORIDE 500 MG/1
500 TABLET, FILM COATED ORAL DAILY
Qty: 30 TABLET | Refills: 5 | OUTPATIENT
Start: 2024-07-23 | End: 2025-01-19

## 2024-07-24 ENCOUNTER — OFFICE VISIT (OUTPATIENT)
Dept: FAMILY MEDICINE CLINIC | Age: 59
End: 2024-07-24
Payer: COMMERCIAL

## 2024-07-24 ENCOUNTER — OFFICE VISIT (OUTPATIENT)
Dept: GASTROENTEROLOGY | Age: 59
End: 2024-07-24
Payer: COMMERCIAL

## 2024-07-24 VITALS
BODY MASS INDEX: 22.97 KG/M2 | OXYGEN SATURATION: 97 % | DIASTOLIC BLOOD PRESSURE: 62 MMHG | WEIGHT: 151.6 LBS | HEART RATE: 79 BPM | TEMPERATURE: 97.4 F | SYSTOLIC BLOOD PRESSURE: 126 MMHG | HEIGHT: 68 IN

## 2024-07-24 VITALS
SYSTOLIC BLOOD PRESSURE: 136 MMHG | HEIGHT: 68 IN | BODY MASS INDEX: 22.73 KG/M2 | HEART RATE: 87 BPM | OXYGEN SATURATION: 96 % | DIASTOLIC BLOOD PRESSURE: 78 MMHG | WEIGHT: 150 LBS

## 2024-07-24 DIAGNOSIS — Z98.84 S/P LAPAROSCOPIC SLEEVE GASTRECTOMY: ICD-10-CM

## 2024-07-24 DIAGNOSIS — R10.11 RUQ PAIN: Primary | ICD-10-CM

## 2024-07-24 DIAGNOSIS — L30.9 ACUTE DERMATITIS: Primary | ICD-10-CM

## 2024-07-24 DIAGNOSIS — K59.00 CONSTIPATION, UNSPECIFIED CONSTIPATION TYPE: ICD-10-CM

## 2024-07-24 PROCEDURE — 3078F DIAST BP <80 MM HG: CPT | Performed by: NURSE PRACTITIONER

## 2024-07-24 PROCEDURE — 3017F COLORECTAL CA SCREEN DOC REV: CPT | Performed by: INTERNAL MEDICINE

## 2024-07-24 PROCEDURE — G8427 DOCREV CUR MEDS BY ELIG CLIN: HCPCS | Performed by: INTERNAL MEDICINE

## 2024-07-24 PROCEDURE — G8420 CALC BMI NORM PARAMETERS: HCPCS | Performed by: NURSE PRACTITIONER

## 2024-07-24 PROCEDURE — 99214 OFFICE O/P EST MOD 30 MIN: CPT | Performed by: NURSE PRACTITIONER

## 2024-07-24 PROCEDURE — 3074F SYST BP LT 130 MM HG: CPT | Performed by: INTERNAL MEDICINE

## 2024-07-24 PROCEDURE — G8420 CALC BMI NORM PARAMETERS: HCPCS | Performed by: INTERNAL MEDICINE

## 2024-07-24 PROCEDURE — G8427 DOCREV CUR MEDS BY ELIG CLIN: HCPCS | Performed by: NURSE PRACTITIONER

## 2024-07-24 PROCEDURE — 99214 OFFICE O/P EST MOD 30 MIN: CPT | Performed by: INTERNAL MEDICINE

## 2024-07-24 PROCEDURE — 3017F COLORECTAL CA SCREEN DOC REV: CPT | Performed by: NURSE PRACTITIONER

## 2024-07-24 PROCEDURE — 3078F DIAST BP <80 MM HG: CPT | Performed by: INTERNAL MEDICINE

## 2024-07-24 PROCEDURE — 3075F SYST BP GE 130 - 139MM HG: CPT | Performed by: NURSE PRACTITIONER

## 2024-07-24 PROCEDURE — 1036F TOBACCO NON-USER: CPT | Performed by: NURSE PRACTITIONER

## 2024-07-24 PROCEDURE — 1036F TOBACCO NON-USER: CPT | Performed by: INTERNAL MEDICINE

## 2024-07-24 RX ORDER — SUCRALFATE 1 G/1
1 TABLET ORAL 2 TIMES DAILY
Qty: 60 TABLET | Refills: 1 | Status: SHIPPED | OUTPATIENT
Start: 2024-07-24

## 2024-07-24 RX ORDER — TRIAMCINOLONE ACETONIDE 1 MG/G
CREAM TOPICAL
Qty: 1 EACH | Refills: 0 | Status: SHIPPED | OUTPATIENT
Start: 2024-07-24

## 2024-07-24 NOTE — PROGRESS NOTES
[facility]             MD Pauline Foy MD Carol Christensen, APRN-CNP             [address] [place] [address]             [phone] fax [phone]        Gastroenterology Clinic Consultation    Pauline Orozco MD  Encounter Date: 07/24/24     CC: 6 Month Follow-Up (Pt states occasionally when she eats she feels super sick to her stomach. She makes herself throw up to feel better./BM are regular )         No referring provider defined for this encounter.     History obtained from: patient, medical records     Subjective:       Beverly Valencia is an 59 y.o. female with past medical history of type 2 diabetes, GERD, COPD, status post laparoscopic sleeve gastrectomy, abdominoplasty, panniculectomy who presents for 6 Month Follow-Up (Pt states occasionally when she eats she feels super sick to her stomach. She makes herself throw up to feel better./BM are regular )  7/24/2024  According to the patient her constipation is slightly better.   Her pain is still persistent and she has issues with early satiety. She is down 12 lbs from her last visit in January. Her weight at the time of the sleeve was 227 lbs ~ 70lbs down from sleeve.   Currently she has a tight knot like feeling in the RUQ. She mentions she feels like she gets Terrence-Horses in her stomach and has to rub it to alleviate the pain.   May labs with normal electrolytes - mag wnl.   Carafate helps her with some of the burning pain.   Has not had to midodrine PRN as no hypotensive pains.     1/24/2024  Since her last appointment She had her Umbilical Hernia and scar tissues removal with her Plastic surgeon [facility] Rayo. Since then her abdominal pain has worsened. Its generalized but pain is centered where her umbilicus used to be. She is on Percocet which has helped her but mentions that she is at the highest point. She mentions that she

## 2024-07-24 NOTE — PROGRESS NOTES
2024     Beverly Valencia (:  1965) is a 59 y.o. female, here for evaluation of the following medical concerns:    Right wrist rash/ pain   Has seen ortho dr dale 2024 whom \"did nothing\" and did not have a rec per the pt. Dark rash \"hurts to the touch. Burning\"   Present since 2024 and staying the same. No injury   Also reports \"heat rash\" to her face since being in [place].       Review of Systems    Prior to Visit Medications    Medication Sig Taking? Authorizing Provider   sucralfate (CARAFATE) 1 GM tablet Take 1 tablet by mouth 2 times daily Please crush and 5 mL of water and take a slurry.  Please  out from other medication Yes Pauline Orozco MD   triamcinolone (KENALOG) 0.1 % cream Apply topically 2 times daily for 7-10 days for right wrist . Yes Evi Lopez, APRN - NP   estradiol (ESTRACE) 2 MG tablet Take 1 tablet by mouth daily Yes Neela Dennis APRN - CNP   fluticasone furoate-vilanterol (BREO ELLIPTA) 100-25 MCG/ACT inhaler Inhale 1 puff into the lungs daily Yes Michelet Dueñas MD   tiotropium (SPIRIVA RESPIMAT) 2.5 MCG/ACT AERS inhaler Inhale 2 puffs into the lungs daily Yes Michelet Dueñas MD   albuterol sulfate HFA (VENTOLIN HFA) 108 (90 Base) MCG/ACT inhaler Inhale 2 puffs into the lungs 4 times daily as needed for Wheezing Yes Michelet Dueñas MD   omeprazole (PRILOSEC) 40 MG delayed release capsule Take 1 capsule by mouth daily Yes Aurora Vázquez APRN - CNP   valACYclovir (VALTREX) 500 MG tablet Take 1 tablet by mouth daily Yes Deandre Dale MD   dilTIAZem (CARDIZEM CD) 120 MG extended release capsule Take 1 capsule by mouth daily Yes Rea Guevara APRN - CNP   QUEtiapine (SEROQUEL) 100 MG tablet Take 1 tablet by mouth at bedtime Yes Evi Lopez, APRN - NP   hydroCHLOROthiazide 12.5 MG capsule Take 1 capsule by mouth as needed (prn) Yes Jay Jay Canchola MD   alogliptin (NESINA) 6.25 MG TABS tablet Take 1 tablet by mouth daily Yes Evi Lopez

## 2024-08-12 ENCOUNTER — OFFICE VISIT (OUTPATIENT)
Dept: CARDIOLOGY CLINIC | Age: 59
End: 2024-08-12
Payer: COMMERCIAL

## 2024-08-12 VITALS
SYSTOLIC BLOOD PRESSURE: 140 MMHG | BODY MASS INDEX: 23.79 KG/M2 | HEIGHT: 67 IN | HEART RATE: 70 BPM | WEIGHT: 151.6 LBS | DIASTOLIC BLOOD PRESSURE: 88 MMHG

## 2024-08-12 DIAGNOSIS — I10 ESSENTIAL HYPERTENSION: ICD-10-CM

## 2024-08-12 DIAGNOSIS — Z95.0 CARDIAC PACEMAKER IN SITU: Primary | ICD-10-CM

## 2024-08-12 DIAGNOSIS — I47.10 SVT (SUPRAVENTRICULAR TACHYCARDIA) (HCC): ICD-10-CM

## 2024-08-12 PROCEDURE — 99214 OFFICE O/P EST MOD 30 MIN: CPT | Performed by: NURSE PRACTITIONER

## 2024-08-12 PROCEDURE — 3017F COLORECTAL CA SCREEN DOC REV: CPT | Performed by: NURSE PRACTITIONER

## 2024-08-12 PROCEDURE — 1036F TOBACCO NON-USER: CPT | Performed by: NURSE PRACTITIONER

## 2024-08-12 PROCEDURE — G8427 DOCREV CUR MEDS BY ELIG CLIN: HCPCS | Performed by: NURSE PRACTITIONER

## 2024-08-12 PROCEDURE — 93000 ELECTROCARDIOGRAM COMPLETE: CPT | Performed by: NURSE PRACTITIONER

## 2024-08-12 PROCEDURE — G8420 CALC BMI NORM PARAMETERS: HCPCS | Performed by: NURSE PRACTITIONER

## 2024-08-12 PROCEDURE — 3079F DIAST BP 80-89 MM HG: CPT | Performed by: NURSE PRACTITIONER

## 2024-08-12 PROCEDURE — 3077F SYST BP >= 140 MM HG: CPT | Performed by: NURSE PRACTITIONER

## 2024-08-12 RX ORDER — ASPIRIN 81 MG
42.5 TABLET,CHEWABLE ORAL DAILY
Qty: 42.5 G | Refills: 0 | Status: SHIPPED | OUTPATIENT
Start: 2024-08-12

## 2024-08-12 ASSESSMENT — ENCOUNTER SYMPTOMS
ABDOMINAL DISTENTION: 0
SINUS PRESSURE: 0
SHORTNESS OF BREATH: 0
BLOOD IN STOOL: 0
COUGH: 0
BACK PAIN: 0
PHOTOPHOBIA: 0
ABDOMINAL PAIN: 0
SINUS PAIN: 0
COLOR CHANGE: 0

## 2024-08-12 NOTE — PROGRESS NOTES
Electrophysiology Follow up Note      Reason for consultation:  AV block    Chief complaint: follow up on SVT on pacemaker    Referring physician:       Primary care physician: Evi Lopez, APRN - NP      History of Present Illness:     This visit 8/12/2024  Patient is here today for follow-up on pacemaker.  Patient reports that she has been feeling well.  She denies chest pain, palpitations, shortness of breath, lightheadedness, dizziness, edema or syncope  She does complain of pain over her pacemaker site.  She states that the scar is tender.    Previous visit:     Patient is a 59-year-old female with a history of hypertension, hyperlipidemia, diabetes mellitus, COPD referred by Dr. Cummings for AV block.  Patient reports on and off chest pain and intermittent dizziness and she had the monitor placed.  She was called in because abnormality on the monitor but she had not had any discussion about what to abnormality.  Patient reports she had episodes of syncope in the past. She has occasional dizziness.  Patient denies shortness of breath, palpitations        Pastmedical history:   Past Medical History:   Diagnosis Date    Adenomatous polyp of ascending colon 8/7/2020    Anxiety     Arthritis     \"Back, Hands, Shoulders\"    Chronic back pain     Arthritis - NKI    COPD (chronic obstructive pulmonary disease) ([facility])     Sees Dr. Spenser MAXWELL-19     Depression     Follows with Mental Health    Diabetes mellitus (HCC) Dx 2008    Type II - follows with PCP    Emphysema     Fibromyalgia     H/O abdominoplasty 12/21/2018    H/O cardiovascular stress test 03/04/2019    ECG portion of stress test is neg for ischemia by diagnostic criteria. No infarct or ischemia noted. Normal stress myocardial perfusion. This is a normal study    H/O echocardiogram 03/04/2019    Left ventricular function is normal. Ejection fraction is visually estimated at 55-60%. No significant valvular

## 2024-08-14 ENCOUNTER — HOSPITAL ENCOUNTER (OUTPATIENT)
Dept: MRI IMAGING | Age: 59
Discharge: HOME | End: 2024-08-14
Attending: INTERNAL MEDICINE
Payer: COMMERCIAL

## 2024-08-14 DIAGNOSIS — R10.11 RUQ PAIN: ICD-10-CM

## 2024-08-14 LAB
EGFR, POC: >90 ML/MIN/1.73M2
POC CREATININE: 0.6 MG/DL (ref 0.5–1.2)

## 2024-08-14 PROCEDURE — A9577 INJ MULTIHANCE: HCPCS | Performed by: INTERNAL MEDICINE

## 2024-08-14 PROCEDURE — 74183 MRI ABD W/O CNTR FLWD CNTR: CPT

## 2024-08-14 PROCEDURE — 82565 ASSAY OF CREATININE: CPT

## 2024-08-14 PROCEDURE — 6360000004 HC RX CONTRAST MEDICATION: Performed by: INTERNAL MEDICINE

## 2024-08-14 RX ADMIN — GADOBENATE DIMEGLUMINE 14 ML: 529 INJECTION, SOLUTION INTRAVENOUS at 11:38

## 2024-08-21 ENCOUNTER — TELEPHONE (OUTPATIENT)
Dept: FAMILY MEDICINE CLINIC | Age: 59
End: 2024-08-21

## 2024-08-21 NOTE — TELEPHONE ENCOUNTER
Pt called asking for a script for muxinex. She reports she isnt sick but she does have some phlegm built up. Please advise.

## 2024-09-17 RX ORDER — SUCRALFATE 1 G/1
1 TABLET ORAL 2 TIMES DAILY
Qty: 60 TABLET | Refills: 1 | Status: SHIPPED | OUTPATIENT
Start: 2024-09-17

## 2024-10-04 DIAGNOSIS — K21.9 GASTROESOPHAGEAL REFLUX DISEASE, UNSPECIFIED WHETHER ESOPHAGITIS PRESENT: ICD-10-CM

## 2024-10-07 RX ORDER — OMEPRAZOLE 40 MG/1
40 CAPSULE, DELAYED RELEASE ORAL DAILY
Qty: 90 CAPSULE | Refills: 1 | Status: SHIPPED | OUTPATIENT
Start: 2024-10-07

## 2024-10-07 RX ORDER — QUETIAPINE FUMARATE 100 MG/1
100 TABLET, FILM COATED ORAL NIGHTLY
Qty: 90 TABLET | Refills: 1 | Status: SHIPPED | OUTPATIENT
Start: 2024-10-07

## 2024-10-21 ENCOUNTER — TELEPHONE (OUTPATIENT)
Dept: CARDIOLOGY CLINIC | Age: 59
End: 2024-10-21

## 2024-10-21 NOTE — TELEPHONE ENCOUNTER
Patient states B/P elevated on Thursday, Saturday and again Sunday night 181/117, HR 98. Patient reports stomach/chest pain felt like gas. Patient used icy hot to resolve.  Today b/p systolic 140. OV scheduled

## 2024-10-21 NOTE — TELEPHONE ENCOUNTER
Patient called she is having chest pain  And her bp has been elevated last night  181/117 today 142/91 having a lot of  Stomach pain as well please advise.

## 2024-10-22 ENCOUNTER — APPOINTMENT (OUTPATIENT)
Dept: GENERAL RADIOLOGY | Age: 59
End: 2024-10-22
Payer: COMMERCIAL

## 2024-10-22 ENCOUNTER — APPOINTMENT (OUTPATIENT)
Dept: CT IMAGING | Age: 59
End: 2024-10-22
Payer: COMMERCIAL

## 2024-10-22 ENCOUNTER — HOSPITAL ENCOUNTER (EMERGENCY)
Age: 59
Discharge: HOME | End: 2024-10-22
Attending: STUDENT IN AN ORGANIZED HEALTH CARE EDUCATION/TRAINING PROGRAM
Payer: COMMERCIAL

## 2024-10-22 VITALS
TEMPERATURE: 98.2 F | HEART RATE: 71 BPM | DIASTOLIC BLOOD PRESSURE: 106 MMHG | OXYGEN SATURATION: 99 % | RESPIRATION RATE: 11 BRPM | SYSTOLIC BLOOD PRESSURE: 184 MMHG

## 2024-10-22 DIAGNOSIS — R03.0 ELEVATED BLOOD PRESSURE READING: ICD-10-CM

## 2024-10-22 DIAGNOSIS — R07.9 CHEST PAIN, UNSPECIFIED TYPE: Primary | ICD-10-CM

## 2024-10-22 LAB
ALBUMIN SERPL-MCNC: 4.6 G/DL (ref 3.4–5)
ALBUMIN/GLOB SERPL: 1.7 {RATIO} (ref 1.1–2.2)
ALP SERPL-CCNC: 65 U/L (ref 40–129)
ALT SERPL-CCNC: 8 U/L (ref 10–40)
ANION GAP SERPL CALCULATED.3IONS-SCNC: 14 MMOL/L (ref 9–17)
AST SERPL-CCNC: 18 U/L (ref 15–37)
BASOPHILS # BLD: 0.03 K/UL
BASOPHILS NFR BLD: 0 % (ref 0–1)
BILIRUB SERPL-MCNC: 0.6 MG/DL (ref 0–1)
BUN SERPL-MCNC: 9 MG/DL (ref 7–20)
CALCIUM SERPL-MCNC: 9.7 MG/DL (ref 8.3–10.6)
CHLORIDE SERPL-SCNC: 103 MMOL/L (ref 99–110)
CO2 SERPL-SCNC: 30 MMOL/L (ref 21–32)
CREAT SERPL-MCNC: 0.9 MG/DL (ref 0.6–1.1)
EOSINOPHIL # BLD: 0 K/UL
EOSINOPHILS RELATIVE PERCENT: 0 % (ref 0–3)
ERYTHROCYTE [DISTWIDTH] IN BLOOD BY AUTOMATED COUNT: 13.8 % (ref 11.7–14.9)
GFR, ESTIMATED: 68 ML/MIN/1.73M2
GLUCOSE SERPL-MCNC: 149 MG/DL (ref 74–99)
HCT VFR BLD AUTO: 43 % (ref 37–47)
HGB BLD-MCNC: 14.9 G/DL (ref 12.5–16)
IMM GRANULOCYTES # BLD AUTO: 0.01 K/UL
IMM GRANULOCYTES NFR BLD: 0 %
LIPASE SERPL-CCNC: 18 U/L (ref 13–60)
LYMPHOCYTES NFR BLD: 0.93 K/UL
LYMPHOCYTES RELATIVE PERCENT: 12 % (ref 24–44)
MAGNESIUM SERPL-MCNC: 1.8 MG/DL (ref 1.8–2.4)
MCH RBC QN AUTO: 26.3 PG (ref 27–31)
MCHC RBC AUTO-ENTMCNC: 34.7 G/DL (ref 32–36)
MCV RBC AUTO: 76 FL (ref 78–100)
MONOCYTES NFR BLD: 0.23 K/UL
MONOCYTES NFR BLD: 3 % (ref 0–4)
NEUTROPHILS NFR BLD: 85 % (ref 36–66)
NEUTS SEG NFR BLD: 6.85 K/UL
PLATELET # BLD AUTO: 357 K/UL (ref 140–440)
PMV BLD AUTO: 10.8 FL (ref 7.5–11.1)
POTASSIUM SERPL-SCNC: 3.9 MMOL/L (ref 3.5–5.1)
PROT SERPL-MCNC: 7.3 G/DL (ref 6.4–8.2)
RBC # BLD AUTO: 5.66 M/UL (ref 4.2–5.4)
SODIUM SERPL-SCNC: 146 MMOL/L (ref 136–145)
TROPONIN I SERPL HS-MCNC: <6 NG/L (ref 0–14)
TROPONIN I SERPL HS-MCNC: <6 NG/L (ref 0–14)
WBC OTHER # BLD: 8.1 K/UL (ref 4–10.5)

## 2024-10-22 PROCEDURE — 71045 X-RAY EXAM CHEST 1 VIEW: CPT

## 2024-10-22 PROCEDURE — 85025 COMPLETE CBC W/AUTO DIFF WBC: CPT

## 2024-10-22 PROCEDURE — 6360000004 HC RX CONTRAST MEDICATION: Performed by: STUDENT IN AN ORGANIZED HEALTH CARE EDUCATION/TRAINING PROGRAM

## 2024-10-22 PROCEDURE — 83735 ASSAY OF MAGNESIUM: CPT

## 2024-10-22 PROCEDURE — 96374 THER/PROPH/DIAG INJ IV PUSH: CPT

## 2024-10-22 PROCEDURE — 84484 ASSAY OF TROPONIN QUANT: CPT

## 2024-10-22 PROCEDURE — 83690 ASSAY OF LIPASE: CPT

## 2024-10-22 PROCEDURE — 80053 COMPREHEN METABOLIC PANEL: CPT

## 2024-10-22 PROCEDURE — 93005 ELECTROCARDIOGRAM TRACING: CPT | Performed by: STUDENT IN AN ORGANIZED HEALTH CARE EDUCATION/TRAINING PROGRAM

## 2024-10-22 PROCEDURE — 71275 CT ANGIOGRAPHY CHEST: CPT

## 2024-10-22 PROCEDURE — 99285 EMERGENCY DEPT VISIT HI MDM: CPT

## 2024-10-22 PROCEDURE — 96375 TX/PRO/DX INJ NEW DRUG ADDON: CPT

## 2024-10-22 PROCEDURE — 6360000002 HC RX W HCPCS: Performed by: STUDENT IN AN ORGANIZED HEALTH CARE EDUCATION/TRAINING PROGRAM

## 2024-10-22 PROCEDURE — 6370000000 HC RX 637 (ALT 250 FOR IP): Performed by: STUDENT IN AN ORGANIZED HEALTH CARE EDUCATION/TRAINING PROGRAM

## 2024-10-22 RX ORDER — IOPAMIDOL 755 MG/ML
100 INJECTION, SOLUTION INTRAVASCULAR
Status: COMPLETED | OUTPATIENT
Start: 2024-10-22 | End: 2024-10-22

## 2024-10-22 RX ORDER — MORPHINE SULFATE 4 MG/ML
4 INJECTION, SOLUTION INTRAMUSCULAR; INTRAVENOUS
Status: COMPLETED | OUTPATIENT
Start: 2024-10-22 | End: 2024-10-22

## 2024-10-22 RX ORDER — ONDANSETRON 2 MG/ML
4 INJECTION INTRAMUSCULAR; INTRAVENOUS ONCE
Status: COMPLETED | OUTPATIENT
Start: 2024-10-22 | End: 2024-10-22

## 2024-10-22 RX ADMIN — ONDANSETRON 4 MG: 2 INJECTION INTRAMUSCULAR; INTRAVENOUS at 15:08

## 2024-10-22 RX ADMIN — LIDOCAINE HYDROCHLORIDE: 20 SOLUTION ORAL at 16:56

## 2024-10-22 RX ADMIN — MORPHINE SULFATE 4 MG: 4 INJECTION, SOLUTION INTRAMUSCULAR; INTRAVENOUS at 15:07

## 2024-10-22 RX ADMIN — IOPAMIDOL 100 ML: 755 INJECTION, SOLUTION INTRAVENOUS at 16:42

## 2024-10-22 ASSESSMENT — PAIN DESCRIPTION - FREQUENCY: FREQUENCY: CONTINUOUS

## 2024-10-22 ASSESSMENT — PAIN DESCRIPTION - LOCATION
LOCATION: CHEST
LOCATION: CHEST;ABDOMEN

## 2024-10-22 ASSESSMENT — PAIN DESCRIPTION - ORIENTATION
ORIENTATION: MID
ORIENTATION: MID

## 2024-10-22 ASSESSMENT — PAIN DESCRIPTION - DESCRIPTORS
DESCRIPTORS: ACHING;BURNING
DESCRIPTORS: ACHING

## 2024-10-22 ASSESSMENT — PAIN DESCRIPTION - PAIN TYPE: TYPE: CHRONIC PAIN

## 2024-10-22 ASSESSMENT — HEART SCORE: ECG: NORMAL

## 2024-10-22 ASSESSMENT — PAIN DESCRIPTION - ONSET: ONSET: ON-GOING

## 2024-10-22 ASSESSMENT — PAIN SCALES - GENERAL: PAINLEVEL_OUTOF10: 10

## 2024-10-22 NOTE — ED PROVIDER NOTES
Emergency Department Encounter    Patient: Beverly Valencia  MRN: [id number]  : 1965  Date of Evaluation: 10/22/2024  ED Provider:  Taiwo Gallego DO    Triage Chief Complaint:   Chest Pain    [place]:  Beverly Valencia is a 59 y.o. female that presents with 5 days of lower chest pain and abdominal pain.  States radiation.  Initially it was light and intermittent.  Over the last 2 days it has become more constant and severe.  Describes it as sharp.  Has had nausea, no vomiting.  Denies any fevers.  States that initially she had noticed right upper quadrant epigastric abdominal pain intermittent.  Her symptoms do get a lot worse after eating.  Reports that this morning she had fried chicken and symptoms got worse after that.  Also states her blood pressure has been elevated ever since her symptoms are started.  Tells me blood pressures are then with systolics of 140s to 180s.  She has been taking additional 2.5 mg tablets that she is prescribed.  Reports that she has a history of a pacemaker due to AV block and has a follow-up appointment with cardiology morning.    MDM:    History from : Patient    Initial evaluation patient is hypertensive otherwise stable vital signs.  She was overall nontoxic.  I considered aortic dissection but she has no focal neurologic deficits and equal pulses bilaterally but we did obtain CTA of her chest given her hypertension and radiation to her back.  Her EKG has a paced rhythm without any signs of acute ischemia.  Troponin is negative.  With her symptoms getting worse after eating I had suspicion for more likely GI source but she does have some cardiac risk factors.  Overall her workup is quite unremarkable.  Pain had improved after interventions provided here.  Her blood pressure had been fluctuating but overall remains hypertensive.  Lower suspicion for hypertensive emergency.  She is a heart score of 3.  ACS is considered but she has a follow-up appointment with her cardiologist in

## 2024-10-23 ENCOUNTER — OFFICE VISIT (OUTPATIENT)
Dept: CARDIOLOGY CLINIC | Age: 59
End: 2024-10-23
Payer: COMMERCIAL

## 2024-10-23 VITALS
SYSTOLIC BLOOD PRESSURE: 124 MMHG | WEIGHT: 142 LBS | OXYGEN SATURATION: 96 % | BODY MASS INDEX: 21.52 KG/M2 | HEART RATE: 79 BPM | DIASTOLIC BLOOD PRESSURE: 86 MMHG | HEIGHT: 68 IN

## 2024-10-23 DIAGNOSIS — F41.9 ANXIETY: Primary | ICD-10-CM

## 2024-10-23 DIAGNOSIS — R00.2 PALPITATIONS: ICD-10-CM

## 2024-10-23 DIAGNOSIS — I44.1 MOBITZ TYPE 1 SECOND DEGREE AV BLOCK: ICD-10-CM

## 2024-10-23 DIAGNOSIS — E78.2 MIXED HYPERLIPIDEMIA: ICD-10-CM

## 2024-10-23 DIAGNOSIS — Z95.0 CARDIAC PACEMAKER IN SITU: ICD-10-CM

## 2024-10-23 DIAGNOSIS — I47.10 SVT (SUPRAVENTRICULAR TACHYCARDIA) (HCC): ICD-10-CM

## 2024-10-23 DIAGNOSIS — I10 ESSENTIAL HYPERTENSION: ICD-10-CM

## 2024-10-23 DIAGNOSIS — E11.42 DIABETIC PERIPHERAL NEUROPATHY ASSOCIATED WITH TYPE 2 DIABETES MELLITUS (HCC): ICD-10-CM

## 2024-10-23 PROCEDURE — 3074F SYST BP LT 130 MM HG: CPT | Performed by: NURSE PRACTITIONER

## 2024-10-23 PROCEDURE — G8484 FLU IMMUNIZE NO ADMIN: HCPCS | Performed by: NURSE PRACTITIONER

## 2024-10-23 PROCEDURE — 3046F HEMOGLOBIN A1C LEVEL >9.0%: CPT | Performed by: NURSE PRACTITIONER

## 2024-10-23 PROCEDURE — 3017F COLORECTAL CA SCREEN DOC REV: CPT | Performed by: NURSE PRACTITIONER

## 2024-10-23 PROCEDURE — G8420 CALC BMI NORM PARAMETERS: HCPCS | Performed by: NURSE PRACTITIONER

## 2024-10-23 PROCEDURE — 3079F DIAST BP 80-89 MM HG: CPT | Performed by: NURSE PRACTITIONER

## 2024-10-23 PROCEDURE — G8427 DOCREV CUR MEDS BY ELIG CLIN: HCPCS | Performed by: NURSE PRACTITIONER

## 2024-10-23 PROCEDURE — 2022F DILAT RTA XM EVC RTNOPTHY: CPT | Performed by: NURSE PRACTITIONER

## 2024-10-23 PROCEDURE — 1036F TOBACCO NON-USER: CPT | Performed by: NURSE PRACTITIONER

## 2024-10-23 PROCEDURE — 99214 OFFICE O/P EST MOD 30 MIN: CPT | Performed by: NURSE PRACTITIONER

## 2024-10-23 RX ORDER — DILTIAZEM HYDROCHLORIDE 120 MG/1
120 CAPSULE, COATED, EXTENDED RELEASE ORAL DAILY
Qty: 90 CAPSULE | Refills: 1 | Status: SHIPPED | OUTPATIENT
Start: 2024-10-23

## 2024-10-23 ASSESSMENT — ENCOUNTER SYMPTOMS
NAUSEA: 1
SHORTNESS OF BREATH: 0
COUGH: 0
ABDOMINAL PAIN: 1

## 2024-10-23 NOTE — PROGRESS NOTES
CARDIOLOGY  NOTE    10/23/2024    Beverly Valencia (:  1965) is a 59 y.o. female,an established patient with Dr. Cummings, here for evaluation of the following chief complaint(s):  Follow-up (CP has had more CP since pacemaker /She feels like her heart is coming out of her chest but her pulse is normal/Nausea/BP high began since 10/17 /Pt is not taking BP medication du to pharmacy issues /)        SUBJECTIVE/OBJECTIVE:    DALIA Paul is here to follow-up on her cardiovascular health.    She reports abdominal pain is getting worse. She is not able to eat without pain. She feels terrible. Her chest pain is associated with food recently. She states that if she takes the muscle relaxer her pain goes a way but then she sleeps all the time.     Unfortunately heart block did advance on event monitor and she had permanent pacemaker placed recently.    She has a history of gastric sleeve surgery.    She is feeling better. Her blood pressure is not as labile since pain is controlled and she is managing her anxiety with THC gummies.     She had stress test  in Dec 2022 showed no ischemia but reduced exercise capacity    she was actually evaluated in 2019 when she was undergoing surgery was noted to have second-degree heart block type I Wenckebach.  S.  During her event monitor which she wore for 14 days in 2019 she was noted to have Wenckebach type I.  She was not symptomatic      Review of Systems   Constitutional:  Negative for fatigue and fever.   Respiratory:  Negative for cough and shortness of breath.    Cardiovascular:  Positive for chest pain and palpitations. Negative for leg swelling.   Gastrointestinal:  Positive for abdominal pain and nausea.   Musculoskeletal:  Negative for arthralgias and gait problem.   Neurological:  Negative for dizziness, syncope, weakness, light-headedness and headaches.       Vitals:    10/23/24 1129   BP: 124/86   Site: Left Upper Arm   Position: Sitting   Cuff Size:

## 2024-10-24 NOTE — TELEPHONE ENCOUNTER
Pt called in requesting a med refill.       She also stated she was having issued with dysphagia, due to a lengthy wait for Dr. Orozco, scheduled with Maggy tomorrow (10/25/24).

## 2024-10-25 ENCOUNTER — CARE COORDINATION (OUTPATIENT)
Dept: CARE COORDINATION | Age: 59
End: 2024-10-25

## 2024-10-25 ENCOUNTER — OFFICE VISIT (OUTPATIENT)
Dept: GASTROENTEROLOGY | Age: 59
End: 2024-10-25
Payer: COMMERCIAL

## 2024-10-25 ENCOUNTER — OFFICE VISIT (OUTPATIENT)
Dept: FAMILY MEDICINE CLINIC | Age: 59
End: 2024-10-25

## 2024-10-25 VITALS
HEIGHT: 68 IN | DIASTOLIC BLOOD PRESSURE: 80 MMHG | SYSTOLIC BLOOD PRESSURE: 126 MMHG | WEIGHT: 145 LBS | HEART RATE: 84 BPM | BODY MASS INDEX: 21.98 KG/M2 | OXYGEN SATURATION: 97 % | RESPIRATION RATE: 15 BRPM

## 2024-10-25 VITALS
DIASTOLIC BLOOD PRESSURE: 80 MMHG | OXYGEN SATURATION: 97 % | HEART RATE: 84 BPM | HEIGHT: 68 IN | SYSTOLIC BLOOD PRESSURE: 126 MMHG | WEIGHT: 145 LBS | BODY MASS INDEX: 21.98 KG/M2

## 2024-10-25 DIAGNOSIS — K59.03 DRUG-INDUCED CONSTIPATION: ICD-10-CM

## 2024-10-25 DIAGNOSIS — F41.9 ANXIETY: ICD-10-CM

## 2024-10-25 DIAGNOSIS — R11.0 CHRONIC NAUSEA: ICD-10-CM

## 2024-10-25 DIAGNOSIS — E11.42 TYPE 2 DIABETES MELLITUS WITH DIABETIC POLYNEUROPATHY, WITHOUT LONG-TERM CURRENT USE OF INSULIN (HCC): Primary | ICD-10-CM

## 2024-10-25 DIAGNOSIS — Z90.3 H/O GASTRIC SLEEVE: ICD-10-CM

## 2024-10-25 DIAGNOSIS — F31.31 BIPOLAR AFFECTIVE DISORDER, CURRENTLY DEPRESSED, MILD (HCC): ICD-10-CM

## 2024-10-25 DIAGNOSIS — K21.9 GASTROESOPHAGEAL REFLUX DISEASE WITHOUT ESOPHAGITIS: ICD-10-CM

## 2024-10-25 DIAGNOSIS — G89.29 CHRONIC ABDOMINAL PAIN: Primary | ICD-10-CM

## 2024-10-25 DIAGNOSIS — K21.9 GASTROESOPHAGEAL REFLUX DISEASE, UNSPECIFIED WHETHER ESOPHAGITIS PRESENT: ICD-10-CM

## 2024-10-25 DIAGNOSIS — Z95.0 PACEMAKER: ICD-10-CM

## 2024-10-25 DIAGNOSIS — I10 ESSENTIAL HYPERTENSION: ICD-10-CM

## 2024-10-25 DIAGNOSIS — G89.4 CHRONIC PAIN SYNDROME: ICD-10-CM

## 2024-10-25 DIAGNOSIS — R10.9 CHRONIC ABDOMINAL PAIN: Primary | ICD-10-CM

## 2024-10-25 DIAGNOSIS — A60.09 HERPES GENITALIS IN WOMEN: ICD-10-CM

## 2024-10-25 DIAGNOSIS — E78.2 MIXED HYPERLIPIDEMIA: ICD-10-CM

## 2024-10-25 LAB
EKG ATRIAL RATE: 82 BPM
EKG DIAGNOSIS: NORMAL
EKG P AXIS: 95 DEGREES
EKG P-R INTERVAL: 252 MS
EKG Q-T INTERVAL: 434 MS
EKG QRS DURATION: 156 MS
EKG QTC CALCULATION (BAZETT): 507 MS
EKG R AXIS: 253 DEGREES
EKG T AXIS: 93 DEGREES
EKG VENTRICULAR RATE: 82 BPM

## 2024-10-25 PROCEDURE — G8420 CALC BMI NORM PARAMETERS: HCPCS | Performed by: NURSE PRACTITIONER

## 2024-10-25 PROCEDURE — G8484 FLU IMMUNIZE NO ADMIN: HCPCS | Performed by: NURSE PRACTITIONER

## 2024-10-25 PROCEDURE — G8427 DOCREV CUR MEDS BY ELIG CLIN: HCPCS | Performed by: NURSE PRACTITIONER

## 2024-10-25 PROCEDURE — 93010 ELECTROCARDIOGRAM REPORT: CPT | Performed by: INTERNAL MEDICINE

## 2024-10-25 PROCEDURE — 1036F TOBACCO NON-USER: CPT | Performed by: NURSE PRACTITIONER

## 2024-10-25 PROCEDURE — 99214 OFFICE O/P EST MOD 30 MIN: CPT | Performed by: NURSE PRACTITIONER

## 2024-10-25 PROCEDURE — 3017F COLORECTAL CA SCREEN DOC REV: CPT | Performed by: NURSE PRACTITIONER

## 2024-10-25 PROCEDURE — 3079F DIAST BP 80-89 MM HG: CPT | Performed by: NURSE PRACTITIONER

## 2024-10-25 PROCEDURE — 3074F SYST BP LT 130 MM HG: CPT | Performed by: NURSE PRACTITIONER

## 2024-10-25 RX ORDER — APREMILAST 10-20-30MG
KIT ORAL
COMMUNITY
Start: 2024-10-07

## 2024-10-25 RX ORDER — ATORVASTATIN CALCIUM 80 MG/1
80 TABLET, FILM COATED ORAL DAILY
Qty: 90 TABLET | Refills: 3 | Status: CANCELLED | OUTPATIENT
Start: 2024-10-25

## 2024-10-25 RX ORDER — METOCLOPRAMIDE 5 MG/1
5 TABLET ORAL
Qty: 60 TABLET | Refills: 0 | Status: SHIPPED | OUTPATIENT
Start: 2024-10-25

## 2024-10-25 SDOH — ECONOMIC STABILITY: FOOD INSECURITY: WITHIN THE PAST 12 MONTHS, YOU WORRIED THAT YOUR FOOD WOULD RUN OUT BEFORE YOU GOT MONEY TO BUY MORE.: NEVER TRUE

## 2024-10-25 SDOH — ECONOMIC STABILITY: FOOD INSECURITY: WITHIN THE PAST 12 MONTHS, THE FOOD YOU BOUGHT JUST DIDN'T LAST AND YOU DIDN'T HAVE MONEY TO GET MORE.: NEVER TRUE

## 2024-10-25 SDOH — ECONOMIC STABILITY: INCOME INSECURITY: HOW HARD IS IT FOR YOU TO PAY FOR THE VERY BASICS LIKE FOOD, HOUSING, MEDICAL CARE, AND HEATING?: NOT HARD AT ALL

## 2024-10-25 NOTE — PROGRESS NOTES
Beverly Valencia 59 y.o. female was seen by DEIRDRE Montano on 10/25/2024     Wt Readings from Last 3 Encounters:   10/25/24 65.8 kg (145 lb)   10/25/24 65.8 kg (145 lb)   10/23/24 64.4 kg (142 lb)       HPI  Beverly Valencia is a pleasant 59 y.o.  female who presents today for follow-up on chronic abdominal pain, constipation and nausea.  She denies NSAID use.  She is taking Percocet for chronic pain per pain management.  She had laparoscopic sleeve gastrectomy done by Dr. Taylor on 10-.  She had multiple abdominal surgeries afterwards.  She abdominoplasty, panniculectomy  done by Dr. Taylor on 9-.  Per chart review she continued to have pain and went to [facility] plastic surgeon Dr. Rayo and had umbilical hernia and scar tissue removal.  Her colonoscopy was done by Dr. Taylor on 07/27/2020 with mild diverticulosis, and small ascending colon polyp that was removed. She has been evaluated by Dr. Orozco  with EGD done on January 2023 revealing evidence of sleeve gastrectomy, characterized by healthy-appearing mucosa biopsies were negative for H. pylori.  Upper GI study also revealed postsurgical changes status post bariatric surgery otherwise unremarkable.  MRI of abdomen done on 8- showed normal MRI of the abdomen.  Her appetite is fair although mentioned she cannot eat due to nausea and epigastric discomfort.  Her weight is stable.  Prilosec twice daily and taking Carafate helps a little.  Her abdominal pain is chronic and long-lasting. Pt states occasionally when she eats she feels super sick to her stomach. She makes herself throw up to feel better.  Her pain is still persistent and she has issues with early satiety.  She mentioned while or soon after eating she has stomach pain; sharp like too many sit ups.  Carafate helps with burning pain and is taking two per day.  Nausea sometimes after eating; sometimes self vomits with last episode Tuesday.  She denies

## 2024-10-25 NOTE — PROGRESS NOTES
10/25/2024     Beverly Valencia (:  1965) is a 59 y.o. female, here for evaluation of the following medical concerns:      6-month chronic        Recent ER visit on 10/22/2024 for 3 hours.  Chest pain complaint.  Follows with cardiology.  Resolved.  Troponin negative. She was out of cardizem so it was refilled by cardio the next day   Pacemaker, HTN, high chol       High cholesterol  Last lipid panel 2023.  Due for that now.  On Lipitor 80 without side effects      Dm2   A1c 6.2023  Alogliptin      Mammo benign 2024    Chronic back pain  Follows with pain management Dr. Duncan Bryant and zanaflex       Chronic arthritis in her hands.    Wanting a referral to cancer center for radiation  Follows with dr chito minor. Xrays of hands one year ago.   States she saw dr dale yesterday. Injection left hand for arthritis yesterday and wearing brace. If not better, will repeat XRAYS with ortho.         HSV.  Follows with infectious disease      Chronic gerd with abdominal pain and discomfort, dysphagia after eating.   Weight loss due to not able to eat.   Seeing gi today   On PPI and carafate   Mult abdominal surgeries   No emesis   H/o gastric sleeve         Bipolar and insomnia anxiety   Controlled with seroquel   Denies suicidal self harm thought plan idea       Review of Systems    Prior to Visit Medications    Medication Sig Taking? Authorizing Provider   OTEZLA 10 & 20 & 30 MG TBPK USE AS DIRECTED ON PACKAGE Yes Provider, MD Giovanny   dilTIAZem (CARDIZEM CD) 120 MG extended release capsule Take 1 capsule by mouth daily Yes Aurora Domingo, APRN - CNP   QUEtiapine (SEROQUEL) 100 MG tablet Take 1 tablet by mouth at bedtime Yes Evi Lopez, APRN - NP   omeprazole (PRILOSEC) 40 MG delayed release capsule Take 1 capsule by mouth daily Yes Evi Lopez, APRN - NP   sucralfate (CARAFATE) 1 GM tablet Take 1 tablet by mouth 2 times daily Please crush and 5 mL of water and take a

## 2024-10-25 NOTE — CARE COORDINATION
Ambulatory Care Coordination Note     10/25/2024 3:21 PM     Patient outreach attempt by this ACM today to offer care management services. ACM was unable to reach the patient by telephone today; left voice message requesting a return phone call to this ACM.  Nativohart message sent requesting patient to contact this ACM.     ACM: Neela Castrejon RN     Care Summary Note: ACM attempted to contact patient today for ED follow-up/enrollment outreach. ACM will outreach patient at a later date.     PCP/Specialist follow up:   Future Appointments         Provider Specialty Dept Phone    11/8/2024 10:20 AM Rea Guevara APRN - CNP Cardiology [phone]    11/13/2024 9:30 AM Michelet Dueñas MD Pulmonology [phone]    11/19/2024 11:00 AM Deandre Dale MD Infectious Diseases [phone]    1/24/2025 11:00 AM Evi Lopez, APRN - NP Family Medicine [phone]    1/27/2025 11:15 AM Maggy Rodriguez, APRN - CNP Gastroenterology [phone]    4/28/2025 9:15 AM Kwesi Sinha MD Cardiology [phone]            Follow Up:   Plan for next ACM outreach in approximately 1-2 days  to complete:  - outreach attempt to offer care management services.

## 2024-10-28 ENCOUNTER — TELEPHONE (OUTPATIENT)
Dept: GASTROENTEROLOGY | Age: 59
End: 2024-10-28

## 2024-10-28 PROCEDURE — 93294 REM INTERROG EVL PM/LDLS PM: CPT | Performed by: INTERNAL MEDICINE

## 2024-10-28 PROCEDURE — 93296 REM INTERROG EVL PM/IDS: CPT | Performed by: INTERNAL MEDICINE

## 2024-10-28 RX ORDER — SUCRALFATE 1 G/1
1 TABLET ORAL 2 TIMES DAILY
Qty: 60 TABLET | Refills: 3 | Status: SHIPPED | OUTPATIENT
Start: 2024-10-28

## 2024-10-28 ASSESSMENT — ENCOUNTER SYMPTOMS
DIARRHEA: 0
BLOOD IN STOOL: 0
BACK PAIN: 1
EYE PAIN: 0
ABDOMINAL PAIN: 1
COLOR CHANGE: 0
VOMITING: 0
COUGH: 0
SHORTNESS OF BREATH: 0
PHOTOPHOBIA: 0
CONSTIPATION: 1
NAUSEA: 1
WHEEZING: 0

## 2024-10-28 NOTE — TELEPHONE ENCOUNTER
Patient called to get a medication refill for sucralrfate 1 GM tablet [facility] on [address]

## 2024-10-30 ENCOUNTER — TELEPHONE (OUTPATIENT)
Dept: GASTROENTEROLOGY | Age: 59
End: 2024-10-30

## 2024-10-30 NOTE — TELEPHONE ENCOUNTER
Patient did not tolerate Miralax, colace, laxatives for treatment of constipation; will order Linzess 145 mcg take once daily for treatment

## 2024-10-31 ENCOUNTER — CARE COORDINATION (OUTPATIENT)
Dept: CARE COORDINATION | Age: 59
End: 2024-10-31

## 2024-10-31 RX ORDER — SUCRALFATE 1 G/1
1 TABLET ORAL 2 TIMES DAILY
Qty: 60 TABLET | Refills: 1 | OUTPATIENT
Start: 2024-10-31

## 2024-10-31 NOTE — CARE COORDINATION
Ambulatory Care Coordination Note     10/31/2024 4:02 PM     patient outreach attempt by this ACM today to offer care management services. ACM was unable to reach the patient by telephone today; left voice message requesting a return phone call to this ACM.     Patient closed (unable to reach patient) from the High Risk Care Management program on 10/31/2024. No further Ambulatory Care Manager follow up scheduled.

## 2024-11-08 ENCOUNTER — OFFICE VISIT (OUTPATIENT)
Dept: CARDIOLOGY CLINIC | Age: 59
End: 2024-11-08
Payer: COMMERCIAL

## 2024-11-08 VITALS
WEIGHT: 143.2 LBS | HEIGHT: 68 IN | BODY MASS INDEX: 21.7 KG/M2 | DIASTOLIC BLOOD PRESSURE: 70 MMHG | HEART RATE: 106 BPM | SYSTOLIC BLOOD PRESSURE: 110 MMHG

## 2024-11-08 DIAGNOSIS — I10 ESSENTIAL HYPERTENSION: ICD-10-CM

## 2024-11-08 DIAGNOSIS — I47.10 SVT (SUPRAVENTRICULAR TACHYCARDIA) (HCC): Primary | ICD-10-CM

## 2024-11-08 PROCEDURE — 93000 ELECTROCARDIOGRAM COMPLETE: CPT | Performed by: NURSE PRACTITIONER

## 2024-11-08 PROCEDURE — G8484 FLU IMMUNIZE NO ADMIN: HCPCS | Performed by: NURSE PRACTITIONER

## 2024-11-08 PROCEDURE — 3017F COLORECTAL CA SCREEN DOC REV: CPT | Performed by: NURSE PRACTITIONER

## 2024-11-08 PROCEDURE — G8427 DOCREV CUR MEDS BY ELIG CLIN: HCPCS | Performed by: NURSE PRACTITIONER

## 2024-11-08 PROCEDURE — 99214 OFFICE O/P EST MOD 30 MIN: CPT | Performed by: NURSE PRACTITIONER

## 2024-11-08 PROCEDURE — 1036F TOBACCO NON-USER: CPT | Performed by: NURSE PRACTITIONER

## 2024-11-08 PROCEDURE — 3078F DIAST BP <80 MM HG: CPT | Performed by: NURSE PRACTITIONER

## 2024-11-08 PROCEDURE — G8420 CALC BMI NORM PARAMETERS: HCPCS | Performed by: NURSE PRACTITIONER

## 2024-11-08 PROCEDURE — 3074F SYST BP LT 130 MM HG: CPT | Performed by: NURSE PRACTITIONER

## 2024-11-08 RX ORDER — DILTIAZEM HYDROCHLORIDE 240 MG/1
240 CAPSULE, COATED, EXTENDED RELEASE ORAL DAILY
Qty: 90 CAPSULE | Refills: 1 | Status: SHIPPED | OUTPATIENT
Start: 2024-11-08

## 2024-11-08 ASSESSMENT — ENCOUNTER SYMPTOMS
SINUS PAIN: 0
ABDOMINAL DISTENTION: 0
SHORTNESS OF BREATH: 0
DIARRHEA: 0
ABDOMINAL PAIN: 0
BACK PAIN: 0
COLOR CHANGE: 0
SINUS PRESSURE: 0
COUGH: 0
PHOTOPHOBIA: 0
CHEST TIGHTNESS: 0
BLOOD IN STOOL: 0

## 2024-11-08 NOTE — PATIENT INSTRUCTIONS
Please be informed that if you contact our office outside of normal business hours the physician on call cannot help with any scheduling or rescheduling issues, procedure instruction questions or any type of medication issue.    We advise you for any urgent/emergency that you go to the nearest emergency room!    PLEASE CALL OUR OFFICE DURING NORMAL BUSINESS HOURS    Monday - Friday   8 am to 5 pm    [place]: [phone]    [place]: [phone]    [place]:  [phone]    **It is YOUR responsibilty to bring medication bottles and/or updated medication list to EACH APPOINTMENT. This will allow us to better serve you and all your healthcare needs**    Thank you for allowing us to care for you today!   We want to ensure we can follow your treatment plan and we strive to give you the best outcomes and experience possible.   If you ever have a life threatening emergency and call 911 - for an ambulance (EMS)   Our providers can only care for you at:   [facility] or [facility].   Even if you have someone take you or you drive yourself we can only care for you in a [facility] facility. Our providers are not setup at the other healthcare locations!

## 2024-11-08 NOTE — PROGRESS NOTES
medications on file prior to visit.       Review of Systems:   Review of Systems   Constitutional:  Negative for activity change and fatigue.   HENT:  Negative for congestion, sinus pressure and sinus pain.    Eyes:  Negative for photophobia and visual disturbance.   Respiratory:  Negative for cough, chest tightness and shortness of breath.    Cardiovascular:  Positive for palpitations. Negative for chest pain and leg swelling.   Gastrointestinal:  Negative for abdominal distention, abdominal pain, blood in stool and diarrhea.   Endocrine: Negative for cold intolerance and heat intolerance.   Genitourinary:  Negative for difficulty urinating, dysuria and hematuria.   Musculoskeletal:  Negative for arthralgias and back pain.   Skin:  Negative for color change and rash.   Allergic/Immunologic: Negative for food allergies.   Neurological:  Negative for dizziness, syncope and light-headedness.   Hematological:  Does not bruise/bleed easily.   Psychiatric/Behavioral:  Negative for agitation and confusion. The patient is not nervous/anxious.            Examination:      Vitals:    11/08/24 1030   BP: 110/70   Site: Left Upper Arm   Position: Sitting   Cuff Size: Medium Adult   Pulse: (!) 106  Comment: per EKG   Weight: 65 kg (143 lb 3.2 oz)   Height: 1.727 m (5' 7.99\")        Body mass index is 21.78 kg/m².      Physical Exam  Constitutional:       Appearance: Normal appearance.   HENT:      Head: Normocephalic and atraumatic.      Right Ear: External ear normal.      Left Ear: External ear normal.      Mouth/Throat:      Mouth: Mucous membranes are moist.   Eyes:      Conjunctiva/sclera: Conjunctivae normal.   Cardiovascular:      Rate and Rhythm: Normal rate and regular rhythm.      Heart sounds: No murmur heard.  Pulmonary:      Breath sounds: No wheezing, rhonchi or rales.   Abdominal:      General: Abdomen is flat.      Palpations: Abdomen is soft.   Musculoskeletal:         General: Normal range of motion.

## 2024-11-15 ENCOUNTER — TELEPHONE (OUTPATIENT)
Dept: FAMILY MEDICINE CLINIC | Age: 59
End: 2024-11-15

## 2024-11-15 NOTE — TELEPHONE ENCOUNTER
Patient called with concerns of weight loss. Patient states that she is dropping weight quickly and wishes to be prescribed medication in hopes to help her gain weight and asked if PCP would be willing to prescribe her Gabapentin, but was advised that provider does not prescribe this medication and expressed verbal understanding. Please contact patient to provide advice. Phone # [phone]

## 2024-11-19 ENCOUNTER — HOSPITAL ENCOUNTER (OUTPATIENT)
Age: 59
Discharge: HOME | End: 2024-11-19
Payer: COMMERCIAL

## 2024-11-19 DIAGNOSIS — Z51.81 THERAPEUTIC DRUG MONITORING: ICD-10-CM

## 2024-11-19 LAB
ALBUMIN SERPL-MCNC: 3.9 G/DL (ref 3.4–5)
ALBUMIN/GLOB SERPL: 1.5 {RATIO} (ref 1.1–2.2)
ALP SERPL-CCNC: 62 U/L (ref 40–129)
ALT SERPL-CCNC: 7 U/L (ref 10–40)
ANION GAP SERPL CALCULATED.3IONS-SCNC: 14 MMOL/L (ref 9–17)
AST SERPL-CCNC: 16 U/L (ref 15–37)
BILIRUB DIRECT SERPL-MCNC: <0.2 MG/DL (ref 0–0.3)
BILIRUB INDIRECT SERPL-MCNC: ABNORMAL MG/DL (ref 0–0.7)
BILIRUB SERPL-MCNC: 0.5 MG/DL (ref 0–1)
BUN SERPL-MCNC: 11 MG/DL (ref 7–20)
CALCIUM SERPL-MCNC: 9.4 MG/DL (ref 8.3–10.6)
CHLORIDE SERPL-SCNC: 102 MMOL/L (ref 99–110)
CO2 SERPL-SCNC: 24 MMOL/L (ref 21–32)
CREAT SERPL-MCNC: 0.8 MG/DL (ref 0.6–1.1)
ERYTHROCYTE [DISTWIDTH] IN BLOOD BY AUTOMATED COUNT: 14 % (ref 11.7–14.9)
GFR, ESTIMATED: 72 ML/MIN/1.73M2
GLUCOSE SERPL-MCNC: 113 MG/DL (ref 74–99)
HCT VFR BLD AUTO: 38.2 % (ref 37–47)
HGB BLD-MCNC: 13.2 G/DL (ref 12.5–16)
MCH RBC QN AUTO: 26.8 PG (ref 27–31)
MCHC RBC AUTO-ENTMCNC: 34.6 G/DL (ref 32–36)
MCV RBC AUTO: 77.6 FL (ref 78–100)
PLATELET # BLD AUTO: 278 K/UL (ref 140–440)
PMV BLD AUTO: 10.8 FL (ref 7.5–11.1)
POTASSIUM SERPL-SCNC: 3.9 MMOL/L (ref 3.5–5.1)
PROT SERPL-MCNC: 6.5 G/DL (ref 6.4–8.2)
RBC # BLD AUTO: 4.92 M/UL (ref 4.2–5.4)
SODIUM SERPL-SCNC: 140 MMOL/L (ref 136–145)
WBC OTHER # BLD: 7.8 K/UL (ref 4–10.5)

## 2024-11-19 PROCEDURE — 82248 BILIRUBIN DIRECT: CPT

## 2024-11-19 PROCEDURE — 80053 COMPREHEN METABOLIC PANEL: CPT

## 2024-11-19 PROCEDURE — 36415 COLL VENOUS BLD VENIPUNCTURE: CPT

## 2024-11-19 PROCEDURE — 85027 COMPLETE CBC AUTOMATED: CPT

## 2024-11-22 ENCOUNTER — OFFICE VISIT (OUTPATIENT)
Dept: FAMILY MEDICINE CLINIC | Age: 59
End: 2024-11-22
Payer: COMMERCIAL

## 2024-11-22 ENCOUNTER — HOSPITAL ENCOUNTER (OUTPATIENT)
Age: 59
Discharge: HOME | End: 2024-11-22
Payer: COMMERCIAL

## 2024-11-22 VITALS
HEART RATE: 88 BPM | OXYGEN SATURATION: 94 % | WEIGHT: 142 LBS | HEIGHT: 68 IN | DIASTOLIC BLOOD PRESSURE: 70 MMHG | SYSTOLIC BLOOD PRESSURE: 120 MMHG | BODY MASS INDEX: 21.52 KG/M2

## 2024-11-22 DIAGNOSIS — E11.9 TYPE 2 DIABETES MELLITUS WITHOUT COMPLICATION, WITHOUT LONG-TERM CURRENT USE OF INSULIN (HCC): ICD-10-CM

## 2024-11-22 DIAGNOSIS — Z90.3 H/O GASTRIC SLEEVE: ICD-10-CM

## 2024-11-22 DIAGNOSIS — E55.9 VITAMIN D DEFICIENCY: ICD-10-CM

## 2024-11-22 DIAGNOSIS — E78.2 MIXED HYPERLIPIDEMIA: ICD-10-CM

## 2024-11-22 DIAGNOSIS — R23.3 EASY BRUISING: Primary | ICD-10-CM

## 2024-11-22 DIAGNOSIS — E55.9 VITAMIN D DEFICIENCY: Primary | ICD-10-CM

## 2024-11-22 LAB
25(OH)D3 SERPL-MCNC: 16.9 NG/ML (ref 30–150)
CHOLEST SERPL-MCNC: 243 MG/DL (ref 125–199)
EST. AVERAGE GLUCOSE BLD GHB EST-MCNC: 136 MG/DL
HBA1C MFR BLD: 6.4 % (ref 4.2–6.3)
HDLC SERPL-MCNC: 102 MG/DL
LDLC SERPL CALC-MCNC: 120 MG/DL
TRIGL SERPL-MCNC: 107 MG/DL

## 2024-11-22 PROCEDURE — 99214 OFFICE O/P EST MOD 30 MIN: CPT | Performed by: NURSE PRACTITIONER

## 2024-11-22 PROCEDURE — 82306 VITAMIN D 25 HYDROXY: CPT

## 2024-11-22 PROCEDURE — 1036F TOBACCO NON-USER: CPT | Performed by: NURSE PRACTITIONER

## 2024-11-22 PROCEDURE — G8420 CALC BMI NORM PARAMETERS: HCPCS | Performed by: NURSE PRACTITIONER

## 2024-11-22 PROCEDURE — 3046F HEMOGLOBIN A1C LEVEL >9.0%: CPT | Performed by: NURSE PRACTITIONER

## 2024-11-22 PROCEDURE — 3078F DIAST BP <80 MM HG: CPT | Performed by: NURSE PRACTITIONER

## 2024-11-22 PROCEDURE — G8427 DOCREV CUR MEDS BY ELIG CLIN: HCPCS | Performed by: NURSE PRACTITIONER

## 2024-11-22 PROCEDURE — 36415 COLL VENOUS BLD VENIPUNCTURE: CPT

## 2024-11-22 PROCEDURE — 3017F COLORECTAL CA SCREEN DOC REV: CPT | Performed by: NURSE PRACTITIONER

## 2024-11-22 PROCEDURE — 2022F DILAT RTA XM EVC RTNOPTHY: CPT | Performed by: NURSE PRACTITIONER

## 2024-11-22 PROCEDURE — 83036 HEMOGLOBIN GLYCOSYLATED A1C: CPT

## 2024-11-22 PROCEDURE — G8484 FLU IMMUNIZE NO ADMIN: HCPCS | Performed by: NURSE PRACTITIONER

## 2024-11-22 PROCEDURE — 3074F SYST BP LT 130 MM HG: CPT | Performed by: NURSE PRACTITIONER

## 2024-11-22 PROCEDURE — 80061 LIPID PANEL: CPT

## 2024-11-22 RX ORDER — GABAPENTIN 100 MG/1
CAPSULE ORAL
COMMUNITY
Start: 2024-11-16

## 2024-11-22 RX ORDER — ERGOCALCIFEROL 1.25 MG/1
50000 CAPSULE, LIQUID FILLED ORAL WEEKLY
Qty: 12 CAPSULE | Refills: 1 | Status: SHIPPED | OUTPATIENT
Start: 2024-11-22

## 2024-11-22 RX ORDER — ACETAMINOPHEN AND CODEINE PHOSPHATE 300; 30 MG/1; MG/1
TABLET ORAL
COMMUNITY

## 2024-11-22 NOTE — PROGRESS NOTES
conducted in 04/2024, were also normal. Her vitamin D and B12 levels, checked in 06/2023, were within normal range. A referral to Dr. Riley, a hematologist and oncologist, will be made for further evaluation. She is advised to avoid activities that could cause bruising.  Fasting cholesterol and A1c tests will be ordered. She is also advised to restart her vitamin regimen.    2. Diabetes Mellitus.  Her last A1c, conducted in December 2023, was 6.7. An A1c test has been ordered to assess her current levels. She is advised to continue monitoring her blood sugar levels and maintain a balanced diet.    3. Health Maintenance.  A fasting cholesterol test has been ordered. She is advised to restart her vitamin regimen, including vitamin D supplements.                 All care gaps addressed     All questions answered    Discussed use, benefit, and side effects of prescribed medications.  Barriers to compliance discussed.  All patient questions answered.  Pt voiced understanding.     Present to the ER for any emergent or acute symptoms not managed at home or in office.    Please note that this chart was generated using dragon and or [employer] dictation software.  Although every effort was made to ensure the accuracy of this automated transcription, some errors in transcription may have occurred.    The patient (or guardian, if applicable) and other individuals in attendance with the patient were advised that Artificial Intelligence will be utilized during this visit to record, process the conversation to generate a clinical note, and support improvement of the AI technology. The patient (or guardian, if applicable) and other individuals in attendance at the appointment consented to the use of AI, including the recording.                    No follow-ups on file.    An electronic signature was used to authenticate this note.    --YESSICA Curtis NP on 11/22/2024 at 11:17 AM

## 2024-11-25 DIAGNOSIS — E78.2 MIXED HYPERLIPIDEMIA: Primary | ICD-10-CM

## 2024-11-25 RX ORDER — ATORVASTATIN CALCIUM 20 MG/1
20 TABLET, FILM COATED ORAL DAILY
Qty: 90 TABLET | Refills: 1 | Status: SHIPPED | OUTPATIENT
Start: 2024-11-25

## 2024-11-26 ENCOUNTER — OFFICE VISIT (OUTPATIENT)
Dept: INFECTIOUS DISEASES | Age: 59
End: 2024-11-26
Payer: COMMERCIAL

## 2024-11-26 VITALS
DIASTOLIC BLOOD PRESSURE: 88 MMHG | WEIGHT: 145.8 LBS | HEART RATE: 75 BPM | SYSTOLIC BLOOD PRESSURE: 143 MMHG | BODY MASS INDEX: 22.17 KG/M2

## 2024-11-26 DIAGNOSIS — A60.09 HERPES GENITALIS IN WOMEN: ICD-10-CM

## 2024-11-26 PROCEDURE — 3017F COLORECTAL CA SCREEN DOC REV: CPT | Performed by: INTERNAL MEDICINE

## 2024-11-26 PROCEDURE — 99214 OFFICE O/P EST MOD 30 MIN: CPT | Performed by: INTERNAL MEDICINE

## 2024-11-26 PROCEDURE — G8420 CALC BMI NORM PARAMETERS: HCPCS | Performed by: INTERNAL MEDICINE

## 2024-11-26 PROCEDURE — 3077F SYST BP >= 140 MM HG: CPT | Performed by: INTERNAL MEDICINE

## 2024-11-26 PROCEDURE — 1036F TOBACCO NON-USER: CPT | Performed by: INTERNAL MEDICINE

## 2024-11-26 PROCEDURE — 3079F DIAST BP 80-89 MM HG: CPT | Performed by: INTERNAL MEDICINE

## 2024-11-26 PROCEDURE — G8427 DOCREV CUR MEDS BY ELIG CLIN: HCPCS | Performed by: INTERNAL MEDICINE

## 2024-11-26 PROCEDURE — G8484 FLU IMMUNIZE NO ADMIN: HCPCS | Performed by: INTERNAL MEDICINE

## 2024-11-26 RX ORDER — VALACYCLOVIR HYDROCHLORIDE 500 MG/1
500 TABLET, FILM COATED ORAL DAILY
Qty: 30 TABLET | Refills: 5 | Status: SHIPPED | OUTPATIENT
Start: 2024-11-26 | End: 2025-05-25

## 2024-11-26 NOTE — PROGRESS NOTES
Michele aTylor MD at [facility] OR    ROTATOR CUFF REPAIR Right     Dr. Lam    ROTATOR CUFF REPAIR Right 2017    Dr Arellano    SLEEVE GASTRECTOMY  10/26/2017    Robotic Laparoscopic, Pre Op Weight 237  Or 238 lbs.    TUBAL LIGATION  1997    Done With     UPPER GASTROINTESTINAL ENDOSCOPY N/A 2023    EGD BIOPSY performed by Pauline Orozco MD at [facility] ENDOSCOPY       Social History     Socioeconomic History    Marital status: Single     Spouse name: Not on file    Number of children: Not on file    Years of education: Not on file    Highest education level: Not on file   Occupational History    Occupation: unemployed   Tobacco Use    Smoking status: Former     Current packs/day: 0.00     Average packs/day: 0.3 packs/day for 34.0 years (8.5 ttl pk-yrs)     Types: Cigarettes, E-Cigarettes     Start date:      Quit date:      Years since quittin.9    Smokeless tobacco: Never   Vaping Use    Vaping status: Never Used   Substance and Sexual Activity    Alcohol use: Yes     Comment: \"Occ. Maybe Twice A Month\"  caffeine iced coffee occ    Drug use: Yes     Types: Marijuana (Weed)     Comment: Marijuana Card  - last used: 23 asked not to use 24-48 hours before procedure    Sexual activity: Yes     Partners: Male   Other Topics Concern    Not on file   Social History Narrative    Not on file     Social Determinants of Health     Financial Resource Strain: Low Risk  (10/25/2024)    Overall Financial Resource Strain (CARDIA)     Difficulty of Paying Living Expenses: Not hard at all   Food Insecurity: No Food Insecurity (10/25/2024)    Hunger Vital Sign     Worried About Running Out of Food in the Last Year: Never true     Ran Out of Food in the Last Year: Never true   Transportation Needs: Unknown (10/25/2024)    PRAPARE - Transportation     Lack of Transportation (Medical): Not on file     Lack of Transportation (Non-Medical): No   Physical Activity: Not on file   Stress: Not on file   Social

## 2024-12-02 RX ORDER — METOCLOPRAMIDE 5 MG/1
5 TABLET ORAL
Qty: 60 TABLET | Refills: 0 | Status: SHIPPED | OUTPATIENT
Start: 2024-12-02

## 2025-01-02 ENCOUNTER — OFFICE VISIT (OUTPATIENT)
Dept: FAMILY MEDICINE CLINIC | Age: 60
End: 2025-01-02

## 2025-01-02 VITALS
OXYGEN SATURATION: 98 % | HEIGHT: 68 IN | BODY MASS INDEX: 22.01 KG/M2 | DIASTOLIC BLOOD PRESSURE: 82 MMHG | HEART RATE: 80 BPM | SYSTOLIC BLOOD PRESSURE: 122 MMHG | WEIGHT: 145.2 LBS

## 2025-01-02 DIAGNOSIS — R05.8 PRODUCTIVE COUGH: Primary | ICD-10-CM

## 2025-01-02 DIAGNOSIS — L81.9 DISCOLORATION OF SKIN: ICD-10-CM

## 2025-01-02 DIAGNOSIS — J44.9 COPD, SEVERE (HCC): ICD-10-CM

## 2025-01-02 RX ORDER — MUPIROCIN 20 MG/G
OINTMENT TOPICAL
COMMUNITY
Start: 2024-12-17

## 2025-01-02 RX ORDER — CETIRIZINE HYDROCHLORIDE 10 MG/1
10 TABLET ORAL DAILY
Qty: 90 TABLET | Refills: 0 | Status: SHIPPED | OUTPATIENT
Start: 2025-01-02

## 2025-01-02 RX ORDER — KETOCONAZOLE 20 MG/G
CREAM TOPICAL
COMMUNITY
Start: 2024-12-17

## 2025-01-02 RX ORDER — METHYLPREDNISOLONE 4 MG/1
TABLET ORAL
Qty: 1 KIT | Refills: 0 | Status: SHIPPED | OUTPATIENT
Start: 2025-01-02

## 2025-01-02 ASSESSMENT — ANXIETY QUESTIONNAIRES
GAD7 TOTAL SCORE: 2
3. WORRYING TOO MUCH ABOUT DIFFERENT THINGS: NOT AT ALL
IF YOU CHECKED OFF ANY PROBLEMS ON THIS QUESTIONNAIRE, HOW DIFFICULT HAVE THESE PROBLEMS MADE IT FOR YOU TO DO YOUR WORK, TAKE CARE OF THINGS AT HOME, OR GET ALONG WITH OTHER PEOPLE: SOMEWHAT DIFFICULT
2. NOT BEING ABLE TO STOP OR CONTROL WORRYING: NOT AT ALL
5. BEING SO RESTLESS THAT IT IS HARD TO SIT STILL: NOT AT ALL
1. FEELING NERVOUS, ANXIOUS, OR ON EDGE: NOT AT ALL
4. TROUBLE RELAXING: MORE THAN HALF THE DAYS
6. BECOMING EASILY ANNOYED OR IRRITABLE: NOT AT ALL
7. FEELING AFRAID AS IF SOMETHING AWFUL MIGHT HAPPEN: NOT AT ALL

## 2025-01-02 ASSESSMENT — PATIENT HEALTH QUESTIONNAIRE - PHQ9
SUM OF ALL RESPONSES TO PHQ QUESTIONS 1-9: 9
10. IF YOU CHECKED OFF ANY PROBLEMS, HOW DIFFICULT HAVE THESE PROBLEMS MADE IT FOR YOU TO DO YOUR WORK, TAKE CARE OF THINGS AT HOME, OR GET ALONG WITH OTHER PEOPLE: NOT DIFFICULT AT ALL
9. THOUGHTS THAT YOU WOULD BE BETTER OFF DEAD, OR OF HURTING YOURSELF: NOT AT ALL
SUM OF ALL RESPONSES TO PHQ QUESTIONS 1-9: 9
3. TROUBLE FALLING OR STAYING ASLEEP: MORE THAN HALF THE DAYS
SUM OF ALL RESPONSES TO PHQ QUESTIONS 1-9: 9
4. FEELING TIRED OR HAVING LITTLE ENERGY: MORE THAN HALF THE DAYS
1. LITTLE INTEREST OR PLEASURE IN DOING THINGS: MORE THAN HALF THE DAYS
7. TROUBLE CONCENTRATING ON THINGS, SUCH AS READING THE NEWSPAPER OR WATCHING TELEVISION: MORE THAN HALF THE DAYS
5. POOR APPETITE OR OVEREATING: SEVERAL DAYS
2. FEELING DOWN, DEPRESSED OR HOPELESS: NOT AT ALL
6. FEELING BAD ABOUT YOURSELF - OR THAT YOU ARE A FAILURE OR HAVE LET YOURSELF OR YOUR FAMILY DOWN: NOT AT ALL
DEPRESSION UNABLE TO ASSESS: FUNCTIONAL CAPACITY MOTIVATION LIMITS ACCURACY
SUM OF ALL RESPONSES TO PHQ9 QUESTIONS 1 & 2: 2
8. MOVING OR SPEAKING SO SLOWLY THAT OTHER PEOPLE COULD HAVE NOTICED. OR THE OPPOSITE, BEING SO FIGETY OR RESTLESS THAT YOU HAVE BEEN MOVING AROUND A LOT MORE THAN USUAL: NOT AT ALL
SUM OF ALL RESPONSES TO PHQ QUESTIONS 1-9: 9

## 2025-01-02 NOTE — PROGRESS NOTES
Maggy Rodriguez APRN - CNP   valACYclovir (VALTREX) 500 MG tablet Take 1 tablet by mouth daily Yes Deandre Dale MD   atorvastatin (LIPITOR) 20 MG tablet Take 1 tablet by mouth daily Yes Evi Lopez APRN - NP   acetaminophen-codeine (TYLENOL #3) 300-30 MG per tablet TAKE ONE TABLET BY MOUTH EVERY 4 TO 6 HOURS AS NEEDED AVOID ALCOHOL/CAUSES DROWSINESS Yes Giovanny Aguilar MD   gabapentin (NEURONTIN) 100 MG capsule  Yes ProviderGiovanny MD   vitamin D (ERGOCALCIFEROL) 1.25 MG (45023 UT) CAPS capsule Take 1 capsule by mouth once a week Yes Evi Lopez APRN - NP   fluticasone furoate-vilanterol (BREO ELLIPTA) 100-25 MCG/ACT inhaler Inhale 1 puff into the lungs daily Yes Michelet Dueñas MD   albuterol sulfate HFA (VENTOLIN HFA) 108 (90 Base) MCG/ACT inhaler Inhale 2 puffs into the lungs 4 times daily as needed for Wheezing Yes Michelet Dueñas MD   tiotropium (SPIRIVA RESPIMAT) 2.5 MCG/ACT AERS inhaler Inhale 2 puffs into the lungs daily Yes Michelet Dueñas MD   dilTIAZem (CARDIZEM CD) 240 MG extended release capsule Take 1 capsule by mouth daily Yes Rea Guevara APRN - CNP   linaclotide (LINZESS) 145 MCG capsule Take 1 capsule by mouth every morning (before breakfast) Yes Maggy Rodriguez APRN - CNP   sucralfate (CARAFATE) 1 GM tablet Take 1 tablet by mouth 2 times daily Please crush and 5 mL of water and take a slurry.  Please  out from other medication Yes Maggy Rodriguez APRN - CNP   OTEZLA 10 & 20 & 30 MG TBPK USE AS DIRECTED ON PACKAGE Yes Giovanny Aguilar MD   QUEtiapine (SEROQUEL) 100 MG tablet Take 1 tablet by mouth at bedtime Yes Evi Lopez APRN - NP   omeprazole (PRILOSEC) 40 MG delayed release capsule Take 1 capsule by mouth daily Yes Evi Lopez APRN - NP   Capsaicin 0.1 % CREA Apply 42.5 g topically daily Yes Rea Guevara APRN - CNP   triamcinolone (KENALOG) 0.1 % cream Apply topically 2 times daily for 7-10 days for right wrist . Yes John

## 2025-01-08 DIAGNOSIS — K21.9 GASTROESOPHAGEAL REFLUX DISEASE, UNSPECIFIED WHETHER ESOPHAGITIS PRESENT: ICD-10-CM

## 2025-01-08 RX ORDER — OMEPRAZOLE 40 MG/1
40 CAPSULE, DELAYED RELEASE ORAL DAILY
Qty: 90 CAPSULE | Refills: 1 | Status: SHIPPED | OUTPATIENT
Start: 2025-01-08

## 2025-01-18 NOTE — PROGRESS NOTES
Patient Name:  Beverly Valencia  Patient :  1965  Patient MRN:  [id number]     Primary Oncologist: Karl Raza MD  Referring Provider: Evi Lopez APRN - BARRIE     Date of Service: 2025      Reason for Consult:  Easy bruising      Chief Complaint:    Chief Complaint   Patient presents with    New Patient        Patient Active Problem List:     Diabetic peripheral neuropathy associated with type 2 diabetes mellitus (HCC)     Essential hypertension     Chronic pain syndrome     COPD, severe (HCC)     Anxiety     Chronic right-sided low back pain with sciatica     Hiatal hernia     H/O gastric sleeve     Abdominal pannus     Fibromyalgia     Sebaceous cyst of breast, right     Mobitz type 1 second degree AV block     Chronic edema BLE     Mixed hyperlipidemia     Neuropathy     Umbilical pain     Lower abdominal pain     Latent tuberculosis     Gastroesophageal reflux disease     Vitamin D deficiency     Psoriasis     Herpes genitalis in women     Ex-smoker     Hot flashes     Palpitations     Epigastric abdominal pain     Abdominal adhesions     Primary insomnia     High-grade atrioventricular block     Cardiac pacemaker in situ     Hot flashes due to menopause     Lack of appetite     Bipolar affective disorder, currently depressed, mild (HCC)     SVT (supraventricular tachycardia) (HCC)     HPI:   Beverly Valencia is a pleasant 59-year-old female patient who was referred for evaluation of easy bruising to LE in the last one year.    She denies NSAID use.  She is taking Gabapentin for chronic pain per pain management.  10/26/2017 laparoscopic sleeve gastrectomy by Dr. Taylor.  She had multiple abdominal surgeries afterwards.    2021 abdominoplasty, panniculectomy by Dr. Taylor.    Per chart review she continued to have pain and went to [facility] plastic surgeon Dr. Rayo and had umbilical hernia and scar tissue removal.  2020 colonoscopy by Dr. Taylor with mild diverticulosis, and

## 2025-01-20 RX ORDER — ESTRADIOL 2 MG/1
2 TABLET ORAL DAILY
Qty: 30 TABLET | Refills: 11 | OUTPATIENT
Start: 2025-01-20

## 2025-01-21 NOTE — TELEPHONE ENCOUNTER
Medication:   Requested Prescriptions     Pending Prescriptions Disp Refills    metoclopramide (REGLAN) 5 MG tablet 60 tablet 0     Sig: Take 1 tablet by mouth 2 times daily (before meals)       Last Filled:      Patient Phone Number: [phone] (home)     Last appt: 10/25/2024   Next appt: 1/27/2025    Last Labs DM:   Lab Results   Component Value Date/Time    LABA1C 6.4 11/22/2024 11:15 AM     Last Lipid:   Lab Results   Component Value Date/Time    CHOL 261 06/22/2023 09:40 AM    TRIG 133 06/22/2023 09:40 AM     11/22/2024 11:15 AM     Last PSA: No results found for: \"PSA\"  Last Thyroid:   Lab Results   Component Value Date/Time    TSH 1.07 12/06/2022 11:20 AM    T4FREE 1.68 03/04/2019 01:24 AM

## 2025-01-22 RX ORDER — METOCLOPRAMIDE 5 MG/1
5 TABLET ORAL
Qty: 60 TABLET | Refills: 0 | Status: SHIPPED | OUTPATIENT
Start: 2025-01-22

## 2025-01-24 ENCOUNTER — OFFICE VISIT (OUTPATIENT)
Dept: FAMILY MEDICINE CLINIC | Age: 60
End: 2025-01-24

## 2025-01-24 VITALS
DIASTOLIC BLOOD PRESSURE: 70 MMHG | OXYGEN SATURATION: 97 % | BODY MASS INDEX: 21.82 KG/M2 | SYSTOLIC BLOOD PRESSURE: 118 MMHG | HEIGHT: 68 IN | WEIGHT: 144 LBS | HEART RATE: 82 BPM

## 2025-01-24 DIAGNOSIS — K21.9 GASTROESOPHAGEAL REFLUX DISEASE, UNSPECIFIED WHETHER ESOPHAGITIS PRESENT: ICD-10-CM

## 2025-01-24 DIAGNOSIS — E78.2 MIXED HYPERLIPIDEMIA: ICD-10-CM

## 2025-01-24 DIAGNOSIS — A60.09 HERPES GENITALIS IN WOMEN: ICD-10-CM

## 2025-01-24 DIAGNOSIS — R05.8 COUGH WITH CONGESTION OF PARANASAL SINUS: ICD-10-CM

## 2025-01-24 DIAGNOSIS — R09.81 COUGH WITH CONGESTION OF PARANASAL SINUS: ICD-10-CM

## 2025-01-24 DIAGNOSIS — F51.01 PRIMARY INSOMNIA: ICD-10-CM

## 2025-01-24 DIAGNOSIS — R60.0 BILATERAL LOWER EXTREMITY EDEMA: ICD-10-CM

## 2025-01-24 DIAGNOSIS — I10 ESSENTIAL HYPERTENSION: ICD-10-CM

## 2025-01-24 DIAGNOSIS — J44.9 COPD, SEVERE (HCC): ICD-10-CM

## 2025-01-24 DIAGNOSIS — E55.9 VITAMIN D DEFICIENCY: ICD-10-CM

## 2025-01-24 DIAGNOSIS — L40.9 PSORIASIS: ICD-10-CM

## 2025-01-24 DIAGNOSIS — F31.31 BIPOLAR AFFECTIVE DISORDER, CURRENTLY DEPRESSED, MILD (HCC): ICD-10-CM

## 2025-01-24 DIAGNOSIS — G89.4 CHRONIC PAIN SYNDROME: ICD-10-CM

## 2025-01-24 DIAGNOSIS — F41.9 ANXIETY: ICD-10-CM

## 2025-01-24 DIAGNOSIS — Z95.0 CARDIAC PACEMAKER IN SITU: ICD-10-CM

## 2025-01-24 DIAGNOSIS — E11.9 TYPE 2 DIABETES MELLITUS WITHOUT COMPLICATION, WITHOUT LONG-TERM CURRENT USE OF INSULIN (HCC): Primary | ICD-10-CM

## 2025-01-24 DIAGNOSIS — E11.42 TYPE 2 DIABETES MELLITUS WITH DIABETIC POLYNEUROPATHY, WITHOUT LONG-TERM CURRENT USE OF INSULIN (HCC): ICD-10-CM

## 2025-01-24 DIAGNOSIS — M79.7 FIBROMYALGIA: ICD-10-CM

## 2025-01-24 DIAGNOSIS — I47.10 SVT (SUPRAVENTRICULAR TACHYCARDIA) (HCC): ICD-10-CM

## 2025-01-24 DIAGNOSIS — R00.2 PALPITATIONS: ICD-10-CM

## 2025-01-24 RX ORDER — NEOMYCIN SULFATE, POLYMYXIN B SULFATE AND DEXAMETHASONE 3.5; 10000; 1 MG/ML; [USP'U]/ML; MG/ML
SUSPENSION/ DROPS OPHTHALMIC
COMMUNITY
Start: 2025-01-21

## 2025-01-24 RX ORDER — CHOLECALCIFEROL (VITAMIN D3) 25 MCG
1000 TABLET ORAL DAILY
Qty: 90 TABLET | Refills: 3 | Status: SHIPPED | OUTPATIENT
Start: 2025-01-24

## 2025-01-24 RX ORDER — FLUTICASONE PROPIONATE 50 MCG
1 SPRAY, SUSPENSION (ML) NASAL DAILY
Qty: 32 G | Refills: 1 | Status: SHIPPED | OUTPATIENT
Start: 2025-01-24

## 2025-01-24 RX ORDER — QUETIAPINE FUMARATE 100 MG/1
100 TABLET, FILM COATED ORAL NIGHTLY
Qty: 90 TABLET | Refills: 1 | Status: SHIPPED | OUTPATIENT
Start: 2025-01-24

## 2025-01-24 RX ORDER — HYDROCHLOROTHIAZIDE 12.5 MG/1
12.5 CAPSULE ORAL PRN
Qty: 30 CAPSULE | Refills: 0 | Status: SHIPPED | OUTPATIENT
Start: 2025-01-24

## 2025-01-24 RX ORDER — GUAIFENESIN 600 MG/1
600 TABLET, EXTENDED RELEASE ORAL 2 TIMES DAILY
Qty: 28 TABLET | Refills: 0 | Status: SHIPPED | OUTPATIENT
Start: 2025-01-24 | End: 2025-02-07

## 2025-01-24 RX ORDER — ALOGLIPTIN 6.25 MG/1
6.25 TABLET, FILM COATED ORAL DAILY
Qty: 90 TABLET | Refills: 3 | Status: SHIPPED | OUTPATIENT
Start: 2025-01-24 | End: 2025-07-23

## 2025-01-24 SDOH — ECONOMIC STABILITY: FOOD INSECURITY: WITHIN THE PAST 12 MONTHS, THE FOOD YOU BOUGHT JUST DIDN'T LAST AND YOU DIDN'T HAVE MONEY TO GET MORE.: NEVER TRUE

## 2025-01-24 SDOH — ECONOMIC STABILITY: FOOD INSECURITY: WITHIN THE PAST 12 MONTHS, YOU WORRIED THAT YOUR FOOD WOULD RUN OUT BEFORE YOU GOT MONEY TO BUY MORE.: NEVER TRUE

## 2025-01-24 NOTE — PROGRESS NOTES
mammogram: June 2024  - Last labs: December 2024  - No chronic kidney disease  - Non-fasting labs to be ordered: vitamin D, electrolytes, blood sugar, kidney, and liver function tests    Follow-up  - Scheduled for a follow-up visit in 3 month                All care gaps addressed     All questions answered    Discussed use, benefit, and side effects of prescribed medications.  Barriers to compliance discussed.  All patient questions answered.  Pt voiced understanding.     Present to the ER for any emergent or acute symptoms not managed at home or in office.    Please note that this chart was generated using dragon and or [employer] dictation software.  Although every effort was made to ensure the accuracy of this automated transcription, some errors in transcription may have occurred.    The patient (or guardian, if applicable) and other individuals in attendance with the patient were advised that Artificial Intelligence will be utilized during this visit to record, process the conversation to generate a clinical note, and support improvement of the AI technology. The patient (or guardian, if applicable) and other individuals in attendance at the appointment consented to the use of AI, including the recording.                    No follow-ups on file.    An electronic signature was used to authenticate this note.    --YESSICA Curtis - NP on 1/24/2025 at 2:46 PM

## 2025-01-25 ENCOUNTER — HOSPITAL ENCOUNTER (OUTPATIENT)
Age: 60
Discharge: HOME | End: 2025-01-25
Payer: COMMERCIAL

## 2025-01-25 DIAGNOSIS — E55.9 VITAMIN D DEFICIENCY: ICD-10-CM

## 2025-01-25 DIAGNOSIS — E11.9 TYPE 2 DIABETES MELLITUS WITHOUT COMPLICATION, WITHOUT LONG-TERM CURRENT USE OF INSULIN (HCC): ICD-10-CM

## 2025-01-25 LAB
25(OH)D3 SERPL-MCNC: 50.3 NG/ML (ref 30–150)
ALBUMIN SERPL-MCNC: 4 G/DL (ref 3.4–5)
ALBUMIN/GLOB SERPL: 1.7 {RATIO} (ref 1.1–2.2)
ALP SERPL-CCNC: 77 U/L (ref 40–129)
ALT SERPL-CCNC: 9 U/L (ref 10–40)
ANION GAP SERPL CALCULATED.3IONS-SCNC: 11 MMOL/L (ref 9–17)
AST SERPL-CCNC: 16 U/L (ref 15–37)
BILIRUB SERPL-MCNC: 0.7 MG/DL (ref 0–1)
BUN SERPL-MCNC: 9 MG/DL (ref 7–20)
CALCIUM SERPL-MCNC: 9.4 MG/DL (ref 8.3–10.6)
CHLORIDE SERPL-SCNC: 104 MMOL/L (ref 99–110)
CO2 SERPL-SCNC: 27 MMOL/L (ref 21–32)
CREAT SERPL-MCNC: 0.9 MG/DL (ref 0.6–1.1)
GFR, ESTIMATED: 67 ML/MIN/1.73M2
GLUCOSE SERPL-MCNC: 94 MG/DL (ref 74–99)
POTASSIUM SERPL-SCNC: 4.3 MMOL/L (ref 3.5–5.1)
PROT SERPL-MCNC: 6.3 G/DL (ref 6.4–8.2)
SODIUM SERPL-SCNC: 142 MMOL/L (ref 136–145)

## 2025-01-25 PROCEDURE — 82306 VITAMIN D 25 HYDROXY: CPT

## 2025-01-25 PROCEDURE — 80053 COMPREHEN METABOLIC PANEL: CPT

## 2025-01-27 ENCOUNTER — OFFICE VISIT (OUTPATIENT)
Dept: GASTROENTEROLOGY | Age: 60
End: 2025-01-27
Payer: COMMERCIAL

## 2025-01-27 VITALS
HEART RATE: 82 BPM | HEIGHT: 68 IN | DIASTOLIC BLOOD PRESSURE: 76 MMHG | SYSTOLIC BLOOD PRESSURE: 120 MMHG | WEIGHT: 143.8 LBS | RESPIRATION RATE: 16 BRPM | OXYGEN SATURATION: 97 % | BODY MASS INDEX: 21.79 KG/M2

## 2025-01-27 DIAGNOSIS — K59.03 DRUG-INDUCED CONSTIPATION: ICD-10-CM

## 2025-01-27 DIAGNOSIS — R10.9 CHRONIC ABDOMINAL PAIN: Primary | ICD-10-CM

## 2025-01-27 DIAGNOSIS — R11.0 NAUSEA: ICD-10-CM

## 2025-01-27 DIAGNOSIS — K21.9 GASTROESOPHAGEAL REFLUX DISEASE WITHOUT ESOPHAGITIS: ICD-10-CM

## 2025-01-27 DIAGNOSIS — G89.29 CHRONIC ABDOMINAL PAIN: Primary | ICD-10-CM

## 2025-01-27 PROCEDURE — G2211 COMPLEX E/M VISIT ADD ON: HCPCS | Performed by: NURSE PRACTITIONER

## 2025-01-27 PROCEDURE — G8420 CALC BMI NORM PARAMETERS: HCPCS | Performed by: NURSE PRACTITIONER

## 2025-01-27 PROCEDURE — G8427 DOCREV CUR MEDS BY ELIG CLIN: HCPCS | Performed by: NURSE PRACTITIONER

## 2025-01-27 PROCEDURE — 3017F COLORECTAL CA SCREEN DOC REV: CPT | Performed by: NURSE PRACTITIONER

## 2025-01-27 PROCEDURE — 1036F TOBACCO NON-USER: CPT | Performed by: NURSE PRACTITIONER

## 2025-01-27 PROCEDURE — 3078F DIAST BP <80 MM HG: CPT | Performed by: NURSE PRACTITIONER

## 2025-01-27 PROCEDURE — 3074F SYST BP LT 130 MM HG: CPT | Performed by: NURSE PRACTITIONER

## 2025-01-27 PROCEDURE — 99213 OFFICE O/P EST LOW 20 MIN: CPT | Performed by: NURSE PRACTITIONER

## 2025-01-27 RX ORDER — SIMETHICONE 125 MG
125 TABLET,CHEWABLE ORAL EVERY 6 HOURS PRN
Qty: 60 TABLET | Refills: 3 | Status: SHIPPED | OUTPATIENT
Start: 2025-01-27

## 2025-01-27 NOTE — PROGRESS NOTES
Beverly Valencia 59 y.o. female was seen by DEIRDRE Montano on 1/27/2025     Wt Readings from Last 3 Encounters:   01/27/25 65.2 kg (143 lb 12.8 oz)   01/24/25 65.3 kg (144 lb)   01/02/25 65.9 kg (145 lb 3.2 oz)       HPI  Beverly Valencia is a pleasant 59 y.o.  female who presents today for follow-up on acid reflux, constipation and nausea.  She reports feeling better.  She denies NSAID use.  She is taking Gabapentin for chronic pain per pain management.  She had laparoscopic sleeve gastrectomy done by Dr. Taylor on 10-.  She had multiple abdominal surgeries afterwards.  She abdominoplasty, panniculectomy  done by Dr. Taylor on 9-.  Per chart review she continued to have pain and went to [facility] plastic surgeon Dr. Rayo and had umbilical hernia and scar tissue removal.  Her colonoscopy was done by Dr. Taylor on 07/27/2020 with mild diverticulosis, and small ascending colon polyp that was removed. She has been evaluated by Dr. Orozco  with EGD done on January 2023 revealing evidence of sleeve gastrectomy, characterized by healthy-appearing mucosa biopsies were negative for H. pylori.  Upper GI study also revealed postsurgical changes status post bariatric surgery otherwise unremarkable.  MRI of abdomen done on 8- showed normal MRI of the abdomen.  Her appetite is getting better. Her weight is stable.  Her nausea has improved with taking Reglan twice daily.  No vomiting.  No abdominal pain.  Intermittent pain comes and goes with last episode in the morning described as a throbbing pain but Prilosec helps and resting on her side.  Her heartburn and acid reflux is controlled with taking Prilosec daily.  No nocturnal awakenings with acid reflux.  No dysphagia or pain with swallowing.  No excess belching.  She has excess flatulence.   Her constipation has improved.  She did get relief from Linzess but it was not covered by her insurance.  Her bowels are moving

## 2025-01-31 ENCOUNTER — HOSPITAL ENCOUNTER (OUTPATIENT)
Dept: INFUSION THERAPY | Age: 60
Discharge: HOME | End: 2025-01-31
Payer: COMMERCIAL

## 2025-01-31 ENCOUNTER — INITIAL CONSULT (OUTPATIENT)
Dept: ONCOLOGY | Age: 60
End: 2025-01-31
Payer: COMMERCIAL

## 2025-01-31 VITALS
WEIGHT: 141.6 LBS | DIASTOLIC BLOOD PRESSURE: 93 MMHG | OXYGEN SATURATION: 99 % | HEART RATE: 90 BPM | BODY MASS INDEX: 22.22 KG/M2 | TEMPERATURE: 97.4 F | SYSTOLIC BLOOD PRESSURE: 145 MMHG | HEIGHT: 67 IN

## 2025-01-31 DIAGNOSIS — R23.3 EASY BRUISING: ICD-10-CM

## 2025-01-31 DIAGNOSIS — D50.9 MICROCYTIC ANEMIA: ICD-10-CM

## 2025-01-31 DIAGNOSIS — D50.9 MICROCYTIC ANEMIA: Primary | ICD-10-CM

## 2025-01-31 LAB
BASOPHILS # BLD: 0.02 K/UL
BASOPHILS NFR BLD: 0 % (ref 0–1)
EOSINOPHIL # BLD: 0.02 K/UL
EOSINOPHILS RELATIVE PERCENT: 0 % (ref 0–3)
ERYTHROCYTE [DISTWIDTH] IN BLOOD BY AUTOMATED COUNT: 14.2 % (ref 11.7–14.9)
FERRITIN SERPL-MCNC: 40 NG/ML (ref 15–150)
HCT VFR BLD AUTO: 39.8 % (ref 37–47)
HGB BLD-MCNC: 13.7 G/DL (ref 12.5–16)
IRON SATN MFR SERPL: 22 % (ref 15–50)
IRON SERPL-MCNC: 79 UG/DL (ref 37–145)
LYMPHOCYTES NFR BLD: 2.92 K/UL
LYMPHOCYTES RELATIVE PERCENT: 29 % (ref 24–44)
MCH RBC QN AUTO: 26 PG (ref 27–31)
MCHC RBC AUTO-ENTMCNC: 34.4 G/DL (ref 32–36)
MCV RBC AUTO: 75.7 FL (ref 78–100)
MONOCYTES NFR BLD: 0.55 K/UL
MONOCYTES NFR BLD: 6 % (ref 0–4)
NEUTROPHILS NFR BLD: 65 % (ref 36–66)
NEUTS SEG NFR BLD: 6.45 K/UL
PLATELET # BLD AUTO: 298 K/UL (ref 140–440)
PMV BLD AUTO: 9.7 FL (ref 7.5–11.1)
RBC # BLD AUTO: 5.26 M/UL (ref 4.2–5.4)
TIBC SERPL-MCNC: 364 UG/DL (ref 260–445)
UNSATURATED IRON BINDING CAPACITY: 285 UG/DL (ref 110–370)
WBC OTHER # BLD: 10 K/UL (ref 4–10.5)

## 2025-01-31 PROCEDURE — 99204 OFFICE O/P NEW MOD 45 MIN: CPT | Performed by: INTERNAL MEDICINE

## 2025-01-31 PROCEDURE — 3077F SYST BP >= 140 MM HG: CPT | Performed by: INTERNAL MEDICINE

## 2025-01-31 PROCEDURE — 85610 PROTHROMBIN TIME: CPT

## 2025-01-31 PROCEDURE — 83020 HEMOGLOBIN ELECTROPHORESIS: CPT

## 2025-01-31 PROCEDURE — 83550 IRON BINDING TEST: CPT

## 2025-01-31 PROCEDURE — 3080F DIAST BP >= 90 MM HG: CPT | Performed by: INTERNAL MEDICINE

## 2025-01-31 PROCEDURE — G8427 DOCREV CUR MEDS BY ELIG CLIN: HCPCS | Performed by: INTERNAL MEDICINE

## 2025-01-31 PROCEDURE — 1036F TOBACCO NON-USER: CPT | Performed by: INTERNAL MEDICINE

## 2025-01-31 PROCEDURE — 83021 HEMOGLOBIN CHROMOTOGRAPHY: CPT

## 2025-01-31 PROCEDURE — 82728 ASSAY OF FERRITIN: CPT

## 2025-01-31 PROCEDURE — 85245 CLOT FACTOR VIII VW RISTOCTN: CPT

## 2025-01-31 PROCEDURE — 99202 OFFICE O/P NEW SF 15 MIN: CPT

## 2025-01-31 PROCEDURE — 85025 COMPLETE CBC W/AUTO DIFF WBC: CPT

## 2025-01-31 PROCEDURE — 85730 THROMBOPLASTIN TIME PARTIAL: CPT

## 2025-01-31 PROCEDURE — 85240 CLOT FACTOR VIII AHG 1 STAGE: CPT

## 2025-01-31 PROCEDURE — 85246 CLOT FACTOR VIII VW ANTIGEN: CPT

## 2025-01-31 PROCEDURE — 83540 ASSAY OF IRON: CPT

## 2025-01-31 PROCEDURE — 99211 OFF/OP EST MAY X REQ PHY/QHP: CPT

## 2025-01-31 PROCEDURE — 36415 COLL VENOUS BLD VENIPUNCTURE: CPT

## 2025-01-31 PROCEDURE — 85247 CLOT FACTOR VIII MULTIMETRIC: CPT

## 2025-01-31 PROCEDURE — 3017F COLORECTAL CA SCREEN DOC REV: CPT | Performed by: INTERNAL MEDICINE

## 2025-01-31 PROCEDURE — G8420 CALC BMI NORM PARAMETERS: HCPCS | Performed by: INTERNAL MEDICINE

## 2025-01-31 ASSESSMENT — PATIENT HEALTH QUESTIONNAIRE - PHQ9
SUM OF ALL RESPONSES TO PHQ QUESTIONS 1-9: 0
SUM OF ALL RESPONSES TO PHQ QUESTIONS 1-9: 0
1. LITTLE INTEREST OR PLEASURE IN DOING THINGS: NOT AT ALL
SUM OF ALL RESPONSES TO PHQ9 QUESTIONS 1 & 2: 0
SUM OF ALL RESPONSES TO PHQ QUESTIONS 1-9: 0
SUM OF ALL RESPONSES TO PHQ QUESTIONS 1-9: 0
2. FEELING DOWN, DEPRESSED OR HOPELESS: NOT AT ALL

## 2025-01-31 NOTE — PROGRESS NOTES
MA Rooming Questions  Patient: Beverly Valencia  MRN: [id number]    Date: 1/31/2025        New Patient        5. Did the patient have a depression screening completed today? Yes    PHQ-9 Total Score: 0 (1/31/2025  1:58 PM)       PHQ-9 Given to (if applicable):               PHQ-9 Score (if applicable):                     [] Positive     [x]  Negative              Does question #9 need addressed (if applicable)                     [] Yes    []  No               Carmela Baeza [facility]

## 2025-02-04 LAB
HGB A1 MFR BLD: 64.9 % (ref 95–97.9)
HGB A2 MFR BLD: 3.2 % (ref 2–3.5)
HGB C MFR BLD: 31.8 % (ref 0–0)
HGB E MFR BLD: 0 % (ref 0–0)
HGB F MFR BLD: 0.1 % (ref 0–2.1)
HGB FRACT BLD ELPH-IMP: ABNORMAL
HGB OTHER MFR BLD: 0 % (ref 0–0)
HGB S BLD QL SOLY: ABNORMAL
HGB S MFR BLD: 0 % (ref 0–0)
PATH INTERP BLD-IMP: ABNORMAL

## 2025-02-05 ENCOUNTER — TELEPHONE (OUTPATIENT)
Dept: FAMILY MEDICINE CLINIC | Age: 60
End: 2025-02-05

## 2025-02-05 LAB
FACTOR VIII ACTIVITY: 224 % (ref 56–191)
VWF AG ACT/NOR PPP IA: 258 % (ref 52–214)
VWF:RCO ACT/NOR PPP PL AGG: 264 % (ref 51–215)

## 2025-02-05 NOTE — TELEPHONE ENCOUNTER
Pt is requesting an order for a humidifier to be called into pharmacy. [facility] [address]. Please advise

## 2025-02-06 LAB — VON WILLEBRAND MULTIMERS: NORMAL

## 2025-02-14 ENCOUNTER — OFFICE VISIT (OUTPATIENT)
Age: 60
End: 2025-02-14
Payer: COMMERCIAL

## 2025-02-14 VITALS
BODY MASS INDEX: 22.07 KG/M2 | HEART RATE: 76 BPM | HEIGHT: 67 IN | SYSTOLIC BLOOD PRESSURE: 122 MMHG | WEIGHT: 140.6 LBS | DIASTOLIC BLOOD PRESSURE: 78 MMHG

## 2025-02-14 DIAGNOSIS — I47.10 SVT (SUPRAVENTRICULAR TACHYCARDIA) (HCC): ICD-10-CM

## 2025-02-14 DIAGNOSIS — I10 ESSENTIAL HYPERTENSION: Primary | ICD-10-CM

## 2025-02-14 PROCEDURE — G8420 CALC BMI NORM PARAMETERS: HCPCS | Performed by: NURSE PRACTITIONER

## 2025-02-14 PROCEDURE — 99214 OFFICE O/P EST MOD 30 MIN: CPT | Performed by: NURSE PRACTITIONER

## 2025-02-14 PROCEDURE — 3074F SYST BP LT 130 MM HG: CPT | Performed by: NURSE PRACTITIONER

## 2025-02-14 PROCEDURE — 3078F DIAST BP <80 MM HG: CPT | Performed by: NURSE PRACTITIONER

## 2025-02-14 PROCEDURE — G8427 DOCREV CUR MEDS BY ELIG CLIN: HCPCS | Performed by: NURSE PRACTITIONER

## 2025-02-14 PROCEDURE — 1036F TOBACCO NON-USER: CPT | Performed by: NURSE PRACTITIONER

## 2025-02-14 PROCEDURE — 3017F COLORECTAL CA SCREEN DOC REV: CPT | Performed by: NURSE PRACTITIONER

## 2025-02-14 RX ORDER — DILTIAZEM HYDROCHLORIDE 240 MG/1
240 CAPSULE, COATED, EXTENDED RELEASE ORAL DAILY
Qty: 90 CAPSULE | Refills: 1 | Status: SHIPPED | OUTPATIENT
Start: 2025-02-14

## 2025-02-14 ASSESSMENT — ENCOUNTER SYMPTOMS
COLOR CHANGE: 0
PHOTOPHOBIA: 0
SINUS PRESSURE: 0
BLOOD IN STOOL: 0
SINUS PAIN: 0
ABDOMINAL DISTENTION: 0
ABDOMINAL PAIN: 0
BACK PAIN: 0
COUGH: 0
SHORTNESS OF BREATH: 1

## 2025-02-14 NOTE — PROGRESS NOTES
tobacco. She reports current alcohol use. She reports current drug use. Drug: Marijuana (Weed).    Allergies   Allergen Reactions    Other      Patient states the electrodes irritated her skin.    Wound Dressing Adhesive Rash     Rash from EKG/ HEART MONITOR ADHESIVE WHEN LEFT ON FOR EXTENDED PERIOD OF TIME.       Current Outpatient Medications on File Prior to Visit   Medication Sig Dispense Refill    simethicone (MYLICON) 125 MG chewable tablet Take 1 tablet by mouth every 6 hours as needed for Flatulence 60 tablet 3    neomycin-polymyxin-dexameth (MAXITROL) 3.5-57629-8.1 ophthalmic suspension INSTILL 1 DROP INTO RIGHT EYE FOUR TIMES DAILY      alogliptin (NESINA) 6.25 MG TABS tablet Take 1 tablet by mouth daily 90 tablet 3    blood glucose monitor kit and supplies Dispense 1 glucometer starter kit for blood sugar testing one  times daily. 1 kit 0    fluticasone (FLONASE) 50 MCG/ACT nasal spray 1 spray by Each Nostril route daily 32 g 1    vitamin D3 (CHOLECALCIFEROL) 25 MCG (1000 UT) TABS tablet Take 1 tablet by mouth daily 90 tablet 3    QUEtiapine (SEROQUEL) 100 MG tablet Take 1 tablet by mouth at bedtime 90 tablet 1    hydroCHLOROthiazide 12.5 MG capsule Take 1 capsule by mouth as needed (prn) 30 capsule 0    Elastic Bandages & Supports (MEDICAL COMPRESSION SOCKS) MISC WEAR DAILY. REMOVE AT NIGHT 3 each 2    metoclopramide (REGLAN) 5 MG tablet Take 1 tablet by mouth 2 times daily (before meals) 60 tablet 0    omeprazole (PRILOSEC) 40 MG delayed release capsule TAKE 1 CAPSULE BY MOUTH DAILY 90 capsule 1    ketoconazole (NIZORAL) 2 % cream MIX WITH 1 LB OF VANICREAM AND APPLY TO THE AFFECTED AREA THREE TIMES DAILY AS DIRECTED      mupirocin (BACTROBAN) 2 % ointment MIX WITH 1 LB OF VANICREAM AND APPLY TO THE AFFECTED AREA THREE TIMES DAILY AS DIRECTED      cetirizine (ZYRTEC) 10 MG tablet Take 1 tablet by mouth daily 90 tablet 0    valACYclovir (VALTREX) 500 MG tablet Take 1 tablet by mouth daily 30 tablet 5

## 2025-02-19 ENCOUNTER — HOSPITAL ENCOUNTER (OUTPATIENT)
Age: 60
Discharge: HOME | End: 2025-02-19
Payer: COMMERCIAL

## 2025-02-19 ENCOUNTER — OFFICE VISIT (OUTPATIENT)
Dept: ONCOLOGY | Age: 60
End: 2025-02-19
Payer: COMMERCIAL

## 2025-02-19 ENCOUNTER — HOSPITAL ENCOUNTER (OUTPATIENT)
Dept: INFUSION THERAPY | Age: 60
Discharge: HOME | End: 2025-02-19
Payer: COMMERCIAL

## 2025-02-19 VITALS
SYSTOLIC BLOOD PRESSURE: 137 MMHG | BODY MASS INDEX: 21.94 KG/M2 | HEART RATE: 78 BPM | DIASTOLIC BLOOD PRESSURE: 83 MMHG | TEMPERATURE: 97.7 F | OXYGEN SATURATION: 100 % | HEIGHT: 67 IN | RESPIRATION RATE: 16 BRPM | WEIGHT: 139.8 LBS

## 2025-02-19 DIAGNOSIS — D64.9 ANEMIA, UNSPECIFIED TYPE: Primary | ICD-10-CM

## 2025-02-19 LAB
T4 FREE SERPL-MCNC: 1.3 NG/DL (ref 0.9–1.8)
TSH SERPL DL<=0.05 MIU/L-ACNC: 0.38 UIU/ML (ref 0.27–4.2)

## 2025-02-19 PROCEDURE — 3017F COLORECTAL CA SCREEN DOC REV: CPT | Performed by: INTERNAL MEDICINE

## 2025-02-19 PROCEDURE — 99213 OFFICE O/P EST LOW 20 MIN: CPT | Performed by: INTERNAL MEDICINE

## 2025-02-19 PROCEDURE — 84443 ASSAY THYROID STIM HORMONE: CPT

## 2025-02-19 PROCEDURE — 99212 OFFICE O/P EST SF 10 MIN: CPT

## 2025-02-19 PROCEDURE — 84439 ASSAY OF FREE THYROXINE: CPT

## 2025-02-19 PROCEDURE — 3075F SYST BP GE 130 - 139MM HG: CPT | Performed by: INTERNAL MEDICINE

## 2025-02-19 PROCEDURE — G8420 CALC BMI NORM PARAMETERS: HCPCS | Performed by: INTERNAL MEDICINE

## 2025-02-19 PROCEDURE — 84432 ASSAY OF THYROGLOBULIN: CPT

## 2025-02-19 PROCEDURE — G8427 DOCREV CUR MEDS BY ELIG CLIN: HCPCS | Performed by: INTERNAL MEDICINE

## 2025-02-19 PROCEDURE — 1036F TOBACCO NON-USER: CPT | Performed by: INTERNAL MEDICINE

## 2025-02-19 PROCEDURE — 84445 ASSAY OF TSI GLOBULIN: CPT

## 2025-02-19 PROCEDURE — 3079F DIAST BP 80-89 MM HG: CPT | Performed by: INTERNAL MEDICINE

## 2025-02-19 PROCEDURE — 86376 MICROSOMAL ANTIBODY EACH: CPT

## 2025-02-19 NOTE — PROGRESS NOTES
MA Rooming Questions  Patient: Beverly Valencia  MRN: [id number]    Date: 2/19/2025        1. Do you have any new issues?   yes - c/o abdominal pain- intermittent, stabbing         2. Do you need any refills on medications?    no    3. Have you had any imaging done since your last visit?   no    4. Have you been hospitalized or seen in the emergency room since your last visit here?   no    5. Did the patient have a depression screening completed today? No    No data recorded     PHQ-9 Given to (if applicable):               PHQ-9 Score (if applicable):                     [] Positive     []  Negative              Does question #9 need addressed (if applicable)                     [] Yes    []  No               Shy Remy CMA      
with Dr Orozco: evidence of sleeve gastrectomy, characterized by healthy-appearing mucosa biopsies were negative for H. pylori.  Upper GI study also revealed postsurgical changes status post bariatric surgery otherwise unremarkable.   8/14/2024 MRI of abdomen normal MRI of the abdomen.     Review of labs:   06/22/23 Ferritin: 128.6 Iron: 142 TIBC: 366 Folate: 14.05 Vitamin B-12: 289 Iron % Saturation: 39  04/09/24 Prothrombin Time: 13.2 INR: 1.0 PTT: 25.9  11/19/2024 WBC 7.8, Hg 13.2, MCV 77.6, plat 278.   12/6/2024 creat 1.19H. WBC 9.4, Hg 13, MCT 78, plat 310.   1/25/2025 creat 0.9. LFTs grossly unremarkable.    6/18/2024 mammogram benign.    No previous h/o bleeding issue after surgery or dental w/u.  H/o normal menstruation. H/o partial hysterectomy due to fibroid tumor at age 35.   She has thin skin and superficial blood vessel.  Bruising mainly to LE. No ecchymosis or petechiae today.  She has 2 children. No cancer in the family.     I order PT/PTT, VW study, CBC, hemoglobin electrophoresis, iron study. She has microcytosis.      2/19/2025 follow up visit.    1/31/25 WBC 10, RBC 5.26, Hg 13.7, MCV 75.7, plat 298.   Ferritin 40, TIBC 364, iron 79, sat 22.   Hb e'phoresis showed HbA 64.9, HbC 31.8. No oral durga.  FVIII 224, Ristocetin co factor 265, vW Ag 258. Multimer study wnl.  No bruising today.  She has labs with dermatologist d/t vitiligo.     No acute pain.  Denies any nausea, vomiting or diarrhea.  No fever or chills.  No chest pain, shortness of breath or palpitation.  No headache or dizzy spell.  No specific bone pain.  No melena or hematochezia.  Denied any dysuria or hematuria.    Past Medical History:   Diagnosis Date    Adenomatous polyp of ascending colon 8/7/2020    Anxiety     Arthritis     \"Back, Hands, Shoulders\"    Chronic back pain     Arthritis - NKI    COPD (chronic obstructive pulmonary disease) (HCC)     Sees Dr. Spenser [id number]     Depression     Follows with Mental Health    Diabetes

## 2025-02-20 LAB — MICROSOMAL ANTIBODY: 12 IU/ML

## 2025-02-21 LAB — THYROID STIMULATING IMMUNOGLOB: <0.1 IU/L

## 2025-02-22 DIAGNOSIS — R60.0 BILATERAL LOWER EXTREMITY EDEMA: ICD-10-CM

## 2025-02-24 RX ORDER — HYDROCHLOROTHIAZIDE 12.5 MG/1
CAPSULE ORAL
Qty: 30 CAPSULE | Refills: 0 | Status: SHIPPED | OUTPATIENT
Start: 2025-02-24

## 2025-02-24 RX ORDER — METOCLOPRAMIDE 5 MG/1
5 TABLET ORAL
Qty: 60 TABLET | Refills: 0 | Status: SHIPPED | OUTPATIENT
Start: 2025-02-24

## 2025-02-26 RX ORDER — SIMETHICONE 125 MG
125 TABLET,CHEWABLE ORAL EVERY 6 HOURS PRN
Qty: 60 TABLET | Refills: 3 | Status: SHIPPED | OUTPATIENT
Start: 2025-02-26

## 2025-03-03 ENCOUNTER — TELEPHONE (OUTPATIENT)
Dept: GASTROENTEROLOGY | Age: 60
End: 2025-03-03

## 2025-03-03 ENCOUNTER — OFFICE VISIT (OUTPATIENT)
Dept: FAMILY MEDICINE CLINIC | Age: 60
End: 2025-03-03
Payer: COMMERCIAL

## 2025-03-03 VITALS
BODY MASS INDEX: 22.29 KG/M2 | SYSTOLIC BLOOD PRESSURE: 130 MMHG | WEIGHT: 142 LBS | DIASTOLIC BLOOD PRESSURE: 84 MMHG | HEIGHT: 67 IN | HEART RATE: 86 BPM | OXYGEN SATURATION: 95 %

## 2025-03-03 DIAGNOSIS — R09.82 PND (POST-NASAL DRIP): Primary | ICD-10-CM

## 2025-03-03 DIAGNOSIS — K31.84 GASTROPARESIS: ICD-10-CM

## 2025-03-03 DIAGNOSIS — R05.8 COUGH WITH CONGESTION OF PARANASAL SINUS: ICD-10-CM

## 2025-03-03 DIAGNOSIS — K21.9 GASTROESOPHAGEAL REFLUX DISEASE, UNSPECIFIED WHETHER ESOPHAGITIS PRESENT: ICD-10-CM

## 2025-03-03 DIAGNOSIS — R05.8 PRODUCTIVE COUGH: ICD-10-CM

## 2025-03-03 DIAGNOSIS — J30.89 NON-SEASONAL ALLERGIC RHINITIS, UNSPECIFIED TRIGGER: ICD-10-CM

## 2025-03-03 DIAGNOSIS — R09.81 COUGH WITH CONGESTION OF PARANASAL SINUS: ICD-10-CM

## 2025-03-03 DIAGNOSIS — K59.00 CONSTIPATION, UNSPECIFIED CONSTIPATION TYPE: ICD-10-CM

## 2025-03-03 PROCEDURE — 1036F TOBACCO NON-USER: CPT | Performed by: NURSE PRACTITIONER

## 2025-03-03 PROCEDURE — 99214 OFFICE O/P EST MOD 30 MIN: CPT | Performed by: NURSE PRACTITIONER

## 2025-03-03 PROCEDURE — G8420 CALC BMI NORM PARAMETERS: HCPCS | Performed by: NURSE PRACTITIONER

## 2025-03-03 PROCEDURE — 3017F COLORECTAL CA SCREEN DOC REV: CPT | Performed by: NURSE PRACTITIONER

## 2025-03-03 PROCEDURE — 3075F SYST BP GE 130 - 139MM HG: CPT | Performed by: NURSE PRACTITIONER

## 2025-03-03 PROCEDURE — G8427 DOCREV CUR MEDS BY ELIG CLIN: HCPCS | Performed by: NURSE PRACTITIONER

## 2025-03-03 PROCEDURE — 3079F DIAST BP 80-89 MM HG: CPT | Performed by: NURSE PRACTITIONER

## 2025-03-03 RX ORDER — MONTELUKAST SODIUM 10 MG/1
10 TABLET ORAL NIGHTLY
Qty: 30 TABLET | Refills: 5 | Status: SHIPPED | OUTPATIENT
Start: 2025-03-03

## 2025-03-03 RX ORDER — GUAIFENESIN 600 MG/1
600 TABLET, EXTENDED RELEASE ORAL 2 TIMES DAILY
Qty: 28 TABLET | Refills: 0 | Status: SHIPPED | OUTPATIENT
Start: 2025-03-03 | End: 2025-03-17

## 2025-03-03 RX ORDER — SUCRALFATE 1 G/1
1 TABLET ORAL 2 TIMES DAILY
Qty: 60 TABLET | Refills: 3 | Status: CANCELLED | OUTPATIENT
Start: 2025-03-03

## 2025-03-03 RX ORDER — SUCRALFATE 1 G/1
1 TABLET ORAL 2 TIMES DAILY
Qty: 60 TABLET | Refills: 0 | Status: SHIPPED | OUTPATIENT
Start: 2025-03-03 | End: 2025-03-03 | Stop reason: SDUPTHER

## 2025-03-03 NOTE — TELEPHONE ENCOUNTER
Patient came into office to get medication refill on SUCRALFATE  and would like a 3 month supply and sent [facility]  Thank you

## 2025-03-03 NOTE — PROGRESS NOTES
proper administration to prevent runny nose or backflow  - Humidifier not necessary at this point  - Prescription for Mucinex, to be taken twice daily for 2 weeks  - Initiate Singulair at bedtime to help stop the drainage  - Consider referral to ENT specialist if symptoms do not improve    2. Medication management  - Out of Carafate for 2 weeks, reports changes in bowel movements  - Instructed to inform GI  to submit a refill request for Carafate for longer term  - Provide a month's supply of Carafate to restart treatment               All care gaps addressed     All questions answered    Discussed use, benefit, and side effects of prescribed medications.  Barriers to compliance discussed.  All patient questions answered.  Pt voiced understanding.     Present to the ER for any emergent or acute symptoms not managed at home or in office.    Please note that this chart was generated using dragon and or [employer] dictation software.  Although every effort was made to ensure the accuracy of this automated transcription, some errors in transcription may have occurred.    The patient (or guardian, if applicable) and other individuals in attendance with the patient were advised that Artificial Intelligence will be utilized during this visit to record, process the conversation to generate a clinical note, and support improvement of the AI technology. The patient (or guardian, if applicable) and other individuals in attendance at the appointment consented to the use of AI, including the recording.                    No follow-ups on file.    An electronic signature was used to authenticate this note.    --YESSICA Curtis - NP on 3/3/2025 at 12:49 PM

## 2025-03-04 LAB — DIABETIC RETINOPATHY: NEGATIVE

## 2025-03-04 RX ORDER — SUCRALFATE 1 G/1
1 TABLET ORAL 2 TIMES DAILY
Qty: 60 TABLET | Refills: 0 | Status: SHIPPED | OUTPATIENT
Start: 2025-03-04

## 2025-03-11 DIAGNOSIS — E78.2 MIXED HYPERLIPIDEMIA: ICD-10-CM

## 2025-03-11 DIAGNOSIS — R60.0 BILATERAL LOWER EXTREMITY EDEMA: ICD-10-CM

## 2025-03-11 LAB — THYROGLOB SERPL-MCNC: 17.7 NG/ML (ref 1.3–31.8)

## 2025-03-12 RX ORDER — HYDROCHLOROTHIAZIDE 12.5 MG/1
CAPSULE ORAL
Qty: 30 CAPSULE | Refills: 0 | OUTPATIENT
Start: 2025-03-12

## 2025-03-12 RX ORDER — ATORVASTATIN CALCIUM 20 MG/1
20 TABLET, FILM COATED ORAL DAILY
Qty: 90 TABLET | Refills: 1 | OUTPATIENT
Start: 2025-03-12

## 2025-03-27 ENCOUNTER — TELEPHONE (OUTPATIENT)
Dept: FAMILY MEDICINE CLINIC | Age: 60
End: 2025-03-27

## 2025-03-27 DIAGNOSIS — J30.89 NON-SEASONAL ALLERGIC RHINITIS, UNSPECIFIED TRIGGER: ICD-10-CM

## 2025-03-27 DIAGNOSIS — R05.8 COUGH WITH CONGESTION OF PARANASAL SINUS: Primary | ICD-10-CM

## 2025-03-27 DIAGNOSIS — R09.81 COUGH WITH CONGESTION OF PARANASAL SINUS: Primary | ICD-10-CM

## 2025-03-27 NOTE — TELEPHONE ENCOUNTER
Called patient and she stated that she has called the [facility] provider twice and not heard back. I advised the patient to call them back and tell them you would like a call back ASAP due to call on Friday. discontinued on 3/11/2025 by Zeyad Orellana PA-C

## 2025-03-27 NOTE — TELEPHONE ENCOUNTER
Patient called and requested a referral to be sent to ENT.  Stated that she had been seen earlier this month (March) and it was suggested. Patient would like to see [facility]. Please advise.

## 2025-04-04 ENCOUNTER — TELEPHONE (OUTPATIENT)
Dept: OBGYN | Age: 60
End: 2025-04-04

## 2025-04-07 DIAGNOSIS — F31.31 BIPOLAR AFFECTIVE DISORDER, CURRENTLY DEPRESSED, MILD (HCC): ICD-10-CM

## 2025-04-07 DIAGNOSIS — F51.01 PRIMARY INSOMNIA: ICD-10-CM

## 2025-04-07 RX ORDER — METOCLOPRAMIDE 5 MG/1
5 TABLET ORAL
Qty: 60 TABLET | Refills: 0 | OUTPATIENT
Start: 2025-04-07

## 2025-04-07 RX ORDER — QUETIAPINE FUMARATE 100 MG/1
100 TABLET, FILM COATED ORAL NIGHTLY
Qty: 90 TABLET | Refills: 1 | OUTPATIENT
Start: 2025-04-07

## 2025-04-07 NOTE — TELEPHONE ENCOUNTER
Pt is needing medication refills for SUCRALFATE  and metroclopramide. Gave medications to castro to call in.

## 2025-04-21 ENCOUNTER — HOSPITAL ENCOUNTER (OUTPATIENT)
Age: 60
Setting detail: SPECIMEN
Discharge: HOME | End: 2025-04-21
Payer: COMMERCIAL

## 2025-04-21 ENCOUNTER — OFFICE VISIT (OUTPATIENT)
Dept: OBGYN | Age: 60
End: 2025-04-21

## 2025-04-21 VITALS
DIASTOLIC BLOOD PRESSURE: 86 MMHG | SYSTOLIC BLOOD PRESSURE: 146 MMHG | HEART RATE: 80 BPM | WEIGHT: 143 LBS | BODY MASS INDEX: 22.44 KG/M2 | HEIGHT: 67 IN

## 2025-04-21 DIAGNOSIS — Z13.820 ENCOUNTER FOR OSTEOPOROSIS SCREENING IN ASYMPTOMATIC POSTMENOPAUSAL PATIENT: ICD-10-CM

## 2025-04-21 DIAGNOSIS — Z78.0 ENCOUNTER FOR OSTEOPOROSIS SCREENING IN ASYMPTOMATIC POSTMENOPAUSAL PATIENT: ICD-10-CM

## 2025-04-21 DIAGNOSIS — Z01.419 WOMEN'S ANNUAL ROUTINE GYNECOLOGICAL EXAMINATION: Primary | ICD-10-CM

## 2025-04-21 DIAGNOSIS — Z12.31 BREAST CANCER SCREENING BY MAMMOGRAM: ICD-10-CM

## 2025-04-21 DIAGNOSIS — Z79.899 MEDICAL MARIJUANA USE: ICD-10-CM

## 2025-04-21 DIAGNOSIS — R23.2 HOT FLASHES: ICD-10-CM

## 2025-04-21 PROCEDURE — G0123 SCREEN CERV/VAG THIN LAYER: HCPCS

## 2025-04-21 PROCEDURE — 87624 HPV HI-RISK TYP POOLED RSLT: CPT

## 2025-04-21 RX ORDER — ESTRADIOL 2 MG/1
2 TABLET ORAL DAILY
Qty: 90 TABLET | Refills: 3 | Status: SHIPPED | OUTPATIENT
Start: 2025-04-21

## 2025-04-21 ASSESSMENT — ENCOUNTER SYMPTOMS
VOMITING: 0
NAUSEA: 0
RESPIRATORY NEGATIVE: 1
GASTROINTESTINAL NEGATIVE: 1
CONSTIPATION: 0
SHORTNESS OF BREATH: 0
DIARRHEA: 0
ABDOMINAL PAIN: 0

## 2025-04-21 NOTE — PROGRESS NOTES
MRN: [id number]  Name: Beverly Valencia  : 1965    Insurance: Payor: [facility] /  /  /      Phone #: [phone]  Provider: YESSICA Vasquse CNP     Date of Visit: 2025    Reason for visit: 6 month f/u Recent Hospitalization Date:    Reason for Hospitalization:    Last EK24  Type of Device: Medtronic Dual Chamber Pacemaker  CARELINK  Last checked: 3/5/25       Vitals BP HR O2% WT HT ORTHO BP LYING ORTHO BP SITTING ORTHO BP SITTING   Today's Findings           Patients work up- Check List     Testing Last Date Completed Date Expected  ([facility]) Additional Notes    MA to document For provider to complete Either MA or Provider    Carotid Duplex  STAT 1 WK 6 MTH       THIS WK 2 WK 1 YEAR     Cardiac CTA  STAT 1 WK 6 MTH       THIS WK 2 WK 1 YEAR     Cardiac CT Calcium scoring  STAT 1 WK 6 MTH       THIS WK 2 WK 1 YEAR     CTA Chest, Abdomen & Pelvis  STAT 1 WK 6 MTH       THIS WK 2 WK 1 YEAR     CT Chest IV w/ Contrast  STAT 1 WK 6 MTH       THIS WK 2 WK 1 YEAR     CT Chest w/o Contrast  STAT 1 WK 6 MTH       THIS WK 2 WK 1 YEAR     CXR  STAT 1 WK 6 MTH       THIS WK 2 WK 1 YEAR     ECHO  Stress Complete Limited     MRI- Cardiac  STAT 1 WK 6 MTH       THIS WK 2 WK 1 YEAR     MUGA Scan  STAT 1 WK 6 MTH       THIS WK 2 WK 1 YEAR     Nuclear Stress  Lexiscan Cardiolite     PFT  STAT 1 WK 6 MTH       THIS WK 2 WK 1 YEAR     Treadmill Stress Test  STAT 1 WK 6 MTH       THIS WK 2 WK 1 YEAR     Vascular Duplex  Lower: Right Left Bilat       Upper: Right Left Bilat     Other Test Not Listed:    Monitors Last Date Completed Day's Request/Ordered     Holter  Short term 24 hours 48 hours      Long term 3 days 7 days 14 days   Event   (1-30 days)      Procedures Last Date Performed Procedure Details Date Expected   Additional Notes    ASD Closure        Carotid Angio        Cardioversion        Heart Cath  R L R&L      Peripheral Angio  R L      PFO Closure        PTCA/PCI        ASHOK

## 2025-04-21 NOTE — PROGRESS NOTES
valACYclovir (VALTREX) 500 MG tablet Take 1 tablet by mouth daily 30 tablet 5    atorvastatin (LIPITOR) 20 MG tablet Take 1 tablet by mouth daily 90 tablet 1    gabapentin (NEURONTIN) 100 MG capsule       fluticasone furoate-vilanterol (BREO ELLIPTA) 100-25 MCG/ACT inhaler Inhale 1 puff into the lungs daily 1 each 5    tiotropium (SPIRIVA RESPIMAT) 2.5 MCG/ACT AERS inhaler Inhale 2 puffs into the lungs daily 1 each 5    OTEZLA 10 & 20 & 30 MG TBPK USE AS DIRECTED ON PACKAGE      Capsaicin 0.1 % CREA Apply 42.5 g topically daily 42.5 g 0    estradiol (ESTRACE) 2 MG tablet Take 1 tablet by mouth daily 30 tablet 11    ammonium lactate (LAC-HYDRIN) 12 % lotion APPLY TO SCALY AREAS ONCE A DAY AS NEEDED      clobetasol (TEMOVATE) 0.05 % ointment APPLY TO PSORIASIS TWICE A DAY MONDAY - FRIDAY, NO WEEKENDS      clotrimazole-betamethasone (LOTRISONE) 1-0.05 % cream Apply topically 2 times daily. 15 g 0    albuterol (PROVENTIL) (2.5 MG/3ML) 0.083% nebulizer solution Take 3 mLs by nebulization every 6 hours 120 each 5    tiZANidine (ZANAFLEX) 4 MG tablet Take 1 tablet by mouth every 8 hours as needed      oxyCODONE-acetaminophen (PERCOCET) 7.5-325 MG per tablet Take 1 tablet by mouth every 8 hours as needed for Pain (Patient states takes every 6 hours).       No current facility-administered medications for this visit.       Allergies   Allergen Reactions    Other      Patient states the electrodes irritated her skin.    Wound Dressing Adhesive Rash     Rash from EKG/ HEART MONITOR ADHESIVE WHEN LEFT ON FOR EXTENDED PERIOD OF TIME.           Immunization History   Administered Date(s) Administered    COVID-19, PFIZER PURPLE top, DILUTE for use, (age 12 y+), 30mcg/0.3mL 2021, 2021    Influenza Virus Vaccine 2014    Pneumococcal, PCV-13, PREVNAR 13, (age 6w+), IM, 0.5mL 2014, 2018    TDaP, ADACEL (age 10y-64y), BOOSTRIX (age 10y+), IM, 0.5mL 2011, 2023    Zoster Recombinant

## 2025-04-23 NOTE — ASSESSMENT & PLAN NOTE
CPM/PAT Evaluation       Name: Suyapa Thao   /Age: 1969       TELEMEDICINE ENCOUNTER  Patient was interviewed by telephone for preadmission testing perioperative risk assessment prior to surgery.    DATE OF CONSULT: 2025  REFERRING PROVIDER: Dr. Lois Kenney  SURGERY, DATE, AND LENGTH: MicroDirect laryngoscopy with Restylane injection of left vocal cord, possible right vocal cord; 2025; 55 minutes    CHIEF COMPLAINT  Left vocal cord paralysis, voice hoarseness    HPI  Suyapa Thao is a 55-year-old female with history of chronic voice hoarseness and vocal cord polyps. She has undergone Restylane injection several times in the past that provided good results with improvement of voice quality.  Over the past several months she has noticed increasing voice hoarseness, and voice fatigue with conversations.  She has been referred to preadmission testing in anticipation of Restylane injection to her vocal cords.  Patient with medical history significant for mild COPD with occasional shortness of breath; prediabetes; PTSD; anxiety disorder tobacco dependency.  Patient has no other medical complaints at this time, and reports to be in usual state of health without any current or recent illnesses/infections/fevers, or hospitalizations. In the past month, neither the patient nor anyone in the household has had any respiratory illnesses including Covid 19, Influenza; Respiratory Syncytial Virus (RSV), or pneumonia.      ACTIVE PROBLEMS  Problem List[1]     PAST MEDICAL HISTORY  Medical History[2]     SURGICAL HISTORY  Surgical History[3]     ANESTHESIA HISTORY  Denies problems with anesthesia in the past such as PONV, prolonged sedation, awareness, dental damage, aspiration, cardiac arrest, difficult intubation, or unexpected hospital admissions. Denies family history of malignant hyperthermia, or pseudocholinesterase deficiency.   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  She is requesting antianxiety medication to be given on day of surgery.    SOCIAL HISTORY  Current every day smoker smokes about 6 cigarettes daily for the past year, has a smoking history of 1 pack/day for 30 years; denies alcohol, medical marijuana, recreational drug use.  Patient  with COPD/mild emphysema.  Uses albuterol inhaler daily.  She states she does get shortness of breath with stairs and some moderate activities.  She denies regular exercise, or physically demanding job.  She denies history of chest pain.  METS >4     FAMILY HISTORY  Family History[4]     ALLERGIES  RX Allergies[5]     MEDICATIONS  Current Medications[6]     REVIEW OF SYSTEMS  Review of Systems   HENT:  Positive for voice change.    Respiratory:  Positive for shortness of breath.    All other systems reviewed and are negative.    STOP BANG:  Negative for CAREN    PHYSICAL EXAM  Deferred    AIRWAY EXAM  Deferred    VITALS  No vitals taken for telemedicine visit  BMI Readings from Last 1 Encounters:   04/14/25 24.33 kg/m²      BP Readings from Last 4 Encounters:   04/14/25 129/66   04/04/25 124/80   02/28/25 118/74   02/21/25 110/62        LABS  Lab Results   Component Value Date    WBC 8.1 02/28/2025    HGB 13.8 02/28/2025    HCT 43.2 02/28/2025    MCV 80 02/28/2025     02/28/2025      Lab Results   Component Value Date    GLUCOSE 93 02/28/2025    CALCIUM 9.3 02/28/2025     02/28/2025    K 3.8 02/28/2025    CO2 25 02/28/2025     02/28/2025    BUN 5 (L) 02/28/2025    CREATININE 0.69 02/28/2025      Lab Results   Component Value Date    HGBA1C 5.7 (H) 06/13/2024      Lab Results   Component Value Date    CHOL 134 06/13/2024    CHOL 204 (H) 02/06/2024    CHOL 191 12/29/2021     Lab Results   Component Value Date    HDL 68.1 06/13/2024    HDL 64.0 02/06/2024    HDL 67.0 12/29/2021     Lab Results   Component Value Date    LDLCALC 54  "06/13/2024    LDLCALC 126 (H) 02/06/2024     Lab Results   Component Value Date    TRIG 60 06/13/2024    TRIG 70 02/06/2024    TRIG 127 12/29/2021     No components found for: \"CHOLHDL\"     IMAGING  Encounter Date: 02/28/25   ECG 12 lead   Result Value    Ventricular Rate 72    Atrial Rate 72    NY Interval 156    QRS Duration 80    QT Interval 398    QTC Calculation(Bazett) 436    P Axis 33    R Axis 61    T Axis 28    QRS Count 11    Q Onset 252    T Offset 451    QTC Fredericia 423    Narrative    Sinus rhythm    Confirmed by Gopal Ennis ([id number]) on 2/28/2025 7:35:13 PM        ASSESSMENT/PLAN  Voice hoarseness, left vocal cord paresis  MicroDirect laryngoscopy with Restylane injection      Preoperative instructions reviewed in detail with patient during this encounter. A copy of these instructions has been unloaded to  [employer] along with a copy sent to either home email address or mailed to home address.  This note was created in part upon personal review of patient's medical records.  Speech recognition transcription software was used in the creation of this note. Despite proofreading, several typographical errors might be present that might affect the meaning of the content.            [1]   Patient Active Problem List  Diagnosis    Achilles tendinitis of left lower extremity    Cervical spondylosis without myelopathy    Chronic cough    Complete paralysis of left vocal cord    COPD, mild (Multi)    Degeneration of intervertebral disc of lumbosacral region    Depression, recurrent (CMS-HCC)    Fibroids    Functional dyspareunia    Herniated nucleus pulposus, C5-6    Bilateral sacroiliitis    Lesion of vocal cord    Trauma to vocal cord    Lumbar spondylosis    Myofascial pain syndrome of lumbar spine    Right lumbar radiculitis    Menses, irregular    Mild anemia    Numbness of fingers of both hands    Other cervical disc displacement at C5-C6 level    Overweight (BMI 25.0-29.9)    Pelvic mass in female    " Perimenopause    Prediabetes    PTSD (post-traumatic stress disorder)    Recent change in weight    Right cervical radiculopathy    Shortness of breath    Smoking addiction    TEETEE (stress urinary incontinence, female)    Vaginal dryness    Voice hoarseness    Vulvar mass    Vaginal atrophy    Adenomyosis of the uterus    Lymphangioma, any site    Encounter for surveillance of contraceptive pills    Dysphonia    Neck pain    Tobacco dependency    LPRD (laryngopharyngeal reflux disease)    Sensorineural hearing loss (SNHL) of both ears    Glottic insufficiency    Hoarseness of voice    Menorrhagia with irregular cycle    Burn of wrist    Labial cyst    Menopausal osteoporosis    Ankle pain    Spinal stenosis of cervical region    Injury of right foot    Subjective hearing loss    Urinary incontinence    Vaginal discharge    Hypoglycemia    Hyperlipidemia    Osteopenia    Acute vaginitis    Herpes genitalis in women    Current severe episode of major depressive disorder without psychotic features without prior episode (Multi)   [2]   Past Medical History:  Diagnosis Date    ADHD (attention deficit hyperactivity disorder)     Adjustment disorder     Anxiety     Chronic pain disorder     COPD (chronic obstructive pulmonary disease) (Multi)     Depression     Hallucination     Hyperlipidemia     Memory loss     Panic attack     Post-traumatic stress disorder, unspecified 08/02/2022    PTSD (post-traumatic stress disorder)    Psychiatric illness     PTSD (post-traumatic stress disorder)     Sleep difficulties    [3]   Past Surgical History:  Procedure Laterality Date    OTHER SURGICAL HISTORY  03/29/2022    Vocal Cordectomy    OTHER SURGICAL HISTORY  03/29/2022    Breast surgery/ augmentation   [4]   Family History  Problem Relation Name Age of Onset    Hypertension Mother      Heart disease Father      Diabetes Father      Hypertension Father     [5] No Known Allergies  [6] No current facility-administered medications for  this encounter.    Current Outpatient Medications:     albuterol 90 mcg/actuation inhaler, INHALE 2 PUFFS BY MOUTH EVERY 4 HOURS AS NEEDED FOR WHEEZING OR SHORTNESS OF BREATH, Disp: 8.5 g, Rfl: 2    clonazePAM (KlonoPIN) 1 mg tablet, Take 1 tablet (1 mg) by mouth 2 times a day., Disp: 180 tablet, Rfl: 0    escitalopram (Lexapro) 20 mg tablet, Take 1 tablet (20 mg) by mouth once daily., Disp: 90 tablet, Rfl: 3    Incassia 0.35 mg tablet, TAKE 1 TABLET DAILY, Disp: 84 tablet, Rfl: 3    prazosin (Minipress) 5 mg capsule, Take 1 capsule (5 mg) by mouth 2 times a day. For nightmares, Disp: 180 capsule, Rfl: 3    QUEtiapine (SEROquel) 200 mg tablet, Take 1 tablet (200 mg) by mouth once daily at bedtime., Disp: 90 tablet, Rfl: 3    risperiDONE (RisperDAL) 1 mg tablet, Take 1 tablet (1 mg) by mouth 2 times a day., Disp: 180 tablet, Rfl: 1    risperiDONE (RisperDAL) 4 mg tablet, Take 1 tablet (4 mg) by mouth once daily. Take 1 tablet  (2 mg) at bedtime, and 1/2 tablet (1 mg) in the morning. Thanks., Disp: 90 tablet, Rfl: 3    acetaminophen (Tylenol) 500 mg tablet, Take 2 tablets (1,000 mg) by mouth every 6 hours if needed for mild pain (1 - 3)., Disp: 30 tablet, Rfl: 0    estradiol (Vagifem) 10 mcg tablet vaginal tablet, Insert 1 tablet (10 mcg) into the vagina 2 times a week., Disp: 24 tablet, Rfl: 3    ketorolac (Toradol) 10 mg tablet, Take 1 tablet (10 mg) by mouth every 6 hours if needed for moderate pain (4 - 6)., Disp: 20 tablet, Rfl: 0    ospemifene (Osphena) 60 mg tablet, Take 1 tablet by mouth once daily., Disp: , Rfl:     prasterone, dhea, (Intrarosa) 6.5 mg insert, Insert 6.5 mg into the vagina once daily., Disp: 30 each, Rfl: 3    rosuvastatin (Crestor) 5 mg tablet, TAKE 1 TABLET(5 MG) BY MOUTH DAILY, Disp: 90 tablet, Rfl: 1    tamsulosin (Flomax) 0.4 mg 24 hr capsule, Take 3 days before surgery and 7 days after (Patient not taking: Reported on 4/23/2025), Disp: 10 capsule, Rfl: 0    tamsulosin (Flomax) 0.4 mg 24  hr capsule, Take 1 capsule by mouth starting 3 days before surgery and for 7 days after (Patient not taking: Reported on 4/23/2025), Disp: 10 capsule, Rfl: 0    traMADol (Ultram) 50 mg tablet, Take 1 tablet (50 mg) by mouth every 6 hours if needed for severe pain (7 - 10)., Disp: 15 tablet, Rfl: 0    valACYclovir (Valtrex) 500 mg tablet, Take 1 tablet (500 mg) by mouth once daily. Take twice daily for 5 days if an outbreak occurs., Disp: 90 tablet, Rfl: 3

## 2025-04-24 LAB
COMMENT: NORMAL
HPV OTHER HR TYPES: NOT DETECTED
HPV TYPE 16: NOT DETECTED
HPV TYPE 18: NOT DETECTED

## 2025-04-25 LAB — GYNECOLOGY CYTOLOGY REPORT: NORMAL

## 2025-04-28 ENCOUNTER — OFFICE VISIT (OUTPATIENT)
Dept: GASTROENTEROLOGY | Age: 60
End: 2025-04-28
Payer: COMMERCIAL

## 2025-04-28 ENCOUNTER — OFFICE VISIT (OUTPATIENT)
Dept: CARDIOLOGY CLINIC | Age: 60
End: 2025-04-28
Payer: COMMERCIAL

## 2025-04-28 VITALS
OXYGEN SATURATION: 97 % | WEIGHT: 146 LBS | HEIGHT: 67 IN | SYSTOLIC BLOOD PRESSURE: 128 MMHG | DIASTOLIC BLOOD PRESSURE: 80 MMHG | RESPIRATION RATE: 14 BRPM | HEART RATE: 80 BPM | BODY MASS INDEX: 22.91 KG/M2

## 2025-04-28 VITALS
WEIGHT: 146.8 LBS | DIASTOLIC BLOOD PRESSURE: 68 MMHG | BODY MASS INDEX: 23.04 KG/M2 | HEIGHT: 67 IN | HEART RATE: 88 BPM | SYSTOLIC BLOOD PRESSURE: 122 MMHG

## 2025-04-28 DIAGNOSIS — I10 ESSENTIAL HYPERTENSION: ICD-10-CM

## 2025-04-28 DIAGNOSIS — Z95.0 CARDIAC PACEMAKER IN SITU: ICD-10-CM

## 2025-04-28 DIAGNOSIS — G89.29 CHRONIC ABDOMINAL PAIN: Primary | ICD-10-CM

## 2025-04-28 DIAGNOSIS — R11.0 NAUSEA: ICD-10-CM

## 2025-04-28 DIAGNOSIS — I47.10 SVT (SUPRAVENTRICULAR TACHYCARDIA): ICD-10-CM

## 2025-04-28 DIAGNOSIS — K21.9 GASTROESOPHAGEAL REFLUX DISEASE WITHOUT ESOPHAGITIS: ICD-10-CM

## 2025-04-28 DIAGNOSIS — F41.9 ANXIETY: ICD-10-CM

## 2025-04-28 DIAGNOSIS — K21.9 GASTROESOPHAGEAL REFLUX DISEASE, UNSPECIFIED WHETHER ESOPHAGITIS PRESENT: ICD-10-CM

## 2025-04-28 DIAGNOSIS — R10.9 CHRONIC ABDOMINAL PAIN: Primary | ICD-10-CM

## 2025-04-28 DIAGNOSIS — F12.90 CANNABIS USE WITHOUT COMPLICATION: ICD-10-CM

## 2025-04-28 DIAGNOSIS — I47.10 SVT (SUPRAVENTRICULAR TACHYCARDIA): Primary | ICD-10-CM

## 2025-04-28 DIAGNOSIS — Z87.19 HISTORY OF CONSTIPATION: ICD-10-CM

## 2025-04-28 PROCEDURE — G8427 DOCREV CUR MEDS BY ELIG CLIN: HCPCS | Performed by: NURSE PRACTITIONER

## 2025-04-28 PROCEDURE — 3078F DIAST BP <80 MM HG: CPT | Performed by: NURSE PRACTITIONER

## 2025-04-28 PROCEDURE — 3017F COLORECTAL CA SCREEN DOC REV: CPT | Performed by: NURSE PRACTITIONER

## 2025-04-28 PROCEDURE — 3079F DIAST BP 80-89 MM HG: CPT | Performed by: NURSE PRACTITIONER

## 2025-04-28 PROCEDURE — 1036F TOBACCO NON-USER: CPT | Performed by: NURSE PRACTITIONER

## 2025-04-28 PROCEDURE — G8420 CALC BMI NORM PARAMETERS: HCPCS | Performed by: NURSE PRACTITIONER

## 2025-04-28 PROCEDURE — 99214 OFFICE O/P EST MOD 30 MIN: CPT | Performed by: NURSE PRACTITIONER

## 2025-04-28 PROCEDURE — 3074F SYST BP LT 130 MM HG: CPT | Performed by: NURSE PRACTITIONER

## 2025-04-28 PROCEDURE — G2211 COMPLEX E/M VISIT ADD ON: HCPCS | Performed by: NURSE PRACTITIONER

## 2025-04-28 PROCEDURE — 99213 OFFICE O/P EST LOW 20 MIN: CPT | Performed by: NURSE PRACTITIONER

## 2025-04-28 RX ORDER — METOCLOPRAMIDE 5 MG/1
5 TABLET ORAL
Qty: 60 TABLET | Refills: 5 | Status: SHIPPED | OUTPATIENT
Start: 2025-04-28

## 2025-04-28 RX ORDER — OMEPRAZOLE 40 MG/1
40 CAPSULE, DELAYED RELEASE ORAL DAILY
Qty: 90 CAPSULE | Refills: 5 | Status: SHIPPED | OUTPATIENT
Start: 2025-04-28

## 2025-04-28 RX ORDER — SUCRALFATE 1 G/1
1 TABLET ORAL 2 TIMES DAILY
Qty: 60 TABLET | Refills: 5 | Status: SHIPPED | OUTPATIENT
Start: 2025-04-28

## 2025-04-28 NOTE — PROGRESS NOTES
CLINICAL STAFF DOCUMENTATION    Aurora Domingo, EVANGELINA     Beverly Valencia  1965  [id number]    Have you had any Chest Pain recently? - No  Have you had any Shortness of Breath - Yes  When did it begin? - Months   If Yes - When on exertion  Have you had any dizziness - No  Have you had any palpitations recently? - No  Do you have any edema - swelling in No      Do you have a surgery or procedure scheduled in the near future - No  Do use tobacco products? - No  Do you drink alcohol? - No  Do you use any illicit drugs? - Yes marijuana  Caffeine? - Yes  How much caffeine? .1  cups       Check medication list thoroughly!!! AND RECONCILE OUTSIDE MEDICATIONS  If dose has changed change the entire order not just the MG  BE SURE TO ASK PATIENT IF THEY NEED MEDICATION REFILLS  Verify Pharmacy and update if incorrect    Add to every patient's \"wrap up\" the following dot phrase AFTERVISITCARDIOHEARTHOUSE and ensure we explain this to our patients

## 2025-04-28 NOTE — PROGRESS NOTES
CARDIOLOGY  NOTE    2025    Beverly Valencia (:  1965) is a 60 y.o. female,an established patient with Dr. Cummings, here for evaluation of the following chief complaint(s):  Shortness of Breath        SUBJECTIVE/OBJECTIVE:  History of Present Illness  The patient presents for evaluation of blood pressure management.    A stable condition is reported with no recent surgical interventions. Discontinuation of gummies is noted, with smoking being preferred for managing anxiety and pain. Daily blood pressure monitoring has been ceased, with checks occurring only during discomfort. No recent episodes of low blood pressure are reported. Adherence to the prescribed medication regimen, including Cardizem 240 mg once daily, is confirmed. Midodrine has been discontinued since the last procedure.    SOCIAL HISTORY  Tobacco: The patient admits to smoking marijuana        Tobacco Use      Smoking status: Former        Packs/day: 0.00        Years: 0.3 packs/day for 34.0 years (8.5 ttl pk-yrs)        Types: Cigarettes, E-Cigarettes        Start date:         Quit date: 2020        Years since quittin.3      Smokeless tobacco: Never. she denies any issues with obtaining taking or side effects from medications.       Review of Systems   Constitutional:  Negative for fatigue and fever.   Respiratory:  Negative for cough and shortness of breath.    Cardiovascular:  Negative for chest pain, palpitations and leg swelling.   Musculoskeletal:  Negative for arthralgias and gait problem.   Neurological:  Negative for dizziness, syncope, weakness, light-headedness and headaches.       Vitals:    25 0923   BP: 122/68   BP Site: Left Upper Arm   Patient Position: Sitting   BP Cuff Size: Medium Adult   Pulse: 88   Weight: 66.6 kg (146 lb 12.8 oz)   Height: 1.702 m (5' 7\")       Wt Readings from Last 3 Encounters:   25 66.6 kg (146 lb 12.8 oz)   25 64.9 kg (143 lb)   25 64.4 kg (142 lb)       BP

## 2025-04-28 NOTE — PROGRESS NOTES
Beverly Valencia 60 y.o. female was seen by DEIRDRE Montano on 4/28/2025     Wt Readings from Last 3 Encounters:   04/28/25 66.2 kg (146 lb)   04/28/25 66.6 kg (146 lb 12.8 oz)   04/21/25 64.9 kg (143 lb)       HPI  Beverly Valencia is a pleasant 60 y.o.  female who presents today for follow-up on acid reflux, chronic abdominal pain, constipation and nausea.  She denies NSAID use.  She continues to take Percocet a couple times a day for chronic pain.   She had laparoscopic sleeve gastrectomy done by Dr. Taylor on 10-.  She had multiple abdominal surgeries afterwards.  She abdominoplasty, panniculectomy  done by Dr. Taylor on 9-.  Per chart review she continued to have pain and went to [facility] plastic surgeon Dr. Rayo and had umbilical hernia and scar tissue removal.  Her colonoscopy was done by Dr. Taylor on 07/27/2020 with mild diverticulosis, and small ascending colon polyp that was removed. She has been evaluated by Dr. Orozco  with EGD done on January 2023 revealing evidence of sleeve gastrectomy, characterized by healthy-appearing mucosa biopsies were negative for H. pylori.  Upper GI study also revealed postsurgical changes status post bariatric surgery otherwise unremarkable. Her appetite is fair and weight is stable.  Her nausea has improved with taking Reglan.  No vomiting.  No abdominal pain.  Intermittent pain comes and goes with last episode in the morning described as a throbbing pain but Prilosec helps and resting on her side.  Her heartburn and acid reflux is controlled with taking Prilosec daily.  No nocturnal awakenings with acid reflux.  No dysphagia or pain with swallowing.  No excess belching.  She has excess flatulence.   Her bowels are moving daily with soft brown formed.  No current constipation.  No diarrhea.  No blood in her stools or melena.       ROS  Review of Systems   Constitutional:  Negative for chills, diaphoresis, fatigue and fever.

## 2025-04-29 PROBLEM — F12.90 CANNABIS USE WITHOUT COMPLICATION: Status: ACTIVE | Noted: 2025-04-29

## 2025-04-29 RX ORDER — DILTIAZEM HYDROCHLORIDE 240 MG/1
240 CAPSULE, COATED, EXTENDED RELEASE ORAL DAILY
Qty: 30 CAPSULE | Refills: 5 | Status: SHIPPED | OUTPATIENT
Start: 2025-04-29

## 2025-04-29 ASSESSMENT — ENCOUNTER SYMPTOMS
COUGH: 0
SHORTNESS OF BREATH: 0

## 2025-04-30 ENCOUNTER — OFFICE VISIT (OUTPATIENT)
Dept: FAMILY MEDICINE CLINIC | Age: 60
End: 2025-04-30
Payer: COMMERCIAL

## 2025-04-30 ENCOUNTER — TELEPHONE (OUTPATIENT)
Dept: CARDIOLOGY CLINIC | Age: 60
End: 2025-04-30

## 2025-04-30 VITALS
BODY MASS INDEX: 22.49 KG/M2 | HEART RATE: 86 BPM | OXYGEN SATURATION: 98 % | SYSTOLIC BLOOD PRESSURE: 130 MMHG | DIASTOLIC BLOOD PRESSURE: 76 MMHG | WEIGHT: 143.6 LBS

## 2025-04-30 DIAGNOSIS — H10.32 ACUTE CONJUNCTIVITIS OF LEFT EYE, UNSPECIFIED ACUTE CONJUNCTIVITIS TYPE: Primary | ICD-10-CM

## 2025-04-30 PROCEDURE — 99213 OFFICE O/P EST LOW 20 MIN: CPT | Performed by: NURSE PRACTITIONER

## 2025-04-30 PROCEDURE — G8420 CALC BMI NORM PARAMETERS: HCPCS | Performed by: NURSE PRACTITIONER

## 2025-04-30 PROCEDURE — 3078F DIAST BP <80 MM HG: CPT | Performed by: NURSE PRACTITIONER

## 2025-04-30 PROCEDURE — 3075F SYST BP GE 130 - 139MM HG: CPT | Performed by: NURSE PRACTITIONER

## 2025-04-30 PROCEDURE — G8427 DOCREV CUR MEDS BY ELIG CLIN: HCPCS | Performed by: NURSE PRACTITIONER

## 2025-04-30 PROCEDURE — 3017F COLORECTAL CA SCREEN DOC REV: CPT | Performed by: NURSE PRACTITIONER

## 2025-04-30 PROCEDURE — 1036F TOBACCO NON-USER: CPT | Performed by: NURSE PRACTITIONER

## 2025-04-30 RX ORDER — CETIRIZINE HYDROCHLORIDE 10 MG/1
10 TABLET ORAL DAILY
Qty: 90 TABLET | Refills: 1 | Status: SHIPPED | OUTPATIENT
Start: 2025-04-30

## 2025-04-30 RX ORDER — METHYLPREDNISOLONE 4 MG/1
TABLET ORAL
Qty: 1 KIT | Refills: 0 | Status: SHIPPED | OUTPATIENT
Start: 2025-04-30

## 2025-04-30 ASSESSMENT — VISUAL ACUITY: OU: 1

## 2025-04-30 NOTE — PROGRESS NOTES
2025     Beverly Valencia (:  1965) is a 60 y.o. female, here for evaluation of the following medical concerns:    History of Present Illness  The patient presents for evaluation of eye redness.    Eye Redness  - Persistent eye redness has been experienced, initially attributed to conjunctivitis due to rubbing the eyes at night.  - A sensation of a foreign body in the eye is reported, but no changes in vision have been noted.  - Occasionally, the eye appears blue or gray.  - Swelling is most pronounced upon waking in the morning.  - A cold compress has been applied as advised by the optometrist.  - Medical attention has been sought from an ophthalmologist on three separate occasions.  - Initially, antibiotic eyedrops were prescribed, then discontinued, and later restarted.  - The medication ran out, and a refill was not obtained.  - Over-the-counter eyedrops were used, providing some relief, but redness persists.  - A thorough examination by the ophthalmologist, including numbing the eye and conducting tests, revealed no abnormalities.  - Referral to another eye surgeon has been made, with an appointment scheduled for Monday.    Supplemental information: She recently completed a course of amoxicillin prescribed by her ENT specialist. Flonase use has been discontinued, and there is uncertainty about the current use of Zyrtec.               Prior to Visit Medications    Medication Sig Taking? Authorizing Provider   cetirizine (ZYRTEC) 10 MG tablet Take 1 tablet by mouth daily Yes Evi Lopez APRN - NP   methylPREDNISolone (MEDROL DOSEPACK) 4 MG tablet Take by mouth. Yes Evi Lopez APRN - NP   dilTIAZem (CARDIZEM CD) 240 MG extended release capsule Take 1 capsule by mouth daily Yes Aurora Domingo APRN - CNP   metoclopramide (REGLAN) 5 MG tablet Take 1 tablet by mouth 2 times daily (before meals) Yes Maggy Rodriguez APRN - CNP   omeprazole (PRILOSEC) 40 MG delayed release capsule Take 1

## 2025-05-27 ENCOUNTER — OFFICE VISIT (OUTPATIENT)
Dept: INFECTIOUS DISEASES | Age: 60
End: 2025-05-27
Payer: COMMERCIAL

## 2025-05-27 VITALS — HEART RATE: 75 BPM | SYSTOLIC BLOOD PRESSURE: 143 MMHG | DIASTOLIC BLOOD PRESSURE: 89 MMHG

## 2025-05-27 DIAGNOSIS — A60.09 HERPES GENITALIS IN WOMEN: Primary | ICD-10-CM

## 2025-05-27 PROCEDURE — 3077F SYST BP >= 140 MM HG: CPT | Performed by: INTERNAL MEDICINE

## 2025-05-27 PROCEDURE — 3017F COLORECTAL CA SCREEN DOC REV: CPT | Performed by: INTERNAL MEDICINE

## 2025-05-27 PROCEDURE — 1036F TOBACCO NON-USER: CPT | Performed by: INTERNAL MEDICINE

## 2025-05-27 PROCEDURE — 99213 OFFICE O/P EST LOW 20 MIN: CPT | Performed by: INTERNAL MEDICINE

## 2025-05-27 PROCEDURE — G8427 DOCREV CUR MEDS BY ELIG CLIN: HCPCS | Performed by: INTERNAL MEDICINE

## 2025-05-27 PROCEDURE — 3079F DIAST BP 80-89 MM HG: CPT | Performed by: INTERNAL MEDICINE

## 2025-05-27 PROCEDURE — G8420 CALC BMI NORM PARAMETERS: HCPCS | Performed by: INTERNAL MEDICINE

## 2025-05-27 RX ORDER — VALACYCLOVIR HYDROCHLORIDE 500 MG/1
500 TABLET, FILM COATED ORAL DAILY
Qty: 30 TABLET | Refills: 5 | Status: SHIPPED | OUTPATIENT
Start: 2025-05-27 | End: 2025-11-23

## 2025-05-27 NOTE — PROGRESS NOTES
oxyCODONE-acetaminophen (PERCOCET) 7.5-325 MG per tablet Take 1 tablet by mouth every 8 hours as needed for Pain (Patient states takes every 6 hours).       No current facility-administered medications for this visit.       Past Medical History:   Diagnosis Date    Adenomatous polyp of ascending colon 8/7/2020    Anxiety     Arthritis     \"Back, Hands, Shoulders\"    Chronic back pain     Arthritis - NKI    COPD (chronic obstructive pulmonary disease) ([facility])     Sees Dr. Spenser MAXWELL-19     Depression     Follows with Mental Health    Diabetes mellitus (HCC) Dx 2008    Type II - follows with PCP    Emphysema     Fibromyalgia     H/O abdominoplasty 12/21/2018    H/O cardiovascular stress test 03/04/2019    ECG portion of stress test is neg for ischemia by diagnostic criteria. No infarct or ischemia noted. Normal stress myocardial perfusion. This is a normal study    H/O echocardiogram 03/04/2019    Left ventricular function is normal. Ejection fraction is visually estimated at 55-60%. No significant valvular abnormalitites.     H/O tilt table evaluation 08/26/2021    Orthostatic syncope.    History of MRSA infection 6/23/2013    Hot flashes     Hyperlipidemia     Hypersomnia 8/5/2021    Hypertension     Follows with PCP    Migraines Last Migraine 7/20/21    Mobitz (type) I (Wenckebach's) atrioventricular block 01/29/2020    Neuropathy     From feet to knees    Night sweat     Piriformis syndrome 06/24/2016    Polyp of cecum     Prolonged emergence from general anesthesia     Psoriasis     Rotator cuff impingement syndrome, right 02/26/2018    RTC repair Dr Lam 2015  Treated by Dr Arellano 2/18/17  Arthrocentesis 12/19/17    S/P abdominoplasty 2/1/2019    S/P laparoscopic sleeve gastrectomy 11/2/2017    S/P panniculectomy 3/5/2021    Shortness of breath on exertion     Syncope and collapse 03/04/2019    Teeth missing     Upper And Lower    Wears glasses        Past Surgical History:   Procedure Laterality Date

## 2025-05-28 ENCOUNTER — TELEPHONE (OUTPATIENT)
Dept: FAMILY MEDICINE CLINIC | Age: 60
End: 2025-05-28

## 2025-05-28 DIAGNOSIS — E11.9 TYPE 2 DIABETES MELLITUS WITHOUT COMPLICATION, WITHOUT LONG-TERM CURRENT USE OF INSULIN (HCC): Primary | ICD-10-CM

## 2025-05-28 RX ORDER — GLUCOSAMINE HCL/CHONDROITIN SU 500-400 MG
CAPSULE ORAL
Qty: 100 STRIP | Refills: 0 | Status: SHIPPED | OUTPATIENT
Start: 2025-05-28

## 2025-05-28 NOTE — TELEPHONE ENCOUNTER
Pt needs test strips for the [employer] kit PCP ordered her. Test strips did not come with the kit. Pharmacy [facility] [address]. Please advise

## 2025-06-02 PROCEDURE — 93294 REM INTERROG EVL PM/LDLS PM: CPT | Performed by: INTERNAL MEDICINE

## 2025-06-02 PROCEDURE — 93296 REM INTERROG EVL PM/IDS: CPT | Performed by: INTERNAL MEDICINE

## 2025-06-06 DIAGNOSIS — E11.9 TYPE 2 DIABETES MELLITUS WITHOUT COMPLICATION, WITHOUT LONG-TERM CURRENT USE OF INSULIN (HCC): ICD-10-CM

## 2025-06-06 RX ORDER — CALCIUM CITRATE/VITAMIN D3 200MG-6.25
TABLET ORAL
Qty: 100 STRIP | Refills: 0 | OUTPATIENT
Start: 2025-06-06

## 2025-07-17 ENCOUNTER — OFFICE VISIT (OUTPATIENT)
Dept: FAMILY MEDICINE CLINIC | Age: 60
End: 2025-07-17
Payer: COMMERCIAL

## 2025-07-17 ENCOUNTER — HOSPITAL ENCOUNTER (OUTPATIENT)
Age: 60
Discharge: HOME | End: 2025-07-17
Payer: COMMERCIAL

## 2025-07-17 ENCOUNTER — HOSPITAL ENCOUNTER (OUTPATIENT)
Dept: GENERAL RADIOLOGY | Age: 60
Discharge: HOME | End: 2025-07-17
Payer: COMMERCIAL

## 2025-07-17 VITALS
OXYGEN SATURATION: 90 % | WEIGHT: 143 LBS | BODY MASS INDEX: 21.74 KG/M2 | HEART RATE: 73 BPM | DIASTOLIC BLOOD PRESSURE: 78 MMHG | SYSTOLIC BLOOD PRESSURE: 142 MMHG

## 2025-07-17 DIAGNOSIS — M79.7 FIBROMYALGIA: ICD-10-CM

## 2025-07-17 DIAGNOSIS — F31.31 BIPOLAR AFFECTIVE DISORDER, CURRENTLY DEPRESSED, MILD (HCC): ICD-10-CM

## 2025-07-17 DIAGNOSIS — G89.4 CHRONIC PAIN SYNDROME: ICD-10-CM

## 2025-07-17 DIAGNOSIS — J30.89 NON-SEASONAL ALLERGIC RHINITIS, UNSPECIFIED TRIGGER: ICD-10-CM

## 2025-07-17 DIAGNOSIS — K66.0 ABDOMINAL ADHESIONS: ICD-10-CM

## 2025-07-17 DIAGNOSIS — K31.84 GASTROPARESIS: ICD-10-CM

## 2025-07-17 DIAGNOSIS — Z90.3 H/O GASTRIC SLEEVE: ICD-10-CM

## 2025-07-17 DIAGNOSIS — E78.2 MIXED HYPERLIPIDEMIA: ICD-10-CM

## 2025-07-17 DIAGNOSIS — E11.9 TYPE 2 DIABETES MELLITUS WITHOUT COMPLICATION, WITHOUT LONG-TERM CURRENT USE OF INSULIN (HCC): ICD-10-CM

## 2025-07-17 DIAGNOSIS — F51.01 PRIMARY INSOMNIA: ICD-10-CM

## 2025-07-17 DIAGNOSIS — R10.9 CHRONIC ABDOMINAL PAIN: ICD-10-CM

## 2025-07-17 DIAGNOSIS — J44.9 COPD, SEVERE (HCC): ICD-10-CM

## 2025-07-17 DIAGNOSIS — A60.09 HERPES GENITALIS IN WOMEN: ICD-10-CM

## 2025-07-17 DIAGNOSIS — F41.9 ANXIETY: ICD-10-CM

## 2025-07-17 DIAGNOSIS — E11.9 TYPE 2 DIABETES MELLITUS WITHOUT COMPLICATION, WITHOUT LONG-TERM CURRENT USE OF INSULIN (HCC): Primary | ICD-10-CM

## 2025-07-17 DIAGNOSIS — G89.29 CHRONIC ABDOMINAL PAIN: ICD-10-CM

## 2025-07-17 DIAGNOSIS — E55.9 VITAMIN D DEFICIENCY: ICD-10-CM

## 2025-07-17 DIAGNOSIS — K21.9 GASTROESOPHAGEAL REFLUX DISEASE, UNSPECIFIED WHETHER ESOPHAGITIS PRESENT: ICD-10-CM

## 2025-07-17 DIAGNOSIS — I10 ESSENTIAL HYPERTENSION: ICD-10-CM

## 2025-07-17 DIAGNOSIS — Z95.0 PACEMAKER: ICD-10-CM

## 2025-07-17 LAB
ALBUMIN SERPL-MCNC: 4.1 G/DL (ref 3.4–5)
ALBUMIN/GLOB SERPL: 1.4 {RATIO} (ref 1.1–2.2)
ALP SERPL-CCNC: 68 U/L (ref 40–129)
ALT SERPL-CCNC: 7 U/L (ref 10–40)
ANION GAP SERPL CALCULATED.3IONS-SCNC: 10 MMOL/L (ref 9–17)
AST SERPL-CCNC: 18 U/L (ref 15–37)
BILIRUB SERPL-MCNC: 0.5 MG/DL (ref 0–1)
BUN SERPL-MCNC: 10 MG/DL (ref 7–20)
CALCIUM SERPL-MCNC: 10 MG/DL (ref 8.3–10.6)
CHLORIDE SERPL-SCNC: 102 MMOL/L (ref 99–110)
CO2 SERPL-SCNC: 28 MMOL/L (ref 21–32)
CREAT SERPL-MCNC: 0.8 MG/DL (ref 0.6–1.1)
CREAT UR-MCNC: 96.3 MG/DL (ref 28–217)
EST. AVERAGE GLUCOSE BLD GHB EST-MCNC: 133 MG/DL
GFR, ESTIMATED: 72 ML/MIN/1.73M2
GLUCOSE SERPL-MCNC: 103 MG/DL (ref 74–99)
HBA1C MFR BLD: 6.3 % (ref 4.2–6.3)
MICROALBUMIN UR-MCNC: 42 MG/L (ref 0–20)
MICROALBUMIN/CREAT UR-RTO: 43 MCG/MG CREAT
POTASSIUM SERPL-SCNC: 4 MMOL/L (ref 3.5–5.1)
PROT SERPL-MCNC: 7 G/DL (ref 6.4–8.2)
SODIUM SERPL-SCNC: 139 MMOL/L (ref 136–145)

## 2025-07-17 PROCEDURE — 82570 ASSAY OF URINE CREATININE: CPT

## 2025-07-17 PROCEDURE — G8427 DOCREV CUR MEDS BY ELIG CLIN: HCPCS | Performed by: NURSE PRACTITIONER

## 2025-07-17 PROCEDURE — 3078F DIAST BP <80 MM HG: CPT | Performed by: NURSE PRACTITIONER

## 2025-07-17 PROCEDURE — 1036F TOBACCO NON-USER: CPT | Performed by: NURSE PRACTITIONER

## 2025-07-17 PROCEDURE — 74019 RADEX ABDOMEN 2 VIEWS: CPT

## 2025-07-17 PROCEDURE — 3023F SPIROM DOC REV: CPT | Performed by: NURSE PRACTITIONER

## 2025-07-17 PROCEDURE — 3046F HEMOGLOBIN A1C LEVEL >9.0%: CPT | Performed by: NURSE PRACTITIONER

## 2025-07-17 PROCEDURE — 3017F COLORECTAL CA SCREEN DOC REV: CPT | Performed by: NURSE PRACTITIONER

## 2025-07-17 PROCEDURE — 82043 UR ALBUMIN QUANTITATIVE: CPT

## 2025-07-17 PROCEDURE — 3077F SYST BP >= 140 MM HG: CPT | Performed by: NURSE PRACTITIONER

## 2025-07-17 PROCEDURE — 80053 COMPREHEN METABOLIC PANEL: CPT

## 2025-07-17 PROCEDURE — 2022F DILAT RTA XM EVC RTNOPTHY: CPT | Performed by: NURSE PRACTITIONER

## 2025-07-17 PROCEDURE — 99214 OFFICE O/P EST MOD 30 MIN: CPT | Performed by: NURSE PRACTITIONER

## 2025-07-17 PROCEDURE — G8420 CALC BMI NORM PARAMETERS: HCPCS | Performed by: NURSE PRACTITIONER

## 2025-07-17 PROCEDURE — 83036 HEMOGLOBIN GLYCOSYLATED A1C: CPT

## 2025-07-17 RX ORDER — FEXOFENADINE HCL 180 MG/1
180 TABLET ORAL DAILY
Qty: 90 TABLET | Refills: 0 | Status: SHIPPED | OUTPATIENT
Start: 2025-07-17

## 2025-07-17 RX ORDER — QUETIAPINE FUMARATE 100 MG/1
100 TABLET, FILM COATED ORAL NIGHTLY
Qty: 90 TABLET | Refills: 1 | Status: SHIPPED | OUTPATIENT
Start: 2025-07-17

## 2025-07-17 NOTE — PROGRESS NOTES
2025     Beverly Valencia (:  1965) is a 60 y.o. female, here for evaluation of the following medical concerns:    History of Present Illness  The patient presents for evaluation of productive cough, abdominal pain, and upper back pain.    Productive Cough  - She continues to experience a productive cough, with the phlegm being thick and green in color.  - This morning, she noticed an increase in the green hue of the phlegm.  - She has been under the care of an ENT specialist who prescribed a nasal spray, but she discontinued its use due to excessive dryness.  - A few months ago, she consulted with Dr. Ramirez, a pulmonologist, who found no abnormalities.  - Despite this, she continues to cough up phlegm throughout the day.  - She is currently taking Zyrtec and Flonase, but wishes to discontinue the latter.  - She has been prescribed antibiotics on several occasions, but these have not alleviated her symptoms.  - She recalls that her symptoms began after receiving an injection in 2024, which resulted in an ER visit.  - Since then, she has been experiencing breathing difficulties and phlegm production.  - Her current medications include albuterol, Symbicort, ProAir, Spiriva, and Breo.  - She has not tried Allegra and is unsure if she has used Claritin in the past.    Abdominal Pain  - She is requesting an x-ray of her abdomen due to persistent pain that she describes as similar to the discomfort she experienced post-surgery two years ago.  - The pain is located in her upper abdomen and extends across her stomach.  Multiple abdominal surgeries history  Adhesions  Chronic abdominal pain  Follows with surgeon      Upper Back Pain  - She has been experiencing significant upper back pain and is under the care of a pain management specialist.  - She is scheduled for another injection in her back, but not near her lungs.  - She is willing to try this treatment.  - Her current medications include Percocet,

## 2025-07-18 ENCOUNTER — TELEPHONE (OUTPATIENT)
Dept: FAMILY MEDICINE CLINIC | Age: 60
End: 2025-07-18

## 2025-07-18 NOTE — TELEPHONE ENCOUNTER
Patient called and requested a handicapped letter stated that she had forgot to speak to you about it in her appointment on 7/17/25.  Please advise.

## 2025-07-31 ENCOUNTER — HOSPITAL ENCOUNTER (OUTPATIENT)
Dept: CT IMAGING | Age: 60
Discharge: HOME | End: 2025-07-31
Payer: COMMERCIAL

## 2025-07-31 DIAGNOSIS — Z90.3 H/O GASTRIC SLEEVE: ICD-10-CM

## 2025-07-31 DIAGNOSIS — K31.84 GASTROPARESIS: ICD-10-CM

## 2025-07-31 DIAGNOSIS — R93.5 ABNORMAL ABDOMINAL X-RAY: ICD-10-CM

## 2025-07-31 DIAGNOSIS — K66.0 ABDOMINAL ADHESIONS: ICD-10-CM

## 2025-07-31 PROCEDURE — 74150 CT ABDOMEN W/O CONTRAST: CPT

## 2025-08-07 ENCOUNTER — TELEPHONE (OUTPATIENT)
Dept: CARDIOLOGY CLINIC | Age: 60
End: 2025-08-07

## 2025-08-07 DIAGNOSIS — I31.39 PERICARDIAL EFFUSION: Primary | ICD-10-CM

## 2025-08-12 ENCOUNTER — HOSPITAL ENCOUNTER (OUTPATIENT)
Dept: INFUSION THERAPY | Age: 60
Discharge: HOME | End: 2025-08-12
Payer: COMMERCIAL

## 2025-08-12 DIAGNOSIS — D64.9 ANEMIA, UNSPECIFIED TYPE: ICD-10-CM

## 2025-08-12 LAB
BASOPHILS # BLD: 0.03 K/UL
BASOPHILS NFR BLD: 0 % (ref 0–1)
EOSINOPHIL # BLD: 0.02 K/UL
EOSINOPHILS RELATIVE PERCENT: 0 % (ref 0–3)
ERYTHROCYTE [DISTWIDTH] IN BLOOD BY AUTOMATED COUNT: 14.1 % (ref 11.7–14.9)
FERRITIN SERPL-MCNC: 40 NG/ML (ref 15–150)
HCT VFR BLD AUTO: 38.8 % (ref 37–47)
HGB BLD-MCNC: 13.5 G/DL (ref 12.5–16)
IRON SATN MFR SERPL: 27 % (ref 15–50)
IRON SERPL-MCNC: 81 UG/DL (ref 37–145)
LYMPHOCYTES NFR BLD: 1.37 K/UL
LYMPHOCYTES RELATIVE PERCENT: 18 % (ref 24–44)
MCH RBC QN AUTO: 26.6 PG (ref 27–31)
MCHC RBC AUTO-ENTMCNC: 34.8 G/DL (ref 32–36)
MCV RBC AUTO: 76.5 FL (ref 78–100)
MONOCYTES NFR BLD: 0.42 K/UL
MONOCYTES NFR BLD: 5 % (ref 0–5)
NEUTROPHILS NFR BLD: 76 % (ref 36–66)
NEUTS SEG NFR BLD: 5.88 K/UL
PLATELET # BLD AUTO: 281 K/UL (ref 140–440)
PMV BLD AUTO: 10.3 FL (ref 7.5–11.1)
RBC # BLD AUTO: 5.07 M/UL (ref 4.2–5.4)
TIBC SERPL-MCNC: 300 UG/DL (ref 260–445)
UNSATURATED IRON BINDING CAPACITY: 219 UG/DL (ref 110–370)
WBC OTHER # BLD: 7.7 K/UL (ref 4–10.5)

## 2025-08-12 PROCEDURE — 82728 ASSAY OF FERRITIN: CPT

## 2025-08-12 PROCEDURE — 36415 COLL VENOUS BLD VENIPUNCTURE: CPT

## 2025-08-12 PROCEDURE — 85025 COMPLETE CBC W/AUTO DIFF WBC: CPT

## 2025-08-12 PROCEDURE — 83550 IRON BINDING TEST: CPT

## 2025-08-12 PROCEDURE — 83540 ASSAY OF IRON: CPT

## 2025-08-19 ENCOUNTER — CLINICAL DOCUMENTATION (OUTPATIENT)
Dept: ONCOLOGY | Age: 60
End: 2025-08-19

## 2025-08-19 ENCOUNTER — HOSPITAL ENCOUNTER (OUTPATIENT)
Dept: INFUSION THERAPY | Age: 60
Discharge: HOME | End: 2025-08-19
Payer: COMMERCIAL

## 2025-08-19 ENCOUNTER — OFFICE VISIT (OUTPATIENT)
Dept: ONCOLOGY | Age: 60
End: 2025-08-19
Payer: COMMERCIAL

## 2025-08-19 VITALS
WEIGHT: 142 LBS | SYSTOLIC BLOOD PRESSURE: 139 MMHG | TEMPERATURE: 97.6 F | OXYGEN SATURATION: 97 % | BODY MASS INDEX: 21.52 KG/M2 | HEART RATE: 75 BPM | HEIGHT: 68 IN | DIASTOLIC BLOOD PRESSURE: 82 MMHG

## 2025-08-19 DIAGNOSIS — Z12.31 OTHER SCREENING MAMMOGRAM: Primary | ICD-10-CM

## 2025-08-19 DIAGNOSIS — D64.9 ANEMIA, UNSPECIFIED TYPE: ICD-10-CM

## 2025-08-19 DIAGNOSIS — D58.2 HEMOGLOBIN C (HB-C): ICD-10-CM

## 2025-08-19 PROCEDURE — 99212 OFFICE O/P EST SF 10 MIN: CPT

## 2025-08-19 PROCEDURE — 1036F TOBACCO NON-USER: CPT | Performed by: INTERNAL MEDICINE

## 2025-08-19 PROCEDURE — G8427 DOCREV CUR MEDS BY ELIG CLIN: HCPCS | Performed by: INTERNAL MEDICINE

## 2025-08-19 PROCEDURE — 3075F SYST BP GE 130 - 139MM HG: CPT | Performed by: INTERNAL MEDICINE

## 2025-08-19 PROCEDURE — 3079F DIAST BP 80-89 MM HG: CPT | Performed by: INTERNAL MEDICINE

## 2025-08-19 PROCEDURE — 3017F COLORECTAL CA SCREEN DOC REV: CPT | Performed by: INTERNAL MEDICINE

## 2025-08-19 PROCEDURE — G8420 CALC BMI NORM PARAMETERS: HCPCS | Performed by: INTERNAL MEDICINE

## 2025-08-19 PROCEDURE — 99213 OFFICE O/P EST LOW 20 MIN: CPT | Performed by: INTERNAL MEDICINE

## 2025-08-27 ENCOUNTER — HOSPITAL ENCOUNTER (OUTPATIENT)
Dept: NON INVASIVE DIAGNOSTICS | Age: 60
Discharge: HOME | End: 2025-08-29
Payer: COMMERCIAL

## 2025-08-27 VITALS
BODY MASS INDEX: 21.52 KG/M2 | WEIGHT: 142 LBS | HEIGHT: 68 IN | HEART RATE: 75 BPM | DIASTOLIC BLOOD PRESSURE: 82 MMHG | SYSTOLIC BLOOD PRESSURE: 139 MMHG

## 2025-08-27 DIAGNOSIS — I31.39 PERICARDIAL EFFUSION: ICD-10-CM

## 2025-08-27 LAB
ECHO AO ROOT DIAM: 2.6 CM
ECHO AO ROOT INDEX: 1.47 CM/M2
ECHO AV AREA PEAK VELOCITY: 1.9 CM2
ECHO AV AREA VTI: 1.9 CM2
ECHO AV AREA/BSA PEAK VELOCITY: 1.1 CM2/M2
ECHO AV AREA/BSA VTI: 1.1 CM2/M2
ECHO AV MEAN GRADIENT: 3 MMHG
ECHO AV MEAN VELOCITY: 0.8 M/S
ECHO AV PEAK GRADIENT: 4 MMHG
ECHO AV PEAK VELOCITY: 1.1 M/S
ECHO AV VELOCITY RATIO: 0.64
ECHO AV VTI: 21.6 CM
ECHO BSA: 1.76 M2
ECHO EST RA PRESSURE: 3 MMHG
ECHO IVC PROX: 2.4 CM
ECHO LA AREA 4C: 13.1 CM2
ECHO LA DIAMETER INDEX: 1.07 CM/M2
ECHO LA DIAMETER: 1.9 CM
ECHO LA MAJOR AXIS: 4.2 CM
ECHO LA TO AORTIC ROOT RATIO: 0.73
ECHO LA VOL MOD A4C: 35 ML (ref 22–52)
ECHO LA VOLUME INDEX MOD A4C: 20 ML/M2 (ref 16–34)
ECHO LV E' LATERAL VELOCITY: 8.3 CM/S
ECHO LV E' SEPTAL VELOCITY: 3.9 CM/S
ECHO LV EDV A4C: 74 ML
ECHO LV EDV INDEX A4C: 42 ML/M2
ECHO LV EF PHYSICIAN: 45 %
ECHO LV EJECTION FRACTION A4C: 42 %
ECHO LV ESV A4C: 43 ML
ECHO LV ESV INDEX A4C: 24 ML/M2
ECHO LV FRACTIONAL SHORTENING: 22 % (ref 28–44)
ECHO LV INTERNAL DIMENSION DIASTOLE INDEX: 2.54 CM/M2
ECHO LV INTERNAL DIMENSION DIASTOLIC: 4.5 CM (ref 3.9–5.3)
ECHO LV INTERNAL DIMENSION SYSTOLIC INDEX: 1.98 CM/M2
ECHO LV INTERNAL DIMENSION SYSTOLIC: 3.5 CM
ECHO LV IVSD: 0.6 CM (ref 0.6–0.9)
ECHO LV MASS 2D: 95.7 G (ref 67–162)
ECHO LV MASS INDEX 2D: 54 G/M2 (ref 43–95)
ECHO LV POSTERIOR WALL DIASTOLIC: 0.8 CM (ref 0.6–0.9)
ECHO LV RELATIVE WALL THICKNESS RATIO: 0.36
ECHO LVOT AREA: 3.1 CM2
ECHO LVOT AV VTI INDEX: 0.59
ECHO LVOT DIAM: 2 CM
ECHO LVOT MEAN GRADIENT: 1 MMHG
ECHO LVOT PEAK GRADIENT: 2 MMHG
ECHO LVOT PEAK VELOCITY: 0.7 M/S
ECHO LVOT STROKE VOLUME INDEX: 22.7 ML/M2
ECHO LVOT SV: 40.2 ML
ECHO LVOT VTI: 12.8 CM
ECHO MV A VELOCITY: 0.31 M/S
ECHO MV E DECELERATION TIME (DT): 151 MS
ECHO MV E VELOCITY: 1.03 M/S
ECHO MV E/A RATIO: 3.32
ECHO MV E/E' LATERAL: 12.41
ECHO MV E/E' RATIO (AVERAGED): 19.41
ECHO MV E/E' SEPTAL: 26.41
ECHO RIGHT VENTRICULAR SYSTOLIC PRESSURE (RVSP): 27 MMHG
ECHO TV REGURGITANT MAX VELOCITY: 2.43 M/S
ECHO TV REGURGITANT PEAK GRADIENT: 24 MMHG

## 2025-08-27 PROCEDURE — 93306 TTE W/DOPPLER COMPLETE: CPT

## 2025-08-27 PROCEDURE — 6360000004 HC RX CONTRAST MEDICATION: Performed by: INTERNAL MEDICINE

## 2025-08-27 PROCEDURE — 93306 TTE W/DOPPLER COMPLETE: CPT | Performed by: INTERNAL MEDICINE

## 2025-08-27 RX ADMIN — SULFUR HEXAFLUORIDE 2 ML: 60.7; .19; .19 INJECTION, POWDER, LYOPHILIZED, FOR SUSPENSION INTRAVENOUS; INTRAVESICAL at 08:40

## 2025-08-28 ENCOUNTER — OFFICE VISIT (OUTPATIENT)
Dept: OBGYN | Age: 60
End: 2025-08-28
Payer: COMMERCIAL

## 2025-08-28 VITALS
SYSTOLIC BLOOD PRESSURE: 130 MMHG | WEIGHT: 141.2 LBS | DIASTOLIC BLOOD PRESSURE: 84 MMHG | HEART RATE: 82 BPM | BODY MASS INDEX: 21.47 KG/M2

## 2025-08-28 DIAGNOSIS — N76.4 LABIAL ABSCESS: Primary | ICD-10-CM

## 2025-08-28 PROCEDURE — 56405 I&D VULVA/PERINEAL ABSCESS: CPT | Performed by: NURSE PRACTITIONER

## 2025-08-28 RX ORDER — SULFAMETHOXAZOLE AND TRIMETHOPRIM 800; 160 MG/1; MG/1
1 TABLET ORAL 2 TIMES DAILY
Qty: 20 TABLET | Refills: 0 | Status: SHIPPED | OUTPATIENT
Start: 2025-08-28 | End: 2025-09-07

## 2025-09-02 ENCOUNTER — TELEPHONE (OUTPATIENT)
Dept: CARDIOLOGY CLINIC | Age: 60
End: 2025-09-02

## 2025-09-03 ENCOUNTER — TELEPHONE (OUTPATIENT)
Dept: CARDIOLOGY CLINIC | Age: 60
End: 2025-09-03

## 2025-09-05 ENCOUNTER — OFFICE VISIT (OUTPATIENT)
Dept: CARDIOLOGY CLINIC | Age: 60
End: 2025-09-05

## 2025-09-05 VITALS
HEIGHT: 67 IN | SYSTOLIC BLOOD PRESSURE: 120 MMHG | HEART RATE: 80 BPM | BODY MASS INDEX: 22.76 KG/M2 | DIASTOLIC BLOOD PRESSURE: 86 MMHG | WEIGHT: 145 LBS

## 2025-09-05 DIAGNOSIS — R06.02 SHORTNESS OF BREATH: ICD-10-CM

## 2025-09-05 DIAGNOSIS — R07.9 CHEST PAIN, UNSPECIFIED TYPE: Primary | ICD-10-CM

## (undated) DEVICE — SYSTEM SKIN CLSR 22CM DERMBND PRINEO

## (undated) DEVICE — ATTACHMENT SMK EVAC FOR ES PNCL ACCUVAC

## (undated) DEVICE — SPONGE GZ W4XL8IN COT WVN 12 PLY

## (undated) DEVICE — SPONGE 4X4IN 6 PLY NONWOV DRAIN LINT

## (undated) DEVICE — PAD,ABDOMINAL,5"X9",ST,LF,25/BX: Brand: MEDLINE INDUSTRIES, INC.

## (undated) DEVICE — MARKER SURG SKIN UTIL REGULAR/FINE 2 TIP W/ RUL AND 9 LBL

## (undated) DEVICE — CIRCUIT BREATHING SINGLE LIMB AD 72 IN 3 LT 2 FILTER LIMBO

## (undated) DEVICE — COUNTER NDL 60 COUNT FOAM STRP SGL MAG

## (undated) DEVICE — DRAPE SHEET ULTRAGARD: Brand: MEDLINE

## (undated) DEVICE — PENCIL ES CRD L10FT HND SWCHING ROCK SWCH W/ EDGE COAT BLDE

## (undated) DEVICE — CHLORAPREP 26ML ORANGE

## (undated) DEVICE — RETRACTOR SURG NARROW FLAT ULT LTWT ELEV TIP LO PROF

## (undated) DEVICE — STERILE LATEX POWDER-FREE SURGICAL GLOVESWITH NITRILE COATING: Brand: PROTEXIS

## (undated) DEVICE — ELECTRODE ES AD CRDLSS PT RET REM POLYHESIVE

## (undated) DEVICE — BINDER ABD H12IN FOR 30-45IN WAIST UNIV 4 PNL PREM DSGN E

## (undated) DEVICE — CORD ES L15FT PT RET REUSE VALLEYLAB REM

## (undated) DEVICE — PACK,BASIC,SIRUS,V: Brand: MEDLINE

## (undated) DEVICE — PACK,BASIC,IX: Brand: MEDLINE

## (undated) DEVICE — LINER,SEMI-RIGID,3000CC,50EA/CS: Brand: MEDLINE

## (undated) DEVICE — SUTURE PROL SZ 2-0 L30IN NONABSORBABLE BLU L26MM SH 1/2 CIR 8833H

## (undated) DEVICE — SPONGE LAP W18XL18IN WHT COT 4 PLY FLD STRUNG RADPQ DISP ST

## (undated) DEVICE — SUTURE VCRL SZ 4-0 L18IN ABSRB UD L19MM PS-2 3/8 CIR PRIM J496H

## (undated) DEVICE — TOWEL,OR,DSP,ST,BLUE,STD,6/PK,12PK/CS: Brand: MEDLINE

## (undated) DEVICE — INTENDED FOR TISSUE SEPARATION, AND OTHER PROCEDURES THAT REQUIRE A SHARP SURGICAL BLADE TO PUNCTURE OR CUT.: Brand: BARD-PARKER ® STAINLESS STEEL BLADES

## (undated) DEVICE — GLOVE SURG SZ 7 CRM LTX FREE POLYISOPRENE POLYMER BEAD ANTI

## (undated) DEVICE — SHEET, T, LAPAROTOMY, STERILE: Brand: MEDLINE

## (undated) DEVICE — APPLICATOR PREP 26ML 0.7% IOD POVACRYLEX 74% ISO ALC ST

## (undated) DEVICE — GLOVE ORANGE PI 7   MSG9070

## (undated) DEVICE — DRAPE,T,LAPARO,TRANS,STERILE: Brand: MEDLINE

## (undated) DEVICE — SUTURE ABSORBABLE BRAIDED 2-0 CT-1 27 IN UD VICRYL J259H

## (undated) DEVICE — MAT FLOOR ULTRA ABS 28X48IN

## (undated) DEVICE — YANKAUER,FLEXIBLE HANDLE,REGLR CAPACITY: Brand: MEDLINE INDUSTRIES, INC.

## (undated) DEVICE — BLADE SURG 11 NO RULER SS STRL LTX FREE DISP

## (undated) DEVICE — SOLUTION IV IRRIG WATER 1000ML POUR BRL 2F7114

## (undated) DEVICE — RETRIEVER ENDOSCP UNIV 4X5.5 CM 2.5 MMX230 CM PLAT ROTH NET

## (undated) DEVICE — ELECTRODE ELECSURG L3/4IN ODSEC1/32IN NDL TIP STRL DISP

## (undated) DEVICE — ELECTRODE ES L2.75IN S STL INSUL BLDE W/ SL EDGE

## (undated) DEVICE — SUTURE VCRL SZ 5-0 L18IN ABSRB UD L13MM P-3 3/8 CIR PRIM J493G

## (undated) DEVICE — 3M™ IOBAN™ 2 ANTIMICROBIAL INCISE DRAPE 6650EZ: Brand: IOBAN™ 2

## (undated) DEVICE — LINER SUCT CANSTR 1500CC SEMI RIG W/ POR HYDROPHOBIC SHUT

## (undated) DEVICE — SUTURE VCRL SZ 2-0 L27IN ABSRB UD L26MM CT-2 1/2 CIR J269H

## (undated) DEVICE — ACUSNARE POLYPECTOMY SNARE: Brand: ACUSNARE

## (undated) DEVICE — SYRINGE MED 50ML LUERLOCK TIP

## (undated) DEVICE — RESERVOIR,SUCTION,100CC,SILICONE: Brand: MEDLINE

## (undated) DEVICE — SUTURE ETHBND EXCEL SZ 0 L18IN NONABSORBABLE GRN L26MM MO-6 CX45D

## (undated) DEVICE — TUBING, SUCTION, 9/32" X 10', STRAIGHT: Brand: MEDLINE

## (undated) DEVICE — ANESTHESIA CIRCUIT ADULT-LF: Brand: MEDLINE INDUSTRIES, INC.

## (undated) DEVICE — SUTURE ETHIBOND EXCEL SZ 0 L30IN NONABSORBABLE GRN CT1 L36MM X424H

## (undated) DEVICE — BINDER ABD SM M W12IN CIRC 30 45IN 4 PNL E CNTCT CLSR POSTOP

## (undated) DEVICE — FREE NEEDLE

## (undated) DEVICE — COUNTER NDL 30 COUNT FOAM STRP SGL MAG

## (undated) DEVICE — SYSTEM SKIN CLOSURE 42CM DERMABOND PRINEO

## (undated) DEVICE — Z DISCONTINUED NO SUB IDED TUBING ETCO2 AD L6.5FT NSL ORAL CVD PRNG NONFLARED TIP OVR

## (undated) DEVICE — SOLUTION PREP POVIDONE IOD FOR SKIN MUCOUS MEM PRIOR TO

## (undated) DEVICE — SOLUTION IV 1000ML 0.9% SOD CHL FOR IRRIG PLAS CONT

## (undated) DEVICE — SUTURE STRATAFIX SPRL SZ 1 L14IN ABSRB VLT L48CM CTX 1/2 SXPD2B405

## (undated) DEVICE — INTRODUCER SHTH 7FR L13CM NDL 18GA GWIRE 0.035IN SYR VLV

## (undated) DEVICE — GAUZE,SPONGE,4"X4",16PLY,XRAY,STRL,LF: Brand: MEDLINE

## (undated) DEVICE — Z DUP USE 2257490 ADHESIVE SKIN CLSRE 036ML TPCL 2CTL CNCRLTE HIGH VSCSTY DRMB

## (undated) DEVICE — AGENT HEMSTAT 3GM OXIDIZED REGENERATED CELOS ABSRB FOR CONT (ORDER MULTIPLES OF 5EA)

## (undated) DEVICE — DRAIN SURG 19FR 100% SIL RADPQ RND CHN FULL FLUT

## (undated) DEVICE — SUTURE ETHBND EXCEL SZ 0 L36IN NONABSORBABLE GRN SH L26MM X524H

## (undated) DEVICE — ENDOSCOPY KIT: Brand: MEDLINE INDUSTRIES, INC.

## (undated) DEVICE — TUBING, SUCTION, 3/16" X 10', STRAIGHT: Brand: MEDLINE

## (undated) DEVICE — STRIP WND PK W0.25INXL5YD IODO GZ TIGHTLY WVN CURAD

## (undated) DEVICE — GLOVE ORANGE PI 7 1/2   MSG9075

## (undated) DEVICE — GLOVE SURG SZ 65 L12IN FNGR THK87MIL WHT LTX FREE

## (undated) DEVICE — SUTURE VICRYL SZ 4-0 L18IN ABSRB UD L16MM PS-4 1/2 CIR PRIM J507G

## (undated) DEVICE — RETRACTOR SURG WIDE FLAT LO PROF LTWT PHOTONGUIDE

## (undated) DEVICE — NEEDLE HYPO 23GA L1.5IN TURQ POLYPR HUB S STL THN WALL IM

## (undated) DEVICE — SUTURE 2-0 VCRL CTD FS-1 J443H

## (undated) DEVICE — GLOVE ORANGE PI 8   MSG9080

## (undated) DEVICE — GAUZE,SPONGE,2"X2",8PLY,STERILE,LF,2'S: Brand: MEDLINE

## (undated) DEVICE — THREE QUARTER SHEET: Brand: CONVERTORS

## (undated) DEVICE — GOWN,ECLIPSE,POLYRNF,BRTHSLV,XL,30/CS: Brand: MEDLINE

## (undated) DEVICE — Z INACTIVE USE 2660664 SOLUTION IRRIG 3000ML 0.9% SOD CHL USP UROMATIC PLAS CONT

## (undated) DEVICE — 1315 FOAM STRIP NEEDLE COUNTER: Brand: DEVON

## (undated) DEVICE — SKIN AFFIX SURG ADHESIVE 72/CS 0.55ML: Brand: MEDLINE

## (undated) DEVICE — MARKER,SKIN,WI/RULER AND LABELS: Brand: MEDLINE

## (undated) DEVICE — TOTAL TRAY, DB, 100% SILI FOLEY, 16FR 10: Brand: MEDLINE

## (undated) DEVICE — SUTURE STRATAFIX SPRL PDS + VLT 3-0 15CM DISPOSABLE/SNGLE SXPP1B420

## (undated) DEVICE — BINDER ABD UNISX 4 PNL PREM 6INX6INX12IN L XL 4

## (undated) DEVICE — SUTURE VICRYL SZ 2-0 L27IN ABSRB UD L26MM CT-2 1/2 CIR J269H

## (undated) DEVICE — GOWN,SIRUS,POLYRNF,BRTHSLV,XLN/XL,20/CS: Brand: MEDLINE

## (undated) DEVICE — BLADE ES L2.75IN ELASTOMERIC COAT DURABLE BEND UPTO 90DEG

## (undated) DEVICE — SUTURE VCRL SZ 4-0 L27IN ABSRB UD L19MM FS-2 3/8 CIR REV J422H

## (undated) DEVICE — DRAIN SURG W10MMXL20CM SIL FULL PERF HUBLESS FLAT RADPQ

## (undated) DEVICE — SUTURE VCRL SZ 3-0 L18IN ABSRB UD L26MM SH 1/2 CIR J864D

## (undated) DEVICE — CONMED ACCESSORY ELECTRODE, NEEDLE WITH EXTENDED INSULATION: Brand: CONMED

## (undated) DEVICE — TRAY PREP DRY W/ PREM GLV 2 APPL 6 SPNG 2 UNDPD 1 OVERWRAP

## (undated) DEVICE — COVER US PRB W12XL244CM SURGICAL INTRAOPERATIVE PLAS TAPR L

## (undated) DEVICE — SYRINGE IRRIG 60ML SFT PLIABLE BLB EZ TO GRP 1 HND USE W/

## (undated) DEVICE — SKIN MARKER REGULAR TIP WITH RULER CAP AND LABELS: Brand: DEVON

## (undated) DEVICE — TUBE PORT-A-CUL SWAB 11ML ST

## (undated) DEVICE — SPONGE DRN W4XL4IN RAYON/POLYESTER 6 PLY NONWOVEN PRECUT

## (undated) DEVICE — GAUZE,PACKING STRIP,IODOFORM,1/2"X5YD,ST: Brand: CURAD

## (undated) DEVICE — SUTURE PDS II SZ 1 L96IN ABSRB VLT TP-1 L65MM 1/2 CIR Z880G

## (undated) DEVICE — Device

## (undated) DEVICE — STAPLER SKIN H3.9MM WIRE DIA0.58MM CRWN 6.9MM 35 STPL ROT

## (undated) DEVICE — FAN SPRAY KIT: Brand: PULSAVAC®

## (undated) DEVICE — PRESSURE TUBING: Brand: TRUWAVE

## (undated) DEVICE — SUTURE PERMA HND 2-0 L18IN NONABSORBABLE BLK X-1 L22IN 1/2 737G

## (undated) DEVICE — SUTURE VCRL 2-0 L27IN ABSRB CT BRAID COAT UD J275H

## (undated) DEVICE — SYRINGE MED 10ML LUERLOCK TIP W/O SFTY DISP

## (undated) DEVICE — 3M™ STERI-STRIP™ COMPOUND BENZOIN TINCTURE 40 BAGS/CARTON 4 CARTONS/CASE C1544: Brand: 3M™ STERI-STRIP™

## (undated) DEVICE — DRAPE,ABDOMINAL,MAJOR,STERILE: Brand: MEDLINE

## (undated) DEVICE — PERCUTANEOUS ENTRY THINWALL NEEDLE  ONE-PART: Brand: COOK

## (undated) DEVICE — LIQUIBAND RAPID ADHESIVE 36/CS 0.8ML: Brand: MEDLINE

## (undated) DEVICE — GLOVE SURG SZ 65 CRM LTX FREE POLYISOPRENE POLYMER BEAD ANTI

## (undated) DEVICE — CONTAINER,SPECIMEN,OR STERILE,4OZ: Brand: MEDLINE

## (undated) DEVICE — Z INACTIVE USE 2735373 APPLICATOR FBR LAIN COT WOOD TIP ECONOMICAL

## (undated) DEVICE — SUTURE VCRL SZ 3-0 L27IN ABSRB UD L17MM RB-1 1/2 CIR J215H

## (undated) DEVICE — GLOVE SURG SZ 6 THK91MIL LTX FREE SYN POLYISOPRENE ANTI

## (undated) DEVICE — STRIP SKIN CLSR W0.25XL4IN WHT SPUNBOUND FBR NYL HI ADH

## (undated) DEVICE — SOLUTION PREP 4OZ 3% H PEROX 1ST AID ANTISEP ORAL DEBRIDING

## (undated) DEVICE — MARKER RAD MARGINMRK

## (undated) DEVICE — APPLICATOR MEDICATED 26 CC SOLUTION HI LT ORNG CHLORAPREP

## (undated) DEVICE — STOPCOCK IV HI PRSS 1050PSI NDLLSS INJ 3 W LUER SWVL NUT

## (undated) DEVICE — MARKER SURG MARGIN STD 6 CLR INK ASST CORR CLP

## (undated) DEVICE — GLOVE SURG SZ 65 L12IN FNGR THK79MIL GRN LTX FREE

## (undated) DEVICE — GLOVE SURG SZ 7 L12IN FNGR THK87MIL WHT LTX FREE

## (undated) DEVICE — SYRINGE MED 10ML TRNSLUC BRL PLUNG BLK MRK POLYPR CTRL

## (undated) DEVICE — GLOVE SURG SZ 65 THK91MIL LTX FREE SYN POLYISOPRENE

## (undated) DEVICE — GOWN,ECLIPSE,POLYRNF,BRTHSLV,L,30/CS: Brand: MEDLINE

## (undated) DEVICE — DISCONTINUED USE 111569 BF APPLICATOR COTN TIP 6IN ST

## (undated) DEVICE — RADIOGRAPHY DEVICE SPECIMEN TRANSPEC

## (undated) DEVICE — SUTURE PROL SZ 1 L30IN NONABSORBABLE BLU CTX L48MM 1/2 CIR 8455H

## (undated) DEVICE — Z DISCONTINUED (USE MFG CAT MVABO)  TUBING GAS SAMPLING STD 6.5 FT FEMALE CONN SMRT CAPNOLINE

## (undated) DEVICE — STRIP SKIN CLSR W1XL5IN NYL REINF CURAD

## (undated) DEVICE — SUTURE VCRL SZ 2-0 L27IN ABSRB UD L26MM SH 1/2 CIR J417H

## (undated) DEVICE — FORCEPS BX L240CM JAW DIA2.8MM L CAP W/ NDL MIC MESH TOOTH

## (undated) DEVICE — BANDAGE GZ W2XL75IN ST RAYON POLY CNFRM STRTCH LTWT

## (undated) DEVICE — Z INACTIVE USE 2635503 SOLUTION IRRIG 3000ML ST H2O USP UROMATIC PLAS CONT